# Patient Record
Sex: FEMALE | URBAN - METROPOLITAN AREA
[De-identification: names, ages, dates, MRNs, and addresses within clinical notes are randomized per-mention and may not be internally consistent; named-entity substitution may affect disease eponyms.]

---

## 2017-02-02 ENCOUNTER — EMERGENCY (EMERGENCY)
Facility: HOSPITAL | Age: 57
LOS: 1 days | Discharge: HOME | End: 2017-02-02
Attending: EMERGENCY MEDICINE
Payer: MEDICARE

## 2017-02-02 VITALS
TEMPERATURE: 98 F | SYSTOLIC BLOOD PRESSURE: 111 MMHG | HEIGHT: 56 IN | DIASTOLIC BLOOD PRESSURE: 71 MMHG | HEART RATE: 68 BPM | RESPIRATION RATE: 16 BRPM | OXYGEN SATURATION: 94 % | WEIGHT: 100.09 LBS

## 2017-02-02 VITALS
SYSTOLIC BLOOD PRESSURE: 98 MMHG | DIASTOLIC BLOOD PRESSURE: 61 MMHG | HEART RATE: 64 BPM | RESPIRATION RATE: 16 BRPM | OXYGEN SATURATION: 100 % | TEMPERATURE: 98 F

## 2017-02-02 DIAGNOSIS — Z00.00 ENCOUNTER FOR GENERAL ADULT MEDICAL EXAMINATION WITHOUT ABNORMAL FINDINGS: ICD-10-CM

## 2017-02-02 DIAGNOSIS — I49.1 ATRIAL PREMATURE DEPOLARIZATION: ICD-10-CM

## 2017-02-02 DIAGNOSIS — F79 UNSPECIFIED INTELLECTUAL DISABILITIES: ICD-10-CM

## 2017-02-02 DIAGNOSIS — R56.9 UNSPECIFIED CONVULSIONS: ICD-10-CM

## 2017-02-02 DIAGNOSIS — R10.2 PELVIC AND PERINEAL PAIN: ICD-10-CM

## 2017-02-02 LAB
ANION GAP SERPL CALC-SCNC: 11 MMOL/L — SIGNIFICANT CHANGE UP (ref 5–17)
APTT BLD: 30 SEC — SIGNIFICANT CHANGE UP (ref 27.5–37.4)
BUN SERPL-MCNC: 23 MG/DL — HIGH (ref 8–20)
CALCIUM SERPL-MCNC: 8.6 MG/DL — SIGNIFICANT CHANGE UP (ref 8.6–10.2)
CHLORIDE SERPL-SCNC: 99 MMOL/L — SIGNIFICANT CHANGE UP (ref 98–107)
CO2 SERPL-SCNC: 28 MMOL/L — SIGNIFICANT CHANGE UP (ref 22–29)
CREAT SERPL-MCNC: 0.79 MG/DL — SIGNIFICANT CHANGE UP (ref 0.5–1.3)
GLUCOSE SERPL-MCNC: 86 MG/DL — SIGNIFICANT CHANGE UP (ref 70–115)
HCT VFR BLD CALC: 38.3 % — SIGNIFICANT CHANGE UP (ref 37–47)
HGB BLD-MCNC: 12.6 G/DL — SIGNIFICANT CHANGE UP (ref 12–16)
INR BLD: 1.01 RATIO — SIGNIFICANT CHANGE UP (ref 0.88–1.16)
MCHC RBC-ENTMCNC: 32.4 PG — HIGH (ref 27–31)
MCHC RBC-ENTMCNC: 32.9 G/DL — SIGNIFICANT CHANGE UP (ref 32–36)
MCV RBC AUTO: 98.5 FL — SIGNIFICANT CHANGE UP (ref 81–99)
PLATELET # BLD AUTO: 264 K/UL — SIGNIFICANT CHANGE UP (ref 150–400)
POTASSIUM SERPL-MCNC: 5.6 MMOL/L — HIGH (ref 3.5–5.3)
POTASSIUM SERPL-SCNC: 5.6 MMOL/L — HIGH (ref 3.5–5.3)
PROTHROM AB SERPL-ACNC: 11.1 SEC — SIGNIFICANT CHANGE UP (ref 10–13.1)
RBC # BLD: 3.89 M/UL — LOW (ref 4.4–5.2)
RBC # FLD: 14.9 % — SIGNIFICANT CHANGE UP (ref 11–15.6)
SODIUM SERPL-SCNC: 138 MMOL/L — SIGNIFICANT CHANGE UP (ref 135–145)
WBC # BLD: 7.82 K/UL — SIGNIFICANT CHANGE UP (ref 4.8–10.8)
WBC # FLD AUTO: 7.82 K/UL — SIGNIFICANT CHANGE UP (ref 4.8–10.8)

## 2017-02-02 PROCEDURE — 80048 BASIC METABOLIC PNL TOTAL CA: CPT

## 2017-02-02 PROCEDURE — 70450 CT HEAD/BRAIN W/O DYE: CPT

## 2017-02-02 PROCEDURE — 70450 CT HEAD/BRAIN W/O DYE: CPT | Mod: 26

## 2017-02-02 PROCEDURE — 99284 EMERGENCY DEPT VISIT MOD MDM: CPT

## 2017-02-02 PROCEDURE — 85610 PROTHROMBIN TIME: CPT

## 2017-02-02 PROCEDURE — 85027 COMPLETE CBC AUTOMATED: CPT

## 2017-02-02 PROCEDURE — 85730 THROMBOPLASTIN TIME PARTIAL: CPT

## 2017-02-02 PROCEDURE — 99284 EMERGENCY DEPT VISIT MOD MDM: CPT | Mod: 25

## 2017-02-02 RX ORDER — SODIUM CHLORIDE 9 MG/ML
3 INJECTION INTRAMUSCULAR; INTRAVENOUS; SUBCUTANEOUS ONCE
Qty: 0 | Refills: 0 | Status: COMPLETED | OUTPATIENT
Start: 2017-02-02 | End: 2017-02-02

## 2017-02-02 RX ORDER — RISPERIDONE 4 MG/1
0 TABLET ORAL
Qty: 0 | Refills: 0 | COMMUNITY

## 2017-02-02 RX ORDER — LEVOTHYROXINE SODIUM 125 MCG
0 TABLET ORAL
Qty: 0 | Refills: 0 | COMMUNITY

## 2017-02-02 RX ORDER — MEMANTINE HYDROCHLORIDE 10 MG/1
0 TABLET ORAL
Qty: 0 | Refills: 0 | COMMUNITY

## 2017-02-02 RX ORDER — SERTRALINE 25 MG/1
0 TABLET, FILM COATED ORAL
Qty: 0 | Refills: 0 | COMMUNITY

## 2017-02-02 RX ADMIN — SODIUM CHLORIDE 3 MILLILITER(S): 9 INJECTION INTRAMUSCULAR; INTRAVENOUS; SUBCUTANEOUS at 18:21

## 2017-02-02 NOTE — ED ADULT TRIAGE NOTE - CHIEF COMPLAINT QUOTE
BIBA, patient is awake and oriented to person only, near baseline, per ems, patient was sent by tere LAKHANI for eval of a seizure, patient denies any pain or injury

## 2017-02-02 NOTE — ED PROVIDER NOTE - CHPI ED SYMPTOMS NEG
no fever/no pain/no numbness/no tingling/no nausea/no decreased eating/drinking/no vomiting/no weakness/no chills/no dizziness

## 2017-02-02 NOTE — ED PROVIDER NOTE - OBJECTIVE STATEMENT
PT IS A 57 YO FEMALE WITH MENTAL RETARDATION, MICROCEPHALY,  WHO PRESENTS AFTER BEING FOUND DOWN ON THE GROUND. PT WAS IN HER WHEELCHAIR WAITING TO LOAD INTO THE TRANSPORTATION VEHICLE AND WHEN STAFF WENT TO LOAD HER SHE WAS ON THE GROUND.  NO SEIZURE ACTIVITY SEEN. NO LOC.  NO VOMITING. NO ABNORMAL BEHAVIOR. PT IS UNDERGOING NEUROLOGIC EVALUATION BY  NEURO FOR RECENT EPISODES OF FACIAL TWITCHING. SHE IS SCHEDULED FOR AN EEG AND HAD A RECENT CT HEAD APPROXIMATELY 2-4 WEEKS AGO. RESULTS UNKNOWN.

## 2017-02-02 NOTE — ED ADULT NURSE NOTE - OBJECTIVE STATEMENT
Patient is awake and oriented to person only. Patient was sent by tere LAKHANI for eval of a seizure. patient of history downs syndrome. VSS. clear BBS. Abd soft, nondistended and nontender. moving all extremities well. Patient is awake and oriented to person only. s/p fall. no injury noted. no loc. Denies head injury. Patient was sent by tere LAKHANI for eval of a seizure. patient of history downs syndrome. VSS. clear BBS. Abd soft, nondistended and nontender. moving all extremities well. Patient is awake and oriented to person only (baseline). s/p fall out of the chair, last seeing in the chair as per EMS. no injury noted. no loc. Denies head injury. Patient was sent by tere LAKHANI for eval of a seizure. patient of history downs syndrome. VSS. clear BBS. Abd soft, nondistended and nontender. moving all extremities well.

## 2017-02-02 NOTE — ED PROVIDER NOTE - MEDICAL DECISION MAKING DETAILS
PT FOUND ON GROUND. LIKELY FALL OUT OF WHEELCHAIR. CHECKING LABS AND CT HEAD TO RULE OUT ACUTE PATHOLOGY. PT UNDERGOING NEUROLOGIC EVALUATION. MOST LIKELY DOES NOT HAVE UPPER BODY STRENGTH TO SELF MAINTAIN IN WHEELCHAIR. LABS AND CT ORDERED PT FOUND ON GROUND. LIKELY FALL OUT OF WHEELCHAIR. CHECKING LABS AND CT HEAD TO RULE OUT ACUTE PATHOLOGY. PT UNDERGOING NEUROLOGIC EVALUATION. MOST LIKELY DOES NOT HAVE UPPER BODY STRENGTH TO SELF MAINTAIN IN WHEELCHAIR. LABS AND CT ORDERED  NORMAL EXAM AND RESULTS. AMBULATORY. D/C

## 2019-01-01 ENCOUNTER — APPOINTMENT (OUTPATIENT)
Dept: RADIOLOGY | Facility: HOSPITAL | Age: 59
End: 2019-01-01
Payer: MEDICARE

## 2019-01-01 ENCOUNTER — OUTPATIENT (OUTPATIENT)
Dept: OUTPATIENT SERVICES | Facility: HOSPITAL | Age: 59
LOS: 1 days | End: 2019-01-01
Payer: MEDICARE

## 2019-01-01 ENCOUNTER — EMERGENCY (EMERGENCY)
Facility: HOSPITAL | Age: 59
LOS: 1 days | Discharge: HOME | End: 2019-01-01
Attending: INTERNAL MEDICINE | Admitting: EMERGENCY MEDICINE
Payer: MEDICARE

## 2019-01-01 ENCOUNTER — APPOINTMENT (OUTPATIENT)
Dept: ULTRASOUND IMAGING | Facility: HOSPITAL | Age: 59
End: 2019-01-01
Payer: MEDICARE

## 2019-01-01 VITALS
SYSTOLIC BLOOD PRESSURE: 114 MMHG | DIASTOLIC BLOOD PRESSURE: 65 MMHG | HEART RATE: 59 BPM | OXYGEN SATURATION: 100 % | RESPIRATION RATE: 16 BRPM

## 2019-01-01 VITALS
WEIGHT: 145.06 LBS | HEIGHT: 62 IN | OXYGEN SATURATION: 98 % | HEART RATE: 80 BPM | SYSTOLIC BLOOD PRESSURE: 92 MMHG | RESPIRATION RATE: 16 BRPM | TEMPERATURE: 97 F | DIASTOLIC BLOOD PRESSURE: 58 MMHG

## 2019-01-01 DIAGNOSIS — Z00.8 ENCOUNTER FOR OTHER GENERAL EXAMINATION: ICD-10-CM

## 2019-01-01 LAB
-  AMIKACIN: SIGNIFICANT CHANGE UP
-  AMPICILLIN/SULBACTAM: SIGNIFICANT CHANGE UP
-  AMPICILLIN: SIGNIFICANT CHANGE UP
-  AZTREONAM: SIGNIFICANT CHANGE UP
-  CEFAZOLIN: SIGNIFICANT CHANGE UP
-  CEFEPIME: SIGNIFICANT CHANGE UP
-  CEFOXITIN: SIGNIFICANT CHANGE UP
-  CEFTRIAXONE: SIGNIFICANT CHANGE UP
-  CIPROFLOXACIN: SIGNIFICANT CHANGE UP
-  GENTAMICIN: SIGNIFICANT CHANGE UP
-  IMIPENEM: SIGNIFICANT CHANGE UP
-  LEVOFLOXACIN: SIGNIFICANT CHANGE UP
-  MEROPENEM: SIGNIFICANT CHANGE UP
-  NITROFURANTOIN: SIGNIFICANT CHANGE UP
-  PIPERACILLIN/TAZOBACTAM: SIGNIFICANT CHANGE UP
-  TIGECYCLINE: SIGNIFICANT CHANGE UP
-  TOBRAMYCIN: SIGNIFICANT CHANGE UP
-  TRIMETHOPRIM/SULFAMETHOXAZOLE: SIGNIFICANT CHANGE UP
ALBUMIN SERPL ELPH-MCNC: 2.8 G/DL — LOW (ref 3.3–5)
ALP SERPL-CCNC: 121 U/L — HIGH (ref 40–120)
ALT FLD-CCNC: 38 U/L — SIGNIFICANT CHANGE UP (ref 10–45)
ANION GAP SERPL CALC-SCNC: 6 MMOL/L — SIGNIFICANT CHANGE UP (ref 5–17)
ANION GAP SERPL CALC-SCNC: 9 MMOL/L — SIGNIFICANT CHANGE UP (ref 5–17)
APPEARANCE UR: CLEAR — SIGNIFICANT CHANGE UP
AST SERPL-CCNC: 43 U/L — HIGH (ref 10–40)
BACTERIA # UR AUTO: ABNORMAL /HPF
BASOPHILS # BLD AUTO: 0.06 K/UL — SIGNIFICANT CHANGE UP (ref 0–0.2)
BASOPHILS NFR BLD AUTO: 1.2 % — SIGNIFICANT CHANGE UP (ref 0–2)
BILIRUB SERPL-MCNC: 0.3 MG/DL — SIGNIFICANT CHANGE UP (ref 0.2–1.2)
BILIRUB UR-MCNC: NEGATIVE — SIGNIFICANT CHANGE UP
BUN SERPL-MCNC: 16 MG/DL — SIGNIFICANT CHANGE UP (ref 7–23)
BUN SERPL-MCNC: 17 MG/DL — SIGNIFICANT CHANGE UP (ref 7–23)
CALCIUM SERPL-MCNC: 8.3 MG/DL — LOW (ref 8.4–10.5)
CALCIUM SERPL-MCNC: 8.8 MG/DL — SIGNIFICANT CHANGE UP (ref 8.4–10.5)
CHLORIDE SERPL-SCNC: 105 MMOL/L — SIGNIFICANT CHANGE UP (ref 96–108)
CHLORIDE SERPL-SCNC: 106 MMOL/L — SIGNIFICANT CHANGE UP (ref 96–108)
CO2 SERPL-SCNC: 25 MMOL/L — SIGNIFICANT CHANGE UP (ref 22–31)
CO2 SERPL-SCNC: 30 MMOL/L — SIGNIFICANT CHANGE UP (ref 22–31)
COLOR SPEC: YELLOW — SIGNIFICANT CHANGE UP
CREAT SERPL-MCNC: 0.82 MG/DL — SIGNIFICANT CHANGE UP (ref 0.5–1.3)
CREAT SERPL-MCNC: 0.9 MG/DL — SIGNIFICANT CHANGE UP (ref 0.5–1.3)
CULTURE RESULTS: SIGNIFICANT CHANGE UP
DIFF PNL FLD: NEGATIVE — SIGNIFICANT CHANGE UP
EOSINOPHIL # BLD AUTO: 0.06 K/UL — SIGNIFICANT CHANGE UP (ref 0–0.5)
EOSINOPHIL NFR BLD AUTO: 1.2 % — SIGNIFICANT CHANGE UP (ref 0–6)
EPI CELLS # UR: SIGNIFICANT CHANGE UP
GLUCOSE SERPL-MCNC: 149 MG/DL — HIGH (ref 70–99)
GLUCOSE SERPL-MCNC: 97 MG/DL — SIGNIFICANT CHANGE UP (ref 70–99)
GLUCOSE UR QL: NEGATIVE — SIGNIFICANT CHANGE UP
HCT VFR BLD CALC: 40.3 % — SIGNIFICANT CHANGE UP (ref 34.5–45)
HGB BLD-MCNC: 13.4 G/DL — SIGNIFICANT CHANGE UP (ref 11.5–15.5)
IMM GRANULOCYTES NFR BLD AUTO: 0.2 % — SIGNIFICANT CHANGE UP (ref 0–1.5)
KETONES UR-MCNC: NEGATIVE — SIGNIFICANT CHANGE UP
LEUKOCYTE ESTERASE UR-ACNC: ABNORMAL
LYMPHOCYTES # BLD AUTO: 1.47 K/UL — SIGNIFICANT CHANGE UP (ref 1–3.3)
LYMPHOCYTES # BLD AUTO: 29.5 % — SIGNIFICANT CHANGE UP (ref 13–44)
MCHC RBC-ENTMCNC: 33.3 GM/DL — SIGNIFICANT CHANGE UP (ref 32–36)
MCHC RBC-ENTMCNC: 34.2 PG — HIGH (ref 27–34)
MCV RBC AUTO: 102.8 FL — HIGH (ref 80–100)
METHOD TYPE: SIGNIFICANT CHANGE UP
MONOCYTES # BLD AUTO: 0.52 K/UL — SIGNIFICANT CHANGE UP (ref 0–0.9)
MONOCYTES NFR BLD AUTO: 10.4 % — SIGNIFICANT CHANGE UP (ref 2–14)
NEUTROPHILS # BLD AUTO: 2.86 K/UL — SIGNIFICANT CHANGE UP (ref 1.8–7.4)
NEUTROPHILS NFR BLD AUTO: 57.5 % — SIGNIFICANT CHANGE UP (ref 43–77)
NITRITE UR-MCNC: POSITIVE
NRBC # BLD: 0 /100 WBCS — SIGNIFICANT CHANGE UP (ref 0–0)
ORGANISM # SPEC MICROSCOPIC CNT: SIGNIFICANT CHANGE UP
ORGANISM # SPEC MICROSCOPIC CNT: SIGNIFICANT CHANGE UP
PH UR: 6.5 — SIGNIFICANT CHANGE UP (ref 5–8)
PLATELET # BLD AUTO: 200 K/UL — SIGNIFICANT CHANGE UP (ref 150–400)
POTASSIUM SERPL-MCNC: 5.7 MMOL/L — HIGH (ref 3.5–5.3)
POTASSIUM SERPL-MCNC: 6 MMOL/L — HIGH (ref 3.5–5.3)
POTASSIUM SERPL-SCNC: 5.7 MMOL/L — HIGH (ref 3.5–5.3)
POTASSIUM SERPL-SCNC: 6 MMOL/L — HIGH (ref 3.5–5.3)
PROT SERPL-MCNC: 7.2 G/DL — SIGNIFICANT CHANGE UP (ref 6–8.3)
PROT UR-MCNC: NEGATIVE — SIGNIFICANT CHANGE UP
RBC # BLD: 3.92 M/UL — SIGNIFICANT CHANGE UP (ref 3.8–5.2)
RBC # FLD: 13.4 % — SIGNIFICANT CHANGE UP (ref 10.3–14.5)
RBC CASTS # UR COMP ASSIST: SIGNIFICANT CHANGE UP /HPF (ref 0–4)
SODIUM SERPL-SCNC: 139 MMOL/L — SIGNIFICANT CHANGE UP (ref 135–145)
SODIUM SERPL-SCNC: 142 MMOL/L — SIGNIFICANT CHANGE UP (ref 135–145)
SP GR SPEC: 1.01 — SIGNIFICANT CHANGE UP (ref 1.01–1.02)
SPECIMEN SOURCE: SIGNIFICANT CHANGE UP
UROBILINOGEN FLD QL: NEGATIVE — SIGNIFICANT CHANGE UP
WBC # BLD: 4.98 K/UL — SIGNIFICANT CHANGE UP (ref 3.8–10.5)
WBC # FLD AUTO: 4.98 K/UL — SIGNIFICANT CHANGE UP (ref 3.8–10.5)
WBC UR QL: ABNORMAL /HPF (ref 0–5)

## 2019-01-01 PROCEDURE — 80053 COMPREHEN METABOLIC PANEL: CPT

## 2019-01-01 PROCEDURE — 85027 COMPLETE CBC AUTOMATED: CPT

## 2019-01-01 PROCEDURE — 81001 URINALYSIS AUTO W/SCOPE: CPT

## 2019-01-01 PROCEDURE — 96365 THER/PROPH/DIAG IV INF INIT: CPT

## 2019-01-01 PROCEDURE — 87186 SC STD MICRODIL/AGAR DIL: CPT

## 2019-01-01 PROCEDURE — 76641 ULTRASOUND BREAST COMPLETE: CPT | Mod: 26,50

## 2019-01-01 PROCEDURE — 77080 DXA BONE DENSITY AXIAL: CPT | Mod: 26

## 2019-01-01 PROCEDURE — 77080 DXA BONE DENSITY AXIAL: CPT

## 2019-01-01 PROCEDURE — 80048 BASIC METABOLIC PNL TOTAL CA: CPT

## 2019-01-01 PROCEDURE — 99284 EMERGENCY DEPT VISIT MOD MDM: CPT

## 2019-01-01 PROCEDURE — 76641 ULTRASOUND BREAST COMPLETE: CPT

## 2019-01-01 PROCEDURE — 36415 COLL VENOUS BLD VENIPUNCTURE: CPT

## 2019-01-01 PROCEDURE — 99284 EMERGENCY DEPT VISIT MOD MDM: CPT | Mod: 25

## 2019-01-01 PROCEDURE — 87086 URINE CULTURE/COLONY COUNT: CPT

## 2019-01-01 PROCEDURE — 96367 TX/PROPH/DG ADDL SEQ IV INF: CPT

## 2019-01-01 PROCEDURE — 96368 THER/DIAG CONCURRENT INF: CPT

## 2019-01-01 RX ORDER — LEVETIRACETAM 250 MG/1
500 TABLET, FILM COATED ORAL ONCE
Refills: 0 | Status: COMPLETED | OUTPATIENT
Start: 2019-01-01 | End: 2019-01-01

## 2019-01-01 RX ORDER — CIPROFLOXACIN LACTATE 400MG/40ML
200 VIAL (ML) INTRAVENOUS ONCE
Refills: 0 | Status: COMPLETED | OUTPATIENT
Start: 2019-01-01 | End: 2019-01-01

## 2019-01-01 RX ORDER — AZTREONAM 2 G
1000 VIAL (EA) INJECTION ONCE
Refills: 0 | Status: COMPLETED | OUTPATIENT
Start: 2019-01-01 | End: 2019-01-01

## 2019-01-01 RX ORDER — NITROFURANTOIN MACROCRYSTAL 50 MG
1 CAPSULE ORAL
Qty: 20 | Refills: 0
Start: 2019-01-01 | End: 2020-01-01

## 2019-01-01 RX ORDER — SODIUM CHLORIDE 9 MG/ML
1000 INJECTION INTRAMUSCULAR; INTRAVENOUS; SUBCUTANEOUS ONCE
Refills: 0 | Status: COMPLETED | OUTPATIENT
Start: 2019-01-01 | End: 2019-01-01

## 2019-01-01 RX ORDER — CEFTRIAXONE 500 MG/1
1000 INJECTION, POWDER, FOR SOLUTION INTRAMUSCULAR; INTRAVENOUS ONCE
Refills: 0 | Status: DISCONTINUED | OUTPATIENT
Start: 2019-01-01 | End: 2019-01-01

## 2019-01-01 RX ADMIN — Medication 1000 MILLIGRAM(S): at 16:34

## 2019-01-01 RX ADMIN — SODIUM CHLORIDE 1000 MILLILITER(S): 9 INJECTION INTRAMUSCULAR; INTRAVENOUS; SUBCUTANEOUS at 14:06

## 2019-01-01 RX ADMIN — Medication 200 MILLIGRAM(S): at 15:42

## 2019-01-01 RX ADMIN — LEVETIRACETAM 400 MILLIGRAM(S): 250 TABLET, FILM COATED ORAL at 14:13

## 2019-01-01 RX ADMIN — SODIUM CHLORIDE 1000 MILLILITER(S): 9 INJECTION INTRAMUSCULAR; INTRAVENOUS; SUBCUTANEOUS at 15:44

## 2019-01-01 RX ADMIN — Medication 100 MILLIGRAM(S): at 14:31

## 2019-01-01 RX ADMIN — LEVETIRACETAM 500 MILLIGRAM(S): 250 TABLET, FILM COATED ORAL at 15:42

## 2019-01-01 RX ADMIN — Medication 50 MILLIGRAM(S): at 15:44

## 2019-08-08 ENCOUNTER — TRANSCRIPTION ENCOUNTER (OUTPATIENT)
Age: 59
End: 2019-08-08

## 2019-08-21 PROBLEM — Z00.00 ENCOUNTER FOR PREVENTIVE HEALTH EXAMINATION: Status: ACTIVE | Noted: 2019-08-21

## 2019-09-23 ENCOUNTER — EMERGENCY (EMERGENCY)
Facility: HOSPITAL | Age: 59
LOS: 1 days | Discharge: HOME | End: 2019-09-23
Attending: EMERGENCY MEDICINE | Admitting: EMERGENCY MEDICINE
Payer: MEDICARE

## 2019-09-23 VITALS
RESPIRATION RATE: 17 BRPM | DIASTOLIC BLOOD PRESSURE: 76 MMHG | TEMPERATURE: 98 F | WEIGHT: 98.99 LBS | HEART RATE: 77 BPM | OXYGEN SATURATION: 94 % | SYSTOLIC BLOOD PRESSURE: 128 MMHG

## 2019-09-23 VITALS
DIASTOLIC BLOOD PRESSURE: 55 MMHG | HEART RATE: 76 BPM | OXYGEN SATURATION: 95 % | RESPIRATION RATE: 16 BRPM | SYSTOLIC BLOOD PRESSURE: 109 MMHG

## 2019-09-23 LAB
ALBUMIN SERPL ELPH-MCNC: 2.9 G/DL — LOW (ref 3.3–5)
ALP SERPL-CCNC: 268 U/L — HIGH (ref 40–120)
ALT FLD-CCNC: 22 U/L — SIGNIFICANT CHANGE UP (ref 10–45)
ANION GAP SERPL CALC-SCNC: 6 MMOL/L — SIGNIFICANT CHANGE UP (ref 5–17)
APPEARANCE UR: CLEAR — SIGNIFICANT CHANGE UP
AST SERPL-CCNC: 27 U/L — SIGNIFICANT CHANGE UP (ref 10–40)
BACTERIA # UR AUTO: ABNORMAL /HPF
BASOPHILS # BLD AUTO: 0.07 K/UL — SIGNIFICANT CHANGE UP (ref 0–0.2)
BASOPHILS NFR BLD AUTO: 1.6 % — SIGNIFICANT CHANGE UP (ref 0–2)
BILIRUB SERPL-MCNC: 0.3 MG/DL — SIGNIFICANT CHANGE UP (ref 0.2–1.2)
BILIRUB UR-MCNC: NEGATIVE — SIGNIFICANT CHANGE UP
BUN SERPL-MCNC: 13 MG/DL — SIGNIFICANT CHANGE UP (ref 7–23)
CALCIUM SERPL-MCNC: 9.6 MG/DL — SIGNIFICANT CHANGE UP (ref 8.4–10.5)
CHLORIDE SERPL-SCNC: 105 MMOL/L — SIGNIFICANT CHANGE UP (ref 96–108)
CO2 SERPL-SCNC: 30 MMOL/L — SIGNIFICANT CHANGE UP (ref 22–31)
COLOR SPEC: YELLOW — SIGNIFICANT CHANGE UP
CREAT SERPL-MCNC: 0.78 MG/DL — SIGNIFICANT CHANGE UP (ref 0.5–1.3)
DIFF PNL FLD: NEGATIVE — SIGNIFICANT CHANGE UP
EOSINOPHIL # BLD AUTO: 0.05 K/UL — SIGNIFICANT CHANGE UP (ref 0–0.5)
EOSINOPHIL NFR BLD AUTO: 1.2 % — SIGNIFICANT CHANGE UP (ref 0–6)
EPI CELLS # UR: SIGNIFICANT CHANGE UP
GLUCOSE SERPL-MCNC: 104 MG/DL — HIGH (ref 70–99)
GLUCOSE UR QL: NEGATIVE — SIGNIFICANT CHANGE UP
HCT VFR BLD CALC: 41.5 % — SIGNIFICANT CHANGE UP (ref 34.5–45)
HGB BLD-MCNC: 13.6 G/DL — SIGNIFICANT CHANGE UP (ref 11.5–15.5)
IMM GRANULOCYTES NFR BLD AUTO: 0.2 % — SIGNIFICANT CHANGE UP (ref 0–1.5)
KETONES UR-MCNC: NEGATIVE — SIGNIFICANT CHANGE UP
LEUKOCYTE ESTERASE UR-ACNC: ABNORMAL
LYMPHOCYTES # BLD AUTO: 1.94 K/UL — SIGNIFICANT CHANGE UP (ref 1–3.3)
LYMPHOCYTES # BLD AUTO: 45 % — HIGH (ref 13–44)
MCHC RBC-ENTMCNC: 32.8 GM/DL — SIGNIFICANT CHANGE UP (ref 32–36)
MCHC RBC-ENTMCNC: 34.1 PG — HIGH (ref 27–34)
MCV RBC AUTO: 104 FL — HIGH (ref 80–100)
MONOCYTES # BLD AUTO: 0.57 K/UL — SIGNIFICANT CHANGE UP (ref 0–0.9)
MONOCYTES NFR BLD AUTO: 13.2 % — SIGNIFICANT CHANGE UP (ref 2–14)
NEUTROPHILS # BLD AUTO: 1.67 K/UL — LOW (ref 1.8–7.4)
NEUTROPHILS NFR BLD AUTO: 38.8 % — LOW (ref 43–77)
NITRITE UR-MCNC: POSITIVE
NRBC # BLD: 0 /100 WBCS — SIGNIFICANT CHANGE UP (ref 0–0)
PH UR: 6 — SIGNIFICANT CHANGE UP (ref 5–8)
PLATELET # BLD AUTO: 292 K/UL — SIGNIFICANT CHANGE UP (ref 150–400)
POTASSIUM SERPL-MCNC: 4.5 MMOL/L — SIGNIFICANT CHANGE UP (ref 3.5–5.3)
POTASSIUM SERPL-SCNC: 4.5 MMOL/L — SIGNIFICANT CHANGE UP (ref 3.5–5.3)
PROT SERPL-MCNC: 8.1 G/DL — SIGNIFICANT CHANGE UP (ref 6–8.3)
PROT UR-MCNC: NEGATIVE — SIGNIFICANT CHANGE UP
RBC # BLD: 3.99 M/UL — SIGNIFICANT CHANGE UP (ref 3.8–5.2)
RBC # FLD: 12.3 % — SIGNIFICANT CHANGE UP (ref 10.3–14.5)
RBC CASTS # UR COMP ASSIST: SIGNIFICANT CHANGE UP /HPF (ref 0–4)
SODIUM SERPL-SCNC: 141 MMOL/L — SIGNIFICANT CHANGE UP (ref 135–145)
SP GR SPEC: 1.01 — SIGNIFICANT CHANGE UP (ref 1.01–1.02)
UROBILINOGEN FLD QL: NEGATIVE — SIGNIFICANT CHANGE UP
WBC # BLD: 4.31 K/UL — SIGNIFICANT CHANGE UP (ref 3.8–10.5)
WBC # FLD AUTO: 4.31 K/UL — SIGNIFICANT CHANGE UP (ref 3.8–10.5)
WBC UR QL: SIGNIFICANT CHANGE UP /HPF (ref 0–5)

## 2019-09-23 PROCEDURE — 99283 EMERGENCY DEPT VISIT LOW MDM: CPT

## 2019-09-23 PROCEDURE — 80053 COMPREHEN METABOLIC PANEL: CPT

## 2019-09-23 PROCEDURE — 36415 COLL VENOUS BLD VENIPUNCTURE: CPT

## 2019-09-23 PROCEDURE — 85027 COMPLETE CBC AUTOMATED: CPT

## 2019-09-23 PROCEDURE — 99284 EMERGENCY DEPT VISIT MOD MDM: CPT

## 2019-09-23 PROCEDURE — 87086 URINE CULTURE/COLONY COUNT: CPT

## 2019-09-23 PROCEDURE — 81001 URINALYSIS AUTO W/SCOPE: CPT

## 2019-09-23 PROCEDURE — 87186 SC STD MICRODIL/AGAR DIL: CPT

## 2019-09-23 RX ORDER — AZTREONAM 2 G
1 VIAL (EA) INJECTION
Qty: 14 | Refills: 0
Start: 2019-09-23 | End: 2019-09-29

## 2019-09-23 RX ADMIN — Medication 1 TABLET(S): at 20:35

## 2019-09-23 NOTE — ED PROVIDER NOTE - OBJECTIVE STATEMENT
58 y/o F with h/o Down Syndrome, Hypothyroidism, frequent UTI's pw "foul smelling urine" and "shaking" x 1 day. No known fever, abdominal pain, nausea, vomiting, 60 y/o F sent in from [facility] with h/o Down Syndrome, Hypothyroidism, frequent UTI's pw "foul smelling urine" and "shaking" x 1 day. No known fever, abdominal pain, nausea, vomiting, diarrhea. Accompanied with aid.  JACKSON- Snow

## 2019-09-23 NOTE — ED PROVIDER NOTE - CLINICAL SUMMARY MEDICAL DECISION MAKING FREE TEXT BOX
58 y/o F sent in from [facility] with h/o Down Syndrome, Hypothyroidism, frequent UTI's pw "foul smelling urine" and "shaking" x 1 day. No known fever, abdominal pain, nausea, vomiting, diarrhea. Responds to tactile and verbal stimulation but does not follow commands nor answer questions. Vital signs stable upon arrival. Will obtain basic labs, UA, UCX, administer ABX, PMD follow up Rangel: 58 y/o F sent in from [facility] with h/o Down Syndrome, Hypothyroidism, frequent UTI's pw "foul smelling urine" and "shaking" x 1 day. No known fever, abdominal pain, nausea, vomiting, diarrhea. Responds to tactile and verbal stimulation but does not follow commands nor answer questions. Vital signs stable upon arrival. Will obtain basic labs, UA, UCX, administer ABX, PMD follow up

## 2019-09-23 NOTE — ED ADULT NURSE NOTE - NSIMPLEMENTINTERV_GEN_ALL_ED
Implemented All Universal Safety Interventions:  [place] to call system. Call bell, personal items and telephone within reach. Instruct patient to call for assistance. Room bathroom lighting operational. Non-slip footwear when patient is off stretcher. Physically safe environment: no spills, clutter or unnecessary equipment. Stretcher in lowest position, wheels locked, appropriate side rails in place.

## 2019-09-23 NOTE — ED ADULT NURSE NOTE - CHIEF COMPLAINT QUOTE
Pt from P, aide stated pt lethargic and shaking, stated she has strong smelling urine, sent here to r/o UTI

## 2019-09-23 NOTE — ED PROVIDER NOTE - NSFOLLOWUPINSTRUCTIONS_ED_ALL_ED_FT
Follow up with your PMD within 48-72 hours.   Increase your fluids.    Take Bactrim DS 1 tab 2x/day for 7 days.    Worsening, continued or ANY new concerning symptoms return to the emergency department.

## 2019-09-23 NOTE — ED PROVIDER NOTE - PMH
Immediate Brief Procedure Note    Patient: Heather Roche    Pre-op Dx: Acquired absence of bilateral breasts and nipples after mastectomies for right breast carcinoma    Post-op Dx: Same    Procedure: Bilateral immediate breast reconstruction with prepectoral tissue expanders and acellular dermal matrix (Allergan 133 MV 13 T4 00 cc expanders filled to 100 cc of air the right and 125 cc on the left,  SN [id number] for the right SN [id number] for the left, and two pieces and medium contour ready to use thick perforated AlloDerm sewn together per side).  Placement of Prevena 13 Duo  negative pressure incisional dressings    Surgeon:  Pradip Bonner MD    Assistants: MD Daya Gomez SA    Anesthesia Staff: Anesthesiologist: Donovan De Leon MD  Anesthesia Technician: Man Treadwell    Anesthesia Type: general     Findings: breast weight 273 gm left and 260 gm on right   4 drains , all skin margins viable at completion    Estimated Blood Loss: 75 ml    Complications: none    Specimens Removed: none   Down syndrome    Frequent urinary tract infections    Hypothyroidism, unspecified type

## 2019-09-23 NOTE — ED PROVIDER NOTE - PATIENT PORTAL LINK FT
You can access the FollowMyHealth Patient Portal offered by [facility] by registering at the following website: [email or web address]. By joining [facility]’s FollowMyHealth portal, you will also be able to view your health information using other applications (apps) compatible with our system.

## 2019-09-25 ENCOUNTER — LABORATORY RESULT (OUTPATIENT)
Age: 59
End: 2019-09-25

## 2019-09-25 ENCOUNTER — OUTPATIENT (OUTPATIENT)
Dept: OUTPATIENT SERVICES | Facility: HOSPITAL | Age: 59
LOS: 1 days | End: 2019-09-25
Payer: MEDICARE

## 2019-09-25 ENCOUNTER — APPOINTMENT (OUTPATIENT)
Dept: OBGYN | Facility: CLINIC | Age: 59
End: 2019-09-25
Payer: MEDICARE

## 2019-09-25 DIAGNOSIS — Z01.419 ENCOUNTER FOR GYNECOLOGICAL EXAMINATION (GENERAL) (ROUTINE) WITHOUT ABNORMAL FINDINGS: ICD-10-CM

## 2019-09-25 DIAGNOSIS — N76.0 ACUTE VAGINITIS: ICD-10-CM

## 2019-09-25 PROCEDURE — 87624 HPV HI-RISK TYP POOLED RSLT: CPT

## 2019-09-25 PROCEDURE — G0463: CPT

## 2019-09-25 PROCEDURE — 99203 OFFICE O/P NEW LOW 30 MIN: CPT | Mod: GE

## 2019-09-25 NOTE — PHYSICAL EXAM
[Awake] : awake [Alert] : alert [Tender] : tender [Soft] : soft [No Lesions] : no genitalia lesions [Vulvar Atrophy] : vulvar atrophy [Labia Majora] : labia major [Normal] : uterus [Atrophy] : atrophy [Dry Mucosa] : dry mucosa [No Bleeding] : there was no active vaginal bleeding [Pap Obtained] : a Pap smear was performed [Acute Distress] : no acute distress [Distended] : not distended [H/Smegaly] : no hepatosplenomegaly [Discharge] : no discharge [Uterine Prolapse] : no uterine prolapse [FreeTextEntry6] : normal

## 2019-09-26 PROBLEM — N39.0 URINARY TRACT INFECTION, SITE NOT SPECIFIED: Chronic | Status: ACTIVE | Noted: 2019-09-23

## 2019-09-26 PROBLEM — Q90.9 DOWN SYNDROME, UNSPECIFIED: Chronic | Status: ACTIVE | Noted: 2019-09-23

## 2019-09-26 PROBLEM — E03.9 HYPOTHYROIDISM, UNSPECIFIED: Chronic | Status: ACTIVE | Noted: 2019-09-23

## 2019-09-26 LAB
-  AMIKACIN: SIGNIFICANT CHANGE UP
-  AMPICILLIN/SULBACTAM: SIGNIFICANT CHANGE UP
-  AMPICILLIN: SIGNIFICANT CHANGE UP
-  AZTREONAM: SIGNIFICANT CHANGE UP
-  CEFAZOLIN: SIGNIFICANT CHANGE UP
-  CEFEPIME: SIGNIFICANT CHANGE UP
-  CEFOXITIN: SIGNIFICANT CHANGE UP
-  CEFTRIAXONE: SIGNIFICANT CHANGE UP
-  CIPROFLOXACIN: SIGNIFICANT CHANGE UP
-  GENTAMICIN: SIGNIFICANT CHANGE UP
-  IMIPENEM: SIGNIFICANT CHANGE UP
-  LEVOFLOXACIN: SIGNIFICANT CHANGE UP
-  MEROPENEM: SIGNIFICANT CHANGE UP
-  NITROFURANTOIN: SIGNIFICANT CHANGE UP
-  PIPERACILLIN/TAZOBACTAM: SIGNIFICANT CHANGE UP
-  TIGECYCLINE: SIGNIFICANT CHANGE UP
-  TOBRAMYCIN: SIGNIFICANT CHANGE UP
-  TRIMETHOPRIM/SULFAMETHOXAZOLE: SIGNIFICANT CHANGE UP
CULTURE RESULTS: SIGNIFICANT CHANGE UP
HPV HIGH+LOW RISK DNA PNL CVX: SIGNIFICANT CHANGE UP
METHOD TYPE: SIGNIFICANT CHANGE UP
ORGANISM # SPEC MICROSCOPIC CNT: SIGNIFICANT CHANGE UP
ORGANISM # SPEC MICROSCOPIC CNT: SIGNIFICANT CHANGE UP
SPECIMEN SOURCE: SIGNIFICANT CHANGE UP

## 2019-09-27 RX ORDER — CIPROFLOXACIN LACTATE 400MG/40ML
1 VIAL (ML) INTRAVENOUS
Qty: 14 | Refills: 0
Start: 2019-09-27 | End: 2019-10-03

## 2019-09-29 DIAGNOSIS — N39.0 URINARY TRACT INFECTION, SITE NOT SPECIFIED: ICD-10-CM

## 2019-09-30 LAB — CYTOLOGY SPEC DOC CYTO: SIGNIFICANT CHANGE UP

## 2019-10-09 ENCOUNTER — APPOINTMENT (OUTPATIENT)
Dept: MAMMOGRAPHY | Facility: HOSPITAL | Age: 59
End: 2019-10-09

## 2019-10-09 ENCOUNTER — APPOINTMENT (OUTPATIENT)
Dept: RADIOLOGY | Facility: HOSPITAL | Age: 59
End: 2019-10-09

## 2019-10-30 DIAGNOSIS — Z01.419 ENCOUNTER FOR GYNECOLOGICAL EXAMINATION (GENERAL) (ROUTINE) W/OUT ABNORMAL FINDINGS: ICD-10-CM

## 2019-11-11 ENCOUNTER — APPOINTMENT (OUTPATIENT)
Dept: ULTRASOUND IMAGING | Facility: HOSPITAL | Age: 59
End: 2019-11-11

## 2019-11-11 ENCOUNTER — APPOINTMENT (OUTPATIENT)
Dept: RADIOLOGY | Facility: HOSPITAL | Age: 59
End: 2019-11-11

## 2019-11-27 ENCOUNTER — EMERGENCY (EMERGENCY)
Facility: HOSPITAL | Age: 59
LOS: 1 days | Discharge: HOME | End: 2019-11-27
Attending: EMERGENCY MEDICINE | Admitting: EMERGENCY MEDICINE
Payer: MEDICARE

## 2019-11-27 VITALS
HEIGHT: 56 IN | RESPIRATION RATE: 18 BRPM | HEART RATE: 150 BPM | DIASTOLIC BLOOD PRESSURE: 64 MMHG | TEMPERATURE: 98 F | WEIGHT: 97 LBS | SYSTOLIC BLOOD PRESSURE: 99 MMHG

## 2019-11-27 VITALS
HEART RATE: 68 BPM | OXYGEN SATURATION: 100 % | SYSTOLIC BLOOD PRESSURE: 129 MMHG | RESPIRATION RATE: 16 BRPM | DIASTOLIC BLOOD PRESSURE: 80 MMHG

## 2019-11-27 DIAGNOSIS — R25.1 TREMOR, UNSPECIFIED: ICD-10-CM

## 2019-11-27 LAB
ALBUMIN SERPL ELPH-MCNC: 3 G/DL — LOW (ref 3.3–5)
ALP SERPL-CCNC: 132 U/L — HIGH (ref 40–120)
ALT FLD-CCNC: 33 U/L — SIGNIFICANT CHANGE UP (ref 10–45)
ANION GAP SERPL CALC-SCNC: 8 MMOL/L — SIGNIFICANT CHANGE UP (ref 5–17)
APPEARANCE UR: CLEAR — SIGNIFICANT CHANGE UP
APTT BLD: 29.2 SEC — SIGNIFICANT CHANGE UP (ref 27.5–36.3)
AST SERPL-CCNC: 57 U/L — HIGH (ref 10–40)
BACTERIA # UR AUTO: ABNORMAL /HPF
BASOPHILS # BLD AUTO: 0.05 K/UL — SIGNIFICANT CHANGE UP (ref 0–0.2)
BASOPHILS NFR BLD AUTO: 1.1 % — SIGNIFICANT CHANGE UP (ref 0–2)
BILIRUB SERPL-MCNC: 0.5 MG/DL — SIGNIFICANT CHANGE UP (ref 0.2–1.2)
BILIRUB UR-MCNC: NEGATIVE — SIGNIFICANT CHANGE UP
BUN SERPL-MCNC: 12 MG/DL — SIGNIFICANT CHANGE UP (ref 7–23)
CALCIUM SERPL-MCNC: 9.1 MG/DL — SIGNIFICANT CHANGE UP (ref 8.4–10.5)
CHLORIDE SERPL-SCNC: 105 MMOL/L — SIGNIFICANT CHANGE UP (ref 96–108)
CO2 SERPL-SCNC: 27 MMOL/L — SIGNIFICANT CHANGE UP (ref 22–31)
COLOR SPEC: YELLOW — SIGNIFICANT CHANGE UP
CREAT SERPL-MCNC: 0.84 MG/DL — SIGNIFICANT CHANGE UP (ref 0.5–1.3)
DIFF PNL FLD: NEGATIVE — SIGNIFICANT CHANGE UP
EOSINOPHIL # BLD AUTO: 0.07 K/UL — SIGNIFICANT CHANGE UP (ref 0–0.5)
EOSINOPHIL NFR BLD AUTO: 1.6 % — SIGNIFICANT CHANGE UP (ref 0–6)
EPI CELLS # UR: SIGNIFICANT CHANGE UP
GLUCOSE SERPL-MCNC: 95 MG/DL — SIGNIFICANT CHANGE UP (ref 70–99)
GLUCOSE UR QL: NEGATIVE — SIGNIFICANT CHANGE UP
HCT VFR BLD CALC: 44 % — SIGNIFICANT CHANGE UP (ref 34.5–45)
HGB BLD-MCNC: 14.2 G/DL — SIGNIFICANT CHANGE UP (ref 11.5–15.5)
IMM GRANULOCYTES NFR BLD AUTO: 0.2 % — SIGNIFICANT CHANGE UP (ref 0–1.5)
INR BLD: 0.98 RATIO — SIGNIFICANT CHANGE UP (ref 0.88–1.16)
KETONES UR-MCNC: NEGATIVE — SIGNIFICANT CHANGE UP
LACTATE SERPL-SCNC: 1.7 MMOL/L — SIGNIFICANT CHANGE UP (ref 0.7–2)
LEUKOCYTE ESTERASE UR-ACNC: NEGATIVE — SIGNIFICANT CHANGE UP
LYMPHOCYTES # BLD AUTO: 2.14 K/UL — SIGNIFICANT CHANGE UP (ref 1–3.3)
LYMPHOCYTES # BLD AUTO: 47.9 % — HIGH (ref 13–44)
MCHC RBC-ENTMCNC: 32.3 GM/DL — SIGNIFICANT CHANGE UP (ref 32–36)
MCHC RBC-ENTMCNC: 33.3 PG — SIGNIFICANT CHANGE UP (ref 27–34)
MCV RBC AUTO: 103.3 FL — HIGH (ref 80–100)
MONOCYTES # BLD AUTO: 0.44 K/UL — SIGNIFICANT CHANGE UP (ref 0–0.9)
MONOCYTES NFR BLD AUTO: 9.8 % — SIGNIFICANT CHANGE UP (ref 2–14)
NEUTROPHILS # BLD AUTO: 1.76 K/UL — LOW (ref 1.8–7.4)
NEUTROPHILS NFR BLD AUTO: 39.4 % — LOW (ref 43–77)
NITRITE UR-MCNC: POSITIVE
NRBC # BLD: 0 /100 WBCS — SIGNIFICANT CHANGE UP (ref 0–0)
PH UR: 7 — SIGNIFICANT CHANGE UP (ref 5–8)
PLATELET # BLD AUTO: 197 K/UL — SIGNIFICANT CHANGE UP (ref 150–400)
POTASSIUM SERPL-MCNC: 5.7 MMOL/L — HIGH (ref 3.5–5.3)
POTASSIUM SERPL-SCNC: 5.7 MMOL/L — HIGH (ref 3.5–5.3)
PROT SERPL-MCNC: 7.7 G/DL — SIGNIFICANT CHANGE UP (ref 6–8.3)
PROT UR-MCNC: NEGATIVE — SIGNIFICANT CHANGE UP
PROTHROM AB SERPL-ACNC: 11 SEC — SIGNIFICANT CHANGE UP (ref 10–12.9)
RBC # BLD: 4.26 M/UL — SIGNIFICANT CHANGE UP (ref 3.8–5.2)
RBC # FLD: 13.2 % — SIGNIFICANT CHANGE UP (ref 10.3–14.5)
RBC CASTS # UR COMP ASSIST: SIGNIFICANT CHANGE UP /HPF (ref 0–4)
SODIUM SERPL-SCNC: 140 MMOL/L — SIGNIFICANT CHANGE UP (ref 135–145)
SP GR SPEC: 1.01 — SIGNIFICANT CHANGE UP (ref 1.01–1.02)
UROBILINOGEN FLD QL: NEGATIVE — SIGNIFICANT CHANGE UP
WBC # BLD: 4.47 K/UL — SIGNIFICANT CHANGE UP (ref 3.8–10.5)
WBC # FLD AUTO: 4.47 K/UL — SIGNIFICANT CHANGE UP (ref 3.8–10.5)
WBC UR QL: SIGNIFICANT CHANGE UP /HPF (ref 0–5)

## 2019-11-27 PROCEDURE — 87040 BLOOD CULTURE FOR BACTERIA: CPT

## 2019-11-27 PROCEDURE — 83605 ASSAY OF LACTIC ACID: CPT

## 2019-11-27 PROCEDURE — 99284 EMERGENCY DEPT VISIT MOD MDM: CPT

## 2019-11-27 PROCEDURE — 87086 URINE CULTURE/COLONY COUNT: CPT

## 2019-11-27 PROCEDURE — 93005 ELECTROCARDIOGRAM TRACING: CPT

## 2019-11-27 PROCEDURE — 36415 COLL VENOUS BLD VENIPUNCTURE: CPT

## 2019-11-27 PROCEDURE — 71045 X-RAY EXAM CHEST 1 VIEW: CPT

## 2019-11-27 PROCEDURE — 99284 EMERGENCY DEPT VISIT MOD MDM: CPT | Mod: 25

## 2019-11-27 PROCEDURE — 96361 HYDRATE IV INFUSION ADD-ON: CPT

## 2019-11-27 PROCEDURE — 85027 COMPLETE CBC AUTOMATED: CPT

## 2019-11-27 PROCEDURE — 71045 X-RAY EXAM CHEST 1 VIEW: CPT | Mod: 26

## 2019-11-27 PROCEDURE — 85610 PROTHROMBIN TIME: CPT

## 2019-11-27 PROCEDURE — 93010 ELECTROCARDIOGRAM REPORT: CPT

## 2019-11-27 PROCEDURE — 80053 COMPREHEN METABOLIC PANEL: CPT

## 2019-11-27 PROCEDURE — 85730 THROMBOPLASTIN TIME PARTIAL: CPT

## 2019-11-27 PROCEDURE — 87186 SC STD MICRODIL/AGAR DIL: CPT

## 2019-11-27 PROCEDURE — 96374 THER/PROPH/DIAG INJ IV PUSH: CPT

## 2019-11-27 PROCEDURE — 81001 URINALYSIS AUTO W/SCOPE: CPT

## 2019-11-27 RX ORDER — CIPROFLOXACIN LACTATE 400MG/40ML
1 VIAL (ML) INTRAVENOUS
Qty: 7 | Refills: 0
Start: 2019-11-27 | End: 2019-12-03

## 2019-11-27 RX ORDER — SODIUM CHLORIDE 9 MG/ML
1350 INJECTION INTRAMUSCULAR; INTRAVENOUS; SUBCUTANEOUS ONCE
Refills: 0 | Status: COMPLETED | OUTPATIENT
Start: 2019-11-27 | End: 2019-11-27

## 2019-11-27 RX ADMIN — SODIUM CHLORIDE 1350 MILLILITER(S): 9 INJECTION INTRAMUSCULAR; INTRAVENOUS; SUBCUTANEOUS at 13:53

## 2019-11-27 RX ADMIN — SODIUM CHLORIDE 1350 MILLILITER(S): 9 INJECTION INTRAMUSCULAR; INTRAVENOUS; SUBCUTANEOUS at 14:57

## 2019-11-27 NOTE — ED ADULT NURSE NOTE - CHIEF COMPLAINT QUOTE
Pt BIB EMS from [facility] for "tremors for 10 minutes".  Pt has history of seizures.  Pt is non verbal.

## 2019-11-27 NOTE — ED PROVIDER NOTE - PATIENT PORTAL LINK FT
You can access the FollowMyHealth Patient Portal offered by [facility] by registering at the following website: [email or web address]. By joining [facility]’s FollowMyHealth portal, you will also be able to view your health information using other applications (apps) compatible with our system.

## 2019-11-27 NOTE — ED ADULT TRIAGE NOTE - CHIEF COMPLAINT QUOTE
Pt BIB EMS from [facility] for "tremors for 10 minutes".  Pt has history of seizures.  Pt is non verbal.

## 2019-11-27 NOTE — ED PROVIDER NOTE - OBJECTIVE STATEMENT
58 y/o F sent in from [facility] with h/o Down Syndrome, Hypothyroidism, frequent UTI's "shaking" this AM.   No known fever, abdominal pain, nausea, vomiting, diarrhea. Accompanied with aid.

## 2019-11-27 NOTE — ED PROVIDER NOTE - ATTENDING CONTRIBUTION TO CARE
Lonnie with ZOE Velazquez. 58 y/o F sent in from [facility] with h/o Down Syndrome, Hypothyroidism, frequent UTI's "shaking" this AM.   No known fever, abdominal pain, nausea, vomiting, diarrhea. Accompanied with aid.  nitrate positive ua. will dc with PO abx. rectal temp checked twice and pt remains afebrile. Stable for discharge. Will f/u urine culture.     HR was 150 on triage vitals but that is noted to be an error as the patient had tremors. It was a HR taken by Pulse Oximetry. When attached to cardiac monitory, true HR noted. Also, on auscultation, normal rate and rhythm noted.       I performed a face to face bedside interview with patient regarding history of present illness, review of symptoms and past medical history. I completed an independent physical exam.  I have discussed the patient's plan of care with Physician Assistant (PA). I agree with note as stated above, having amended the EMR as needed to reflect my findings.   This includes History of Present Illness, HIV, Past Medical/Surgical/Family/Social History, Allergies and Home Medications, Review of Systems, Physical Exam, and any Progress Notes during the time I functioned as the attending physician for this patient.

## 2019-11-27 NOTE — ED PROVIDER NOTE - NSFOLLOWUPINSTRUCTIONS_ED_ALL_ED_FT
Follow up with your primary care provider within 48-72 hours.    Take levaquin 500 mg 1 tab 2x/day for 7 days.    Increase fluids.   Motrin 600mg every 6 hours with food for pain.   Worsening pain, new fever, chills, nausea, vomiting return to ER           Urinary Tract Infection    A urinary tract infection (UTI) is an infection of any part of the urinary tract, which includes the kidneys, ureters, bladder, and urethra. Risk factors include ignoring your need to urinate, wiping back to front if female, being an uncircumcised male, and having diabetes or a weak immune system. Symptoms include frequent urination, pain or burning with urination, foul smelling urine, cloudy urine, pain in the lower abdomen, blood in the urine, and fever. If you were prescribed an antibiotic medicine, take it as told by your health care provider. Do not stop taking the antibiotic even if you start to feel better.    SEEK IMMEDIATE MEDICAL CARE IF YOU HAVE ANY OF THE FOLLOWING SYMPTOMS: severe back or abdominal pain, fever, inability to keep fluids or medicine down, dizziness/lightheadedness, or a change in mental status.

## 2019-11-27 NOTE — ED PROVIDER NOTE - PHYSICAL EXAMINATION
General:     NAD   Eyes: PERRL  Head:     NC/AT   Neck:     trachea midline  Lungs:     CTA b/l  CVS:     RRR  Abd:     not distended  Skin: stage 2 decubitus ulcer   Ext:   diffuse upper ext tremor  Neuro: alert, non verbal

## 2019-11-29 NOTE — ED POST DISCHARGE NOTE - RESULT SUMMARY
Prelim urine cx E.Coli >493481, sensitivity pending, pt on Levaquin; will f/u on sensitivity; Prelim urine cx - gr neg rods >341013, sensitivity pending, pt on Levaquin; will f/u on sensitivity;

## 2019-11-30 RX ORDER — NITROFURANTOIN MACROCRYSTAL 50 MG
100 CAPSULE ORAL
Refills: 0 | Status: DISCONTINUED | OUTPATIENT
Start: 2019-11-30 | End: 2019-11-30

## 2019-11-30 RX ORDER — NITROFURANTOIN MACROCRYSTAL 50 MG
1 CAPSULE ORAL
Qty: 14 | Refills: 0
Start: 2019-11-30 | End: 2019-12-06

## 2019-12-02 LAB
CULTURE RESULTS: SIGNIFICANT CHANGE UP
CULTURE RESULTS: SIGNIFICANT CHANGE UP
SPECIMEN SOURCE: SIGNIFICANT CHANGE UP
SPECIMEN SOURCE: SIGNIFICANT CHANGE UP

## 2019-12-25 NOTE — ED PROVIDER NOTE - CARE PLAN
Principal Discharge DX:	Acute UTI Principal Discharge DX:	Acute UTI  Secondary Diagnosis:	Breakthrough seizure

## 2019-12-25 NOTE — ED ADULT TRIAGE NOTE - IDEAL BODY WEIGHT(KG)
From: Ray Corona  To: Chad Snell MD  Sent: 8/14/2017 3:55 PM CDT  Subject: My stomach problems    Dr. Snell:   Thank you for the new stomach pills you prescribed for me. I take this pill before breakfast, and it has eliminated the nausea/vomiting and diahrea problems I was having. I have Pepto Bismol tablets handy if I occasionally have a queasy stomach. Was there any problem that you saw in the x-ray you had taken of my abdomen?    Thank you,    - Ray Corona   48

## 2019-12-25 NOTE — ED ADULT NURSE REASSESSMENT NOTE - NS ED NURSE REASSESS COMMENT FT1
aide at bedside. VSS. some shaking noted, r/o seizure activity, she seems to be able to focus with verbal stimuli when shaking. Dr Romero aware

## 2019-12-25 NOTE — ED PROVIDER NOTE - CARE PROVIDER_API CALL
Antonio Donis (DO)  Internal Medicine  [address], [address]  [place], [place] [id number]  Phone: [phone]  Fax: [phone]  Established Patient  Follow Up Time:

## 2019-12-25 NOTE — ED PROVIDER NOTE - PATIENT PORTAL LINK FT
You can access the FollowMyHealth Patient Portal offered by [facility] by registering at the following website: [email or web address]. By joining [facility]’s FollowMyHealth portal, you will also be able to view your health information using other applications (apps) compatible with our system.

## 2019-12-25 NOTE — ED PROVIDER NOTE - CPE EDP EYES NORM
05/23/18 1500   Quick Adds   Quick Adds Certification   Type of Visit Initial OP PT Evaluation       Present No   General Information   Start of Care Date 05/23/18   Referring Physician Rina Solares   Orders Evaluate and Treat as Indicated   Additional Orders Familial progressive myoclonic epilepsy. Impaired mobility and ADL's. 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 States he was ambulating more however in the last year he has used his wheelchair more so he wouldn't fall   ADL States independence   Current Community Support Transportation service   Patient role/Employment history Disabled   Living environment Assisted living   Home/Community Accessibility Comments Uses wheelchair for longer distance and baldo walker for transfers and short distance ambulation   Current Assistive Devices Baldo Walker;Manual Wheel Chair   Patient/Family Goals Statement Patient wants to become more steady while ambluation to reduce his risk of falling   General Information Comments Patient is a 51 year old male referred to physical therapy with familial progressive myoclonic epilepsy leading to impaired mobility and ADL's. He states that he was diagnosed with the neurological diagnosis back in 1985 and he also had a fall in 2014 resulting in a TBI. He states that he is dealing with ataxia in the UE's and the LE's.  States that because of this his balance is impaired. He has been in a wheelchair for about 1 year now because he does not want to fall. He moved to an Assisted living about 1 year ago. He states he gets help with cleaning and laundry. He states that he is dressing, bathing, and eating independently. Does complain of some low back pain that worses with activity.    Fall Risk Screen   Fall screen completed by PT   Have you fallen 2 or more times in the past year? No   Have you fallen and had an injury in the past year? No   Is patient a fall risk? Yes;Department fall risk interventions implemented   Cognitive Status Examination   Orientation orientation to person, place and time   Level of Consciousness alert   Follows Commands and Answers Questions 100% of the time   Observation   Observation Arrives in wheelchair. Able to maneuver in chair and adjust foot pedals and brakes independently.    Integumentary   Integumentary Comments No significant findings. Brace on right wrist.    Posture   Posture Protracted shoulders   Range of Motion (ROM)   ROM Comment Limited in cervical range of motion due to past surgeries.    Strength   Manual Muscle Testing Quick Adds MMT: Hip;MMT: Knee;MMT: Ankle   MMT: Hip, Rehab Eval   Hip Flexion - Left Side (5/5) normal,left   Hip ABduction - Left Side (5/5) normal,left   Hip ADduction - Left Side (5/5) normal,left   Hip Flexion - Right Side (5/5) normal,right   Hip ABduction - Right Side (5/5) normal,right   Hip ADduction - Right Side (5/5) normal,right   MMT: Knee, Rehab Eval   Knee Flexion - Left Side (5/5) normal,left   Knee Extension - Left Side (5/5) normal,left   Knee Flexion - Right Side (5/5) normal,right   Knee Extension - Right Side (5/5) normal,right   MMT: Ankle, Rehab Eval   Ankle Dorsiflexion - Left Side (5/5) normal,left   Ankle Dorsiflexion - Right Side (5/5) normal,right   Bed Mobility   Bed Mobility Bed mobility skill: Sit to supine;Bed mobility skill: Supine to sit    Bed Mobility Skill: Sit to Supine   Level of [place]: Sit/Supine independent   Bed Mobility Skill: Supine to Sit   Level of [place]: Supine/Sit independent   Transfer Skills   Transfer Transfer Skill: Bed to Chair/Chair to Bed;Transfer Skill: Sit/Stand   Transfer Skill: Bed/Chair   Level of [place]: Bed/Chair independent   Physical/Nonphysical Assist: Bed/Chair supervision   Weighbearing Restrictions: Bed/Chair full weight-bearing   Assistive Device: Bed/Chair sherry walker   Transfer Skill: Sit to Stand   Level of [place]: Sit to Stand independent   Weighbearing Restrictions: Sit to Stand full weight-bearing   Locomotion   Wheel Chair Mobility Comments Able to propel wheelchair with bilateral LE's   Gait   Gait Gait Skill   Gait Skills   Level of [place]: Gait contact guard   Physical Assist/Nonphysical Assist: Gait 1 person assist   Weight-Bearing Restrictions: Gait full weight-bearing   Assistive Device for Transfer: Gait sherry walker   Gait Distance 200 feet   Balance   Balance other (describe)   Balance Comments Most balance impairements with narrowed base of supoprt or SLS   Balance Special Tests   Balance Special Tests Lopez balance   Balance Special Tests Lopez Balance   Score out of 56 23/56   Coordination   Coordination Comments Able to perform UE pronation/supination alternating movements, heel to toe movements, and heel to shin   Modality Interventions   Planned Modality Interventions Cryotherapy   Planned Therapy Interventions   Planned Therapy Interventions balance training;bed mobility training;gait training;neuromuscular re-education;ROM;strengthening;stretching;transfer training   Clinical Impression   Criteria for Skilled Therapeutic Interventions Met yes, treatment indicated   PT Diagnosis Impaired mobility, gait, and balance, decreased muscle strength and endurance   Influenced by the following impairments Ataxia, motor control, functional weakness,    Functional limitations  due to impairments prolonged ambulation, stairs, transfers   Clinical Presentation Evolving/Changing   Clinical Presentation Rationale Multiple comoridities effecing plan of care   Clinical Decision Making (Complexity) Moderate complexity   Therapy Frequency 2 times/Week   Predicted Duration of Therapy Intervention (days/wks) 8 weeks   Risk & Benefits of therapy have been explained Yes   Patient, Family & other staff in agreement with plan of care Yes   Clinical Impression Comments Patient is a 51 year old male referred to physical therapy for impaired mobility and ADLs. Has developed some back pain with prolonged walking or sitting. Patient states most of his difficulty with gait is when he gets into an open environment and has to process potential challenges or threats to his balance. He would benefit from physical therapy services in order to improve his balance and gait while reducing his risk of falling   GOALS   PT Eval Goals 1;2;3;4   Goal 1   Goal Identifier HEP   Goal Description Patient will demonstrate compliance and independence with his HEP in order to improve his overall function and gait   Target Date 06/20/18   Goal 2   Goal Identifier Gait   Goal Description Patient will be able to ambulate for longer than 5 minutes with CGA for safety with Baldo walker in order to improve mobility around the home and community   Target Date 07/18/18   Goal 3   Goal Identifier Functional transfers   Goal Description Patient will be able to complete all transfers with SBA and no loss of balance consistently in order to improve overall function around the home   Target Date 07/18/18   Goal 4   Goal Identifier Stairs   Goal Description Patient will be able to ascend/descend 1 flight of stairs in order to improve functional mobility   Target Date 07/18/18   Total Evaluation Time   Total Evaluation Time (Minutes) 45   Therapy Certification   Certification date from 05/23/18   Certification date to 07/18/18   Medical  Diagnosis 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

## 2020-01-01 ENCOUNTER — OUTPATIENT (OUTPATIENT)
Dept: OUTPATIENT SERVICES | Facility: HOSPITAL | Age: 60
LOS: 1 days | End: 2020-01-01
Payer: MEDICARE

## 2020-01-01 ENCOUNTER — INPATIENT (INPATIENT)
Facility: HOSPITAL | Age: 60
LOS: 17 days | Discharge: ANOTHER HOSPITAL | DRG: 207 | End: 2020-04-22
Attending: INTERNAL MEDICINE | Admitting: INTERNAL MEDICINE
Payer: MEDICARE

## 2020-01-01 ENCOUNTER — TRANSCRIPTION ENCOUNTER (OUTPATIENT)
Age: 60
End: 2020-01-01

## 2020-01-01 ENCOUNTER — INPATIENT (INPATIENT)
Facility: HOSPITAL | Age: 60
LOS: 0 days | DRG: 64 | End: 2020-12-18
Attending: INTERNAL MEDICINE | Admitting: INTERNAL MEDICINE
Payer: MEDICARE

## 2020-01-01 ENCOUNTER — INPATIENT (INPATIENT)
Facility: HOSPITAL | Age: 60
LOS: 27 days | Discharge: NURSING, REHAB OR LONG-TERM CARE | End: 2020-05-20
Attending: INTERNAL MEDICINE | Admitting: INTERNAL MEDICINE
Payer: MEDICARE

## 2020-01-01 VITALS
RESPIRATION RATE: 19 BRPM | DIASTOLIC BLOOD PRESSURE: 77 MMHG | HEART RATE: 81 BPM | OXYGEN SATURATION: 100 % | TEMPERATURE: 99 F | SYSTOLIC BLOOD PRESSURE: 115 MMHG

## 2020-01-01 VITALS
TEMPERATURE: 97 F | HEART RATE: 47 BPM | DIASTOLIC BLOOD PRESSURE: 83 MMHG | RESPIRATION RATE: 16 BRPM | OXYGEN SATURATION: 96 % | SYSTOLIC BLOOD PRESSURE: 145 MMHG

## 2020-01-01 VITALS
DIASTOLIC BLOOD PRESSURE: 50 MMHG | RESPIRATION RATE: 24 BRPM | OXYGEN SATURATION: 92 % | HEIGHT: 59 IN | WEIGHT: 100.09 LBS | TEMPERATURE: 100 F | SYSTOLIC BLOOD PRESSURE: 90 MMHG | HEART RATE: 66 BPM

## 2020-01-01 VITALS — DIASTOLIC BLOOD PRESSURE: 67 MMHG | HEART RATE: 46 BPM | TEMPERATURE: 96 F | SYSTOLIC BLOOD PRESSURE: 145 MMHG

## 2020-01-01 VITALS — HEIGHT: 59 IN | WEIGHT: 139.99 LBS

## 2020-01-01 DIAGNOSIS — G40.909 EPILEPSY, UNSPECIFIED, NOT INTRACTABLE, WITHOUT STATUS EPILEPTICUS: ICD-10-CM

## 2020-01-01 DIAGNOSIS — Z03.818 ENCOUNTER FOR OBSERVATION FOR SUSPECTED EXPOSURE TO OTHER BIOLOGICAL AGENTS RULED OUT: ICD-10-CM

## 2020-01-01 DIAGNOSIS — R30.0 DYSURIA: ICD-10-CM

## 2020-01-01 DIAGNOSIS — Z93.0 TRACHEOSTOMY STATUS: Chronic | ICD-10-CM

## 2020-01-01 DIAGNOSIS — N17.9 ACUTE KIDNEY FAILURE, UNSPECIFIED: ICD-10-CM

## 2020-01-01 DIAGNOSIS — R50.9 FEVER, UNSPECIFIED: ICD-10-CM

## 2020-01-01 DIAGNOSIS — Z93.0 TRACHEOSTOMY STATUS: ICD-10-CM

## 2020-01-01 DIAGNOSIS — Z93.1 GASTROSTOMY STATUS: Chronic | ICD-10-CM

## 2020-01-01 DIAGNOSIS — D62 ACUTE POSTHEMORRHAGIC ANEMIA: ICD-10-CM

## 2020-01-01 DIAGNOSIS — Q90.9 DOWN SYNDROME, UNSPECIFIED: ICD-10-CM

## 2020-01-01 DIAGNOSIS — U07.1 COVID-19: ICD-10-CM

## 2020-01-01 DIAGNOSIS — J96.01 ACUTE RESPIRATORY FAILURE WITH HYPOXIA: ICD-10-CM

## 2020-01-01 DIAGNOSIS — A41.9 SEPSIS, UNSPECIFIED ORGANISM: ICD-10-CM

## 2020-01-01 DIAGNOSIS — I21.4 NON-ST ELEVATION (NSTEMI) MYOCARDIAL INFARCTION: ICD-10-CM

## 2020-01-01 DIAGNOSIS — R09.02 HYPOXEMIA: ICD-10-CM

## 2020-01-01 DIAGNOSIS — Z51.5 ENCOUNTER FOR PALLIATIVE CARE: ICD-10-CM

## 2020-01-01 DIAGNOSIS — R41.82 ALTERED MENTAL STATUS, UNSPECIFIED: ICD-10-CM

## 2020-01-01 DIAGNOSIS — R56.9 UNSPECIFIED CONVULSIONS: ICD-10-CM

## 2020-01-01 DIAGNOSIS — K62.5 HEMORRHAGE OF ANUS AND RECTUM: ICD-10-CM

## 2020-01-01 DIAGNOSIS — Z93.1 GASTROSTOMY STATUS: ICD-10-CM

## 2020-01-01 DIAGNOSIS — E03.9 HYPOTHYROIDISM, UNSPECIFIED: ICD-10-CM

## 2020-01-01 DIAGNOSIS — K59.00 CONSTIPATION, UNSPECIFIED: ICD-10-CM

## 2020-01-01 DIAGNOSIS — Z71.89 OTHER SPECIFIED COUNSELING: ICD-10-CM

## 2020-01-01 DIAGNOSIS — N93.9 ABNORMAL UTERINE AND VAGINAL BLEEDING, UNSPECIFIED: ICD-10-CM

## 2020-01-01 DIAGNOSIS — J18.9 PNEUMONIA, UNSPECIFIED ORGANISM: ICD-10-CM

## 2020-01-01 DIAGNOSIS — R53.2 FUNCTIONAL QUADRIPLEGIA: ICD-10-CM

## 2020-01-01 DIAGNOSIS — E87.0 HYPEROSMOLALITY AND HYPERNATREMIA: ICD-10-CM

## 2020-01-01 DIAGNOSIS — Z29.9 ENCOUNTER FOR PROPHYLACTIC MEASURES, UNSPECIFIED: ICD-10-CM

## 2020-01-01 DIAGNOSIS — R68.89 OTHER GENERAL SYMPTOMS AND SIGNS: ICD-10-CM

## 2020-01-01 LAB
-  CANDIDA ALBICANS: SIGNIFICANT CHANGE UP
-  CANDIDA GLABRATA: SIGNIFICANT CHANGE UP
-  CANDIDA KRUSEI: SIGNIFICANT CHANGE UP
-  CANDIDA PARAPSILOSIS: SIGNIFICANT CHANGE UP
-  CANDIDA TROPICALIS: SIGNIFICANT CHANGE UP
-  COAGULASE NEGATIVE STAPHYLOCOCCUS: SIGNIFICANT CHANGE UP
-  K. PNEUMONIAE GROUP: SIGNIFICANT CHANGE UP
-  KPC RESISTANCE GENE: SIGNIFICANT CHANGE UP
-  STREPTOCOCCUS SP. (NOT GRP A, B OR S PNEUMONIAE): SIGNIFICANT CHANGE UP
A BAUMANNII DNA SPEC QL NAA+PROBE: SIGNIFICANT CHANGE UP
ABO RH CONFIRMATION: SIGNIFICANT CHANGE UP
ALBUMIN SERPL ELPH-MCNC: 1.2 G/DL — LOW (ref 3.3–5)
ALBUMIN SERPL ELPH-MCNC: 1.3 G/DL — LOW (ref 3.3–5)
ALBUMIN SERPL ELPH-MCNC: 1.3 G/DL — LOW (ref 3.3–5)
ALBUMIN SERPL ELPH-MCNC: 1.4 G/DL — LOW (ref 3.3–5)
ALBUMIN SERPL ELPH-MCNC: 1.5 G/DL — LOW (ref 3.3–5)
ALBUMIN SERPL ELPH-MCNC: 1.5 G/DL — LOW (ref 3.3–5)
ALBUMIN SERPL ELPH-MCNC: 1.6 G/DL — LOW (ref 3.3–5)
ALBUMIN SERPL ELPH-MCNC: 1.7 G/DL — LOW (ref 3.3–5)
ALBUMIN SERPL ELPH-MCNC: 1.8 G/DL — LOW (ref 3.3–5)
ALBUMIN SERPL ELPH-MCNC: 2.1 G/DL — LOW (ref 3.3–5)
ALBUMIN SERPL ELPH-MCNC: 2.2 G/DL — LOW (ref 3.3–5)
ALBUMIN SERPL ELPH-MCNC: 2.3 G/DL — LOW (ref 3.3–5)
ALBUMIN SERPL ELPH-MCNC: 2.3 G/DL — LOW (ref 3.3–5)
ALBUMIN SERPL ELPH-MCNC: 2.4 G/DL — LOW (ref 3.3–5)
ALBUMIN SERPL ELPH-MCNC: 2.4 G/DL — LOW (ref 3.3–5)
ALBUMIN SERPL ELPH-MCNC: 2.5 G/DL — LOW (ref 3.3–5)
ALBUMIN SERPL ELPH-MCNC: 2.5 G/DL — LOW (ref 3.3–5)
ALBUMIN SERPL ELPH-MCNC: 2.6 G/DL — LOW (ref 3.3–5)
ALBUMIN SERPL ELPH-MCNC: 2.7 G/DL — LOW (ref 3.3–5)
ALBUMIN SERPL ELPH-MCNC: 2.8 G/DL — LOW (ref 3.3–5)
ALBUMIN SERPL ELPH-MCNC: 2.8 G/DL — LOW (ref 3.3–5)
ALBUMIN SERPL ELPH-MCNC: 2.9 G/DL — LOW (ref 3.3–5)
ALBUMIN SERPL ELPH-MCNC: 3 G/DL — LOW (ref 3.3–5)
ALBUMIN SERPL ELPH-MCNC: 3.1 G/DL — LOW (ref 3.3–5)
ALBUMIN SERPL ELPH-MCNC: 3.1 G/DL — LOW (ref 3.3–5)
ALBUMIN SERPL ELPH-MCNC: 3.2 G/DL — LOW (ref 3.3–5)
ALBUMIN SERPL ELPH-MCNC: 3.4 G/DL — SIGNIFICANT CHANGE UP (ref 3.3–5)
ALBUMIN SERPL ELPH-MCNC: 3.5 G/DL — SIGNIFICANT CHANGE UP (ref 3.3–5)
ALP SERPL-CCNC: 101 U/L — SIGNIFICANT CHANGE UP (ref 30–120)
ALP SERPL-CCNC: 108 U/L — SIGNIFICANT CHANGE UP (ref 30–120)
ALP SERPL-CCNC: 115 U/L — SIGNIFICANT CHANGE UP (ref 30–120)
ALP SERPL-CCNC: 118 U/L — SIGNIFICANT CHANGE UP (ref 30–120)
ALP SERPL-CCNC: 125 U/L — HIGH (ref 30–120)
ALP SERPL-CCNC: 139 U/L — HIGH (ref 30–120)
ALP SERPL-CCNC: 52 U/L — SIGNIFICANT CHANGE UP (ref 40–120)
ALP SERPL-CCNC: 54 U/L — SIGNIFICANT CHANGE UP (ref 40–120)
ALP SERPL-CCNC: 55 U/L — SIGNIFICANT CHANGE UP (ref 40–120)
ALP SERPL-CCNC: 58 U/L — SIGNIFICANT CHANGE UP (ref 40–120)
ALP SERPL-CCNC: 60 U/L — SIGNIFICANT CHANGE UP (ref 40–120)
ALP SERPL-CCNC: 60 U/L — SIGNIFICANT CHANGE UP (ref 40–120)
ALP SERPL-CCNC: 61 U/L — SIGNIFICANT CHANGE UP (ref 40–120)
ALP SERPL-CCNC: 62 U/L — SIGNIFICANT CHANGE UP (ref 40–120)
ALP SERPL-CCNC: 64 U/L — SIGNIFICANT CHANGE UP (ref 40–120)
ALP SERPL-CCNC: 68 U/L — SIGNIFICANT CHANGE UP (ref 40–120)
ALP SERPL-CCNC: 70 U/L — SIGNIFICANT CHANGE UP (ref 30–120)
ALP SERPL-CCNC: 70 U/L — SIGNIFICANT CHANGE UP (ref 30–120)
ALP SERPL-CCNC: 71 U/L — SIGNIFICANT CHANGE UP (ref 40–120)
ALP SERPL-CCNC: 72 U/L — SIGNIFICANT CHANGE UP (ref 40–120)
ALP SERPL-CCNC: 73 U/L — SIGNIFICANT CHANGE UP (ref 30–120)
ALP SERPL-CCNC: 78 U/L — SIGNIFICANT CHANGE UP (ref 30–120)
ALP SERPL-CCNC: 78 U/L — SIGNIFICANT CHANGE UP (ref 30–120)
ALP SERPL-CCNC: 79 U/L — SIGNIFICANT CHANGE UP (ref 30–120)
ALP SERPL-CCNC: 79 U/L — SIGNIFICANT CHANGE UP (ref 40–120)
ALP SERPL-CCNC: 80 U/L — SIGNIFICANT CHANGE UP (ref 30–120)
ALP SERPL-CCNC: 84 U/L — SIGNIFICANT CHANGE UP (ref 30–120)
ALP SERPL-CCNC: 85 U/L — SIGNIFICANT CHANGE UP (ref 30–120)
ALP SERPL-CCNC: 85 U/L — SIGNIFICANT CHANGE UP (ref 30–120)
ALP SERPL-CCNC: 87 U/L — SIGNIFICANT CHANGE UP (ref 30–120)
ALP SERPL-CCNC: 88 U/L — SIGNIFICANT CHANGE UP (ref 30–120)
ALP SERPL-CCNC: 92 U/L — SIGNIFICANT CHANGE UP (ref 30–120)
ALP SERPL-CCNC: 96 U/L — SIGNIFICANT CHANGE UP (ref 40–120)
ALP SERPL-CCNC: 98 U/L — SIGNIFICANT CHANGE UP (ref 30–120)
ALP SERPL-CCNC: 98 U/L — SIGNIFICANT CHANGE UP (ref 30–120)
ALT FLD-CCNC: 10 U/L — SIGNIFICANT CHANGE UP (ref 4–33)
ALT FLD-CCNC: 11 U/L — SIGNIFICANT CHANGE UP (ref 4–33)
ALT FLD-CCNC: 13 U/L — SIGNIFICANT CHANGE UP (ref 4–33)
ALT FLD-CCNC: 14 U/L — SIGNIFICANT CHANGE UP (ref 4–33)
ALT FLD-CCNC: 15 U/L — SIGNIFICANT CHANGE UP (ref 4–33)
ALT FLD-CCNC: 16 U/L — SIGNIFICANT CHANGE UP (ref 4–33)
ALT FLD-CCNC: 16 U/L — SIGNIFICANT CHANGE UP (ref 4–33)
ALT FLD-CCNC: 17 U/L — SIGNIFICANT CHANGE UP (ref 4–33)
ALT FLD-CCNC: 18 U/L — SIGNIFICANT CHANGE UP (ref 4–33)
ALT FLD-CCNC: 18 U/L — SIGNIFICANT CHANGE UP (ref 4–33)
ALT FLD-CCNC: 19 U/L — SIGNIFICANT CHANGE UP (ref 4–33)
ALT FLD-CCNC: 20 U/L — SIGNIFICANT CHANGE UP (ref 4–33)
ALT FLD-CCNC: 24 U/L — SIGNIFICANT CHANGE UP (ref 4–33)
ALT FLD-CCNC: 25 U/L — SIGNIFICANT CHANGE UP (ref 4–33)
ALT FLD-CCNC: 26 U/L — SIGNIFICANT CHANGE UP (ref 4–33)
ALT FLD-CCNC: 27 U/L DA — SIGNIFICANT CHANGE UP (ref 10–60)
ALT FLD-CCNC: 31 U/L — SIGNIFICANT CHANGE UP (ref 4–33)
ALT FLD-CCNC: 32 U/L DA — SIGNIFICANT CHANGE UP (ref 10–60)
ALT FLD-CCNC: 34 U/L DA — SIGNIFICANT CHANGE UP (ref 10–60)
ALT FLD-CCNC: 35 U/L DA — SIGNIFICANT CHANGE UP (ref 10–60)
ALT FLD-CCNC: 36 U/L DA — SIGNIFICANT CHANGE UP (ref 10–60)
ALT FLD-CCNC: 38 U/L DA — SIGNIFICANT CHANGE UP (ref 10–60)
ALT FLD-CCNC: 39 U/L DA — SIGNIFICANT CHANGE UP (ref 10–60)
ALT FLD-CCNC: 43 U/L DA — SIGNIFICANT CHANGE UP (ref 10–60)
ALT FLD-CCNC: 44 U/L DA — SIGNIFICANT CHANGE UP (ref 10–60)
ALT FLD-CCNC: 45 U/L DA — SIGNIFICANT CHANGE UP (ref 10–60)
ALT FLD-CCNC: 46 U/L DA — SIGNIFICANT CHANGE UP (ref 10–60)
ALT FLD-CCNC: 46 U/L DA — SIGNIFICANT CHANGE UP (ref 10–60)
ALT FLD-CCNC: 55 U/L DA — SIGNIFICANT CHANGE UP (ref 10–60)
ALT FLD-CCNC: 59 U/L DA — SIGNIFICANT CHANGE UP (ref 10–60)
ALT FLD-CCNC: 62 U/L DA — HIGH (ref 10–60)
ALT FLD-CCNC: 65 U/L DA — HIGH (ref 10–60)
ALT FLD-CCNC: 66 U/L DA — HIGH (ref 10–60)
ALT FLD-CCNC: 69 U/L DA — HIGH (ref 10–60)
ALT FLD-CCNC: 72 U/L DA — HIGH (ref 10–60)
AMYLASE P1 CFR SERPL: 40 U/L — SIGNIFICANT CHANGE UP (ref 25–125)
ANION GAP SERPL CALC-SCNC: -1 MMOL/L — LOW (ref 5–17)
ANION GAP SERPL CALC-SCNC: 1 MMOL/L — LOW (ref 5–17)
ANION GAP SERPL CALC-SCNC: 10 MMO/L — SIGNIFICANT CHANGE UP (ref 7–14)
ANION GAP SERPL CALC-SCNC: 10 MMOL/L — SIGNIFICANT CHANGE UP (ref 5–17)
ANION GAP SERPL CALC-SCNC: 11 MMO/L — SIGNIFICANT CHANGE UP (ref 7–14)
ANION GAP SERPL CALC-SCNC: 12 MMO/L — SIGNIFICANT CHANGE UP (ref 7–14)
ANION GAP SERPL CALC-SCNC: 13 MMO/L — SIGNIFICANT CHANGE UP (ref 7–14)
ANION GAP SERPL CALC-SCNC: 13 MMOL/L — SIGNIFICANT CHANGE UP (ref 5–17)
ANION GAP SERPL CALC-SCNC: 3 MMOL/L — LOW (ref 5–17)
ANION GAP SERPL CALC-SCNC: 4 MMOL/L — LOW (ref 5–17)
ANION GAP SERPL CALC-SCNC: 5 MMOL/L — SIGNIFICANT CHANGE UP (ref 5–17)
ANION GAP SERPL CALC-SCNC: 6 MMOL/L — SIGNIFICANT CHANGE UP (ref 5–17)
ANION GAP SERPL CALC-SCNC: 7 MMO/L — SIGNIFICANT CHANGE UP (ref 7–14)
ANION GAP SERPL CALC-SCNC: 7 MMOL/L — SIGNIFICANT CHANGE UP (ref 5–17)
ANION GAP SERPL CALC-SCNC: 8 MMOL/L — SIGNIFICANT CHANGE UP (ref 5–17)
ANION GAP SERPL CALC-SCNC: 9 MMO/L — SIGNIFICANT CHANGE UP (ref 7–14)
ANISOCYTOSIS BLD QL: SIGNIFICANT CHANGE UP
APPEARANCE UR: ABNORMAL
APTT BLD: 21.2 SEC — LOW (ref 27.5–36.3)
APTT BLD: 24.7 SEC — LOW (ref 27.5–36.3)
APTT BLD: 25.2 SEC — LOW (ref 28.5–37)
APTT BLD: 25.7 SEC — LOW (ref 27.5–36.3)
APTT BLD: 27.1 SEC — LOW (ref 27.5–36.3)
APTT BLD: 27.1 SEC — LOW (ref 27.5–36.3)
APTT BLD: 27.3 SEC — LOW (ref 27.5–36.3)
APTT BLD: 27.5 SEC — SIGNIFICANT CHANGE UP (ref 27.5–36.3)
APTT BLD: 27.7 SEC — SIGNIFICANT CHANGE UP (ref 27.5–35.5)
APTT BLD: 28.3 SEC — SIGNIFICANT CHANGE UP (ref 27.5–36.3)
APTT BLD: 29.3 SEC — SIGNIFICANT CHANGE UP (ref 28.5–37)
APTT BLD: 29.7 SEC — SIGNIFICANT CHANGE UP (ref 27.5–36.3)
APTT BLD: 29.9 SEC — SIGNIFICANT CHANGE UP (ref 28.5–37)
APTT BLD: 30.5 SEC — SIGNIFICANT CHANGE UP (ref 27.5–36.3)
APTT BLD: 30.6 SEC — SIGNIFICANT CHANGE UP (ref 27.5–36.3)
APTT BLD: 30.8 SEC — SIGNIFICANT CHANGE UP (ref 28.5–37)
APTT BLD: 30.9 SEC — SIGNIFICANT CHANGE UP (ref 27.5–36.3)
APTT BLD: 31 SEC — SIGNIFICANT CHANGE UP (ref 27.5–36.3)
APTT BLD: 32.2 SEC — SIGNIFICANT CHANGE UP (ref 27.5–36.3)
APTT BLD: 32.5 SEC — SIGNIFICANT CHANGE UP (ref 28.5–37)
APTT BLD: 33 SEC — SIGNIFICANT CHANGE UP (ref 28.5–37)
APTT BLD: 33.5 SEC — SIGNIFICANT CHANGE UP (ref 28.5–37)
APTT BLD: 33.8 SEC — SIGNIFICANT CHANGE UP (ref 28.5–37)
APTT BLD: 33.9 SEC — SIGNIFICANT CHANGE UP (ref 28.5–37)
APTT BLD: 35.1 SEC — SIGNIFICANT CHANGE UP (ref 28.5–37)
APTT BLD: 37.2 SEC — HIGH (ref 28.5–37)
AST SERPL-CCNC: 17 U/L — SIGNIFICANT CHANGE UP (ref 4–32)
AST SERPL-CCNC: 19 U/L — SIGNIFICANT CHANGE UP (ref 4–32)
AST SERPL-CCNC: 20 U/L — SIGNIFICANT CHANGE UP (ref 4–32)
AST SERPL-CCNC: 21 U/L — SIGNIFICANT CHANGE UP (ref 4–32)
AST SERPL-CCNC: 22 U/L — SIGNIFICANT CHANGE UP (ref 4–32)
AST SERPL-CCNC: 23 U/L — SIGNIFICANT CHANGE UP (ref 4–32)
AST SERPL-CCNC: 23 U/L — SIGNIFICANT CHANGE UP (ref 4–32)
AST SERPL-CCNC: 24 U/L — SIGNIFICANT CHANGE UP (ref 4–32)
AST SERPL-CCNC: 26 U/L — SIGNIFICANT CHANGE UP (ref 4–32)
AST SERPL-CCNC: 26 U/L — SIGNIFICANT CHANGE UP (ref 4–32)
AST SERPL-CCNC: 29 U/L — SIGNIFICANT CHANGE UP (ref 10–40)
AST SERPL-CCNC: 29 U/L — SIGNIFICANT CHANGE UP (ref 10–40)
AST SERPL-CCNC: 31 U/L — SIGNIFICANT CHANGE UP (ref 4–32)
AST SERPL-CCNC: 31 U/L — SIGNIFICANT CHANGE UP (ref 4–32)
AST SERPL-CCNC: 32 U/L — SIGNIFICANT CHANGE UP (ref 4–32)
AST SERPL-CCNC: 36 U/L — SIGNIFICANT CHANGE UP (ref 10–40)
AST SERPL-CCNC: 36 U/L — SIGNIFICANT CHANGE UP (ref 10–40)
AST SERPL-CCNC: 39 U/L — SIGNIFICANT CHANGE UP (ref 10–40)
AST SERPL-CCNC: 43 U/L — HIGH (ref 10–40)
AST SERPL-CCNC: 47 U/L — HIGH (ref 10–40)
AST SERPL-CCNC: 50 U/L — HIGH (ref 10–40)
AST SERPL-CCNC: 54 U/L — HIGH (ref 10–40)
AST SERPL-CCNC: 54 U/L — HIGH (ref 10–40)
AST SERPL-CCNC: 58 U/L — HIGH (ref 10–40)
AST SERPL-CCNC: 58 U/L — HIGH (ref 10–40)
AST SERPL-CCNC: 59 U/L — HIGH (ref 10–40)
AST SERPL-CCNC: 60 U/L — HIGH (ref 10–40)
AST SERPL-CCNC: 62 U/L — HIGH (ref 10–40)
AST SERPL-CCNC: 68 U/L — HIGH (ref 10–40)
AST SERPL-CCNC: 70 U/L — HIGH (ref 10–40)
AST SERPL-CCNC: 73 U/L — HIGH (ref 10–40)
AST SERPL-CCNC: 73 U/L — HIGH (ref 10–40)
AST SERPL-CCNC: 80 U/L — HIGH (ref 10–40)
AST SERPL-CCNC: 90 U/L — HIGH (ref 10–40)
BACTERIA # UR AUTO: ABNORMAL
BASE EXCESS BLDA CALC-SCNC: -18.8 MMOL/L — LOW (ref -2–2)
BASE EXCESS BLDA CALC-SCNC: -8.3 MMOL/L — LOW (ref -2–2)
BASE EXCESS BLDA CALC-SCNC: 0.1 MMOL/L — SIGNIFICANT CHANGE UP (ref -2–2)
BASE EXCESS BLDA CALC-SCNC: 0.2 MMOL/L — SIGNIFICANT CHANGE UP (ref -2–2)
BASE EXCESS BLDA CALC-SCNC: 0.3 MMOL/L — SIGNIFICANT CHANGE UP (ref -2–2)
BASE EXCESS BLDA CALC-SCNC: 10.1 MMOL/L — SIGNIFICANT CHANGE UP
BASE EXCESS BLDA CALC-SCNC: 11.1 MMOL/L — SIGNIFICANT CHANGE UP
BASE EXCESS BLDA CALC-SCNC: 11.3 MMOL/L — SIGNIFICANT CHANGE UP
BASE EXCESS BLDA CALC-SCNC: 11.8 MMOL/L — SIGNIFICANT CHANGE UP
BASE EXCESS BLDA CALC-SCNC: 12.5 MMOL/L — HIGH (ref -2–2)
BASE EXCESS BLDA CALC-SCNC: 2.5 MMOL/L — HIGH (ref -2–2)
BASE EXCESS BLDA CALC-SCNC: 2.7 MMOL/L — HIGH (ref -2–2)
BASE EXCESS BLDA CALC-SCNC: 3.3 MMOL/L — HIGH (ref -2–2)
BASE EXCESS BLDA CALC-SCNC: 3.7 MMOL/L — SIGNIFICANT CHANGE UP
BASE EXCESS BLDA CALC-SCNC: 4.7 MMOL/L — HIGH (ref -2–2)
BASE EXCESS BLDA CALC-SCNC: 4.7 MMOL/L — HIGH (ref -2–2)
BASE EXCESS BLDA CALC-SCNC: 4.7 MMOL/L — SIGNIFICANT CHANGE UP
BASE EXCESS BLDA CALC-SCNC: 4.9 MMOL/L — HIGH (ref -2–2)
BASE EXCESS BLDA CALC-SCNC: 5.1 MMOL/L — HIGH (ref -2–2)
BASE EXCESS BLDA CALC-SCNC: 5.2 MMOL/L — SIGNIFICANT CHANGE UP
BASE EXCESS BLDA CALC-SCNC: 5.5 MMOL/L — HIGH (ref -2–2)
BASE EXCESS BLDA CALC-SCNC: 5.5 MMOL/L — HIGH (ref -2–2)
BASE EXCESS BLDA CALC-SCNC: 5.5 MMOL/L — SIGNIFICANT CHANGE UP
BASE EXCESS BLDA CALC-SCNC: 6 MMOL/L — SIGNIFICANT CHANGE UP
BASE EXCESS BLDA CALC-SCNC: 6.1 MMOL/L — SIGNIFICANT CHANGE UP
BASE EXCESS BLDA CALC-SCNC: 6.2 MMOL/L — SIGNIFICANT CHANGE UP
BASE EXCESS BLDA CALC-SCNC: 6.5 MMOL/L — SIGNIFICANT CHANGE UP
BASE EXCESS BLDA CALC-SCNC: 6.6 MMOL/L — SIGNIFICANT CHANGE UP
BASE EXCESS BLDA CALC-SCNC: 7.1 MMOL/L — SIGNIFICANT CHANGE UP
BASE EXCESS BLDA CALC-SCNC: 7.1 MMOL/L — SIGNIFICANT CHANGE UP
BASE EXCESS BLDA CALC-SCNC: 7.2 MMOL/L — HIGH (ref -2–2)
BASE EXCESS BLDA CALC-SCNC: 7.2 MMOL/L — SIGNIFICANT CHANGE UP
BASE EXCESS BLDA CALC-SCNC: 7.2 MMOL/L — SIGNIFICANT CHANGE UP
BASE EXCESS BLDA CALC-SCNC: 7.4 MMOL/L — SIGNIFICANT CHANGE UP
BASE EXCESS BLDA CALC-SCNC: 7.6 MMOL/L — SIGNIFICANT CHANGE UP
BASE EXCESS BLDA CALC-SCNC: 7.7 MMOL/L — SIGNIFICANT CHANGE UP
BASE EXCESS BLDA CALC-SCNC: 7.7 MMOL/L — SIGNIFICANT CHANGE UP
BASE EXCESS BLDA CALC-SCNC: 7.9 MMOL/L — SIGNIFICANT CHANGE UP
BASE EXCESS BLDA CALC-SCNC: 8.1 MMOL/L — SIGNIFICANT CHANGE UP
BASE EXCESS BLDA CALC-SCNC: 8.8 MMOL/L — HIGH (ref -2–2)
BASE EXCESS BLDA CALC-SCNC: 8.8 MMOL/L — SIGNIFICANT CHANGE UP
BASE EXCESS BLDA CALC-SCNC: 8.9 MMOL/L — SIGNIFICANT CHANGE UP
BASE EXCESS BLDA CALC-SCNC: 9 MMOL/L — SIGNIFICANT CHANGE UP
BASE EXCESS BLDA CALC-SCNC: 9.5 MMOL/L — SIGNIFICANT CHANGE UP
BASE EXCESS BLDA CALC-SCNC: 9.5 MMOL/L — SIGNIFICANT CHANGE UP
BASE EXCESS BLDA CALC-SCNC: 9.7 MMOL/L — SIGNIFICANT CHANGE UP
BASE EXCESS BLDA CALC-SCNC: 9.9 MMOL/L — SIGNIFICANT CHANGE UP
BASE EXCESS BLDV CALC-SCNC: -0.4 MMOL/L — SIGNIFICANT CHANGE UP (ref -2–2)
BASE EXCESS BLDV CALC-SCNC: -5.1 MMOL/L — LOW (ref -2–2)
BASE EXCESS BLDV CALC-SCNC: 9.5 MMOL/L — SIGNIFICANT CHANGE UP
BASOPHILS # BLD AUTO: 0 K/UL — SIGNIFICANT CHANGE UP (ref 0–0.2)
BASOPHILS # BLD AUTO: 0.01 K/UL — SIGNIFICANT CHANGE UP (ref 0–0.2)
BASOPHILS # BLD AUTO: 0.02 K/UL — SIGNIFICANT CHANGE UP (ref 0–0.2)
BASOPHILS # BLD AUTO: 0.03 K/UL — SIGNIFICANT CHANGE UP (ref 0–0.2)
BASOPHILS # BLD AUTO: 0.04 K/UL — SIGNIFICANT CHANGE UP (ref 0–0.2)
BASOPHILS # BLD AUTO: 0.07 K/UL — SIGNIFICANT CHANGE UP (ref 0–0.2)
BASOPHILS NFR BLD AUTO: 0 % — SIGNIFICANT CHANGE UP (ref 0–2)
BASOPHILS NFR BLD AUTO: 0.1 % — SIGNIFICANT CHANGE UP (ref 0–2)
BASOPHILS NFR BLD AUTO: 0.2 % — SIGNIFICANT CHANGE UP (ref 0–2)
BASOPHILS NFR BLD AUTO: 0.3 % — SIGNIFICANT CHANGE UP (ref 0–2)
BASOPHILS NFR BLD AUTO: 0.4 % — SIGNIFICANT CHANGE UP (ref 0–2)
BASOPHILS NFR BLD AUTO: 0.9 % — SIGNIFICANT CHANGE UP (ref 0–2)
BASOPHILS NFR BLD AUTO: 1 % — SIGNIFICANT CHANGE UP (ref 0–2)
BASOPHILS NFR BLD AUTO: 1.1 % — SIGNIFICANT CHANGE UP (ref 0–2)
BASOPHILS NFR BLD AUTO: 1.2 % — SIGNIFICANT CHANGE UP (ref 0–2)
BASOPHILS NFR SPEC: 0 % — SIGNIFICANT CHANGE UP (ref 0–2)
BILIRUB DIRECT SERPL-MCNC: 0.1 MG/DL — SIGNIFICANT CHANGE UP (ref 0–0.2)
BILIRUB INDIRECT FLD-MCNC: 0.1 MG/DL — LOW (ref 0.2–1)
BILIRUB SERPL-MCNC: 0.1 MG/DL — LOW (ref 0.2–1.2)
BILIRUB SERPL-MCNC: 0.2 MG/DL — SIGNIFICANT CHANGE UP (ref 0.2–1.2)
BILIRUB SERPL-MCNC: 0.3 MG/DL — SIGNIFICANT CHANGE UP (ref 0.2–1.2)
BILIRUB SERPL-MCNC: 0.4 MG/DL — SIGNIFICANT CHANGE UP (ref 0.2–1.2)
BILIRUB SERPL-MCNC: 0.5 MG/DL — SIGNIFICANT CHANGE UP (ref 0.2–1.2)
BILIRUB SERPL-MCNC: 0.6 MG/DL — SIGNIFICANT CHANGE UP (ref 0.2–1.2)
BILIRUB SERPL-MCNC: < 0.2 MG/DL — LOW (ref 0.2–1.2)
BILIRUB UR-MCNC: NEGATIVE — SIGNIFICANT CHANGE UP
BLASTS # FLD: 0 % — SIGNIFICANT CHANGE UP (ref 0–0)
BLD GP AB SCN SERPL QL: NEGATIVE — SIGNIFICANT CHANGE UP
BLD GP AB SCN SERPL QL: SIGNIFICANT CHANGE UP
BLOOD GAS ARTERIAL - FIO2: 100 — SIGNIFICANT CHANGE UP
BLOOD GAS ARTERIAL - FIO2: 30 — SIGNIFICANT CHANGE UP
BLOOD GAS ARTERIAL - FIO2: 30 — SIGNIFICANT CHANGE UP
BLOOD GAS ARTERIAL - FIO2: 40 — SIGNIFICANT CHANGE UP
BLOOD GAS ARTERIAL - FIO2: 50 — SIGNIFICANT CHANGE UP
BLOOD GAS ARTERIAL - FIO2: 50 — SIGNIFICANT CHANGE UP
BLOOD GAS ARTERIAL - FIO2: 60 — SIGNIFICANT CHANGE UP
BLOOD GAS COMMENTS ARTERIAL: SIGNIFICANT CHANGE UP
BLOOD GAS COMMENTS: 320 — SIGNIFICANT CHANGE UP
BLOOD GAS COMMENTS: SIGNIFICANT CHANGE UP
BLOOD GAS SOURCE: SIGNIFICANT CHANGE UP
BUN SERPL-MCNC: 10 MG/DL — SIGNIFICANT CHANGE UP (ref 7–23)
BUN SERPL-MCNC: 11 MG/DL — SIGNIFICANT CHANGE UP (ref 7–23)
BUN SERPL-MCNC: 12 MG/DL — SIGNIFICANT CHANGE UP (ref 7–23)
BUN SERPL-MCNC: 13 MG/DL — SIGNIFICANT CHANGE UP (ref 7–23)
BUN SERPL-MCNC: 14 MG/DL — SIGNIFICANT CHANGE UP (ref 7–23)
BUN SERPL-MCNC: 15 MG/DL — SIGNIFICANT CHANGE UP (ref 7–23)
BUN SERPL-MCNC: 16 MG/DL — SIGNIFICANT CHANGE UP (ref 7–23)
BUN SERPL-MCNC: 17 MG/DL — SIGNIFICANT CHANGE UP (ref 7–23)
BUN SERPL-MCNC: 17 MG/DL — SIGNIFICANT CHANGE UP (ref 7–23)
BUN SERPL-MCNC: 18 MG/DL — SIGNIFICANT CHANGE UP (ref 7–23)
BUN SERPL-MCNC: 19 MG/DL — SIGNIFICANT CHANGE UP (ref 7–23)
BUN SERPL-MCNC: 19 MG/DL — SIGNIFICANT CHANGE UP (ref 7–23)
BUN SERPL-MCNC: 31 MG/DL — HIGH (ref 7–23)
BUN SERPL-MCNC: 7 MG/DL — SIGNIFICANT CHANGE UP (ref 7–23)
BUN SERPL-MCNC: 8 MG/DL — SIGNIFICANT CHANGE UP (ref 7–23)
BUN SERPL-MCNC: 9 MG/DL — SIGNIFICANT CHANGE UP (ref 7–23)
C DIFF BY PCR RESULT: SIGNIFICANT CHANGE UP
C DIFF TOX GENS STL QL NAA+PROBE: SIGNIFICANT CHANGE UP
CA-I BLDA-SCNC: 1.09 MMOL/L — LOW (ref 1.15–1.29)
CA-I BLDA-SCNC: 1.1 MMOL/L — LOW (ref 1.15–1.29)
CA-I BLDA-SCNC: 1.13 MMOL/L — LOW (ref 1.15–1.29)
CA-I BLDA-SCNC: 1.15 MMOL/L — SIGNIFICANT CHANGE UP (ref 1.15–1.29)
CA-I BLDA-SCNC: 1.16 MMOL/L — SIGNIFICANT CHANGE UP (ref 1.15–1.29)
CA-I BLDA-SCNC: 1.17 MMOL/L — SIGNIFICANT CHANGE UP (ref 1.15–1.29)
CA-I BLDA-SCNC: 1.17 MMOL/L — SIGNIFICANT CHANGE UP (ref 1.15–1.29)
CA-I BLDA-SCNC: 1.18 MMOL/L — SIGNIFICANT CHANGE UP (ref 1.15–1.29)
CA-I BLDA-SCNC: 1.19 MMOL/L — SIGNIFICANT CHANGE UP (ref 1.15–1.29)
CA-I BLDA-SCNC: 1.2 MMOL/L — SIGNIFICANT CHANGE UP (ref 1.15–1.29)
CA-I BLDA-SCNC: 1.21 MMOL/L — SIGNIFICANT CHANGE UP (ref 1.15–1.29)
CA-I BLDA-SCNC: 1.24 MMOL/L — SIGNIFICANT CHANGE UP (ref 1.15–1.29)
CA-I BLDA-SCNC: 1.25 MMOL/L — SIGNIFICANT CHANGE UP (ref 1.15–1.29)
CA-I BLDA-SCNC: 1.26 MMOL/L — SIGNIFICANT CHANGE UP (ref 1.15–1.29)
CALCIUM SERPL-MCNC: 6.8 MG/DL — LOW (ref 8.4–10.5)
CALCIUM SERPL-MCNC: 7 MG/DL — LOW (ref 8.4–10.5)
CALCIUM SERPL-MCNC: 7.1 MG/DL — LOW (ref 8.4–10.5)
CALCIUM SERPL-MCNC: 7.2 MG/DL — LOW (ref 8.4–10.5)
CALCIUM SERPL-MCNC: 7.2 MG/DL — LOW (ref 8.4–10.5)
CALCIUM SERPL-MCNC: 7.3 MG/DL — LOW (ref 8.4–10.5)
CALCIUM SERPL-MCNC: 7.5 MG/DL — LOW (ref 8.4–10.5)
CALCIUM SERPL-MCNC: 7.6 MG/DL — LOW (ref 8.4–10.5)
CALCIUM SERPL-MCNC: 7.7 MG/DL — LOW (ref 8.4–10.5)
CALCIUM SERPL-MCNC: 7.9 MG/DL — LOW (ref 8.4–10.5)
CALCIUM SERPL-MCNC: 7.9 MG/DL — LOW (ref 8.4–10.5)
CALCIUM SERPL-MCNC: 8 MG/DL — LOW (ref 8.4–10.5)
CALCIUM SERPL-MCNC: 8.1 MG/DL — LOW (ref 8.4–10.5)
CALCIUM SERPL-MCNC: 8.2 MG/DL — LOW (ref 8.4–10.5)
CALCIUM SERPL-MCNC: 8.3 MG/DL — LOW (ref 8.4–10.5)
CALCIUM SERPL-MCNC: 8.4 MG/DL — SIGNIFICANT CHANGE UP (ref 8.4–10.5)
CALCIUM SERPL-MCNC: 8.4 MG/DL — SIGNIFICANT CHANGE UP (ref 8.4–10.5)
CALCIUM SERPL-MCNC: 8.5 MG/DL — SIGNIFICANT CHANGE UP (ref 8.4–10.5)
CALCIUM SERPL-MCNC: 8.6 MG/DL — SIGNIFICANT CHANGE UP (ref 8.4–10.5)
CALCIUM SERPL-MCNC: 8.7 MG/DL — SIGNIFICANT CHANGE UP (ref 8.4–10.5)
CALCIUM SERPL-MCNC: 8.8 MG/DL — SIGNIFICANT CHANGE UP (ref 8.4–10.5)
CALCIUM SERPL-MCNC: 8.9 MG/DL — SIGNIFICANT CHANGE UP (ref 8.4–10.5)
CALCIUM SERPL-MCNC: 9 MG/DL — SIGNIFICANT CHANGE UP (ref 8.4–10.5)
CALCIUM SERPL-MCNC: 9.1 MG/DL — SIGNIFICANT CHANGE UP (ref 8.4–10.5)
CHLORIDE BLDA-SCNC: 104 MMOL/L — SIGNIFICANT CHANGE UP (ref 96–108)
CHLORIDE BLDA-SCNC: 105 MMOL/L — SIGNIFICANT CHANGE UP (ref 96–108)
CHLORIDE BLDA-SCNC: 106 MMOL/L — SIGNIFICANT CHANGE UP (ref 96–108)
CHLORIDE BLDA-SCNC: 106 MMOL/L — SIGNIFICANT CHANGE UP (ref 96–108)
CHLORIDE BLDA-SCNC: 107 MMOL/L — SIGNIFICANT CHANGE UP (ref 96–108)
CHLORIDE BLDA-SCNC: 107 MMOL/L — SIGNIFICANT CHANGE UP (ref 96–108)
CHLORIDE BLDA-SCNC: 111 MMOL/L — HIGH (ref 96–108)
CHLORIDE BLDA-SCNC: 111 MMOL/L — HIGH (ref 96–108)
CHLORIDE BLDA-SCNC: 113 MMOL/L — HIGH (ref 96–108)
CHLORIDE SERPL-SCNC: 100 MMOL/L — SIGNIFICANT CHANGE UP (ref 98–107)
CHLORIDE SERPL-SCNC: 101 MMOL/L — SIGNIFICANT CHANGE UP (ref 98–107)
CHLORIDE SERPL-SCNC: 102 MMOL/L — SIGNIFICANT CHANGE UP (ref 98–107)
CHLORIDE SERPL-SCNC: 102 MMOL/L — SIGNIFICANT CHANGE UP (ref 98–107)
CHLORIDE SERPL-SCNC: 103 MMOL/L — SIGNIFICANT CHANGE UP (ref 98–107)
CHLORIDE SERPL-SCNC: 104 MMOL/L — SIGNIFICANT CHANGE UP (ref 96–108)
CHLORIDE SERPL-SCNC: 104 MMOL/L — SIGNIFICANT CHANGE UP (ref 98–107)
CHLORIDE SERPL-SCNC: 104 MMOL/L — SIGNIFICANT CHANGE UP (ref 98–107)
CHLORIDE SERPL-SCNC: 105 MMOL/L — SIGNIFICANT CHANGE UP (ref 96–108)
CHLORIDE SERPL-SCNC: 105 MMOL/L — SIGNIFICANT CHANGE UP (ref 96–108)
CHLORIDE SERPL-SCNC: 105 MMOL/L — SIGNIFICANT CHANGE UP (ref 98–107)
CHLORIDE SERPL-SCNC: 106 MMOL/L — SIGNIFICANT CHANGE UP (ref 98–107)
CHLORIDE SERPL-SCNC: 106 MMOL/L — SIGNIFICANT CHANGE UP (ref 98–107)
CHLORIDE SERPL-SCNC: 107 MMOL/L — SIGNIFICANT CHANGE UP (ref 96–108)
CHLORIDE SERPL-SCNC: 107 MMOL/L — SIGNIFICANT CHANGE UP (ref 98–107)
CHLORIDE SERPL-SCNC: 108 MMOL/L — SIGNIFICANT CHANGE UP (ref 96–108)
CHLORIDE SERPL-SCNC: 108 MMOL/L — SIGNIFICANT CHANGE UP (ref 96–108)
CHLORIDE SERPL-SCNC: 109 MMOL/L — HIGH (ref 96–108)
CHLORIDE SERPL-SCNC: 109 MMOL/L — HIGH (ref 98–107)
CHLORIDE SERPL-SCNC: 110 MMOL/L — HIGH (ref 96–108)
CHLORIDE SERPL-SCNC: 111 MMOL/L — HIGH (ref 96–108)
CHLORIDE SERPL-SCNC: 111 MMOL/L — HIGH (ref 96–108)
CHLORIDE SERPL-SCNC: 111 MMOL/L — HIGH (ref 98–107)
CHLORIDE SERPL-SCNC: 114 MMOL/L — HIGH (ref 96–108)
CHLORIDE SERPL-SCNC: 115 MMOL/L — HIGH (ref 96–108)
CHLORIDE SERPL-SCNC: 115 MMOL/L — HIGH (ref 96–108)
CHLORIDE SERPL-SCNC: 117 MMOL/L — HIGH (ref 96–108)
CHLORIDE SERPL-SCNC: 117 MMOL/L — HIGH (ref 96–108)
CHLORIDE SERPL-SCNC: 118 MMOL/L — HIGH (ref 96–108)
CHLORIDE SERPL-SCNC: 91 MMOL/L — LOW (ref 96–108)
CHLORIDE SERPL-SCNC: 93 MMOL/L — LOW (ref 96–108)
CHLORIDE SERPL-SCNC: 99 MMOL/L — SIGNIFICANT CHANGE UP (ref 96–108)
CHLORIDE SERPL-SCNC: 99 MMOL/L — SIGNIFICANT CHANGE UP (ref 98–107)
CHLORIDE SERPL-SCNC: 99 MMOL/L — SIGNIFICANT CHANGE UP (ref 98–107)
CK SERPL-CCNC: 12 U/L — LOW (ref 25–170)
CK SERPL-CCNC: 124 U/L — SIGNIFICANT CHANGE UP (ref 26–192)
CK SERPL-CCNC: 15 U/L — LOW (ref 25–170)
CK SERPL-CCNC: 15 U/L — LOW (ref 25–170)
CK SERPL-CCNC: 155 U/L — SIGNIFICANT CHANGE UP (ref 26–192)
CK SERPL-CCNC: 17 U/L — LOW (ref 26–192)
CK SERPL-CCNC: 254 U/L — HIGH (ref 26–192)
CK SERPL-CCNC: 26 U/L — SIGNIFICANT CHANGE UP (ref 26–192)
CK SERPL-CCNC: 290 U/L — HIGH (ref 26–192)
CK SERPL-CCNC: 30 U/L — SIGNIFICANT CHANGE UP (ref 26–192)
CK SERPL-CCNC: 31 U/L — SIGNIFICANT CHANGE UP (ref 26–192)
CK SERPL-CCNC: 60 U/L — SIGNIFICANT CHANGE UP (ref 26–192)
CK SERPL-CCNC: 71 U/L — SIGNIFICANT CHANGE UP (ref 26–192)
CK SERPL-CCNC: 74 U/L — SIGNIFICANT CHANGE UP (ref 26–192)
CO2 SERPL-SCNC: 24 MMOL/L — SIGNIFICANT CHANGE UP (ref 22–31)
CO2 SERPL-SCNC: 24 MMOL/L — SIGNIFICANT CHANGE UP (ref 22–31)
CO2 SERPL-SCNC: 25 MMOL/L — SIGNIFICANT CHANGE UP (ref 22–31)
CO2 SERPL-SCNC: 25 MMOL/L — SIGNIFICANT CHANGE UP (ref 22–31)
CO2 SERPL-SCNC: 26 MMOL/L — SIGNIFICANT CHANGE UP (ref 22–31)
CO2 SERPL-SCNC: 27 MMOL/L — SIGNIFICANT CHANGE UP (ref 22–31)
CO2 SERPL-SCNC: 28 MMOL/L — SIGNIFICANT CHANGE UP (ref 22–31)
CO2 SERPL-SCNC: 29 MMOL/L — SIGNIFICANT CHANGE UP (ref 22–31)
CO2 SERPL-SCNC: 30 MMOL/L — SIGNIFICANT CHANGE UP (ref 22–31)
CO2 SERPL-SCNC: 31 MMOL/L — SIGNIFICANT CHANGE UP (ref 22–31)
CO2 SERPL-SCNC: 32 MMOL/L — HIGH (ref 22–31)
CO2 SERPL-SCNC: 32 MMOL/L — HIGH (ref 22–31)
CO2 SERPL-SCNC: 33 MMOL/L — HIGH (ref 22–31)
CO2 SERPL-SCNC: 34 MMOL/L — HIGH (ref 22–31)
CO2 SERPL-SCNC: 35 MMOL/L — HIGH (ref 22–31)
CO2 SERPL-SCNC: 35 MMOL/L — HIGH (ref 22–31)
CO2 SERPL-SCNC: 38 MMOL/L — HIGH (ref 22–31)
COLOR SPEC: YELLOW — SIGNIFICANT CHANGE UP
CORTIS AM PEAK SERPL-MCNC: 14 UG/DL — SIGNIFICANT CHANGE UP (ref 6–18.4)
CREAT BLDA-MCNC: 0.4 MG/DL — LOW (ref 0.5–1.3)
CREAT BLDA-MCNC: 0.4 MG/DL — LOW (ref 0.5–1.3)
CREAT BLDA-MCNC: 0.42 MG/DL — LOW (ref 0.5–1.3)
CREAT BLDA-MCNC: 0.48 MG/DL — LOW (ref 0.5–1.3)
CREAT BLDA-MCNC: 0.49 MG/DL — LOW (ref 0.5–1.3)
CREAT BLDA-MCNC: 0.49 MG/DL — LOW (ref 0.5–1.3)
CREAT BLDA-MCNC: 0.55 MG/DL — SIGNIFICANT CHANGE UP (ref 0.5–1.3)
CREAT BLDA-MCNC: SIGNIFICANT CHANGE UP MG/DL (ref 0.5–1.3)
CREAT SERPL-MCNC: 0.43 MG/DL — LOW (ref 0.5–1.3)
CREAT SERPL-MCNC: 0.45 MG/DL — LOW (ref 0.5–1.3)
CREAT SERPL-MCNC: 0.45 MG/DL — LOW (ref 0.5–1.3)
CREAT SERPL-MCNC: 0.46 MG/DL — LOW (ref 0.5–1.3)
CREAT SERPL-MCNC: 0.46 MG/DL — LOW (ref 0.5–1.3)
CREAT SERPL-MCNC: 0.47 MG/DL — LOW (ref 0.5–1.3)
CREAT SERPL-MCNC: 0.47 MG/DL — LOW (ref 0.5–1.3)
CREAT SERPL-MCNC: 0.48 MG/DL — LOW (ref 0.5–1.3)
CREAT SERPL-MCNC: 0.48 MG/DL — LOW (ref 0.5–1.3)
CREAT SERPL-MCNC: 0.49 MG/DL — LOW (ref 0.5–1.3)
CREAT SERPL-MCNC: 0.5 MG/DL — SIGNIFICANT CHANGE UP (ref 0.5–1.3)
CREAT SERPL-MCNC: 0.5 MG/DL — SIGNIFICANT CHANGE UP (ref 0.5–1.3)
CREAT SERPL-MCNC: 0.52 MG/DL — SIGNIFICANT CHANGE UP (ref 0.5–1.3)
CREAT SERPL-MCNC: 0.53 MG/DL — SIGNIFICANT CHANGE UP (ref 0.5–1.3)
CREAT SERPL-MCNC: 0.53 MG/DL — SIGNIFICANT CHANGE UP (ref 0.5–1.3)
CREAT SERPL-MCNC: 0.54 MG/DL — SIGNIFICANT CHANGE UP (ref 0.5–1.3)
CREAT SERPL-MCNC: 0.54 MG/DL — SIGNIFICANT CHANGE UP (ref 0.5–1.3)
CREAT SERPL-MCNC: 0.55 MG/DL — SIGNIFICANT CHANGE UP (ref 0.5–1.3)
CREAT SERPL-MCNC: 0.58 MG/DL — SIGNIFICANT CHANGE UP (ref 0.5–1.3)
CREAT SERPL-MCNC: 0.59 MG/DL — SIGNIFICANT CHANGE UP (ref 0.5–1.3)
CREAT SERPL-MCNC: 0.6 MG/DL — SIGNIFICANT CHANGE UP (ref 0.5–1.3)
CREAT SERPL-MCNC: 0.61 MG/DL — SIGNIFICANT CHANGE UP (ref 0.5–1.3)
CREAT SERPL-MCNC: 0.61 MG/DL — SIGNIFICANT CHANGE UP (ref 0.5–1.3)
CREAT SERPL-MCNC: 0.62 MG/DL — SIGNIFICANT CHANGE UP (ref 0.5–1.3)
CREAT SERPL-MCNC: 0.63 MG/DL — SIGNIFICANT CHANGE UP (ref 0.5–1.3)
CREAT SERPL-MCNC: 0.64 MG/DL — SIGNIFICANT CHANGE UP (ref 0.5–1.3)
CREAT SERPL-MCNC: 0.66 MG/DL — SIGNIFICANT CHANGE UP (ref 0.5–1.3)
CREAT SERPL-MCNC: 0.66 MG/DL — SIGNIFICANT CHANGE UP (ref 0.5–1.3)
CREAT SERPL-MCNC: 0.67 MG/DL — SIGNIFICANT CHANGE UP (ref 0.5–1.3)
CREAT SERPL-MCNC: 0.67 MG/DL — SIGNIFICANT CHANGE UP (ref 0.5–1.3)
CREAT SERPL-MCNC: 0.68 MG/DL — SIGNIFICANT CHANGE UP (ref 0.5–1.3)
CREAT SERPL-MCNC: 0.69 MG/DL — SIGNIFICANT CHANGE UP (ref 0.5–1.3)
CREAT SERPL-MCNC: 0.69 MG/DL — SIGNIFICANT CHANGE UP (ref 0.5–1.3)
CREAT SERPL-MCNC: 0.7 MG/DL — SIGNIFICANT CHANGE UP (ref 0.5–1.3)
CREAT SERPL-MCNC: 0.73 MG/DL — SIGNIFICANT CHANGE UP (ref 0.5–1.3)
CREAT SERPL-MCNC: 0.78 MG/DL — SIGNIFICANT CHANGE UP (ref 0.5–1.3)
CREAT SERPL-MCNC: 0.81 MG/DL — SIGNIFICANT CHANGE UP (ref 0.5–1.3)
CREAT SERPL-MCNC: 0.85 MG/DL — SIGNIFICANT CHANGE UP (ref 0.5–1.3)
CREAT SERPL-MCNC: 0.87 MG/DL — SIGNIFICANT CHANGE UP (ref 0.5–1.3)
CREAT SERPL-MCNC: 0.92 MG/DL — SIGNIFICANT CHANGE UP (ref 0.5–1.3)
CREAT SERPL-MCNC: 0.95 MG/DL — SIGNIFICANT CHANGE UP (ref 0.5–1.3)
CREAT SERPL-MCNC: 1.48 MG/DL — HIGH (ref 0.5–1.3)
CRP SERPL-MCNC: 10.04 MG/DL — HIGH (ref 0–0.4)
CRP SERPL-MCNC: 106.5 MG/L — HIGH
CRP SERPL-MCNC: 11.24 MG/DL — HIGH (ref 0–0.4)
CRP SERPL-MCNC: 13.23 MG/DL — HIGH (ref 0–0.4)
CRP SERPL-MCNC: 16.96 MG/DL — HIGH (ref 0–0.4)
CRP SERPL-MCNC: 18.4 MG/L — HIGH
CRP SERPL-MCNC: 2.58 MG/DL — HIGH (ref 0–0.4)
CRP SERPL-MCNC: 2.68 MG/DL — HIGH (ref 0–0.4)
CRP SERPL-MCNC: 3.82 MG/DL — HIGH (ref 0–0.4)
CRP SERPL-MCNC: 5.53 MG/DL — HIGH (ref 0–0.4)
CRP SERPL-MCNC: 51.6 MG/L — HIGH
CRP SERPL-MCNC: 6.4 MG/DL — HIGH (ref 0–0.4)
CRP SERPL-MCNC: 68.8 MG/L — HIGH
CRP SERPL-MCNC: 9.59 MG/DL — HIGH (ref 0–0.4)
CRP SERPL-MCNC: 91.1 MG/L — HIGH
CULTURE RESULTS: SIGNIFICANT CHANGE UP
D DIMER BLD IA.RAPID-MCNC: 1063 NG/ML DDU — HIGH
D DIMER BLD IA.RAPID-MCNC: 1212 NG/ML DDU — HIGH
D DIMER BLD IA.RAPID-MCNC: 228 NG/ML DDU — SIGNIFICANT CHANGE UP
D DIMER BLD IA.RAPID-MCNC: 404 NG/ML DDU — HIGH
D DIMER BLD IA.RAPID-MCNC: 428 NG/ML DDU — HIGH
D DIMER BLD IA.RAPID-MCNC: 467 NG/ML DDU — HIGH
D DIMER BLD IA.RAPID-MCNC: 578 NG/ML — SIGNIFICANT CHANGE UP
D DIMER BLD IA.RAPID-MCNC: 639 NG/ML DDU — HIGH
D DIMER BLD IA.RAPID-MCNC: 645 NG/ML DDU — HIGH
D DIMER BLD IA.RAPID-MCNC: 656 NG/ML — SIGNIFICANT CHANGE UP
D DIMER BLD IA.RAPID-MCNC: 693 NG/ML — SIGNIFICANT CHANGE UP
D DIMER BLD IA.RAPID-MCNC: 743 NG/ML — SIGNIFICANT CHANGE UP
D DIMER BLD IA.RAPID-MCNC: 786 NG/ML DDU — HIGH
D DIMER BLD IA.RAPID-MCNC: 826 NG/ML — SIGNIFICANT CHANGE UP
D DIMER BLD IA.RAPID-MCNC: 902 NG/ML DDU — HIGH
D DIMER BLD IA.RAPID-MCNC: 997 NG/ML DDU — HIGH
DIFF PNL FLD: ABNORMAL
E CLOAC COMP DNA BLD POS QL NAA+PROBE: SIGNIFICANT CHANGE UP
E COLI DNA BLD POS QL NAA+NON-PROBE: SIGNIFICANT CHANGE UP
ENTEROCOC DNA BLD POS QL NAA+NON-PROBE: SIGNIFICANT CHANGE UP
ENTEROCOC DNA BLD POS QL NAA+NON-PROBE: SIGNIFICANT CHANGE UP
EOSINOPHIL # BLD AUTO: 0 K/UL — SIGNIFICANT CHANGE UP (ref 0–0.5)
EOSINOPHIL # BLD AUTO: 0.01 K/UL — SIGNIFICANT CHANGE UP (ref 0–0.5)
EOSINOPHIL # BLD AUTO: 0.02 K/UL — SIGNIFICANT CHANGE UP (ref 0–0.5)
EOSINOPHIL # BLD AUTO: 0.03 K/UL — SIGNIFICANT CHANGE UP (ref 0–0.5)
EOSINOPHIL # BLD AUTO: 0.04 K/UL — SIGNIFICANT CHANGE UP (ref 0–0.5)
EOSINOPHIL # BLD AUTO: 0.04 K/UL — SIGNIFICANT CHANGE UP (ref 0–0.5)
EOSINOPHIL # BLD AUTO: 0.07 K/UL — SIGNIFICANT CHANGE UP (ref 0–0.5)
EOSINOPHIL # BLD AUTO: 0.13 K/UL — SIGNIFICANT CHANGE UP (ref 0–0.5)
EOSINOPHIL NFR BLD AUTO: 0 % — SIGNIFICANT CHANGE UP (ref 0–6)
EOSINOPHIL NFR BLD AUTO: 0.1 % — SIGNIFICANT CHANGE UP (ref 0–6)
EOSINOPHIL NFR BLD AUTO: 0.2 % — SIGNIFICANT CHANGE UP (ref 0–6)
EOSINOPHIL NFR BLD AUTO: 0.2 % — SIGNIFICANT CHANGE UP (ref 0–6)
EOSINOPHIL NFR BLD AUTO: 0.4 % — SIGNIFICANT CHANGE UP (ref 0–6)
EOSINOPHIL NFR BLD AUTO: 0.5 % — SIGNIFICANT CHANGE UP (ref 0–6)
EOSINOPHIL NFR BLD AUTO: 0.7 % — SIGNIFICANT CHANGE UP (ref 0–6)
EOSINOPHIL NFR BLD AUTO: 0.9 % — SIGNIFICANT CHANGE UP (ref 0–6)
EOSINOPHIL NFR BLD AUTO: 1.3 % — SIGNIFICANT CHANGE UP (ref 0–6)
EOSINOPHIL NFR FLD: 0 % — SIGNIFICANT CHANGE UP (ref 0–6)
EPI CELLS # UR: SIGNIFICANT CHANGE UP
ERYTHROCYTE [SEDIMENTATION RATE] IN BLOOD: 101 MM/HR — HIGH (ref 0–20)
ERYTHROCYTE [SEDIMENTATION RATE] IN BLOOD: 101 MM/HR — HIGH (ref 0–20)
ERYTHROCYTE [SEDIMENTATION RATE] IN BLOOD: 25 MM/HR — HIGH (ref 0–20)
ERYTHROCYTE [SEDIMENTATION RATE] IN BLOOD: 25 MM/HR — HIGH (ref 0–20)
ERYTHROCYTE [SEDIMENTATION RATE] IN BLOOD: 26 MM/HR — HIGH (ref 0–20)
ERYTHROCYTE [SEDIMENTATION RATE] IN BLOOD: 26 MM/HR — HIGH (ref 0–20)
ERYTHROCYTE [SEDIMENTATION RATE] IN BLOOD: 91 MM/HR — HIGH (ref 0–20)
FERRITIN SERPL-MCNC: 1011 NG/ML — HIGH (ref 15–150)
FERRITIN SERPL-MCNC: 1039 NG/ML — HIGH (ref 15–150)
FERRITIN SERPL-MCNC: 1078 NG/ML — HIGH (ref 15–150)
FERRITIN SERPL-MCNC: 1233 NG/ML — HIGH (ref 15–150)
FERRITIN SERPL-MCNC: 1342 NG/ML — HIGH (ref 15–150)
FERRITIN SERPL-MCNC: 1404 NG/ML — HIGH (ref 15–150)
FERRITIN SERPL-MCNC: 1405 NG/ML — HIGH (ref 15–150)
FERRITIN SERPL-MCNC: 1454 NG/ML — HIGH (ref 15–150)
FERRITIN SERPL-MCNC: 1524 NG/ML — HIGH (ref 15–150)
FERRITIN SERPL-MCNC: 1602 NG/ML — HIGH (ref 15–150)
FERRITIN SERPL-MCNC: 1625 NG/ML — HIGH (ref 15–150)
FERRITIN SERPL-MCNC: 1644 NG/ML — HIGH (ref 15–150)
FERRITIN SERPL-MCNC: 1770 NG/ML — HIGH (ref 15–150)
FERRITIN SERPL-MCNC: 2326 NG/ML — HIGH (ref 15–150)
FERRITIN SERPL-MCNC: 2591 NG/ML — HIGH (ref 15–150)
FERRITIN SERPL-MCNC: 671 NG/ML — HIGH (ref 15–150)
FERRITIN SERPL-MCNC: 808 NG/ML — HIGH (ref 15–150)
FIBRINOGEN PPP-MCNC: 404 MG/DL — SIGNIFICANT CHANGE UP (ref 300–520)
FIBRINOGEN PPP-MCNC: 508 MG/DL — SIGNIFICANT CHANGE UP (ref 300–520)
FIBRINOGEN PPP-MCNC: 632 MG/DL — HIGH (ref 300–520)
FIBRINOGEN PPP-MCNC: 666 MG/DL — HIGH (ref 300–520)
GAS PNL BLDA: SIGNIFICANT CHANGE UP
GAS PNL BLDV: 147 MMOL/L — HIGH (ref 136–146)
GAS PNL BLDV: SIGNIFICANT CHANGE UP
GIANT PLATELETS BLD QL SMEAR: PRESENT — SIGNIFICANT CHANGE UP
GLUCOSE BLDA-MCNC: 101 MG/DL — HIGH (ref 70–99)
GLUCOSE BLDA-MCNC: 101 MG/DL — HIGH (ref 70–99)
GLUCOSE BLDA-MCNC: 102 MG/DL — HIGH (ref 70–99)
GLUCOSE BLDA-MCNC: 105 MG/DL — HIGH (ref 70–99)
GLUCOSE BLDA-MCNC: 108 MG/DL — HIGH (ref 70–99)
GLUCOSE BLDA-MCNC: 108 MG/DL — HIGH (ref 70–99)
GLUCOSE BLDA-MCNC: 113 MG/DL — HIGH (ref 70–99)
GLUCOSE BLDA-MCNC: 114 MG/DL — HIGH (ref 70–99)
GLUCOSE BLDA-MCNC: 115 MG/DL — HIGH (ref 70–99)
GLUCOSE BLDA-MCNC: 120 MG/DL — HIGH (ref 70–99)
GLUCOSE BLDA-MCNC: 123 MG/DL — HIGH (ref 70–99)
GLUCOSE BLDA-MCNC: 124 MG/DL — HIGH (ref 70–99)
GLUCOSE BLDA-MCNC: 127 MG/DL — HIGH (ref 70–99)
GLUCOSE BLDA-MCNC: 129 MG/DL — HIGH (ref 70–99)
GLUCOSE BLDA-MCNC: 148 MG/DL — HIGH (ref 70–99)
GLUCOSE BLDA-MCNC: 154 MG/DL — HIGH (ref 70–99)
GLUCOSE BLDA-MCNC: 168 MG/DL — HIGH (ref 70–99)
GLUCOSE BLDA-MCNC: 73 MG/DL — SIGNIFICANT CHANGE UP (ref 70–99)
GLUCOSE BLDA-MCNC: 79 MG/DL — SIGNIFICANT CHANGE UP (ref 70–99)
GLUCOSE BLDA-MCNC: 79 MG/DL — SIGNIFICANT CHANGE UP (ref 70–99)
GLUCOSE BLDA-MCNC: 83 MG/DL — SIGNIFICANT CHANGE UP (ref 70–99)
GLUCOSE BLDA-MCNC: 85 MG/DL — SIGNIFICANT CHANGE UP (ref 70–99)
GLUCOSE BLDA-MCNC: 87 MG/DL — SIGNIFICANT CHANGE UP (ref 70–99)
GLUCOSE BLDA-MCNC: 89 MG/DL — SIGNIFICANT CHANGE UP (ref 70–99)
GLUCOSE BLDA-MCNC: 91 MG/DL — SIGNIFICANT CHANGE UP (ref 70–99)
GLUCOSE BLDA-MCNC: 95 MG/DL — SIGNIFICANT CHANGE UP (ref 70–99)
GLUCOSE BLDA-MCNC: 95 MG/DL — SIGNIFICANT CHANGE UP (ref 70–99)
GLUCOSE BLDA-MCNC: 96 MG/DL — SIGNIFICANT CHANGE UP (ref 70–99)
GLUCOSE BLDA-MCNC: 96 MG/DL — SIGNIFICANT CHANGE UP (ref 70–99)
GLUCOSE BLDC GLUCOMTR-MCNC: 101 MG/DL — HIGH (ref 70–99)
GLUCOSE BLDC GLUCOMTR-MCNC: 102 MG/DL — HIGH (ref 70–99)
GLUCOSE BLDC GLUCOMTR-MCNC: 102 MG/DL — HIGH (ref 70–99)
GLUCOSE BLDC GLUCOMTR-MCNC: 103 MG/DL — HIGH (ref 70–99)
GLUCOSE BLDC GLUCOMTR-MCNC: 103 MG/DL — HIGH (ref 70–99)
GLUCOSE BLDC GLUCOMTR-MCNC: 104 MG/DL — HIGH (ref 70–99)
GLUCOSE BLDC GLUCOMTR-MCNC: 104 MG/DL — HIGH (ref 70–99)
GLUCOSE BLDC GLUCOMTR-MCNC: 105 MG/DL — HIGH (ref 70–99)
GLUCOSE BLDC GLUCOMTR-MCNC: 105 MG/DL — HIGH (ref 70–99)
GLUCOSE BLDC GLUCOMTR-MCNC: 108 MG/DL — HIGH (ref 70–99)
GLUCOSE BLDC GLUCOMTR-MCNC: 109 MG/DL — HIGH (ref 70–99)
GLUCOSE BLDC GLUCOMTR-MCNC: 112 MG/DL — HIGH (ref 70–99)
GLUCOSE BLDC GLUCOMTR-MCNC: 117 MG/DL — HIGH (ref 70–99)
GLUCOSE BLDC GLUCOMTR-MCNC: 118 MG/DL — HIGH (ref 70–99)
GLUCOSE BLDC GLUCOMTR-MCNC: 120 MG/DL — HIGH (ref 70–99)
GLUCOSE BLDC GLUCOMTR-MCNC: 122 MG/DL — HIGH (ref 70–99)
GLUCOSE BLDC GLUCOMTR-MCNC: 122 MG/DL — HIGH (ref 70–99)
GLUCOSE BLDC GLUCOMTR-MCNC: 123 MG/DL — HIGH (ref 70–99)
GLUCOSE BLDC GLUCOMTR-MCNC: 125 MG/DL — HIGH (ref 70–99)
GLUCOSE BLDC GLUCOMTR-MCNC: 126 MG/DL — HIGH (ref 70–99)
GLUCOSE BLDC GLUCOMTR-MCNC: 126 MG/DL — HIGH (ref 70–99)
GLUCOSE BLDC GLUCOMTR-MCNC: 127 MG/DL — HIGH (ref 70–99)
GLUCOSE BLDC GLUCOMTR-MCNC: 129 MG/DL — HIGH (ref 70–99)
GLUCOSE BLDC GLUCOMTR-MCNC: 130 MG/DL — HIGH (ref 70–99)
GLUCOSE BLDC GLUCOMTR-MCNC: 133 MG/DL — HIGH (ref 70–99)
GLUCOSE BLDC GLUCOMTR-MCNC: 136 MG/DL — HIGH (ref 70–99)
GLUCOSE BLDC GLUCOMTR-MCNC: 137 MG/DL — HIGH (ref 70–99)
GLUCOSE BLDC GLUCOMTR-MCNC: 141 MG/DL — HIGH (ref 70–99)
GLUCOSE BLDC GLUCOMTR-MCNC: 143 MG/DL — HIGH (ref 70–99)
GLUCOSE BLDC GLUCOMTR-MCNC: 144 MG/DL — HIGH (ref 70–99)
GLUCOSE BLDC GLUCOMTR-MCNC: 145 MG/DL — HIGH (ref 70–99)
GLUCOSE BLDC GLUCOMTR-MCNC: 145 MG/DL — HIGH (ref 70–99)
GLUCOSE BLDC GLUCOMTR-MCNC: 146 MG/DL — HIGH (ref 70–99)
GLUCOSE BLDC GLUCOMTR-MCNC: 150 MG/DL — HIGH (ref 70–99)
GLUCOSE BLDC GLUCOMTR-MCNC: 151 MG/DL — HIGH (ref 70–99)
GLUCOSE BLDC GLUCOMTR-MCNC: 153 MG/DL — HIGH (ref 70–99)
GLUCOSE BLDC GLUCOMTR-MCNC: 159 MG/DL — HIGH (ref 70–99)
GLUCOSE BLDC GLUCOMTR-MCNC: 164 MG/DL — HIGH (ref 70–99)
GLUCOSE BLDC GLUCOMTR-MCNC: 191 MG/DL — HIGH (ref 70–99)
GLUCOSE BLDC GLUCOMTR-MCNC: 58 MG/DL — LOW (ref 70–99)
GLUCOSE BLDC GLUCOMTR-MCNC: 61 MG/DL — LOW (ref 70–99)
GLUCOSE BLDC GLUCOMTR-MCNC: 63 MG/DL — LOW (ref 70–99)
GLUCOSE BLDC GLUCOMTR-MCNC: 65 MG/DL — LOW (ref 70–99)
GLUCOSE BLDC GLUCOMTR-MCNC: 66 MG/DL — LOW (ref 70–99)
GLUCOSE BLDC GLUCOMTR-MCNC: 67 MG/DL — LOW (ref 70–99)
GLUCOSE BLDC GLUCOMTR-MCNC: 70 MG/DL — SIGNIFICANT CHANGE UP (ref 70–99)
GLUCOSE BLDC GLUCOMTR-MCNC: 70 MG/DL — SIGNIFICANT CHANGE UP (ref 70–99)
GLUCOSE BLDC GLUCOMTR-MCNC: 72 MG/DL — SIGNIFICANT CHANGE UP (ref 70–99)
GLUCOSE BLDC GLUCOMTR-MCNC: 79 MG/DL — SIGNIFICANT CHANGE UP (ref 70–99)
GLUCOSE BLDC GLUCOMTR-MCNC: 80 MG/DL — SIGNIFICANT CHANGE UP (ref 70–99)
GLUCOSE BLDC GLUCOMTR-MCNC: 81 MG/DL — SIGNIFICANT CHANGE UP (ref 70–99)
GLUCOSE BLDC GLUCOMTR-MCNC: 82 MG/DL — SIGNIFICANT CHANGE UP (ref 70–99)
GLUCOSE BLDC GLUCOMTR-MCNC: 83 MG/DL — SIGNIFICANT CHANGE UP (ref 70–99)
GLUCOSE BLDC GLUCOMTR-MCNC: 85 MG/DL — SIGNIFICANT CHANGE UP (ref 70–99)
GLUCOSE BLDC GLUCOMTR-MCNC: 86 MG/DL — SIGNIFICANT CHANGE UP (ref 70–99)
GLUCOSE BLDC GLUCOMTR-MCNC: 86 MG/DL — SIGNIFICANT CHANGE UP (ref 70–99)
GLUCOSE BLDC GLUCOMTR-MCNC: 87 MG/DL — SIGNIFICANT CHANGE UP (ref 70–99)
GLUCOSE BLDC GLUCOMTR-MCNC: 89 MG/DL — SIGNIFICANT CHANGE UP (ref 70–99)
GLUCOSE BLDC GLUCOMTR-MCNC: 89 MG/DL — SIGNIFICANT CHANGE UP (ref 70–99)
GLUCOSE BLDC GLUCOMTR-MCNC: 90 MG/DL — SIGNIFICANT CHANGE UP (ref 70–99)
GLUCOSE BLDC GLUCOMTR-MCNC: 92 MG/DL — SIGNIFICANT CHANGE UP (ref 70–99)
GLUCOSE BLDC GLUCOMTR-MCNC: 94 MG/DL — SIGNIFICANT CHANGE UP (ref 70–99)
GLUCOSE BLDC GLUCOMTR-MCNC: 95 MG/DL — SIGNIFICANT CHANGE UP (ref 70–99)
GLUCOSE BLDC GLUCOMTR-MCNC: 96 MG/DL — SIGNIFICANT CHANGE UP (ref 70–99)
GLUCOSE BLDC GLUCOMTR-MCNC: 97 MG/DL — SIGNIFICANT CHANGE UP (ref 70–99)
GLUCOSE BLDC GLUCOMTR-MCNC: 98 MG/DL — SIGNIFICANT CHANGE UP (ref 70–99)
GLUCOSE BLDC GLUCOMTR-MCNC: 98 MG/DL — SIGNIFICANT CHANGE UP (ref 70–99)
GLUCOSE BLDV-MCNC: 83 MG/DL — SIGNIFICANT CHANGE UP (ref 70–99)
GLUCOSE SERPL-MCNC: 100 MG/DL — HIGH (ref 70–99)
GLUCOSE SERPL-MCNC: 101 MG/DL — HIGH (ref 70–99)
GLUCOSE SERPL-MCNC: 102 MG/DL — HIGH (ref 70–99)
GLUCOSE SERPL-MCNC: 102 MG/DL — HIGH (ref 70–99)
GLUCOSE SERPL-MCNC: 106 MG/DL — HIGH (ref 70–99)
GLUCOSE SERPL-MCNC: 107 MG/DL — HIGH (ref 70–99)
GLUCOSE SERPL-MCNC: 107 MG/DL — HIGH (ref 70–99)
GLUCOSE SERPL-MCNC: 108 MG/DL — HIGH (ref 70–99)
GLUCOSE SERPL-MCNC: 109 MG/DL — HIGH (ref 70–99)
GLUCOSE SERPL-MCNC: 110 MG/DL — HIGH (ref 70–99)
GLUCOSE SERPL-MCNC: 112 MG/DL — HIGH (ref 70–99)
GLUCOSE SERPL-MCNC: 114 MG/DL — HIGH (ref 70–99)
GLUCOSE SERPL-MCNC: 115 MG/DL — HIGH (ref 70–99)
GLUCOSE SERPL-MCNC: 116 MG/DL — HIGH (ref 70–99)
GLUCOSE SERPL-MCNC: 119 MG/DL — HIGH (ref 70–99)
GLUCOSE SERPL-MCNC: 122 MG/DL — HIGH (ref 70–99)
GLUCOSE SERPL-MCNC: 128 MG/DL — HIGH (ref 70–99)
GLUCOSE SERPL-MCNC: 130 MG/DL — HIGH (ref 70–99)
GLUCOSE SERPL-MCNC: 130 MG/DL — HIGH (ref 70–99)
GLUCOSE SERPL-MCNC: 131 MG/DL — HIGH (ref 70–99)
GLUCOSE SERPL-MCNC: 132 MG/DL — HIGH (ref 70–99)
GLUCOSE SERPL-MCNC: 132 MG/DL — HIGH (ref 70–99)
GLUCOSE SERPL-MCNC: 142 MG/DL — HIGH (ref 70–99)
GLUCOSE SERPL-MCNC: 143 MG/DL — HIGH (ref 70–99)
GLUCOSE SERPL-MCNC: 147 MG/DL — HIGH (ref 70–99)
GLUCOSE SERPL-MCNC: 154 MG/DL — HIGH (ref 70–99)
GLUCOSE SERPL-MCNC: 156 MG/DL — HIGH (ref 70–99)
GLUCOSE SERPL-MCNC: 171 MG/DL — HIGH (ref 70–99)
GLUCOSE SERPL-MCNC: 175 MG/DL — HIGH (ref 70–99)
GLUCOSE SERPL-MCNC: 178 MG/DL — HIGH (ref 70–99)
GLUCOSE SERPL-MCNC: 198 MG/DL — HIGH (ref 70–99)
GLUCOSE SERPL-MCNC: 222 MG/DL — HIGH (ref 70–99)
GLUCOSE SERPL-MCNC: 70 MG/DL — SIGNIFICANT CHANGE UP (ref 70–99)
GLUCOSE SERPL-MCNC: 77 MG/DL — SIGNIFICANT CHANGE UP (ref 70–99)
GLUCOSE SERPL-MCNC: 79 MG/DL — SIGNIFICANT CHANGE UP (ref 70–99)
GLUCOSE SERPL-MCNC: 81 MG/DL — SIGNIFICANT CHANGE UP (ref 70–99)
GLUCOSE SERPL-MCNC: 86 MG/DL — SIGNIFICANT CHANGE UP (ref 70–99)
GLUCOSE SERPL-MCNC: 87 MG/DL — SIGNIFICANT CHANGE UP (ref 70–99)
GLUCOSE SERPL-MCNC: 90 MG/DL — SIGNIFICANT CHANGE UP (ref 70–99)
GLUCOSE SERPL-MCNC: 91 MG/DL — SIGNIFICANT CHANGE UP (ref 70–99)
GLUCOSE SERPL-MCNC: 96 MG/DL — SIGNIFICANT CHANGE UP (ref 70–99)
GLUCOSE SERPL-MCNC: 97 MG/DL — SIGNIFICANT CHANGE UP (ref 70–99)
GLUCOSE SERPL-MCNC: 98 MG/DL — SIGNIFICANT CHANGE UP (ref 70–99)
GLUCOSE SERPL-MCNC: 98 MG/DL — SIGNIFICANT CHANGE UP (ref 70–99)
GLUCOSE SERPL-MCNC: 99 MG/DL — SIGNIFICANT CHANGE UP (ref 70–99)
GLUCOSE UR QL: NEGATIVE MG/DL — SIGNIFICANT CHANGE UP
GP B STREP DNA BLD POS QL NAA+NON-PROBE: SIGNIFICANT CHANGE UP
GRAM STN FLD: SIGNIFICANT CHANGE UP
HAEM INFLU DNA BLD POS QL NAA+NON-PROBE: SIGNIFICANT CHANGE UP
HBA1C BLD-MCNC: 5.6 % — SIGNIFICANT CHANGE UP (ref 4–5.6)
HCO3 BLDA-SCNC: 17 MMOL/L — LOW (ref 21–29)
HCO3 BLDA-SCNC: 23 MMOL/L — SIGNIFICANT CHANGE UP (ref 21–29)
HCO3 BLDA-SCNC: 24 MMOL/L — SIGNIFICANT CHANGE UP (ref 21–29)
HCO3 BLDA-SCNC: 25 MMOL/L — SIGNIFICANT CHANGE UP (ref 21–29)
HCO3 BLDA-SCNC: 26 MMOL/L — SIGNIFICANT CHANGE UP (ref 21–29)
HCO3 BLDA-SCNC: 27 MMOL/L — SIGNIFICANT CHANGE UP (ref 21–29)
HCO3 BLDA-SCNC: 28 MMOL/L — HIGH (ref 22–26)
HCO3 BLDA-SCNC: 28 MMOL/L — SIGNIFICANT CHANGE UP (ref 21–29)
HCO3 BLDA-SCNC: 29 MMOL/L — HIGH (ref 22–26)
HCO3 BLDA-SCNC: 29 MMOL/L — HIGH (ref 22–26)
HCO3 BLDA-SCNC: 29 MMOL/L — SIGNIFICANT CHANGE UP (ref 21–29)
HCO3 BLDA-SCNC: 30 MMOL/L — HIGH (ref 22–26)
HCO3 BLDA-SCNC: 31 MMOL/L — HIGH (ref 21–29)
HCO3 BLDA-SCNC: 31 MMOL/L — HIGH (ref 22–26)
HCO3 BLDA-SCNC: 32 MMOL/L — HIGH (ref 22–26)
HCO3 BLDA-SCNC: 33 MMOL/L — HIGH (ref 22–26)
HCO3 BLDA-SCNC: 34 MMOL/L — HIGH (ref 22–26)
HCO3 BLDA-SCNC: 35 MMOL/L — HIGH (ref 21–29)
HCO3 BLDA-SCNC: 35 MMOL/L — HIGH (ref 22–26)
HCO3 BLDA-SCNC: 9 MMOL/L — LOW (ref 21–29)
HCO3 BLDV-SCNC: 18 MMOL/L — LOW (ref 21–29)
HCO3 BLDV-SCNC: 23 MMOL/L — SIGNIFICANT CHANGE UP (ref 21–29)
HCO3 BLDV-SCNC: 33 MMOL/L — HIGH (ref 20–27)
HCT VFR BLD CALC: 20.2 % — CRITICAL LOW (ref 34.5–45)
HCT VFR BLD CALC: 22.4 % — LOW (ref 34.5–45)
HCT VFR BLD CALC: 22.6 % — LOW (ref 34.5–45)
HCT VFR BLD CALC: 23.7 % — LOW (ref 34.5–45)
HCT VFR BLD CALC: 24.2 % — LOW (ref 34.5–45)
HCT VFR BLD CALC: 24.3 % — LOW (ref 34.5–45)
HCT VFR BLD CALC: 24.3 % — LOW (ref 34.5–45)
HCT VFR BLD CALC: 24.6 % — LOW (ref 34.5–45)
HCT VFR BLD CALC: 24.8 % — LOW (ref 34.5–45)
HCT VFR BLD CALC: 25.2 % — LOW (ref 34.5–45)
HCT VFR BLD CALC: 26.2 % — LOW (ref 34.5–45)
HCT VFR BLD CALC: 26.4 % — LOW (ref 34.5–45)
HCT VFR BLD CALC: 26.4 % — LOW (ref 34.5–45)
HCT VFR BLD CALC: 26.5 % — LOW (ref 34.5–45)
HCT VFR BLD CALC: 26.8 % — LOW (ref 34.5–45)
HCT VFR BLD CALC: 26.9 % — LOW (ref 34.5–45)
HCT VFR BLD CALC: 27.2 % — LOW (ref 34.5–45)
HCT VFR BLD CALC: 27.3 % — LOW (ref 34.5–45)
HCT VFR BLD CALC: 27.4 % — LOW (ref 34.5–45)
HCT VFR BLD CALC: 27.6 % — LOW (ref 34.5–45)
HCT VFR BLD CALC: 27.7 % — LOW (ref 34.5–45)
HCT VFR BLD CALC: 28 % — LOW (ref 34.5–45)
HCT VFR BLD CALC: 28 % — LOW (ref 34.5–45)
HCT VFR BLD CALC: 28.2 % — LOW (ref 34.5–45)
HCT VFR BLD CALC: 28.4 % — LOW (ref 34.5–45)
HCT VFR BLD CALC: 28.8 % — LOW (ref 34.5–45)
HCT VFR BLD CALC: 28.9 % — LOW (ref 34.5–45)
HCT VFR BLD CALC: 28.9 % — LOW (ref 34.5–45)
HCT VFR BLD CALC: 29.1 % — LOW (ref 34.5–45)
HCT VFR BLD CALC: 29.7 % — LOW (ref 34.5–45)
HCT VFR BLD CALC: 30.3 % — LOW (ref 34.5–45)
HCT VFR BLD CALC: 31.1 % — LOW (ref 34.5–45)
HCT VFR BLD CALC: 31.2 % — LOW (ref 34.5–45)
HCT VFR BLD CALC: 31.3 % — LOW (ref 34.5–45)
HCT VFR BLD CALC: 31.3 % — LOW (ref 34.5–45)
HCT VFR BLD CALC: 31.6 % — LOW (ref 34.5–45)
HCT VFR BLD CALC: 31.7 % — LOW (ref 34.5–45)
HCT VFR BLD CALC: 32 % — LOW (ref 34.5–45)
HCT VFR BLD CALC: 32.6 % — LOW (ref 34.5–45)
HCT VFR BLD CALC: 32.7 % — LOW (ref 34.5–45)
HCT VFR BLD CALC: 33.6 % — LOW (ref 34.5–45)
HCT VFR BLD CALC: 34 % — LOW (ref 34.5–45)
HCT VFR BLD CALC: 35 % — SIGNIFICANT CHANGE UP (ref 34.5–45)
HCT VFR BLD CALC: 38.8 % — SIGNIFICANT CHANGE UP (ref 34.5–45)
HCT VFR BLD CALC: 42.5 % — SIGNIFICANT CHANGE UP (ref 34.5–45)
HCT VFR BLD CALC: 45.9 % — HIGH (ref 34.5–45)
HCT VFR BLDA CALC: 14.6 % — CRITICAL LOW (ref 34.5–46.5)
HCT VFR BLDA CALC: 20.9 % — CRITICAL LOW (ref 34.5–46.5)
HCT VFR BLDA CALC: 21 % — CRITICAL LOW (ref 34.5–46.5)
HCT VFR BLDA CALC: 21.5 % — LOW (ref 34.5–46.5)
HCT VFR BLDA CALC: 22.2 % — LOW (ref 34.5–46.5)
HCT VFR BLDA CALC: 22.5 % — LOW (ref 34.5–46.5)
HCT VFR BLDA CALC: 23.6 % — LOW (ref 34.5–46.5)
HCT VFR BLDA CALC: 23.9 % — LOW (ref 34.5–46.5)
HCT VFR BLDA CALC: 24.3 % — LOW (ref 34.5–46.5)
HCT VFR BLDA CALC: 24.7 % — LOW (ref 34.5–46.5)
HCT VFR BLDA CALC: 26.8 % — LOW (ref 34.5–46.5)
HCT VFR BLDA CALC: 26.9 % — LOW (ref 34.5–46.5)
HCT VFR BLDA CALC: 27 % — LOW (ref 34.5–46.5)
HCT VFR BLDA CALC: 27.2 % — LOW (ref 34.5–46.5)
HCT VFR BLDA CALC: 27.4 % — LOW (ref 34.5–46.5)
HCT VFR BLDA CALC: 27.5 % — LOW (ref 34.5–46.5)
HCT VFR BLDA CALC: 27.7 % — LOW (ref 34.5–46.5)
HCT VFR BLDA CALC: 27.9 % — LOW (ref 34.5–46.5)
HCT VFR BLDA CALC: 28.1 % — LOW (ref 34.5–46.5)
HCT VFR BLDA CALC: 28.2 % — LOW (ref 34.5–46.5)
HCT VFR BLDA CALC: 28.4 % — LOW (ref 34.5–46.5)
HCT VFR BLDA CALC: 28.5 % — LOW (ref 34.5–46.5)
HCT VFR BLDA CALC: 28.6 % — LOW (ref 34.5–46.5)
HCT VFR BLDA CALC: 28.9 % — LOW (ref 34.5–46.5)
HCT VFR BLDA CALC: 29 % — LOW (ref 34.5–46.5)
HCT VFR BLDA CALC: 29.1 % — LOW (ref 34.5–46.5)
HCT VFR BLDA CALC: 29.8 % — LOW (ref 34.5–46.5)
HCT VFR BLDA CALC: 30.5 % — LOW (ref 34.5–46.5)
HCT VFR BLDA CALC: 30.5 % — LOW (ref 34.5–46.5)
HCT VFR BLDV CALC: 32.7 % — LOW (ref 34.5–45)
HCV AB S/CO SERPL IA: 0.25 S/CO — SIGNIFICANT CHANGE UP (ref 0–0.99)
HCV AB SERPL-IMP: SIGNIFICANT CHANGE UP
HEPARIN-PF4 AB RESULT: <0.6 U/ML — SIGNIFICANT CHANGE UP (ref 0–0.9)
HGB BLD-MCNC: 10.3 G/DL — LOW (ref 11.5–15.5)
HGB BLD-MCNC: 10.4 G/DL — LOW (ref 11.5–15.5)
HGB BLD-MCNC: 10.5 G/DL — LOW (ref 11.5–15.5)
HGB BLD-MCNC: 10.5 G/DL — LOW (ref 11.5–15.5)
HGB BLD-MCNC: 10.6 G/DL — LOW (ref 11.5–15.5)
HGB BLD-MCNC: 10.6 G/DL — LOW (ref 11.5–15.5)
HGB BLD-MCNC: 10.7 G/DL — LOW (ref 11.5–15.5)
HGB BLD-MCNC: 10.9 G/DL — LOW (ref 11.5–15.5)
HGB BLD-MCNC: 11.1 G/DL — LOW (ref 11.5–15.5)
HGB BLD-MCNC: 11.2 G/DL — LOW (ref 11.5–15.5)
HGB BLD-MCNC: 11.7 G/DL — SIGNIFICANT CHANGE UP (ref 11.5–15.5)
HGB BLD-MCNC: 12.4 G/DL — SIGNIFICANT CHANGE UP (ref 11.5–15.5)
HGB BLD-MCNC: 13.4 G/DL — SIGNIFICANT CHANGE UP (ref 11.5–15.5)
HGB BLD-MCNC: 14.7 G/DL — SIGNIFICANT CHANGE UP (ref 11.5–15.5)
HGB BLD-MCNC: 6.5 G/DL — CRITICAL LOW (ref 11.5–15.5)
HGB BLD-MCNC: 7.1 G/DL — LOW (ref 11.5–15.5)
HGB BLD-MCNC: 7.4 G/DL — LOW (ref 11.5–15.5)
HGB BLD-MCNC: 7.6 G/DL — LOW (ref 11.5–15.5)
HGB BLD-MCNC: 7.8 G/DL — LOW (ref 11.5–15.5)
HGB BLD-MCNC: 8 G/DL — LOW (ref 11.5–15.5)
HGB BLD-MCNC: 8 G/DL — LOW (ref 11.5–15.5)
HGB BLD-MCNC: 8.1 G/DL — LOW (ref 11.5–15.5)
HGB BLD-MCNC: 8.3 G/DL — LOW (ref 11.5–15.5)
HGB BLD-MCNC: 8.5 G/DL — LOW (ref 11.5–15.5)
HGB BLD-MCNC: 8.5 G/DL — LOW (ref 11.5–15.5)
HGB BLD-MCNC: 8.6 G/DL — LOW (ref 11.5–15.5)
HGB BLD-MCNC: 8.6 G/DL — LOW (ref 11.5–15.5)
HGB BLD-MCNC: 8.7 G/DL — LOW (ref 11.5–15.5)
HGB BLD-MCNC: 8.8 G/DL — LOW (ref 11.5–15.5)
HGB BLD-MCNC: 8.8 G/DL — LOW (ref 11.5–15.5)
HGB BLD-MCNC: 8.9 G/DL — LOW (ref 11.5–15.5)
HGB BLD-MCNC: 9 G/DL — LOW (ref 11.5–15.5)
HGB BLD-MCNC: 9.1 G/DL — LOW (ref 11.5–15.5)
HGB BLD-MCNC: 9.2 G/DL — LOW (ref 11.5–15.5)
HGB BLD-MCNC: 9.3 G/DL — LOW (ref 11.5–15.5)
HGB BLD-MCNC: 9.3 G/DL — LOW (ref 11.5–15.5)
HGB BLD-MCNC: 9.6 G/DL — LOW (ref 11.5–15.5)
HGB BLD-MCNC: 9.7 G/DL — LOW (ref 11.5–15.5)
HGB BLD-MCNC: 9.8 G/DL — LOW (ref 11.5–15.5)
HGB BLDA-MCNC: 4.6 G/DL — CRITICAL LOW (ref 11.5–15.5)
HGB BLDA-MCNC: 6.7 G/DL — CRITICAL LOW (ref 11.5–15.5)
HGB BLDA-MCNC: 6.7 G/DL — CRITICAL LOW (ref 11.5–15.5)
HGB BLDA-MCNC: 6.9 G/DL — CRITICAL LOW (ref 11.5–15.5)
HGB BLDA-MCNC: 7.1 G/DL — LOW (ref 11.5–15.5)
HGB BLDA-MCNC: 7.2 G/DL — LOW (ref 11.5–15.5)
HGB BLDA-MCNC: 7.6 G/DL — LOW (ref 11.5–15.5)
HGB BLDA-MCNC: 7.7 G/DL — LOW (ref 11.5–15.5)
HGB BLDA-MCNC: 7.8 G/DL — LOW (ref 11.5–15.5)
HGB BLDA-MCNC: 7.9 G/DL — LOW (ref 11.5–15.5)
HGB BLDA-MCNC: 8.6 G/DL — LOW (ref 11.5–15.5)
HGB BLDA-MCNC: 8.7 G/DL — LOW (ref 11.5–15.5)
HGB BLDA-MCNC: 8.7 G/DL — LOW (ref 11.5–15.5)
HGB BLDA-MCNC: 8.8 G/DL — LOW (ref 11.5–15.5)
HGB BLDA-MCNC: 8.8 G/DL — LOW (ref 11.5–15.5)
HGB BLDA-MCNC: 8.9 G/DL — LOW (ref 11.5–15.5)
HGB BLDA-MCNC: 8.9 G/DL — LOW (ref 11.5–15.5)
HGB BLDA-MCNC: 9 G/DL — LOW (ref 11.5–15.5)
HGB BLDA-MCNC: 9 G/DL — LOW (ref 11.5–15.5)
HGB BLDA-MCNC: 9.1 G/DL — LOW (ref 11.5–15.5)
HGB BLDA-MCNC: 9.2 G/DL — LOW (ref 11.5–15.5)
HGB BLDA-MCNC: 9.3 G/DL — LOW (ref 11.5–15.5)
HGB BLDA-MCNC: 9.4 G/DL — LOW (ref 11.5–15.5)
HGB BLDA-MCNC: 9.4 G/DL — LOW (ref 11.5–15.5)
HGB BLDA-MCNC: 9.6 G/DL — LOW (ref 11.5–15.5)
HGB BLDA-MCNC: 9.9 G/DL — LOW (ref 11.5–15.5)
HGB BLDA-MCNC: 9.9 G/DL — LOW (ref 11.5–15.5)
HGB BLDV-MCNC: 10.6 G/DL — LOW (ref 11.5–15.5)
HOROWITZ INDEX BLDA+IHG-RTO: 100 — SIGNIFICANT CHANGE UP
HOROWITZ INDEX BLDA+IHG-RTO: 30 — SIGNIFICANT CHANGE UP
HOROWITZ INDEX BLDA+IHG-RTO: 30 — SIGNIFICANT CHANGE UP
HOROWITZ INDEX BLDA+IHG-RTO: 40 — SIGNIFICANT CHANGE UP
HOROWITZ INDEX BLDA+IHG-RTO: 45 — SIGNIFICANT CHANGE UP
HOROWITZ INDEX BLDA+IHG-RTO: 50 — SIGNIFICANT CHANGE UP
HOROWITZ INDEX BLDA+IHG-RTO: 50 — SIGNIFICANT CHANGE UP
HOROWITZ INDEX BLDA+IHG-RTO: 60 — SIGNIFICANT CHANGE UP
HOROWITZ INDEX BLDA+IHG-RTO: 60 — SIGNIFICANT CHANGE UP
HOROWITZ INDEX BLDA+IHG-RTO: 70 — SIGNIFICANT CHANGE UP
HOROWITZ INDEX BLDV+IHG-RTO: 21 — SIGNIFICANT CHANGE UP
HYPOCHROMIA BLD QL: SLIGHT — SIGNIFICANT CHANGE UP
IMM GRANULOCYTES NFR BLD AUTO: 0.3 % — SIGNIFICANT CHANGE UP (ref 0–1.5)
IMM GRANULOCYTES NFR BLD AUTO: 0.4 % — SIGNIFICANT CHANGE UP (ref 0–1.5)
IMM GRANULOCYTES NFR BLD AUTO: 0.4 % — SIGNIFICANT CHANGE UP (ref 0–1.5)
IMM GRANULOCYTES NFR BLD AUTO: 0.5 % — SIGNIFICANT CHANGE UP (ref 0–1.5)
IMM GRANULOCYTES NFR BLD AUTO: 0.5 % — SIGNIFICANT CHANGE UP (ref 0–1.5)
IMM GRANULOCYTES NFR BLD AUTO: 0.9 % — SIGNIFICANT CHANGE UP (ref 0–1.5)
IMM GRANULOCYTES NFR BLD AUTO: 1 % — SIGNIFICANT CHANGE UP (ref 0–1.5)
IMM GRANULOCYTES NFR BLD AUTO: 1.1 % — SIGNIFICANT CHANGE UP (ref 0–1.5)
IMM GRANULOCYTES NFR BLD AUTO: 1.2 % — SIGNIFICANT CHANGE UP (ref 0–1.5)
IMM GRANULOCYTES NFR BLD AUTO: 1.3 % — SIGNIFICANT CHANGE UP (ref 0–1.5)
IMM GRANULOCYTES NFR BLD AUTO: 1.3 % — SIGNIFICANT CHANGE UP (ref 0–1.5)
IMM GRANULOCYTES NFR BLD AUTO: 1.5 % — SIGNIFICANT CHANGE UP (ref 0–1.5)
IMM GRANULOCYTES NFR BLD AUTO: 1.7 % — HIGH (ref 0–1.5)
IMM GRANULOCYTES NFR BLD AUTO: 1.7 % — HIGH (ref 0–1.5)
IMM GRANULOCYTES NFR BLD AUTO: 2 % — HIGH (ref 0–1.5)
IMM GRANULOCYTES NFR BLD AUTO: 3 % — HIGH (ref 0–1.5)
IMM GRANULOCYTES NFR BLD AUTO: 3.3 % — HIGH (ref 0–1.5)
IMM GRANULOCYTES NFR BLD AUTO: 3.4 % — HIGH (ref 0–1.5)
IMM GRANULOCYTES NFR BLD AUTO: 4 % — HIGH (ref 0–1.5)
IMM GRANULOCYTES NFR BLD AUTO: 6 % — HIGH (ref 0–1.5)
IMM GRANULOCYTES NFR BLD AUTO: 6.4 % — HIGH (ref 0–1.5)
INR BLD: 0.98 RATIO — SIGNIFICANT CHANGE UP (ref 0.88–1.16)
INR BLD: 0.98 — SIGNIFICANT CHANGE UP (ref 0.88–1.17)
INR BLD: 1 RATIO — SIGNIFICANT CHANGE UP (ref 0.88–1.16)
INR BLD: 1 — SIGNIFICANT CHANGE UP (ref 0.88–1.17)
INR BLD: 1.01 RATIO — SIGNIFICANT CHANGE UP (ref 0.88–1.16)
INR BLD: 1.01 — SIGNIFICANT CHANGE UP (ref 0.88–1.17)
INR BLD: 1.03 RATIO — SIGNIFICANT CHANGE UP (ref 0.88–1.16)
INR BLD: 1.03 — SIGNIFICANT CHANGE UP (ref 0.88–1.17)
INR BLD: 1.04 — SIGNIFICANT CHANGE UP (ref 0.88–1.17)
INR BLD: 1.04 — SIGNIFICANT CHANGE UP (ref 0.88–1.17)
INR BLD: 1.05 — SIGNIFICANT CHANGE UP (ref 0.88–1.17)
INR BLD: 1.05 — SIGNIFICANT CHANGE UP (ref 0.88–1.17)
INR BLD: 1.06 RATIO — SIGNIFICANT CHANGE UP (ref 0.88–1.16)
INR BLD: 1.06 — SIGNIFICANT CHANGE UP (ref 0.88–1.17)
INR BLD: 1.07 RATIO — SIGNIFICANT CHANGE UP (ref 0.88–1.16)
INR BLD: 1.07 RATIO — SIGNIFICANT CHANGE UP (ref 0.88–1.16)
INR BLD: 1.09 RATIO — SIGNIFICANT CHANGE UP (ref 0.88–1.16)
INR BLD: 1.09 — SIGNIFICANT CHANGE UP (ref 0.88–1.17)
INR BLD: 1.1 RATIO — SIGNIFICANT CHANGE UP (ref 0.88–1.16)
INR BLD: 1.1 — SIGNIFICANT CHANGE UP (ref 0.88–1.17)
INR BLD: 1.11 — SIGNIFICANT CHANGE UP (ref 0.88–1.17)
INR BLD: 1.13 — SIGNIFICANT CHANGE UP (ref 0.88–1.17)
INR BLD: 1.15 RATIO — SIGNIFICANT CHANGE UP (ref 0.88–1.16)
INR BLD: 1.16 RATIO — SIGNIFICANT CHANGE UP (ref 0.88–1.16)
INR BLD: 1.18 RATIO — HIGH (ref 0.88–1.16)
INR BLD: 1.2 RATIO — HIGH (ref 0.88–1.16)
INR BLD: 1.22 RATIO — HIGH (ref 0.88–1.16)
INR BLD: 1.24 RATIO — HIGH (ref 0.88–1.16)
INR BLD: 1.28 RATIO — HIGH (ref 0.88–1.16)
INR BLD: 1.44 RATIO — HIGH (ref 0.88–1.16)
K OXYTOCA DNA BLD POS QL NAA+NON-PROBE: SIGNIFICANT CHANGE UP
KETONES UR-MCNC: NEGATIVE — SIGNIFICANT CHANGE UP
L MONOCYTOG DNA BLD POS QL NAA+NON-PROBE: SIGNIFICANT CHANGE UP
LACTATE BLDA-SCNC: 1.1 MMOL/L — SIGNIFICANT CHANGE UP (ref 0.5–2)
LACTATE BLDA-SCNC: 1.1 MMOL/L — SIGNIFICANT CHANGE UP (ref 0.5–2)
LACTATE BLDA-SCNC: 1.2 MMOL/L — SIGNIFICANT CHANGE UP (ref 0.5–2)
LACTATE BLDA-SCNC: 1.3 MMOL/L — SIGNIFICANT CHANGE UP (ref 0.5–2)
LACTATE BLDA-SCNC: 1.3 MMOL/L — SIGNIFICANT CHANGE UP (ref 0.5–2)
LACTATE BLDA-SCNC: 1.5 MMOL/L — SIGNIFICANT CHANGE UP (ref 0.5–2)
LACTATE BLDA-SCNC: 1.6 MMOL/L — SIGNIFICANT CHANGE UP (ref 0.5–2)
LACTATE BLDA-SCNC: 1.6 MMOL/L — SIGNIFICANT CHANGE UP (ref 0.5–2)
LACTATE BLDA-SCNC: 1.7 MMOL/L — SIGNIFICANT CHANGE UP (ref 0.5–2)
LACTATE BLDA-SCNC: 1.8 MMOL/L — SIGNIFICANT CHANGE UP (ref 0.5–2)
LACTATE BLDA-SCNC: 1.8 MMOL/L — SIGNIFICANT CHANGE UP (ref 0.5–2)
LACTATE BLDA-SCNC: 1.9 MMOL/L — SIGNIFICANT CHANGE UP (ref 0.5–2)
LACTATE BLDA-SCNC: 1.9 MMOL/L — SIGNIFICANT CHANGE UP (ref 0.5–2)
LACTATE BLDA-SCNC: 2 MMOL/L — SIGNIFICANT CHANGE UP (ref 0.5–2)
LACTATE BLDA-SCNC: 2.1 MMOL/L — HIGH (ref 0.5–2)
LACTATE BLDA-SCNC: 2.2 MMOL/L — HIGH (ref 0.5–2)
LACTATE BLDA-SCNC: 2.2 MMOL/L — HIGH (ref 0.5–2)
LACTATE BLDA-SCNC: 2.4 MMOL/L — HIGH (ref 0.5–2)
LACTATE BLDA-SCNC: 2.6 MMOL/L — HIGH (ref 0.5–2)
LACTATE BLDA-SCNC: 2.7 MMOL/L — HIGH (ref 0.5–2)
LACTATE SERPL-SCNC: 1 MMOL/L — SIGNIFICANT CHANGE UP (ref 0.7–2)
LACTATE SERPL-SCNC: 1.5 MMOL/L — SIGNIFICANT CHANGE UP (ref 0.7–2)
LACTATE SERPL-SCNC: 1.8 MMOL/L — SIGNIFICANT CHANGE UP (ref 0.7–2)
LACTATE SERPL-SCNC: 1.9 MMOL/L — SIGNIFICANT CHANGE UP (ref 0.7–2)
LACTATE SERPL-SCNC: 2.1 MMOL/L — HIGH (ref 0.7–2)
LACTATE SERPL-SCNC: 2.1 MMOL/L — HIGH (ref 0.7–2)
LACTATE SERPL-SCNC: 3.2 MMOL/L — HIGH (ref 0.7–2)
LACTATE SERPL-SCNC: 3.8 MMOL/L — HIGH (ref 0.7–2)
LACTATE SERPL-SCNC: 4.1 MMOL/L — CRITICAL HIGH (ref 0.7–2)
LACTATE SERPL-SCNC: 4.9 MMOL/L — CRITICAL HIGH (ref 0.7–2)
LDH SERPL L TO P-CCNC: 244 U/L — HIGH (ref 50–242)
LDH SERPL L TO P-CCNC: 317 U/L — HIGH (ref 50–242)
LDH SERPL L TO P-CCNC: 379 U/L — HIGH (ref 50–242)
LDH SERPL L TO P-CCNC: 379 U/L — HIGH (ref 50–242)
LDH SERPL L TO P-CCNC: 384 U/L — HIGH (ref 50–242)
LDH SERPL L TO P-CCNC: 385 U/L — HIGH (ref 50–242)
LDH SERPL L TO P-CCNC: 440 U/L — HIGH (ref 50–242)
LDH SERPL L TO P-CCNC: 446 U/L — HIGH (ref 50–242)
LDH SERPL L TO P-CCNC: 451 U/L — HIGH (ref 50–242)
LDH SERPL L TO P-CCNC: 461 U/L — HIGH (ref 50–242)
LDH SERPL L TO P-CCNC: 463 U/L — HIGH (ref 50–242)
LDH SERPL L TO P-CCNC: 477 U/L — HIGH (ref 50–242)
LDH SERPL L TO P-CCNC: 514 U/L — HIGH (ref 50–242)
LDH SERPL L TO P-CCNC: 731 U/L — HIGH (ref 50–242)
LDH SERPL L TO P-CCNC: 732 U/L — HIGH (ref 50–242)
LEGIONELLA AG UR QL: NEGATIVE — SIGNIFICANT CHANGE UP
LEUKOCYTE ESTERASE UR-ACNC: ABNORMAL
LEVETIRACETAM SERPL-MCNC: 48.4 MCG/ML — HIGH (ref 12–46)
LEVETIRACETAM SERPL-MCNC: 90.4 MCG/ML — HIGH (ref 12–46)
LIDOCAIN IGE QN: 67 U/L — LOW (ref 73–393)
LYMPHOCYTES # BLD AUTO: 0.45 K/UL — LOW (ref 1–3.3)
LYMPHOCYTES # BLD AUTO: 0.49 K/UL — LOW (ref 1–3.3)
LYMPHOCYTES # BLD AUTO: 0.59 K/UL — LOW (ref 1–3.3)
LYMPHOCYTES # BLD AUTO: 0.66 K/UL — LOW (ref 1–3.3)
LYMPHOCYTES # BLD AUTO: 0.76 K/UL — LOW (ref 1–3.3)
LYMPHOCYTES # BLD AUTO: 0.81 K/UL — LOW (ref 1–3.3)
LYMPHOCYTES # BLD AUTO: 0.82 K/UL — LOW (ref 1–3.3)
LYMPHOCYTES # BLD AUTO: 0.9 K/UL — LOW (ref 1–3.3)
LYMPHOCYTES # BLD AUTO: 0.93 K/UL — LOW (ref 1–3.3)
LYMPHOCYTES # BLD AUTO: 1 K/UL — SIGNIFICANT CHANGE UP (ref 1–3.3)
LYMPHOCYTES # BLD AUTO: 1.05 K/UL — SIGNIFICANT CHANGE UP (ref 1–3.3)
LYMPHOCYTES # BLD AUTO: 1.08 K/UL — SIGNIFICANT CHANGE UP (ref 1–3.3)
LYMPHOCYTES # BLD AUTO: 1.2 K/UL — SIGNIFICANT CHANGE UP (ref 1–3.3)
LYMPHOCYTES # BLD AUTO: 1.21 K/UL — SIGNIFICANT CHANGE UP (ref 1–3.3)
LYMPHOCYTES # BLD AUTO: 1.22 K/UL — SIGNIFICANT CHANGE UP (ref 1–3.3)
LYMPHOCYTES # BLD AUTO: 1.25 K/UL — SIGNIFICANT CHANGE UP (ref 1–3.3)
LYMPHOCYTES # BLD AUTO: 1.48 K/UL — SIGNIFICANT CHANGE UP (ref 1–3.3)
LYMPHOCYTES # BLD AUTO: 1.51 K/UL — SIGNIFICANT CHANGE UP (ref 1–3.3)
LYMPHOCYTES # BLD AUTO: 1.52 K/UL — SIGNIFICANT CHANGE UP (ref 1–3.3)
LYMPHOCYTES # BLD AUTO: 10.6 % — LOW (ref 13–44)
LYMPHOCYTES # BLD AUTO: 11 % — LOW (ref 13–44)
LYMPHOCYTES # BLD AUTO: 11.8 % — LOW (ref 13–44)
LYMPHOCYTES # BLD AUTO: 12.4 % — LOW (ref 13–44)
LYMPHOCYTES # BLD AUTO: 12.6 % — LOW (ref 13–44)
LYMPHOCYTES # BLD AUTO: 12.8 % — LOW (ref 13–44)
LYMPHOCYTES # BLD AUTO: 13.1 % — SIGNIFICANT CHANGE UP (ref 13–44)
LYMPHOCYTES # BLD AUTO: 13.2 % — SIGNIFICANT CHANGE UP (ref 13–44)
LYMPHOCYTES # BLD AUTO: 14.1 % — SIGNIFICANT CHANGE UP (ref 13–44)
LYMPHOCYTES # BLD AUTO: 14.4 % — SIGNIFICANT CHANGE UP (ref 13–44)
LYMPHOCYTES # BLD AUTO: 15 % — SIGNIFICANT CHANGE UP (ref 13–44)
LYMPHOCYTES # BLD AUTO: 17.7 % — SIGNIFICANT CHANGE UP (ref 13–44)
LYMPHOCYTES # BLD AUTO: 19.1 % — SIGNIFICANT CHANGE UP (ref 13–44)
LYMPHOCYTES # BLD AUTO: 19.5 % — SIGNIFICANT CHANGE UP (ref 13–44)
LYMPHOCYTES # BLD AUTO: 2.05 K/UL — SIGNIFICANT CHANGE UP (ref 1–3.3)
LYMPHOCYTES # BLD AUTO: 2.33 K/UL — SIGNIFICANT CHANGE UP (ref 1–3.3)
LYMPHOCYTES # BLD AUTO: 2.34 K/UL — SIGNIFICANT CHANGE UP (ref 1–3.3)
LYMPHOCYTES # BLD AUTO: 2.43 K/UL — SIGNIFICANT CHANGE UP (ref 1–3.3)
LYMPHOCYTES # BLD AUTO: 2.49 K/UL — SIGNIFICANT CHANGE UP (ref 1–3.3)
LYMPHOCYTES # BLD AUTO: 2.51 K/UL — SIGNIFICANT CHANGE UP (ref 1–3.3)
LYMPHOCYTES # BLD AUTO: 2.56 K/UL — SIGNIFICANT CHANGE UP (ref 1–3.3)
LYMPHOCYTES # BLD AUTO: 2.7 K/UL — SIGNIFICANT CHANGE UP (ref 1–3.3)
LYMPHOCYTES # BLD AUTO: 2.72 K/UL — SIGNIFICANT CHANGE UP (ref 1–3.3)
LYMPHOCYTES # BLD AUTO: 20 % — SIGNIFICANT CHANGE UP (ref 13–44)
LYMPHOCYTES # BLD AUTO: 21 % — SIGNIFICANT CHANGE UP (ref 13–44)
LYMPHOCYTES # BLD AUTO: 23 % — SIGNIFICANT CHANGE UP (ref 13–44)
LYMPHOCYTES # BLD AUTO: 25.5 % — SIGNIFICANT CHANGE UP (ref 13–44)
LYMPHOCYTES # BLD AUTO: 26.7 % — SIGNIFICANT CHANGE UP (ref 13–44)
LYMPHOCYTES # BLD AUTO: 27.3 % — SIGNIFICANT CHANGE UP (ref 13–44)
LYMPHOCYTES # BLD AUTO: 29 % — SIGNIFICANT CHANGE UP (ref 13–44)
LYMPHOCYTES # BLD AUTO: 32.1 % — SIGNIFICANT CHANGE UP (ref 13–44)
LYMPHOCYTES # BLD AUTO: 32.7 % — SIGNIFICANT CHANGE UP (ref 13–44)
LYMPHOCYTES # BLD AUTO: 37.1 % — SIGNIFICANT CHANGE UP (ref 13–44)
LYMPHOCYTES # BLD AUTO: 4.6 % — LOW (ref 13–44)
LYMPHOCYTES # BLD AUTO: 40.2 % — SIGNIFICANT CHANGE UP (ref 13–44)
LYMPHOCYTES # BLD AUTO: 47 % — HIGH (ref 13–44)
LYMPHOCYTES # BLD AUTO: 9.8 % — LOW (ref 13–44)
LYMPHOCYTES NFR SPEC AUTO: 9.5 % — LOW (ref 13–44)
MACROCYTES BLD QL: SIGNIFICANT CHANGE UP
MACROCYTES BLD QL: SIGNIFICANT CHANGE UP
MACROCYTES BLD QL: SLIGHT — SIGNIFICANT CHANGE UP
MACROCYTES BLD QL: SLIGHT — SIGNIFICANT CHANGE UP
MAGNESIUM SERPL-MCNC: 1.3 MG/DL — LOW (ref 1.6–2.6)
MAGNESIUM SERPL-MCNC: 1.4 MG/DL — LOW (ref 1.6–2.6)
MAGNESIUM SERPL-MCNC: 1.7 MG/DL — SIGNIFICANT CHANGE UP (ref 1.6–2.6)
MAGNESIUM SERPL-MCNC: 1.8 MG/DL — SIGNIFICANT CHANGE UP (ref 1.6–2.6)
MAGNESIUM SERPL-MCNC: 1.9 MG/DL — SIGNIFICANT CHANGE UP (ref 1.6–2.6)
MAGNESIUM SERPL-MCNC: 2 MG/DL — SIGNIFICANT CHANGE UP (ref 1.6–2.6)
MAGNESIUM SERPL-MCNC: 2.1 MG/DL — SIGNIFICANT CHANGE UP (ref 1.6–2.6)
MAGNESIUM SERPL-MCNC: 2.2 MG/DL — SIGNIFICANT CHANGE UP (ref 1.6–2.6)
MAGNESIUM SERPL-MCNC: 2.3 MG/DL — SIGNIFICANT CHANGE UP (ref 1.6–2.6)
MAGNESIUM SERPL-MCNC: 2.6 MG/DL — SIGNIFICANT CHANGE UP (ref 1.6–2.6)
MAGNESIUM SERPL-MCNC: 2.6 MG/DL — SIGNIFICANT CHANGE UP (ref 1.6–2.6)
MAGNESIUM SERPL-MCNC: 3.2 MG/DL — HIGH (ref 1.6–2.6)
MANUAL SMEAR VERIFICATION: SIGNIFICANT CHANGE UP
MANUAL SMEAR VERIFICATION: YES — SIGNIFICANT CHANGE UP
MCHC RBC-ENTMCNC: 30.5 % — LOW (ref 32–36)
MCHC RBC-ENTMCNC: 30.5 % — LOW (ref 32–36)
MCHC RBC-ENTMCNC: 30.6 % — LOW (ref 32–36)
MCHC RBC-ENTMCNC: 30.7 % — LOW (ref 32–36)
MCHC RBC-ENTMCNC: 30.9 % — LOW (ref 32–36)
MCHC RBC-ENTMCNC: 31 % — LOW (ref 32–36)
MCHC RBC-ENTMCNC: 31.2 % — LOW (ref 32–36)
MCHC RBC-ENTMCNC: 31.2 PG — SIGNIFICANT CHANGE UP (ref 27–34)
MCHC RBC-ENTMCNC: 31.3 % — LOW (ref 32–36)
MCHC RBC-ENTMCNC: 31.3 % — LOW (ref 32–36)
MCHC RBC-ENTMCNC: 31.3 PG — SIGNIFICANT CHANGE UP (ref 27–34)
MCHC RBC-ENTMCNC: 31.4 % — LOW (ref 32–36)
MCHC RBC-ENTMCNC: 31.4 % — LOW (ref 32–36)
MCHC RBC-ENTMCNC: 31.5 GM/DL — LOW (ref 32–36)
MCHC RBC-ENTMCNC: 31.6 % — LOW (ref 32–36)
MCHC RBC-ENTMCNC: 31.6 PG — SIGNIFICANT CHANGE UP (ref 27–34)
MCHC RBC-ENTMCNC: 31.7 % — LOW (ref 32–36)
MCHC RBC-ENTMCNC: 31.8 % — LOW (ref 32–36)
MCHC RBC-ENTMCNC: 31.9 % — LOW (ref 32–36)
MCHC RBC-ENTMCNC: 32 % — SIGNIFICANT CHANGE UP (ref 32–36)
MCHC RBC-ENTMCNC: 32 GM/DL — SIGNIFICANT CHANGE UP (ref 32–36)
MCHC RBC-ENTMCNC: 32 GM/DL — SIGNIFICANT CHANGE UP (ref 32–36)
MCHC RBC-ENTMCNC: 32.1 % — SIGNIFICANT CHANGE UP (ref 32–36)
MCHC RBC-ENTMCNC: 32.1 PG — SIGNIFICANT CHANGE UP (ref 27–34)
MCHC RBC-ENTMCNC: 32.2 % — SIGNIFICANT CHANGE UP (ref 32–36)
MCHC RBC-ENTMCNC: 32.2 PG — SIGNIFICANT CHANGE UP (ref 27–34)
MCHC RBC-ENTMCNC: 32.3 PG — SIGNIFICANT CHANGE UP (ref 27–34)
MCHC RBC-ENTMCNC: 32.4 PG — SIGNIFICANT CHANGE UP (ref 27–34)
MCHC RBC-ENTMCNC: 32.5 PG — SIGNIFICANT CHANGE UP (ref 27–34)
MCHC RBC-ENTMCNC: 32.5 PG — SIGNIFICANT CHANGE UP (ref 27–34)
MCHC RBC-ENTMCNC: 32.6 PG — SIGNIFICANT CHANGE UP (ref 27–34)
MCHC RBC-ENTMCNC: 32.7 % — SIGNIFICANT CHANGE UP (ref 32–36)
MCHC RBC-ENTMCNC: 32.7 PG — SIGNIFICANT CHANGE UP (ref 27–34)
MCHC RBC-ENTMCNC: 32.7 PG — SIGNIFICANT CHANGE UP (ref 27–34)
MCHC RBC-ENTMCNC: 32.8 PG — SIGNIFICANT CHANGE UP (ref 27–34)
MCHC RBC-ENTMCNC: 32.8 PG — SIGNIFICANT CHANGE UP (ref 27–34)
MCHC RBC-ENTMCNC: 32.9 GM/DL — SIGNIFICANT CHANGE UP (ref 32–36)
MCHC RBC-ENTMCNC: 32.9 PG — SIGNIFICANT CHANGE UP (ref 27–34)
MCHC RBC-ENTMCNC: 33 % — SIGNIFICANT CHANGE UP (ref 32–36)
MCHC RBC-ENTMCNC: 33 GM/DL — SIGNIFICANT CHANGE UP (ref 32–36)
MCHC RBC-ENTMCNC: 33 GM/DL — SIGNIFICANT CHANGE UP (ref 32–36)
MCHC RBC-ENTMCNC: 33 PG — SIGNIFICANT CHANGE UP (ref 27–34)
MCHC RBC-ENTMCNC: 33.1 PG — SIGNIFICANT CHANGE UP (ref 27–34)
MCHC RBC-ENTMCNC: 33.2 GM/DL — SIGNIFICANT CHANGE UP (ref 32–36)
MCHC RBC-ENTMCNC: 33.3 GM/DL — SIGNIFICANT CHANGE UP (ref 32–36)
MCHC RBC-ENTMCNC: 33.3 PG — SIGNIFICANT CHANGE UP (ref 27–34)
MCHC RBC-ENTMCNC: 33.4 GM/DL — SIGNIFICANT CHANGE UP (ref 32–36)
MCHC RBC-ENTMCNC: 33.4 PG — SIGNIFICANT CHANGE UP (ref 27–34)
MCHC RBC-ENTMCNC: 33.6 % — SIGNIFICANT CHANGE UP (ref 32–36)
MCHC RBC-ENTMCNC: 33.6 PG — SIGNIFICANT CHANGE UP (ref 27–34)
MCHC RBC-ENTMCNC: 33.6 PG — SIGNIFICANT CHANGE UP (ref 27–34)
MCHC RBC-ENTMCNC: 33.7 % — SIGNIFICANT CHANGE UP (ref 32–36)
MCHC RBC-ENTMCNC: 33.7 % — SIGNIFICANT CHANGE UP (ref 32–36)
MCHC RBC-ENTMCNC: 33.7 PG — SIGNIFICANT CHANGE UP (ref 27–34)
MCHC RBC-ENTMCNC: 33.8 PG — SIGNIFICANT CHANGE UP (ref 27–34)
MCHC RBC-ENTMCNC: 33.9 PG — SIGNIFICANT CHANGE UP (ref 27–34)
MCHC RBC-ENTMCNC: 34 GM/DL — SIGNIFICANT CHANGE UP (ref 32–36)
MCHC RBC-ENTMCNC: 34 GM/DL — SIGNIFICANT CHANGE UP (ref 32–36)
MCHC RBC-ENTMCNC: 34 PG — SIGNIFICANT CHANGE UP (ref 27–34)
MCHC RBC-ENTMCNC: 34 PG — SIGNIFICANT CHANGE UP (ref 27–34)
MCHC RBC-ENTMCNC: 34.2 GM/DL — SIGNIFICANT CHANGE UP (ref 32–36)
MCHC RBC-ENTMCNC: 34.2 PG — HIGH (ref 27–34)
MCHC RBC-ENTMCNC: 34.2 PG — HIGH (ref 27–34)
MCHC RBC-ENTMCNC: 34.3 GM/DL — SIGNIFICANT CHANGE UP (ref 32–36)
MCHC RBC-ENTMCNC: 34.3 PG — HIGH (ref 27–34)
MCHC RBC-ENTMCNC: 34.3 PG — HIGH (ref 27–34)
MCHC RBC-ENTMCNC: 34.4 PG — HIGH (ref 27–34)
MCHC RBC-ENTMCNC: 34.4 PG — HIGH (ref 27–34)
MCHC RBC-ENTMCNC: 34.6 PG — HIGH (ref 27–34)
MCHC RBC-ENTMCNC: 34.6 PG — HIGH (ref 27–34)
MCHC RBC-ENTMCNC: 34.7 PG — HIGH (ref 27–34)
MCHC RBC-ENTMCNC: 34.7 PG — HIGH (ref 27–34)
MCHC RBC-ENTMCNC: 34.8 GM/DL — SIGNIFICANT CHANGE UP (ref 32–36)
MCHC RBC-ENTMCNC: 35.3 PG — HIGH (ref 27–34)
MCHC RBC-ENTMCNC: 35.4 GM/DL — SIGNIFICANT CHANGE UP (ref 32–36)
MCHC RBC-ENTMCNC: 36.3 PG — HIGH (ref 27–34)
MCV RBC AUTO: 100 FL — SIGNIFICANT CHANGE UP (ref 80–100)
MCV RBC AUTO: 100 FL — SIGNIFICANT CHANGE UP (ref 80–100)
MCV RBC AUTO: 100.3 FL — HIGH (ref 80–100)
MCV RBC AUTO: 100.9 FL — HIGH (ref 80–100)
MCV RBC AUTO: 101 FL — HIGH (ref 80–100)
MCV RBC AUTO: 101.1 FL — HIGH (ref 80–100)
MCV RBC AUTO: 101.3 FL — HIGH (ref 80–100)
MCV RBC AUTO: 101.4 FL — HIGH (ref 80–100)
MCV RBC AUTO: 101.6 FL — HIGH (ref 80–100)
MCV RBC AUTO: 101.6 FL — HIGH (ref 80–100)
MCV RBC AUTO: 101.8 FL — HIGH (ref 80–100)
MCV RBC AUTO: 101.9 FL — HIGH (ref 80–100)
MCV RBC AUTO: 101.9 FL — HIGH (ref 80–100)
MCV RBC AUTO: 102 FL — HIGH (ref 80–100)
MCV RBC AUTO: 102.3 FL — HIGH (ref 80–100)
MCV RBC AUTO: 102.4 FL — HIGH (ref 80–100)
MCV RBC AUTO: 102.4 FL — HIGH (ref 80–100)
MCV RBC AUTO: 102.6 FL — HIGH (ref 80–100)
MCV RBC AUTO: 102.7 FL — HIGH (ref 80–100)
MCV RBC AUTO: 102.9 FL — HIGH (ref 80–100)
MCV RBC AUTO: 102.9 FL — HIGH (ref 80–100)
MCV RBC AUTO: 103.3 FL — HIGH (ref 80–100)
MCV RBC AUTO: 103.7 FL — HIGH (ref 80–100)
MCV RBC AUTO: 104 FL — HIGH (ref 80–100)
MCV RBC AUTO: 104.1 FL — HIGH (ref 80–100)
MCV RBC AUTO: 104.1 FL — HIGH (ref 80–100)
MCV RBC AUTO: 104.3 FL — HIGH (ref 80–100)
MCV RBC AUTO: 104.3 FL — HIGH (ref 80–100)
MCV RBC AUTO: 105.2 FL — HIGH (ref 80–100)
MCV RBC AUTO: 105.6 FL — HIGH (ref 80–100)
MCV RBC AUTO: 105.7 FL — HIGH (ref 80–100)
MCV RBC AUTO: 106.1 FL — HIGH (ref 80–100)
MCV RBC AUTO: 106.3 FL — HIGH (ref 80–100)
MCV RBC AUTO: 106.6 FL — HIGH (ref 80–100)
MCV RBC AUTO: 106.7 FL — HIGH (ref 80–100)
MCV RBC AUTO: 106.9 FL — HIGH (ref 80–100)
MCV RBC AUTO: 107.4 FL — HIGH (ref 80–100)
MCV RBC AUTO: 108 FL — HIGH (ref 80–100)
MCV RBC AUTO: 108.4 FL — HIGH (ref 80–100)
MCV RBC AUTO: 108.7 FL — HIGH (ref 80–100)
MCV RBC AUTO: 109 FL — HIGH (ref 80–100)
MCV RBC AUTO: 92.3 FL — SIGNIFICANT CHANGE UP (ref 80–100)
MCV RBC AUTO: 96.4 FL — SIGNIFICANT CHANGE UP (ref 80–100)
MCV RBC AUTO: 97.8 FL — SIGNIFICANT CHANGE UP (ref 80–100)
MCV RBC AUTO: 98.9 FL — SIGNIFICANT CHANGE UP (ref 80–100)
MCV RBC AUTO: 98.9 FL — SIGNIFICANT CHANGE UP (ref 80–100)
MCV RBC AUTO: 99 FL — SIGNIFICANT CHANGE UP (ref 80–100)
MCV RBC AUTO: 99.6 FL — SIGNIFICANT CHANGE UP (ref 80–100)
METAMYELOCYTES # FLD: 0 % — SIGNIFICANT CHANGE UP (ref 0–1)
METHOD TYPE: SIGNIFICANT CHANGE UP
MONOCYTES # BLD AUTO: 0.03 K/UL — SIGNIFICANT CHANGE UP (ref 0–0.9)
MONOCYTES # BLD AUTO: 0.04 K/UL — SIGNIFICANT CHANGE UP (ref 0–0.9)
MONOCYTES # BLD AUTO: 0.13 K/UL — SIGNIFICANT CHANGE UP (ref 0–0.9)
MONOCYTES # BLD AUTO: 0.17 K/UL — SIGNIFICANT CHANGE UP (ref 0–0.9)
MONOCYTES # BLD AUTO: 0.24 K/UL — SIGNIFICANT CHANGE UP (ref 0–0.9)
MONOCYTES # BLD AUTO: 0.24 K/UL — SIGNIFICANT CHANGE UP (ref 0–0.9)
MONOCYTES # BLD AUTO: 0.28 K/UL — SIGNIFICANT CHANGE UP (ref 0–0.9)
MONOCYTES # BLD AUTO: 0.28 K/UL — SIGNIFICANT CHANGE UP (ref 0–0.9)
MONOCYTES # BLD AUTO: 0.3 K/UL — SIGNIFICANT CHANGE UP (ref 0–0.9)
MONOCYTES # BLD AUTO: 0.37 K/UL — SIGNIFICANT CHANGE UP (ref 0–0.9)
MONOCYTES # BLD AUTO: 0.43 K/UL — SIGNIFICANT CHANGE UP (ref 0–0.9)
MONOCYTES # BLD AUTO: 0.52 K/UL — SIGNIFICANT CHANGE UP (ref 0–0.9)
MONOCYTES # BLD AUTO: 0.6 K/UL — SIGNIFICANT CHANGE UP (ref 0–0.9)
MONOCYTES # BLD AUTO: 0.68 K/UL — SIGNIFICANT CHANGE UP (ref 0–0.9)
MONOCYTES # BLD AUTO: 0.69 K/UL — SIGNIFICANT CHANGE UP (ref 0–0.9)
MONOCYTES # BLD AUTO: 0.69 K/UL — SIGNIFICANT CHANGE UP (ref 0–0.9)
MONOCYTES # BLD AUTO: 0.73 K/UL — SIGNIFICANT CHANGE UP (ref 0–0.9)
MONOCYTES # BLD AUTO: 0.74 K/UL — SIGNIFICANT CHANGE UP (ref 0–0.9)
MONOCYTES # BLD AUTO: 0.75 K/UL — SIGNIFICANT CHANGE UP (ref 0–0.9)
MONOCYTES # BLD AUTO: 0.79 K/UL — SIGNIFICANT CHANGE UP (ref 0–0.9)
MONOCYTES # BLD AUTO: 0.8 K/UL — SIGNIFICANT CHANGE UP (ref 0–0.9)
MONOCYTES # BLD AUTO: 0.83 K/UL — SIGNIFICANT CHANGE UP (ref 0–0.9)
MONOCYTES # BLD AUTO: 0.84 K/UL — SIGNIFICANT CHANGE UP (ref 0–0.9)
MONOCYTES # BLD AUTO: 0.95 K/UL — HIGH (ref 0–0.9)
MONOCYTES # BLD AUTO: 1.02 K/UL — HIGH (ref 0–0.9)
MONOCYTES # BLD AUTO: 1.25 K/UL — HIGH (ref 0–0.9)
MONOCYTES # BLD AUTO: 1.35 K/UL — HIGH (ref 0–0.9)
MONOCYTES # BLD AUTO: 1.36 K/UL — HIGH (ref 0–0.9)
MONOCYTES NFR BLD AUTO: 1 % — LOW (ref 2–14)
MONOCYTES NFR BLD AUTO: 1 % — LOW (ref 2–14)
MONOCYTES NFR BLD AUTO: 10.3 % — SIGNIFICANT CHANGE UP (ref 2–14)
MONOCYTES NFR BLD AUTO: 10.7 % — SIGNIFICANT CHANGE UP (ref 2–14)
MONOCYTES NFR BLD AUTO: 11.7 % — SIGNIFICANT CHANGE UP (ref 2–14)
MONOCYTES NFR BLD AUTO: 11.9 % — SIGNIFICANT CHANGE UP (ref 2–14)
MONOCYTES NFR BLD AUTO: 12.2 % — SIGNIFICANT CHANGE UP (ref 2–14)
MONOCYTES NFR BLD AUTO: 12.7 % — SIGNIFICANT CHANGE UP (ref 2–14)
MONOCYTES NFR BLD AUTO: 12.9 % — SIGNIFICANT CHANGE UP (ref 2–14)
MONOCYTES NFR BLD AUTO: 14.4 % — HIGH (ref 2–14)
MONOCYTES NFR BLD AUTO: 16.9 % — HIGH (ref 2–14)
MONOCYTES NFR BLD AUTO: 2.4 % — SIGNIFICANT CHANGE UP (ref 2–14)
MONOCYTES NFR BLD AUTO: 3 % — SIGNIFICANT CHANGE UP (ref 2–14)
MONOCYTES NFR BLD AUTO: 3.1 % — SIGNIFICANT CHANGE UP (ref 2–14)
MONOCYTES NFR BLD AUTO: 3.5 % — SIGNIFICANT CHANGE UP (ref 2–14)
MONOCYTES NFR BLD AUTO: 3.7 % — SIGNIFICANT CHANGE UP (ref 2–14)
MONOCYTES NFR BLD AUTO: 4 % — SIGNIFICANT CHANGE UP (ref 2–14)
MONOCYTES NFR BLD AUTO: 4.4 % — SIGNIFICANT CHANGE UP (ref 2–14)
MONOCYTES NFR BLD AUTO: 5.1 % — SIGNIFICANT CHANGE UP (ref 2–14)
MONOCYTES NFR BLD AUTO: 6.4 % — SIGNIFICANT CHANGE UP (ref 2–14)
MONOCYTES NFR BLD AUTO: 6.6 % — SIGNIFICANT CHANGE UP (ref 2–14)
MONOCYTES NFR BLD AUTO: 8.2 % — SIGNIFICANT CHANGE UP (ref 2–14)
MONOCYTES NFR BLD AUTO: 8.7 % — SIGNIFICANT CHANGE UP (ref 2–14)
MONOCYTES NFR BLD AUTO: 8.8 % — SIGNIFICANT CHANGE UP (ref 2–14)
MONOCYTES NFR BLD AUTO: 9 % — SIGNIFICANT CHANGE UP (ref 2–14)
MONOCYTES NFR BLD AUTO: 9 % — SIGNIFICANT CHANGE UP (ref 2–14)
MONOCYTES NFR BLD AUTO: 9.1 % — SIGNIFICANT CHANGE UP (ref 2–14)
MONOCYTES NFR BLD AUTO: 9.4 % — SIGNIFICANT CHANGE UP (ref 2–14)
MONOCYTES NFR BLD: 0.8 % — LOW (ref 2–9)
MRSA PCR RESULT.: SIGNIFICANT CHANGE UP
MRSA SPEC QL CULT: SIGNIFICANT CHANGE UP
MSSA DNA SPEC QL NAA+PROBE: SIGNIFICANT CHANGE UP
MYELOCYTES NFR BLD: 0.9 % — HIGH (ref 0–0)
N MEN ISLT CULT: SIGNIFICANT CHANGE UP
NEUTROPHIL AB SER-ACNC: 84.5 % — HIGH (ref 43–77)
NEUTROPHILS # BLD AUTO: 1.41 K/UL — LOW (ref 1.8–7.4)
NEUTROPHILS # BLD AUTO: 2.12 K/UL — SIGNIFICANT CHANGE UP (ref 1.8–7.4)
NEUTROPHILS # BLD AUTO: 2.79 K/UL — SIGNIFICANT CHANGE UP (ref 1.8–7.4)
NEUTROPHILS # BLD AUTO: 3.05 K/UL — SIGNIFICANT CHANGE UP (ref 1.8–7.4)
NEUTROPHILS # BLD AUTO: 3.28 K/UL — SIGNIFICANT CHANGE UP (ref 1.8–7.4)
NEUTROPHILS # BLD AUTO: 3.3 K/UL — SIGNIFICANT CHANGE UP (ref 1.8–7.4)
NEUTROPHILS # BLD AUTO: 3.38 K/UL — SIGNIFICANT CHANGE UP (ref 1.8–7.4)
NEUTROPHILS # BLD AUTO: 3.45 K/UL — SIGNIFICANT CHANGE UP (ref 1.8–7.4)
NEUTROPHILS # BLD AUTO: 3.56 K/UL — SIGNIFICANT CHANGE UP (ref 1.8–7.4)
NEUTROPHILS # BLD AUTO: 3.65 K/UL — SIGNIFICANT CHANGE UP (ref 1.8–7.4)
NEUTROPHILS # BLD AUTO: 3.97 K/UL — SIGNIFICANT CHANGE UP (ref 1.8–7.4)
NEUTROPHILS # BLD AUTO: 4.36 K/UL — SIGNIFICANT CHANGE UP (ref 1.8–7.4)
NEUTROPHILS # BLD AUTO: 4.61 K/UL — SIGNIFICANT CHANGE UP (ref 1.8–7.4)
NEUTROPHILS # BLD AUTO: 5.15 K/UL — SIGNIFICANT CHANGE UP (ref 1.8–7.4)
NEUTROPHILS # BLD AUTO: 5.24 K/UL — SIGNIFICANT CHANGE UP (ref 1.8–7.4)
NEUTROPHILS # BLD AUTO: 5.3 K/UL — SIGNIFICANT CHANGE UP (ref 1.8–7.4)
NEUTROPHILS # BLD AUTO: 6.08 K/UL — SIGNIFICANT CHANGE UP (ref 1.8–7.4)
NEUTROPHILS # BLD AUTO: 6.13 K/UL — SIGNIFICANT CHANGE UP (ref 1.8–7.4)
NEUTROPHILS # BLD AUTO: 6.16 K/UL — SIGNIFICANT CHANGE UP (ref 1.8–7.4)
NEUTROPHILS # BLD AUTO: 6.3 K/UL — SIGNIFICANT CHANGE UP (ref 1.8–7.4)
NEUTROPHILS # BLD AUTO: 6.53 K/UL — SIGNIFICANT CHANGE UP (ref 1.8–7.4)
NEUTROPHILS # BLD AUTO: 6.61 K/UL — SIGNIFICANT CHANGE UP (ref 1.8–7.4)
NEUTROPHILS # BLD AUTO: 6.71 K/UL — SIGNIFICANT CHANGE UP (ref 1.8–7.4)
NEUTROPHILS # BLD AUTO: 7.55 K/UL — HIGH (ref 1.8–7.4)
NEUTROPHILS # BLD AUTO: 8.27 K/UL — HIGH (ref 1.8–7.4)
NEUTROPHILS # BLD AUTO: 8.32 K/UL — HIGH (ref 1.8–7.4)
NEUTROPHILS # BLD AUTO: 8.32 K/UL — HIGH (ref 1.8–7.4)
NEUTROPHILS # BLD AUTO: 9.71 K/UL — HIGH (ref 1.8–7.4)
NEUTROPHILS NFR BLD AUTO: 36.9 % — LOW (ref 43–77)
NEUTROPHILS NFR BLD AUTO: 45 % — SIGNIFICANT CHANGE UP (ref 43–77)
NEUTROPHILS NFR BLD AUTO: 45 % — SIGNIFICANT CHANGE UP (ref 43–77)
NEUTROPHILS NFR BLD AUTO: 49.7 % — SIGNIFICANT CHANGE UP (ref 43–77)
NEUTROPHILS NFR BLD AUTO: 54 % — SIGNIFICANT CHANGE UP (ref 43–77)
NEUTROPHILS NFR BLD AUTO: 55.1 % — SIGNIFICANT CHANGE UP (ref 43–77)
NEUTROPHILS NFR BLD AUTO: 59 % — SIGNIFICANT CHANGE UP (ref 43–77)
NEUTROPHILS NFR BLD AUTO: 59.9 % — SIGNIFICANT CHANGE UP (ref 43–77)
NEUTROPHILS NFR BLD AUTO: 60.2 % — SIGNIFICANT CHANGE UP (ref 43–77)
NEUTROPHILS NFR BLD AUTO: 61.4 % — SIGNIFICANT CHANGE UP (ref 43–77)
NEUTROPHILS NFR BLD AUTO: 62.8 % — SIGNIFICANT CHANGE UP (ref 43–77)
NEUTROPHILS NFR BLD AUTO: 65.4 % — SIGNIFICANT CHANGE UP (ref 43–77)
NEUTROPHILS NFR BLD AUTO: 68.5 % — SIGNIFICANT CHANGE UP (ref 43–77)
NEUTROPHILS NFR BLD AUTO: 69.8 % — SIGNIFICANT CHANGE UP (ref 43–77)
NEUTROPHILS NFR BLD AUTO: 69.9 % — SIGNIFICANT CHANGE UP (ref 43–77)
NEUTROPHILS NFR BLD AUTO: 69.9 % — SIGNIFICANT CHANGE UP (ref 43–77)
NEUTROPHILS NFR BLD AUTO: 71.7 % — SIGNIFICANT CHANGE UP (ref 43–77)
NEUTROPHILS NFR BLD AUTO: 71.9 % — SIGNIFICANT CHANGE UP (ref 43–77)
NEUTROPHILS NFR BLD AUTO: 73.2 % — SIGNIFICANT CHANGE UP (ref 43–77)
NEUTROPHILS NFR BLD AUTO: 74.9 % — SIGNIFICANT CHANGE UP (ref 43–77)
NEUTROPHILS NFR BLD AUTO: 76.3 % — SIGNIFICANT CHANGE UP (ref 43–77)
NEUTROPHILS NFR BLD AUTO: 77.4 % — HIGH (ref 43–77)
NEUTROPHILS NFR BLD AUTO: 77.6 % — HIGH (ref 43–77)
NEUTROPHILS NFR BLD AUTO: 77.7 % — HIGH (ref 43–77)
NEUTROPHILS NFR BLD AUTO: 77.9 % — HIGH (ref 43–77)
NEUTROPHILS NFR BLD AUTO: 79.1 % — HIGH (ref 43–77)
NEUTROPHILS NFR BLD AUTO: 80.8 % — HIGH (ref 43–77)
NEUTROPHILS NFR BLD AUTO: 90.4 % — HIGH (ref 43–77)
NEUTS BAND # BLD: 0 % — SIGNIFICANT CHANGE UP (ref 0–6)
NEUTS BAND # BLD: 22 % — HIGH (ref 0–8)
NEUTS BAND # BLD: 4 % — SIGNIFICANT CHANGE UP (ref 0–8)
NITRITE UR-MCNC: NEGATIVE — SIGNIFICANT CHANGE UP
NRBC # BLD: 0 /100 WBCS — SIGNIFICANT CHANGE UP (ref 0–0)
NRBC # BLD: 0 — SIGNIFICANT CHANGE UP
NRBC # BLD: 0 — SIGNIFICANT CHANGE UP
NRBC # BLD: 1 /100 WBCS — HIGH (ref 0–0)
NRBC # BLD: SIGNIFICANT CHANGE UP /100 WBCS (ref 0–0)
NRBC # FLD: 0 K/UL — SIGNIFICANT CHANGE UP (ref 0–0)
NRBC # FLD: 0.02 K/UL — SIGNIFICANT CHANGE UP (ref 0–0)
NT-PROBNP SERPL-SCNC: HIGH PG/ML (ref 0–125)
ORGANISM # SPEC MICROSCOPIC CNT: SIGNIFICANT CHANGE UP
ORGANISM # SPEC MICROSCOPIC CNT: SIGNIFICANT CHANGE UP
OTHER - HEMATOLOGY %: 0 — SIGNIFICANT CHANGE UP
P AERUGINOSA DNA BLD POS NAA+NON-PROBE: SIGNIFICANT CHANGE UP
PCO2 BLDA: 25 MMHG — LOW (ref 32–48)
PCO2 BLDA: 28 MMHG — LOW (ref 32–46)
PCO2 BLDA: 30 MMHG — LOW (ref 32–48)
PCO2 BLDA: 31 MMHG — LOW (ref 32–46)
PCO2 BLDA: 32 MMHG — SIGNIFICANT CHANGE UP (ref 32–46)
PCO2 BLDA: 33 MMHG — SIGNIFICANT CHANGE UP (ref 32–46)
PCO2 BLDA: 34 MMHG — SIGNIFICANT CHANGE UP (ref 32–46)
PCO2 BLDA: 35 MMHG — SIGNIFICANT CHANGE UP (ref 32–46)
PCO2 BLDA: 37 MMHG — SIGNIFICANT CHANGE UP (ref 32–48)
PCO2 BLDA: 38 MMHG — SIGNIFICANT CHANGE UP (ref 32–48)
PCO2 BLDA: 40 MMHG — SIGNIFICANT CHANGE UP (ref 32–46)
PCO2 BLDA: 41 MMHG — SIGNIFICANT CHANGE UP (ref 32–46)
PCO2 BLDA: 41 MMHG — SIGNIFICANT CHANGE UP (ref 32–46)
PCO2 BLDA: 41 MMHG — SIGNIFICANT CHANGE UP (ref 32–48)
PCO2 BLDA: 42 MMHG — SIGNIFICANT CHANGE UP (ref 32–46)
PCO2 BLDA: 42 MMHG — SIGNIFICANT CHANGE UP (ref 32–46)
PCO2 BLDA: 42 MMHG — SIGNIFICANT CHANGE UP (ref 32–48)
PCO2 BLDA: 42 MMHG — SIGNIFICANT CHANGE UP (ref 32–48)
PCO2 BLDA: 43 MMHG — SIGNIFICANT CHANGE UP (ref 32–48)
PCO2 BLDA: 44 MMHG — SIGNIFICANT CHANGE UP (ref 32–48)
PCO2 BLDA: 45 MMHG — SIGNIFICANT CHANGE UP (ref 32–48)
PCO2 BLDA: 46 MMHG — SIGNIFICANT CHANGE UP (ref 32–48)
PCO2 BLDA: 46 MMHG — SIGNIFICANT CHANGE UP (ref 32–48)
PCO2 BLDA: 47 MMHG — SIGNIFICANT CHANGE UP (ref 32–48)
PCO2 BLDA: 48 MMHG — HIGH (ref 32–46)
PCO2 BLDA: 48 MMHG — SIGNIFICANT CHANGE UP (ref 32–48)
PCO2 BLDA: 48 MMHG — SIGNIFICANT CHANGE UP (ref 32–48)
PCO2 BLDA: 49 MMHG — HIGH (ref 32–48)
PCO2 BLDA: 50 MMHG — HIGH (ref 32–46)
PCO2 BLDA: 50 MMHG — HIGH (ref 32–48)
PCO2 BLDA: 51 MMHG — HIGH (ref 32–48)
PCO2 BLDA: 52 MMHG — HIGH (ref 32–48)
PCO2 BLDA: 53 MMHG — HIGH (ref 32–48)
PCO2 BLDA: 53 MMHG — HIGH (ref 32–48)
PCO2 BLDA: 55 MMHG — HIGH (ref 32–48)
PCO2 BLDA: 58 MMHG — HIGH (ref 32–46)
PCO2 BLDA: 62 MMHG — HIGH (ref 32–46)
PCO2 BLDV: 28 MMHG — LOW (ref 35–50)
PCO2 BLDV: 42 MMHG — SIGNIFICANT CHANGE UP (ref 35–50)
PCO2 BLDV: 49 MMHG — SIGNIFICANT CHANGE UP (ref 41–51)
PF4 HEPARIN CMPLX AB SER-ACNC: NEGATIVE — SIGNIFICANT CHANGE UP
PH BLD: 7.09 — CRITICAL LOW (ref 7.35–7.45)
PH BLD: 7.27 — LOW (ref 7.35–7.45)
PH BLD: 7.34 — LOW (ref 7.35–7.45)
PH BLD: 7.39 — SIGNIFICANT CHANGE UP (ref 7.35–7.45)
PH BLD: 7.42 — SIGNIFICANT CHANGE UP (ref 7.35–7.45)
PH BLD: 7.44 — SIGNIFICANT CHANGE UP (ref 7.35–7.45)
PH BLD: 7.45 — SIGNIFICANT CHANGE UP (ref 7.35–7.45)
PH BLD: 7.45 — SIGNIFICANT CHANGE UP (ref 7.35–7.45)
PH BLD: 7.47 — HIGH (ref 7.35–7.45)
PH BLD: 7.48 — HIGH (ref 7.35–7.45)
PH BLD: 7.5 — HIGH (ref 7.35–7.45)
PH BLD: 7.51 — HIGH (ref 7.35–7.45)
PH BLD: 7.52 — HIGH (ref 7.35–7.45)
PH BLD: 7.57 — HIGH (ref 7.35–7.45)
PH BLDA: 7.31 — LOW (ref 7.35–7.45)
PH BLDA: 7.41 PH — SIGNIFICANT CHANGE UP (ref 7.35–7.45)
PH BLDA: 7.41 PH — SIGNIFICANT CHANGE UP (ref 7.35–7.45)
PH BLDA: 7.42 PH — SIGNIFICANT CHANGE UP (ref 7.35–7.45)
PH BLDA: 7.42 PH — SIGNIFICANT CHANGE UP (ref 7.35–7.45)
PH BLDA: 7.43 PH — SIGNIFICANT CHANGE UP (ref 7.35–7.45)
PH BLDA: 7.44 PH — SIGNIFICANT CHANGE UP (ref 7.35–7.45)
PH BLDA: 7.45 PH — SIGNIFICANT CHANGE UP (ref 7.35–7.45)
PH BLDA: 7.46 PH — HIGH (ref 7.35–7.45)
PH BLDA: 7.47 PH — HIGH (ref 7.35–7.45)
PH BLDA: 7.48 PH — HIGH (ref 7.35–7.45)
PH BLDA: 7.49 PH — HIGH (ref 7.35–7.45)
PH BLDA: 7.5 PH — HIGH (ref 7.35–7.45)
PH BLDA: 7.51 PH — HIGH (ref 7.35–7.45)
PH BLDA: 7.52 PH — HIGH (ref 7.35–7.45)
PH BLDA: 7.52 PH — HIGH (ref 7.35–7.45)
PH BLDA: 7.61 PH — CRITICAL HIGH (ref 7.35–7.45)
PH BLDA: 7.67 PH — CRITICAL HIGH (ref 7.35–7.45)
PH BLDV: 7.37 — SIGNIFICANT CHANGE UP (ref 7.35–7.45)
PH BLDV: 7.43 — SIGNIFICANT CHANGE UP (ref 7.35–7.45)
PH BLDV: 7.46 PH — HIGH (ref 7.32–7.43)
PH UR: 6 — SIGNIFICANT CHANGE UP (ref 5–8)
PHOSPHATE SERPL-MCNC: 1.3 MG/DL — LOW (ref 2.5–4.5)
PHOSPHATE SERPL-MCNC: 1.5 MG/DL — LOW (ref 2.5–4.5)
PHOSPHATE SERPL-MCNC: 1.9 MG/DL — LOW (ref 2.5–4.5)
PHOSPHATE SERPL-MCNC: 2 MG/DL — LOW (ref 2.5–4.5)
PHOSPHATE SERPL-MCNC: 2.2 MG/DL — LOW (ref 2.5–4.5)
PHOSPHATE SERPL-MCNC: 2.4 MG/DL — LOW (ref 2.5–4.5)
PHOSPHATE SERPL-MCNC: 2.7 MG/DL — SIGNIFICANT CHANGE UP (ref 2.5–4.5)
PHOSPHATE SERPL-MCNC: 2.7 MG/DL — SIGNIFICANT CHANGE UP (ref 2.5–4.5)
PHOSPHATE SERPL-MCNC: 2.8 MG/DL — SIGNIFICANT CHANGE UP (ref 2.5–4.5)
PHOSPHATE SERPL-MCNC: 3 MG/DL — SIGNIFICANT CHANGE UP (ref 2.5–4.5)
PHOSPHATE SERPL-MCNC: 3.1 MG/DL — SIGNIFICANT CHANGE UP (ref 2.5–4.5)
PHOSPHATE SERPL-MCNC: 3.2 MG/DL — SIGNIFICANT CHANGE UP (ref 2.5–4.5)
PHOSPHATE SERPL-MCNC: 3.3 MG/DL — SIGNIFICANT CHANGE UP (ref 2.5–4.5)
PHOSPHATE SERPL-MCNC: 3.3 MG/DL — SIGNIFICANT CHANGE UP (ref 2.5–4.5)
PHOSPHATE SERPL-MCNC: 3.4 MG/DL — SIGNIFICANT CHANGE UP (ref 2.5–4.5)
PHOSPHATE SERPL-MCNC: 3.5 MG/DL — SIGNIFICANT CHANGE UP (ref 2.5–4.5)
PHOSPHATE SERPL-MCNC: 3.6 MG/DL — SIGNIFICANT CHANGE UP (ref 2.5–4.5)
PHOSPHATE SERPL-MCNC: 3.6 MG/DL — SIGNIFICANT CHANGE UP (ref 2.5–4.5)
PHOSPHATE SERPL-MCNC: 3.7 MG/DL — SIGNIFICANT CHANGE UP (ref 2.5–4.5)
PHOSPHATE SERPL-MCNC: 3.8 MG/DL — SIGNIFICANT CHANGE UP (ref 2.5–4.5)
PHOSPHATE SERPL-MCNC: 3.9 MG/DL — SIGNIFICANT CHANGE UP (ref 2.5–4.5)
PHOSPHATE SERPL-MCNC: 3.9 MG/DL — SIGNIFICANT CHANGE UP (ref 2.5–4.5)
PHOSPHATE SERPL-MCNC: 4 MG/DL — SIGNIFICANT CHANGE UP (ref 2.5–4.5)
PHOSPHATE SERPL-MCNC: 4 MG/DL — SIGNIFICANT CHANGE UP (ref 2.5–4.5)
PHOSPHATE SERPL-MCNC: 4.1 MG/DL — SIGNIFICANT CHANGE UP (ref 2.5–4.5)
PHOSPHATE SERPL-MCNC: 4.2 MG/DL — SIGNIFICANT CHANGE UP (ref 2.5–4.5)
PHOSPHATE SERPL-MCNC: 4.4 MG/DL — SIGNIFICANT CHANGE UP (ref 2.5–4.5)
PHOSPHATE SERPL-MCNC: 4.5 MG/DL — SIGNIFICANT CHANGE UP (ref 2.5–4.5)
PLAT MORPH BLD: NORMAL — SIGNIFICANT CHANGE UP
PLATELET # BLD AUTO: 100 K/UL — LOW (ref 150–400)
PLATELET # BLD AUTO: 103 K/UL — LOW (ref 150–400)
PLATELET # BLD AUTO: 109 K/UL — LOW (ref 150–400)
PLATELET # BLD AUTO: 109 K/UL — LOW (ref 150–400)
PLATELET # BLD AUTO: 113 K/UL — LOW (ref 150–400)
PLATELET # BLD AUTO: 115 K/UL — LOW (ref 150–400)
PLATELET # BLD AUTO: 129 K/UL — LOW (ref 150–400)
PLATELET # BLD AUTO: 133 K/UL — LOW (ref 150–400)
PLATELET # BLD AUTO: 134 K/UL — LOW (ref 150–400)
PLATELET # BLD AUTO: 136 K/UL — LOW (ref 150–400)
PLATELET # BLD AUTO: 136 K/UL — LOW (ref 150–400)
PLATELET # BLD AUTO: 142 K/UL — LOW (ref 150–400)
PLATELET # BLD AUTO: 149 K/UL — LOW (ref 150–400)
PLATELET # BLD AUTO: 153 K/UL — SIGNIFICANT CHANGE UP (ref 150–400)
PLATELET # BLD AUTO: 163 K/UL — SIGNIFICANT CHANGE UP (ref 150–400)
PLATELET # BLD AUTO: 167 K/UL — SIGNIFICANT CHANGE UP (ref 150–400)
PLATELET # BLD AUTO: 182 K/UL — SIGNIFICANT CHANGE UP (ref 150–400)
PLATELET # BLD AUTO: 197 K/UL — SIGNIFICANT CHANGE UP (ref 150–400)
PLATELET # BLD AUTO: 197 K/UL — SIGNIFICANT CHANGE UP (ref 150–400)
PLATELET # BLD AUTO: 207 K/UL — SIGNIFICANT CHANGE UP (ref 150–400)
PLATELET # BLD AUTO: 215 K/UL — SIGNIFICANT CHANGE UP (ref 150–400)
PLATELET # BLD AUTO: 224 K/UL — SIGNIFICANT CHANGE UP (ref 150–400)
PLATELET # BLD AUTO: 235 K/UL — SIGNIFICANT CHANGE UP (ref 150–400)
PLATELET # BLD AUTO: 257 K/UL — SIGNIFICANT CHANGE UP (ref 150–400)
PLATELET # BLD AUTO: 271 K/UL — SIGNIFICANT CHANGE UP (ref 150–400)
PLATELET # BLD AUTO: 304 K/UL — SIGNIFICANT CHANGE UP (ref 150–400)
PLATELET # BLD AUTO: 321 K/UL — SIGNIFICANT CHANGE UP (ref 150–400)
PLATELET # BLD AUTO: 331 K/UL — SIGNIFICANT CHANGE UP (ref 150–400)
PLATELET # BLD AUTO: 333 K/UL — SIGNIFICANT CHANGE UP (ref 150–400)
PLATELET # BLD AUTO: 339 K/UL — SIGNIFICANT CHANGE UP (ref 150–400)
PLATELET # BLD AUTO: 343 K/UL — SIGNIFICANT CHANGE UP (ref 150–400)
PLATELET # BLD AUTO: 350 K/UL — SIGNIFICANT CHANGE UP (ref 150–400)
PLATELET # BLD AUTO: 367 K/UL — SIGNIFICANT CHANGE UP (ref 150–400)
PLATELET # BLD AUTO: 367 K/UL — SIGNIFICANT CHANGE UP (ref 150–400)
PLATELET # BLD AUTO: 380 K/UL — SIGNIFICANT CHANGE UP (ref 150–400)
PLATELET # BLD AUTO: 383 K/UL — SIGNIFICANT CHANGE UP (ref 150–400)
PLATELET # BLD AUTO: 387 K/UL — SIGNIFICANT CHANGE UP (ref 150–400)
PLATELET # BLD AUTO: 396 K/UL — SIGNIFICANT CHANGE UP (ref 150–400)
PLATELET # BLD AUTO: 455 K/UL — HIGH (ref 150–400)
PLATELET # BLD AUTO: 466 K/UL — HIGH (ref 150–400)
PLATELET # BLD AUTO: 52 K/UL — LOW (ref 150–400)
PLATELET # BLD AUTO: 53 K/UL — LOW (ref 150–400)
PLATELET # BLD AUTO: 54 K/UL — LOW (ref 150–400)
PLATELET # BLD AUTO: 81 K/UL — LOW (ref 150–400)
PLATELET # BLD AUTO: 89 K/UL — LOW (ref 150–400)
PLATELET # BLD AUTO: 89 K/UL — LOW (ref 150–400)
PLATELET # BLD AUTO: 96 K/UL — LOW (ref 150–400)
PLATELET # BLD AUTO: 98 K/UL — LOW (ref 150–400)
PLATELET COUNT - ESTIMATE: ABNORMAL
PLATELET COUNT - ESTIMATE: NORMAL — SIGNIFICANT CHANGE UP
PMV BLD: 10.1 FL — SIGNIFICANT CHANGE UP (ref 7–13)
PMV BLD: 10.1 FL — SIGNIFICANT CHANGE UP (ref 7–13)
PMV BLD: 10.4 FL — SIGNIFICANT CHANGE UP (ref 7–13)
PMV BLD: 10.5 FL — SIGNIFICANT CHANGE UP (ref 7–13)
PMV BLD: 10.6 FL — SIGNIFICANT CHANGE UP (ref 7–13)
PMV BLD: 10.6 FL — SIGNIFICANT CHANGE UP (ref 7–13)
PMV BLD: 10.7 FL — SIGNIFICANT CHANGE UP (ref 7–13)
PMV BLD: 10.8 FL — SIGNIFICANT CHANGE UP (ref 7–13)
PMV BLD: 10.9 FL — SIGNIFICANT CHANGE UP (ref 7–13)
PMV BLD: 11 FL — SIGNIFICANT CHANGE UP (ref 7–13)
PMV BLD: 11.2 FL — SIGNIFICANT CHANGE UP (ref 7–13)
PMV BLD: 11.2 FL — SIGNIFICANT CHANGE UP (ref 7–13)
PMV BLD: 11.3 FL — SIGNIFICANT CHANGE UP (ref 7–13)
PMV BLD: 11.3 FL — SIGNIFICANT CHANGE UP (ref 7–13)
PMV BLD: 11.5 FL — SIGNIFICANT CHANGE UP (ref 7–13)
PMV BLD: 11.6 FL — SIGNIFICANT CHANGE UP (ref 7–13)
PMV BLD: 11.7 FL — SIGNIFICANT CHANGE UP (ref 7–13)
PMV BLD: 12 FL — SIGNIFICANT CHANGE UP (ref 7–13)
PMV BLD: 12.4 FL — SIGNIFICANT CHANGE UP (ref 7–13)
PMV BLD: 12.4 FL — SIGNIFICANT CHANGE UP (ref 7–13)
PO2 BLDA: 100 MMHG — SIGNIFICANT CHANGE UP (ref 83–108)
PO2 BLDA: 104 MMHG — SIGNIFICANT CHANGE UP (ref 83–108)
PO2 BLDA: 109 MMHG — HIGH (ref 83–108)
PO2 BLDA: 114 MMHG — HIGH (ref 83–108)
PO2 BLDA: 115 MMHG — HIGH (ref 83–108)
PO2 BLDA: 117 MMHG — HIGH (ref 83–108)
PO2 BLDA: 149 MMHG — HIGH (ref 83–108)
PO2 BLDA: 157 MMHG — HIGH (ref 83–108)
PO2 BLDA: 158 MMHG — HIGH (ref 74–108)
PO2 BLDA: 46 MMHG — CRITICAL LOW (ref 74–108)
PO2 BLDA: 48 MMHG — CRITICAL LOW (ref 74–108)
PO2 BLDA: 49 MMHG — CRITICAL LOW (ref 74–108)
PO2 BLDA: 51 MMHG — LOW (ref 74–108)
PO2 BLDA: 54 MMHG — LOW (ref 74–108)
PO2 BLDA: 60 MMHG — LOW (ref 74–108)
PO2 BLDA: 62 MMHG — LOW (ref 83–108)
PO2 BLDA: 63 MMHG — LOW (ref 74–108)
PO2 BLDA: 63 MMHG — LOW (ref 74–108)
PO2 BLDA: 63 MMHG — LOW (ref 83–108)
PO2 BLDA: 63 MMHG — LOW (ref 83–108)
PO2 BLDA: 64 MMHG — LOW (ref 74–108)
PO2 BLDA: 67 MMHG — LOW (ref 74–108)
PO2 BLDA: 68 MMHG — LOW (ref 74–108)
PO2 BLDA: 68 MMHG — LOW (ref 83–108)
PO2 BLDA: 69 MMHG — LOW (ref 74–108)
PO2 BLDA: 69 MMHG — LOW (ref 74–108)
PO2 BLDA: 69 MMHG — LOW (ref 83–108)
PO2 BLDA: 69 MMHG — LOW (ref 83–108)
PO2 BLDA: 70 MMHG — LOW (ref 83–108)
PO2 BLDA: 71 MMHG — LOW (ref 83–108)
PO2 BLDA: 72 MMHG — LOW (ref 83–108)
PO2 BLDA: 75 MMHG — LOW (ref 83–108)
PO2 BLDA: 75 MMHG — SIGNIFICANT CHANGE UP (ref 74–108)
PO2 BLDA: 78 MMHG — LOW (ref 83–108)
PO2 BLDA: 79 MMHG — SIGNIFICANT CHANGE UP (ref 74–108)
PO2 BLDA: 80 MMHG — LOW (ref 83–108)
PO2 BLDA: 81 MMHG — LOW (ref 83–108)
PO2 BLDA: 81 MMHG — LOW (ref 83–108)
PO2 BLDA: 82 MMHG — LOW (ref 83–108)
PO2 BLDA: 82 MMHG — LOW (ref 83–108)
PO2 BLDA: 83 MMHG — SIGNIFICANT CHANGE UP (ref 83–108)
PO2 BLDA: 88 MMHG — SIGNIFICANT CHANGE UP (ref 83–108)
PO2 BLDA: 89 MMHG — SIGNIFICANT CHANGE UP (ref 83–108)
PO2 BLDA: 91 MMHG — SIGNIFICANT CHANGE UP (ref 74–108)
PO2 BLDA: 93 MMHG — SIGNIFICANT CHANGE UP (ref 83–108)
PO2 BLDA: 94 MMHG — SIGNIFICANT CHANGE UP (ref 83–108)
PO2 BLDA: 98 MMHG — SIGNIFICANT CHANGE UP (ref 83–108)
PO2 BLDV: 135 MMHG — HIGH (ref 25–45)
PO2 BLDV: 36 MMHG — SIGNIFICANT CHANGE UP (ref 25–45)
PO2 BLDV: 47 MMHG — HIGH (ref 35–40)
POLYCHROMASIA BLD QL SMEAR: SLIGHT — SIGNIFICANT CHANGE UP
POTASSIUM BLDA-SCNC: 2.6 MMOL/L — CRITICAL LOW (ref 3.4–4.5)
POTASSIUM BLDA-SCNC: 2.7 MMOL/L — CRITICAL LOW (ref 3.4–4.5)
POTASSIUM BLDA-SCNC: 3.1 MMOL/L — LOW (ref 3.4–4.5)
POTASSIUM BLDA-SCNC: 3.3 MMOL/L — LOW (ref 3.4–4.5)
POTASSIUM BLDA-SCNC: 3.3 MMOL/L — LOW (ref 3.4–4.5)
POTASSIUM BLDA-SCNC: 3.4 MMOL/L — SIGNIFICANT CHANGE UP (ref 3.4–4.5)
POTASSIUM BLDA-SCNC: 3.5 MMOL/L — SIGNIFICANT CHANGE UP (ref 3.4–4.5)
POTASSIUM BLDA-SCNC: 3.7 MMOL/L — SIGNIFICANT CHANGE UP (ref 3.4–4.5)
POTASSIUM BLDA-SCNC: 3.8 MMOL/L — SIGNIFICANT CHANGE UP (ref 3.4–4.5)
POTASSIUM BLDA-SCNC: 3.9 MMOL/L — SIGNIFICANT CHANGE UP (ref 3.4–4.5)
POTASSIUM BLDA-SCNC: 4 MMOL/L — SIGNIFICANT CHANGE UP (ref 3.4–4.5)
POTASSIUM BLDA-SCNC: 4 MMOL/L — SIGNIFICANT CHANGE UP (ref 3.4–4.5)
POTASSIUM BLDA-SCNC: 4.1 MMOL/L — SIGNIFICANT CHANGE UP (ref 3.4–4.5)
POTASSIUM BLDA-SCNC: 4.1 MMOL/L — SIGNIFICANT CHANGE UP (ref 3.4–4.5)
POTASSIUM BLDA-SCNC: 4.3 MMOL/L — SIGNIFICANT CHANGE UP (ref 3.4–4.5)
POTASSIUM BLDA-SCNC: 4.3 MMOL/L — SIGNIFICANT CHANGE UP (ref 3.4–4.5)
POTASSIUM BLDV-SCNC: 3.3 MMOL/L — LOW (ref 3.4–4.5)
POTASSIUM SERPL-MCNC: 2.8 MMOL/L — CRITICAL LOW (ref 3.5–5.3)
POTASSIUM SERPL-MCNC: 3 MMOL/L — LOW (ref 3.5–5.3)
POTASSIUM SERPL-MCNC: 3.1 MMOL/L — LOW (ref 3.5–5.3)
POTASSIUM SERPL-MCNC: 3.2 MMOL/L — LOW (ref 3.5–5.3)
POTASSIUM SERPL-MCNC: 3.3 MMOL/L — LOW (ref 3.5–5.3)
POTASSIUM SERPL-MCNC: 3.4 MMOL/L — LOW (ref 3.5–5.3)
POTASSIUM SERPL-MCNC: 3.5 MMOL/L — SIGNIFICANT CHANGE UP (ref 3.5–5.3)
POTASSIUM SERPL-MCNC: 3.7 MMOL/L — SIGNIFICANT CHANGE UP (ref 3.5–5.3)
POTASSIUM SERPL-MCNC: 3.8 MMOL/L — SIGNIFICANT CHANGE UP (ref 3.5–5.3)
POTASSIUM SERPL-MCNC: 3.9 MMOL/L — SIGNIFICANT CHANGE UP (ref 3.5–5.3)
POTASSIUM SERPL-MCNC: 4 MMOL/L — SIGNIFICANT CHANGE UP (ref 3.5–5.3)
POTASSIUM SERPL-MCNC: 4.1 MMOL/L — SIGNIFICANT CHANGE UP (ref 3.5–5.3)
POTASSIUM SERPL-MCNC: 4.2 MMOL/L — SIGNIFICANT CHANGE UP (ref 3.5–5.3)
POTASSIUM SERPL-MCNC: 4.3 MMOL/L — SIGNIFICANT CHANGE UP (ref 3.5–5.3)
POTASSIUM SERPL-MCNC: 4.4 MMOL/L — SIGNIFICANT CHANGE UP (ref 3.5–5.3)
POTASSIUM SERPL-MCNC: 4.5 MMOL/L — SIGNIFICANT CHANGE UP (ref 3.5–5.3)
POTASSIUM SERPL-MCNC: 4.5 MMOL/L — SIGNIFICANT CHANGE UP (ref 3.5–5.3)
POTASSIUM SERPL-MCNC: 4.6 MMOL/L — SIGNIFICANT CHANGE UP (ref 3.5–5.3)
POTASSIUM SERPL-MCNC: 4.7 MMOL/L — SIGNIFICANT CHANGE UP (ref 3.5–5.3)
POTASSIUM SERPL-MCNC: 5 MMOL/L — SIGNIFICANT CHANGE UP (ref 3.5–5.3)
POTASSIUM SERPL-MCNC: 5 MMOL/L — SIGNIFICANT CHANGE UP (ref 3.5–5.3)
POTASSIUM SERPL-SCNC: 2.8 MMOL/L — CRITICAL LOW (ref 3.5–5.3)
POTASSIUM SERPL-SCNC: 3 MMOL/L — LOW (ref 3.5–5.3)
POTASSIUM SERPL-SCNC: 3.1 MMOL/L — LOW (ref 3.5–5.3)
POTASSIUM SERPL-SCNC: 3.2 MMOL/L — LOW (ref 3.5–5.3)
POTASSIUM SERPL-SCNC: 3.3 MMOL/L — LOW (ref 3.5–5.3)
POTASSIUM SERPL-SCNC: 3.4 MMOL/L — LOW (ref 3.5–5.3)
POTASSIUM SERPL-SCNC: 3.5 MMOL/L — SIGNIFICANT CHANGE UP (ref 3.5–5.3)
POTASSIUM SERPL-SCNC: 3.7 MMOL/L — SIGNIFICANT CHANGE UP (ref 3.5–5.3)
POTASSIUM SERPL-SCNC: 3.8 MMOL/L — SIGNIFICANT CHANGE UP (ref 3.5–5.3)
POTASSIUM SERPL-SCNC: 3.9 MMOL/L — SIGNIFICANT CHANGE UP (ref 3.5–5.3)
POTASSIUM SERPL-SCNC: 4 MMOL/L — SIGNIFICANT CHANGE UP (ref 3.5–5.3)
POTASSIUM SERPL-SCNC: 4.1 MMOL/L — SIGNIFICANT CHANGE UP (ref 3.5–5.3)
POTASSIUM SERPL-SCNC: 4.2 MMOL/L — SIGNIFICANT CHANGE UP (ref 3.5–5.3)
POTASSIUM SERPL-SCNC: 4.3 MMOL/L — SIGNIFICANT CHANGE UP (ref 3.5–5.3)
POTASSIUM SERPL-SCNC: 4.4 MMOL/L — SIGNIFICANT CHANGE UP (ref 3.5–5.3)
POTASSIUM SERPL-SCNC: 4.5 MMOL/L — SIGNIFICANT CHANGE UP (ref 3.5–5.3)
POTASSIUM SERPL-SCNC: 4.5 MMOL/L — SIGNIFICANT CHANGE UP (ref 3.5–5.3)
POTASSIUM SERPL-SCNC: 4.6 MMOL/L — SIGNIFICANT CHANGE UP (ref 3.5–5.3)
POTASSIUM SERPL-SCNC: 4.7 MMOL/L — SIGNIFICANT CHANGE UP (ref 3.5–5.3)
POTASSIUM SERPL-SCNC: 5 MMOL/L — SIGNIFICANT CHANGE UP (ref 3.5–5.3)
POTASSIUM SERPL-SCNC: 5 MMOL/L — SIGNIFICANT CHANGE UP (ref 3.5–5.3)
PROCALCITONIN SERPL-MCNC: 0.08 NG/ML — SIGNIFICANT CHANGE UP (ref 0.02–0.1)
PROCALCITONIN SERPL-MCNC: 0.09 NG/ML — SIGNIFICANT CHANGE UP (ref 0.02–0.1)
PROCALCITONIN SERPL-MCNC: 0.09 NG/ML — SIGNIFICANT CHANGE UP (ref 0.02–0.1)
PROCALCITONIN SERPL-MCNC: 0.11 NG/ML — HIGH (ref 0.02–0.1)
PROCALCITONIN SERPL-MCNC: 0.12 NG/ML — HIGH (ref 0.02–0.1)
PROCALCITONIN SERPL-MCNC: 0.13 NG/ML — HIGH (ref 0.02–0.1)
PROCALCITONIN SERPL-MCNC: 0.15 NG/ML — HIGH (ref 0.02–0.1)
PROCALCITONIN SERPL-MCNC: 0.15 NG/ML — HIGH (ref 0.02–0.1)
PROCALCITONIN SERPL-MCNC: 0.25 NG/ML — HIGH (ref 0.02–0.1)
PROCALCITONIN SERPL-MCNC: 0.27 NG/ML — HIGH (ref 0.02–0.1)
PROCALCITONIN SERPL-MCNC: 0.29 NG/ML — HIGH (ref 0.02–0.1)
PROCALCITONIN SERPL-MCNC: 0.34 NG/ML — HIGH (ref 0.02–0.1)
PROCALCITONIN SERPL-MCNC: 0.45 NG/ML — HIGH (ref 0.02–0.1)
PROCALCITONIN SERPL-MCNC: 0.46 NG/ML — HIGH (ref 0.02–0.1)
PROMYELOCYTES # FLD: 0 % — SIGNIFICANT CHANGE UP (ref 0–0)
PROT SERPL-MCNC: 4.5 G/DL — LOW (ref 6–8.3)
PROT SERPL-MCNC: 4.7 G/DL — LOW (ref 6–8.3)
PROT SERPL-MCNC: 5.1 G/DL — LOW (ref 6–8.3)
PROT SERPL-MCNC: 5.1 G/DL — LOW (ref 6–8.3)
PROT SERPL-MCNC: 5.2 G/DL — LOW (ref 6–8.3)
PROT SERPL-MCNC: 5.3 G/DL — LOW (ref 6–8.3)
PROT SERPL-MCNC: 5.7 G/DL — LOW (ref 6–8.3)
PROT SERPL-MCNC: 5.8 G/DL — LOW (ref 6–8.3)
PROT SERPL-MCNC: 5.8 G/DL — LOW (ref 6–8.3)
PROT SERPL-MCNC: 5.9 G/DL — LOW (ref 6–8.3)
PROT SERPL-MCNC: 5.9 G/DL — LOW (ref 6–8.3)
PROT SERPL-MCNC: 6 G/DL — SIGNIFICANT CHANGE UP (ref 6–8.3)
PROT SERPL-MCNC: 6.1 G/DL — SIGNIFICANT CHANGE UP (ref 6–8.3)
PROT SERPL-MCNC: 6.2 G/DL — SIGNIFICANT CHANGE UP (ref 6–8.3)
PROT SERPL-MCNC: 6.3 G/DL — SIGNIFICANT CHANGE UP (ref 6–8.3)
PROT SERPL-MCNC: 6.3 G/DL — SIGNIFICANT CHANGE UP (ref 6–8.3)
PROT SERPL-MCNC: 6.5 G/DL — SIGNIFICANT CHANGE UP (ref 6–8.3)
PROT SERPL-MCNC: 6.6 G/DL — SIGNIFICANT CHANGE UP (ref 6–8.3)
PROT SERPL-MCNC: 6.8 G/DL — SIGNIFICANT CHANGE UP (ref 6–8.3)
PROT SERPL-MCNC: 6.8 G/DL — SIGNIFICANT CHANGE UP (ref 6–8.3)
PROT SERPL-MCNC: 6.9 G/DL — SIGNIFICANT CHANGE UP (ref 6–8.3)
PROT SERPL-MCNC: 7.1 G/DL — SIGNIFICANT CHANGE UP (ref 6–8.3)
PROT SERPL-MCNC: 7.1 G/DL — SIGNIFICANT CHANGE UP (ref 6–8.3)
PROT SERPL-MCNC: 7.2 G/DL — SIGNIFICANT CHANGE UP (ref 6–8.3)
PROT SERPL-MCNC: 7.4 G/DL — SIGNIFICANT CHANGE UP (ref 6–8.3)
PROT SERPL-MCNC: 7.5 G/DL — SIGNIFICANT CHANGE UP (ref 6–8.3)
PROT UR-MCNC: NEGATIVE MG/DL — SIGNIFICANT CHANGE UP
PROTEUS SP DNA BLD POS QL NAA+NON-PROBE: SIGNIFICANT CHANGE UP
PROTHROM AB SERPL-ACNC: 10.9 SEC — SIGNIFICANT CHANGE UP (ref 10–12.9)
PROTHROM AB SERPL-ACNC: 11.1 SEC — SIGNIFICANT CHANGE UP (ref 10–12.9)
PROTHROM AB SERPL-ACNC: 11.2 SEC — SIGNIFICANT CHANGE UP (ref 10–12.9)
PROTHROM AB SERPL-ACNC: 11.3 SEC — SIGNIFICANT CHANGE UP (ref 9.8–13.1)
PROTHROM AB SERPL-ACNC: 11.5 SEC — SIGNIFICANT CHANGE UP (ref 10–12.9)
PROTHROM AB SERPL-ACNC: 11.5 SEC — SIGNIFICANT CHANGE UP (ref 9.8–13.1)
PROTHROM AB SERPL-ACNC: 11.5 SEC — SIGNIFICANT CHANGE UP (ref 9.8–13.1)
PROTHROM AB SERPL-ACNC: 11.8 SEC — SIGNIFICANT CHANGE UP (ref 10–12.9)
PROTHROM AB SERPL-ACNC: 11.8 SEC — SIGNIFICANT CHANGE UP (ref 9.8–13.1)
PROTHROM AB SERPL-ACNC: 11.9 SEC — SIGNIFICANT CHANGE UP (ref 10–12.9)
PROTHROM AB SERPL-ACNC: 11.9 SEC — SIGNIFICANT CHANGE UP (ref 10–12.9)
PROTHROM AB SERPL-ACNC: 12 SEC — SIGNIFICANT CHANGE UP (ref 9.8–13.1)
PROTHROM AB SERPL-ACNC: 12 SEC — SIGNIFICANT CHANGE UP (ref 9.8–13.1)
PROTHROM AB SERPL-ACNC: 12.1 SEC — SIGNIFICANT CHANGE UP (ref 10–12.9)
PROTHROM AB SERPL-ACNC: 12.1 SEC — SIGNIFICANT CHANGE UP (ref 9.8–13.1)
PROTHROM AB SERPL-ACNC: 12.2 SEC — SIGNIFICANT CHANGE UP (ref 10–12.9)
PROTHROM AB SERPL-ACNC: 12.3 SEC — SIGNIFICANT CHANGE UP (ref 10–12.9)
PROTHROM AB SERPL-ACNC: 12.3 SEC — SIGNIFICANT CHANGE UP (ref 10–12.9)
PROTHROM AB SERPL-ACNC: 12.6 SEC — SIGNIFICANT CHANGE UP (ref 9.8–13.1)
PROTHROM AB SERPL-ACNC: 12.7 SEC — SIGNIFICANT CHANGE UP (ref 9.8–13.1)
PROTHROM AB SERPL-ACNC: 12.7 SEC — SIGNIFICANT CHANGE UP (ref 9.8–13.1)
PROTHROM AB SERPL-ACNC: 12.8 SEC — SIGNIFICANT CHANGE UP (ref 10–12.9)
PROTHROM AB SERPL-ACNC: 12.8 SEC — SIGNIFICANT CHANGE UP (ref 9.8–13.1)
PROTHROM AB SERPL-ACNC: 12.8 SEC — SIGNIFICANT CHANGE UP (ref 9.8–13.1)
PROTHROM AB SERPL-ACNC: 12.9 SEC — SIGNIFICANT CHANGE UP (ref 10–12.9)
PROTHROM AB SERPL-ACNC: 13.1 SEC — SIGNIFICANT CHANGE UP (ref 9.8–13.1)
PROTHROM AB SERPL-ACNC: 13.2 SEC — HIGH (ref 10–12.9)
PROTHROM AB SERPL-ACNC: 13.4 SEC — HIGH (ref 10–12.9)
PROTHROM AB SERPL-ACNC: 13.9 SEC — HIGH (ref 10–12.9)
PROTHROM AB SERPL-ACNC: 14.1 SEC — HIGH (ref 10.6–13.6)
PROTHROM AB SERPL-ACNC: 14.3 SEC — HIGH (ref 10–12.9)
PROTHROM AB SERPL-ACNC: 16.2 SEC — HIGH (ref 10–12.9)
RAPID RVP RESULT: SIGNIFICANT CHANGE UP
RBC # BLD: 1.9 M/UL — LOW (ref 3.8–5.2)
RBC # BLD: 2.13 M/UL — LOW (ref 3.8–5.2)
RBC # BLD: 2.21 M/UL — LOW (ref 3.8–5.2)
RBC # BLD: 2.23 M/UL — LOW (ref 3.8–5.2)
RBC # BLD: 2.28 M/UL — LOW (ref 3.8–5.2)
RBC # BLD: 2.3 M/UL — LOW (ref 3.8–5.2)
RBC # BLD: 2.32 M/UL — LOW (ref 3.8–5.2)
RBC # BLD: 2.33 M/UL — LOW (ref 3.8–5.2)
RBC # BLD: 2.34 M/UL — LOW (ref 3.8–5.2)
RBC # BLD: 2.44 M/UL — LOW (ref 3.8–5.2)
RBC # BLD: 2.53 M/UL — LOW (ref 3.8–5.2)
RBC # BLD: 2.55 M/UL — LOW (ref 3.8–5.2)
RBC # BLD: 2.58 M/UL — LOW (ref 3.8–5.2)
RBC # BLD: 2.6 M/UL — LOW (ref 3.8–5.2)
RBC # BLD: 2.61 M/UL — LOW (ref 3.8–5.2)
RBC # BLD: 2.63 M/UL — LOW (ref 3.8–5.2)
RBC # BLD: 2.65 M/UL — LOW (ref 3.8–5.2)
RBC # BLD: 2.67 M/UL — LOW (ref 3.8–5.2)
RBC # BLD: 2.67 M/UL — LOW (ref 3.8–5.2)
RBC # BLD: 2.69 M/UL — LOW (ref 3.8–5.2)
RBC # BLD: 2.69 M/UL — LOW (ref 3.8–5.2)
RBC # BLD: 2.71 M/UL — LOW (ref 3.8–5.2)
RBC # BLD: 2.75 M/UL — LOW (ref 3.8–5.2)
RBC # BLD: 2.77 M/UL — LOW (ref 3.8–5.2)
RBC # BLD: 2.79 M/UL — LOW (ref 3.8–5.2)
RBC # BLD: 2.88 M/UL — LOW (ref 3.8–5.2)
RBC # BLD: 2.9 M/UL — LOW (ref 3.8–5.2)
RBC # BLD: 3 M/UL — LOW (ref 3.8–5.2)
RBC # BLD: 3.03 M/UL — LOW (ref 3.8–5.2)
RBC # BLD: 3.06 M/UL — LOW (ref 3.8–5.2)
RBC # BLD: 3.07 M/UL — LOW (ref 3.8–5.2)
RBC # BLD: 3.08 M/UL — LOW (ref 3.8–5.2)
RBC # BLD: 3.08 M/UL — LOW (ref 3.8–5.2)
RBC # BLD: 3.12 M/UL — LOW (ref 3.8–5.2)
RBC # BLD: 3.15 M/UL — LOW (ref 3.8–5.2)
RBC # BLD: 3.15 M/UL — LOW (ref 3.8–5.2)
RBC # BLD: 3.23 M/UL — LOW (ref 3.8–5.2)
RBC # BLD: 3.24 M/UL — LOW (ref 3.8–5.2)
RBC # BLD: 3.27 M/UL — LOW (ref 3.8–5.2)
RBC # BLD: 3.32 M/UL — LOW (ref 3.8–5.2)
RBC # BLD: 3.4 M/UL — LOW (ref 3.8–5.2)
RBC # BLD: 3.57 M/UL — LOW (ref 3.8–5.2)
RBC # BLD: 3.92 M/UL — SIGNIFICANT CHANGE UP (ref 3.8–5.2)
RBC # BLD: 4.25 M/UL — SIGNIFICANT CHANGE UP (ref 3.8–5.2)
RBC # FLD: 13.2 % — SIGNIFICANT CHANGE UP (ref 10.3–14.5)
RBC # FLD: 13.4 % — SIGNIFICANT CHANGE UP (ref 10.3–14.5)
RBC # FLD: 13.5 % — SIGNIFICANT CHANGE UP (ref 10.3–14.5)
RBC # FLD: 13.5 % — SIGNIFICANT CHANGE UP (ref 10.3–14.5)
RBC # FLD: 13.6 % — SIGNIFICANT CHANGE UP (ref 10.3–14.5)
RBC # FLD: 13.7 % — SIGNIFICANT CHANGE UP (ref 10.3–14.5)
RBC # FLD: 13.7 % — SIGNIFICANT CHANGE UP (ref 10.3–14.5)
RBC # FLD: 13.9 % — SIGNIFICANT CHANGE UP (ref 10.3–14.5)
RBC # FLD: 13.9 % — SIGNIFICANT CHANGE UP (ref 10.3–14.5)
RBC # FLD: 14.1 % — SIGNIFICANT CHANGE UP (ref 10.3–14.5)
RBC # FLD: 14.6 % — HIGH (ref 10.3–14.5)
RBC # FLD: 14.9 % — HIGH (ref 10.3–14.5)
RBC # FLD: 15 % — HIGH (ref 10.3–14.5)
RBC # FLD: 16.6 % — HIGH (ref 10.3–14.5)
RBC # FLD: 18.2 % — HIGH (ref 10.3–14.5)
RBC # FLD: 18.5 % — HIGH (ref 10.3–14.5)
RBC # FLD: 18.8 % — HIGH (ref 10.3–14.5)
RBC # FLD: 18.9 % — HIGH (ref 10.3–14.5)
RBC # FLD: 19 % — HIGH (ref 10.3–14.5)
RBC # FLD: 19 % — HIGH (ref 10.3–14.5)
RBC # FLD: 19.6 % — HIGH (ref 10.3–14.5)
RBC # FLD: 19.8 % — HIGH (ref 10.3–14.5)
RBC # FLD: 19.9 % — HIGH (ref 10.3–14.5)
RBC # FLD: 19.9 % — HIGH (ref 10.3–14.5)
RBC # FLD: 20.4 % — HIGH (ref 10.3–14.5)
RBC # FLD: 20.4 % — HIGH (ref 10.3–14.5)
RBC # FLD: 20.5 % — HIGH (ref 10.3–14.5)
RBC # FLD: 20.6 % — HIGH (ref 10.3–14.5)
RBC # FLD: 20.8 % — HIGH (ref 10.3–14.5)
RBC # FLD: 20.9 % — HIGH (ref 10.3–14.5)
RBC # FLD: 21.2 % — HIGH (ref 10.3–14.5)
RBC # FLD: 21.5 % — HIGH (ref 10.3–14.5)
RBC # FLD: 21.7 % — HIGH (ref 10.3–14.5)
RBC # FLD: 21.8 % — HIGH (ref 10.3–14.5)
RBC # FLD: 22.5 % — HIGH (ref 10.3–14.5)
RBC BLD AUTO: ABNORMAL
RBC BLD AUTO: NORMAL — SIGNIFICANT CHANGE UP
RBC CASTS # UR COMP ASSIST: SIGNIFICANT CHANGE UP /HPF (ref 0–4)
RH IG SCN BLD-IMP: POSITIVE — SIGNIFICANT CHANGE UP
S AUREUS DNA NOSE QL NAA+PROBE: SIGNIFICANT CHANGE UP
S MARCESCENS DNA BLD POS NAA+NON-PROBE: SIGNIFICANT CHANGE UP
S PNEUM AG UR QL: NEGATIVE — SIGNIFICANT CHANGE UP
S PNEUM DNA BLD POS QL NAA+NON-PROBE: SIGNIFICANT CHANGE UP
S PYO DNA BLD POS QL NAA+NON-PROBE: SIGNIFICANT CHANGE UP
SAO2 % BLDA: 100 % — HIGH (ref 95–99)
SAO2 % BLDA: 76 % — LOW (ref 92–96)
SAO2 % BLDA: 83 % — LOW (ref 92–96)
SAO2 % BLDA: 84 % — LOW (ref 92–96)
SAO2 % BLDA: 85 % — LOW (ref 92–96)
SAO2 % BLDA: 86 % — LOW (ref 92–96)
SAO2 % BLDA: 86 % — LOW (ref 92–96)
SAO2 % BLDA: 90 % — LOW (ref 92–96)
SAO2 % BLDA: 91 % — LOW (ref 92–96)
SAO2 % BLDA: 91.8 % — LOW (ref 95–99)
SAO2 % BLDA: 92 % — SIGNIFICANT CHANGE UP (ref 92–96)
SAO2 % BLDA: 92.5 % — LOW (ref 95–99)
SAO2 % BLDA: 92.5 % — LOW (ref 95–99)
SAO2 % BLDA: 93 % — LOW (ref 95–99)
SAO2 % BLDA: 93 % — SIGNIFICANT CHANGE UP (ref 92–96)
SAO2 % BLDA: 93.5 % — LOW (ref 95–99)
SAO2 % BLDA: 94 % — SIGNIFICANT CHANGE UP (ref 92–96)
SAO2 % BLDA: 94.2 % — LOW (ref 95–99)
SAO2 % BLDA: 94.3 % — LOW (ref 95–99)
SAO2 % BLDA: 94.4 % — LOW (ref 95–99)
SAO2 % BLDA: 95 % — SIGNIFICANT CHANGE UP (ref 92–96)
SAO2 % BLDA: 95.4 % — SIGNIFICANT CHANGE UP (ref 95–99)
SAO2 % BLDA: 95.8 % — SIGNIFICANT CHANGE UP (ref 95–99)
SAO2 % BLDA: 95.9 % — SIGNIFICANT CHANGE UP (ref 95–99)
SAO2 % BLDA: 96 % — SIGNIFICANT CHANGE UP (ref 92–96)
SAO2 % BLDA: 96.4 % — SIGNIFICANT CHANGE UP (ref 95–99)
SAO2 % BLDA: 96.7 % — SIGNIFICANT CHANGE UP (ref 95–99)
SAO2 % BLDA: 96.7 % — SIGNIFICANT CHANGE UP (ref 95–99)
SAO2 % BLDA: 96.9 % — SIGNIFICANT CHANGE UP (ref 95–99)
SAO2 % BLDA: 97.1 % — SIGNIFICANT CHANGE UP (ref 95–99)
SAO2 % BLDA: 97.2 % — SIGNIFICANT CHANGE UP (ref 95–99)
SAO2 % BLDA: 97.3 % — SIGNIFICANT CHANGE UP (ref 95–99)
SAO2 % BLDA: 97.9 % — SIGNIFICANT CHANGE UP (ref 95–99)
SAO2 % BLDA: 98 % — HIGH (ref 92–96)
SAO2 % BLDA: 98 % — HIGH (ref 92–96)
SAO2 % BLDA: 98 % — SIGNIFICANT CHANGE UP (ref 95–99)
SAO2 % BLDA: 98 % — SIGNIFICANT CHANGE UP (ref 95–99)
SAO2 % BLDA: 98.1 % — SIGNIFICANT CHANGE UP (ref 95–99)
SAO2 % BLDA: 98.1 % — SIGNIFICANT CHANGE UP (ref 95–99)
SAO2 % BLDA: 98.7 % — SIGNIFICANT CHANGE UP (ref 95–99)
SAO2 % BLDA: 98.7 % — SIGNIFICANT CHANGE UP (ref 95–99)
SAO2 % BLDA: 98.9 % — SIGNIFICANT CHANGE UP (ref 95–99)
SAO2 % BLDA: 99.1 % — HIGH (ref 95–99)
SAO2 % BLDA: 99.7 % — HIGH (ref 95–99)
SAO2 % BLDA: 99.7 % — HIGH (ref 95–99)
SAO2 % BLDV: 64 % — LOW (ref 67–88)
SAO2 % BLDV: 81.7 % — SIGNIFICANT CHANGE UP (ref 60–85)
SAO2 % BLDV: 99 % — HIGH (ref 67–88)
SARS-COV-2 RNA SPEC QL NAA+PROBE: DETECTED
SARS-COV-2 RNA SPEC QL NAA+PROBE: SIGNIFICANT CHANGE UP
SODIUM BLDA-SCNC: 139 MMOL/L — SIGNIFICANT CHANGE UP (ref 136–146)
SODIUM BLDA-SCNC: 140 MMOL/L — SIGNIFICANT CHANGE UP (ref 136–146)
SODIUM BLDA-SCNC: 141 MMOL/L — SIGNIFICANT CHANGE UP (ref 136–146)
SODIUM BLDA-SCNC: 142 MMOL/L — SIGNIFICANT CHANGE UP (ref 136–146)
SODIUM BLDA-SCNC: 143 MMOL/L — SIGNIFICANT CHANGE UP (ref 136–146)
SODIUM BLDA-SCNC: 144 MMOL/L — SIGNIFICANT CHANGE UP (ref 136–146)
SODIUM BLDA-SCNC: 145 MMOL/L — SIGNIFICANT CHANGE UP (ref 136–146)
SODIUM BLDA-SCNC: 146 MMOL/L — SIGNIFICANT CHANGE UP (ref 136–146)
SODIUM BLDA-SCNC: 146 MMOL/L — SIGNIFICANT CHANGE UP (ref 136–146)
SODIUM SERPL-SCNC: 125 MMOL/L — LOW (ref 135–145)
SODIUM SERPL-SCNC: 128 MMOL/L — LOW (ref 135–145)
SODIUM SERPL-SCNC: 136 MMOL/L — SIGNIFICANT CHANGE UP (ref 135–145)
SODIUM SERPL-SCNC: 136 MMOL/L — SIGNIFICANT CHANGE UP (ref 135–145)
SODIUM SERPL-SCNC: 137 MMOL/L — SIGNIFICANT CHANGE UP (ref 135–145)
SODIUM SERPL-SCNC: 137 MMOL/L — SIGNIFICANT CHANGE UP (ref 135–145)
SODIUM SERPL-SCNC: 139 MMOL/L — SIGNIFICANT CHANGE UP (ref 135–145)
SODIUM SERPL-SCNC: 140 MMOL/L — SIGNIFICANT CHANGE UP (ref 135–145)
SODIUM SERPL-SCNC: 141 MMOL/L — SIGNIFICANT CHANGE UP (ref 135–145)
SODIUM SERPL-SCNC: 142 MMOL/L — SIGNIFICANT CHANGE UP (ref 135–145)
SODIUM SERPL-SCNC: 143 MMOL/L — SIGNIFICANT CHANGE UP (ref 135–145)
SODIUM SERPL-SCNC: 144 MMOL/L — SIGNIFICANT CHANGE UP (ref 135–145)
SODIUM SERPL-SCNC: 144 MMOL/L — SIGNIFICANT CHANGE UP (ref 135–145)
SODIUM SERPL-SCNC: 145 MMOL/L — SIGNIFICANT CHANGE UP (ref 135–145)
SODIUM SERPL-SCNC: 146 MMOL/L — HIGH (ref 135–145)
SODIUM SERPL-SCNC: 147 MMOL/L — HIGH (ref 135–145)
SODIUM SERPL-SCNC: 147 MMOL/L — HIGH (ref 135–145)
SODIUM SERPL-SCNC: 148 MMOL/L — HIGH (ref 135–145)
SODIUM SERPL-SCNC: 149 MMOL/L — HIGH (ref 135–145)
SODIUM SERPL-SCNC: 150 MMOL/L — HIGH (ref 135–145)
SODIUM SERPL-SCNC: 151 MMOL/L — HIGH (ref 135–145)
SODIUM SERPL-SCNC: 153 MMOL/L — HIGH (ref 135–145)
SODIUM SERPL-SCNC: 153 MMOL/L — HIGH (ref 135–145)
SODIUM SERPL-SCNC: 154 MMOL/L — HIGH (ref 135–145)
SODIUM SERPL-SCNC: 154 MMOL/L — HIGH (ref 135–145)
SP GR SPEC: 1.01 — SIGNIFICANT CHANGE UP (ref 1.01–1.02)
SPECIMEN SOURCE: SIGNIFICANT CHANGE UP
T4 FREE SERPL-MCNC: 1.11 NG/DL — SIGNIFICANT CHANGE UP (ref 0.9–1.8)
TRIGL SERPL-MCNC: 122 MG/DL — SIGNIFICANT CHANGE UP (ref 10–149)
TRIGL SERPL-MCNC: 131 MG/DL — SIGNIFICANT CHANGE UP (ref 10–149)
TRIGL SERPL-MCNC: 191 MG/DL — HIGH (ref 10–149)
TRIGL SERPL-MCNC: 261 MG/DL — HIGH (ref 10–149)
TRIGL SERPL-MCNC: 276 MG/DL — HIGH (ref 10–149)
TRIGL SERPL-MCNC: 289 MG/DL — HIGH (ref 10–149)
TRIGL SERPL-MCNC: 345 MG/DL — HIGH (ref 10–149)
TROPONIN I SERPL-MCNC: 0.02 NG/ML — SIGNIFICANT CHANGE UP (ref 0.02–0.06)
TROPONIN I SERPL-MCNC: 0.04 NG/ML — SIGNIFICANT CHANGE UP (ref 0.02–0.06)
TROPONIN I SERPL-MCNC: 0.04 NG/ML — SIGNIFICANT CHANGE UP (ref 0.02–0.06)
TROPONIN I SERPL-MCNC: 0.1 NG/ML — HIGH (ref 0.02–0.06)
TROPONIN I SERPL-MCNC: 0.1 NG/ML — HIGH (ref 0.02–0.06)
TROPONIN I SERPL-MCNC: 0.16 NG/ML — HIGH (ref 0.02–0.06)
TROPONIN I SERPL-MCNC: 0.19 NG/ML — HIGH (ref 0.02–0.06)
TROPONIN I SERPL-MCNC: 0.24 NG/ML — HIGH (ref 0.02–0.06)
TROPONIN I SERPL-MCNC: 0.47 NG/ML — HIGH (ref 0.02–0.06)
TROPONIN I SERPL-MCNC: 0.61 NG/ML — HIGH (ref 0.02–0.06)
TROPONIN I SERPL-MCNC: 0.64 NG/ML — HIGH (ref 0.02–0.06)
TROPONIN I SERPL-MCNC: 1.01 NG/ML — HIGH (ref 0.02–0.06)
TROPONIN I SERPL-MCNC: 1.41 NG/ML — HIGH (ref 0.02–0.06)
TROPONIN I SERPL-MCNC: 2.71 NG/ML — HIGH (ref 0.02–0.06)
TROPONIN T, HIGH SENSITIVITY: 44 NG/L — SIGNIFICANT CHANGE UP (ref ?–14)
TROPONIN T, HIGH SENSITIVITY: 52 NG/L — HIGH (ref ?–14)
TROPONIN T, HIGH SENSITIVITY: 57 NG/L — CRITICAL HIGH (ref ?–14)
TROPONIN T, HIGH SENSITIVITY: 61 NG/L — CRITICAL HIGH (ref ?–14)
TROPONIN T, HIGH SENSITIVITY: 68 NG/L — CRITICAL HIGH (ref ?–14)
TSH SERPL-MCNC: 1.99 UIU/ML — SIGNIFICANT CHANGE UP (ref 0.27–4.2)
UROBILINOGEN FLD QL: NEGATIVE MG/DL — SIGNIFICANT CHANGE UP
VALPROATE SERPL-MCNC: 16 UG/ML — LOW (ref 50–100)
VALPROATE SERPL-MCNC: 19 UG/ML — LOW (ref 50–100)
VALPROATE SERPL-MCNC: 28 UG/ML — LOW (ref 50–100)
VALPROATE SERPL-MCNC: 34 UG/ML — LOW (ref 50–100)
VALPROATE SERPL-MCNC: 62.2 UG/ML — SIGNIFICANT CHANGE UP (ref 50–100)
VALPROATE SERPL-MCNC: 76.1 UG/ML — SIGNIFICANT CHANGE UP (ref 50–100)
VARIANT LYMPHS # BLD: 26 % — HIGH (ref 0–6)
VARIANT LYMPHS # BLD: 4.3 % — SIGNIFICANT CHANGE UP
WBC # BLD: 10.24 K/UL — SIGNIFICANT CHANGE UP (ref 3.8–10.5)
WBC # BLD: 10.32 K/UL — SIGNIFICANT CHANGE UP (ref 3.8–10.5)
WBC # BLD: 10.52 K/UL — HIGH (ref 3.8–10.5)
WBC # BLD: 10.74 K/UL — HIGH (ref 3.8–10.5)
WBC # BLD: 10.74 K/UL — HIGH (ref 3.8–10.5)
WBC # BLD: 11.11 K/UL — HIGH (ref 3.8–10.5)
WBC # BLD: 11.92 K/UL — HIGH (ref 3.8–10.5)
WBC # BLD: 2.88 K/UL — LOW (ref 3.8–10.5)
WBC # BLD: 3.55 K/UL — LOW (ref 3.8–10.5)
WBC # BLD: 3.93 K/UL — SIGNIFICANT CHANGE UP (ref 3.8–10.5)
WBC # BLD: 4.24 K/UL — SIGNIFICANT CHANGE UP (ref 3.8–10.5)
WBC # BLD: 4.45 K/UL — SIGNIFICANT CHANGE UP (ref 3.8–10.5)
WBC # BLD: 4.58 K/UL — SIGNIFICANT CHANGE UP (ref 3.8–10.5)
WBC # BLD: 4.65 K/UL — SIGNIFICANT CHANGE UP (ref 3.8–10.5)
WBC # BLD: 4.71 K/UL — SIGNIFICANT CHANGE UP (ref 3.8–10.5)
WBC # BLD: 5.53 K/UL — SIGNIFICANT CHANGE UP (ref 3.8–10.5)
WBC # BLD: 5.75 K/UL — SIGNIFICANT CHANGE UP (ref 3.8–10.5)
WBC # BLD: 5.76 K/UL — SIGNIFICANT CHANGE UP (ref 3.8–10.5)
WBC # BLD: 5.81 K/UL — SIGNIFICANT CHANGE UP (ref 3.8–10.5)
WBC # BLD: 6.2 K/UL — SIGNIFICANT CHANGE UP (ref 3.8–10.5)
WBC # BLD: 6.23 K/UL — SIGNIFICANT CHANGE UP (ref 3.8–10.5)
WBC # BLD: 6.32 K/UL — SIGNIFICANT CHANGE UP (ref 3.8–10.5)
WBC # BLD: 6.36 K/UL — SIGNIFICANT CHANGE UP (ref 3.8–10.5)
WBC # BLD: 6.64 K/UL — SIGNIFICANT CHANGE UP (ref 3.8–10.5)
WBC # BLD: 6.9 K/UL — SIGNIFICANT CHANGE UP (ref 3.8–10.5)
WBC # BLD: 7.2 K/UL — SIGNIFICANT CHANGE UP (ref 3.8–10.5)
WBC # BLD: 7.59 K/UL — SIGNIFICANT CHANGE UP (ref 3.8–10.5)
WBC # BLD: 7.65 K/UL — SIGNIFICANT CHANGE UP (ref 3.8–10.5)
WBC # BLD: 7.67 K/UL — SIGNIFICANT CHANGE UP (ref 3.8–10.5)
WBC # BLD: 7.75 K/UL — SIGNIFICANT CHANGE UP (ref 3.8–10.5)
WBC # BLD: 7.94 K/UL — SIGNIFICANT CHANGE UP (ref 3.8–10.5)
WBC # BLD: 7.98 K/UL — SIGNIFICANT CHANGE UP (ref 3.8–10.5)
WBC # BLD: 8.1 K/UL — SIGNIFICANT CHANGE UP (ref 3.8–10.5)
WBC # BLD: 8.25 K/UL — SIGNIFICANT CHANGE UP (ref 3.8–10.5)
WBC # BLD: 8.25 K/UL — SIGNIFICANT CHANGE UP (ref 3.8–10.5)
WBC # BLD: 8.39 K/UL — SIGNIFICANT CHANGE UP (ref 3.8–10.5)
WBC # BLD: 8.49 K/UL — SIGNIFICANT CHANGE UP (ref 3.8–10.5)
WBC # BLD: 8.64 K/UL — SIGNIFICANT CHANGE UP (ref 3.8–10.5)
WBC # BLD: 8.75 K/UL — SIGNIFICANT CHANGE UP (ref 3.8–10.5)
WBC # BLD: 8.88 K/UL — SIGNIFICANT CHANGE UP (ref 3.8–10.5)
WBC # BLD: 9.15 K/UL — SIGNIFICANT CHANGE UP (ref 3.8–10.5)
WBC # BLD: 9.18 K/UL — SIGNIFICANT CHANGE UP (ref 3.8–10.5)
WBC # BLD: 9.31 K/UL — SIGNIFICANT CHANGE UP (ref 3.8–10.5)
WBC # BLD: 9.33 K/UL — SIGNIFICANT CHANGE UP (ref 3.8–10.5)
WBC # BLD: 9.34 K/UL — SIGNIFICANT CHANGE UP (ref 3.8–10.5)
WBC # BLD: 9.39 K/UL — SIGNIFICANT CHANGE UP (ref 3.8–10.5)
WBC # BLD: 9.59 K/UL — SIGNIFICANT CHANGE UP (ref 3.8–10.5)
WBC # BLD: 9.98 K/UL — SIGNIFICANT CHANGE UP (ref 3.8–10.5)
WBC # FLD AUTO: 10.24 K/UL — SIGNIFICANT CHANGE UP (ref 3.8–10.5)
WBC # FLD AUTO: 10.32 K/UL — SIGNIFICANT CHANGE UP (ref 3.8–10.5)
WBC # FLD AUTO: 10.52 K/UL — HIGH (ref 3.8–10.5)
WBC # FLD AUTO: 10.74 K/UL — HIGH (ref 3.8–10.5)
WBC # FLD AUTO: 10.74 K/UL — HIGH (ref 3.8–10.5)
WBC # FLD AUTO: 11.11 K/UL — HIGH (ref 3.8–10.5)
WBC # FLD AUTO: 11.92 K/UL — HIGH (ref 3.8–10.5)
WBC # FLD AUTO: 2.88 K/UL — LOW (ref 3.8–10.5)
WBC # FLD AUTO: 3.55 K/UL — LOW (ref 3.8–10.5)
WBC # FLD AUTO: 3.93 K/UL — SIGNIFICANT CHANGE UP (ref 3.8–10.5)
WBC # FLD AUTO: 4.24 K/UL — SIGNIFICANT CHANGE UP (ref 3.8–10.5)
WBC # FLD AUTO: 4.45 K/UL — SIGNIFICANT CHANGE UP (ref 3.8–10.5)
WBC # FLD AUTO: 4.58 K/UL — SIGNIFICANT CHANGE UP (ref 3.8–10.5)
WBC # FLD AUTO: 4.65 K/UL — SIGNIFICANT CHANGE UP (ref 3.8–10.5)
WBC # FLD AUTO: 4.71 K/UL — SIGNIFICANT CHANGE UP (ref 3.8–10.5)
WBC # FLD AUTO: 5.53 K/UL — SIGNIFICANT CHANGE UP (ref 3.8–10.5)
WBC # FLD AUTO: 5.75 K/UL — SIGNIFICANT CHANGE UP (ref 3.8–10.5)
WBC # FLD AUTO: 5.76 K/UL — SIGNIFICANT CHANGE UP (ref 3.8–10.5)
WBC # FLD AUTO: 5.81 K/UL — SIGNIFICANT CHANGE UP (ref 3.8–10.5)
WBC # FLD AUTO: 6.2 K/UL — SIGNIFICANT CHANGE UP (ref 3.8–10.5)
WBC # FLD AUTO: 6.23 K/UL — SIGNIFICANT CHANGE UP (ref 3.8–10.5)
WBC # FLD AUTO: 6.32 K/UL — SIGNIFICANT CHANGE UP (ref 3.8–10.5)
WBC # FLD AUTO: 6.36 K/UL — SIGNIFICANT CHANGE UP (ref 3.8–10.5)
WBC # FLD AUTO: 6.64 K/UL — SIGNIFICANT CHANGE UP (ref 3.8–10.5)
WBC # FLD AUTO: 6.9 K/UL — SIGNIFICANT CHANGE UP (ref 3.8–10.5)
WBC # FLD AUTO: 7.2 K/UL — SIGNIFICANT CHANGE UP (ref 3.8–10.5)
WBC # FLD AUTO: 7.59 K/UL — SIGNIFICANT CHANGE UP (ref 3.8–10.5)
WBC # FLD AUTO: 7.65 K/UL — SIGNIFICANT CHANGE UP (ref 3.8–10.5)
WBC # FLD AUTO: 7.67 K/UL — SIGNIFICANT CHANGE UP (ref 3.8–10.5)
WBC # FLD AUTO: 7.75 K/UL — SIGNIFICANT CHANGE UP (ref 3.8–10.5)
WBC # FLD AUTO: 7.94 K/UL — SIGNIFICANT CHANGE UP (ref 3.8–10.5)
WBC # FLD AUTO: 7.98 K/UL — SIGNIFICANT CHANGE UP (ref 3.8–10.5)
WBC # FLD AUTO: 8.1 K/UL — SIGNIFICANT CHANGE UP (ref 3.8–10.5)
WBC # FLD AUTO: 8.25 K/UL — SIGNIFICANT CHANGE UP (ref 3.8–10.5)
WBC # FLD AUTO: 8.25 K/UL — SIGNIFICANT CHANGE UP (ref 3.8–10.5)
WBC # FLD AUTO: 8.39 K/UL — SIGNIFICANT CHANGE UP (ref 3.8–10.5)
WBC # FLD AUTO: 8.49 K/UL — SIGNIFICANT CHANGE UP (ref 3.8–10.5)
WBC # FLD AUTO: 8.64 K/UL — SIGNIFICANT CHANGE UP (ref 3.8–10.5)
WBC # FLD AUTO: 8.75 K/UL — SIGNIFICANT CHANGE UP (ref 3.8–10.5)
WBC # FLD AUTO: 8.88 K/UL — SIGNIFICANT CHANGE UP (ref 3.8–10.5)
WBC # FLD AUTO: 9.15 K/UL — SIGNIFICANT CHANGE UP (ref 3.8–10.5)
WBC # FLD AUTO: 9.18 K/UL — SIGNIFICANT CHANGE UP (ref 3.8–10.5)
WBC # FLD AUTO: 9.31 K/UL — SIGNIFICANT CHANGE UP (ref 3.8–10.5)
WBC # FLD AUTO: 9.33 K/UL — SIGNIFICANT CHANGE UP (ref 3.8–10.5)
WBC # FLD AUTO: 9.34 K/UL — SIGNIFICANT CHANGE UP (ref 3.8–10.5)
WBC # FLD AUTO: 9.39 K/UL — SIGNIFICANT CHANGE UP (ref 3.8–10.5)
WBC # FLD AUTO: 9.59 K/UL — SIGNIFICANT CHANGE UP (ref 3.8–10.5)
WBC # FLD AUTO: 9.98 K/UL — SIGNIFICANT CHANGE UP (ref 3.8–10.5)
WBC UR QL: ABNORMAL

## 2020-01-01 PROCEDURE — 99233 SBSQ HOSP IP/OBS HIGH 50: CPT

## 2020-01-01 PROCEDURE — 71045 X-RAY EXAM CHEST 1 VIEW: CPT | Mod: 26

## 2020-01-01 PROCEDURE — 71045 X-RAY EXAM CHEST 1 VIEW: CPT | Mod: 26,CS,77

## 2020-01-01 PROCEDURE — 83880 ASSAY OF NATRIURETIC PEPTIDE: CPT

## 2020-01-01 PROCEDURE — 96361 HYDRATE IV INFUSION ADD-ON: CPT

## 2020-01-01 PROCEDURE — 99232 SBSQ HOSP IP/OBS MODERATE 35: CPT | Mod: CS

## 2020-01-01 PROCEDURE — 71045 X-RAY EXAM CHEST 1 VIEW: CPT | Mod: 26,77

## 2020-01-01 PROCEDURE — 85379 FIBRIN DEGRADATION QUANT: CPT

## 2020-01-01 PROCEDURE — 93010 ELECTROCARDIOGRAM REPORT: CPT

## 2020-01-01 PROCEDURE — 84145 PROCALCITONIN (PCT): CPT

## 2020-01-01 PROCEDURE — 99233 SBSQ HOSP IP/OBS HIGH 50: CPT | Mod: CS,GC

## 2020-01-01 PROCEDURE — 99223 1ST HOSP IP/OBS HIGH 75: CPT

## 2020-01-01 PROCEDURE — 87798 DETECT AGENT NOS DNA AMP: CPT

## 2020-01-01 PROCEDURE — 85730 THROMBOPLASTIN TIME PARTIAL: CPT

## 2020-01-01 PROCEDURE — 82803 BLOOD GASES ANY COMBINATION: CPT

## 2020-01-01 PROCEDURE — 43246 EGD PLACE GASTROSTOMY TUBE: CPT | Mod: CS

## 2020-01-01 PROCEDURE — 94640 AIRWAY INHALATION TREATMENT: CPT

## 2020-01-01 PROCEDURE — 99233 SBSQ HOSP IP/OBS HIGH 50: CPT | Mod: CS

## 2020-01-01 PROCEDURE — 86850 RBC ANTIBODY SCREEN: CPT

## 2020-01-01 PROCEDURE — 99223 1ST HOSP IP/OBS HIGH 75: CPT | Mod: CS,25

## 2020-01-01 PROCEDURE — 87493 C DIFF AMPLIFIED PROBE: CPT

## 2020-01-01 PROCEDURE — 87633 RESP VIRUS 12-25 TARGETS: CPT

## 2020-01-01 PROCEDURE — 82550 ASSAY OF CK (CPK): CPT

## 2020-01-01 PROCEDURE — 36430 TRANSFUSION BLD/BLD COMPNT: CPT

## 2020-01-01 PROCEDURE — 71045 X-RAY EXAM CHEST 1 VIEW: CPT

## 2020-01-01 PROCEDURE — 99292 CRITICAL CARE ADDL 30 MIN: CPT

## 2020-01-01 PROCEDURE — 87635 SARS-COV-2 COVID-19 AMP PRB: CPT

## 2020-01-01 PROCEDURE — 74018 RADEX ABDOMEN 1 VIEW: CPT | Mod: 26,CS

## 2020-01-01 PROCEDURE — 82150 ASSAY OF AMYLASE: CPT

## 2020-01-01 PROCEDURE — 84484 ASSAY OF TROPONIN QUANT: CPT

## 2020-01-01 PROCEDURE — 99232 SBSQ HOSP IP/OBS MODERATE 35: CPT

## 2020-01-01 PROCEDURE — 83605 ASSAY OF LACTIC ACID: CPT

## 2020-01-01 PROCEDURE — 74018 RADEX ABDOMEN 1 VIEW: CPT | Mod: 26

## 2020-01-01 PROCEDURE — 93306 TTE W/DOPPLER COMPLETE: CPT

## 2020-01-01 PROCEDURE — 85610 PROTHROMBIN TIME: CPT

## 2020-01-01 PROCEDURE — 99291 CRITICAL CARE FIRST HOUR: CPT | Mod: CS,25

## 2020-01-01 PROCEDURE — 99291 CRITICAL CARE FIRST HOUR: CPT | Mod: CS

## 2020-01-01 PROCEDURE — 80053 COMPREHEN METABOLIC PANEL: CPT

## 2020-01-01 PROCEDURE — P9016: CPT

## 2020-01-01 PROCEDURE — 71250 CT THORAX DX C-: CPT | Mod: 26

## 2020-01-01 PROCEDURE — 86140 C-REACTIVE PROTEIN: CPT

## 2020-01-01 PROCEDURE — 99232 SBSQ HOSP IP/OBS MODERATE 35: CPT | Mod: CS,57

## 2020-01-01 PROCEDURE — 84478 ASSAY OF TRIGLYCERIDES: CPT

## 2020-01-01 PROCEDURE — 85027 COMPLETE CBC AUTOMATED: CPT

## 2020-01-01 PROCEDURE — 36415 COLL VENOUS BLD VENIPUNCTURE: CPT

## 2020-01-01 PROCEDURE — 71250 CT THORAX DX C-: CPT

## 2020-01-01 PROCEDURE — 80048 BASIC METABOLIC PNL TOTAL CA: CPT

## 2020-01-01 PROCEDURE — 31600 PLANNED TRACHEOSTOMY: CPT | Mod: CS

## 2020-01-01 PROCEDURE — 86900 BLOOD TYPING SEROLOGIC ABO: CPT

## 2020-01-01 PROCEDURE — 95812 EEG 41-60 MINUTES: CPT

## 2020-01-01 PROCEDURE — 87040 BLOOD CULTURE FOR BACTERIA: CPT

## 2020-01-01 PROCEDURE — 93308 TTE F-UP OR LMTD: CPT | Mod: 26,CS

## 2020-01-01 PROCEDURE — 87045 FECES CULTURE AEROBIC BACT: CPT

## 2020-01-01 PROCEDURE — 87641 MR-STAPH DNA AMP PROBE: CPT

## 2020-01-01 PROCEDURE — 99291 CRITICAL CARE FIRST HOUR: CPT

## 2020-01-01 PROCEDURE — 83735 ASSAY OF MAGNESIUM: CPT

## 2020-01-01 PROCEDURE — 87899 AGENT NOS ASSAY W/OPTIC: CPT

## 2020-01-01 PROCEDURE — 87640 STAPH A DNA AMP PROBE: CPT

## 2020-01-01 PROCEDURE — P9047: CPT

## 2020-01-01 PROCEDURE — 86923 COMPATIBILITY TEST ELECTRIC: CPT

## 2020-01-01 PROCEDURE — 99239 HOSP IP/OBS DSCHRG MGMT >30: CPT | Mod: CS

## 2020-01-01 PROCEDURE — 86022 PLATELET ANTIBODIES: CPT

## 2020-01-01 PROCEDURE — 85652 RBC SED RATE AUTOMATED: CPT

## 2020-01-01 PROCEDURE — 76604 US EXAM CHEST: CPT | Mod: 26,CS

## 2020-01-01 PROCEDURE — 74018 RADEX ABDOMEN 1 VIEW: CPT

## 2020-01-01 PROCEDURE — 74176 CT ABD & PELVIS W/O CONTRAST: CPT

## 2020-01-01 PROCEDURE — 82728 ASSAY OF FERRITIN: CPT

## 2020-01-01 PROCEDURE — 87449 NOS EACH ORGANISM AG IA: CPT

## 2020-01-01 PROCEDURE — 87581 M.PNEUMON DNA AMP PROBE: CPT

## 2020-01-01 PROCEDURE — 87046 STOOL CULTR AEROBIC BACT EA: CPT

## 2020-01-01 PROCEDURE — 86803 HEPATITIS C AB TEST: CPT

## 2020-01-01 PROCEDURE — 71045 X-RAY EXAM CHEST 1 VIEW: CPT | Mod: 26,76

## 2020-01-01 PROCEDURE — 99497 ADVNCD CARE PLAN 30 MIN: CPT | Mod: CS

## 2020-01-01 PROCEDURE — 99497 ADVNCD CARE PLAN 30 MIN: CPT | Mod: CS,25

## 2020-01-01 PROCEDURE — 86901 BLOOD TYPING SEROLOGIC RH(D): CPT

## 2020-01-01 PROCEDURE — 82962 GLUCOSE BLOOD TEST: CPT

## 2020-01-01 PROCEDURE — 99285 EMERGENCY DEPT VISIT HI MDM: CPT

## 2020-01-01 PROCEDURE — 93005 ELECTROCARDIOGRAM TRACING: CPT

## 2020-01-01 PROCEDURE — 70450 CT HEAD/BRAIN W/O DYE: CPT | Mod: 26

## 2020-01-01 PROCEDURE — 71045 X-RAY EXAM CHEST 1 VIEW: CPT | Mod: 26,CS

## 2020-01-01 PROCEDURE — 96365 THER/PROPH/DIAG IV INF INIT: CPT

## 2020-01-01 PROCEDURE — 99223 1ST HOSP IP/OBS HIGH 75: CPT | Mod: CS

## 2020-01-01 PROCEDURE — 87486 CHLMYD PNEUM DNA AMP PROBE: CPT

## 2020-01-01 PROCEDURE — 31500 INSERT EMERGENCY AIRWAY: CPT | Mod: CS

## 2020-01-01 PROCEDURE — 99285 EMERGENCY DEPT VISIT HI MDM: CPT | Mod: CS

## 2020-01-01 PROCEDURE — 80164 ASSAY DIPROPYLACETIC ACD TOT: CPT

## 2020-01-01 PROCEDURE — 82248 BILIRUBIN DIRECT: CPT

## 2020-01-01 PROCEDURE — 87150 DNA/RNA AMPLIFIED PROBE: CPT

## 2020-01-01 PROCEDURE — 87507 IADNA-DNA/RNA PROBE TQ 12-25: CPT

## 2020-01-01 PROCEDURE — 83690 ASSAY OF LIPASE: CPT

## 2020-01-01 PROCEDURE — 36600 WITHDRAWAL OF ARTERIAL BLOOD: CPT

## 2020-01-01 PROCEDURE — 84100 ASSAY OF PHOSPHORUS: CPT

## 2020-01-01 PROCEDURE — 31645 BRNCHSC W/THER ASPIR 1ST: CPT | Mod: CS

## 2020-01-01 PROCEDURE — 83615 LACTATE (LD) (LDH) ENZYME: CPT

## 2020-01-01 PROCEDURE — 70450 CT HEAD/BRAIN W/O DYE: CPT

## 2020-01-01 PROCEDURE — 99231 SBSQ HOSP IP/OBS SF/LOW 25: CPT | Mod: CS

## 2020-01-01 PROCEDURE — 87070 CULTURE OTHR SPECIMN AEROBIC: CPT

## 2020-01-01 PROCEDURE — 31624 DX BRONCHOSCOPE/LAVAGE: CPT | Mod: CS

## 2020-01-01 PROCEDURE — 93306 TTE W/DOPPLER COMPLETE: CPT | Mod: 26

## 2020-01-01 PROCEDURE — 82533 TOTAL CORTISOL: CPT

## 2020-01-01 PROCEDURE — 74176 CT ABD & PELVIS W/O CONTRAST: CPT | Mod: 26

## 2020-01-01 RX ORDER — AMIODARONE HYDROCHLORIDE 400 MG/1
1 TABLET ORAL
Qty: 900 | Refills: 0 | Status: DISCONTINUED | OUTPATIENT
Start: 2020-01-01 | End: 2020-01-01

## 2020-01-01 RX ORDER — FENTANYL CITRATE 50 UG/ML
5 INJECTION INTRAVENOUS
Qty: 0 | Refills: 0 | DISCHARGE
Start: 2020-01-01

## 2020-01-01 RX ORDER — MAGNESIUM SULFATE 500 MG/ML
1 VIAL (ML) INJECTION ONCE
Refills: 0 | Status: COMPLETED | OUTPATIENT
Start: 2020-01-01 | End: 2020-01-01

## 2020-01-01 RX ORDER — SODIUM CHLORIDE 9 MG/ML
2 INJECTION INTRAMUSCULAR; INTRAVENOUS; SUBCUTANEOUS
Qty: 0 | Refills: 0 | DISCHARGE

## 2020-01-01 RX ORDER — VALPROIC ACID (AS SODIUM SALT) 250 MG/5ML
5 SOLUTION, ORAL ORAL
Qty: 0 | Refills: 0 | DISCHARGE
Start: 2020-01-01

## 2020-01-01 RX ORDER — DEXTROSE 50 % IN WATER 50 %
25 SYRINGE (ML) INTRAVENOUS ONCE
Refills: 0 | Status: DISCONTINUED | OUTPATIENT
Start: 2020-01-01 | End: 2020-01-01

## 2020-01-01 RX ORDER — METOCLOPRAMIDE HCL 10 MG
5 TABLET ORAL
Refills: 0 | Status: DISCONTINUED | OUTPATIENT
Start: 2020-01-01 | End: 2020-01-01

## 2020-01-01 RX ORDER — CISATRACURIUM BESYLATE 2 MG/ML
20 INJECTION INTRAVENOUS ONCE
Refills: 0 | Status: DISCONTINUED | OUTPATIENT
Start: 2020-01-01 | End: 2020-01-01

## 2020-01-01 RX ORDER — POLYETHYLENE GLYCOL 3350 17 G/17G
17 POWDER, FOR SOLUTION ORAL DAILY
Refills: 0 | Status: DISCONTINUED | OUTPATIENT
Start: 2020-01-01 | End: 2020-01-01

## 2020-01-01 RX ORDER — OMEPRAZOLE 10 MG/1
1 CAPSULE, DELAYED RELEASE ORAL
Qty: 0 | Refills: 0 | DISCHARGE

## 2020-01-01 RX ORDER — POTASSIUM CHLORIDE 20 MEQ
20 PACKET (EA) ORAL
Refills: 0 | Status: COMPLETED | OUTPATIENT
Start: 2020-01-01 | End: 2020-01-01

## 2020-01-01 RX ORDER — POTASSIUM CHLORIDE 20 MEQ
40 PACKET (EA) ORAL
Refills: 0 | Status: COMPLETED | OUTPATIENT
Start: 2020-01-01 | End: 2020-01-01

## 2020-01-01 RX ORDER — MAGNESIUM HYDROXIDE 400 MG/1
30 TABLET, CHEWABLE ORAL
Qty: 0 | Refills: 0 | DISCHARGE

## 2020-01-01 RX ORDER — ENOXAPARIN SODIUM 100 MG/ML
40 INJECTION SUBCUTANEOUS DAILY
Refills: 0 | Status: DISCONTINUED | OUTPATIENT
Start: 2020-01-01 | End: 2020-01-01

## 2020-01-01 RX ORDER — DOCUSATE SODIUM 100 MG
10 CAPSULE ORAL
Qty: 0 | Refills: 0 | DISCHARGE

## 2020-01-01 RX ORDER — HYDROCORTISONE 20 MG
7 TABLET ORAL ONCE
Refills: 0 | Status: COMPLETED | OUTPATIENT
Start: 2020-01-01 | End: 2020-01-01

## 2020-01-01 RX ORDER — MIDAZOLAM HYDROCHLORIDE 1 MG/ML
0 INJECTION, SOLUTION INTRAMUSCULAR; INTRAVENOUS
Qty: 0 | Refills: 0 | DISCHARGE
Start: 2020-01-01

## 2020-01-01 RX ORDER — DEXMEDETOMIDINE HYDROCHLORIDE IN 0.9% SODIUM CHLORIDE 4 UG/ML
0.4 INJECTION INTRAVENOUS
Qty: 200 | Refills: 0 | Status: DISCONTINUED | OUTPATIENT
Start: 2020-01-01 | End: 2020-01-01

## 2020-01-01 RX ORDER — SODIUM CHLORIDE 9 MG/ML
1000 INJECTION INTRAMUSCULAR; INTRAVENOUS; SUBCUTANEOUS ONCE
Refills: 0 | Status: COMPLETED | OUTPATIENT
Start: 2020-01-01 | End: 2020-01-01

## 2020-01-01 RX ORDER — ENOXAPARIN SODIUM 100 MG/ML
40 INJECTION SUBCUTANEOUS
Qty: 0 | Refills: 0 | DISCHARGE
Start: 2020-01-01

## 2020-01-01 RX ORDER — CALCIUM CARBONATE 500(1250)
0 TABLET ORAL
Qty: 0 | Refills: 0 | DISCHARGE

## 2020-01-01 RX ORDER — CHLORHEXIDINE GLUCONATE 213 G/1000ML
15 SOLUTION TOPICAL EVERY 12 HOURS
Refills: 0 | Status: DISCONTINUED | OUTPATIENT
Start: 2020-01-01 | End: 2020-01-01

## 2020-01-01 RX ORDER — ENOXAPARIN SODIUM 100 MG/ML
30 INJECTION SUBCUTANEOUS DAILY
Refills: 0 | Status: DISCONTINUED | OUTPATIENT
Start: 2020-01-01 | End: 2020-01-01

## 2020-01-01 RX ORDER — LEVOTHYROXINE SODIUM 125 MCG
125 TABLET ORAL DAILY
Refills: 0 | Status: DISCONTINUED | OUTPATIENT
Start: 2020-01-01 | End: 2020-01-01

## 2020-01-01 RX ORDER — ALBUMIN HUMAN 25 %
50 VIAL (ML) INTRAVENOUS ONCE
Refills: 0 | Status: DISCONTINUED | OUTPATIENT
Start: 2020-01-01 | End: 2020-01-01

## 2020-01-01 RX ORDER — GLUCAGON INJECTION, SOLUTION 0.5 MG/.1ML
1 INJECTION, SOLUTION SUBCUTANEOUS ONCE
Refills: 0 | Status: DISCONTINUED | OUTPATIENT
Start: 2020-01-01 | End: 2020-01-01

## 2020-01-01 RX ORDER — POLYETHYLENE GLYCOL 3350 17 G/17G
17 POWDER, FOR SOLUTION ORAL
Qty: 0 | Refills: 0 | DISCHARGE
Start: 2020-01-01

## 2020-01-01 RX ORDER — MEROPENEM 1 G/30ML
1000 INJECTION INTRAVENOUS ONCE
Refills: 0 | Status: COMPLETED | OUTPATIENT
Start: 2020-01-01 | End: 2020-01-01

## 2020-01-01 RX ORDER — NITROFURANTOIN MACROCRYSTAL 50 MG
1 CAPSULE ORAL
Qty: 0 | Refills: 0 | DISCHARGE

## 2020-01-01 RX ORDER — SENNA PLUS 8.6 MG/1
2 TABLET ORAL AT BEDTIME
Refills: 0 | Status: DISCONTINUED | OUTPATIENT
Start: 2020-01-01 | End: 2020-01-01

## 2020-01-01 RX ORDER — ERYTHROMYCIN ETHYLSUCCINATE 400 MG
120 TABLET ORAL EVERY 8 HOURS
Refills: 0 | Status: DISCONTINUED | OUTPATIENT
Start: 2020-01-01 | End: 2020-01-01

## 2020-01-01 RX ORDER — FAMOTIDINE 10 MG/ML
20 INJECTION INTRAVENOUS DAILY
Refills: 0 | Status: DISCONTINUED | OUTPATIENT
Start: 2020-01-01 | End: 2020-01-01

## 2020-01-01 RX ORDER — NOREPINEPHRINE BITARTRATE/D5W 8 MG/250ML
0.05 PLASTIC BAG, INJECTION (ML) INTRAVENOUS
Qty: 8 | Refills: 0 | Status: DISCONTINUED | OUTPATIENT
Start: 2020-01-01 | End: 2020-01-01

## 2020-01-01 RX ORDER — MIDODRINE HYDROCHLORIDE 2.5 MG/1
10 TABLET ORAL EVERY 8 HOURS
Refills: 0 | Status: DISCONTINUED | OUTPATIENT
Start: 2020-01-01 | End: 2020-01-01

## 2020-01-01 RX ORDER — CALCITONIN SALMON 200 [IU]/ML
1 INJECTION, SOLUTION INTRAMUSCULAR
Qty: 0 | Refills: 0 | DISCHARGE

## 2020-01-01 RX ORDER — HYDROXYCHLOROQUINE SULFATE 200 MG
200 TABLET ORAL EVERY 12 HOURS
Refills: 0 | Status: COMPLETED | OUTPATIENT
Start: 2020-01-01 | End: 2020-01-01

## 2020-01-01 RX ORDER — AZITHROMYCIN 500 MG/1
500 TABLET, FILM COATED ORAL DAILY
Refills: 0 | Status: DISCONTINUED | OUTPATIENT
Start: 2020-01-01 | End: 2020-01-01

## 2020-01-01 RX ORDER — MAGNESIUM SULFATE 500 MG/ML
2 VIAL (ML) INJECTION ONCE
Refills: 0 | Status: COMPLETED | OUTPATIENT
Start: 2020-01-01 | End: 2020-01-01

## 2020-01-01 RX ORDER — DEXTROSE 50 % IN WATER 50 %
12.5 SYRINGE (ML) INTRAVENOUS ONCE
Refills: 0 | Status: DISCONTINUED | OUTPATIENT
Start: 2020-01-01 | End: 2020-01-01

## 2020-01-01 RX ORDER — MEMANTINE HYDROCHLORIDE 10 MG/1
1 TABLET ORAL
Qty: 0 | Refills: 0 | DISCHARGE

## 2020-01-01 RX ORDER — POTASSIUM CHLORIDE 20 MEQ
10 PACKET (EA) ORAL
Refills: 0 | Status: COMPLETED | OUTPATIENT
Start: 2020-01-01 | End: 2020-01-01

## 2020-01-01 RX ORDER — MIDAZOLAM HYDROCHLORIDE 1 MG/ML
0.02 INJECTION, SOLUTION INTRAMUSCULAR; INTRAVENOUS
Qty: 100 | Refills: 0 | Status: DISCONTINUED | OUTPATIENT
Start: 2020-01-01 | End: 2020-01-01

## 2020-01-01 RX ORDER — POTASSIUM CHLORIDE 20 MEQ
10 PACKET (EA) ORAL ONCE
Refills: 0 | Status: COMPLETED | OUTPATIENT
Start: 2020-01-01 | End: 2020-01-01

## 2020-01-01 RX ORDER — ASPIRIN/CALCIUM CARB/MAGNESIUM 324 MG
81 TABLET ORAL DAILY
Refills: 0 | Status: DISCONTINUED | OUTPATIENT
Start: 2020-01-01 | End: 2020-01-01

## 2020-01-01 RX ORDER — FENTANYL CITRATE 50 UG/ML
100 INJECTION INTRAVENOUS ONCE
Refills: 0 | Status: DISCONTINUED | OUTPATIENT
Start: 2020-01-01 | End: 2020-01-01

## 2020-01-01 RX ORDER — PROPOFOL 10 MG/ML
40 INJECTION, EMULSION INTRAVENOUS
Qty: 1000 | Refills: 0 | Status: DISCONTINUED | OUTPATIENT
Start: 2020-01-01 | End: 2020-01-01

## 2020-01-01 RX ORDER — VANCOMYCIN HCL 1 G
1000 VIAL (EA) INTRAVENOUS ONCE
Refills: 0 | Status: COMPLETED | OUTPATIENT
Start: 2020-01-01 | End: 2020-01-01

## 2020-01-01 RX ORDER — HYDROMORPHONE HYDROCHLORIDE 2 MG/ML
1 INJECTION INTRAMUSCULAR; INTRAVENOUS; SUBCUTANEOUS ONCE
Refills: 0 | Status: DISCONTINUED | OUTPATIENT
Start: 2020-01-01 | End: 2020-01-01

## 2020-01-01 RX ORDER — ACETAMINOPHEN 500 MG
650 TABLET ORAL EVERY 6 HOURS
Refills: 0 | Status: DISCONTINUED | OUTPATIENT
Start: 2020-01-01 | End: 2020-01-01

## 2020-01-01 RX ORDER — LEVETIRACETAM 250 MG/1
1 TABLET, FILM COATED ORAL
Qty: 0 | Refills: 0 | DISCHARGE

## 2020-01-01 RX ORDER — HYDROCORTISONE 20 MG
25 TABLET ORAL ONCE
Refills: 0 | Status: COMPLETED | OUTPATIENT
Start: 2020-01-01 | End: 2020-01-01

## 2020-01-01 RX ORDER — SODIUM CHLORIDE 9 MG/ML
500 INJECTION, SOLUTION INTRAVENOUS ONCE
Refills: 0 | Status: COMPLETED | OUTPATIENT
Start: 2020-01-01 | End: 2020-01-01

## 2020-01-01 RX ORDER — SODIUM CHLORIDE 9 MG/ML
1000 INJECTION, SOLUTION INTRAVENOUS
Refills: 0 | Status: DISCONTINUED | OUTPATIENT
Start: 2020-01-01 | End: 2020-01-01

## 2020-01-01 RX ORDER — IMMUNE GLOBULIN (HUMAN) 10 G/100ML
20 INJECTION INTRAVENOUS; SUBCUTANEOUS DAILY
Refills: 0 | Status: DISCONTINUED | OUTPATIENT
Start: 2020-01-01 | End: 2020-01-01

## 2020-01-01 RX ORDER — CHLORHEXIDINE GLUCONATE 213 G/1000ML
1 SOLUTION TOPICAL
Refills: 0 | Status: DISCONTINUED | OUTPATIENT
Start: 2020-01-01 | End: 2020-01-01

## 2020-01-01 RX ORDER — FUROSEMIDE 40 MG
20 TABLET ORAL ONCE
Refills: 0 | Status: COMPLETED | OUTPATIENT
Start: 2020-01-01 | End: 2020-01-01

## 2020-01-01 RX ORDER — SODIUM BICARBONATE 1 MEQ/ML
0.33 SYRINGE (ML) INTRAVENOUS
Qty: 150 | Refills: 0 | Status: DISCONTINUED | OUTPATIENT
Start: 2020-01-01 | End: 2020-01-01

## 2020-01-01 RX ORDER — HYDROCORTISONE 20 MG
7 TABLET ORAL
Refills: 0 | Status: DISCONTINUED | OUTPATIENT
Start: 2020-01-01 | End: 2020-01-01

## 2020-01-01 RX ORDER — NOREPINEPHRINE BITARTRATE/D5W 8 MG/250ML
0.05 PLASTIC BAG, INJECTION (ML) INTRAVENOUS
Qty: 16 | Refills: 0 | Status: DISCONTINUED | OUTPATIENT
Start: 2020-01-01 | End: 2020-01-01

## 2020-01-01 RX ORDER — VALPROIC ACID (AS SODIUM SALT) 250 MG/5ML
500 SOLUTION, ORAL ORAL EVERY 12 HOURS
Refills: 0 | Status: DISCONTINUED | OUTPATIENT
Start: 2020-01-01 | End: 2020-01-01

## 2020-01-01 RX ORDER — SODIUM CHLORIDE 9 MG/ML
500 INJECTION INTRAMUSCULAR; INTRAVENOUS; SUBCUTANEOUS
Refills: 0 | Status: DISCONTINUED | OUTPATIENT
Start: 2020-01-01 | End: 2020-01-01

## 2020-01-01 RX ORDER — POTASSIUM CHLORIDE 20 MEQ
40 PACKET (EA) ORAL ONCE
Refills: 0 | Status: COMPLETED | OUTPATIENT
Start: 2020-01-01 | End: 2020-01-01

## 2020-01-01 RX ORDER — LACTOBACILLUS ACIDOPHILUS 100MM CELL
1 CAPSULE ORAL DAILY
Refills: 0 | Status: DISCONTINUED | OUTPATIENT
Start: 2020-01-01 | End: 2020-01-01

## 2020-01-01 RX ORDER — DIVALPROEX SODIUM 500 MG/1
2 TABLET, DELAYED RELEASE ORAL
Qty: 0 | Refills: 0 | DISCHARGE

## 2020-01-01 RX ORDER — ACETAMINOPHEN 500 MG
2 TABLET ORAL
Qty: 0 | Refills: 0 | DISCHARGE
Start: 2020-01-01

## 2020-01-01 RX ORDER — CHLORHEXIDINE GLUCONATE 213 G/1000ML
15 SOLUTION TOPICAL
Qty: 0 | Refills: 0 | DISCHARGE

## 2020-01-01 RX ORDER — INSULIN LISPRO 100/ML
VIAL (ML) SUBCUTANEOUS EVERY 6 HOURS
Refills: 0 | Status: DISCONTINUED | OUTPATIENT
Start: 2020-01-01 | End: 2020-01-01

## 2020-01-01 RX ORDER — ZINC OXIDE 200 MG/G
1 OINTMENT TOPICAL
Qty: 0 | Refills: 0 | DISCHARGE

## 2020-01-01 RX ORDER — VANCOMYCIN HCL 1 G
1000 VIAL (EA) INTRAVENOUS ONCE
Refills: 0 | Status: DISCONTINUED | OUTPATIENT
Start: 2020-01-01 | End: 2020-01-01

## 2020-01-01 RX ORDER — MEROPENEM 1 G/30ML
INJECTION INTRAVENOUS
Refills: 0 | Status: DISCONTINUED | OUTPATIENT
Start: 2020-01-01 | End: 2020-01-01

## 2020-01-01 RX ORDER — LEVETIRACETAM 250 MG/1
1000 TABLET, FILM COATED ORAL EVERY 12 HOURS
Refills: 0 | Status: DISCONTINUED | OUTPATIENT
Start: 2020-01-01 | End: 2020-01-01

## 2020-01-01 RX ORDER — ACETAZOLAMIDE 250 MG/1
250 TABLET ORAL ONCE
Refills: 0 | Status: COMPLETED | OUTPATIENT
Start: 2020-01-01 | End: 2020-01-01

## 2020-01-01 RX ORDER — FENTANYL CITRATE 50 UG/ML
25 INJECTION INTRAVENOUS
Refills: 0 | Status: DISCONTINUED | OUTPATIENT
Start: 2020-01-01 | End: 2020-01-01

## 2020-01-01 RX ORDER — VECURONIUM BROMIDE 20 MG/1
10 INJECTION, POWDER, FOR SOLUTION INTRAVENOUS ONCE
Refills: 0 | Status: DISCONTINUED | OUTPATIENT
Start: 2020-01-01 | End: 2020-01-01

## 2020-01-01 RX ORDER — MIDAZOLAM HYDROCHLORIDE 1 MG/ML
2 INJECTION, SOLUTION INTRAMUSCULAR; INTRAVENOUS ONCE
Refills: 0 | Status: DISCONTINUED | OUTPATIENT
Start: 2020-01-01 | End: 2020-01-01

## 2020-01-01 RX ORDER — NOREPINEPHRINE BITARTRATE/D5W 8 MG/250ML
0 PLASTIC BAG, INJECTION (ML) INTRAVENOUS
Qty: 0 | Refills: 0 | DISCHARGE
Start: 2020-01-01

## 2020-01-01 RX ORDER — HYDROCORTISONE 20 MG
12.5 TABLET ORAL
Refills: 0 | Status: COMPLETED | OUTPATIENT
Start: 2020-01-01 | End: 2020-01-01

## 2020-01-01 RX ORDER — IPRATROPIUM/ALBUTEROL SULFATE 18-103MCG
3 AEROSOL WITH ADAPTER (GRAM) INHALATION EVERY 6 HOURS
Refills: 0 | Status: DISCONTINUED | OUTPATIENT
Start: 2020-01-01 | End: 2020-01-01

## 2020-01-01 RX ORDER — PROPOFOL 10 MG/ML
15 INJECTION, EMULSION INTRAVENOUS
Qty: 1000 | Refills: 0 | Status: DISCONTINUED | OUTPATIENT
Start: 2020-01-01 | End: 2020-01-01

## 2020-01-01 RX ORDER — VALPROIC ACID (AS SODIUM SALT) 250 MG/5ML
250 SOLUTION, ORAL ORAL EVERY 8 HOURS
Refills: 0 | Status: DISCONTINUED | OUTPATIENT
Start: 2020-01-01 | End: 2020-01-01

## 2020-01-01 RX ORDER — LEVOTHYROXINE SODIUM 125 MCG
1 TABLET ORAL
Qty: 0 | Refills: 0 | DISCHARGE

## 2020-01-01 RX ORDER — ALTEPLASE 100 MG
2 KIT INTRAVENOUS ONCE
Refills: 0 | Status: COMPLETED | OUTPATIENT
Start: 2020-01-01 | End: 2020-01-01

## 2020-01-01 RX ORDER — PROPOFOL 10 MG/ML
10 INJECTION, EMULSION INTRAVENOUS
Qty: 1000 | Refills: 0 | Status: DISCONTINUED | OUTPATIENT
Start: 2020-01-01 | End: 2020-01-01

## 2020-01-01 RX ORDER — DEXMEDETOMIDINE HYDROCHLORIDE IN 0.9% SODIUM CHLORIDE 4 UG/ML
0 INJECTION INTRAVENOUS
Qty: 0 | Refills: 0 | DISCHARGE
Start: 2020-01-01

## 2020-01-01 RX ORDER — AZITHROMYCIN 500 MG/1
500 TABLET, FILM COATED ORAL ONCE
Refills: 0 | Status: DISCONTINUED | OUTPATIENT
Start: 2020-01-01 | End: 2020-01-01

## 2020-01-01 RX ORDER — LEVETIRACETAM 250 MG/1
10 TABLET, FILM COATED ORAL
Qty: 0 | Refills: 0 | DISCHARGE
Start: 2020-01-01

## 2020-01-01 RX ORDER — HYDROCORTISONE 20 MG
50 TABLET ORAL EVERY 8 HOURS
Refills: 0 | Status: DISCONTINUED | OUTPATIENT
Start: 2020-01-01 | End: 2020-01-01

## 2020-01-01 RX ORDER — METOCLOPRAMIDE HCL 10 MG
10 TABLET ORAL ONCE
Refills: 0 | Status: COMPLETED | OUTPATIENT
Start: 2020-01-01 | End: 2020-01-01

## 2020-01-01 RX ORDER — PANTOPRAZOLE SODIUM 20 MG/1
40 TABLET, DELAYED RELEASE ORAL DAILY
Refills: 0 | Status: DISCONTINUED | OUTPATIENT
Start: 2020-01-01 | End: 2020-01-01

## 2020-01-01 RX ORDER — CEFEPIME 1 G/1
1000 INJECTION, POWDER, FOR SOLUTION INTRAMUSCULAR; INTRAVENOUS EVERY 8 HOURS
Refills: 0 | Status: DISCONTINUED | OUTPATIENT
Start: 2020-01-01 | End: 2020-01-01

## 2020-01-01 RX ORDER — ACETYLCYSTEINE 200 MG/ML
4 VIAL (ML) MISCELLANEOUS EVERY 8 HOURS
Refills: 0 | Status: COMPLETED | OUTPATIENT
Start: 2020-01-01 | End: 2020-01-01

## 2020-01-01 RX ORDER — MEMANTINE HYDROCHLORIDE 10 MG/1
5 TABLET ORAL DAILY
Refills: 0 | Status: DISCONTINUED | OUTPATIENT
Start: 2020-01-01 | End: 2020-01-01

## 2020-01-01 RX ORDER — ALBUTEROL 90 UG/1
2 AEROSOL, METERED ORAL EVERY 6 HOURS
Refills: 0 | Status: DISCONTINUED | OUTPATIENT
Start: 2020-01-01 | End: 2020-01-01

## 2020-01-01 RX ORDER — AZITHROMYCIN 500 MG/1
500 TABLET, FILM COATED ORAL ONCE
Refills: 0 | Status: COMPLETED | OUTPATIENT
Start: 2020-01-01 | End: 2020-01-01

## 2020-01-01 RX ORDER — AMIODARONE HYDROCHLORIDE 400 MG/1
150 TABLET ORAL ONCE
Refills: 0 | Status: COMPLETED | OUTPATIENT
Start: 2020-01-01 | End: 2020-01-01

## 2020-01-01 RX ORDER — ACETYLCYSTEINE 200 MG/ML
2.5 VIAL (ML) MISCELLANEOUS EVERY 6 HOURS
Refills: 0 | Status: COMPLETED | OUTPATIENT
Start: 2020-01-01 | End: 2020-01-01

## 2020-01-01 RX ORDER — LEVETIRACETAM 250 MG/1
1000 TABLET, FILM COATED ORAL
Refills: 0 | Status: DISCONTINUED | OUTPATIENT
Start: 2020-01-01 | End: 2020-01-01

## 2020-01-01 RX ORDER — CEFEPIME 1 G/1
INJECTION, POWDER, FOR SOLUTION INTRAMUSCULAR; INTRAVENOUS
Refills: 0 | Status: DISCONTINUED | OUTPATIENT
Start: 2020-01-01 | End: 2020-01-01

## 2020-01-01 RX ORDER — POTASSIUM CHLORIDE 20 MEQ
40 PACKET (EA) ORAL
Refills: 0 | Status: DISCONTINUED | OUTPATIENT
Start: 2020-01-01 | End: 2020-01-01

## 2020-01-01 RX ORDER — LACTOBACILLUS ACIDOPHILUS 100MM CELL
1 CAPSULE ORAL
Qty: 0 | Refills: 0 | DISCHARGE

## 2020-01-01 RX ORDER — MEROPENEM 1 G/30ML
1000 INJECTION INTRAVENOUS EVERY 8 HOURS
Refills: 0 | Status: DISCONTINUED | OUTPATIENT
Start: 2020-01-01 | End: 2020-01-01

## 2020-01-01 RX ORDER — PANTOPRAZOLE SODIUM 20 MG/1
40 TABLET, DELAYED RELEASE ORAL
Qty: 0 | Refills: 0 | DISCHARGE
Start: 2020-01-01

## 2020-01-01 RX ORDER — INSULIN LISPRO 100/ML
VIAL (ML) SUBCUTANEOUS
Refills: 0 | Status: DISCONTINUED | OUTPATIENT
Start: 2020-01-01 | End: 2020-01-01

## 2020-01-01 RX ORDER — CHLORHEXIDINE GLUCONATE 213 G/1000ML
1 SOLUTION TOPICAL DAILY
Refills: 0 | Status: DISCONTINUED | OUTPATIENT
Start: 2020-01-01 | End: 2020-01-01

## 2020-01-01 RX ORDER — ACETAMINOPHEN 500 MG
650 TABLET ORAL ONCE
Refills: 0 | Status: COMPLETED | OUTPATIENT
Start: 2020-01-01 | End: 2020-01-01

## 2020-01-01 RX ORDER — DEXTROSE 50 % IN WATER 50 %
15 SYRINGE (ML) INTRAVENOUS ONCE
Refills: 0 | Status: DISCONTINUED | OUTPATIENT
Start: 2020-01-01 | End: 2020-01-01

## 2020-01-01 RX ORDER — LACTOBACILLUS ACIDOPHILUS 100MM CELL
1 CAPSULE ORAL
Refills: 0 | Status: DISCONTINUED | OUTPATIENT
Start: 2020-01-01 | End: 2020-01-01

## 2020-01-01 RX ORDER — QUETIAPINE FUMARATE 200 MG/1
25 TABLET, FILM COATED ORAL DAILY
Refills: 0 | Status: DISCONTINUED | OUTPATIENT
Start: 2020-01-01 | End: 2020-01-01

## 2020-01-01 RX ORDER — POTASSIUM CHLORIDE 20 MEQ
20 PACKET (EA) ORAL
Refills: 0 | Status: DISCONTINUED | OUTPATIENT
Start: 2020-01-01 | End: 2020-01-01

## 2020-01-01 RX ORDER — VANCOMYCIN HCL 1 G
125 VIAL (EA) INTRAVENOUS EVERY 6 HOURS
Refills: 0 | Status: DISCONTINUED | OUTPATIENT
Start: 2020-01-01 | End: 2020-01-01

## 2020-01-01 RX ORDER — DIVALPROEX SODIUM 500 MG/1
250 TABLET, DELAYED RELEASE ORAL THREE TIMES A DAY
Refills: 0 | Status: DISCONTINUED | OUTPATIENT
Start: 2020-01-01 | End: 2020-01-01

## 2020-01-01 RX ORDER — HYDROCORTISONE 20 MG
25 TABLET ORAL
Qty: 0 | Refills: 0 | DISCHARGE
Start: 2020-01-01

## 2020-01-01 RX ORDER — MEMANTINE HYDROCHLORIDE 10 MG/1
10 TABLET ORAL EVERY 12 HOURS
Refills: 0 | Status: DISCONTINUED | OUTPATIENT
Start: 2020-01-01 | End: 2020-01-01

## 2020-01-01 RX ORDER — SODIUM CHLORIDE 9 MG/ML
500 INJECTION, SOLUTION INTRAVENOUS
Refills: 0 | Status: DISCONTINUED | OUTPATIENT
Start: 2020-01-01 | End: 2020-01-01

## 2020-01-01 RX ORDER — ALBUMIN HUMAN 25 %
50 VIAL (ML) INTRAVENOUS EVERY 6 HOURS
Refills: 0 | Status: COMPLETED | OUTPATIENT
Start: 2020-01-01 | End: 2020-01-01

## 2020-01-01 RX ORDER — FUROSEMIDE 40 MG
40 TABLET ORAL ONCE
Refills: 0 | Status: COMPLETED | OUTPATIENT
Start: 2020-01-01 | End: 2020-01-01

## 2020-01-01 RX ORDER — QUETIAPINE FUMARATE 200 MG/1
12.5 TABLET, FILM COATED ORAL DAILY
Refills: 0 | Status: DISCONTINUED | OUTPATIENT
Start: 2020-01-01 | End: 2020-01-01

## 2020-01-01 RX ORDER — ENOXAPARIN SODIUM 100 MG/ML
0.4 INJECTION SUBCUTANEOUS
Qty: 0 | Refills: 0 | DISCHARGE

## 2020-01-01 RX ORDER — DEXMEDETOMIDINE HYDROCHLORIDE IN 0.9% SODIUM CHLORIDE 4 UG/ML
0.05 INJECTION INTRAVENOUS
Qty: 200 | Refills: 0 | Status: DISCONTINUED | OUTPATIENT
Start: 2020-01-01 | End: 2020-01-01

## 2020-01-01 RX ORDER — POLYETHYLENE GLYCOL 3350 17 G/17G
17 POWDER, FOR SOLUTION ORAL
Qty: 0 | Refills: 0 | DISCHARGE

## 2020-01-01 RX ORDER — ENOXAPARIN SODIUM 100 MG/ML
40 INJECTION SUBCUTANEOUS EVERY 12 HOURS
Refills: 0 | Status: DISCONTINUED | OUTPATIENT
Start: 2020-01-01 | End: 2020-01-01

## 2020-01-01 RX ORDER — SODIUM,POTASSIUM PHOSPHATES 278-250MG
1 POWDER IN PACKET (EA) ORAL EVERY 6 HOURS
Refills: 0 | Status: COMPLETED | OUTPATIENT
Start: 2020-01-01 | End: 2020-01-01

## 2020-01-01 RX ORDER — CEFEPIME 1 G/1
1000 INJECTION, POWDER, FOR SOLUTION INTRAMUSCULAR; INTRAVENOUS ONCE
Refills: 0 | Status: DISCONTINUED | OUTPATIENT
Start: 2020-01-01 | End: 2020-01-01

## 2020-01-01 RX ORDER — MIDAZOLAM HYDROCHLORIDE 1 MG/ML
4 INJECTION, SOLUTION INTRAMUSCULAR; INTRAVENOUS ONCE
Refills: 0 | Status: DISCONTINUED | OUTPATIENT
Start: 2020-01-01 | End: 2020-01-01

## 2020-01-01 RX ORDER — DORNASE ALFA 1 MG/ML
2.5 SOLUTION RESPIRATORY (INHALATION) EVERY 12 HOURS
Refills: 0 | Status: DISCONTINUED | OUTPATIENT
Start: 2020-01-01 | End: 2020-01-01

## 2020-01-01 RX ORDER — POTASSIUM CHLORIDE 20 MEQ
40 PACKET (EA) ORAL ONCE
Refills: 0 | Status: DISCONTINUED | OUTPATIENT
Start: 2020-01-01 | End: 2020-01-01

## 2020-01-01 RX ORDER — SODIUM CHLORIDE 9 MG/ML
1950 INJECTION INTRAMUSCULAR; INTRAVENOUS; SUBCUTANEOUS ONCE
Refills: 0 | Status: COMPLETED | OUTPATIENT
Start: 2020-01-01 | End: 2020-01-01

## 2020-01-01 RX ORDER — LEVOTHYROXINE SODIUM 125 MCG
62.5 TABLET ORAL AT BEDTIME
Refills: 0 | Status: DISCONTINUED | OUTPATIENT
Start: 2020-01-01 | End: 2020-01-01

## 2020-01-01 RX ORDER — CEFEPIME 1 G/1
1000 INJECTION, POWDER, FOR SOLUTION INTRAMUSCULAR; INTRAVENOUS ONCE
Refills: 0 | Status: COMPLETED | OUTPATIENT
Start: 2020-01-01 | End: 2020-01-01

## 2020-01-01 RX ORDER — PHENYLEPHRINE HYDROCHLORIDE 10 MG/ML
2 INJECTION INTRAVENOUS
Qty: 160 | Refills: 0 | Status: DISCONTINUED | OUTPATIENT
Start: 2020-01-01 | End: 2020-01-01

## 2020-01-01 RX ORDER — FENTANYL CITRATE 50 UG/ML
0.5 INJECTION INTRAVENOUS
Qty: 2500 | Refills: 0 | Status: DISCONTINUED | OUTPATIENT
Start: 2020-01-01 | End: 2020-01-01

## 2020-01-01 RX ORDER — SODIUM BICARBONATE 1 MEQ/ML
50 SYRINGE (ML) INTRAVENOUS ONCE
Refills: 0 | Status: COMPLETED | OUTPATIENT
Start: 2020-01-01 | End: 2020-01-01

## 2020-01-01 RX ORDER — VALPROIC ACID (AS SODIUM SALT) 250 MG/5ML
1000 SOLUTION, ORAL ORAL ONCE
Refills: 0 | Status: COMPLETED | OUTPATIENT
Start: 2020-01-01 | End: 2020-01-01

## 2020-01-01 RX ORDER — CHOLECALCIFEROL (VITAMIN D3) 125 MCG
2 CAPSULE ORAL
Qty: 0 | Refills: 0 | DISCHARGE

## 2020-01-01 RX ORDER — POTASSIUM PHOSPHATE, MONOBASIC POTASSIUM PHOSPHATE, DIBASIC 236; 224 MG/ML; MG/ML
30 INJECTION, SOLUTION INTRAVENOUS ONCE
Refills: 0 | Status: COMPLETED | OUTPATIENT
Start: 2020-01-01 | End: 2020-01-01

## 2020-01-01 RX ORDER — CEFEPIME 1 G/1
2000 INJECTION, POWDER, FOR SOLUTION INTRAMUSCULAR; INTRAVENOUS ONCE
Refills: 0 | Status: COMPLETED | OUTPATIENT
Start: 2020-01-01 | End: 2020-01-01

## 2020-01-01 RX ORDER — LEVOTHYROXINE SODIUM 125 MCG
62.5 TABLET ORAL
Qty: 0 | Refills: 0 | DISCHARGE
Start: 2020-01-01

## 2020-01-01 RX ORDER — SODIUM CHLORIDE 9 MG/ML
1 INJECTION INTRAMUSCULAR; INTRAVENOUS; SUBCUTANEOUS
Refills: 0 | Status: DISCONTINUED | OUTPATIENT
Start: 2020-01-01 | End: 2020-01-01

## 2020-01-01 RX ORDER — VASOPRESSIN 20 [USP'U]/ML
0.06 INJECTION INTRAVENOUS
Qty: 50 | Refills: 0 | Status: DISCONTINUED | OUTPATIENT
Start: 2020-01-01 | End: 2020-01-01

## 2020-01-01 RX ORDER — ALBUTEROL 90 UG/1
4 AEROSOL, METERED ORAL EVERY 4 HOURS
Refills: 0 | Status: DISCONTINUED | OUTPATIENT
Start: 2020-01-01 | End: 2020-01-01

## 2020-01-01 RX ORDER — ACETYLCYSTEINE 200 MG/ML
4 VIAL (ML) MISCELLANEOUS THREE TIMES A DAY
Refills: 0 | Status: COMPLETED | OUTPATIENT
Start: 2020-01-01 | End: 2020-01-01

## 2020-01-01 RX ORDER — CIPROFLOXACIN LACTATE 400MG/40ML
1 VIAL (ML) INTRAVENOUS
Qty: 0 | Refills: 0 | DISCHARGE

## 2020-01-01 RX ORDER — HYDROCORTISONE 20 MG
25 TABLET ORAL EVERY 8 HOURS
Refills: 0 | Status: DISCONTINUED | OUTPATIENT
Start: 2020-01-01 | End: 2020-01-01

## 2020-01-01 RX ORDER — ACETAMINOPHEN 500 MG
650 TABLET ORAL EVERY 4 HOURS
Refills: 0 | Status: DISCONTINUED | OUTPATIENT
Start: 2020-01-01 | End: 2020-01-01

## 2020-01-01 RX ORDER — METOCLOPRAMIDE HCL 10 MG
1 TABLET ORAL
Qty: 0 | Refills: 0 | DISCHARGE
Start: 2020-01-01

## 2020-01-01 RX ORDER — LEVETIRACETAM 250 MG/1
500 TABLET, FILM COATED ORAL ONCE
Refills: 0 | Status: COMPLETED | OUTPATIENT
Start: 2020-01-01 | End: 2020-01-01

## 2020-01-01 RX ORDER — MIDODRINE HYDROCHLORIDE 2.5 MG/1
1 TABLET ORAL
Qty: 0 | Refills: 0 | DISCHARGE
Start: 2020-01-01

## 2020-01-01 RX ORDER — AZTREONAM 2 G
1000 VIAL (EA) INJECTION EVERY 8 HOURS
Refills: 0 | Status: DISCONTINUED | OUTPATIENT
Start: 2020-01-01 | End: 2020-01-01

## 2020-01-01 RX ORDER — CEFEPIME 1 G/1
2000 INJECTION, POWDER, FOR SOLUTION INTRAMUSCULAR; INTRAVENOUS ONCE
Refills: 0 | Status: DISCONTINUED | OUTPATIENT
Start: 2020-01-01 | End: 2020-01-01

## 2020-01-01 RX ORDER — ACETAMINOPHEN 500 MG
1 TABLET ORAL
Qty: 0 | Refills: 0 | DISCHARGE
Start: 2020-01-01

## 2020-01-01 RX ORDER — HYDROXYCHLOROQUINE SULFATE 200 MG
TABLET ORAL
Refills: 0 | Status: COMPLETED | OUTPATIENT
Start: 2020-01-01 | End: 2020-01-01

## 2020-01-01 RX ORDER — PANTOPRAZOLE SODIUM 20 MG/1
1 TABLET, DELAYED RELEASE ORAL
Qty: 0 | Refills: 0 | DISCHARGE

## 2020-01-01 RX ORDER — VALPROIC ACID (AS SODIUM SALT) 250 MG/5ML
500 SOLUTION, ORAL ORAL
Refills: 0 | Status: DISCONTINUED | OUTPATIENT
Start: 2020-01-01 | End: 2020-01-01

## 2020-01-01 RX ORDER — TOCILIZUMAB 20 MG/ML
400 INJECTION, SOLUTION, CONCENTRATE INTRAVENOUS ONCE
Refills: 0 | Status: DISCONTINUED | OUTPATIENT
Start: 2020-01-01 | End: 2020-01-01

## 2020-01-01 RX ORDER — VALPROIC ACID (AS SODIUM SALT) 250 MG/5ML
10 SOLUTION, ORAL ORAL
Qty: 0 | Refills: 0 | DISCHARGE

## 2020-01-01 RX ORDER — HYDROXYCHLOROQUINE SULFATE 200 MG
400 TABLET ORAL EVERY 12 HOURS
Refills: 0 | Status: COMPLETED | OUTPATIENT
Start: 2020-01-01 | End: 2020-01-01

## 2020-01-01 RX ORDER — SENNA PLUS 8.6 MG/1
2 TABLET ORAL
Qty: 0 | Refills: 0 | DISCHARGE
Start: 2020-01-01

## 2020-01-01 RX ORDER — ALBUTEROL 90 UG/1
2 AEROSOL, METERED ORAL
Qty: 0 | Refills: 0 | DISCHARGE
Start: 2020-01-01

## 2020-01-01 RX ORDER — PANTOPRAZOLE SODIUM 20 MG/1
40 TABLET, DELAYED RELEASE ORAL
Refills: 0 | Status: DISCONTINUED | OUTPATIENT
Start: 2020-01-01 | End: 2020-01-01

## 2020-01-01 RX ORDER — MIDAZOLAM HYDROCHLORIDE 1 MG/ML
1 INJECTION, SOLUTION INTRAMUSCULAR; INTRAVENOUS
Refills: 0 | Status: DISCONTINUED | OUTPATIENT
Start: 2020-01-01 | End: 2020-01-01

## 2020-01-01 RX ORDER — LEVOTHYROXINE SODIUM 125 MCG
1 TABLET ORAL
Qty: 0 | Refills: 0 | DISCHARGE
Start: 2020-01-01

## 2020-01-01 RX ORDER — ALENDRONATE SODIUM 70 MG/1
1 TABLET ORAL
Qty: 0 | Refills: 0 | DISCHARGE

## 2020-01-01 RX ORDER — SENNA PLUS 8.6 MG/1
10 TABLET ORAL
Qty: 0 | Refills: 0 | DISCHARGE
Start: 2020-01-01

## 2020-01-01 RX ORDER — ALBUMIN HUMAN 25 %
50 VIAL (ML) INTRAVENOUS EVERY 8 HOURS
Refills: 0 | Status: DISCONTINUED | OUTPATIENT
Start: 2020-01-01 | End: 2020-01-01

## 2020-01-01 RX ORDER — DEXMEDETOMIDINE HYDROCHLORIDE IN 0.9% SODIUM CHLORIDE 4 UG/ML
0.5 INJECTION INTRAVENOUS
Qty: 400 | Refills: 0 | Status: DISCONTINUED | OUTPATIENT
Start: 2020-01-01 | End: 2020-01-01

## 2020-01-01 RX ADMIN — QUETIAPINE FUMARATE 25 MILLIGRAM(S): 200 TABLET, FILM COATED ORAL at 12:23

## 2020-01-01 RX ADMIN — Medication 26.25 MILLIGRAM(S): at 13:10

## 2020-01-01 RX ADMIN — ENOXAPARIN SODIUM 40 MILLIGRAM(S): 100 INJECTION SUBCUTANEOUS at 10:54

## 2020-01-01 RX ADMIN — Medication 500 MILLIGRAM(S): at 18:17

## 2020-01-01 RX ADMIN — Medication 27.5 MILLIGRAM(S): at 11:59

## 2020-01-01 RX ADMIN — Medication 100 MILLIEQUIVALENT(S): at 09:49

## 2020-01-01 RX ADMIN — LEVETIRACETAM 400 MILLIGRAM(S): 250 TABLET, FILM COATED ORAL at 18:05

## 2020-01-01 RX ADMIN — ENOXAPARIN SODIUM 30 MILLIGRAM(S): 100 INJECTION SUBCUTANEOUS at 12:36

## 2020-01-01 RX ADMIN — CHLORHEXIDINE GLUCONATE 1 APPLICATION(S): 213 SOLUTION TOPICAL at 12:01

## 2020-01-01 RX ADMIN — MIDODRINE HYDROCHLORIDE 10 MILLIGRAM(S): 2.5 TABLET ORAL at 05:13

## 2020-01-01 RX ADMIN — Medication 27.5 MILLIGRAM(S): at 06:04

## 2020-01-01 RX ADMIN — Medication 50 MILLILITER(S): at 11:04

## 2020-01-01 RX ADMIN — CHLORHEXIDINE GLUCONATE 15 MILLILITER(S): 213 SOLUTION TOPICAL at 17:57

## 2020-01-01 RX ADMIN — DIVALPROEX SODIUM 250 MILLIGRAM(S): 500 TABLET, DELAYED RELEASE ORAL at 15:19

## 2020-01-01 RX ADMIN — ENOXAPARIN SODIUM 40 MILLIGRAM(S): 100 INJECTION SUBCUTANEOUS at 17:38

## 2020-01-01 RX ADMIN — Medication 2.11 MICROGRAM(S)/KG/MIN: at 20:19

## 2020-01-01 RX ADMIN — Medication 50 MILLILITER(S): at 05:12

## 2020-01-01 RX ADMIN — MEMANTINE HYDROCHLORIDE 5 MILLIGRAM(S): 10 TABLET ORAL at 12:54

## 2020-01-01 RX ADMIN — Medication 650 MILLIGRAM(S): at 04:16

## 2020-01-01 RX ADMIN — Medication 100 MILLIEQUIVALENT(S): at 12:13

## 2020-01-01 RX ADMIN — Medication 27.5 MILLIGRAM(S): at 18:15

## 2020-01-01 RX ADMIN — LEVETIRACETAM 1000 MILLIGRAM(S): 250 TABLET, FILM COATED ORAL at 17:00

## 2020-01-01 RX ADMIN — PANTOPRAZOLE SODIUM 40 MILLIGRAM(S): 20 TABLET, DELAYED RELEASE ORAL at 11:37

## 2020-01-01 RX ADMIN — CHLORHEXIDINE GLUCONATE 1 APPLICATION(S): 213 SOLUTION TOPICAL at 11:29

## 2020-01-01 RX ADMIN — DEXMEDETOMIDINE HYDROCHLORIDE IN 0.9% SODIUM CHLORIDE 4.54 MICROGRAM(S)/KG/HR: 4 INJECTION INTRAVENOUS at 07:47

## 2020-01-01 RX ADMIN — Medication 26.25 MILLIGRAM(S): at 19:58

## 2020-01-01 RX ADMIN — DORNASE ALFA 2.5 MILLIGRAM(S): 1 SOLUTION RESPIRATORY (INHALATION) at 19:53

## 2020-01-01 RX ADMIN — ENOXAPARIN SODIUM 30 MILLIGRAM(S): 100 INJECTION SUBCUTANEOUS at 11:17

## 2020-01-01 RX ADMIN — MEROPENEM 100 MILLIGRAM(S): 1 INJECTION INTRAVENOUS at 13:33

## 2020-01-01 RX ADMIN — SODIUM CHLORIDE 2000 MILLILITER(S): 9 INJECTION INTRAMUSCULAR; INTRAVENOUS; SUBCUTANEOUS at 02:37

## 2020-01-01 RX ADMIN — CHLORHEXIDINE GLUCONATE 15 MILLILITER(S): 213 SOLUTION TOPICAL at 18:17

## 2020-01-01 RX ADMIN — Medication 500 MILLIGRAM(S): at 17:00

## 2020-01-01 RX ADMIN — CHLORHEXIDINE GLUCONATE 15 MILLILITER(S): 213 SOLUTION TOPICAL at 17:38

## 2020-01-01 RX ADMIN — Medication 27.5 MILLIGRAM(S): at 18:00

## 2020-01-01 RX ADMIN — Medication 27.5 MILLIGRAM(S): at 16:56

## 2020-01-01 RX ADMIN — MIDODRINE HYDROCHLORIDE 10 MILLIGRAM(S): 2.5 TABLET ORAL at 21:44

## 2020-01-01 RX ADMIN — SODIUM CHLORIDE 75 MILLILITER(S): 9 INJECTION, SOLUTION INTRAVENOUS at 01:40

## 2020-01-01 RX ADMIN — Medication 62.5 MICROGRAM(S): at 21:23

## 2020-01-01 RX ADMIN — Medication 26.25 MILLIGRAM(S): at 05:16

## 2020-01-01 RX ADMIN — Medication 100 MILLIGRAM(S): at 22:25

## 2020-01-01 RX ADMIN — MIDODRINE HYDROCHLORIDE 10 MILLIGRAM(S): 2.5 TABLET ORAL at 05:45

## 2020-01-01 RX ADMIN — CHLORHEXIDINE GLUCONATE 15 MILLILITER(S): 213 SOLUTION TOPICAL at 19:26

## 2020-01-01 RX ADMIN — SODIUM CHLORIDE 500 MILLILITER(S): 9 INJECTION, SOLUTION INTRAVENOUS at 16:29

## 2020-01-01 RX ADMIN — Medication 4.26 MICROGRAM(S)/KG/MIN: at 08:40

## 2020-01-01 RX ADMIN — MIDODRINE HYDROCHLORIDE 10 MILLIGRAM(S): 2.5 TABLET ORAL at 04:17

## 2020-01-01 RX ADMIN — Medication 100 MILLIEQUIVALENT(S): at 22:27

## 2020-01-01 RX ADMIN — Medication 500 MILLIGRAM(S): at 05:10

## 2020-01-01 RX ADMIN — Medication 2.11 MICROGRAM(S)/KG/MIN: at 05:36

## 2020-01-01 RX ADMIN — DORNASE ALFA 2.5 MILLIGRAM(S): 1 SOLUTION RESPIRATORY (INHALATION) at 08:39

## 2020-01-01 RX ADMIN — LEVETIRACETAM 1000 MILLIGRAM(S): 250 TABLET, FILM COATED ORAL at 17:31

## 2020-01-01 RX ADMIN — SENNA PLUS 2 TABLET(S): 8.6 TABLET ORAL at 23:49

## 2020-01-01 RX ADMIN — FAMOTIDINE 20 MILLIGRAM(S): 10 INJECTION INTRAVENOUS at 11:02

## 2020-01-01 RX ADMIN — ENOXAPARIN SODIUM 40 MILLIGRAM(S): 100 INJECTION SUBCUTANEOUS at 10:09

## 2020-01-01 RX ADMIN — LEVETIRACETAM 1000 MILLIGRAM(S): 250 TABLET, FILM COATED ORAL at 06:49

## 2020-01-01 RX ADMIN — CHLORHEXIDINE GLUCONATE 15 MILLILITER(S): 213 SOLUTION TOPICAL at 05:07

## 2020-01-01 RX ADMIN — Medication 25 MILLIGRAM(S): at 13:51

## 2020-01-01 RX ADMIN — Medication 40 MILLIEQUIVALENT(S): at 06:40

## 2020-01-01 RX ADMIN — Medication 4.26 MICROGRAM(S)/KG/MIN: at 03:24

## 2020-01-01 RX ADMIN — PANTOPRAZOLE SODIUM 40 MILLIGRAM(S): 20 TABLET, DELAYED RELEASE ORAL at 11:25

## 2020-01-01 RX ADMIN — SENNA PLUS 2 TABLET(S): 8.6 TABLET ORAL at 21:23

## 2020-01-01 RX ADMIN — Medication 100 MILLIEQUIVALENT(S): at 11:47

## 2020-01-01 RX ADMIN — Medication 2.11 MICROGRAM(S)/KG/MIN: at 07:41

## 2020-01-01 RX ADMIN — VASOPRESSIN 3.6 UNIT(S)/MIN: 20 INJECTION INTRAVENOUS at 22:21

## 2020-01-01 RX ADMIN — FAMOTIDINE 20 MILLIGRAM(S): 10 INJECTION INTRAVENOUS at 11:37

## 2020-01-01 RX ADMIN — MIDODRINE HYDROCHLORIDE 10 MILLIGRAM(S): 2.5 TABLET ORAL at 05:30

## 2020-01-01 RX ADMIN — MEROPENEM 100 MILLIGRAM(S): 1 INJECTION INTRAVENOUS at 05:32

## 2020-01-01 RX ADMIN — Medication 40 MILLIEQUIVALENT(S): at 11:59

## 2020-01-01 RX ADMIN — Medication 81 MILLIGRAM(S): at 11:30

## 2020-01-01 RX ADMIN — Medication 62.5 MICROGRAM(S): at 22:06

## 2020-01-01 RX ADMIN — MIDODRINE HYDROCHLORIDE 10 MILLIGRAM(S): 2.5 TABLET ORAL at 21:27

## 2020-01-01 RX ADMIN — Medication 26.25 MILLIGRAM(S): at 13:02

## 2020-01-01 RX ADMIN — MIDODRINE HYDROCHLORIDE 10 MILLIGRAM(S): 2.5 TABLET ORAL at 06:31

## 2020-01-01 RX ADMIN — MIDODRINE HYDROCHLORIDE 10 MILLIGRAM(S): 2.5 TABLET ORAL at 05:36

## 2020-01-01 RX ADMIN — Medication 4.26 MICROGRAM(S)/KG/MIN: at 10:55

## 2020-01-01 RX ADMIN — MIDAZOLAM HYDROCHLORIDE 0.9 MG/KG/HR: 1 INJECTION, SOLUTION INTRAMUSCULAR; INTRAVENOUS at 18:10

## 2020-01-01 RX ADMIN — Medication 1000 MILLIGRAM(S): at 16:18

## 2020-01-01 RX ADMIN — Medication 100 MILLIEQUIVALENT(S): at 00:09

## 2020-01-01 RX ADMIN — DORNASE ALFA 2.5 MILLIGRAM(S): 1 SOLUTION RESPIRATORY (INHALATION) at 23:11

## 2020-01-01 RX ADMIN — Medication 125 MILLIGRAM(S): at 00:22

## 2020-01-01 RX ADMIN — Medication 27.5 MILLIGRAM(S): at 05:13

## 2020-01-01 RX ADMIN — CHLORHEXIDINE GLUCONATE 1 APPLICATION(S): 213 SOLUTION TOPICAL at 12:02

## 2020-01-01 RX ADMIN — FAMOTIDINE 20 MILLIGRAM(S): 10 INJECTION INTRAVENOUS at 10:12

## 2020-01-01 RX ADMIN — DIVALPROEX SODIUM 250 MILLIGRAM(S): 500 TABLET, DELAYED RELEASE ORAL at 16:24

## 2020-01-01 RX ADMIN — CHLORHEXIDINE GLUCONATE 1 APPLICATION(S): 213 SOLUTION TOPICAL at 11:51

## 2020-01-01 RX ADMIN — MEMANTINE HYDROCHLORIDE 10 MILLIGRAM(S): 10 TABLET ORAL at 05:45

## 2020-01-01 RX ADMIN — VASOPRESSIN 3.6 UNIT(S)/MIN: 20 INJECTION INTRAVENOUS at 08:05

## 2020-01-01 RX ADMIN — QUETIAPINE FUMARATE 25 MILLIGRAM(S): 200 TABLET, FILM COATED ORAL at 10:20

## 2020-01-01 RX ADMIN — MIDODRINE HYDROCHLORIDE 10 MILLIGRAM(S): 2.5 TABLET ORAL at 22:02

## 2020-01-01 RX ADMIN — MIDODRINE HYDROCHLORIDE 10 MILLIGRAM(S): 2.5 TABLET ORAL at 22:42

## 2020-01-01 RX ADMIN — Medication 40 MILLIEQUIVALENT(S): at 03:56

## 2020-01-01 RX ADMIN — CHLORHEXIDINE GLUCONATE 15 MILLILITER(S): 213 SOLUTION TOPICAL at 05:31

## 2020-01-01 RX ADMIN — LEVETIRACETAM 400 MILLIGRAM(S): 250 TABLET, FILM COATED ORAL at 06:09

## 2020-01-01 RX ADMIN — DEXMEDETOMIDINE HYDROCHLORIDE IN 0.9% SODIUM CHLORIDE 0.56 MICROGRAM(S)/KG/HR: 4 INJECTION INTRAVENOUS at 00:59

## 2020-01-01 RX ADMIN — DEXMEDETOMIDINE HYDROCHLORIDE IN 0.9% SODIUM CHLORIDE 5.68 MICROGRAM(S)/KG/HR: 4 INJECTION INTRAVENOUS at 09:18

## 2020-01-01 RX ADMIN — MIDODRINE HYDROCHLORIDE 10 MILLIGRAM(S): 2.5 TABLET ORAL at 12:58

## 2020-01-01 RX ADMIN — POLYETHYLENE GLYCOL 3350 17 GRAM(S): 17 POWDER, FOR SOLUTION ORAL at 12:01

## 2020-01-01 RX ADMIN — Medication 7 MILLIGRAM(S): at 05:13

## 2020-01-01 RX ADMIN — ENOXAPARIN SODIUM 30 MILLIGRAM(S): 100 INJECTION SUBCUTANEOUS at 11:05

## 2020-01-01 RX ADMIN — Medication 81 MILLIGRAM(S): at 11:22

## 2020-01-01 RX ADMIN — LEVETIRACETAM 400 MILLIGRAM(S): 250 TABLET, FILM COATED ORAL at 05:02

## 2020-01-01 RX ADMIN — LEVETIRACETAM 1000 MILLIGRAM(S): 250 TABLET, FILM COATED ORAL at 17:16

## 2020-01-01 RX ADMIN — AMIODARONE HYDROCHLORIDE 618 MILLIGRAM(S): 400 TABLET ORAL at 19:38

## 2020-01-01 RX ADMIN — DEXMEDETOMIDINE HYDROCHLORIDE IN 0.9% SODIUM CHLORIDE 5.68 MICROGRAM(S)/KG/HR: 4 INJECTION INTRAVENOUS at 17:16

## 2020-01-01 RX ADMIN — FAMOTIDINE 20 MILLIGRAM(S): 10 INJECTION INTRAVENOUS at 10:54

## 2020-01-01 RX ADMIN — SODIUM CHLORIDE 500 MILLILITER(S): 9 INJECTION, SOLUTION INTRAVENOUS at 14:35

## 2020-01-01 RX ADMIN — Medication 27.5 MILLIGRAM(S): at 12:00

## 2020-01-01 RX ADMIN — LEVETIRACETAM 400 MILLIGRAM(S): 250 TABLET, FILM COATED ORAL at 16:30

## 2020-01-01 RX ADMIN — CHLORHEXIDINE GLUCONATE 15 MILLILITER(S): 213 SOLUTION TOPICAL at 04:27

## 2020-01-01 RX ADMIN — CHLORHEXIDINE GLUCONATE 15 MILLILITER(S): 213 SOLUTION TOPICAL at 04:05

## 2020-01-01 RX ADMIN — HYDROMORPHONE HYDROCHLORIDE 1 MILLIGRAM(S): 2 INJECTION INTRAMUSCULAR; INTRAVENOUS; SUBCUTANEOUS at 12:46

## 2020-01-01 RX ADMIN — Medication 650 MILLIGRAM(S): at 22:16

## 2020-01-01 RX ADMIN — POLYETHYLENE GLYCOL 3350 17 GRAM(S): 17 POWDER, FOR SOLUTION ORAL at 12:21

## 2020-01-01 RX ADMIN — Medication 200 MILLIGRAM(S): at 04:08

## 2020-01-01 RX ADMIN — PANTOPRAZOLE SODIUM 40 MILLIGRAM(S): 20 TABLET, DELAYED RELEASE ORAL at 05:37

## 2020-01-01 RX ADMIN — DEXMEDETOMIDINE HYDROCHLORIDE IN 0.9% SODIUM CHLORIDE 4.54 MICROGRAM(S)/KG/HR: 4 INJECTION INTRAVENOUS at 07:46

## 2020-01-01 RX ADMIN — ENOXAPARIN SODIUM 40 MILLIGRAM(S): 100 INJECTION SUBCUTANEOUS at 17:20

## 2020-01-01 RX ADMIN — DORNASE ALFA 2.5 MILLIGRAM(S): 1 SOLUTION RESPIRATORY (INHALATION) at 10:38

## 2020-01-01 RX ADMIN — LEVETIRACETAM 1000 MILLIGRAM(S): 250 TABLET, FILM COATED ORAL at 17:10

## 2020-01-01 RX ADMIN — Medication 4.26 MICROGRAM(S)/KG/MIN: at 00:07

## 2020-01-01 RX ADMIN — DEXMEDETOMIDINE HYDROCHLORIDE IN 0.9% SODIUM CHLORIDE 0.56 MICROGRAM(S)/KG/HR: 4 INJECTION INTRAVENOUS at 10:44

## 2020-01-01 RX ADMIN — Medication 62.5 MICROGRAM(S): at 22:00

## 2020-01-01 RX ADMIN — CHLORHEXIDINE GLUCONATE 15 MILLILITER(S): 213 SOLUTION TOPICAL at 18:34

## 2020-01-01 RX ADMIN — Medication 2.11 MICROGRAM(S)/KG/MIN: at 10:55

## 2020-01-01 RX ADMIN — Medication 1 TABLET(S): at 21:13

## 2020-01-01 RX ADMIN — SODIUM CHLORIDE 50 MILLILITER(S): 9 INJECTION, SOLUTION INTRAVENOUS at 20:55

## 2020-01-01 RX ADMIN — CHLORHEXIDINE GLUCONATE 15 MILLILITER(S): 213 SOLUTION TOPICAL at 05:11

## 2020-01-01 RX ADMIN — Medication 27.5 MILLIGRAM(S): at 19:00

## 2020-01-01 RX ADMIN — LEVETIRACETAM 400 MILLIGRAM(S): 250 TABLET, FILM COATED ORAL at 05:03

## 2020-01-01 RX ADMIN — Medication 40 MILLIEQUIVALENT(S): at 08:25

## 2020-01-01 RX ADMIN — POLYETHYLENE GLYCOL 3350 17 GRAM(S): 17 POWDER, FOR SOLUTION ORAL at 11:07

## 2020-01-01 RX ADMIN — SODIUM CHLORIDE 1 GRAM(S): 9 INJECTION INTRAMUSCULAR; INTRAVENOUS; SUBCUTANEOUS at 05:24

## 2020-01-01 RX ADMIN — MEMANTINE HYDROCHLORIDE 10 MILLIGRAM(S): 10 TABLET ORAL at 04:40

## 2020-01-01 RX ADMIN — ENOXAPARIN SODIUM 40 MILLIGRAM(S): 100 INJECTION SUBCUTANEOUS at 12:02

## 2020-01-01 RX ADMIN — SENNA PLUS 2 TABLET(S): 8.6 TABLET ORAL at 22:42

## 2020-01-01 RX ADMIN — MIDODRINE HYDROCHLORIDE 10 MILLIGRAM(S): 2.5 TABLET ORAL at 05:15

## 2020-01-01 RX ADMIN — Medication 81 MILLIGRAM(S): at 11:48

## 2020-01-01 RX ADMIN — Medication 62.5 MICROGRAM(S): at 21:12

## 2020-01-01 RX ADMIN — MEMANTINE HYDROCHLORIDE 10 MILLIGRAM(S): 10 TABLET ORAL at 18:37

## 2020-01-01 RX ADMIN — Medication 62.5 MICROGRAM(S): at 22:02

## 2020-01-01 RX ADMIN — MEMANTINE HYDROCHLORIDE 5 MILLIGRAM(S): 10 TABLET ORAL at 15:19

## 2020-01-01 RX ADMIN — ENOXAPARIN SODIUM 40 MILLIGRAM(S): 100 INJECTION SUBCUTANEOUS at 11:50

## 2020-01-01 RX ADMIN — Medication 5.95 MICROGRAM(S)/KG/MIN: at 15:30

## 2020-01-01 RX ADMIN — CHLORHEXIDINE GLUCONATE 1 APPLICATION(S): 213 SOLUTION TOPICAL at 11:06

## 2020-01-01 RX ADMIN — Medication 100 MILLIEQUIVALENT(S): at 21:40

## 2020-01-01 RX ADMIN — Medication 100 MEQ/KG/HR: at 03:24

## 2020-01-01 RX ADMIN — Medication 80 MILLIGRAM(S): at 18:04

## 2020-01-01 RX ADMIN — Medication 100 MILLIEQUIVALENT(S): at 18:17

## 2020-01-01 RX ADMIN — ACETAZOLAMIDE 105 MILLIGRAM(S): 250 TABLET ORAL at 11:31

## 2020-01-01 RX ADMIN — LEVETIRACETAM 400 MILLIGRAM(S): 250 TABLET, FILM COATED ORAL at 19:27

## 2020-01-01 RX ADMIN — DEXMEDETOMIDINE HYDROCHLORIDE IN 0.9% SODIUM CHLORIDE 0.56 MICROGRAM(S)/KG/HR: 4 INJECTION INTRAVENOUS at 00:01

## 2020-01-01 RX ADMIN — DORNASE ALFA 2.5 MILLIGRAM(S): 1 SOLUTION RESPIRATORY (INHALATION) at 20:46

## 2020-01-01 RX ADMIN — DEXMEDETOMIDINE HYDROCHLORIDE IN 0.9% SODIUM CHLORIDE 5.68 MICROGRAM(S)/KG/HR: 4 INJECTION INTRAVENOUS at 20:35

## 2020-01-01 RX ADMIN — Medication 62.5 MICROGRAM(S): at 22:39

## 2020-01-01 RX ADMIN — LEVETIRACETAM 400 MILLIGRAM(S): 250 TABLET, FILM COATED ORAL at 05:22

## 2020-01-01 RX ADMIN — Medication 62.5 MICROGRAM(S): at 22:41

## 2020-01-01 RX ADMIN — MIDODRINE HYDROCHLORIDE 10 MILLIGRAM(S): 2.5 TABLET ORAL at 05:34

## 2020-01-01 RX ADMIN — PROPOFOL 2.72 MICROGRAM(S)/KG/MIN: 10 INJECTION, EMULSION INTRAVENOUS at 10:54

## 2020-01-01 RX ADMIN — CHLORHEXIDINE GLUCONATE 15 MILLILITER(S): 213 SOLUTION TOPICAL at 18:21

## 2020-01-01 RX ADMIN — POLYETHYLENE GLYCOL 3350 17 GRAM(S): 17 POWDER, FOR SOLUTION ORAL at 13:00

## 2020-01-01 RX ADMIN — LEVETIRACETAM 1000 MILLIGRAM(S): 250 TABLET, FILM COATED ORAL at 05:20

## 2020-01-01 RX ADMIN — Medication 80 MILLIGRAM(S): at 04:08

## 2020-01-01 RX ADMIN — SODIUM CHLORIDE 1 GRAM(S): 9 INJECTION INTRAMUSCULAR; INTRAVENOUS; SUBCUTANEOUS at 16:38

## 2020-01-01 RX ADMIN — ENOXAPARIN SODIUM 40 MILLIGRAM(S): 100 INJECTION SUBCUTANEOUS at 18:28

## 2020-01-01 RX ADMIN — Medication 81 MILLIGRAM(S): at 11:17

## 2020-01-01 RX ADMIN — ENOXAPARIN SODIUM 30 MILLIGRAM(S): 100 INJECTION SUBCUTANEOUS at 12:26

## 2020-01-01 RX ADMIN — Medication 125 MICROGRAM(S): at 05:17

## 2020-01-01 RX ADMIN — LEVETIRACETAM 1000 MILLIGRAM(S): 250 TABLET, FILM COATED ORAL at 05:38

## 2020-01-01 RX ADMIN — Medication 27.5 MILLIGRAM(S): at 00:03

## 2020-01-01 RX ADMIN — Medication 27.5 MILLIGRAM(S): at 04:41

## 2020-01-01 RX ADMIN — CHLORHEXIDINE GLUCONATE 1 APPLICATION(S): 213 SOLUTION TOPICAL at 12:26

## 2020-01-01 RX ADMIN — SENNA PLUS 2 TABLET(S): 8.6 TABLET ORAL at 21:57

## 2020-01-01 RX ADMIN — CHLORHEXIDINE GLUCONATE 15 MILLILITER(S): 213 SOLUTION TOPICAL at 06:39

## 2020-01-01 RX ADMIN — DEXMEDETOMIDINE HYDROCHLORIDE IN 0.9% SODIUM CHLORIDE 0.56 MICROGRAM(S)/KG/HR: 4 INJECTION INTRAVENOUS at 20:08

## 2020-01-01 RX ADMIN — DORNASE ALFA 2.5 MILLIGRAM(S): 1 SOLUTION RESPIRATORY (INHALATION) at 08:03

## 2020-01-01 RX ADMIN — Medication 100 MILLIEQUIVALENT(S): at 18:09

## 2020-01-01 RX ADMIN — ENOXAPARIN SODIUM 30 MILLIGRAM(S): 100 INJECTION SUBCUTANEOUS at 11:06

## 2020-01-01 RX ADMIN — POLYETHYLENE GLYCOL 3350 17 GRAM(S): 17 POWDER, FOR SOLUTION ORAL at 11:59

## 2020-01-01 RX ADMIN — Medication 27.5 MILLIGRAM(S): at 06:26

## 2020-01-01 RX ADMIN — Medication 26.25 MILLIGRAM(S): at 21:22

## 2020-01-01 RX ADMIN — SENNA PLUS 2 TABLET(S): 8.6 TABLET ORAL at 21:40

## 2020-01-01 RX ADMIN — FAMOTIDINE 20 MILLIGRAM(S): 10 INJECTION INTRAVENOUS at 00:00

## 2020-01-01 RX ADMIN — LEVETIRACETAM 400 MILLIGRAM(S): 250 TABLET, FILM COATED ORAL at 17:09

## 2020-01-01 RX ADMIN — VASOPRESSIN 3.6 UNIT(S)/MIN: 20 INJECTION INTRAVENOUS at 21:27

## 2020-01-01 RX ADMIN — PANTOPRAZOLE SODIUM 40 MILLIGRAM(S): 20 TABLET, DELAYED RELEASE ORAL at 11:28

## 2020-01-01 RX ADMIN — MIDODRINE HYDROCHLORIDE 10 MILLIGRAM(S): 2.5 TABLET ORAL at 14:37

## 2020-01-01 RX ADMIN — LEVETIRACETAM 400 MILLIGRAM(S): 250 TABLET, FILM COATED ORAL at 04:40

## 2020-01-01 RX ADMIN — LEVETIRACETAM 400 MILLIGRAM(S): 250 TABLET, FILM COATED ORAL at 05:50

## 2020-01-01 RX ADMIN — Medication 1 TABLET(S): at 21:06

## 2020-01-01 RX ADMIN — Medication 62.5 MICROGRAM(S): at 23:55

## 2020-01-01 RX ADMIN — CHLORHEXIDINE GLUCONATE 15 MILLILITER(S): 213 SOLUTION TOPICAL at 05:30

## 2020-01-01 RX ADMIN — CHLORHEXIDINE GLUCONATE 1 APPLICATION(S): 213 SOLUTION TOPICAL at 13:38

## 2020-01-01 RX ADMIN — Medication 200 MILLIGRAM(S): at 05:15

## 2020-01-01 RX ADMIN — LEVETIRACETAM 1000 MILLIGRAM(S): 250 TABLET, FILM COATED ORAL at 05:08

## 2020-01-01 RX ADMIN — MIDODRINE HYDROCHLORIDE 10 MILLIGRAM(S): 2.5 TABLET ORAL at 05:14

## 2020-01-01 RX ADMIN — Medication 100 MILLIEQUIVALENT(S): at 13:00

## 2020-01-01 RX ADMIN — SODIUM CHLORIDE 75 MILLILITER(S): 9 INJECTION, SOLUTION INTRAVENOUS at 15:38

## 2020-01-01 RX ADMIN — Medication 100 MILLIEQUIVALENT(S): at 10:16

## 2020-01-01 RX ADMIN — CEFEPIME 100 MILLIGRAM(S): 1 INJECTION, POWDER, FOR SOLUTION INTRAMUSCULAR; INTRAVENOUS at 15:51

## 2020-01-01 RX ADMIN — LEVETIRACETAM 400 MILLIGRAM(S): 250 TABLET, FILM COATED ORAL at 05:13

## 2020-01-01 RX ADMIN — PROPOFOL 2.72 MICROGRAM(S)/KG/MIN: 10 INJECTION, EMULSION INTRAVENOUS at 23:33

## 2020-01-01 RX ADMIN — ENOXAPARIN SODIUM 40 MILLIGRAM(S): 100 INJECTION SUBCUTANEOUS at 12:10

## 2020-01-01 RX ADMIN — Medication 27.5 MILLIGRAM(S): at 02:03

## 2020-01-01 RX ADMIN — Medication 50 MILLIEQUIVALENT(S): at 03:45

## 2020-01-01 RX ADMIN — MIDODRINE HYDROCHLORIDE 10 MILLIGRAM(S): 2.5 TABLET ORAL at 23:53

## 2020-01-01 RX ADMIN — DORNASE ALFA 2.5 MILLIGRAM(S): 1 SOLUTION RESPIRATORY (INHALATION) at 20:12

## 2020-01-01 RX ADMIN — Medication 100 MILLIGRAM(S): at 05:06

## 2020-01-01 RX ADMIN — Medication 500 MILLIGRAM(S): at 06:49

## 2020-01-01 RX ADMIN — DEXMEDETOMIDINE HYDROCHLORIDE IN 0.9% SODIUM CHLORIDE 0.56 MICROGRAM(S)/KG/HR: 4 INJECTION INTRAVENOUS at 20:13

## 2020-01-01 RX ADMIN — CHLORHEXIDINE GLUCONATE 15 MILLILITER(S): 213 SOLUTION TOPICAL at 17:29

## 2020-01-01 RX ADMIN — MIDODRINE HYDROCHLORIDE 10 MILLIGRAM(S): 2.5 TABLET ORAL at 21:13

## 2020-01-01 RX ADMIN — LEVETIRACETAM 400 MILLIGRAM(S): 250 TABLET, FILM COATED ORAL at 16:16

## 2020-01-01 RX ADMIN — ENOXAPARIN SODIUM 40 MILLIGRAM(S): 100 INJECTION SUBCUTANEOUS at 10:36

## 2020-01-01 RX ADMIN — Medication 50 MILLIGRAM(S): at 14:06

## 2020-01-01 RX ADMIN — MIDODRINE HYDROCHLORIDE 10 MILLIGRAM(S): 2.5 TABLET ORAL at 22:41

## 2020-01-01 RX ADMIN — Medication 50 MILLIGRAM(S): at 05:13

## 2020-01-01 RX ADMIN — Medication 100 MILLIGRAM(S): at 05:31

## 2020-01-01 RX ADMIN — MIDODRINE HYDROCHLORIDE 10 MILLIGRAM(S): 2.5 TABLET ORAL at 20:39

## 2020-01-01 RX ADMIN — MIDAZOLAM HYDROCHLORIDE 0.9 MG/KG/HR: 1 INJECTION, SOLUTION INTRAMUSCULAR; INTRAVENOUS at 07:29

## 2020-01-01 RX ADMIN — Medication 100 MILLIEQUIVALENT(S): at 13:37

## 2020-01-01 RX ADMIN — CHLORHEXIDINE GLUCONATE 1 APPLICATION(S): 213 SOLUTION TOPICAL at 11:58

## 2020-01-01 RX ADMIN — Medication 100 GRAM(S): at 06:30

## 2020-01-01 RX ADMIN — ENOXAPARIN SODIUM 30 MILLIGRAM(S): 100 INJECTION SUBCUTANEOUS at 15:18

## 2020-01-01 RX ADMIN — LEVETIRACETAM 400 MILLIGRAM(S): 250 TABLET, FILM COATED ORAL at 20:08

## 2020-01-01 RX ADMIN — LEVETIRACETAM 400 MILLIGRAM(S): 250 TABLET, FILM COATED ORAL at 18:07

## 2020-01-01 RX ADMIN — CHLORHEXIDINE GLUCONATE 15 MILLILITER(S): 213 SOLUTION TOPICAL at 17:20

## 2020-01-01 RX ADMIN — DORNASE ALFA 2.5 MILLIGRAM(S): 1 SOLUTION RESPIRATORY (INHALATION) at 22:39

## 2020-01-01 RX ADMIN — SENNA PLUS 2 TABLET(S): 8.6 TABLET ORAL at 22:37

## 2020-01-01 RX ADMIN — PANTOPRAZOLE SODIUM 40 MILLIGRAM(S): 20 TABLET, DELAYED RELEASE ORAL at 12:14

## 2020-01-01 RX ADMIN — LEVETIRACETAM 400 MILLIGRAM(S): 250 TABLET, FILM COATED ORAL at 17:13

## 2020-01-01 RX ADMIN — LEVETIRACETAM 400 MILLIGRAM(S): 250 TABLET, FILM COATED ORAL at 16:57

## 2020-01-01 RX ADMIN — LEVETIRACETAM 1000 MILLIGRAM(S): 250 TABLET, FILM COATED ORAL at 05:05

## 2020-01-01 RX ADMIN — Medication 100 MILLIEQUIVALENT(S): at 07:48

## 2020-01-01 RX ADMIN — SODIUM CHLORIDE 1950 MILLILITER(S): 9 INJECTION INTRAMUSCULAR; INTRAVENOUS; SUBCUTANEOUS at 14:30

## 2020-01-01 RX ADMIN — Medication 27.5 MILLIGRAM(S): at 05:58

## 2020-01-01 RX ADMIN — CHLORHEXIDINE GLUCONATE 15 MILLILITER(S): 213 SOLUTION TOPICAL at 08:37

## 2020-01-01 RX ADMIN — CHLORHEXIDINE GLUCONATE 15 MILLILITER(S): 213 SOLUTION TOPICAL at 17:06

## 2020-01-01 RX ADMIN — CHLORHEXIDINE GLUCONATE 1 APPLICATION(S): 213 SOLUTION TOPICAL at 11:38

## 2020-01-01 RX ADMIN — Medication 2 MILLIGRAM(S): at 15:39

## 2020-01-01 RX ADMIN — Medication 500 MILLIGRAM(S): at 17:20

## 2020-01-01 RX ADMIN — Medication 100 MILLIEQUIVALENT(S): at 06:05

## 2020-01-01 RX ADMIN — LEVETIRACETAM 1000 MILLIGRAM(S): 250 TABLET, FILM COATED ORAL at 05:17

## 2020-01-01 RX ADMIN — CHLORHEXIDINE GLUCONATE 15 MILLILITER(S): 213 SOLUTION TOPICAL at 17:25

## 2020-01-01 RX ADMIN — QUETIAPINE FUMARATE 25 MILLIGRAM(S): 200 TABLET, FILM COATED ORAL at 11:59

## 2020-01-01 RX ADMIN — LEVETIRACETAM 400 MILLIGRAM(S): 250 TABLET, FILM COATED ORAL at 17:00

## 2020-01-01 RX ADMIN — LEVETIRACETAM 400 MILLIGRAM(S): 250 TABLET, FILM COATED ORAL at 05:12

## 2020-01-01 RX ADMIN — Medication 50 GRAM(S): at 09:04

## 2020-01-01 RX ADMIN — Medication 50 MILLIEQUIVALENT(S): at 16:00

## 2020-01-01 RX ADMIN — MEMANTINE HYDROCHLORIDE 5 MILLIGRAM(S): 10 TABLET ORAL at 11:29

## 2020-01-01 RX ADMIN — Medication 40 MILLIEQUIVALENT(S): at 14:40

## 2020-01-01 RX ADMIN — Medication 100 MILLIEQUIVALENT(S): at 11:55

## 2020-01-01 RX ADMIN — Medication 100 MILLIEQUIVALENT(S): at 17:15

## 2020-01-01 RX ADMIN — Medication 500 MILLIGRAM(S): at 05:17

## 2020-01-01 RX ADMIN — Medication 100 MILLIEQUIVALENT(S): at 19:11

## 2020-01-01 RX ADMIN — SENNA PLUS 2 TABLET(S): 8.6 TABLET ORAL at 21:39

## 2020-01-01 RX ADMIN — DEXMEDETOMIDINE HYDROCHLORIDE IN 0.9% SODIUM CHLORIDE 4.54 MICROGRAM(S)/KG/HR: 4 INJECTION INTRAVENOUS at 15:37

## 2020-01-01 RX ADMIN — DORNASE ALFA 2.5 MILLIGRAM(S): 1 SOLUTION RESPIRATORY (INHALATION) at 21:39

## 2020-01-01 RX ADMIN — ENOXAPARIN SODIUM 30 MILLIGRAM(S): 100 INJECTION SUBCUTANEOUS at 11:47

## 2020-01-01 RX ADMIN — MEMANTINE HYDROCHLORIDE 5 MILLIGRAM(S): 10 TABLET ORAL at 11:23

## 2020-01-01 RX ADMIN — IMMUNE GLOBULIN (HUMAN) 200 GRAM(S): 10 INJECTION INTRAVENOUS; SUBCUTANEOUS at 15:40

## 2020-01-01 RX ADMIN — Medication 100 MILLIGRAM(S): at 14:00

## 2020-01-01 RX ADMIN — Medication 100 MILLIGRAM(S): at 05:36

## 2020-01-01 RX ADMIN — Medication 125 MICROGRAM(S): at 06:49

## 2020-01-01 RX ADMIN — FAMOTIDINE 20 MILLIGRAM(S): 10 INJECTION INTRAVENOUS at 12:00

## 2020-01-01 RX ADMIN — Medication 125 MICROGRAM(S): at 13:45

## 2020-01-01 RX ADMIN — ENOXAPARIN SODIUM 40 MILLIGRAM(S): 100 INJECTION SUBCUTANEOUS at 12:17

## 2020-01-01 RX ADMIN — Medication 500 MILLIGRAM(S): at 18:34

## 2020-01-01 RX ADMIN — Medication 4.26 MICROGRAM(S)/KG/MIN: at 06:43

## 2020-01-01 RX ADMIN — LEVETIRACETAM 400 MILLIGRAM(S): 250 TABLET, FILM COATED ORAL at 18:06

## 2020-01-01 RX ADMIN — LEVETIRACETAM 400 MILLIGRAM(S): 250 TABLET, FILM COATED ORAL at 05:49

## 2020-01-01 RX ADMIN — SODIUM CHLORIDE 50 MILLILITER(S): 9 INJECTION, SOLUTION INTRAVENOUS at 13:49

## 2020-01-01 RX ADMIN — DEXMEDETOMIDINE HYDROCHLORIDE IN 0.9% SODIUM CHLORIDE 5.68 MICROGRAM(S)/KG/HR: 4 INJECTION INTRAVENOUS at 08:50

## 2020-01-01 RX ADMIN — LEVETIRACETAM 400 MILLIGRAM(S): 250 TABLET, FILM COATED ORAL at 05:25

## 2020-01-01 RX ADMIN — FAMOTIDINE 20 MILLIGRAM(S): 10 INJECTION INTRAVENOUS at 12:10

## 2020-01-01 RX ADMIN — DORNASE ALFA 2.5 MILLIGRAM(S): 1 SOLUTION RESPIRATORY (INHALATION) at 20:47

## 2020-01-01 RX ADMIN — ENOXAPARIN SODIUM 40 MILLIGRAM(S): 100 INJECTION SUBCUTANEOUS at 10:19

## 2020-01-01 RX ADMIN — Medication 4.26 MICROGRAM(S)/KG/MIN: at 20:35

## 2020-01-01 RX ADMIN — MIDODRINE HYDROCHLORIDE 10 MILLIGRAM(S): 2.5 TABLET ORAL at 04:39

## 2020-01-01 RX ADMIN — Medication 4.26 MICROGRAM(S)/KG/MIN: at 18:59

## 2020-01-01 RX ADMIN — DEXMEDETOMIDINE HYDROCHLORIDE IN 0.9% SODIUM CHLORIDE 0.56 MICROGRAM(S)/KG/HR: 4 INJECTION INTRAVENOUS at 07:41

## 2020-01-01 RX ADMIN — Medication 200 MILLIGRAM(S): at 18:35

## 2020-01-01 RX ADMIN — Medication 20 MILLIGRAM(S): at 12:19

## 2020-01-01 RX ADMIN — POLYETHYLENE GLYCOL 3350 17 GRAM(S): 17 POWDER, FOR SOLUTION ORAL at 22:33

## 2020-01-01 RX ADMIN — DEXMEDETOMIDINE HYDROCHLORIDE IN 0.9% SODIUM CHLORIDE 4.54 MICROGRAM(S)/KG/HR: 4 INJECTION INTRAVENOUS at 08:43

## 2020-01-01 RX ADMIN — Medication 27.5 MILLIGRAM(S): at 05:31

## 2020-01-01 RX ADMIN — Medication 4.26 MICROGRAM(S)/KG/MIN: at 07:46

## 2020-01-01 RX ADMIN — CHLORHEXIDINE GLUCONATE 1 APPLICATION(S): 213 SOLUTION TOPICAL at 11:18

## 2020-01-01 RX ADMIN — Medication 27.5 MILLIGRAM(S): at 12:04

## 2020-01-01 RX ADMIN — LEVETIRACETAM 400 MILLIGRAM(S): 250 TABLET, FILM COATED ORAL at 17:20

## 2020-01-01 RX ADMIN — CHLORHEXIDINE GLUCONATE 15 MILLILITER(S): 213 SOLUTION TOPICAL at 17:31

## 2020-01-01 RX ADMIN — PROPOFOL 10.8 MICROGRAM(S)/KG/MIN: 10 INJECTION, EMULSION INTRAVENOUS at 07:29

## 2020-01-01 RX ADMIN — LEVETIRACETAM 400 MILLIGRAM(S): 250 TABLET, FILM COATED ORAL at 05:11

## 2020-01-01 RX ADMIN — PANTOPRAZOLE SODIUM 40 MILLIGRAM(S): 20 TABLET, DELAYED RELEASE ORAL at 13:19

## 2020-01-01 RX ADMIN — Medication 20 MILLIGRAM(S): at 00:00

## 2020-01-01 RX ADMIN — Medication 50 MILLIEQUIVALENT(S): at 03:32

## 2020-01-01 RX ADMIN — Medication 1 TABLET(S): at 05:12

## 2020-01-01 RX ADMIN — Medication 4.22 MICROGRAM(S)/KG/MIN: at 07:30

## 2020-01-01 RX ADMIN — Medication 27.5 MILLIGRAM(S): at 17:47

## 2020-01-01 RX ADMIN — Medication 26.25 MILLIGRAM(S): at 06:41

## 2020-01-01 RX ADMIN — CHLORHEXIDINE GLUCONATE 15 MILLILITER(S): 213 SOLUTION TOPICAL at 17:11

## 2020-01-01 RX ADMIN — Medication 50 GRAM(S): at 06:11

## 2020-01-01 RX ADMIN — Medication 2.11 MICROGRAM(S)/KG/MIN: at 21:03

## 2020-01-01 RX ADMIN — MIDODRINE HYDROCHLORIDE 10 MILLIGRAM(S): 2.5 TABLET ORAL at 21:32

## 2020-01-01 RX ADMIN — Medication 81 MILLIGRAM(S): at 11:27

## 2020-01-01 RX ADMIN — Medication 62.5 MICROGRAM(S): at 21:34

## 2020-01-01 RX ADMIN — CHLORHEXIDINE GLUCONATE 15 MILLILITER(S): 213 SOLUTION TOPICAL at 05:15

## 2020-01-01 RX ADMIN — Medication 40 MILLIEQUIVALENT(S): at 09:08

## 2020-01-01 RX ADMIN — MIDODRINE HYDROCHLORIDE 10 MILLIGRAM(S): 2.5 TABLET ORAL at 13:24

## 2020-01-01 RX ADMIN — ENOXAPARIN SODIUM 40 MILLIGRAM(S): 100 INJECTION SUBCUTANEOUS at 11:59

## 2020-01-01 RX ADMIN — Medication 50 MILLIGRAM(S): at 21:01

## 2020-01-01 RX ADMIN — Medication 50 MILLILITER(S): at 14:57

## 2020-01-01 RX ADMIN — Medication 1 TABLET(S): at 05:08

## 2020-01-01 RX ADMIN — CHLORHEXIDINE GLUCONATE 15 MILLILITER(S): 213 SOLUTION TOPICAL at 17:51

## 2020-01-01 RX ADMIN — ENOXAPARIN SODIUM 30 MILLIGRAM(S): 100 INJECTION SUBCUTANEOUS at 11:19

## 2020-01-01 RX ADMIN — MIDODRINE HYDROCHLORIDE 10 MILLIGRAM(S): 2.5 TABLET ORAL at 12:44

## 2020-01-01 RX ADMIN — ENOXAPARIN SODIUM 30 MILLIGRAM(S): 100 INJECTION SUBCUTANEOUS at 13:34

## 2020-01-01 RX ADMIN — CEFEPIME 100 MILLIGRAM(S): 1 INJECTION, POWDER, FOR SOLUTION INTRAMUSCULAR; INTRAVENOUS at 06:00

## 2020-01-01 RX ADMIN — CEFEPIME 100 MILLIGRAM(S): 1 INJECTION, POWDER, FOR SOLUTION INTRAMUSCULAR; INTRAVENOUS at 15:17

## 2020-01-01 RX ADMIN — SODIUM CHLORIDE 500 MILLILITER(S): 9 INJECTION INTRAMUSCULAR; INTRAVENOUS; SUBCUTANEOUS at 00:09

## 2020-01-01 RX ADMIN — Medication 1 TABLET(S): at 05:40

## 2020-01-01 RX ADMIN — LEVETIRACETAM 400 MILLIGRAM(S): 250 TABLET, FILM COATED ORAL at 05:45

## 2020-01-01 RX ADMIN — CEFEPIME 2000 MILLIGRAM(S): 1 INJECTION, POWDER, FOR SOLUTION INTRAMUSCULAR; INTRAVENOUS at 15:47

## 2020-01-01 RX ADMIN — Medication 1 TABLET(S): at 20:40

## 2020-01-01 RX ADMIN — Medication 1 TABLET(S): at 09:45

## 2020-01-01 RX ADMIN — Medication 62.5 MICROGRAM(S): at 23:52

## 2020-01-01 RX ADMIN — DEXMEDETOMIDINE HYDROCHLORIDE IN 0.9% SODIUM CHLORIDE 0.56 MICROGRAM(S)/KG/HR: 4 INJECTION INTRAVENOUS at 08:21

## 2020-01-01 RX ADMIN — MEROPENEM 100 MILLIGRAM(S): 1 INJECTION INTRAVENOUS at 15:23

## 2020-01-01 RX ADMIN — CHLORHEXIDINE GLUCONATE 15 MILLILITER(S): 213 SOLUTION TOPICAL at 05:06

## 2020-01-01 RX ADMIN — Medication 650 MILLIGRAM(S): at 01:18

## 2020-01-01 RX ADMIN — DEXMEDETOMIDINE HYDROCHLORIDE IN 0.9% SODIUM CHLORIDE 5.68 MICROGRAM(S)/KG/HR: 4 INJECTION INTRAVENOUS at 01:08

## 2020-01-01 RX ADMIN — Medication 50 GRAM(S): at 10:45

## 2020-01-01 RX ADMIN — SODIUM CHLORIDE 100 MILLILITER(S): 9 INJECTION, SOLUTION INTRAVENOUS at 21:38

## 2020-01-01 RX ADMIN — ENOXAPARIN SODIUM 30 MILLIGRAM(S): 100 INJECTION SUBCUTANEOUS at 11:58

## 2020-01-01 RX ADMIN — Medication 7 MILLIGRAM(S): at 05:23

## 2020-01-01 RX ADMIN — FAMOTIDINE 20 MILLIGRAM(S): 10 INJECTION INTRAVENOUS at 22:02

## 2020-01-01 RX ADMIN — CHLORHEXIDINE GLUCONATE 15 MILLILITER(S): 213 SOLUTION TOPICAL at 05:14

## 2020-01-01 RX ADMIN — MIDODRINE HYDROCHLORIDE 10 MILLIGRAM(S): 2.5 TABLET ORAL at 22:35

## 2020-01-01 RX ADMIN — CHLORHEXIDINE GLUCONATE 15 MILLILITER(S): 213 SOLUTION TOPICAL at 05:23

## 2020-01-01 RX ADMIN — FAMOTIDINE 20 MILLIGRAM(S): 10 INJECTION INTRAVENOUS at 12:02

## 2020-01-01 RX ADMIN — Medication 62.5 MICROGRAM(S): at 20:56

## 2020-01-01 RX ADMIN — Medication 7 MILLIGRAM(S): at 18:14

## 2020-01-01 RX ADMIN — Medication 62.5 MICROGRAM(S): at 21:31

## 2020-01-01 RX ADMIN — Medication 62.5 MICROGRAM(S): at 21:45

## 2020-01-01 RX ADMIN — MEMANTINE HYDROCHLORIDE 5 MILLIGRAM(S): 10 TABLET ORAL at 11:20

## 2020-01-01 RX ADMIN — FENTANYL CITRATE 100 MICROGRAM(S): 50 INJECTION INTRAVENOUS at 21:34

## 2020-01-01 RX ADMIN — MIDODRINE HYDROCHLORIDE 10 MILLIGRAM(S): 2.5 TABLET ORAL at 05:40

## 2020-01-01 RX ADMIN — QUETIAPINE FUMARATE 25 MILLIGRAM(S): 200 TABLET, FILM COATED ORAL at 12:10

## 2020-01-01 RX ADMIN — Medication 27.5 MILLIGRAM(S): at 12:28

## 2020-01-01 RX ADMIN — Medication 27.5 MILLIGRAM(S): at 19:19

## 2020-01-01 RX ADMIN — MIDODRINE HYDROCHLORIDE 10 MILLIGRAM(S): 2.5 TABLET ORAL at 13:18

## 2020-01-01 RX ADMIN — MIDODRINE HYDROCHLORIDE 10 MILLIGRAM(S): 2.5 TABLET ORAL at 21:22

## 2020-01-01 RX ADMIN — Medication 27.5 MILLIGRAM(S): at 06:25

## 2020-01-01 RX ADMIN — MIDODRINE HYDROCHLORIDE 10 MILLIGRAM(S): 2.5 TABLET ORAL at 05:46

## 2020-01-01 RX ADMIN — Medication 4.26 MICROGRAM(S)/KG/MIN: at 07:22

## 2020-01-01 RX ADMIN — Medication 80 MILLIGRAM(S): at 05:11

## 2020-01-01 RX ADMIN — SODIUM CHLORIDE 1000 MILLILITER(S): 9 INJECTION INTRAMUSCULAR; INTRAVENOUS; SUBCUTANEOUS at 09:50

## 2020-01-01 RX ADMIN — Medication 500 MILLIGRAM(S): at 05:19

## 2020-01-01 RX ADMIN — LEVETIRACETAM 400 MILLIGRAM(S): 250 TABLET, FILM COATED ORAL at 07:24

## 2020-01-01 RX ADMIN — VASOPRESSIN 3.6 UNIT(S)/MIN: 20 INJECTION INTRAVENOUS at 17:14

## 2020-01-01 RX ADMIN — DORNASE ALFA 2.5 MILLIGRAM(S): 1 SOLUTION RESPIRATORY (INHALATION) at 20:50

## 2020-01-01 RX ADMIN — MEROPENEM 100 MILLIGRAM(S): 1 INJECTION INTRAVENOUS at 21:37

## 2020-01-01 RX ADMIN — ENOXAPARIN SODIUM 40 MILLIGRAM(S): 100 INJECTION SUBCUTANEOUS at 13:59

## 2020-01-01 RX ADMIN — Medication 81 MILLIGRAM(S): at 19:47

## 2020-01-01 RX ADMIN — MIDODRINE HYDROCHLORIDE 10 MILLIGRAM(S): 2.5 TABLET ORAL at 13:54

## 2020-01-01 RX ADMIN — Medication 100 MILLIEQUIVALENT(S): at 11:21

## 2020-01-01 RX ADMIN — Medication 80 MILLIGRAM(S): at 05:30

## 2020-01-01 RX ADMIN — CHLORHEXIDINE GLUCONATE 1 APPLICATION(S): 213 SOLUTION TOPICAL at 11:07

## 2020-01-01 RX ADMIN — Medication 2 MILLIGRAM(S): at 17:24

## 2020-01-01 RX ADMIN — MIDODRINE HYDROCHLORIDE 10 MILLIGRAM(S): 2.5 TABLET ORAL at 13:22

## 2020-01-01 RX ADMIN — Medication 200 MILLIGRAM(S): at 05:11

## 2020-01-01 RX ADMIN — Medication 125 MICROGRAM(S): at 05:05

## 2020-01-01 RX ADMIN — ENOXAPARIN SODIUM 40 MILLIGRAM(S): 100 INJECTION SUBCUTANEOUS at 11:25

## 2020-01-01 RX ADMIN — Medication 2.11 MICROGRAM(S)/KG/MIN: at 20:09

## 2020-01-01 RX ADMIN — Medication 1 TABLET(S): at 05:30

## 2020-01-01 RX ADMIN — PROPOFOL 2.72 MICROGRAM(S)/KG/MIN: 10 INJECTION, EMULSION INTRAVENOUS at 19:52

## 2020-01-01 RX ADMIN — ENOXAPARIN SODIUM 40 MILLIGRAM(S): 100 INJECTION SUBCUTANEOUS at 17:40

## 2020-01-01 RX ADMIN — LEVETIRACETAM 400 MILLIGRAM(S): 250 TABLET, FILM COATED ORAL at 06:00

## 2020-01-01 RX ADMIN — Medication 250 MILLIGRAM(S): at 15:18

## 2020-01-01 RX ADMIN — Medication 5 MILLIGRAM(S): at 06:08

## 2020-01-01 RX ADMIN — CHLORHEXIDINE GLUCONATE 15 MILLILITER(S): 213 SOLUTION TOPICAL at 05:10

## 2020-01-01 RX ADMIN — Medication 50 MILLILITER(S): at 21:42

## 2020-01-01 RX ADMIN — QUETIAPINE FUMARATE 25 MILLIGRAM(S): 200 TABLET, FILM COATED ORAL at 13:01

## 2020-01-01 RX ADMIN — Medication 10 MILLIGRAM(S): at 00:12

## 2020-01-01 RX ADMIN — LEVETIRACETAM 400 MILLIGRAM(S): 250 TABLET, FILM COATED ORAL at 16:55

## 2020-01-01 RX ADMIN — DORNASE ALFA 2.5 MILLIGRAM(S): 1 SOLUTION RESPIRATORY (INHALATION) at 09:49

## 2020-01-01 RX ADMIN — CHLORHEXIDINE GLUCONATE 15 MILLILITER(S): 213 SOLUTION TOPICAL at 18:30

## 2020-01-01 RX ADMIN — CHLORHEXIDINE GLUCONATE 15 MILLILITER(S): 213 SOLUTION TOPICAL at 22:33

## 2020-01-01 RX ADMIN — CHLORHEXIDINE GLUCONATE 1 APPLICATION(S): 213 SOLUTION TOPICAL at 10:36

## 2020-01-01 RX ADMIN — POLYETHYLENE GLYCOL 3350 17 GRAM(S): 17 POWDER, FOR SOLUTION ORAL at 10:47

## 2020-01-01 RX ADMIN — Medication 27.5 MILLIGRAM(S): at 13:30

## 2020-01-01 RX ADMIN — Medication 4 MILLILITER(S): at 00:31

## 2020-01-01 RX ADMIN — Medication 50 MILLILITER(S): at 17:17

## 2020-01-01 RX ADMIN — Medication 27.5 MILLIGRAM(S): at 00:22

## 2020-01-01 RX ADMIN — Medication 7 MILLIGRAM(S): at 17:35

## 2020-01-01 RX ADMIN — Medication 100 GRAM(S): at 01:40

## 2020-01-01 RX ADMIN — MIDODRINE HYDROCHLORIDE 10 MILLIGRAM(S): 2.5 TABLET ORAL at 21:06

## 2020-01-01 RX ADMIN — Medication 27.5 MILLIGRAM(S): at 23:23

## 2020-01-01 RX ADMIN — Medication 12.5 MILLIGRAM(S): at 05:32

## 2020-01-01 RX ADMIN — PANTOPRAZOLE SODIUM 40 MILLIGRAM(S): 20 TABLET, DELAYED RELEASE ORAL at 12:26

## 2020-01-01 RX ADMIN — LEVETIRACETAM 400 MILLIGRAM(S): 250 TABLET, FILM COATED ORAL at 18:36

## 2020-01-01 RX ADMIN — Medication 4.26 MICROGRAM(S)/KG/MIN: at 22:42

## 2020-01-01 RX ADMIN — LEVETIRACETAM 400 MILLIGRAM(S): 250 TABLET, FILM COATED ORAL at 17:03

## 2020-01-01 RX ADMIN — Medication 27.5 MILLIGRAM(S): at 00:30

## 2020-01-01 RX ADMIN — ENOXAPARIN SODIUM 40 MILLIGRAM(S): 100 INJECTION SUBCUTANEOUS at 17:42

## 2020-01-01 RX ADMIN — Medication 27.5 MILLIGRAM(S): at 18:10

## 2020-01-01 RX ADMIN — Medication 27.5 MILLIGRAM(S): at 17:06

## 2020-01-01 RX ADMIN — FAMOTIDINE 20 MILLIGRAM(S): 10 INJECTION INTRAVENOUS at 10:36

## 2020-01-01 RX ADMIN — Medication 27.5 MILLIGRAM(S): at 00:32

## 2020-01-01 RX ADMIN — ENOXAPARIN SODIUM 40 MILLIGRAM(S): 100 INJECTION SUBCUTANEOUS at 05:13

## 2020-01-01 RX ADMIN — LEVETIRACETAM 400 MILLIGRAM(S): 250 TABLET, FILM COATED ORAL at 17:36

## 2020-01-01 RX ADMIN — LEVETIRACETAM 400 MILLIGRAM(S): 250 TABLET, FILM COATED ORAL at 18:12

## 2020-01-01 RX ADMIN — ENOXAPARIN SODIUM 40 MILLIGRAM(S): 100 INJECTION SUBCUTANEOUS at 11:00

## 2020-01-01 RX ADMIN — PANTOPRAZOLE SODIUM 40 MILLIGRAM(S): 20 TABLET, DELAYED RELEASE ORAL at 11:06

## 2020-01-01 RX ADMIN — MIDODRINE HYDROCHLORIDE 10 MILLIGRAM(S): 2.5 TABLET ORAL at 05:52

## 2020-01-01 RX ADMIN — Medication 27.5 MILLIGRAM(S): at 06:42

## 2020-01-01 RX ADMIN — POLYETHYLENE GLYCOL 3350 17 GRAM(S): 17 POWDER, FOR SOLUTION ORAL at 11:38

## 2020-01-01 RX ADMIN — Medication 0: at 07:07

## 2020-01-01 RX ADMIN — Medication 500 MILLIGRAM(S): at 05:05

## 2020-01-01 RX ADMIN — SODIUM CHLORIDE 75 MILLILITER(S): 9 INJECTION, SOLUTION INTRAVENOUS at 21:12

## 2020-01-01 RX ADMIN — Medication 5 MILLIGRAM(S): at 11:22

## 2020-01-01 RX ADMIN — Medication 27.5 MILLIGRAM(S): at 05:45

## 2020-01-01 RX ADMIN — Medication 4.26 MICROGRAM(S)/KG/MIN: at 09:18

## 2020-01-01 RX ADMIN — POLYETHYLENE GLYCOL 3350 17 GRAM(S): 17 POWDER, FOR SOLUTION ORAL at 11:00

## 2020-01-01 RX ADMIN — POLYETHYLENE GLYCOL 3350 17 GRAM(S): 17 POWDER, FOR SOLUTION ORAL at 18:34

## 2020-01-01 RX ADMIN — MEMANTINE HYDROCHLORIDE 10 MILLIGRAM(S): 10 TABLET ORAL at 17:50

## 2020-01-01 RX ADMIN — Medication 1 TABLET(S): at 22:41

## 2020-01-01 RX ADMIN — Medication 4.26 MICROGRAM(S)/KG/MIN: at 08:26

## 2020-01-01 RX ADMIN — Medication 500 MILLIGRAM(S): at 18:59

## 2020-01-01 RX ADMIN — Medication 650 MILLIGRAM(S): at 13:32

## 2020-01-01 RX ADMIN — LEVETIRACETAM 400 MILLIGRAM(S): 250 TABLET, FILM COATED ORAL at 17:16

## 2020-01-01 RX ADMIN — MEROPENEM 100 MILLIGRAM(S): 1 INJECTION INTRAVENOUS at 22:15

## 2020-01-01 RX ADMIN — Medication 500 MILLIGRAM(S): at 17:10

## 2020-01-01 RX ADMIN — SODIUM CHLORIDE 500 MILLILITER(S): 9 INJECTION, SOLUTION INTRAVENOUS at 14:14

## 2020-01-01 RX ADMIN — Medication 50 MILLIEQUIVALENT(S): at 18:55

## 2020-01-01 RX ADMIN — SENNA PLUS 2 TABLET(S): 8.6 TABLET ORAL at 22:32

## 2020-01-01 RX ADMIN — MIDODRINE HYDROCHLORIDE 10 MILLIGRAM(S): 2.5 TABLET ORAL at 23:49

## 2020-01-01 RX ADMIN — MEMANTINE HYDROCHLORIDE 5 MILLIGRAM(S): 10 TABLET ORAL at 11:06

## 2020-01-01 RX ADMIN — QUETIAPINE FUMARATE 25 MILLIGRAM(S): 200 TABLET, FILM COATED ORAL at 14:31

## 2020-01-01 RX ADMIN — ENOXAPARIN SODIUM 40 MILLIGRAM(S): 100 INJECTION SUBCUTANEOUS at 09:53

## 2020-01-01 RX ADMIN — Medication 81 MILLIGRAM(S): at 13:43

## 2020-01-01 RX ADMIN — ALTEPLASE 2 MILLIGRAM(S): KIT at 18:26

## 2020-01-01 RX ADMIN — DIVALPROEX SODIUM 250 MILLIGRAM(S): 500 TABLET, DELAYED RELEASE ORAL at 05:36

## 2020-01-01 RX ADMIN — Medication 500 MILLIGRAM(S): at 06:39

## 2020-01-01 RX ADMIN — ENOXAPARIN SODIUM 40 MILLIGRAM(S): 100 INJECTION SUBCUTANEOUS at 06:00

## 2020-01-01 RX ADMIN — LEVETIRACETAM 400 MILLIGRAM(S): 250 TABLET, FILM COATED ORAL at 17:52

## 2020-01-01 RX ADMIN — MEMANTINE HYDROCHLORIDE 5 MILLIGRAM(S): 10 TABLET ORAL at 11:48

## 2020-01-01 RX ADMIN — MEMANTINE HYDROCHLORIDE 5 MILLIGRAM(S): 10 TABLET ORAL at 11:28

## 2020-01-01 RX ADMIN — Medication 500 MILLIGRAM(S): at 17:26

## 2020-01-01 RX ADMIN — CHLORHEXIDINE GLUCONATE 15 MILLILITER(S): 213 SOLUTION TOPICAL at 17:02

## 2020-01-01 RX ADMIN — Medication 1 TABLET(S): at 05:13

## 2020-01-01 RX ADMIN — Medication 500 MILLIGRAM(S): at 05:57

## 2020-01-01 RX ADMIN — Medication 62.5 MICROGRAM(S): at 21:14

## 2020-01-01 RX ADMIN — LEVETIRACETAM 1000 MILLIGRAM(S): 250 TABLET, FILM COATED ORAL at 05:41

## 2020-01-01 RX ADMIN — CHLORHEXIDINE GLUCONATE 1 APPLICATION(S): 213 SOLUTION TOPICAL at 12:08

## 2020-01-01 RX ADMIN — MEMANTINE HYDROCHLORIDE 10 MILLIGRAM(S): 10 TABLET ORAL at 04:05

## 2020-01-01 RX ADMIN — PANTOPRAZOLE SODIUM 40 MILLIGRAM(S): 20 TABLET, DELAYED RELEASE ORAL at 11:48

## 2020-01-01 RX ADMIN — Medication 26.25 MILLIGRAM(S): at 05:15

## 2020-01-01 RX ADMIN — Medication 4.26 MICROGRAM(S)/KG/MIN: at 22:45

## 2020-01-01 RX ADMIN — POLYETHYLENE GLYCOL 3350 17 GRAM(S): 17 POWDER, FOR SOLUTION ORAL at 11:02

## 2020-01-01 RX ADMIN — POLYETHYLENE GLYCOL 3350 17 GRAM(S): 17 POWDER, FOR SOLUTION ORAL at 10:37

## 2020-01-01 RX ADMIN — Medication 80 MILLIGRAM(S): at 05:26

## 2020-01-01 RX ADMIN — CHLORHEXIDINE GLUCONATE 1 APPLICATION(S): 213 SOLUTION TOPICAL at 11:34

## 2020-01-01 RX ADMIN — LEVETIRACETAM 1000 MILLIGRAM(S): 250 TABLET, FILM COATED ORAL at 05:36

## 2020-01-01 RX ADMIN — Medication 500 MILLIGRAM(S): at 17:56

## 2020-01-01 RX ADMIN — Medication 500 MILLIGRAM(S): at 05:08

## 2020-01-01 RX ADMIN — CHLORHEXIDINE GLUCONATE 15 MILLILITER(S): 213 SOLUTION TOPICAL at 18:44

## 2020-01-01 RX ADMIN — Medication 2 MILLIGRAM(S): at 17:50

## 2020-01-01 RX ADMIN — CHLORHEXIDINE GLUCONATE 15 MILLILITER(S): 213 SOLUTION TOPICAL at 17:16

## 2020-01-01 RX ADMIN — Medication 27.5 MILLIGRAM(S): at 17:34

## 2020-01-01 RX ADMIN — MEMANTINE HYDROCHLORIDE 5 MILLIGRAM(S): 10 TABLET ORAL at 12:24

## 2020-01-01 RX ADMIN — CHLORHEXIDINE GLUCONATE 15 MILLILITER(S): 213 SOLUTION TOPICAL at 05:37

## 2020-01-01 RX ADMIN — Medication 27.5 MILLIGRAM(S): at 23:43

## 2020-01-01 RX ADMIN — MEMANTINE HYDROCHLORIDE 10 MILLIGRAM(S): 10 TABLET ORAL at 06:21

## 2020-01-01 RX ADMIN — DEXMEDETOMIDINE HYDROCHLORIDE IN 0.9% SODIUM CHLORIDE 5.68 MICROGRAM(S)/KG/HR: 4 INJECTION INTRAVENOUS at 08:53

## 2020-01-01 RX ADMIN — Medication 200 MILLIGRAM(S): at 17:50

## 2020-01-01 RX ADMIN — ALBUTEROL 2 PUFF(S): 90 AEROSOL, METERED ORAL at 15:42

## 2020-01-01 RX ADMIN — Medication 20 MILLIGRAM(S): at 20:00

## 2020-01-01 RX ADMIN — ENOXAPARIN SODIUM 30 MILLIGRAM(S): 100 INJECTION SUBCUTANEOUS at 11:59

## 2020-01-01 RX ADMIN — Medication 62.5 MICROGRAM(S): at 22:01

## 2020-01-01 RX ADMIN — LEVETIRACETAM 400 MILLIGRAM(S): 250 TABLET, FILM COATED ORAL at 05:10

## 2020-01-01 RX ADMIN — ENOXAPARIN SODIUM 40 MILLIGRAM(S): 100 INJECTION SUBCUTANEOUS at 06:42

## 2020-01-01 RX ADMIN — CHLORHEXIDINE GLUCONATE 1 APPLICATION(S): 213 SOLUTION TOPICAL at 12:36

## 2020-01-01 RX ADMIN — Medication 62.5 MICROGRAM(S): at 21:21

## 2020-01-01 RX ADMIN — Medication 27.5 MILLIGRAM(S): at 06:27

## 2020-01-01 RX ADMIN — PROPOFOL 2.72 MICROGRAM(S)/KG/MIN: 10 INJECTION, EMULSION INTRAVENOUS at 08:41

## 2020-01-01 RX ADMIN — PANTOPRAZOLE SODIUM 40 MILLIGRAM(S): 20 TABLET, DELAYED RELEASE ORAL at 11:59

## 2020-01-01 RX ADMIN — LEVETIRACETAM 400 MILLIGRAM(S): 250 TABLET, FILM COATED ORAL at 17:51

## 2020-01-01 RX ADMIN — Medication 50 GRAM(S): at 19:12

## 2020-01-01 RX ADMIN — CHLORHEXIDINE GLUCONATE 15 MILLILITER(S): 213 SOLUTION TOPICAL at 18:06

## 2020-01-01 RX ADMIN — MEMANTINE HYDROCHLORIDE 10 MILLIGRAM(S): 10 TABLET ORAL at 05:12

## 2020-01-01 RX ADMIN — LEVETIRACETAM 400 MILLIGRAM(S): 250 TABLET, FILM COATED ORAL at 06:15

## 2020-01-01 RX ADMIN — CHLORHEXIDINE GLUCONATE 15 MILLILITER(S): 213 SOLUTION TOPICAL at 17:39

## 2020-01-01 RX ADMIN — FENTANYL CITRATE 100 MICROGRAM(S): 50 INJECTION INTRAVENOUS at 01:20

## 2020-01-01 RX ADMIN — ALBUTEROL 2 PUFF(S): 90 AEROSOL, METERED ORAL at 18:00

## 2020-01-01 RX ADMIN — LEVETIRACETAM 1000 MILLIGRAM(S): 250 TABLET, FILM COATED ORAL at 18:31

## 2020-01-01 RX ADMIN — Medication 50 GRAM(S): at 16:21

## 2020-01-01 RX ADMIN — LEVETIRACETAM 400 MILLIGRAM(S): 250 TABLET, FILM COATED ORAL at 17:38

## 2020-01-01 RX ADMIN — Medication 4.26 MICROGRAM(S)/KG/MIN: at 06:07

## 2020-01-01 RX ADMIN — Medication 100 GRAM(S): at 06:28

## 2020-01-01 RX ADMIN — LEVETIRACETAM 400 MILLIGRAM(S): 250 TABLET, FILM COATED ORAL at 06:02

## 2020-01-01 RX ADMIN — DEXMEDETOMIDINE HYDROCHLORIDE IN 0.9% SODIUM CHLORIDE 0.56 MICROGRAM(S)/KG/HR: 4 INJECTION INTRAVENOUS at 06:51

## 2020-01-01 RX ADMIN — CHLORHEXIDINE GLUCONATE 15 MILLILITER(S): 213 SOLUTION TOPICAL at 05:21

## 2020-01-01 RX ADMIN — Medication 650 MILLIGRAM(S): at 19:35

## 2020-01-01 RX ADMIN — Medication 4.26 MICROGRAM(S)/KG/MIN: at 00:04

## 2020-01-01 RX ADMIN — MIDODRINE HYDROCHLORIDE 10 MILLIGRAM(S): 2.5 TABLET ORAL at 22:56

## 2020-01-01 RX ADMIN — Medication 26.25 MILLIGRAM(S): at 04:03

## 2020-01-01 RX ADMIN — CHLORHEXIDINE GLUCONATE 15 MILLILITER(S): 213 SOLUTION TOPICAL at 05:17

## 2020-01-01 RX ADMIN — CHLORHEXIDINE GLUCONATE 15 MILLILITER(S): 213 SOLUTION TOPICAL at 06:13

## 2020-01-01 RX ADMIN — Medication 2.11 MICROGRAM(S)/KG/MIN: at 08:10

## 2020-01-01 RX ADMIN — SENNA PLUS 2 TABLET(S): 8.6 TABLET ORAL at 22:02

## 2020-01-01 RX ADMIN — MIDODRINE HYDROCHLORIDE 10 MILLIGRAM(S): 2.5 TABLET ORAL at 05:11

## 2020-01-01 RX ADMIN — DEXMEDETOMIDINE HYDROCHLORIDE IN 0.9% SODIUM CHLORIDE 5.68 MICROGRAM(S)/KG/HR: 4 INJECTION INTRAVENOUS at 08:02

## 2020-01-01 RX ADMIN — Medication 650 MILLIGRAM(S): at 22:57

## 2020-01-01 RX ADMIN — LEVETIRACETAM 400 MILLIGRAM(S): 250 TABLET, FILM COATED ORAL at 05:07

## 2020-01-01 RX ADMIN — Medication 4.26 MICROGRAM(S)/KG/MIN: at 17:53

## 2020-01-01 RX ADMIN — Medication 2.11 MICROGRAM(S)/KG/MIN: at 22:34

## 2020-01-01 RX ADMIN — MEROPENEM 100 MILLIGRAM(S): 1 INJECTION INTRAVENOUS at 06:27

## 2020-01-01 RX ADMIN — Medication 26.25 MILLIGRAM(S): at 03:16

## 2020-01-01 RX ADMIN — ENOXAPARIN SODIUM 40 MILLIGRAM(S): 100 INJECTION SUBCUTANEOUS at 10:46

## 2020-01-01 RX ADMIN — PROPOFOL 2.72 MICROGRAM(S)/KG/MIN: 10 INJECTION, EMULSION INTRAVENOUS at 18:11

## 2020-01-01 RX ADMIN — MIDODRINE HYDROCHLORIDE 10 MILLIGRAM(S): 2.5 TABLET ORAL at 05:33

## 2020-01-01 RX ADMIN — LEVETIRACETAM 1000 MILLIGRAM(S): 250 TABLET, FILM COATED ORAL at 19:02

## 2020-01-01 RX ADMIN — ENOXAPARIN SODIUM 40 MILLIGRAM(S): 100 INJECTION SUBCUTANEOUS at 06:14

## 2020-01-01 RX ADMIN — ENOXAPARIN SODIUM 30 MILLIGRAM(S): 100 INJECTION SUBCUTANEOUS at 11:51

## 2020-01-01 RX ADMIN — Medication 200 MILLIGRAM(S): at 17:08

## 2020-01-01 RX ADMIN — LEVETIRACETAM 400 MILLIGRAM(S): 250 TABLET, FILM COATED ORAL at 18:11

## 2020-01-01 RX ADMIN — Medication 27.5 MILLIGRAM(S): at 16:40

## 2020-01-01 RX ADMIN — PANTOPRAZOLE SODIUM 40 MILLIGRAM(S): 20 TABLET, DELAYED RELEASE ORAL at 11:26

## 2020-01-01 RX ADMIN — DIVALPROEX SODIUM 250 MILLIGRAM(S): 500 TABLET, DELAYED RELEASE ORAL at 21:36

## 2020-01-01 RX ADMIN — CHLORHEXIDINE GLUCONATE 15 MILLILITER(S): 213 SOLUTION TOPICAL at 05:19

## 2020-01-01 RX ADMIN — MEMANTINE HYDROCHLORIDE 10 MILLIGRAM(S): 10 TABLET ORAL at 18:15

## 2020-01-01 RX ADMIN — FAMOTIDINE 20 MILLIGRAM(S): 10 INJECTION INTRAVENOUS at 11:22

## 2020-01-01 RX ADMIN — CEFEPIME 100 MILLIGRAM(S): 1 INJECTION, POWDER, FOR SOLUTION INTRAMUSCULAR; INTRAVENOUS at 21:59

## 2020-01-01 RX ADMIN — Medication 4.26 MICROGRAM(S)/KG/MIN: at 15:37

## 2020-01-01 RX ADMIN — Medication 500 MILLIGRAM(S): at 05:41

## 2020-01-01 RX ADMIN — MIDODRINE HYDROCHLORIDE 10 MILLIGRAM(S): 2.5 TABLET ORAL at 05:02

## 2020-01-01 RX ADMIN — Medication 27.5 MILLIGRAM(S): at 06:00

## 2020-01-01 RX ADMIN — DEXMEDETOMIDINE HYDROCHLORIDE IN 0.9% SODIUM CHLORIDE 5.68 MICROGRAM(S)/KG/HR: 4 INJECTION INTRAVENOUS at 20:59

## 2020-01-01 RX ADMIN — LEVETIRACETAM 1000 MILLIGRAM(S): 250 TABLET, FILM COATED ORAL at 06:39

## 2020-01-01 RX ADMIN — POLYETHYLENE GLYCOL 3350 17 GRAM(S): 17 POWDER, FOR SOLUTION ORAL at 12:36

## 2020-01-01 RX ADMIN — CHLORHEXIDINE GLUCONATE 15 MILLILITER(S): 213 SOLUTION TOPICAL at 17:41

## 2020-01-01 RX ADMIN — Medication 25 MILLIGRAM(S): at 05:08

## 2020-01-01 RX ADMIN — Medication 100 MILLIGRAM(S): at 16:56

## 2020-01-01 RX ADMIN — Medication 50 MILLILITER(S): at 16:06

## 2020-01-01 RX ADMIN — Medication 500 MILLIGRAM(S): at 18:31

## 2020-01-01 RX ADMIN — Medication 400 MILLIGRAM(S): at 23:25

## 2020-01-01 RX ADMIN — Medication 25 MILLIGRAM(S): at 20:37

## 2020-01-01 RX ADMIN — Medication 27.5 MILLIGRAM(S): at 18:03

## 2020-01-01 RX ADMIN — AMIODARONE HYDROCHLORIDE 33.3 MG/MIN: 400 TABLET ORAL at 00:02

## 2020-01-01 RX ADMIN — Medication 1 PACKET(S): at 00:15

## 2020-01-01 RX ADMIN — MIDODRINE HYDROCHLORIDE 10 MILLIGRAM(S): 2.5 TABLET ORAL at 05:12

## 2020-01-01 RX ADMIN — ENOXAPARIN SODIUM 30 MILLIGRAM(S): 100 INJECTION SUBCUTANEOUS at 11:34

## 2020-01-01 RX ADMIN — Medication 26.25 MILLIGRAM(S): at 14:09

## 2020-01-01 RX ADMIN — DEXMEDETOMIDINE HYDROCHLORIDE IN 0.9% SODIUM CHLORIDE 0.56 MICROGRAM(S)/KG/HR: 4 INJECTION INTRAVENOUS at 08:11

## 2020-01-01 RX ADMIN — Medication 200 MILLIGRAM(S): at 05:30

## 2020-01-01 RX ADMIN — DORNASE ALFA 2.5 MILLIGRAM(S): 1 SOLUTION RESPIRATORY (INHALATION) at 08:30

## 2020-01-01 RX ADMIN — Medication 62.5 MICROGRAM(S): at 23:25

## 2020-01-01 RX ADMIN — MIDODRINE HYDROCHLORIDE 10 MILLIGRAM(S): 2.5 TABLET ORAL at 14:59

## 2020-01-01 RX ADMIN — LEVETIRACETAM 1000 MILLIGRAM(S): 250 TABLET, FILM COATED ORAL at 17:56

## 2020-01-01 RX ADMIN — Medication 2.11 MICROGRAM(S)/KG/MIN: at 20:14

## 2020-01-01 RX ADMIN — CHLORHEXIDINE GLUCONATE 15 MILLILITER(S): 213 SOLUTION TOPICAL at 17:00

## 2020-01-01 RX ADMIN — Medication 125 MICROGRAM(S): at 05:08

## 2020-01-01 RX ADMIN — Medication 2.11 MICROGRAM(S)/KG/MIN: at 08:22

## 2020-01-01 RX ADMIN — PROPOFOL 2.72 MICROGRAM(S)/KG/MIN: 10 INJECTION, EMULSION INTRAVENOUS at 22:20

## 2020-01-01 RX ADMIN — CHLORHEXIDINE GLUCONATE 1 APPLICATION(S): 213 SOLUTION TOPICAL at 12:27

## 2020-01-01 RX ADMIN — CHLORHEXIDINE GLUCONATE 15 MILLILITER(S): 213 SOLUTION TOPICAL at 17:48

## 2020-01-01 RX ADMIN — MEMANTINE HYDROCHLORIDE 10 MILLIGRAM(S): 10 TABLET ORAL at 17:20

## 2020-01-01 RX ADMIN — Medication 1 TABLET(S): at 21:31

## 2020-01-01 RX ADMIN — CHLORHEXIDINE GLUCONATE 15 MILLILITER(S): 213 SOLUTION TOPICAL at 18:11

## 2020-01-01 RX ADMIN — Medication 1 TABLET(S): at 13:24

## 2020-01-01 RX ADMIN — LEVETIRACETAM 1000 MILLIGRAM(S): 250 TABLET, FILM COATED ORAL at 18:59

## 2020-01-01 RX ADMIN — DORNASE ALFA 2.5 MILLIGRAM(S): 1 SOLUTION RESPIRATORY (INHALATION) at 07:14

## 2020-01-01 RX ADMIN — CHLORHEXIDINE GLUCONATE 15 MILLILITER(S): 213 SOLUTION TOPICAL at 05:05

## 2020-01-01 RX ADMIN — ENOXAPARIN SODIUM 40 MILLIGRAM(S): 100 INJECTION SUBCUTANEOUS at 11:01

## 2020-01-01 RX ADMIN — LEVETIRACETAM 400 MILLIGRAM(S): 250 TABLET, FILM COATED ORAL at 04:05

## 2020-01-01 RX ADMIN — DORNASE ALFA 2.5 MILLIGRAM(S): 1 SOLUTION RESPIRATORY (INHALATION) at 10:30

## 2020-01-01 RX ADMIN — FAMOTIDINE 20 MILLIGRAM(S): 10 INJECTION INTRAVENOUS at 11:50

## 2020-01-01 RX ADMIN — Medication 62.5 MICROGRAM(S): at 21:30

## 2020-01-01 RX ADMIN — Medication 2.5 MILLILITER(S): at 00:32

## 2020-01-01 RX ADMIN — Medication 1 TABLET(S): at 05:14

## 2020-01-01 RX ADMIN — CHLORHEXIDINE GLUCONATE 1 APPLICATION(S): 213 SOLUTION TOPICAL at 13:32

## 2020-01-01 RX ADMIN — CHLORHEXIDINE GLUCONATE 1 APPLICATION(S): 213 SOLUTION TOPICAL at 12:10

## 2020-01-01 RX ADMIN — Medication 1 TABLET(S): at 20:33

## 2020-01-01 RX ADMIN — LEVETIRACETAM 1000 MILLIGRAM(S): 250 TABLET, FILM COATED ORAL at 05:57

## 2020-01-01 RX ADMIN — LEVETIRACETAM 400 MILLIGRAM(S): 250 TABLET, FILM COATED ORAL at 05:31

## 2020-01-01 RX ADMIN — Medication 26.25 MILLIGRAM(S): at 11:31

## 2020-01-01 RX ADMIN — POLYETHYLENE GLYCOL 3350 17 GRAM(S): 17 POWDER, FOR SOLUTION ORAL at 11:18

## 2020-01-01 RX ADMIN — LEVETIRACETAM 400 MILLIGRAM(S): 250 TABLET, FILM COATED ORAL at 05:24

## 2020-01-01 RX ADMIN — Medication 27.5 MILLIGRAM(S): at 17:50

## 2020-01-01 RX ADMIN — Medication 4.26 MICROGRAM(S)/KG/MIN: at 18:35

## 2020-01-01 RX ADMIN — Medication 1 PACKET(S): at 18:09

## 2020-01-01 RX ADMIN — ENOXAPARIN SODIUM 40 MILLIGRAM(S): 100 INJECTION SUBCUTANEOUS at 11:37

## 2020-01-01 RX ADMIN — Medication 125 MICROGRAM(S): at 05:09

## 2020-01-01 RX ADMIN — FAMOTIDINE 20 MILLIGRAM(S): 10 INJECTION INTRAVENOUS at 10:46

## 2020-01-01 RX ADMIN — Medication 50 MILLILITER(S): at 17:48

## 2020-01-01 RX ADMIN — DORNASE ALFA 2.5 MILLIGRAM(S): 1 SOLUTION RESPIRATORY (INHALATION) at 08:15

## 2020-01-01 RX ADMIN — Medication 27.5 MILLIGRAM(S): at 05:25

## 2020-01-01 RX ADMIN — PANTOPRAZOLE SODIUM 40 MILLIGRAM(S): 20 TABLET, DELAYED RELEASE ORAL at 11:17

## 2020-01-01 RX ADMIN — Medication 100 MILLIGRAM(S): at 21:21

## 2020-01-01 RX ADMIN — CHLORHEXIDINE GLUCONATE 1 APPLICATION(S): 213 SOLUTION TOPICAL at 12:00

## 2020-01-01 RX ADMIN — LEVETIRACETAM 400 MILLIGRAM(S): 250 TABLET, FILM COATED ORAL at 17:40

## 2020-01-01 RX ADMIN — LEVETIRACETAM 400 MILLIGRAM(S): 250 TABLET, FILM COATED ORAL at 05:30

## 2020-01-01 RX ADMIN — Medication 50 MILLILITER(S): at 05:22

## 2020-01-01 RX ADMIN — CHLORHEXIDINE GLUCONATE 15 MILLILITER(S): 213 SOLUTION TOPICAL at 17:34

## 2020-01-01 RX ADMIN — Medication 2 MILLIGRAM(S): at 22:41

## 2020-01-01 RX ADMIN — Medication 100 MILLIEQUIVALENT(S): at 12:44

## 2020-01-01 RX ADMIN — DEXMEDETOMIDINE HYDROCHLORIDE IN 0.9% SODIUM CHLORIDE 5.68 MICROGRAM(S)/KG/HR: 4 INJECTION INTRAVENOUS at 11:37

## 2020-01-01 RX ADMIN — Medication 650 MILLIGRAM(S): at 16:32

## 2020-01-01 RX ADMIN — PROPOFOL 2.72 MICROGRAM(S)/KG/MIN: 10 INJECTION, EMULSION INTRAVENOUS at 08:08

## 2020-01-01 RX ADMIN — LEVETIRACETAM 400 MILLIGRAM(S): 250 TABLET, FILM COATED ORAL at 18:15

## 2020-01-01 RX ADMIN — MIDODRINE HYDROCHLORIDE 10 MILLIGRAM(S): 2.5 TABLET ORAL at 11:20

## 2020-01-01 RX ADMIN — Medication 50 MILLILITER(S): at 23:24

## 2020-01-01 RX ADMIN — MIDODRINE HYDROCHLORIDE 10 MILLIGRAM(S): 2.5 TABLET ORAL at 13:30

## 2020-01-01 RX ADMIN — LEVETIRACETAM 400 MILLIGRAM(S): 250 TABLET, FILM COATED ORAL at 18:21

## 2020-01-01 RX ADMIN — PROPOFOL 10.8 MICROGRAM(S)/KG/MIN: 10 INJECTION, EMULSION INTRAVENOUS at 18:00

## 2020-01-01 RX ADMIN — SENNA PLUS 2 TABLET(S): 8.6 TABLET ORAL at 21:35

## 2020-01-01 RX ADMIN — Medication 5 MILLIGRAM(S): at 06:29

## 2020-01-01 RX ADMIN — ENOXAPARIN SODIUM 30 MILLIGRAM(S): 100 INJECTION SUBCUTANEOUS at 13:32

## 2020-01-01 RX ADMIN — DORNASE ALFA 2.5 MILLIGRAM(S): 1 SOLUTION RESPIRATORY (INHALATION) at 19:22

## 2020-01-01 RX ADMIN — Medication 2.5 MILLILITER(S): at 20:22

## 2020-01-01 RX ADMIN — Medication 125 MICROGRAM(S): at 05:57

## 2020-01-01 RX ADMIN — LEVETIRACETAM 400 MILLIGRAM(S): 250 TABLET, FILM COATED ORAL at 05:16

## 2020-01-01 RX ADMIN — PROPOFOL 2.72 MICROGRAM(S)/KG/MIN: 10 INJECTION, EMULSION INTRAVENOUS at 07:47

## 2020-01-01 RX ADMIN — LEVETIRACETAM 400 MILLIGRAM(S): 250 TABLET, FILM COATED ORAL at 05:20

## 2020-01-01 RX ADMIN — Medication 125 MICROGRAM(S): at 05:36

## 2020-01-01 RX ADMIN — Medication 4.26 MICROGRAM(S)/KG/MIN: at 13:09

## 2020-01-01 RX ADMIN — LEVETIRACETAM 1000 MILLIGRAM(S): 250 TABLET, FILM COATED ORAL at 17:24

## 2020-01-01 RX ADMIN — Medication 125 MILLIGRAM(S): at 17:42

## 2020-01-01 RX ADMIN — CEFEPIME 100 MILLIGRAM(S): 1 INJECTION, POWDER, FOR SOLUTION INTRAMUSCULAR; INTRAVENOUS at 19:38

## 2020-01-01 RX ADMIN — MIDODRINE HYDROCHLORIDE 10 MILLIGRAM(S): 2.5 TABLET ORAL at 14:05

## 2020-01-01 RX ADMIN — MEMANTINE HYDROCHLORIDE 5 MILLIGRAM(S): 10 TABLET ORAL at 12:23

## 2020-01-01 RX ADMIN — CHLORHEXIDINE GLUCONATE 1 APPLICATION(S): 213 SOLUTION TOPICAL at 13:35

## 2020-01-01 RX ADMIN — MIDAZOLAM HYDROCHLORIDE 2 MILLIGRAM(S): 1 INJECTION, SOLUTION INTRAMUSCULAR; INTRAVENOUS at 01:22

## 2020-01-01 RX ADMIN — Medication 27.5 MILLIGRAM(S): at 00:00

## 2020-01-01 RX ADMIN — SODIUM CHLORIDE 1000 MILLILITER(S): 9 INJECTION INTRAMUSCULAR; INTRAVENOUS; SUBCUTANEOUS at 10:59

## 2020-01-01 RX ADMIN — CHLORHEXIDINE GLUCONATE 1 APPLICATION(S): 213 SOLUTION TOPICAL at 13:27

## 2020-01-01 RX ADMIN — DEXMEDETOMIDINE HYDROCHLORIDE IN 0.9% SODIUM CHLORIDE 5.68 MICROGRAM(S)/KG/HR: 4 INJECTION INTRAVENOUS at 01:35

## 2020-01-01 RX ADMIN — CHLORHEXIDINE GLUCONATE 1 APPLICATION(S): 213 SOLUTION TOPICAL at 13:10

## 2020-01-01 RX ADMIN — Medication 4.26 MICROGRAM(S)/KG/MIN: at 12:48

## 2020-01-01 RX ADMIN — Medication 200 MILLIGRAM(S): at 18:08

## 2020-01-01 RX ADMIN — Medication 125 MICROGRAM(S): at 06:39

## 2020-01-01 RX ADMIN — MIDODRINE HYDROCHLORIDE 10 MILLIGRAM(S): 2.5 TABLET ORAL at 13:55

## 2020-01-01 RX ADMIN — Medication 40 MILLIGRAM(S): at 11:55

## 2020-01-01 RX ADMIN — Medication 80 MILLIGRAM(S): at 08:03

## 2020-01-01 RX ADMIN — POLYETHYLENE GLYCOL 3350 17 GRAM(S): 17 POWDER, FOR SOLUTION ORAL at 18:57

## 2020-01-01 RX ADMIN — AMIODARONE HYDROCHLORIDE 33.3 MG/MIN: 400 TABLET ORAL at 19:55

## 2020-01-01 RX ADMIN — MIDODRINE HYDROCHLORIDE 10 MILLIGRAM(S): 2.5 TABLET ORAL at 06:22

## 2020-01-01 RX ADMIN — ENOXAPARIN SODIUM 40 MILLIGRAM(S): 100 INJECTION SUBCUTANEOUS at 17:36

## 2020-01-01 RX ADMIN — Medication 1 TABLET(S): at 14:15

## 2020-01-01 RX ADMIN — Medication 500 MILLIGRAM(S): at 17:16

## 2020-01-01 RX ADMIN — LEVETIRACETAM 1000 MILLIGRAM(S): 250 TABLET, FILM COATED ORAL at 18:34

## 2020-01-01 RX ADMIN — MIDODRINE HYDROCHLORIDE 10 MILLIGRAM(S): 2.5 TABLET ORAL at 22:22

## 2020-01-01 RX ADMIN — LEVETIRACETAM 1000 MILLIGRAM(S): 250 TABLET, FILM COATED ORAL at 18:17

## 2020-01-01 RX ADMIN — Medication 4.26 MICROGRAM(S)/KG/MIN: at 10:07

## 2020-01-01 RX ADMIN — DEXMEDETOMIDINE HYDROCHLORIDE IN 0.9% SODIUM CHLORIDE 5.68 MICROGRAM(S)/KG/HR: 4 INJECTION INTRAVENOUS at 18:36

## 2020-01-01 RX ADMIN — Medication 27.5 MILLIGRAM(S): at 01:32

## 2020-01-01 RX ADMIN — AZITHROMYCIN 500 MILLIGRAM(S): 500 TABLET, FILM COATED ORAL at 12:32

## 2020-01-01 RX ADMIN — MIDODRINE HYDROCHLORIDE 10 MILLIGRAM(S): 2.5 TABLET ORAL at 14:47

## 2020-01-01 RX ADMIN — PROPOFOL 2.72 MICROGRAM(S)/KG/MIN: 10 INJECTION, EMULSION INTRAVENOUS at 07:21

## 2020-01-01 RX ADMIN — Medication 50 GRAM(S): at 17:35

## 2020-01-01 RX ADMIN — Medication 40 MILLIGRAM(S): at 10:51

## 2020-01-01 RX ADMIN — Medication 650 MILLIGRAM(S): at 15:18

## 2020-01-01 RX ADMIN — Medication 50 MILLIEQUIVALENT(S): at 02:45

## 2020-01-01 RX ADMIN — LEVETIRACETAM 400 MILLIGRAM(S): 250 TABLET, FILM COATED ORAL at 17:08

## 2020-01-01 RX ADMIN — MIDODRINE HYDROCHLORIDE 10 MILLIGRAM(S): 2.5 TABLET ORAL at 05:31

## 2020-01-01 RX ADMIN — MIDODRINE HYDROCHLORIDE 10 MILLIGRAM(S): 2.5 TABLET ORAL at 14:01

## 2020-01-01 RX ADMIN — LEVETIRACETAM 400 MILLIGRAM(S): 250 TABLET, FILM COATED ORAL at 17:44

## 2020-01-01 RX ADMIN — FAMOTIDINE 20 MILLIGRAM(S): 10 INJECTION INTRAVENOUS at 10:19

## 2020-01-01 RX ADMIN — Medication 100 MILLIEQUIVALENT(S): at 16:19

## 2020-01-01 RX ADMIN — Medication 100 MILLIGRAM(S): at 21:01

## 2020-01-01 RX ADMIN — LEVETIRACETAM 400 MILLIGRAM(S): 250 TABLET, FILM COATED ORAL at 18:44

## 2020-01-01 RX ADMIN — Medication 3 MILLILITER(S): at 00:32

## 2020-01-01 RX ADMIN — VASOPRESSIN 3.6 UNIT(S)/MIN: 20 INJECTION INTRAVENOUS at 08:50

## 2020-01-01 RX ADMIN — DEXMEDETOMIDINE HYDROCHLORIDE IN 0.9% SODIUM CHLORIDE 0.56 MICROGRAM(S)/KG/HR: 4 INJECTION INTRAVENOUS at 20:19

## 2020-01-01 RX ADMIN — MIDODRINE HYDROCHLORIDE 10 MILLIGRAM(S): 2.5 TABLET ORAL at 13:39

## 2020-01-01 RX ADMIN — Medication 5 MILLIGRAM(S): at 17:45

## 2020-01-01 RX ADMIN — FENTANYL CITRATE 2.27 MICROGRAM(S)/KG/HR: 50 INJECTION INTRAVENOUS at 02:56

## 2020-01-01 RX ADMIN — Medication 100 MILLIEQUIVALENT(S): at 10:58

## 2020-01-01 RX ADMIN — DEXMEDETOMIDINE HYDROCHLORIDE IN 0.9% SODIUM CHLORIDE 5.68 MICROGRAM(S)/KG/HR: 4 INJECTION INTRAVENOUS at 00:03

## 2020-01-01 RX ADMIN — CHLORHEXIDINE GLUCONATE 15 MILLILITER(S): 213 SOLUTION TOPICAL at 06:49

## 2020-01-01 RX ADMIN — DEXMEDETOMIDINE HYDROCHLORIDE IN 0.9% SODIUM CHLORIDE 5.68 MICROGRAM(S)/KG/HR: 4 INJECTION INTRAVENOUS at 22:43

## 2020-01-01 RX ADMIN — MIDODRINE HYDROCHLORIDE 10 MILLIGRAM(S): 2.5 TABLET ORAL at 14:12

## 2020-01-01 RX ADMIN — MIDODRINE HYDROCHLORIDE 10 MILLIGRAM(S): 2.5 TABLET ORAL at 13:11

## 2020-01-01 RX ADMIN — Medication 26.25 MILLIGRAM(S): at 21:12

## 2020-01-01 RX ADMIN — CHLORHEXIDINE GLUCONATE 15 MILLILITER(S): 213 SOLUTION TOPICAL at 05:40

## 2020-01-01 RX ADMIN — PANTOPRAZOLE SODIUM 40 MILLIGRAM(S): 20 TABLET, DELAYED RELEASE ORAL at 12:11

## 2020-01-01 RX ADMIN — Medication 62.5 MICROGRAM(S): at 21:05

## 2020-01-01 RX ADMIN — Medication 62.5 MICROGRAM(S): at 21:41

## 2020-01-01 RX ADMIN — LEVETIRACETAM 1000 MILLIGRAM(S): 250 TABLET, FILM COATED ORAL at 05:06

## 2020-01-01 RX ADMIN — Medication 20 MILLIGRAM(S): at 16:38

## 2020-01-01 RX ADMIN — Medication 100 MILLIEQUIVALENT(S): at 12:42

## 2020-01-01 RX ADMIN — Medication 26.25 MILLIGRAM(S): at 13:31

## 2020-01-01 RX ADMIN — MIDODRINE HYDROCHLORIDE 10 MILLIGRAM(S): 2.5 TABLET ORAL at 13:09

## 2020-01-01 RX ADMIN — ENOXAPARIN SODIUM 30 MILLIGRAM(S): 100 INJECTION SUBCUTANEOUS at 11:28

## 2020-01-01 RX ADMIN — Medication 2.11 MICROGRAM(S)/KG/MIN: at 10:46

## 2020-01-01 RX ADMIN — CHLORHEXIDINE GLUCONATE 15 MILLILITER(S): 213 SOLUTION TOPICAL at 05:57

## 2020-01-01 RX ADMIN — Medication 50 MILLIGRAM(S): at 20:28

## 2020-01-01 RX ADMIN — FENTANYL CITRATE 100 MICROGRAM(S): 50 INJECTION INTRAVENOUS at 01:12

## 2020-01-01 RX ADMIN — Medication 25 MILLIGRAM(S): at 13:35

## 2020-01-01 RX ADMIN — LEVETIRACETAM 1000 MILLIGRAM(S): 250 TABLET, FILM COATED ORAL at 17:57

## 2020-01-01 RX ADMIN — Medication 1 TABLET(S): at 22:01

## 2020-01-01 RX ADMIN — Medication 50 MILLILITER(S): at 00:24

## 2020-01-01 RX ADMIN — POLYETHYLENE GLYCOL 3350 17 GRAM(S): 17 POWDER, FOR SOLUTION ORAL at 11:34

## 2020-01-01 RX ADMIN — CHLORHEXIDINE GLUCONATE 15 MILLILITER(S): 213 SOLUTION TOPICAL at 18:59

## 2020-01-01 RX ADMIN — Medication 20 MILLIGRAM(S): at 15:34

## 2020-01-01 RX ADMIN — MIDODRINE HYDROCHLORIDE 10 MILLIGRAM(S): 2.5 TABLET ORAL at 21:39

## 2020-01-01 RX ADMIN — Medication 3 MILLILITER(S): at 20:22

## 2020-01-01 RX ADMIN — Medication 27.5 MILLIGRAM(S): at 04:06

## 2020-01-01 RX ADMIN — Medication 27.5 MILLIGRAM(S): at 12:21

## 2020-01-01 RX ADMIN — POLYETHYLENE GLYCOL 3350 17 GRAM(S): 17 POWDER, FOR SOLUTION ORAL at 10:54

## 2020-01-01 RX ADMIN — Medication 27.5 MILLIGRAM(S): at 12:24

## 2020-01-01 RX ADMIN — DORNASE ALFA 2.5 MILLIGRAM(S): 1 SOLUTION RESPIRATORY (INHALATION) at 08:42

## 2020-01-01 RX ADMIN — Medication 2.11 MICROGRAM(S)/KG/MIN: at 08:21

## 2020-01-01 RX ADMIN — PROPOFOL 2.7 MICROGRAM(S)/KG/MIN: 10 INJECTION, EMULSION INTRAVENOUS at 23:10

## 2020-01-01 RX ADMIN — Medication 27.5 MILLIGRAM(S): at 16:35

## 2020-01-01 RX ADMIN — Medication 650 MILLIGRAM(S): at 09:50

## 2020-01-01 RX ADMIN — Medication 50 MILLIEQUIVALENT(S): at 01:45

## 2020-01-01 RX ADMIN — MIDODRINE HYDROCHLORIDE 10 MILLIGRAM(S): 2.5 TABLET ORAL at 21:09

## 2020-01-01 RX ADMIN — Medication 4.26 MICROGRAM(S)/KG/MIN: at 06:30

## 2020-01-01 RX ADMIN — POLYETHYLENE GLYCOL 3350 17 GRAM(S): 17 POWDER, FOR SOLUTION ORAL at 12:23

## 2020-01-01 RX ADMIN — Medication 125 MILLIGRAM(S): at 05:40

## 2020-01-01 RX ADMIN — PANTOPRAZOLE SODIUM 40 MILLIGRAM(S): 20 TABLET, DELAYED RELEASE ORAL at 11:56

## 2020-01-01 RX ADMIN — Medication 12.5 MILLIGRAM(S): at 17:12

## 2020-01-01 RX ADMIN — AZITHROMYCIN 255 MILLIGRAM(S): 500 TABLET, FILM COATED ORAL at 11:32

## 2020-01-01 RX ADMIN — SODIUM CHLORIDE 1950 MILLILITER(S): 9 INJECTION INTRAMUSCULAR; INTRAVENOUS; SUBCUTANEOUS at 15:30

## 2020-01-01 RX ADMIN — SODIUM CHLORIDE 1000 MILLILITER(S): 9 INJECTION INTRAMUSCULAR; INTRAVENOUS; SUBCUTANEOUS at 06:25

## 2020-01-01 RX ADMIN — Medication 400 MILLIGRAM(S): at 11:22

## 2020-01-01 RX ADMIN — MIDODRINE HYDROCHLORIDE 10 MILLIGRAM(S): 2.5 TABLET ORAL at 21:18

## 2020-01-01 RX ADMIN — Medication 1 TABLET(S): at 14:06

## 2020-01-01 RX ADMIN — LEVETIRACETAM 1000 MILLIGRAM(S): 250 TABLET, FILM COATED ORAL at 17:20

## 2020-01-01 RX ADMIN — PROPOFOL 2.72 MICROGRAM(S)/KG/MIN: 10 INJECTION, EMULSION INTRAVENOUS at 00:07

## 2020-01-01 RX ADMIN — SENNA PLUS 2 TABLET(S): 8.6 TABLET ORAL at 23:57

## 2020-01-01 RX ADMIN — Medication 4 MILLILITER(S): at 09:14

## 2020-01-01 RX ADMIN — LEVETIRACETAM 400 MILLIGRAM(S): 250 TABLET, FILM COATED ORAL at 05:40

## 2020-01-01 RX ADMIN — Medication 50 MILLIEQUIVALENT(S): at 14:04

## 2020-01-01 RX ADMIN — LEVETIRACETAM 400 MILLIGRAM(S): 250 TABLET, FILM COATED ORAL at 05:14

## 2020-01-01 RX ADMIN — Medication 500 MILLIGRAM(S): at 17:31

## 2020-01-01 RX ADMIN — Medication 26.25 MILLIGRAM(S): at 03:40

## 2020-01-01 RX ADMIN — CHLORHEXIDINE GLUCONATE 15 MILLILITER(S): 213 SOLUTION TOPICAL at 18:09

## 2020-01-01 RX ADMIN — SODIUM CHLORIDE 1000 MILLILITER(S): 9 INJECTION INTRAMUSCULAR; INTRAVENOUS; SUBCUTANEOUS at 11:32

## 2020-01-01 RX ADMIN — SODIUM CHLORIDE 100 MILLILITER(S): 9 INJECTION, SOLUTION INTRAVENOUS at 17:58

## 2020-01-01 RX ADMIN — Medication 1 TABLET(S): at 13:40

## 2020-01-01 RX ADMIN — Medication 100 MILLIGRAM(S): at 18:19

## 2020-01-01 RX ADMIN — Medication 125 MICROGRAM(S): at 05:40

## 2020-01-01 RX ADMIN — LEVETIRACETAM 400 MILLIGRAM(S): 250 TABLET, FILM COATED ORAL at 04:08

## 2020-01-01 RX ADMIN — POTASSIUM PHOSPHATE, MONOBASIC POTASSIUM PHOSPHATE, DIBASIC 83.33 MILLIMOLE(S): 236; 224 INJECTION, SOLUTION INTRAVENOUS at 11:23

## 2020-01-01 RX ADMIN — ENOXAPARIN SODIUM 40 MILLIGRAM(S): 100 INJECTION SUBCUTANEOUS at 19:26

## 2020-01-01 RX ADMIN — Medication 7 MILLIGRAM(S): at 05:39

## 2020-01-01 RX ADMIN — CHLORHEXIDINE GLUCONATE 1 APPLICATION(S): 213 SOLUTION TOPICAL at 13:00

## 2020-01-01 RX ADMIN — POLYETHYLENE GLYCOL 3350 17 GRAM(S): 17 POWDER, FOR SOLUTION ORAL at 11:20

## 2020-01-01 RX ADMIN — SODIUM CHLORIDE 75 MILLILITER(S): 9 INJECTION, SOLUTION INTRAVENOUS at 10:55

## 2020-01-01 RX ADMIN — DEXMEDETOMIDINE HYDROCHLORIDE IN 0.9% SODIUM CHLORIDE 5.68 MICROGRAM(S)/KG/HR: 4 INJECTION INTRAVENOUS at 08:27

## 2020-01-01 RX ADMIN — Medication 125 MICROGRAM(S): at 05:38

## 2020-01-01 RX ADMIN — Medication 50 MILLIEQUIVALENT(S): at 03:37

## 2020-01-01 RX ADMIN — DEXMEDETOMIDINE HYDROCHLORIDE IN 0.9% SODIUM CHLORIDE 0.56 MICROGRAM(S)/KG/HR: 4 INJECTION INTRAVENOUS at 08:50

## 2020-01-01 RX ADMIN — Medication 100 MILLIEQUIVALENT(S): at 06:10

## 2020-01-01 RX ADMIN — ENOXAPARIN SODIUM 40 MILLIGRAM(S): 100 INJECTION SUBCUTANEOUS at 18:10

## 2020-01-01 RX ADMIN — QUETIAPINE FUMARATE 25 MILLIGRAM(S): 200 TABLET, FILM COATED ORAL at 11:01

## 2020-01-01 RX ADMIN — LEVETIRACETAM 400 MILLIGRAM(S): 250 TABLET, FILM COATED ORAL at 17:41

## 2020-01-01 RX ADMIN — Medication 27.5 MILLIGRAM(S): at 13:24

## 2020-01-01 RX ADMIN — Medication 62.5 MICROGRAM(S): at 21:51

## 2020-01-01 RX ADMIN — SODIUM CHLORIDE 75 MILLILITER(S): 9 INJECTION, SOLUTION INTRAVENOUS at 03:39

## 2020-01-01 RX ADMIN — Medication 25 MILLIGRAM(S): at 18:08

## 2020-01-01 RX ADMIN — PANTOPRAZOLE SODIUM 40 MILLIGRAM(S): 20 TABLET, DELAYED RELEASE ORAL at 11:21

## 2020-01-01 RX ADMIN — Medication 62.5 MICROGRAM(S): at 21:18

## 2020-01-01 RX ADMIN — Medication 5 MILLIGRAM(S): at 16:37

## 2020-01-01 RX ADMIN — VASOPRESSIN 3.6 UNIT(S)/MIN: 20 INJECTION INTRAVENOUS at 00:05

## 2020-01-01 RX ADMIN — CHLORHEXIDINE GLUCONATE 15 MILLILITER(S): 213 SOLUTION TOPICAL at 17:44

## 2020-01-01 RX ADMIN — LEVETIRACETAM 400 MILLIGRAM(S): 250 TABLET, FILM COATED ORAL at 17:49

## 2020-01-01 RX ADMIN — Medication 27.5 MILLIGRAM(S): at 06:11

## 2020-01-01 RX ADMIN — Medication 27.5 MILLIGRAM(S): at 11:57

## 2020-01-01 RX ADMIN — Medication 26.25 MILLIGRAM(S): at 21:19

## 2020-01-01 RX ADMIN — ENOXAPARIN SODIUM 30 MILLIGRAM(S): 100 INJECTION SUBCUTANEOUS at 12:00

## 2020-01-01 RX ADMIN — Medication 62.5 MICROGRAM(S): at 22:35

## 2020-01-01 RX ADMIN — CHLORHEXIDINE GLUCONATE 15 MILLILITER(S): 213 SOLUTION TOPICAL at 05:29

## 2020-01-01 RX ADMIN — CEFEPIME 100 MILLIGRAM(S): 1 INJECTION, POWDER, FOR SOLUTION INTRAMUSCULAR; INTRAVENOUS at 06:13

## 2020-01-01 RX ADMIN — SODIUM CHLORIDE 50 MILLILITER(S): 9 INJECTION, SOLUTION INTRAVENOUS at 19:51

## 2020-01-01 RX ADMIN — MIDODRINE HYDROCHLORIDE 10 MILLIGRAM(S): 2.5 TABLET ORAL at 05:07

## 2020-01-01 RX ADMIN — MIDODRINE HYDROCHLORIDE 10 MILLIGRAM(S): 2.5 TABLET ORAL at 04:05

## 2020-01-01 RX ADMIN — DEXMEDETOMIDINE HYDROCHLORIDE IN 0.9% SODIUM CHLORIDE 0.56 MICROGRAM(S)/KG/HR: 4 INJECTION INTRAVENOUS at 10:55

## 2020-01-01 RX ADMIN — Medication 100 MILLIGRAM(S): at 22:01

## 2020-01-01 RX ADMIN — POLYETHYLENE GLYCOL 3350 17 GRAM(S): 17 POWDER, FOR SOLUTION ORAL at 11:30

## 2020-01-01 RX ADMIN — CHLORHEXIDINE GLUCONATE 15 MILLILITER(S): 213 SOLUTION TOPICAL at 05:16

## 2020-01-01 RX ADMIN — MIDODRINE HYDROCHLORIDE 10 MILLIGRAM(S): 2.5 TABLET ORAL at 21:20

## 2020-01-01 RX ADMIN — Medication 650 MILLIGRAM(S): at 16:24

## 2020-01-01 RX ADMIN — Medication 100 MILLIEQUIVALENT(S): at 17:29

## 2020-01-01 RX ADMIN — Medication 62.5 MICROGRAM(S): at 22:56

## 2020-01-01 RX ADMIN — PROPOFOL 4.05 MICROGRAM(S)/KG/MIN: 10 INJECTION, EMULSION INTRAVENOUS at 00:09

## 2020-01-01 RX ADMIN — DIVALPROEX SODIUM 250 MILLIGRAM(S): 500 TABLET, DELAYED RELEASE ORAL at 21:37

## 2020-01-01 RX ADMIN — Medication 26.25 MILLIGRAM(S): at 05:41

## 2020-01-01 RX ADMIN — DEXMEDETOMIDINE HYDROCHLORIDE IN 0.9% SODIUM CHLORIDE 4.54 MICROGRAM(S)/KG/HR: 4 INJECTION INTRAVENOUS at 21:47

## 2020-01-01 RX ADMIN — Medication 27.5 MILLIGRAM(S): at 18:13

## 2020-01-01 RX ADMIN — LEVETIRACETAM 1000 MILLIGRAM(S): 250 TABLET, FILM COATED ORAL at 05:10

## 2020-01-01 RX ADMIN — Medication 1 TABLET(S): at 13:09

## 2020-01-01 RX ADMIN — LEVETIRACETAM 400 MILLIGRAM(S): 250 TABLET, FILM COATED ORAL at 05:17

## 2020-01-01 RX ADMIN — Medication 62.5 MICROGRAM(S): at 21:50

## 2020-01-01 RX ADMIN — DEXMEDETOMIDINE HYDROCHLORIDE IN 0.9% SODIUM CHLORIDE 0.56 MICROGRAM(S)/KG/HR: 4 INJECTION INTRAVENOUS at 21:03

## 2020-01-01 RX ADMIN — MIDODRINE HYDROCHLORIDE 10 MILLIGRAM(S): 2.5 TABLET ORAL at 14:16

## 2020-01-01 RX ADMIN — Medication 5 MILLIGRAM(S): at 06:13

## 2020-01-01 RX ADMIN — CHLORHEXIDINE GLUCONATE 1 APPLICATION(S): 213 SOLUTION TOPICAL at 11:05

## 2020-01-01 RX ADMIN — ENOXAPARIN SODIUM 30 MILLIGRAM(S): 100 INJECTION SUBCUTANEOUS at 13:00

## 2020-01-01 RX ADMIN — MIDODRINE HYDROCHLORIDE 10 MILLIGRAM(S): 2.5 TABLET ORAL at 13:01

## 2020-01-01 RX ADMIN — MEMANTINE HYDROCHLORIDE 10 MILLIGRAM(S): 10 TABLET ORAL at 18:12

## 2020-01-01 RX ADMIN — Medication 125 MICROGRAM(S): at 05:20

## 2020-01-01 RX ADMIN — MIDODRINE HYDROCHLORIDE 10 MILLIGRAM(S): 2.5 TABLET ORAL at 13:14

## 2020-01-01 RX ADMIN — ACETAZOLAMIDE 105 MILLIGRAM(S): 250 TABLET ORAL at 14:35

## 2020-01-01 RX ADMIN — Medication 27.5 MILLIGRAM(S): at 17:21

## 2020-01-01 RX ADMIN — Medication 62.5 MICROGRAM(S): at 23:32

## 2020-01-01 RX ADMIN — Medication 4 MILLILITER(S): at 13:23

## 2020-01-01 RX ADMIN — DEXMEDETOMIDINE HYDROCHLORIDE IN 0.9% SODIUM CHLORIDE 0.56 MICROGRAM(S)/KG/HR: 4 INJECTION INTRAVENOUS at 23:20

## 2020-01-01 RX ADMIN — Medication 50 MILLIGRAM(S): at 05:11

## 2020-01-01 RX ADMIN — LEVETIRACETAM 400 MILLIGRAM(S): 250 TABLET, FILM COATED ORAL at 05:29

## 2020-01-01 RX ADMIN — ENOXAPARIN SODIUM 30 MILLIGRAM(S): 100 INJECTION SUBCUTANEOUS at 12:07

## 2020-01-01 RX ADMIN — ENOXAPARIN SODIUM 40 MILLIGRAM(S): 100 INJECTION SUBCUTANEOUS at 05:22

## 2020-01-01 RX ADMIN — QUETIAPINE FUMARATE 25 MILLIGRAM(S): 200 TABLET, FILM COATED ORAL at 12:04

## 2020-01-01 RX ADMIN — Medication 85 MILLIMOLE(S): at 16:30

## 2020-01-01 RX ADMIN — Medication 1 TABLET(S): at 13:14

## 2020-01-01 RX ADMIN — MIDODRINE HYDROCHLORIDE 10 MILLIGRAM(S): 2.5 TABLET ORAL at 05:21

## 2020-01-01 RX ADMIN — CHLORHEXIDINE GLUCONATE 1 APPLICATION(S): 213 SOLUTION TOPICAL at 11:48

## 2020-01-01 RX ADMIN — MIDAZOLAM HYDROCHLORIDE 4 MILLIGRAM(S): 1 INJECTION, SOLUTION INTRAMUSCULAR; INTRAVENOUS at 04:17

## 2020-01-01 RX ADMIN — MIDAZOLAM HYDROCHLORIDE 2 MILLIGRAM(S): 1 INJECTION, SOLUTION INTRAMUSCULAR; INTRAVENOUS at 12:15

## 2020-01-01 RX ADMIN — Medication 2.11 MICROGRAM(S)/KG/MIN: at 23:21

## 2020-01-01 RX ADMIN — LEVETIRACETAM 400 MILLIGRAM(S): 250 TABLET, FILM COATED ORAL at 17:25

## 2020-01-01 RX ADMIN — ENOXAPARIN SODIUM 40 MILLIGRAM(S): 100 INJECTION SUBCUTANEOUS at 17:34

## 2020-01-01 RX ADMIN — Medication 27.5 MILLIGRAM(S): at 16:46

## 2020-01-01 RX ADMIN — Medication 27.5 MILLIGRAM(S): at 17:53

## 2020-01-01 RX ADMIN — Medication 26.25 MILLIGRAM(S): at 21:42

## 2020-01-01 RX ADMIN — Medication 500 MILLIGRAM(S): at 05:38

## 2020-01-01 RX ADMIN — MEMANTINE HYDROCHLORIDE 5 MILLIGRAM(S): 10 TABLET ORAL at 11:27

## 2020-01-01 RX ADMIN — CHLORHEXIDINE GLUCONATE 15 MILLILITER(S): 213 SOLUTION TOPICAL at 06:32

## 2020-01-01 RX ADMIN — ENOXAPARIN SODIUM 40 MILLIGRAM(S): 100 INJECTION SUBCUTANEOUS at 05:30

## 2020-01-01 RX ADMIN — CHLORHEXIDINE GLUCONATE 15 MILLILITER(S): 213 SOLUTION TOPICAL at 04:03

## 2020-01-01 RX ADMIN — CHLORHEXIDINE GLUCONATE 15 MILLILITER(S): 213 SOLUTION TOPICAL at 05:03

## 2020-04-04 NOTE — H&P ADULT - PROBLEM SELECTOR PLAN 1
admitted to [facility] with puls eox  will start azithromycin  pendign covid test  will consult pulmonology (dr foster)  pending covid labs

## 2020-04-04 NOTE — CONSULT NOTE ADULT - SUBJECTIVE AND OBJECTIVE BOX
Nephrology Consultation: MD JOSE ANGEL Cain LEONA  60y    HPI:  Patient is a 60y old  Female who presents with a chief complaint of sob and fever    HPI: 60 with Down's syndrome, nonverbal pt bib ems from shelter for fever, sob, cough, low o2 sat, with possible covid exposure. per ems, pt on levaquin for aspiration pna. no further hx available.pt has hx of chronic constipation an dis on multiple stool softners.  she was found ot be hypernatremic and in renal failure on admission, also has hx of hypothyroidism.     PAST MEDICAL & SURGICAL HISTORY:  Frequent urinary tract infections  Hypothyroidism, unspecified type  Down syndrome      FAMILY HISTORY: can not obtain due to mental status      SOCIAL HISTORY:    Allergies    amoxicillin (Rash)  penicillin (Rash)    Intolerances    REVIEW OF SYSEMS:  negative except form above mentioned      Vital Signs Last 24 Hrs  T(C): 37.8 (04 Apr 2020 09:46), Max: 37.8 (04 Apr 2020 09:46)  T(F): 100 (04 Apr 2020 09:46), Max: 100 (04 Apr 2020 09:46)  HR: 68 (04 Apr 2020 11:00) (66 - 69)  BP: 89/52 (04 Apr 2020 11:00) (87/51 - 91/52)  BP(mean): --  RR: 24 (04 Apr 2020 11:00) (24 - 24)  SpO2: 100% (04 Apr 2020 11:00) (89% - 100%)  I&O's Summary      PHYSICAL EXAM:  GENERAL: onnc  HEAD:  Atraumatic, Normocephalic  EYES: EOMI, PERRLA, conjunctiva and sclera clear  NECK: Supple, No JVD  CHEST/LUNG: dec bs at bases and rhonchi  HEART: Regular rate and rhythm; No murmurs, rubs, or gallops  ABDOMEN: Soft, Nontender, Nondistended; Bowel sounds present  SKIN: No rashes or lesions    LABS:                        14.7   2.88  )-----------( 52       ( 04 Apr 2020 10:49 )             45.9     04-04    154<H>  |  117<H>  |  31<H>  ----------------------------<  107<H>  4.7   |  30  |  1.48<H>    Ca    8.5      04 Apr 2020 10:49    TPro  7.1  /  Alb  2.3<L>  /  TBili  0.2  /  DBili  x   /  AST  73<H>  /  ALT  66<H>  /  AlkPhos  118  04-04

## 2020-04-04 NOTE — ED PROVIDER NOTE - OBJECTIVE STATEMENT
nonverbal pt bib ems from [facility] for fever, sob, cough, low o2 sat, with possible covid exposure. per ems, pt on levaquin for aspiration pna. no further hx available.  pmd - tarsha

## 2020-04-04 NOTE — H&P ADULT - ASSESSMENT
This is a nonverbal pt bib ems from [facility] for fever, sob, cough, low o2 sat, with possible covid exposure. per ems, pt on levaquin for aspiration pna. no further hx available.pt has hx of chronic constipation an dis on multiple stool softners.  she was found ot be hypernatremic and in renal failure at admission.  also has hx of hypothyroidism and down syndrome

## 2020-04-04 NOTE — H&P ADULT - HISTORY OF PRESENT ILLNESS
Patient is a 60y old  Female who presents with a chief complaint of sob and fever    HPI:This is a nonverbal pt bib ems from [facility] for fever, sob, cough, low o2 sat, with possible covid exposure. per ems, pt on levaquin for aspiration pna. no further hx available.pt has hx of chronic constipation an dis on multiple stool softners.  she was found ot be hypernatremic and in renal failure at admission.  also has hx of hypothyroidism and down syndrome        PAST MEDICAL & SURGICAL HISTORY:  Frequent urinary tract infections  Hypothyroidism, unspecified type  Down syndrome      FAMILY HISTORY:can not obtain due to mental status      SOCIAL HISTORY:    Allergies    amoxicillin (Rash)  penicillin (Rash)    Intolerances          REVIEW OF SYSEMS:  negative except form above mentioned      Vital Signs Last 24 Hrs  T(C): 37.8 (04 Apr 2020 09:46), Max: 37.8 (04 Apr 2020 09:46)  T(F): 100 (04 Apr 2020 09:46), Max: 100 (04 Apr 2020 09:46)  HR: 68 (04 Apr 2020 11:00) (66 - 69)  BP: 89/52 (04 Apr 2020 11:00) (87/51 - 91/52)  BP(mean): --  RR: 24 (04 Apr 2020 11:00) (24 - 24)  SpO2: 100% (04 Apr 2020 11:00) (89% - 100%)  I&O's Summary      PHYSICAL EXAM:  GENERAL: onnc  HEAD:  Atraumatic, Normocephalic  EYES: EOMI, PERRLA, conjunctiva and sclera clear  NECK: Supple, No JVD  CHEST/LUNG: dec bs at bases and rhonchi  HEART: Regular rate and rhythm; No murmurs, rubs, or gallops  ABDOMEN: Soft, Nontender, Nondistended; Bowel sounds present  SKIN: No rashes or lesions    LABS:                        14.7   2.88  )-----------( 52       ( 04 Apr 2020 10:49 )             45.9     04-04    154<H>  |  117<H>  |  31<H>  ----------------------------<  107<H>  4.7   |  30  |  1.48<H>    Ca    8.5      04 Apr 2020 10:49    TPro  7.1  /  Alb  2.3<L>  /  TBili  0.2  /  DBili  x   /  AST  73<H>  /  ALT  66<H>  /  AlkPhos  118  04-04      CAPILLARY BLOOD GLUCOSE                RADIOLOGY & ADDITIONAL TESTS:    Imaging Personally Reviewed:  [x] YES  [ ] NO    Consultant(s) Notes Reviewed:  [x] YES  [ ] NO      MEDICATIONS  (STANDING):  azithromycin   Tablet 500 milliGRAM(s) Oral daily  dextrose 5%. 1000 milliLiter(s) (100 mL/Hr) IV Continuous <Continuous>  diVALproex  milliGRAM(s) Oral three times a day  enoxaparin Injectable 30 milliGRAM(s) SubCutaneous daily  levETIRAcetam 1000 milliGRAM(s) Oral two times a day  levothyroxine 125 MICROGram(s) Oral daily  memantine 5 milliGRAM(s) Oral daily  pantoprazole    Tablet 40 milliGRAM(s) Oral before breakfast  polyethylene glycol 3350 17 Gram(s) Oral daily    MEDICATIONS  (PRN):  acetaminophen   Tablet .. 650 milliGRAM(s) Oral every 6 hours PRN Temp greater or equal to 38C (100.4F), Mild Pain (1 - 3)      Care Discussed with Consultants/Other Providers [x] YES  [ ] NO    HEALTH ISSUES - PROBLEM Dx:

## 2020-04-04 NOTE — CONSULT NOTE ADULT - ASSESSMENT
60 with MR, Down's syndrome now admitted with fever, cough and SOB. Now being ruled out for covid PNA.   ADAM due to pre renal azotemia and hypernatremia due decreased free water intake. Continue the IVF with D5W at 100 cc an hour. will follow.

## 2020-04-04 NOTE — ED PROVIDER NOTE - CARE PLAN
Principal Discharge DX:	Suspected [facility] coronavirus infection  Secondary Diagnosis:	PNA (pneumonia)  Secondary Diagnosis:	Hypoxia

## 2020-04-04 NOTE — ED ADULT NURSE NOTE - NSIMPLEMENTINTERV_GEN_ALL_ED
Implemented All Fall with Harm Risk Interventions:  [place]olen to call system. Call bell, personal items and telephone within reach. Instruct patient to call for assistance. Room bathroom lighting operational. Non-slip footwear when patient is off stretcher. Physically safe environment: no spills, clutter or unnecessary equipment. Stretcher in lowest position, wheels locked, appropriate side rails in place. Provide visual cue, wrist band, yellow gown, etc. Monitor gait and stability. Monitor for mental status changes and reorient to person, place, and time. Review medications for side effects contributing to fall risk. Reinforce activity limits and safety measures with patient and family. Provide visual clues: red socks.

## 2020-04-04 NOTE — CONSULT NOTE ADULT - SUBJECTIVE AND OBJECTIVE BOX
MIGNON WALTER    SY EDIP 13    Patient is a 60y old  Female who presents with a chief complaint of sob (04 Apr 2020 13:11)       Allergies    amoxicillin (Rash)  penicillin (Rash)    Intolerances        HPI:  Patient is a 60y old  Female who presents with a chief complaint of sob and fever    HPI:This is a nonverbal pt bib ems from [facility] living for fever, sob, cough, low o2 sat, with possible covid exposure. per ems, pt on levaquin for aspiration pna. no further hx available.pt has hx of chronic constipation an dis on multiple stool softners.  she was found ot be hypernatremic and in renal failure at admission.  also has hx of hypothyroidism and down syndrome        PAST MEDICAL & SURGICAL HISTORY:  Frequent urinary tract infections  Hypothyroidism, unspecified type  Down syndrome      FAMILY HISTORY:can not obtain due to mental status      SOCIAL HISTORY:    Allergies    amoxicillin (Rash)  penicillin (Rash)    Intolerances          REVIEW OF SYSEMS:  negative except form above mentioned      Vital Signs Last 24 Hrs  T(C): 37.8 (04 Apr 2020 09:46), Max: 37.8 (04 Apr 2020 09:46)  T(F): 100 (04 Apr 2020 09:46), Max: 100 (04 Apr 2020 09:46)  HR: 68 (04 Apr 2020 11:00) (66 - 69)  BP: 89/52 (04 Apr 2020 11:00) (87/51 - 91/52)  BP(mean): --  RR: 24 (04 Apr 2020 11:00) (24 - 24)  SpO2: 100% (04 Apr 2020 11:00) (89% - 100%)  I&O's Summary      PHYSICAL EXAM:  GENERAL: onnc  HEAD:  Atraumatic, Normocephalic  EYES: EOMI, PERRLA, conjunctiva and sclera clear  NECK: Supple, No JVD  CHEST/LUNG: dec bs at bases and rhonchi  HEART: Regular rate and rhythm; No murmurs, rubs, or gallops  ABDOMEN: Soft, Nontender, Nondistended; Bowel sounds present  SKIN: No rashes or lesions    LABS:                        14.7   2.88  )-----------( 52       ( 04 Apr 2020 10:49 )             45.9     04-04    154<H>  |  117<H>  |  31<H>  ----------------------------<  107<H>  4.7   |  30  |  1.48<H>    Ca    8.5      04 Apr 2020 10:49    TPro  7.1  /  Alb  2.3<L>  /  TBili  0.2  /  DBili  x   /  AST  73<H>  /  ALT  66<H>  /  AlkPhos  118  04-04      CAPILLARY BLOOD GLUCOSE                RADIOLOGY & ADDITIONAL TESTS:    Imaging Personally Reviewed:  [x] YES  [ ] NO    Consultant(s) Notes Reviewed:  [x] YES  [ ] NO      MEDICATIONS  (STANDING):  azithromycin   Tablet 500 milliGRAM(s) Oral daily  dextrose 5%. 1000 milliLiter(s) (100 mL/Hr) IV Continuous <Continuous>  diVALproex  milliGRAM(s) Oral three times a day  enoxaparin Injectable 30 milliGRAM(s) SubCutaneous daily  levETIRAcetam 1000 milliGRAM(s) Oral two times a day  levothyroxine 125 MICROGram(s) Oral daily  memantine 5 milliGRAM(s) Oral daily  pantoprazole    Tablet 40 milliGRAM(s) Oral before breakfast  polyethylene glycol 3350 17 Gram(s) Oral daily    MEDICATIONS  (PRN):  acetaminophen   Tablet .. 650 milliGRAM(s) Oral every 6 hours PRN Temp greater or equal to 38C (100.4F), Mild Pain (1 - 3)      Care Discussed with Consultants/Other Providers [x] YES  [ ] NO    HEALTH ISSUES - PROBLEM Dx: (04 Apr 2020 13:11)      PAST MEDICAL & SURGICAL HISTORY:  Frequent urinary tract infections  Hypothyroidism, unspecified type  Down syndrome      FAMILY HISTORY:        MEDICATIONS   acetaminophen   Tablet .. 650 milliGRAM(s) Oral every 6 hours PRN  ALBUTerol    90 MICROgram(s) HFA Inhaler 2 Puff(s) Inhalation every 6 hours PRN  azithromycin   Tablet 500 milliGRAM(s) Oral daily  dextrose 5%. 1000 milliLiter(s) IV Continuous <Continuous>  diVALproex  milliGRAM(s) Oral three times a day  enoxaparin Injectable 30 milliGRAM(s) SubCutaneous daily  levETIRAcetam 1000 milliGRAM(s) Oral two times a day  levothyroxine 125 MICROGram(s) Oral daily  memantine 5 milliGRAM(s) Oral daily  pantoprazole    Tablet 40 milliGRAM(s) Oral before breakfast  polyethylene glycol 3350 17 Gram(s) Oral daily      Vital Signs Last 24 Hrs  T(C): 37.8 (04 Apr 2020 09:46), Max: 37.8 (04 Apr 2020 09:46)  T(F): 100 (04 Apr 2020 09:46), Max: 100 (04 Apr 2020 09:46)  HR: 99 (04 Apr 2020 14:42) (66 - 99)  BP: 95/51 (04 Apr 2020 14:42) (87/51 - 95/51)  BP(mean): --  RR: 26 (04 Apr 2020 14:42) (24 - 26)  SpO2: 99% (04 Apr 2020 14:42) (89% - 100%)            LABS:                        14.7   2.88  )-----------( 52       ( 04 Apr 2020 10:49 )             45.9     04-04    154<H>  |  117<H>  |  31<H>  ----------------------------<  107<H>  4.7   |  30  |  1.48<H>    Ca    8.5      04 Apr 2020 10:49    TPro  7.1  /  Alb  2.3<L>  /  TBili  0.2  /  DBili  x   /  AST  73<H>  /  ALT  66<H>  /  AlkPhos  118  04-04              WBC:  WBC Count: 2.88 K/uL (04-04 @ 10:49)      MICROBIOLOGY:  RECENT CULTURES:                  Sodium:  Sodium, Serum: 154 mmol/L (04-04 @ 10:49)      1.48 mg/dL 04-04 @ 10:49      Hemoglobin:  Hemoglobin: 14.7 g/dL (04-04 @ 10:49)      Platelets: Platelet Count - Automated: 52 K/uL (04-04 @ 10:49)      LIVER FUNCTIONS - ( 04 Apr 2020 10:49 )  Alb: 2.3 g/dL / Pro: 7.1 g/dL / ALK PHOS: 118 U/L / ALT: 66 U/L DA / AST: 73 U/L / GGT: x                 RADIOLOGY & ADDITIONAL STUDIES:

## 2020-04-04 NOTE — ED ADULT TRIAGE NOTE - DIRECT TO ROOM CARE INITIATED:
04-Apr-2020 09:48
Normal vision: sees adequately in most situations; can see medication labels, newsprint

## 2020-04-04 NOTE — CONSULT NOTE ADULT - ASSESSMENT
[facility] [phone]   1960 DOA 2020 [address]   ALLERGY   amoxicillin pncl      CONTACT  Сергей MARINO  selv                      Initial evaluation/Pulmonary Critical Care consultation requested on  2020  by Dr Gilmore  from Dr Uribe   Patient examined chart reviewed    HOSPITAL ADMISSION   PATIENT CAME  FROM (if information available)      [facility] MIGNON [phone]   1960 [address]      REVIEW OF SYMPTOMS      Able to give ROS  NO     PHYSICAL EXAM    HEENT Unremarkable PERRLA atraumatic   RESP Fair air entry EXP prolonged    Harsh breath sound Resp distres mild   CARDIAC S1 S2 No S3     NO JVD    ABDOMEN SOFT BS PRESENT NOT DISTENDED No hepatosplenomegaly PEDAL EDEMA present No calf tenderness  NO rash   GENERAL Not TOXIC looking    VITALS/LABS       2020 66 91/52   2020 w 2.8 Hb 14.7 Plt 52 Na 154  K 4.7 CO2 30 Cr 1.4     PT DATA/BEST PRACTICE  ALLERGY     NOTEWORTHY  POINTS/CHANGES ROS/PE                                  WT                     BMI          ADVANCED DIRECTIVE       Goals of care discussion                                                                                      HEAD OF BED ELEVATION Yes  DYSPHAGIA EVAL                                  DIET                                         IV F        D5 100 ()                                                DVT PROPHYLAXIS      lvnx 30 ()                STRESS ULCER PROPHYLAXIS     protonix 40 /)                                                                INFECTION PPLX    ECHO                  CXR          2020 cxr volume loss l hemith   bk airspace consolidatn   rco   poss sm l effsn                  CT                                                                              MICROBIO      2020 RVP n              ABIO           azithro ()        PROCEDURE                               COVID-19  3/26/2020 COVID pcr n   SYMPTOM ONSET  PRE DC AFEBRILE DATES                          RESP   O 2020 5l 92%   A     W    2020 w 2.8                      H  2020 Hb 14.7   P 2020 Plt 52   N    2020 1.41            l 2020 .66  NLR   2020 2.1                                  Na   2020 Na 154   Cr      2020 Cr 1.4    CK    l 2020  AST 73 ALT 66        PATIENT SUMMARY   60 f nonverbal downs syndrome from group home HO COVID exposure per transfer papers on Rx for aspiration pneumonia and shortness of breath   er vitals 90/50 66 100f 92%    Pt found to have pneumonia pl effsn hypernatremia and adam on arrival COVID palma started Pulm consulted     Home meds memantine 28 keppra 1.2                                          PATIENT SUMMARY   60 f nonverbal downs syndrome from group home HO COVID exposure per transfer papers on Rx for aspiration pneumonia and shortness of breath   er vitals 90/50 66 100f 92%    Pt found to have pneumonia pl effsn hypernatremia and adam on arrival COVID palma started Pulm consulted     Home meds memantine 28 keppra 1.2                                          PATIENT DATA/ASSESSMENT/RECOMMENDATIONS       HYPOXEMIA   A/R Target po 90-95%   PNEUMONIA POA   A/R Possibly COVID   await test   On azithro ()   PLEURALK EFFSN POA   A/R 2020 Check CT ch  May need diagnostic thoracentesis    WHEEZE   prov hfa ()  Cont Rx    HYPERNATREMIA POA  On D5 w   monitor serially   ADAM POA      Monitor crea  THROPMBOCYTOPENIA POA            Check hit ab                      NEURO PSYCH   depakote 250.3 ()   Keppra 1.2 ()           TIME SPENT Over 55 minutes aggregate care time spent on encounter; activities included   direct pt care, counseling and/or coordinating care reviewing notes, lab data/ imaging , discussion with multidisciplinary team/ pt /family. Risks, benefits, alternatives  discussed in detail.    [facility] [phone]   1960 DOA 2020 [address]

## 2020-04-05 NOTE — PROGRESS NOTE ADULT - SUBJECTIVE AND OBJECTIVE BOX
MIGNON WALTER  60y  Female    Patient is a 60y old  Female who presents with a chief complaint of sob (05 Apr 2020 14:11)      hemodynamically stable.    PHYSICAL EXAM:    T(C): 38.9 (04-05-20 @ 15:15), Max: 39.6 (04-05-20 @ 14:00)  HR: 85 (04-05-20 @ 14:00) (75 - 93)  BP: 92/60 (04-05-20 @ 14:00) (75/52 - 104/60)  RR: 20 (04-05-20 @ 14:00) (20 - 22)  SpO2: 92% (04-05-20 @ 14:00) (92% - 98%)  Wt(kg): --    I&O's Detail    04 Apr 2020 07:01  -  05 Apr 2020 07:00  --------------------------------------------------------  IN:    dextrose 5%.: 1200 mL  Total IN: 1200 mL    OUT:  Total OUT: 0 mL    Total NET: 1200 mL          Respiratory: clear anteriorly, decreased BS at bases  Cardiovascular: S1 S2  Gastrointestinal: soft NT ND +BS  Extremities: edema   Neuro: Awake and alert    MEDICATIONS  (STANDING):  dextrose 5%. 1000 milliLiter(s) (100 mL/Hr) IV Continuous <Continuous>  diVALproex  milliGRAM(s) Oral three times a day  enoxaparin Injectable 30 milliGRAM(s) SubCutaneous daily  levETIRAcetam 1000 milliGRAM(s) Oral two times a day  levothyroxine 125 MICROGram(s) Oral daily  memantine 5 milliGRAM(s) Oral daily  meropenem  IVPB      meropenem  IVPB 1000 milliGRAM(s) IV Intermittent every 8 hours  pantoprazole    Tablet 40 milliGRAM(s) Oral before breakfast  polyethylene glycol 3350 17 Gram(s) Oral daily    MEDICATIONS  (PRN):  acetaminophen   Tablet .. 650 milliGRAM(s) Oral every 6 hours PRN Temp greater or equal to 38C (100.4F), Mild Pain (1 - 3)  ALBUTerol    90 MICROgram(s) HFA Inhaler 2 Puff(s) Inhalation every 6 hours PRN Bronchospasm                            13.4   4.45  )-----------( 53       ( 05 Apr 2020 08:20 )             42.5       04-05    153<H>  |  118<H>  |  19  ----------------------------<  98  4.1   |  27  |  0.95    Ca    8.2<L>      05 Apr 2020 08:20  Phos  2.2     04-05    TPro  6.6  /  Alb  2.1<L>  /  TBili  0.1<L>  /  DBili  x   /  AST  59<H>  /  ALT  46  /  AlkPhos  98  04-05      Creatinine Trend: Creatinine Trend: 0.95<--, 1.48<--

## 2020-04-05 NOTE — DIETITIAN INITIAL EVALUATION ADULT. - FACTORS AFF FOOD INTAKE
difficulty chewing/difficulty feeding self/difficulty swallowing/difficulty with food procurement/preparation

## 2020-04-05 NOTE — DIETITIAN INITIAL EVALUATION ADULT. - OTHER INFO
59 yo female admit from [facility] with SOB< fever, cough; patient has been treated fro Asp PNA with levaquin since 4/1; writer spoke with Breanne @ [facility] for PMH; as per staff, patient was tolerating puree consistency diet with thin liquids well PTA; patient began with a cough, which is likely cause of aspiration; patient required spoonfeeding and was minimally verbal due to her down syndrome; staff reports that patient is able to make her needs known @ the group home, but has impaired speech and cognition; staff also reports that patient is an "owl" and prefers to sleep during the day, and be awake @ night; no edema noted; patient has the following impaired skin: DTI of Jesus heels, stg 2 of the Rt sacrum, and DTI of sacrum; UBW ~100-101# (as of 3/2020) with no change in weight status; unable to perform NFPE due to COVID19 precautions; will monitor; recommend diet advancement, as medically feasible; RD to remain available

## 2020-04-05 NOTE — PROGRESS NOTE ADULT - ASSESSMENT
60 with MR, Down's syndrome now admitted with fever, cough and SOB. Now being ruled out for covid PNA.   ADAM due to pre renal azotemia and hypernatremia due decreased free water intake. Indices are now improving. Continue the IVF with D5W at 100 cc an hour. will follow.

## 2020-04-05 NOTE — PROGRESS NOTE ADULT - ASSESSMENT
PATIENT SUMMARY   60 f nonverbal downs syndrome from group home HO COVID exposure per transfer papers on Rx for aspiration pneumonia and shortness of breath   er vitals 90/50 66 100f 92%    Pt found to have pneumonia pl effsn hypernatremia and adam on arrival COVID palma started Pulm consulted     Pulm consulted  CT ch showed l lung collapse Pt ruled out for COVID     Home meds memantine 28 keppra 1.2                                      PATIENT DATA/ASSESSMENT/RECOMMENDATIONS       HYPOXEMIA   A/R Target po 90-95%   2020 abg ordered  PNEUMONIA POA   A/R Possibly COVID   await test   On azithro (-)   On meropenem ()   ATELECTASIS   2020 DW brother   He was explained re fob rbaa and was explained thatw e will have to intubate prior to procedure as she is on nrb   WHEEZE   prov hfa ()  Cont Rx    HYPERNATREMIA POA  On D5 w   monitor serially   ADAM POA      Monitor crea  THROPMBOCYTOPENIA POA            Check hit ab                      NEURO PSYCH   depakote 250.3 ()   Keppra 1.2 ()              PLAN   2020 Transfer to ICU Intubated Plan bronch after intubation   On meropenem Started mucomyst duoneb     TIME SPENT Over 36 minutes aggregate critical  care time spent on encounter; activities included   direct pt care, counseling and/or coordinating care reviewing notes, lab data/ imaging , discussion with multidisciplinary team/ pt /family. Risks, benefits, alternatives  discussed in detail.    [phone]   1960 DOA 2020 [address]

## 2020-04-05 NOTE — PROGRESS NOTE ADULT - ASSESSMENT
This is a nonverbal pt bib ems from [facility] for fever, sob, cough, low o2 sat, with possible covid exposure. per ems, pt on levaquin for aspiration pna. no further hx available.pt has hx of chronic constipation an dis on multiple stool softners.  she was found ot be hypernatremic and in renal failure at admission.  also has hx of hypothyroidism and down syndrome

## 2020-04-05 NOTE — PROGRESS NOTE ADULT - SUBJECTIVE AND OBJECTIVE BOX
JOSE ANGEL CROW [phone]   1960 DOA 2020 [address]      REVIEW OF SYMPTOMS      Able to give ROS  NO     PHYSICAL EXAM    HEENT Unremarkable PERRLA atraumatic   RESP Fair air entry EXP prolonged    Harsh breath sound Resp distres mild   CARDIAC S1 S2 No S3     NO JVD    ABDOMEN SOFT BS PRESENT NOT DISTENDED No hepatosplenomegaly PEDAL EDEMA present No calf tenderness  NO rash   GENERAL Not TOXIC looking    VITALS/LABS       2020 103 85 92/60   2020 w 4.4 Hb 13.4 Plt 53     PT DATA/BEST PRACTICE  ALLERGY     NOTEWORTHY  POINTS/CHANGES ROS/PE                                  WT  45 (2020)                    BMI     20 (2020)     ADVANCED DIRECTIVE       Goals of care discussion                                                                                      HEAD OF BED ELEVATION Yes  DYSPHAGIA EVAL                                  DIET      dys 1 puree ()                                    IV F        D5 100 ()                                                DVT PROPHYLAXIS      lvnx 30 ()                STRESS ULCER PROPHYLAXIS     protonix 40 /)                                                                INFECTION PPLX    ECHO                  CXR          2020 cxr volume loss l hemith   bk airspace consolidatn   rco   poss sm l effsn                  CT         CT ch complete mucous plug distal lmsb and bk and lll bronchio                                                                         MICROBIO      2020 pc n   2020 RVP n              ABIO       meropenem ()       azithro ()        PROCEDURE

## 2020-04-05 NOTE — PROGRESS NOTE ADULT - SUBJECTIVE AND OBJECTIVE BOX
Patient is a 60y old  Female who presents with a chief complaint of sob (04 Apr 2020 15:51)      INTERVAL HPI/OVERNIGHT EVENTS:    no overnight events    MEDICATIONS  (STANDING):  azithromycin   Tablet 500 milliGRAM(s) Oral daily  dextrose 5%. 1000 milliLiter(s) (100 mL/Hr) IV Continuous <Continuous>  diVALproex  milliGRAM(s) Oral three times a day  enoxaparin Injectable 30 milliGRAM(s) SubCutaneous daily  levETIRAcetam 1000 milliGRAM(s) Oral two times a day  levothyroxine 125 MICROGram(s) Oral daily  memantine 5 milliGRAM(s) Oral daily  pantoprazole    Tablet 40 milliGRAM(s) Oral before breakfast  polyethylene glycol 3350 17 Gram(s) Oral daily    MEDICATIONS  (PRN):  acetaminophen   Tablet .. 650 milliGRAM(s) Oral every 6 hours PRN Temp greater or equal to 38C (100.4F), Mild Pain (1 - 3)  ALBUTerol    90 MICROgram(s) HFA Inhaler 2 Puff(s) Inhalation every 6 hours PRN Bronchospasm      Allergies    amoxicillin (Rash)  penicillin (Rash)    Intolerances        REVIEW OF SYSTEMS:      Vital Signs Last 24 Hrs  T(C): 37.5 (05 Apr 2020 09:31), Max: 38.7 (04 Apr 2020 17:58)  T(F): 99.5 (05 Apr 2020 09:31), Max: 101.6 (04 Apr 2020 17:58)  HR: 93 (05 Apr 2020 12:17) (75 - 99)  BP: 95/53 (05 Apr 2020 12:17) (75/52 - 104/60)  BP(mean): --  RR: 20 (05 Apr 2020 09:31) (20 - 26)  SpO2: 92% (05 Apr 2020 09:31) (92% - 99%)    PHYSICAL EXAM:  GENERAL: onnc  HEAD:  Atraumatic, Normocephalic  EYES: EOMI, PERRLA, conjunctiva and sclera clear  NECK: Supple, No JVD  CHEST/LUNG: dec bs at bases and rhonchi  HEART: Regular rate and rhythm; No murmurs, rubs, or gallops  ABDOMEN: Soft, Nontender, Nondistended; Bowel sounds present  SKIN: No rashes or lesions    LABS:                        13.4   4.45  )-----------( 53       ( 05 Apr 2020 08:20 )             42.5     05 Apr 2020 08:20    153    |  118    |  19     ----------------------------<  98     4.1     |  27     |  0.95     Ca    8.2        05 Apr 2020 08:20  Phos  2.2       05 Apr 2020 08:20    TPro  6.6    /  Alb  2.1    /  TBili  0.1    /  DBili  x      /  AST  59     /  ALT  46     /  AlkPhos  98     05 Apr 2020 08:20    PT/INR - ( 05 Apr 2020 08:20 )   PT: 12.2 sec;   INR: 1.10 ratio             CAPILLARY BLOOD GLUCOSE          RADIOLOGY & ADDITIONAL TESTS:    Imaging Personally Reviewed:  [ ] YES     Consultant(s) Notes Reviewed:      Care Discussed with Consultants/Other Providers:    Advanced Directives: [ ] DNR  [ ] No feeding tube  [ ] MOLST in chart  [ ] MOLST completed today  [ ] Unknown Patient is a 60y old  Female who presents with a chief complaint of sob (04 Apr 2020 15:51)      INTERVAL HPI/OVERNIGHT EVENTS:    Febrile overnight    MEDICATIONS  (STANDING):  azithromycin   Tablet 500 milliGRAM(s) Oral daily  dextrose 5%. 1000 milliLiter(s) (100 mL/Hr) IV Continuous <Continuous>  diVALproex  milliGRAM(s) Oral three times a day  enoxaparin Injectable 30 milliGRAM(s) SubCutaneous daily  levETIRAcetam 1000 milliGRAM(s) Oral two times a day  levothyroxine 125 MICROGram(s) Oral daily  memantine 5 milliGRAM(s) Oral daily  pantoprazole    Tablet 40 milliGRAM(s) Oral before breakfast  polyethylene glycol 3350 17 Gram(s) Oral daily    MEDICATIONS  (PRN):  acetaminophen   Tablet .. 650 milliGRAM(s) Oral every 6 hours PRN Temp greater or equal to 38C (100.4F), Mild Pain (1 - 3)  ALBUTerol    90 MICROgram(s) HFA Inhaler 2 Puff(s) Inhalation every 6 hours PRN Bronchospasm      Allergies    amoxicillin (Rash)  penicillin (Rash)    Intolerances        REVIEW OF SYSTEMS:      Vital Signs Last 24 Hrs  T(C): 37.5 (05 Apr 2020 09:31), Max: 38.7 (04 Apr 2020 17:58)  T(F): 99.5 (05 Apr 2020 09:31), Max: 101.6 (04 Apr 2020 17:58)  HR: 93 (05 Apr 2020 12:17) (75 - 99)  BP: 95/53 (05 Apr 2020 12:17) (75/52 - 104/60)  BP(mean): --  RR: 20 (05 Apr 2020 09:31) (20 - 26)  SpO2: 92% (05 Apr 2020 09:31) (92% - 99%)    PHYSICAL EXAM:  GENERAL: onnc  HEAD:  Atraumatic, Normocephalic  EYES: EOMI, PERRLA, conjunctiva and sclera clear  NECK: Supple, No JVD  CHEST/LUNG: dec bs at bases and rhonchi  HEART: Regular rate and rhythm; No murmurs, rubs, or gallops  ABDOMEN: Soft, Nontender, Nondistended; Bowel sounds present  SKIN: No rashes or lesions    LABS:                        13.4   4.45  )-----------( 53       ( 05 Apr 2020 08:20 )             42.5     05 Apr 2020 08:20    153    |  118    |  19     ----------------------------<  98     4.1     |  27     |  0.95     Ca    8.2        05 Apr 2020 08:20  Phos  2.2       05 Apr 2020 08:20    TPro  6.6    /  Alb  2.1    /  TBili  0.1    /  DBili  x      /  AST  59     /  ALT  46     /  AlkPhos  98     05 Apr 2020 08:20    PT/INR - ( 05 Apr 2020 08:20 )   PT: 12.2 sec;   INR: 1.10 ratio             CAPILLARY BLOOD GLUCOSE          RADIOLOGY & ADDITIONAL TESTS:    Imaging Personally Reviewed:  [ ] YES     Consultant(s) Notes Reviewed:      Care Discussed with Consultants/Other Providers:    Advanced Directives: [ ] DNR  [ ] No feeding tube  [ ] MOLST in chart  [ ] MOLST completed today  [ ] Unknown

## 2020-04-06 NOTE — PROGRESS NOTE ADULT - ASSESSMENT
This is a nonverbal pt bib ems from [facility] for fever, sob, cough, low o2 sat, with possible covid exposure. per ems, pt on levaquin for aspiration pna. no further hx available.pt has hx of chronic constipation an dis on multiple stool softners.  she was found ot be hypernatremic and in renal failure at admission.  also has hx of hypothyroidism and down syndrome

## 2020-04-06 NOTE — PROGRESS NOTE ADULT - SUBJECTIVE AND OBJECTIVE BOX
JOSE ANGEL CROW [phone]   1960 [address]A 2020 [address]TH MEDLEY      REVIEW OF SYMPTOMS      Able to give ROS  NO     PHYSICAL EXAM    HEENT Unremarkable PERRLA atraumatic   RESP Fair air entry EXP prolonged    Harsh breath sound Resp distres mild   CARDIAC S1 S2 No S3     NO JVD    ABDOMEN SOFT BS PRESENT NOT DISTENDED No hepatosplenomegaly PEDAL EDEMA present No calf tenderness  NO rash   GENERAL Not TOXIC looking    VITALS/LABS       2020 102 74 85/76   2020 w 4.7 Hb 12.4 Plt 54     PT DATA/BEST PRACTICE  ALLERGY     NOTEWORTHY  POINTS/CHANGES ROS/PE   2020 fc given 945a Puentes ordered    Transferred to ICU May need intubn and fob dw brother                                WT  45 (2020)                    BMI     20 (2020)     ADVANCED DIRECTIVE       Goals of care discussion                                                                                      HEAD OF BED ELEVATION Yes  DYSPHAGIA EVAL                                  DIET      dys 1 puree ()                                    IV F        D5 100 ()                                                DVT PROPHYLAXIS      lvnx 30 ()                STRESS ULCER PROPHYLAXIS     protonix 40 )                                                                INFECTION PPLX    ECHO                  CXR          2020 cxr volume loss l hemith   bk airspace consolidatn   rco   poss sm l effsn                  CT         CT ch complete mucous plug distal lmsb and bk and lll bronchio                                                                         MICROBIO      2020 pc n   2020 RVP n         blod c n         ABIO       meropenem ()       azithro ()        PROCEDURE                               COVID-19   COVID n   3/26/2020 COVID pcr n   SYMPTOM ONSET  PRE DC AFEBRILE DATES                          RESP   O -2020 5l 92% - nrb 92%   A     W    2020 w 2.8                      H  2020 Hb 14.7   P --2020 Plt 52 -  53 -54   N    2020 1.41            l 2020 .66  NLR   -2020 2.1 - 3.8                                  Na   --2020 Na 154 -153-153    Cr      --2020 Cr 1.4-.9 -.8     CK  2020 ck 290   l 2020  AST 73 ALT 66

## 2020-04-06 NOTE — CONSULT NOTE ADULT - SUBJECTIVE AND OBJECTIVE BOX
Date/Time Patient Seen:  		  Referring MD:   Data Reviewed	       Patient is a 60y old  Female who presents with a chief complaint of sob (05 Apr 2020 18:29)      Subjective/HPI  in bed  seen and examined  vs and meds reviewed  labs reviewed  H and P reviewed  ER provider note reviewed    fever  non verbal  on NRB    HPI:This is a nonverbal pt bib ems from assisted for fever, sob, cough, low o2 sat, with possible covid exposure. per ems, pt on levaquin for aspiration pna. no further hx available.pt has hx of chronic constipation an dis on multiple stool softners.  she was found ot be hypernatremic and in renal failure at admission.  also has hx of hypothyroidism and down syndrome    FAMILY HISTORY:can not obtain due to mental status      SOCIAL HISTORY:    Allergies    amoxicillin (Rash)  penicillin (Rash)    Intolerances          REVIEW OF SYSEMS:  negative except form above mentioned     PAST MEDICAL & SURGICAL HISTORY:  Frequent urinary tract infections  Hypothyroidism, unspecified type  Down syndrome        Medication list         MEDICATIONS  (STANDING):  acetylcysteine 10%  Inhalation 2.5 milliLiter(s) Inhalation every 6 hours  albuterol/ipratropium for Nebulization 3 milliLiter(s) Nebulizer every 6 hours  dextrose 5%. 1000 milliLiter(s) (100 mL/Hr) IV Continuous <Continuous>  diVALproex  milliGRAM(s) Oral three times a day  enoxaparin Injectable 30 milliGRAM(s) SubCutaneous daily  levETIRAcetam 1000 milliGRAM(s) Oral two times a day  levothyroxine 125 MICROGram(s) Oral daily  memantine 5 milliGRAM(s) Oral daily  meropenem  IVPB      meropenem  IVPB 1000 milliGRAM(s) IV Intermittent every 8 hours  pantoprazole    Tablet 40 milliGRAM(s) Oral before breakfast  polyethylene glycol 3350 17 Gram(s) Oral daily    MEDICATIONS  (PRN):  acetaminophen   Tablet .. 650 milliGRAM(s) Oral every 6 hours PRN Temp greater or equal to 38C (100.4F), Mild Pain (1 - 3)  ALBUTerol    90 MICROgram(s) HFA Inhaler 2 Puff(s) Inhalation every 6 hours PRN Bronchospasm         Vitals log        ICU Vital Signs Last 24 Hrs  T(C): 38.4 (06 Apr 2020 05:37), Max: 39.6 (05 Apr 2020 14:00)  T(F): 101.1 (06 Apr 2020 05:37), Max: 103.2 (05 Apr 2020 14:00)  HR: 80 (06 Apr 2020 04:03) (66 - 93)  BP: 98/70 (06 Apr 2020 06:39) (70/44 - 107/92)  BP(mean): --  ABP: --  ABP(mean): --  RR: 29 (06 Apr 2020 04:03) (20 - 29)  SpO2: 96% (06 Apr 2020 04:03) (92% - 98%)           Input and Output:  I&O's Detail    05 Apr 2020 07:01  -  06 Apr 2020 07:00  --------------------------------------------------------  IN:    dextrose 5%.: 1400 mL    IV PiggyBack: 50 mL  Total IN: 1450 mL    OUT:    Indwelling Catheter - Urethral: 80 mL  Total OUT: 80 mL    Total NET: 1370 mL          Lab Data                        12.4   4.71  )-----------( 54       ( 06 Apr 2020 04:23 )             38.8     04-06    153<H>  |  118<H>  |  15  ----------------------------<  70  3.7   |  29  |  0.85    Ca    7.7<L>      06 Apr 2020 07:31  Phos  2.2     04-05    TPro  6.0  /  Alb  1.8<L>  /  TBili  0.2  /  DBili  x   /  AST  50<H>  /  ALT  38  /  AlkPhos  73  04-06    ABG - ( 05 Apr 2020 18:31 )  pH, Arterial: x     pH, Blood: 7.39  /  pCO2: 41    /  pO2: 69    / HCO3: 24    / Base Excess: 0.2   /  SaO2: 93                CARDIAC MARKERS ( 06 Apr 2020 07:31 )  .041 ng/mL / x     / 290 U/L / x     / x      CARDIAC MARKERS ( 05 Apr 2020 08:20 )  .018 ng/mL / x     / 155 U/L / x     / x            Review of Systems	  on NRB    Objective     Physical Examination    heart s1s2  lung dec BS      Pertinent Lab findings & Imaging      Puentes:  NO   Adequate UO     I&O's Detail    05 Apr 2020 07:01  -  06 Apr 2020 07:00  --------------------------------------------------------  IN:    dextrose 5%.: 1400 mL    IV PiggyBack: 50 mL  Total IN: 1450 mL    OUT:    Indwelling Catheter - Urethral: 80 mL  Total OUT: 80 mL    Total NET: 1370 mL               Discussed with:     Cultures:	        Radiology        EXAM:  CT CHEST                                  PROCEDURE DATE:  04/04/2020          INTERPRETATION:  CLINICAL INFORMATION: Shortness of breath, fever cough. Down syndrome.    COMPARISON: Chest radiograph 4/4/2020.    PROCEDURE:   CT of the Chest was performed without intravenous contrast.  Sagittal and coronal reformats were performed.      FINDINGS:    LUNGS AND AIRWAYS:  PLEURA: There is complete mucous plugging distal left mainstem and left upper and lower lobe bronchi. There is complete opacification of the left hemithorax with volume loss including ipsilateral cardiomediastinal shift.    No significant pleural effusion is noted.    There are scattered small groundglass nodular opacities right upper, middle and lower lobes.  There is atelectatic changes involving the right lung base.    MEDIASTINUM AND FANNY: There are shotty prevascular, AP mediastinal and right paratracheal mediastinal lymph nodes, measuring up to 10 mm in short axis.    VESSELS: There is a prominent main pulmonary arterial trunk, which may reflect pulmonary hypertension.    HEART: Heart size is normal. No pericardial effusion.    CHEST WALL AND LOWER NECK: Within normal limits.    VISUALIZED UPPER ABDOMEN: Within normal limits.    BONES: Degenerative changes.  Mild compression deformity T12 vertebral body.    IMPRESSION:     Complete opacification left hemithorax associated with volume loss and mucous plugging; Findings could reflect aspiration pneumonia and atelectasis.    Patchy groundglass opacities right lung may reflect infection, including atypical/viral pneumonia.    Other findings as discussed.                          ZEENAT DOTY M.D., ATTENDING RADIOLOGIST  This document has been electronically signed. Apr 4 2020  4:53PM

## 2020-04-06 NOTE — PROGRESS NOTE ADULT - ASSESSMENT
1.	ADAM: Prerenal azotemia, ATN  2.	Shock, Sepsis  3.	Pneumonia, Fever    Improved renal indices. Will continue IVF. Change to D5 half NS. Needs pressor support for hypotension. Pt already received 3 Lit IVF bolus.   Check cultures, IV abx. Avoid nephrotoxic meds as possible. Will follow electrolytes and renal function trend. D/w ICU staff. 1.	ADAM: Prerenal azotemia, ATN  2.	Shock, Sepsis  3.	Pneumonia, Fever    Improved renal indices. Will continue IVF. Change to D5 half NS. Needs pressor support for hypotension. Pt already received 3 Lit IVF bolus.   Check cultures, IV abx. Avoid nephrotoxic meds as possible. Pt to get central line placement. Will follow electrolytes and renal function trend. D/w ICU staff.

## 2020-04-06 NOTE — PROGRESS NOTE ADULT - ASSESSMENT
PATIENT SUMMARY   60 f nonverbal downs syndrome from group home HO COVID exposure per transfer papers on Rx for aspiration pneumonia and shortness of breath   er vitals 90/50 66 100f 92%    Pt found to have pneumonia pl effsn hypernatremia and adam on arrival COVID palma started Pulm consulted     Pulm consulted  CT ch showed l lung collapse Pt ruled out for COVID     Home meds memantine 28 keppra 1.2                                            PATIENT DATA/ASSESSMENT/RECOMMENDATIONS       HYPOXEMIA    6p nrb 739/41/69   A/R Target po 90-95%   If worsens intubate     RO COVID    COVID n   3/26/2020 COVID pcr n     PNEUMONIA POA   A/R Possibly  aspiration  On meropenem ()   On azithro (-)       LACTICEMIA   2020 la 2.1     HEMODYNAMICS    2020 a Dropped BP this am   2020 Fluid challenge as needed and if does not work will need vasopressors         ATELECTASIS   M Mucomyst 3 doses ()   duoneb.4 ()   Postural drainage () (Dr Kruse)   Chets PT.4 ()   Percussion bed ordered 2020 DW brother   He was explained re fob rbaa and was explained  that w e will have to intubate prior to procedure as she is on nrb   A/R 2020 If conservative means do not work will plan intubation and bronch   2020 dw brother again after he spoke to Dr Burr earlier today   WHEEZE   duoneb.4 ()   Cont Rx      HYPERNATREMIA POA  On D5 w   monitor serially     ADAM POA      Monitor crea    THROPMBOCYTOPENIA POA             hpf4 n     P --2020 Plt 52 -  53 -54                    NEURO PSYCH   depakote 250.3 ()   Keppra 1.2 ()         FAMILY COMMUNICATION  2020 spoke to brother again after he spoke to Dr Burr Plan dw brother     2020     PLAN   2020 On DVT p Monitor abg If resp status deteriorates will intubate Will also intubate if bronch planned   On po diet If dysphagia will make npo On meropenem for asp pneum bultures neg  Check procalcitonin Monitor la and target map 65 On conservative mx for atelectaasis On bd mucolytics  As strong suspicion will repeat covid 19 pcr Is on hypotonic fluids     TIME SPENT Over 36 minutes aggregate critical care time spent on encounter; activities included   direct pt care, counseling and/or coordinating care reviewing notes, lab data/ imaging , discussion with multidisciplinary team/ pt /family. Risks, benefits, alternatives  discussed in detail.    [facility] [phone]   1960 DOA 2020 DR NORTH MEDLEY

## 2020-04-06 NOTE — PROVIDER CONTACT NOTE (CRITICAL VALUE NOTIFICATION) - RECOMMENDATIONS
No further interventions at this time; Dr. Lopez states to use antipyretics once temperature is greater than 101.4 rectally.

## 2020-04-06 NOTE — PROGRESS NOTE ADULT - SUBJECTIVE AND OBJECTIVE BOX
Patient is a 60y old  Female who presents with a chief complaint of sob (06 Apr 2020 10:54)      INTERVAL HPI/OVERNIGHT EVENTS:    hypotensive in AM.  CT shows possible atelectisis on left lung.  Pulmonologist wants to bronch. Covid neg x 2    MEDICATIONS  (STANDING):  acetylcysteine 10%  Inhalation 2.5 milliLiter(s) Inhalation every 6 hours  albuterol/ipratropium for Nebulization 3 milliLiter(s) Nebulizer every 6 hours  dextrose 5%. 1000 milliLiter(s) (100 mL/Hr) IV Continuous <Continuous>  diVALproex  milliGRAM(s) Oral three times a day  enoxaparin Injectable 30 milliGRAM(s) SubCutaneous daily  levETIRAcetam 1000 milliGRAM(s) Oral two times a day  levothyroxine 125 MICROGram(s) Oral daily  memantine 5 milliGRAM(s) Oral daily  meropenem  IVPB      meropenem  IVPB 1000 milliGRAM(s) IV Intermittent every 8 hours  pantoprazole    Tablet 40 milliGRAM(s) Oral before breakfast  polyethylene glycol 3350 17 Gram(s) Oral daily    MEDICATIONS  (PRN):  acetaminophen   Tablet .. 650 milliGRAM(s) Oral every 6 hours PRN Temp greater or equal to 38C (100.4F), Mild Pain (1 - 3)  acetaminophen  Suppository .. 650 milliGRAM(s) Rectal every 4 hours PRN Temp greater or equal to 38C (100.4F)      Allergies    amoxicillin (Rash)  penicillin (Rash)    Intolerances        Vital Signs Last 24 Hrs  T(C): 39.3 (06 Apr 2020 09:30), Max: 39.6 (05 Apr 2020 14:00)  T(F): 102.8 (06 Apr 2020 09:30), Max: 103.2 (05 Apr 2020 14:00)  HR: 74 (06 Apr 2020 09:45) (66 - 93)  BP: 74/39 (06 Apr 2020 09:45) (70/44 - 115/97)  BP(mean): 49 (06 Apr 2020 09:45) (45 - 104)  RR: 30 (06 Apr 2020 09:45) (20 - 50)  SpO2: 97% (06 Apr 2020 09:45) (84% - 98%)    PHYSICAL EXAM:  GENERAL: on NRB  HEAD:  Atraumatic, Normocephalic  EYES: EOMI, PERRLA, conjunctiva and sclera clear  NECK: Supple, No JVD  CHEST/LUNG: dec bs at bases and rhonchi  HEART: Regular rate and rhythm; No murmurs, rubs, or gallops  ABDOMEN: Soft, Nontender, Nondistended; Bowel sounds present  SKIN: No rashes or lesions      LABS:                        12.4   4.71  )-----------( 54       ( 06 Apr 2020 04:23 )             38.8     06 Apr 2020 07:31    153    |  118    |  15     ----------------------------<  70     3.7     |  29     |  0.85     Ca    7.7        06 Apr 2020 07:31    TPro  6.0    /  Alb  1.8    /  TBili  0.2    /  DBili  x      /  AST  50     /  ALT  38     /  AlkPhos  73     06 Apr 2020 07:31    PT/INR - ( 06 Apr 2020 07:31 )   PT: 12.9 sec;   INR: 1.16 ratio             CAPILLARY BLOOD GLUCOSE          RADIOLOGY & ADDITIONAL TESTS:    Imaging Personally Reviewed:  [ ] YES     Consultant(s) Notes Reviewed:      Care Discussed with Consultants/Other Providers:    Advanced Directives: [ ] DNR  [ ] No feeding tube  [ ] MOLST in chart  [ ] MOLST completed today  [ ] Unknown

## 2020-04-06 NOTE — PROGRESS NOTE ADULT - PROBLEM SELECTOR PLAN 2
likely 2/2 viral pneumonia vs aspiration pneumonia  has been on levaquin to no avail  follow labs  on mdi  continue NRB

## 2020-04-06 NOTE — CONSULT NOTE ADULT - PROBLEM SELECTOR RECOMMENDATION 9
fever, persistent fever, poss pna, asp pna, obstructive pna, mrdd, dementia, dysphagia, atelectasis,   needs postural drainage and chest pt to left side  o2 support  keep HOB elev  discussed goals of care with the brother - who resides in [place]  Brother's number - [phone]  as per Brother - pt had 1 outpatient Covid test neg - 1 week ago  GOC discussed  FULL CODE  Cont supportive medical regimen and care

## 2020-04-06 NOTE — PROGRESS NOTE ADULT - SUBJECTIVE AND OBJECTIVE BOX
Patient is a 60y old  Female who presents with a chief complaint of sob (06 Apr 2020 11:30)    Patient seen in follow up for ADAM, Hypernatremia.   Pt lethargic. On oxygen mask. Hypotensive. Got 3 lit IVF bolus.        PAST MEDICAL HISTORY:  Frequent urinary tract infections  Hypothyroidism, unspecified type  Down syndrome    MEDICATIONS  (STANDING):  albuterol/ipratropium for Nebulization 3 milliLiter(s) Nebulizer every 6 hours  dextrose 5%. 1000 milliLiter(s) (100 mL/Hr) IV Continuous <Continuous>  diVALproex  milliGRAM(s) Oral three times a day  enoxaparin Injectable 30 milliGRAM(s) SubCutaneous daily  levETIRAcetam 1000 milliGRAM(s) Oral two times a day  levothyroxine 125 MICROGram(s) Oral daily  memantine 5 milliGRAM(s) Oral daily  meropenem  IVPB      meropenem  IVPB 1000 milliGRAM(s) IV Intermittent every 8 hours  pantoprazole    Tablet 40 milliGRAM(s) Oral before breakfast  polyethylene glycol 3350 17 Gram(s) Oral daily    MEDICATIONS  (PRN):  acetaminophen   Tablet .. 650 milliGRAM(s) Oral every 6 hours PRN Temp greater or equal to 38C (100.4F), Mild Pain (1 - 3)  acetaminophen  Suppository .. 650 milliGRAM(s) Rectal every 4 hours PRN Temp greater or equal to 38C (100.4F)    T(C): 37.3 (04-06-20 @ 12:00), Max: 39.6 (04-05-20 @ 14:00)  HR: 75 (04-06-20 @ 12:00) (66 - 93)  BP: 98/49 (04-06-20 @ 12:00) (70/44 - 115/97)  RR: 35 (04-06-20 @ 12:00) (20 - 50)  SpO2: 97% (04-06-20 @ 12:00) (84% - 98%)  Wt(kg): --  I&O's Detail    05 Apr 2020 07:01  -  06 Apr 2020 07:00  --------------------------------------------------------  IN:    dextrose 5%.: 1400 mL    IV PiggyBack: 50 mL  Total IN: 1450 mL    OUT:    Indwelling Catheter - Urethral: 80 mL  Total OUT: 80 mL    Total NET: 1370 mL          PHYSICAL EXAM:  General: lethargic, on oxygen mask  Respiratory: b/l air entry  Cardiovascular: S1 S2  Gastrointestinal: soft  Extremities:  no edema                          12.4   4.71  )-----------( 54       ( 06 Apr 2020 04:23 )             38.8     04-06    153<H>  |  118<H>  |  15  ----------------------------<  70  3.7   |  29  |  0.85    Ca    7.7<L>      06 Apr 2020 07:31  Phos  2.2     04-05    TPro  6.0  /  Alb  1.8<L>  /  TBili  0.2  /  DBili  x   /  AST  50<H>  /  ALT  38  /  AlkPhos  73  04-06    CARDIAC MARKERS ( 06 Apr 2020 07:31 )  .041 ng/mL / x     / 290 U/L / x     / x      CARDIAC MARKERS ( 05 Apr 2020 08:20 )  .018 ng/mL / x     / 155 U/L / x     / x          LIVER FUNCTIONS - ( 06 Apr 2020 07:31 )  Alb: 1.8 g/dL / Pro: 6.0 g/dL / ALK PHOS: 73 U/L / ALT: 38 U/L DA / AST: 50 U/L / GGT: x             ABG - ( 05 Apr 2020 18:31 )  pH, Arterial: x     pH, Blood: 7.39  /  pCO2: 41    /  pO2: 69    / HCO3: 24    / Base Excess: 0.2   /  SaO2: 93                Sodium, Serum: 153 (04-06 @ 07:31)  Sodium, Serum: 153 (04-05 @ 08:20)  Sodium, Serum: 154 (04-04 @ 10:49)    Creatinine, Serum: 0.85 (04-06 @ 07:31)  Creatinine, Serum: 0.95 (04-05 @ 08:20)  Creatinine, Serum: 1.48 (04-04 @ 10:49)    Potassium, Serum: 3.7 (04-06 @ 07:31)  Potassium, Serum: 4.1 (04-05 @ 08:20)  Potassium, Serum: 4.7 (04-04 @ 10:49)    Hemoglobin: 12.4 (04-06 @ 04:23)  Hemoglobin: 13.4 (04-05 @ 08:20)  Hemoglobin: 14.7 (04-04 @ 10:49)

## 2020-04-07 NOTE — PROGRESS NOTE ADULT - PROBLEM SELECTOR PLAN 2
likely 2/2 viral pneumonia vs aspiration pneumonia  has been on levaquin to no avail  follow labs  on mdi  s/p intubation

## 2020-04-07 NOTE — CHART NOTE - NSCHARTNOTEFT_GEN_A_CORE
called to bedside for seizure activity, eyes deviated right with some nystagmus.  give ativan 2mg IV  due for keppra at 6pm  neuro eval  updated Dr. Uribe

## 2020-04-07 NOTE — PROGRESS NOTE ADULT - SUBJECTIVE AND OBJECTIVE BOX
Patient is a 60y old  Female who presents with a chief complaint of sob, probable COVID19 (07 Apr 2020 03:41)      INTERVAL HPI/OVERNIGHT EVENTS:    repeat test covid +  inutabated overnight      MEDICATIONS  (STANDING):  chlorhexidine 0.12% Liquid 15 milliLiter(s) Oral Mucosa every 12 hours  dextrose 5% + sodium chloride 0.45%. 1000 milliLiter(s) (75 mL/Hr) IV Continuous <Continuous>  enoxaparin Injectable 30 milliGRAM(s) SubCutaneous daily  fentaNYL   Infusion. 0.5 MICROgram(s)/kG/Hr (2.27 mL/Hr) IV Continuous <Continuous>  levETIRAcetam  IVPB 1000 milliGRAM(s) IV Intermittent every 12 hours  levothyroxine Injectable 62.5 MICROGram(s) IV Push at bedtime  memantine 5 milliGRAM(s) Oral daily  midodrine 10 milliGRAM(s) Oral every 8 hours  norepinephrine Infusion 0.05 MICROgram(s)/kG/Min (4.26 mL/Hr) IV Continuous <Continuous>  pantoprazole  Injectable 40 milliGRAM(s) IV Push daily  polyethylene glycol 3350 17 Gram(s) Oral daily  propofol Infusion 10 MICROgram(s)/kG/Min (2.72 mL/Hr) IV Continuous <Continuous>  valproate sodium IVPB 250 milliGRAM(s) IV Intermittent every 8 hours    MEDICATIONS  (PRN):  acetaminophen   Tablet .. 650 milliGRAM(s) Oral every 6 hours PRN Temp greater or equal to 38C (100.4F), Mild Pain (1 - 3)  acetaminophen  Suppository .. 650 milliGRAM(s) Rectal every 4 hours PRN Temp greater or equal to 38C (100.4F)      Allergies    amoxicillin (Rash)  penicillin (Rash)    Intolerances        REVIEW OF SYSTEMS:    Vital Signs Last 24 Hrs  T(C): 36.7 (07 Apr 2020 08:00), Max: 39.5 (07 Apr 2020 00:02)  T(F): 98.1 (07 Apr 2020 08:00), Max: 103.1 (07 Apr 2020 00:02)  HR: 58 (07 Apr 2020 09:26) (56 - 85)  BP: 137/63 (07 Apr 2020 08:00) (67/33 - 137/63)  BP(mean): 80 (07 Apr 2020 08:00) (45 - 98)  RR: 20 (07 Apr 2020 08:00) (16 - 37)  SpO2: 99% (07 Apr 2020 09:26) (87% - 100%)    PHYSICAL EXAM:  GENERAL: intubated  HEAD:  Atraumatic, Normocephalic  CHEST/LUNG: dec bs at bases and rhonchi  HEART: Regular rate and rhythm; No murmurs, rubs, or gallops  ABDOMEN: Soft, Nontender, Nondistended; Bowel sounds present  SKIN: No rashes or lesions    LABS:                        11.2   7.94  )-----------( 96       ( 07 Apr 2020 07:37 )             34.0     07 Apr 2020 07:37    151    |  117    |  11     ----------------------------<  222    3.3     |  30     |  0.68     Ca    6.8        07 Apr 2020 07:37  Phos  1.3       07 Apr 2020 07:37  Mg     1.3       07 Apr 2020 03:40    TPro  4.5    /  Alb  1.4    /  TBili  0.2    /  DBili  0.1    /  AST  58     /  ALT  32     /  AlkPhos  70     07 Apr 2020 07:37    PT/INR - ( 07 Apr 2020 07:37 )   PT: 13.4 sec;   INR: 1.20 ratio             CAPILLARY BLOOD GLUCOSE          RADIOLOGY & ADDITIONAL TESTS:    Imaging Personally Reviewed:  [ ] YES     Consultant(s) Notes Reviewed:      Care Discussed with Consultants/Other Providers:    Advanced Directives: [ ] DNR  [ ] No feeding tube  [ ] MOLST in chart  [ ] MOLST completed today  [ ] Unknown

## 2020-04-07 NOTE — PROGRESS NOTE ADULT - ASSESSMENT
This is a nonverbal pt bib ems from [facility] for fever, sob, cough, low o2 sat, with possible covid exposure. per ems, pt on levaquin for aspiration pna. no further hx available.pt has hx of chronic constipation an dis on multiple stool softners.  she was found ot be hypernatremic and in renal failure at admission.  also has hx of hypothyroidism and down syndrome

## 2020-04-07 NOTE — CONSULT NOTE ADULT - SUBJECTIVE AND OBJECTIVE BOX
REASON FOR CONSULT: intubated, respiratory failure.    Chief Complaint    HPI: r/o COVID, respiratory failure.    PAST MEDICAL & SURGICAL HISTORY:  Frequent urinary tract infections  Hypothyroidism, unspecified type  Down syndrome    Allergies    amoxicillin (Rash)  penicillin (Rash)    Intolerances      FAMILY HISTORY:      Review of Systems:  CONSTITUTIONAL: No fever, chills, or fatigue  EYES: No eye pain, visual disturbances, or discharge  ENMT:  No difficulty hearing, tinnitus, vertigo; No sinus or throat pain  NECK: No pain or stiffness  RESPIRATORY: No cough, wheezing, chills or hemoptysis; No shortness of breath  CARDIOVASCULAR: No chest pain, palpitations, dizziness, or leg swelling  GASTROINTESTINAL: No abdominal or epigastric pain. No nausea, vomiting, or hematemesis; No diarrhea or constipation. No melena or hematochezia.  GENITOURINARY: No dysuria, frequency, hematuria, or incontinence  NEUROLOGICAL: No headaches, memory loss, loss of strength, numbness, or tremors  SKIN: No itching, burning, rashes, or lesions   MUSCULOSKELETAL: No joint pain or swelling; No muscle, back, or extremity pain  PSYCHIATRIC: No depression, anxiety, mood swings, or difficulty sleeping      Medications:  meropenem  IVPB      meropenem  IVPB 1000 milliGRAM(s) IV Intermittent every 8 hours    norepinephrine Infusion 0.05 MICROgram(s)/kG/Min IV Continuous <Continuous>      acetaminophen   Tablet .. 650 milliGRAM(s) Oral every 6 hours PRN  acetaminophen  Suppository .. 650 milliGRAM(s) Rectal every 4 hours PRN  fentaNYL    Injectable 100 MICROGram(s) IV Push once  fentaNYL   Infusion. 0.5 MICROgram(s)/kG/Hr IV Continuous <Continuous>  levETIRAcetam  IVPB 1000 milliGRAM(s) IV Intermittent every 12 hours  memantine 5 milliGRAM(s) Oral daily  midazolam Injectable 4 milliGRAM(s) IV Push once  propofol Infusion 10 MICROgram(s)/kG/Min IV Continuous <Continuous>  valproate sodium IVPB 250 milliGRAM(s) IV Intermittent every 8 hours      enoxaparin Injectable 30 milliGRAM(s) SubCutaneous daily    pantoprazole  Injectable 40 milliGRAM(s) IV Push daily  polyethylene glycol 3350 17 Gram(s) Oral daily      levothyroxine Injectable 62.5 MICROGram(s) IV Push at bedtime    dextrose 5% + sodium chloride 0.45%. 1000 milliLiter(s) IV Continuous <Continuous>  dextrose 5%. 1000 milliLiter(s) IV Continuous <Continuous>  sodium bicarbonate  Infusion 0.33 mEq/kG/Hr IV Continuous <Continuous>      chlorhexidine 0.12% Liquid 15 milliLiter(s) Oral Mucosa every 12 hours        Mode: AC/ CMV (Assist Control/ Continuous Mandatory Ventilation)  RR (machine): 20  TV (machine): 320  FiO2: 100  PEEP: 5  ITime: 1  MAP: 13  PIP: 25      ICU Vital Signs Last 24 Hrs  T(C): 39.5 (07 Apr 2020 00:02), Max: 39.5 (07 Apr 2020 00:02)  T(F): 103.1 (07 Apr 2020 00:02), Max: 103.1 (07 Apr 2020 00:02)  HR: 85 (07 Apr 2020 02:07) (68 - 91)  BP: 117/65 (06 Apr 2020 20:00) (67/33 - 119/58)  BP(mean): 76 (06 Apr 2020 20:00) (45 - 104)  RR: 28 (06 Apr 2020 20:00) (20 - 50)  SpO2: 95% (07 Apr 2020 02:07) (84% - 98%)        ABG - ( 07 Apr 2020 01:38 )  pH, Arterial: x     pH, Blood: 7.09  /  pCO2: 31    /  pO2: 48    / HCO3: 9     / Base Excess: -18.8 /  SaO2: 76          I&O's Detail    05 Apr 2020 07:01  -  06 Apr 2020 07:00  --------------------------------------------------------  IN:    dextrose 5%.: 1400 mL    IV PiggyBack: 50 mL  Total IN: 1450 mL    OUT:    Indwelling Catheter - Urethral: 80 mL  Total OUT: 80 mL    Total NET: 1370 mL      06 Apr 2020 07:01  -  07 Apr 2020 03:42  --------------------------------------------------------  IN:    dextrose 5% + sodium chloride 0.45%.: 375 mL    norepinephrine Infusion: 3.4 mL  Total IN: 378.4 mL    OUT:    Indwelling Catheter - Urethral: 300 mL  Total OUT: 300 mL    Total NET: 78.4 mL            LABS:                        12.4   4.71  )-----------( 54       ( 06 Apr 2020 04:23 )             38.8     04-06    153<H>  |  118<H>  |  15  ----------------------------<  70  3.7   |  29  |  0.85    Ca    7.7<L>      06 Apr 2020 07:31  Phos  2.2     04-05    TPro  6.0  /  Alb  1.8<L>  /  TBili  0.2  /  DBili  x   /  AST  50<H>  /  ALT  38  /  AlkPhos  73  04-06      CARDIAC MARKERS ( 06 Apr 2020 07:31 )  .041 ng/mL / x     / 290 U/L / x     / x      CARDIAC MARKERS ( 05 Apr 2020 08:20 )  .018 ng/mL / x     / 155 U/L / x     / x          CAPILLARY BLOOD GLUCOSE        PT/INR - ( 06 Apr 2020 07:31 )   PT: 12.9 sec;   INR: 1.16 ratio             CULTURES:  Culture Results:   No growth to date. (04-04 @ 22:18)  Culture Results:   No growth to date. (04-04 @ 22:18)      Physical Examination:  Physical exam as per bedside MD (direct physical examination could not be performed because the visit was provided via Telemedicine):       RADIOLOGY: patchy infiltrates bilateral    A/P  Probable COVID19  respiratory failure    Vent support  COVID protocol    Prognosis- poor    CRITICAL CARE TIME SPENT: 30    This visit was provided via telemedicine. Patient was located at     [facility]    Provider was located at [facility], [place] for the visit.

## 2020-04-07 NOTE — PROGRESS NOTE ADULT - ASSESSMENT
PATIENT SUMMARY   60 f nonverbal downs syndrome from group home HO COVID exposure per transfer papers on Rx for aspiration pneumonia and shortness of breath   er vitals 90/50 66 100f 92%    Pt found to have pneumonia pl effsn hypernatremia and adam on arrival COVID palma started Pulm consulted     Pulm consulted  CT ch showed l lung collapse Pt ruled out for COVID     Home meds memantine 28 keppra 1.2                                Pt tr sicu 2020 itubated hemaodynamci intability                   PATIENT DATA/ASSESSMENT/RECOMMENDATIONS         COVID RULED IN    2020 COVID pcr detc    COVID n   3/26/2020 COVID pcr n     PNEUMONIA POA   A/R Meropenem dced as pt has covid which explains all fimndings     ATELECTASIS   2020 CXR shows diffuse bl opac cw covid and less atelectasis Bronch not needed   WHEEZE   duoneb.4 () DCed 2020   Cont Rx      HYPERNATREMIA POA  -2020 Na 151 -151  On D5  w   monitor serially     ADAM POA      URINE 2020 u 500    Crea 2020 Cr .68     THROPMBOCYTOPENIA POA             hpf4 n     P ---2020 Plt 52 -  53 -54 - 96                    NEURO PSYCH   depakote 250.3 ()   Keppra 1.2 ()             COVID  2020 COVID pcr detc   Supp care     SYMPTOM ONSET        OXYGENATION  2020 Intubated ON 90% ox     PROGNOSIS    2020 nlr 6.1   2020 ferritin 1644    2020 procal .27    MEDS     HCQ ()              SEDATION  2020 12p prop 30 m/k/m         RESP GAS EXCHANGE   2020 100% vent 742/41/75     LACTICEMIA   2020 la 2.1     HEMODYNAMICS    2020 Midodrine 10.3 ()   Norepi ()   2020 a Dropped BP this am   2020 Fluid challenge as needed and if does not work will need vasopressors       FAMILY COMMUNICATION  2020 spoke to brother   2020 spoke to brother again after he spoke to Dr Burr Plan dw brother     2020     PLAN   2020  DVT P On lvnx   RESP FAIL Sec COVID 19   COVID 19 On HCQ Will dw Dr Santamaria re Toci   ARDS ltvv   HEMODYNAMICS In shock sec COVID 19 On nore Mido   NONOLIGURIC ADAM Monitor lytes   HYPERNATR On hypotonic fl   SZ On keppra       TIME SPENT Over 36 minutes aggregate critical  care time spent on encounter; activities included   direct pt care, counseling and/or coordinating care reviewing notes, lab data/ imaging , discussion with multidisciplinary team/ pt /family. Risks, benefits, alternatives  discussed in detail.    JOSE ANGEL CROW [phone]   1960 DOA 2020 DR [address] MEDLEY

## 2020-04-07 NOTE — PROCEDURE NOTE - ADDITIONAL PROCEDURE DETAILS
Dx: Acute hypoxic respiratory failure    Procedure performed independent of CCT.
Attempted to access left femoral vein via ultrasound guidance, unsuccessful. Procedure aborted. Accessed right IJ under ultrasound guidance w/o complication.    Dx: Acute hypoxic respiratory failure    Procedure performed independent of CCT.

## 2020-04-07 NOTE — PROGRESS NOTE ADULT - ASSESSMENT
1.	ADAM: Prerenal azotemia, ATN  2.	Shock, Sepsis  3.	Pneumonia, COVID +    Improved and stable renal indices. Will continue IVF. Change to D5W. Treatment for COVID pneumonia. COVID precautions. Supportive care.   Avoid nephrotoxic meds as possible. ? start tube feeds with free water. Management as per ICU staff. 1.	ADAM: Prerenal azotemia, ATN  2.	Shock, Sepsis  3.	Pneumonia, COVID +    Improved and stable renal indices. Will continue IVF. Change to D5W. Treatment for COVID pneumonia. COVID precautions. Supportive care.   Avoid nephrotoxic meds as possible. When able to use NGT start tube feeds with free water. Management as per ICU staff.

## 2020-04-07 NOTE — CHART NOTE - NSCHARTNOTEFT_GEN_A_CORE
Called for hypoxia and tachypnea.  Given low desats despite %, ICU PA and anesthesia were called for intubation.  Spoke to the brother who consented for intubation.  Patient intubated and then transferred to SICU for further management.

## 2020-04-07 NOTE — PROGRESS NOTE ADULT - ASSESSMENT
61 y/o F w/ a PMHx of Down's syndrome, seizure disorder, and hypothyroidism admitted to [facility] on 4/4 for R/O COVID-19. Transferred to ICU s/p emergent intubation for acute hypoxic respiratory failure/ARDS 2/2 COVID-19 infection (rule out) versus aspiration PNA. Course complicated by hypernatremia, metabolic acidosis, and ADAM (resolved).    PLAN:  Neuro: Sedated w/ propofol for vent synchrony. Continue w/ Keppra and Depacon.  Cardiac: Actively titrating Levophed to maintain MAP > 65. Will add midodrine 10mg q8hrs.   Pulm: Emergently intubated, remains on full ventilatory support (20/320/100/12). Actively titrating FiO2 to maintain SpO2 > 90%. Vent bundle in place. Post intubation ABG obtained (7.09/31/48/9), administered 2 amps of sodium bicarb and 150mEq sodium bicarb infusion initiated. Will repeat in 1 hr.   GI: NGT in place, may begin tube feeds in AM.  Renal: ADAM resolved, continue to trend renal function. Puentes in place, monitor urine output. Remains hypernatremic (Na 149), continue w/ D5 half NS per nephro. Hypokalemic (K 3.4), Klor con 40mEqs x 2 doses ordered. Lactate within normal limits. Metabolic acidosis treated as stated above.  ID: COVID-19 PCR (-) x2, however, due to high suspicion for COVID-19 given known exposure, pending repeat PCR. Continue to trend inflammatory markers. Meropenem started for possible aspiration PNA, pending ID evaluation.  Endo: Continue w/ levothyroxine.  Heme/Onc: Chemical DVT prophylaxis w/ Lovenox, mechanical DVT prophylaxis w/ SCDs.    Dispo: Transfer to PACU. Pt's brother made aware of pt's current condition by hospitalist, Dr. Almaguer. Prognosis poor.    Case discussed w/ eICU attending Dr. Vega.

## 2020-04-07 NOTE — PROGRESS NOTE ADULT - SUBJECTIVE AND OBJECTIVE BOX
Patient is a 59 y/o female who presents with a chief complaint of SOB, probable COVID-19 (07 Apr 2020 03:41)    BRIEF HOSPITAL COURSE: 59 y/o F w/ a PMHx of Down's syndrome and hypothyroidism who presented to [facility] on 4/4 from group home w/ fever, SOB, cough, and hypoxia. Pt was admitted for R/O COVID-19. Course complicated by hypernatremia and ADAM. Pt w/ two negative COVID-19 PCRs, but given known exposure to multiple positive individuals, third COVID-19 PCR pending. Pt w/ plan for bronchoscopy by pulmonology. Also, currently being treated for aspiration PNA w/ meropenem.    Events last 24 hours: Called to pt's bedside by hospitalist due to hypoxia (SpO2 60s) on 100% NRB and tachypnea (RR 50s). Pt was emergently intubated by anesthesia and transferred to PACU for continuation of care.    PAST MEDICAL & SURGICAL HISTORY:  Frequent urinary tract infections  Hypothyroidism, unspecified type  Down syndrome    Review of Systems:  Unable to obtain. Pt is intubated and sedated.    Medications:  meropenem  IVPB      meropenem  IVPB 1000 milliGRAM(s) IV Intermittent every 8 hours  norepinephrine Infusion 0.05 MICROgram(s)/kG/Min IV Continuous <Continuous>  acetaminophen   Tablet .. 650 milliGRAM(s) Oral every 6 hours PRN  acetaminophen  Suppository .. 650 milliGRAM(s) Rectal every 4 hours PRN  fentaNYL    Injectable 100 MICROGram(s) IV Push once  fentaNYL   Infusion. 0.5 MICROgram(s)/kG/Hr IV Continuous <Continuous>  levETIRAcetam  IVPB 1000 milliGRAM(s) IV Intermittent every 12 hours  memantine 5 milliGRAM(s) Oral daily  midazolam Injectable 4 milliGRAM(s) IV Push once  propofol Infusion 10 MICROgram(s)/kG/Min IV Continuous <Continuous>  valproate sodium IVPB 250 milliGRAM(s) IV Intermittent every 8 hours  enoxaparin Injectable 30 milliGRAM(s) SubCutaneous daily  pantoprazole  Injectable 40 milliGRAM(s) IV Push daily  polyethylene glycol 3350 17 Gram(s) Oral daily  levothyroxine Injectable 62.5 MICROGram(s) IV Push at bedtime  dextrose 5% + sodium chloride 0.45%. 1000 milliLiter(s) IV Continuous <Continuous>  dextrose 5%. 1000 milliLiter(s) IV Continuous <Continuous>  sodium bicarbonate  Infusion 0.33 mEq/kG/Hr IV Continuous <Continuous>  chlorhexidine 0.12% Liquid 15 milliLiter(s) Oral Mucosa every 12 hours    Mode: AC/ CMV (Assist Control/ Continuous Mandatory Ventilation)  RR (machine): 14  TV (machine): 320  FiO2: 100  PEEP: 5  ITime: 1  MAP: 13  PIP: 25    ICU Vital Signs Last 24 Hrs  T(C): 39.5 (07 Apr 2020 00:02), Max: 39.5 (07 Apr 2020 00:02)  T(F): 103.1 (07 Apr 2020 00:02), Max: 103.1 (07 Apr 2020 00:02)  HR: 85 (07 Apr 2020 02:07) (68 - 91)  BP: 117/65 (06 Apr 2020 20:00) (67/33 - 119/58)  BP(mean): 76 (06 Apr 2020 20:00) (45 - 104)  ABP: --  ABP(mean): --  RR: 28 (06 Apr 2020 20:00) (20 - 50)  SpO2: 95% (07 Apr 2020 02:07) (84% - 98%)    ABG - ( 07 Apr 2020 01:38 )  pH, Arterial: x     pH, Blood: 7.09  /  pCO2: 31    /  pO2: 48    / HCO3: 9     / Base Excess: -18.8 /  SaO2: 76        I&O's Detail    05 Apr 2020 07:01  -  06 Apr 2020 07:00  --------------------------------------------------------  IN:    dextrose 5%.: 1400 mL    IV PiggyBack: 50 mL  Total IN: 1450 mL    OUT:    Indwelling Catheter - Urethral: 80 mL  Total OUT: 80 mL    Total NET: 1370 mL      06 Apr 2020 07:01  -  07 Apr 2020 03:54  --------------------------------------------------------  IN:    dextrose 5% + sodium chloride 0.45%.: 375 mL    norepinephrine Infusion: 139.8 mL    propofol Infusion: 54.4 mL    sodium bicarbonate  Infusion: 200 mL  Total IN: 769.2 mL    OUT:    Indwelling Catheter - Urethral: 300 mL  Total OUT: 300 mL    Total NET: 469.2 mL    LABS:                        12.4   4.71  )-----------( 54       ( 06 Apr 2020 04:23 )             38.8     04-07    149<H>  |  118<H>  |  12  ----------------------------<  156<H>  3.4<L>   |  24  |  0.92    Ca    7.2<L>      07 Apr 2020 03:40  Phos  2.2     04-05    TPro  6.0  /  Alb  1.8<L>  /  TBili  0.2  /  DBili  x   /  AST  50<H>  /  ALT  38  /  AlkPhos  73  04-06    CARDIAC MARKERS ( 06 Apr 2020 07:31 )  .041 ng/mL / x     / 290 U/L / x     / x      CARDIAC MARKERS ( 05 Apr 2020 08:20 )  .018 ng/mL / x     / 155 U/L / x     / x        CAPILLARY BLOOD GLUCOSE    PT/INR - ( 06 Apr 2020 07:31 )   PT: 12.9 sec;   INR: 1.16 ratio      CULTURES:  Culture Results:   No growth to date. (04-04 @ 22:18)  Culture Results:   No growth to date. (04-04 @ 22:18)  Rapid RVP Result: NotDetec (04-04 @ 10:49)    RADIOLOGY:  < from: CT Chest No Cont (04.04.20 @ 16:43) >  EXAM:  CT CHEST                          PROCEDURE DATE:  04/04/2020      INTERPRETATION:  CLINICAL INFORMATION: Shortness of breath, fever cough. Down syndrome.    COMPARISON: Chest radiograph 4/4/2020.    PROCEDURE:   CT of the Chest was performed without intravenous contrast.  Sagittal and coronal reformats were performed.      FINDINGS:    LUNGS AND AIRWAYS:  PLEURA: There is complete mucous plugging distal left mainstem and left upper and lower lobe bronchi. There is complete opacification of the left hemithorax with volume loss including ipsilateral cardiomediastinal shift.    No significant pleural effusion is noted.    There are scattered small groundglass nodular opacities right upper, middle and lower lobes.  There is atelectatic changes involving the right lung base.    MEDIASTINUM AND FANNY: There are shotty prevascular, AP mediastinal and right paratracheal mediastinal lymph nodes, measuring up to 10 mm in short axis.    VESSELS: There is a prominent main pulmonary arterial trunk, which may reflect pulmonary hypertension.    HEART: Heart size is normal. No pericardial effusion.    CHEST WALL AND LOWER NECK: Within normal limits.    VISUALIZED UPPER ABDOMEN: Within normal limits.    BONES: Degenerative changes.  Mild compression deformity T12 vertebral body.    IMPRESSION:     Complete opacification left hemithorax associated with volume loss and mucous plugging; Findings could reflect aspiration pneumonia and atelectasis.    Patchy groundglass opacities right lung may reflect infection, including atypical/viral pneumonia.    Other findings as discussed.    ZEENAT DOTY M.D., ATTENDING RADIOLOGIST  This document has been electronically signed. Apr 4 2020  4:53PM    < end of copied text >    CRITICAL CARE TIME SPENT: 56 minutes

## 2020-04-07 NOTE — PROGRESS NOTE ADULT - SUBJECTIVE AND OBJECTIVE BOX
Date/Time Patient Seen:  		  Referring MD:   Data Reviewed	       Patient is a 60y old  Female who presents with a chief complaint of sob (07 Apr 2020 11:45)      Subjective/HPI     PAST MEDICAL & SURGICAL HISTORY:  Frequent urinary tract infections  Hypothyroidism, unspecified type  Down syndrome        Medication list         MEDICATIONS  (STANDING):  chlorhexidine 0.12% Liquid 15 milliLiter(s) Oral Mucosa every 12 hours  chlorhexidine 2% Cloths 1 Application(s) Topical daily  dextrose 5% + sodium chloride 0.45%. 1000 milliLiter(s) (75 mL/Hr) IV Continuous <Continuous>  enoxaparin Injectable 30 milliGRAM(s) SubCutaneous daily  fentaNYL   Infusion. 0.5 MICROgram(s)/kG/Hr (2.27 mL/Hr) IV Continuous <Continuous>  hydroxychloroquine   Oral   hydroxychloroquine 400 milliGRAM(s) Oral every 12 hours  levETIRAcetam  IVPB 1000 milliGRAM(s) IV Intermittent every 12 hours  levothyroxine Injectable 62.5 MICROGram(s) IV Push at bedtime  memantine 5 milliGRAM(s) Oral daily  midodrine 10 milliGRAM(s) Oral every 8 hours  norepinephrine Infusion 0.05 MICROgram(s)/kG/Min (4.26 mL/Hr) IV Continuous <Continuous>  pantoprazole  Injectable 40 milliGRAM(s) IV Push daily  polyethylene glycol 3350 17 Gram(s) Oral daily  potassium chloride   Powder 40 milliEquivalent(s) Oral once  propofol Infusion 10 MICROgram(s)/kG/Min (2.72 mL/Hr) IV Continuous <Continuous>  valproate sodium IVPB 250 milliGRAM(s) IV Intermittent every 8 hours    MEDICATIONS  (PRN):  acetaminophen   Tablet .. 650 milliGRAM(s) Oral every 6 hours PRN Temp greater or equal to 38C (100.4F), Mild Pain (1 - 3)  acetaminophen  Suppository .. 650 milliGRAM(s) Rectal every 4 hours PRN Temp greater or equal to 38C (100.4F)         Vitals log        ICU Vital Signs Last 24 Hrs  T(C): 36.7 (07 Apr 2020 08:00), Max: 39.5 (07 Apr 2020 00:02)  T(F): 98.1 (07 Apr 2020 08:00), Max: 103.1 (07 Apr 2020 00:02)  HR: 55 (07 Apr 2020 11:00) (50 - 85)  BP: 125/69 (07 Apr 2020 11:00) (67/33 - 137/63)  BP(mean): 80 (07 Apr 2020 11:00) (45 - 98)  ABP: --  ABP(mean): --  RR: 20 (07 Apr 2020 11:00) (16 - 37)  SpO2: 100% (07 Apr 2020 11:00) (87% - 100%)       Mode: AC/ CMV (Assist Control/ Continuous Mandatory Ventilation)  RR (machine): 20  TV (machine): 320  FiO2: 90  PEEP: 12  ITime: 1  MAP: 13  PIP: 31      Input and Output:  I&O's Detail    06 Apr 2020 07:01  -  07 Apr 2020 07:00  --------------------------------------------------------  IN:    dextrose 5% + sodium chloride 0.45%.: 675 mL    Enteral Tube Flush: 150 mL    norepinephrine Infusion: 242.1 mL    propofol Infusion: 95.2 mL    sodium bicarbonate  Infusion: 500 mL  Total IN: 1662.3 mL    OUT:    Indwelling Catheter - Urethral: 800 mL  Total OUT: 800 mL    Total NET: 862.3 mL      07 Apr 2020 07:01  -  07 Apr 2020 13:09  --------------------------------------------------------  IN:    dextrose 5% + sodium chloride 0.45%.: 300 mL    Enteral Tube Flush: 100 mL    norepinephrine Infusion: 103.7 mL    propofol Infusion: 44.9 mL    sodium bicarbonate  Infusion: 200 mL  Total IN: 748.6 mL    OUT:  Total OUT: 0 mL    Total NET: 748.6 mL          Lab Data                        11.2   7.94  )-----------( 96       ( 07 Apr 2020 07:37 )             34.0     04-07    151<H>  |  117<H>  |  11  ----------------------------<  222<H>  3.3<L>   |  30  |  0.68    Ca    6.8<L>      07 Apr 2020 07:37  Phos  1.3     04-07  Mg     1.3     04-07    TPro  4.5<L>  /  Alb  1.4<L>  /  TBili  0.2  /  DBili  0.1  /  AST  58<H>  /  ALT  32  /  AlkPhos  70  04-07    ABG - ( 07 Apr 2020 05:36 )  pH, Arterial: x     pH, Blood: 7.42  /  pCO2: 41    /  pO2: 75    / HCO3: 26    / Base Excess: 2.5   /  SaO2: 95                CARDIAC MARKERS ( 07 Apr 2020 07:37 )  .637 ng/mL / x     / 254 U/L / x     / x      CARDIAC MARKERS ( 06 Apr 2020 07:31 )  .041 ng/mL / x     / 290 U/L / x     / x            Review of Systems	      Objective     Physical Examination    heart s1s2  lung dec BS      Pertinent Lab findings & Imaging      Deloris:  NO   Adequate UO     I&O's Detail    06 Apr 2020 07:01  -  07 Apr 2020 07:00  --------------------------------------------------------  IN:    dextrose 5% + sodium chloride 0.45%.: 675 mL    Enteral Tube Flush: 150 mL    norepinephrine Infusion: 242.1 mL    propofol Infusion: 95.2 mL    sodium bicarbonate  Infusion: 500 mL  Total IN: 1662.3 mL    OUT:    Indwelling Catheter - Urethral: 800 mL  Total OUT: 800 mL    Total NET: 862.3 mL      07 Apr 2020 07:01  -  07 Apr 2020 13:09  --------------------------------------------------------  IN:    dextrose 5% + sodium chloride 0.45%.: 300 mL    Enteral Tube Flush: 100 mL    norepinephrine Infusion: 103.7 mL    propofol Infusion: 44.9 mL    sodium bicarbonate  Infusion: 200 mL  Total IN: 748.6 mL    OUT:  Total OUT: 0 mL    Total NET: 748.6 mL               Discussed with:     Cultures:	        Radiology

## 2020-04-07 NOTE — PROGRESS NOTE ADULT - SUBJECTIVE AND OBJECTIVE BOX
JOSE ANGEL CROW [phone]   1960 DOA 2020 [address] [facility]      REVIEW OF SYMPTOMS      Able to give ROS  NO     PHYSICAL EXAM    HEENT Unremarkable PERRLA atraumatic   RESP Fair air entry EXP prolonged    Harsh breath sound Resp distres mild   CARDIAC S1 S2 No S3     NO JVD    ABDOMEN SOFT BS PRESENT NOT DISTENDED No hepatosplenomegaly PEDAL EDEMA present No calf tenderness  NO rash   GENERAL Not TOXIC looking    VITALS/LABS       2020 afeb 51 112/50    2020 12p nore .28 m/k/m   2020 12p prop 30 m/k/m   2020 u 500  2020 w 7.9 Hb 11.2 Plt 96 Na 151 K 3.3 CO2 30 Cr .6    2020 9a ac 20/320/+12/90%    PT DATA/BEST PRACTICE  ALLERGY     NOTEWORTHY  POINTS/CHANGES ROS/PE   2020 a Intubated tr to SICU   2020 fc given 945a Puentes ordered    Transferred to ICU May need intubn and fob dw brother                                WT  45 (2020)                    BMI     20 (2020)     ADVANCED DIRECTIVE       Goals of care discussion                                                                                      HEAD OF BED ELEVATION Yes  DYSPHAGIA EVAL                                  DIET      dys 1 puree () --> jevity 1.5 720 ()                                    IV F        D5 100 ()   --> D5 1/2 75 (()                                              DVT PROPHYLAXIS      lvnx 30 ()                STRESS ULCER PROPHYLAXIS     protonix 40 /)                                                                INFECTION PPLX  chlorhexidine .12 ()   chlorhexidine 2%   ECHO                  CXR         2020 cxr persistent advanced infiltr    2020 cxr volume loss l hemith   bk airspace consolidatn   rco   poss sm l effsn                  CT         CT ch complete mucous plug distal lmsb and bk and lll bronchio                                                                         MICROBIO       covid pcr detec  2020 mrsa n  2020 pc n   2020 RVP n         blod c n         ABIO       HCQ ()   meropenem (-)       azithro ()        PROCEDURE

## 2020-04-07 NOTE — PROCEDURE NOTE - NSPOSTCAREGUIDE_GEN_A_CORE
Verbal/written post procedure instructions were given to patient/caregiver
Care for catheter as per unit/ICU protocols
Verbal/written post procedure instructions were given to patient/caregiver/Instructed patient/caregiver regarding signs and symptoms of infection/Instructed patient/caregiver to follow-up with primary care physician

## 2020-04-07 NOTE — PROGRESS NOTE ADULT - SUBJECTIVE AND OBJECTIVE BOX
Patient is a 60y old  Female who presents with a chief complaint of sob (06 Apr 2020 11:30)    Patient seen in follow up for ADAM, Hypernatremia.   Pt now COVID +. Chart reviewed.        PAST MEDICAL HISTORY:  Frequent urinary tract infections  Hypothyroidism, unspecified type  Down syndrome    MEDICATIONS  (STANDING):  chlorhexidine 0.12% Liquid 15 milliLiter(s) Oral Mucosa every 12 hours  dextrose 5% + sodium chloride 0.45%. 1000 milliLiter(s) (75 mL/Hr) IV Continuous <Continuous>  enoxaparin Injectable 30 milliGRAM(s) SubCutaneous daily  fentaNYL   Infusion. 0.5 MICROgram(s)/kG/Hr (2.27 mL/Hr) IV Continuous <Continuous>  hydroxychloroquine   Oral   hydroxychloroquine 400 milliGRAM(s) Oral every 12 hours  levETIRAcetam  IVPB 1000 milliGRAM(s) IV Intermittent every 12 hours  levothyroxine Injectable 62.5 MICROGram(s) IV Push at bedtime  memantine 5 milliGRAM(s) Oral daily  midodrine 10 milliGRAM(s) Oral every 8 hours  norepinephrine Infusion 0.05 MICROgram(s)/kG/Min (4.26 mL/Hr) IV Continuous <Continuous>  pantoprazole  Injectable 40 milliGRAM(s) IV Push daily  polyethylene glycol 3350 17 Gram(s) Oral daily  potassium chloride   Powder 40 milliEquivalent(s) Oral once  propofol Infusion 10 MICROgram(s)/kG/Min (2.72 mL/Hr) IV Continuous <Continuous>  valproate sodium IVPB 250 milliGRAM(s) IV Intermittent every 8 hours    MEDICATIONS  (PRN):  acetaminophen   Tablet .. 650 milliGRAM(s) Oral every 6 hours PRN Temp greater or equal to 38C (100.4F), Mild Pain (1 - 3)  acetaminophen  Suppository .. 650 milliGRAM(s) Rectal every 4 hours PRN Temp greater or equal to 38C (100.4F)    T(C): 36.7 (04-07-20 @ 08:00), Max: 39.6 (04-05-20 @ 14:00)  HR: 55 (04-07-20 @ 11:00) (50 - 93)  BP: 125/69 (04-07-20 @ 11:00) (67/33 - 137/63)  RR: 20 (04-07-20 @ 11:00)  SpO2: 100% (04-07-20 @ 11:00)  Wt(kg): --  I&O's Detail    06 Apr 2020 07:01  -  07 Apr 2020 07:00  --------------------------------------------------------  IN:    dextrose 5% + sodium chloride 0.45%.: 675 mL    Enteral Tube Flush: 150 mL    norepinephrine Infusion: 242.1 mL    propofol Infusion: 95.2 mL    sodium bicarbonate  Infusion: 500 mL  Total IN: 1662.3 mL    OUT:    Indwelling Catheter - Urethral: 800 mL  Total OUT: 800 mL    Total NET: 862.3 mL      07 Apr 2020 07:01  -  07 Apr 2020 11:48  --------------------------------------------------------  IN:    dextrose 5% + sodium chloride 0.45%.: 300 mL    Enteral Tube Flush: 100 mL    norepinephrine Infusion: 103.7 mL    propofol Infusion: 44.9 mL    sodium bicarbonate  Infusion: 200 mL  Total IN: 748.6 mL    OUT:  Total OUT: 0 mL    Total NET: 748.6 mL        PHYSICAL EXAM:  Pt on COVID precautions. Chart reviewed and previous physical examination noted.                      LABORATORY:                        11.2   7.94  )-----------( 96       ( 07 Apr 2020 07:37 )             34.0     04-07    151<H>  |  117<H>  |  11  ----------------------------<  222<H>  3.3<L>   |  30  |  0.68    Ca    6.8<L>      07 Apr 2020 07:37  Phos  1.3     04-07  Mg     1.3     04-07    TPro  4.5<L>  /  Alb  1.4<L>  /  TBili  0.2  /  DBili  0.1  /  AST  58<H>  /  ALT  32  /  AlkPhos  70  04-07    Sodium, Serum: 151 mmol/L (04-07 @ 07:37)  Sodium, Serum: 149 mmol/L (04-07 @ 03:40)  Sodium, Serum: 153 mmol/L (04-06 @ 07:31)    Potassium, Serum: 3.3 mmol/L (04-07 @ 07:37)  Potassium, Serum: 3.4 mmol/L (04-07 @ 03:40)  Potassium, Serum: 3.7 mmol/L (04-06 @ 07:31)    Hemoglobin: 11.2 g/dL (04-07 @ 07:37)  Hemoglobin: 12.4 g/dL (04-06 @ 04:23)  Hemoglobin: 13.4 g/dL (04-05 @ 08:20)    Creatinine, Serum 0.68 (04-07 @ 07:37)  Creatinine, Serum 0.92 (04-07 @ 03:40)  Creatinine, Serum 0.85 (04-06 @ 07:31)  Creatinine, Serum 0.95 (04-05 @ 08:20)    CARDIAC MARKERS ( 07 Apr 2020 07:37 )  .637 ng/mL / x     / 254 U/L / x     / x      CARDIAC MARKERS ( 06 Apr 2020 07:31 )  .041 ng/mL / x     / 290 U/L / x     / x          LIVER FUNCTIONS - ( 07 Apr 2020 07:37 )  Alb: 1.4 g/dL / Pro: 4.5 g/dL / ALK PHOS: 70 U/L / ALT: 32 U/L DA / AST: 58 U/L / GGT: x             ABG - ( 07 Apr 2020 05:36 )  pH, Arterial: x     pH, Blood: 7.42  /  pCO2: 41    /  pO2: 75    / HCO3: 26    / Base Excess: 2.5   /  SaO2: 95

## 2020-04-07 NOTE — CHART NOTE - NSCHARTNOTEFT_GEN_A_CORE
Assessment: MD called re: tube feeding order recommendations.  Pt is a 61 yo female admit from [facility] with SOB< fever, cough(+COVID19).  Pt on po diet 4/5-4/6 (dysphagia 1 pureed with honey thick liquids), pt decompensated overnight and required intubation.  NPO status maintained with D51/2NS@75ml/hr infusing.  Noted OGT placed for meds/feedings.  Trophic feeds are recommended for COVID19+ patients.  Recommend Jevity 1.5 180mL every 4 hrs with 60ml free water flush before and after each bolus (provides 1080kcal based on 24kcal/kg Actual BW, 45.9g protein based on 1.0g/kg Actual BW; meeting ~70-85% of estimated needs). Recommend c/w IVFs given hypernatremia. Noted DTI of Jesus heels, stg 2 of the Rt sacrum, and DTI of sacrum.  RD to follow closely.    Factors impacting intake: [ ] none [ ] nausea  [ ] vomiting [ ] diarrhea [ ] constipation  [ ]chewing problems [ ] swallowing issues  [ ] other:     Diet Presciption: Diet, NPO (04-07-20 @ 04:29)    Intake: NPO, OGT to start     Current Weight: Weight (kg): 45.4 (04-04 @ 09:46)  % Weight Change- no updated bw to assess (ubw ~100#)    Pertinent Medications: MEDICATIONS  (STANDING):  chlorhexidine 0.12% Liquid 15 milliLiter(s) Oral Mucosa every 12 hours  dextrose 5% + sodium chloride 0.45%. 1000 milliLiter(s) (75 mL/Hr) IV Continuous <Continuous>  enoxaparin Injectable 30 milliGRAM(s) SubCutaneous daily  fentaNYL   Infusion. 0.5 MICROgram(s)/kG/Hr (2.27 mL/Hr) IV Continuous <Continuous>  hydroxychloroquine   Oral   hydroxychloroquine 400 milliGRAM(s) Oral every 12 hours  levETIRAcetam  IVPB 1000 milliGRAM(s) IV Intermittent every 12 hours  levothyroxine Injectable 62.5 MICROGram(s) IV Push at bedtime  memantine 5 milliGRAM(s) Oral daily  midodrine 10 milliGRAM(s) Oral every 8 hours  norepinephrine Infusion 0.05 MICROgram(s)/kG/Min (4.26 mL/Hr) IV Continuous <Continuous>  pantoprazole  Injectable 40 milliGRAM(s) IV Push daily  polyethylene glycol 3350 17 Gram(s) Oral daily  potassium chloride   Powder 40 milliEquivalent(s) Oral once  propofol Infusion 10 MICROgram(s)/kG/Min (2.72 mL/Hr) IV Continuous <Continuous>  valproate sodium IVPB 250 milliGRAM(s) IV Intermittent every 8 hours    MEDICATIONS  (PRN):  acetaminophen   Tablet .. 650 milliGRAM(s) Oral every 6 hours PRN Temp greater or equal to 38C (100.4F), Mild Pain (1 - 3)  acetaminophen  Suppository .. 650 milliGRAM(s) Rectal every 4 hours PRN Temp greater or equal to 38C (100.4F)    Pertinent Labs: 04-07 Na151 mmol/L<H> Glu 222 mg/dL<H> K+ 3.3 mmol/L<L> Cr  0.68 mg/dL BUN 11 mg/dL 04-07 Phos 1.3 mg/dL<L> 04-07 Alb 1.4 g/dL<L>     CAPILLARY BLOOD GLUCOSE        Skin: DTI of Jesus heels, stg 2 of the Rt sacrum, and DTI of sacrum    Estimated Needs:   [x ] no change since previous assessment  [ ] recalculated:     Previous Nutrition Diagnosis:   [ ] Inadequate Energy Intake [ ]Inadequate Oral Intake [ ] Excessive Energy Intake   [ ] Underweight [ ] Increased Nutrient Needs [ ] Overweight/Obesity [x] swallowing difficulty   [ ] Altered GI Function [ ] Unintended Weight Loss [ ] Food & Nutrition Related Knowledge Deficit [ ] Malnutrition     Nutrition Diagnosis is [x ] ongoing  [ ] resolved [ ] not applicable     New Nutrition Diagnosis: [ ] not applicable       Interventions: bolus feedings, IVF  Recommend  [ ] Change Diet To:  [ ] Nutrition Supplement  [ ] Nutrition Support  [ ] Other:     Monitoring and Evaluation:   [ ] PO intake [ x ] Tolerance to diet prescription/tube feeds [ x ] weights [ x ] labs[ x ] follow up per protocol  [ ] other:

## 2020-04-08 NOTE — PROVIDER CONTACT NOTE (CRITICAL VALUE NOTIFICATION) - ASSESSMENT
WBC within normal range and afebrile
Nonverbal at baseline. Not obtunded, moves arms and legs to touch/noxious stimuli. Previously received 1 L NS bolus in addition to standing D5% @ 100 mL/hr. Puentes cath placed earlier, yielding approximately 60 mL clear yellow urine.
Pt admitted for resp failure - COVID-19 positive. Pt intubated and sedated.

## 2020-04-08 NOTE — PROGRESS NOTE ADULT - ASSESSMENT
1.	ADAM: Prerenal azotemia, ATN  2.	Shock, Sepsis  3.	Pneumonia, COVID +    Improved and stable renal indices. To continue current meds. Treatment for COVID pneumonia. COVID precautions. Supportive care.   Avoid nephrotoxic meds as possible. Management as per ICU staff.

## 2020-04-08 NOTE — PROGRESS NOTE ADULT - ASSESSMENT
59 y/o F w/ a PMHx of Down's syndrome, seizure disorder, and hypothyroidism admitted to [facility] on 4/4 for R/O COVID-19. Transferred to ICU on 4/7 s/p emergent intubation for acute hypoxic respiratory failure/ARDS 2/2 COVID-19 PNA. Course complicated by hypernatremia.    PLAN:  Neuro: Sedated w/ propofol for vent synchrony. Continue w/ Keppra and Depacon. Received an additional dose of Keppra 500mg IV today for seizure.  Cardiac: Actively titrating Levophed to maintain MAP > 65. Continue w/ midodrine 10mg q8hrs. Monitor QTC while on hydroxychloroquine.  Pulm: ARDS-NET 4-6cc/kg IBW TV as able to maintain plateau pressures < 30. Prone ventilation consideration as feasible. PaO2/FiO2 < 150 on FiO2 > 60% and PEEP at least 5. Vent bundle in place. Solumedrol 80mg IV push daily.  GI: NGT in place. Continue w/ tube feeds. Consider adding free water flushes if hyperNa does not improve.  Renal: ADAM resolved, continue to trend renal function. Puentes in place, monitor urine output. Remains hypernatremic (Na 153 corrected), switched IVF to D5W. Hypokalemic and hypophosphatemic on AM labs, received 40mEqs Klorcon and 30mm Kphos. Continue to monitor electrolytes, will replace as needed.  ID: COVID-19 (+), RVP (-). Blood cultures w/o growth. Continue w/ hydroxychloroquine. Trend inflammatory markers. Meropenem discontinued.  Endo: Continue w/ levothyroxine. Accu-Cheks q6hrs. Aggressive glycemic control, goal FS < 180.  Heme/Onc: Chemical DVT prophylaxis w/ Lovenox, mechanical DVT prophylaxis w/ SCDs.    Dispo: To remain in ICU. Prognosis poor. Full code.    PLAN:  Neuro: Sedation w/ neuromuscular blockade (as needed) to facilitate safe ventilation.  Cardiac: Vasosupport as needed to maintain MAP > 65. Avoid fluid challenges.  QTC monitoring while on Zithromax and      hydroxychloroquine.  Pulm: ARDS-NET 4-6cc/kg IBW TV as able to maintain plateau pressures < 30. Prone ventilation consideration as feasabile.  PaO2/FiO2 < 150 on FiO2 > 60% and PEEP at least 5. Vent bundle in place. Consider Solumedrol 1 mg/kg q12 hrs.  GI: Enteric feeds as tolerated in tandem with NMB and prone ventilation.  Renal: Even to negative fluid balance as tolerated by hemodynamics and renal function. Feeds to be provided in lieu of IVF.   ID: ABX discontinuation based on discussion w/ ID in conjunction w/ clinical features, culture data, and judicious procalcitonin monitoring.  Endo: Aggressive glycemic control to limit FS glucose to < 180 mg/dl.  Heme: Chemical DVT prophylaxis w/ Lovenox and mechanical DVT prohylaxis w/ SCDs.    Dispo: To remain in ICU. Prognosis guarded.    COVID-19 specific considerations and therapeutic options based on the available and rapidly changing literature. 59 y/o F w/ a PMHx of Down's syndrome, seizure disorder, and hypothyroidism admitted to [facility] on 4/4 for R/O COVID-19. Transferred to ICU on 4/7 s/p emergent intubation for acute hypoxic respiratory failure/ARDS 2/2 COVID-19 PNA. Course complicated by hypernatremia.    PLAN:  Neuro: Sedated w/ propofol for vent synchrony. Continue w/ Keppra and Depacon. Received an additional dose of Keppra 500mg IV today for seizure. F/u valproic acid level in AM.  Cardiac: Actively titrating Levophed to maintain MAP > 65. Continue w/ midodrine 10mg q8hrs. Monitor QTC while on hydroxychloroquine.  Pulm: ARDS-NET 4-6cc/kg IBW TV as able to maintain plateau pressures < 30. Prone ventilation consideration as feasible. PaO2/FiO2 < 150 on FiO2 > 60% and PEEP at least 5. Vent bundle in place. Solumedrol 80mg IV push daily.  GI: NGT in place. Continue w/ tube feeds. Consider adding free water flushes if hyperNa does not improve.  Renal: ADAM resolved, continue to trend renal function. Puentes in place, monitor urine output. Remains hypernatremic (Na 153 corrected), switched IVF to D5W. Hypokalemic and hypophosphatemic on AM labs, received 40mEqs Klorcon and 30mm Kphos. Continue to monitor electrolytes, will replace as needed.  ID: COVID-19 (+), RVP (-). Blood cultures w/o growth. Continue w/ hydroxychloroquine. Trend inflammatory markers. Meropenem discontinued.  Endo: Continue w/ levothyroxine. Accu-Cheks q6hrs. Aggressive glycemic control, goal FS < 180.  Heme/Onc: Chemical DVT prophylaxis w/ Lovenox, mechanical DVT prophylaxis w/ SCDs.    Dispo: To remain in ICU. Prognosis poor. Full code.    PLAN:  Neuro: Sedation w/ neuromuscular blockade (as needed) to facilitate safe ventilation.  Cardiac: Vasosupport as needed to maintain MAP > 65. Avoid fluid challenges.  QTC monitoring while on Zithromax and      hydroxychloroquine.  Pulm: ARDS-NET 4-6cc/kg IBW TV as able to maintain plateau pressures < 30. Prone ventilation consideration as feasabile.  PaO2/FiO2 < 150 on FiO2 > 60% and PEEP at least 5. Vent bundle in place. Consider Solumedrol 1 mg/kg q12 hrs.  GI: Enteric feeds as tolerated in tandem with NMB and prone ventilation.  Renal: Even to negative fluid balance as tolerated by hemodynamics and renal function. Feeds to be provided in lieu of IVF.   ID: ABX discontinuation based on discussion w/ ID in conjunction w/ clinical features, culture data, and judicious procalcitonin monitoring.  Endo: Aggressive glycemic control to limit FS glucose to < 180 mg/dl.  Heme: Chemical DVT prophylaxis w/ Lovenox and mechanical DVT prohylaxis w/ SCDs.    Dispo: To remain in ICU. Prognosis guarded.    COVID-19 specific considerations and therapeutic options based on the available and rapidly changing literature. 61 y/o F w/ a PMHx of Down's syndrome, seizure disorder, and hypothyroidism admitted to [facility] on 4/4 for R/O COVID-19. Transferred to ICU on 4/7 s/p emergent intubation for acute hypoxic respiratory failure/ARDS 2/2 COVID-19 PNA. Course complicated by hypernatremia.    PLAN:  Neuro: Sedated w/ propofol for vent synchrony. Continue w/ Keppra and Depacon. Received an additional dose of Keppra 500mg IV today for seizure. F/u valproic acid level in AM.  Cardiac: Actively titrating Levophed to maintain MAP > 65. Continue w/ midodrine 10mg q8hrs. Monitor QTC while on hydroxychloroquine. Avoid fluid challenges.  Pulm: ARDS-NET 4-6cc/kg IBW TV as able to maintain plateau pressures < 30. Prone ventilation consideration as feasible. PaO2/FiO2 < 150 on FiO2 > 60% and PEEP at least 5. Vent bundle in place. Solumedrol 80mg IV push daily.  GI: NGT in place. Continue w/ tube feeds. Consider adding free water flushes if hyperNa does not improve.  Renal: ADAM resolved, continue to trend renal function. Puentes in place, monitor urine output. Remains hypernatremic (Na 153 corrected), switched IVF to D5W. Hypokalemic and hypophosphatemic on AM labs, received 40mEqs Klorcon and 30mm Kphos. Continue to monitor electrolytes, will replace as needed. Goal net negative fluid balance.  ID: COVID-19 (+), RVP (-). Blood cultures w/o growth. Continue w/ hydroxychloroquine. Trend inflammatory markers. Meropenem discontinued.  Endo: Continue w/ levothyroxine. Accu-Cheks q6hrs. Aggressive glycemic control, goal FS < 180.  Heme/Onc: Chemical DVT prophylaxis w/ Lovenox, mechanical DVT prophylaxis w/ SCDs.    Dispo: To remain in ICU. Prognosis poor. Full code.    COVID-19 specific considerations and therapeutic options based on the available and rapidly changing literature.

## 2020-04-08 NOTE — PROGRESS NOTE ADULT - SUBJECTIVE AND OBJECTIVE BOX
Patient is a 60y old  Female who presents with a chief complaint of sob (06 Apr 2020 11:30)    Patient seen in follow up for ADAM, Hypernatremia.   COVID +. Chart reviewed.        PAST MEDICAL HISTORY:  Frequent urinary tract infections  Hypothyroidism, unspecified type  Down syndrome    MEDICATIONS  (STANDING):  chlorhexidine 0.12% Liquid 15 milliLiter(s) Oral Mucosa every 12 hours  chlorhexidine 2% Cloths 1 Application(s) Topical daily  dextrose 5%. 1000 milliLiter(s) (75 mL/Hr) IV Continuous <Continuous>  enoxaparin Injectable 30 milliGRAM(s) SubCutaneous daily  hydroxychloroquine   Oral   hydroxychloroquine 200 milliGRAM(s) Oral every 12 hours  levETIRAcetam  IVPB 1000 milliGRAM(s) IV Intermittent every 12 hours  levoFLOXacin IVPB      levothyroxine Injectable 62.5 MICROGram(s) IV Push at bedtime  memantine 5 milliGRAM(s) Oral daily  methylPREDNISolone sodium succinate Injectable 80 milliGRAM(s) IV Push daily  midodrine 10 milliGRAM(s) Oral every 8 hours  norepinephrine Infusion 0.05 MICROgram(s)/kG/Min (4.26 mL/Hr) IV Continuous <Continuous>  pantoprazole  Injectable 40 milliGRAM(s) IV Push daily  polyethylene glycol 3350 17 Gram(s) Oral daily  propofol Infusion 10 MICROgram(s)/kG/Min (2.72 mL/Hr) IV Continuous <Continuous>  valproate sodium IVPB 250 milliGRAM(s) IV Intermittent every 8 hours    MEDICATIONS  (PRN):  acetaminophen   Tablet .. 650 milliGRAM(s) Oral every 6 hours PRN Temp greater or equal to 38C (100.4F), Mild Pain (1 - 3)  acetaminophen  Suppository .. 650 milliGRAM(s) Rectal every 4 hours PRN Temp greater or equal to 38C (100.4F)  LORazepam   Injectable 2 milliGRAM(s) IV Push every 6 hours PRN seizure activity    T(C): 36.3 (04-08-20 @ 14:00), Max: 39.5 (04-07-20 @ 00:02)  HR: 53 (04-08-20 @ 14:00) (47 - 85)  BP: 116/78 (04-08-20 @ 14:00) (83/63 - 155/65)  RR: 24 (04-08-20 @ 14:00)  SpO2: 96% (04-08-20 @ 14:00)  Wt(kg): --  I&O's Detail    07 Apr 2020 07:01  -  08 Apr 2020 07:00  --------------------------------------------------------  IN:    dextrose 5% + sodium chloride 0.45%.: 1350 mL    dextrose 5%.: 450 mL    Enteral Tube Flush: 460 mL    IV PiggyBack: 250 mL    norepinephrine Infusion: 585.3 mL    ns in tub fed  [id number]: 360 mL    propofol Infusion: 126.6 mL    sodium bicarbonate  Infusion: 200 mL    Solution: 499.8 mL  Total IN: 4281.7 mL    OUT:    Indwelling Catheter - Urethral: 900 mL  Total OUT: 900 mL    Total NET: 3381.7 mL      08 Apr 2020 07:01  -  08 Apr 2020 15:25  --------------------------------------------------------  IN:    dextrose 5%.: 300 mL    norepinephrine Infusion: 38 mL    propofol Infusion: 10.8 mL  Total IN: 348.8 mL    OUT:    Indwelling Catheter - Urethral: 850 mL  Total OUT: 850 mL    Total NET: -501.2 mL      PHYSICAL EXAM:  Pt on COVID precautions. Chart reviewed and previous physical examination noted.                    LABORATORY:                        11.7   3.93  )-----------( 98       ( 08 Apr 2020 07:22 )             35.0     04-08    143  |  114<H>  |  11  ----------------------------<  175<H>  5.0   |  25  |  0.87    Ca    7.0<L>      08 Apr 2020 07:22  Phos  2.0     04-08  Mg     1.4     04-08    TPro  4.7<L>  /  Alb  1.2<L>  /  TBili  0.1<L>  /  DBili  x   /  AST  62<H>  /  ALT  35  /  AlkPhos  84  04-08    Sodium, Serum: 143 mmol/L (04-08 @ 07:22)  Sodium, Serum: 151 mmol/L (04-07 @ 07:37)  Sodium, Serum: 149 mmol/L (04-07 @ 03:40)    Potassium, Serum: 5.0 mmol/L (04-08 @ 07:22)  Potassium, Serum: 3.3 mmol/L (04-07 @ 07:37)  Potassium, Serum: 3.4 mmol/L (04-07 @ 03:40)    Hemoglobin: 11.7 g/dL (04-08 @ 07:22)  Hemoglobin: 11.2 g/dL (04-07 @ 07:37)  Hemoglobin: 12.4 g/dL (04-06 @ 04:23)    Creatinine, Serum 0.87 (04-08 @ 07:22)  Creatinine, Serum 0.68 (04-07 @ 07:37)  Creatinine, Serum 0.92 (04-07 @ 03:40)  Creatinine, Serum 0.85 (04-06 @ 07:31)    CARDIAC MARKERS ( 08 Apr 2020 07:22 )  .158 ng/mL / x     / 124 U/L / x     / x      CARDIAC MARKERS ( 07 Apr 2020 07:37 )  .637 ng/mL / x     / 254 U/L / x     / x          LIVER FUNCTIONS - ( 08 Apr 2020 07:22 )  Alb: 1.2 g/dL / Pro: 4.7 g/dL / ALK PHOS: 84 U/L / ALT: 35 U/L DA / AST: 62 U/L / GGT: x             ABG - ( 07 Apr 2020 05:36 )  pH, Arterial: x     pH, Blood: 7.42  /  pCO2: 41    /  pO2: 75    / HCO3: 26    / Base Excess: 2.5   /  SaO2: 95

## 2020-04-08 NOTE — PROGRESS NOTE ADULT - SUBJECTIVE AND OBJECTIVE BOX
Patient seen and examined at bedside.  Intubated, sedated  Last night , report given by ICU staff, patient had a seizure. also noted patient seized two addition times during day  Vitals stable, afebrile  Hb stable  H/L Ratio improving  albumin low at 1.2    Neurology called for consult for evaluation of seizure      Review of Systems:  unable to obtain    Objective:  Vitals  T(C): 36.4 (04-08-20 @ 15:15), Max: 37.1 (04-08-20 @ 01:00)  HR: 65 (04-08-20 @ 16:15) (47 - 72)  BP: 113/65 (04-08-20 @ 16:15) (71/31 - 155/65)  RR: 20 (04-08-20 @ 16:15) (20 - 32)  SpO2: 93% (04-08-20 @ 16:15) (88% - 100%)    Physical Exam:  General: comfortable, no acute distress, sedated on propofl  HEENT: Atraumatic, no LAD, trachea midline, PERRLA  Cardiovascular: normal s1s2, no murmurs, gallops or fricition rubs  Pulmonary: rhonchi,  wheezing , rhonchi  Gastrointestinal: soft non tender non distended, no masses felt, no organomegally  Muscloskeletal: no lower extremity edema, intact bilateral lower extremity pulses  Neurological: patient sedated, although no gross focal defects  Psychiatrical: sedated  SKIN: no rash, lesions or ulcers      Labs:                          11.7   3.93  )-----------( 98       ( 08 Apr 2020 07:22 )             35.0     04-08    143  |  114<H>  |  11  ----------------------------<  175<H>  5.0   |  25  |  0.87    Ca    7.0<L>      08 Apr 2020 07:22  Phos  2.0     04-08  Mg     1.4     04-08    TPro  4.7<L>  /  Alb  1.2<L>  /  TBili  0.1<L>  /  DBili  x   /  AST  62<H>  /  ALT  35  /  AlkPhos  84  04-08    LIVER FUNCTIONS - ( 08 Apr 2020 07:22 )  Alb: 1.2 g/dL / Pro: 4.7 g/dL / ALK PHOS: 84 U/L / ALT: 35 U/L DA / AST: 62 U/L / GGT: x           PT/INR - ( 08 Apr 2020 07:22 )   PT: 12.3 sec;   INR: 1.10 ratio               Active Medications  MEDICATIONS  (STANDING):  chlorhexidine 0.12% Liquid 15 milliLiter(s) Oral Mucosa every 12 hours  chlorhexidine 2% Cloths 1 Application(s) Topical daily  dextrose 5%. 1000 milliLiter(s) (75 mL/Hr) IV Continuous <Continuous>  enoxaparin Injectable 30 milliGRAM(s) SubCutaneous daily  hydroxychloroquine   Oral   hydroxychloroquine 200 milliGRAM(s) Oral every 12 hours  levETIRAcetam  IVPB 1000 milliGRAM(s) IV Intermittent every 12 hours  levoFLOXacin IVPB      levothyroxine Injectable 62.5 MICROGram(s) IV Push at bedtime  magnesium sulfate  IVPB 2 Gram(s) IV Intermittent once  memantine 5 milliGRAM(s) Oral daily  methylPREDNISolone sodium succinate Injectable 80 milliGRAM(s) IV Push daily  midodrine 10 milliGRAM(s) Oral every 8 hours  norepinephrine Infusion 0.05 MICROgram(s)/kG/Min (4.26 mL/Hr) IV Continuous <Continuous>  pantoprazole  Injectable 40 milliGRAM(s) IV Push daily  polyethylene glycol 3350 17 Gram(s) Oral daily  propofol Infusion 10 MICROgram(s)/kG/Min (2.72 mL/Hr) IV Continuous <Continuous>  valproate sodium IVPB 250 milliGRAM(s) IV Intermittent every 8 hours    MEDICATIONS  (PRN):  acetaminophen   Tablet .. 650 milliGRAM(s) Oral every 6 hours PRN Temp greater or equal to 38C (100.4F), Mild Pain (1 - 3)  acetaminophen  Suppository .. 650 milliGRAM(s) Rectal every 4 hours PRN Temp greater or equal to 38C (100.4F)  LORazepam   Injectable 2 milliGRAM(s) IV Push every 6 hours PRN seizure activity

## 2020-04-08 NOTE — PROVIDER CONTACT NOTE (CRITICAL VALUE NOTIFICATION) - ACTION/TREATMENT ORDERED:
no action at this time
As above. Cont to monitor patient.
ZOE Dockery notified of critical values. No further treatment ordered. Will continue to monitor.

## 2020-04-08 NOTE — PROGRESS NOTE ADULT - SUBJECTIVE AND OBJECTIVE BOX
MIGNON WALTER    [facility] 09    Patient is a 60y old  Female who presents with a chief complaint of sob (08 Apr 2020 08:27)       Allergies    amoxicillin (Rash)  penicillin (Rash)    Intolerances        HPI:  Patient is a 60y old  Female who presents with a chief complaint of sob and fever    HPI:This is a nonverbal pt bib ems from longterm for fever, sob, cough, low o2 sat, with possible covid exposure. per ems, pt on levaquin for aspiration pna. no further hx available.pt has hx of chronic constipation an dis on multiple stool softners.  she was found ot be hypernatremic and in renal failure at admission.  also has hx of hypothyroidism and down syndrome        PAST MEDICAL & SURGICAL HISTORY:  Frequent urinary tract infections  Hypothyroidism, unspecified type  Down syndrome      FAMILY HISTORY:can not obtain due to mental status      SOCIAL HISTORY:    Allergies    amoxicillin (Rash)  penicillin (Rash)    Intolerances          REVIEW OF SYSEMS:  negative except form above mentioned      Vital Signs Last 24 Hrs  T(C): 37.8 (04 Apr 2020 09:46), Max: 37.8 (04 Apr 2020 09:46)  T(F): 100 (04 Apr 2020 09:46), Max: 100 (04 Apr 2020 09:46)  HR: 68 (04 Apr 2020 11:00) (66 - 69)  BP: 89/52 (04 Apr 2020 11:00) (87/51 - 91/52)  BP(mean): --  RR: 24 (04 Apr 2020 11:00) (24 - 24)  SpO2: 100% (04 Apr 2020 11:00) (89% - 100%)  I&O's Summary      PHYSICAL EXAM:  GENERAL: onnc  HEAD:  Atraumatic, Normocephalic  EYES: EOMI, PERRLA, conjunctiva and sclera clear  NECK: Supple, No JVD  CHEST/LUNG: dec bs at bases and rhonchi  HEART: Regular rate and rhythm; No murmurs, rubs, or gallops  ABDOMEN: Soft, Nontender, Nondistended; Bowel sounds present  SKIN: No rashes or lesions    LABS:                        14.7   2.88  )-----------( 52       ( 04 Apr 2020 10:49 )             45.9     04-04    154<H>  |  117<H>  |  31<H>  ----------------------------<  107<H>  4.7   |  30  |  1.48<H>    Ca    8.5      04 Apr 2020 10:49    TPro  7.1  /  Alb  2.3<L>  /  TBili  0.2  /  DBili  x   /  AST  73<H>  /  ALT  66<H>  /  AlkPhos  118  04-04      CAPILLARY BLOOD GLUCOSE                RADIOLOGY & ADDITIONAL TESTS:    Imaging Personally Reviewed:  [x] YES  [ ] NO    Consultant(s) Notes Reviewed:  [x] YES  [ ] NO      MEDICATIONS  (STANDING):  azithromycin   Tablet 500 milliGRAM(s) Oral daily  dextrose 5%. 1000 milliLiter(s) (100 mL/Hr) IV Continuous <Continuous>  diVALproex  milliGRAM(s) Oral three times a day  enoxaparin Injectable 30 milliGRAM(s) SubCutaneous daily  levETIRAcetam 1000 milliGRAM(s) Oral two times a day  levothyroxine 125 MICROGram(s) Oral daily  memantine 5 milliGRAM(s) Oral daily  pantoprazole    Tablet 40 milliGRAM(s) Oral before breakfast  polyethylene glycol 3350 17 Gram(s) Oral daily    MEDICATIONS  (PRN):  acetaminophen   Tablet .. 650 milliGRAM(s) Oral every 6 hours PRN Temp greater or equal to 38C (100.4F), Mild Pain (1 - 3)      Care Discussed with Consultants/Other Providers [x] YES  [ ] NO    HEALTH ISSUES - PROBLEM Dx: (04 Apr 2020 13:11)      PAST MEDICAL & SURGICAL HISTORY:  Frequent urinary tract infections  Hypothyroidism, unspecified type  Down syndrome      FAMILY HISTORY:        MEDICATIONS   acetaminophen   Tablet .. 650 milliGRAM(s) Oral every 6 hours PRN  acetaminophen  Suppository .. 650 milliGRAM(s) Rectal every 4 hours PRN  chlorhexidine 0.12% Liquid 15 milliLiter(s) Oral Mucosa every 12 hours  chlorhexidine 2% Cloths 1 Application(s) Topical daily  dextrose 5%. 1000 milliLiter(s) IV Continuous <Continuous>  enoxaparin Injectable 30 milliGRAM(s) SubCutaneous daily  hydroxychloroquine   Oral   hydroxychloroquine 200 milliGRAM(s) Oral every 12 hours  levETIRAcetam  IVPB 1000 milliGRAM(s) IV Intermittent every 12 hours  levoFLOXacin IVPB      levothyroxine Injectable 62.5 MICROGram(s) IV Push at bedtime  LORazepam   Injectable 2 milliGRAM(s) IV Push every 6 hours PRN  memantine 5 milliGRAM(s) Oral daily  methylPREDNISolone sodium succinate Injectable 80 milliGRAM(s) IV Push daily  midodrine 10 milliGRAM(s) Oral every 8 hours  norepinephrine Infusion 0.05 MICROgram(s)/kG/Min IV Continuous <Continuous>  pantoprazole  Injectable 40 milliGRAM(s) IV Push daily  polyethylene glycol 3350 17 Gram(s) Oral daily  propofol Infusion 10 MICROgram(s)/kG/Min IV Continuous <Continuous>  valproate sodium IVPB 250 milliGRAM(s) IV Intermittent every 8 hours      Vital Signs Last 24 Hrs  T(C): 37 (08 Apr 2020 08:00), Max: 37.1 (08 Apr 2020 01:00)  T(F): 98.6 (08 Apr 2020 08:00), Max: 98.8 (08 Apr 2020 06:20)  HR: 49 (08 Apr 2020 10:45) (47 - 61)  BP: 115/79 (08 Apr 2020 10:45) (83/63 - 155/65)  BP(mean): 88 (08 Apr 2020 10:45) (57 - 96)  RR: 20 (08 Apr 2020 10:45) (20 - 20)  SpO2: 95% (08 Apr 2020 10:45) (93% - 100%)      04-07-20 @ 07:01  -  04-08-20 @ 07:00  --------------------------------------------------------  IN: 4281.7 mL / OUT: 900 mL / NET: 3381.7 mL    04-08-20 @ 07:01  -  04-08-20 @ 13:34  --------------------------------------------------------  IN: 348.8 mL / OUT: 0 mL / NET: 348.8 mL        Mode: AC/ CMV (Assist Control/ Continuous Mandatory Ventilation), RR (machine): 201, TV (machine): 320, FiO2: 65, PEEP: 10, ITime: 1, MAP: 12, PIP: 31    LABS:                        11.7   3.93  )-----------( 98       ( 08 Apr 2020 07:22 )             35.0     04-08    143  |  114<H>  |  11  ----------------------------<  175<H>  5.0   |  25  |  0.87    Ca    7.0<L>      08 Apr 2020 07:22  Phos  2.0     04-08  Mg     1.4     04-08    TPro  4.7<L>  /  Alb  1.2<L>  /  TBili  0.1<L>  /  DBili  x   /  AST  62<H>  /  ALT  35  /  AlkPhos  84  04-08    PT/INR - ( 08 Apr 2020 07:22 )   PT: 12.3 sec;   INR: 1.10 ratio               ABG - ( 07 Apr 2020 05:36 )  pH, Arterial: x     pH, Blood: 7.42  /  pCO2: 41    /  pO2: 75    / HCO3: 26    / Base Excess: 2.5   /  SaO2: 95                  WBC:  WBC Count: 3.93 K/uL (04-08 @ 07:22)  WBC Count: 7.94 K/uL (04-07 @ 07:37)  WBC Count: 4.71 K/uL (04-06 @ 04:23)  WBC Count: 4.45 K/uL (04-05 @ 08:20)      MICROBIOLOGY:  RECENT CULTURES:  04-06 .Blood Blood XXXX XXXX   No growth to date.    04-04 .Blood Blood-Peripheral Blood Culture PCR   Growth in aerobic bottle: Gram Variable Rods   Growth in aerobic bottle: Gram Variable Rods  ***Blood Panel PCR results on this specimen are available  approximately 3 hours after the Gram stain result.***  Gram stain, PCR, and/or culture results may not always  correspond due to difference in methodologies.  ************************************************************  This PCR assay was performed using [employer].  The following targets are tested for: Enterococcus,  vancomycin resistant enterococci, Listeria monocytogenes,  coagulase negative staphylococci, S. aureus,  methicillin resistant S. aureus, Streptococcus agalactiae  (Group B), S. pneumoniae, S. pyogenes (Group A),  Acinetobacter baumannii, Enterobacter cloacae, E. coli,  Klebsiella oxytoca, K. pneumoniae, Proteus sp.,  Serratia marcescens, Haemophilus influenzae,  Neisseria meningitidis, Pseudomonas aeruginosa, Candida  albicans, C. glabrata, C krusei, C parapsilosis,  C. tropicalis and the KPC resistance gene.          CARDIAC MARKERS ( 08 Apr 2020 07:22 )  .158 ng/mL / x     / 124 U/L / x     / x      CARDIAC MARKERS ( 07 Apr 2020 07:37 )  .637 ng/mL / x     / 254 U/L / x     / x            PT/INR - ( 08 Apr 2020 07:22 )   PT: 12.3 sec;   INR: 1.10 ratio             Sodium:  Sodium, Serum: 143 mmol/L (04-08 @ 07:22)  Sodium, Serum: 151 mmol/L (04-07 @ 07:37)  Sodium, Serum: 149 mmol/L (04-07 @ 03:40)  Sodium, Serum: 153 mmol/L (04-06 @ 07:31)  Sodium, Serum: 153 mmol/L (04-05 @ 08:20)      0.87 mg/dL 04-08 @ 07:22  0.68 mg/dL 04-07 @ 07:37  0.92 mg/dL 04-07 @ 03:40  0.85 mg/dL 04-06 @ 07:31  0.95 mg/dL 04-05 @ 08:20      Hemoglobin:  Hemoglobin: 11.7 g/dL (04-08 @ 07:22)  Hemoglobin: 11.2 g/dL (04-07 @ 07:37)  Hemoglobin: 12.4 g/dL (04-06 @ 04:23)  Hemoglobin: 13.4 g/dL (04-05 @ 08:20)      Platelets: Platelet Count - Automated: 98 K/uL (04-08 @ 07:22)  Platelet Count - Automated: 96 K/uL (04-07 @ 07:37)  Platelet Count - Automated: 54 K/uL (04-06 @ 04:23)  Platelet Count - Automated: 53 K/uL (04-05 @ 08:20)      LIVER FUNCTIONS - ( 08 Apr 2020 07:22 )  Alb: 1.2 g/dL / Pro: 4.7 g/dL / ALK PHOS: 84 U/L / ALT: 35 U/L DA / AST: 62 U/L / GGT: x                 RADIOLOGY & ADDITIONAL STUDIES:

## 2020-04-08 NOTE — PROGRESS NOTE ADULT - ASSESSMENT
61 yo F  , nonverbal, MRDD, hypothyroidism and down syndrome BIBA  from group home for fever, sob, cough, low o2 sat. Admitted to [facility] for evaluation of r/o COVBID 19 PNA. also found to be hypernatremic and with evidence of Acute Renal Failure. Now admitted to ICU for acute severe respiratory decompensation secondary to COVID 19 PNA      Acute Severe Hypoxemic Respiratory Failure MF due to COVID 19 PNA with evidence of Mucus plug++Aspiration PNA  Septic Shock due to the above  CT with evidence of Right sided Mucus Plugging  s/p zithromax and meropenum  on pressors  Patient ++secretions from ET tube   on plaquinil.     Plan:  Add levoquin due to pencillin allergies  check sputum  c/w plaquenil and IV steroids    Generalized Seizures  may be contributing factor to aspiration PNA  on Keppra IV q12th + ativan rescue  Neuro called to evaluate     Hypernatremia.     Was on d5w at 100ml hr  nephro consulted, recs appreciated.     ADAM likely prerenal oliguria  Resolved    Hypothyroidism, unspecified type.  Plan: levothyroxine.     Constipation.  miralax      code: Full  dvt ppx: lovenox subq	  dispo: back to facility when stable    Patient still medical acute and requires ICU level of care

## 2020-04-08 NOTE — PROVIDER CONTACT NOTE (CRITICAL VALUE NOTIFICATION) - BACKGROUND
pt was on Meropenum and then D/C'd,
Admitted for r/o COVID-19, fever.
Pt admitted for resp failure - COVID-19 positive. Pt intubated and sedated.

## 2020-04-08 NOTE — PROGRESS NOTE ADULT - ASSESSMENT
cont supportive rx JOSE ANGEL CROW [phone]   1960 DOA 2020 [address]      REVIEW OF SYMPTOMS      Able to give ROS  NO     PHYSICAL EXAM    HEENT Unremarkable PERRLA atraumatic   RESP Fair air entry EXP prolonged    Harsh breath sound Resp distres mild   CARDIAC S1 S2 No S3     NO JVD    ABDOMEN SOFT BS PRESENT NOT DISTENDED No hepatosplenomegaly PEDAL EDEMA present No calf tenderness  NO rash   GENERAL Not TOXIC looking    VITALS/LABS       2020 afeb 54 110/61   2020 3p nore .08  2020 5p prop 15 m/k/m   2020 u 850   2020 ac 20/320/+10/65%   2020 w 3.9 Hb 11.7 Plt 98   Na 143 K 5 CO2 25 Cr .8     PT DATA/BEST PRACTICE  ALLERGY     NOTEWORTHY  POINTS/CHANGES ROS/PE   2020 Had seizure  2020 a Intubated tr to SICU   2020 fc given 945a Puentes ordered    Transferred to ICU May need intubn and fob dw brother                                WT  45 (2020)                    BMI     20 (2020)     ADVANCED DIRECTIVE       Goals of care discussion                                                                                      HEAD OF BED ELEVATION Yes  DYSPHAGIA EVAL                                  DIET      dys 1 puree () --> jevity 1.5 720 ()                                    IV F        D5 100 ()   --> D5 1/2 75 (() --> D5 75 ()                                              DVT PROPHYLAXIS      lvnx 30 ()                STRESS ULCER PROPHYLAXIS     protonix 40 /)                                                                INFECTION PPLX  chlorhexidine .12 ()   chlorhexidine 2%   ECHO                  CXR         2020 cxr persistent advanced infiltr    2020 cxr volume loss l hemith   bk airspace consolidatn   rco   poss sm l effsn                  CT         CT ch complete mucous plug distal lmsb and bk and lll bronchio                                                                         MICROBIO       covid pcr detec   leg n    strep pneu ag n    mrsa n   2020 mrsa n  2020 pc n   2020 RVP n         blod c n         ABIO       levaquin 500 () (ccpa) (pneu)   HCQ ()   meropenem (-)       azithro ()        PROCEDURE                               PATIENT SUMMARY   60 f nonverbal downs syndrome from group home HO COVID exposure per transfer papers on Rx for aspiration pneumonia and shortness of breath   er vitals 90/50 66 100f 92%    Pt found to have pneumonia pl effsn hypernatremia and adam on arrival COVID palma started Pulm consulted     Pulm consulted  CT ch showed l lung collapse Pt ruled out for COVID     Home meds memantine 28 keppra 1.2                                Pt tr sicu 2020 itubated hemaodynamci intability                   PATIENT DATA/ASSESSMENT/RECOMMENDATIONS       SEIZURES   2020 Had sz Given ativan      COVID RULED IN    2020 COVID pcr detc    COVID n   3/26/2020 COVID pcr n     LACTICEMIA   2020 la 4.1  Poss sec sz    PNEUMONIA 2020 blod c n    blod c gm raúl rods    A/R Levaquin started 2020   Blod c could be contaminant   WHEEZE   duoneb.4 () DCed 2020   Cont Rx      HYPERNATREMIA POA  --2020 Na 151 -151-143   On D5 1/2 w   monitor serially     ADAM POA      URINE 2020 u 500    Crea 2020 Cr .68     THROPMBOCYTOPENIA POA             hpf4 n     P ---2020 Plt 52 -  53 -54 - 96                    NEURO PSYCH   depakote 250.3 ()   Keppra 1.2 ()             COVID  2020 COVID pcr detc   Supp care     SYMPTOM ONSET        OXYGENATION  2020 Intubated ON 90% ox     PROGNOSIS    2020 nlr 6.1   -2020 ferritin [id number]    -2020 procal .27-.34    MEDS     HCQ ()     STEROIDS   solumed 80.5d ()              SEDATION  2020 12p prop 30 m/k/m         RESP GAS EXCHANGE   2020 On 60% O2  2020 100% vent 742/41/75   2020 1a 100% 709/31/48    LACTICEMIA   2020 la 2.1     HEMODYNAMICS    2020 Midodrine 10.3 ()   Norepi ()   Remains vasopressor dependent   Target map 65       FAMILY COMMUNICATION  2020 spoke to brother   2020 spoke to brother again after he spoke to Dr Burr Plan dw brother     2020       PLAN   DVT P On lvnx   RESP FAIL Sec COVID 19   POSS BACT PNEU On levaquin ()   COVID 19 On HCQ steroids  ARDS ltvv   HEMODYNAMICS In shock sec COVID 19 On nore Mido   NONOLIGURIC ADAM Monitor lytes   HYPERNATR On hypotonic fl   SZ On keppra and ativan for break thru      TIME SPENT Over 36 minutes aggregate critical care time spent on encounter; activities included   direct pt care, counseling and/or coordinating care reviewing notes, lab data/ imaging , discussion with multidisciplinary team/ pt /family. Risks, benefits, alternatives  discussed in detail.    [facility] [phone]   1960 DOA 2020 [address]

## 2020-04-08 NOTE — PROGRESS NOTE ADULT - SUBJECTIVE AND OBJECTIVE BOX
Patient is a 61 y/o female who presents with a chief complaint of SOB (07 Apr 2020 14:40)    BRIEF HOSPITAL COURSE: 61 y/o F w/ a PMHx of Down's syndrome, hypothyroidism, and seizure disorder who presented to [facility] on 4/4 from group home w/ fever, SOB, cough, and hypoxia. Pt was admitted for R/O COVID-19. Course complicated by hypernatremia and ADAM. Pt w/ two negative COVID-19 PCRs, third PCR sent, confirmed COVID-19 (+) now. Pt was emergently intubated by anesthesia on 4/7 for acute hypoxic respiratory failure/ARDS and transferred to PACU for continuation of care.    Events last 24 hours: Reportedly seized today. Given an additional dose of 500mg IV Keppra per neuro recs. Remains intubated on full ventilatory support (320/20/70/10) and sedated w/ propofol. Levophed gtt running.    PAST MEDICAL & SURGICAL HISTORY:  Frequent urinary tract infections  Hypothyroidism, unspecified type  Down syndrome    Review of Systems:  Unable to obtain. Pt is intubated and sedated.    Medications:  hydroxychloroquine   Oral   hydroxychloroquine 200 milliGRAM(s) Oral every 12 hours  midodrine 10 milliGRAM(s) Oral every 8 hours  norepinephrine Infusion 0.05 MICROgram(s)/kG/Min IV Continuous <Continuous>  acetaminophen   Tablet .. 650 milliGRAM(s) Oral every 6 hours PRN  acetaminophen  Suppository .. 650 milliGRAM(s) Rectal every 4 hours PRN  fentaNYL   Infusion. 0.5 MICROgram(s)/kG/Hr IV Continuous <Continuous>  levETIRAcetam  IVPB 1000 milliGRAM(s) IV Intermittent every 12 hours  LORazepam   Injectable 2 milliGRAM(s) IV Push every 6 hours PRN  memantine 5 milliGRAM(s) Oral daily  propofol Infusion 10 MICROgram(s)/kG/Min IV Continuous <Continuous>  valproate sodium IVPB 250 milliGRAM(s) IV Intermittent every 8 hours  enoxaparin Injectable 30 milliGRAM(s) SubCutaneous daily  pantoprazole  Injectable 40 milliGRAM(s) IV Push daily  polyethylene glycol 3350 17 Gram(s) Oral daily  levothyroxine Injectable 62.5 MICROGram(s) IV Push at bedtime  methylPREDNISolone sodium succinate Injectable 80 milliGRAM(s) IV Push daily  dextrose 5% + sodium chloride 0.45%. 1000 milliLiter(s) IV Continuous <Continuous>  chlorhexidine 0.12% Liquid 15 milliLiter(s) Oral Mucosa every 12 hours  chlorhexidine 2% Cloths 1 Application(s) Topical daily    Mode: AC/ CMV (Assist Control/ Continuous Mandatory Ventilation)  RR (machine): 20  TV (machine): 320  FiO2: 65  PEEP: 10  ITime: 1  MAP: 15  PIP: 34    ICU Vital Signs Last 24 Hrs  T(C): 36.5 (07 Apr 2020 18:00), Max: 38.4 (07 Apr 2020 06:00)  T(F): 97.7 (07 Apr 2020 18:00), Max: 101.2 (07 Apr 2020 06:00)  HR: 59 (07 Apr 2020 22:36) (47 - 85)  BP: 111/65 (07 Apr 2020 22:00) (83/63 - 137/63)  BP(mean): 76 (07 Apr 2020 22:00) (57 - 86)  ABP: --  ABP(mean): --  RR: 20 (07 Apr 2020 22:00) (16 - 30)  SpO2: 98% (07 Apr 2020 22:36) (92% - 100%)    ABG - ( 07 Apr 2020 05:36 )  pH, Arterial: x     pH, Blood: 7.42  /  pCO2: 41    /  pO2: 75    / HCO3: 26    / Base Excess: 2.5   /  SaO2: 95        I&O's Detail    06 Apr 2020 07:01  -  07 Apr 2020 07:00  --------------------------------------------------------  IN:    dextrose 5% + sodium chloride 0.45%.: 675 mL    Enteral Tube Flush: 150 mL    norepinephrine Infusion: 242.1 mL    propofol Infusion: 95.2 mL    sodium bicarbonate  Infusion: 500 mL  Total IN: 1662.3 mL    OUT:    Indwelling Catheter - Urethral: 800 mL  Total OUT: 800 mL    Total NET: 862.3 mL    07 Apr 2020 07:01  -  08 Apr 2020 00:42  --------------------------------------------------------  IN:    dextrose 5% + sodium chloride 0.45%.: 1050 mL    Enteral Tube Flush: 100 mL    IV PiggyBack: 250 mL    norepinephrine Infusion: 355.3 mL    propofol Infusion: 96.8 mL    sodium bicarbonate  Infusion: 200 mL    Solution: 499.8 mL  Total IN: 2551.9 mL    OUT:    Indwelling Catheter - Urethral: 300 mL  Total OUT: 300 mL    Total NET: 2251.9 mL    LABS:                        11.2   7.94  )-----------( 96       ( 07 Apr 2020 07:37 )             34.0     04-07    151<H>  |  117<H>  |  11  ----------------------------<  222<H>  3.3<L>   |  30  |  0.68    Ca    6.8<L>      07 Apr 2020 07:37  Phos  1.3     04-07  Mg     1.3     04-07    TPro  4.5<L>  /  Alb  1.4<L>  /  TBili  0.2  /  DBili  0.1  /  AST  58<H>  /  ALT  32  /  AlkPhos  70  04-07    CARDIAC MARKERS ( 07 Apr 2020 07:37 )  .637 ng/mL / x     / 254 U/L / x     / x      CARDIAC MARKERS ( 06 Apr 2020 07:31 )  .041 ng/mL / x     / 290 U/L / x     / x        CAPILLARY BLOOD GLUCOSE    PT/INR - ( 07 Apr 2020 07:37 )   PT: 13.4 sec;   INR: 1.20 ratio      CULTURES:  Culture Results:   No growth to date. (04-06 @ 16:37)  Culture Results:   No growth to date. (04-04 @ 22:18)  Culture Results:   No growth to date. (04-04 @ 22:18)  Rapid RVP Result: NotDetec (04-04 @ 10:49)    RADIOLOGY:  < from: Xray Chest 1 View- PORTABLE-Urgent (04.07.20 @ 06:39) >  EXAM:  XR CHEST PORTABLE URGENT 1V                          PROCEDURE DATE:  04/07/2020      INTERPRETATION:  AP supine chest on April 7, 2020 1:45 AM. Patient was intubated.    Heart size is within normal limits.    On April 4 there was a significant left lung infiltrate with some atelectasis of the left lung.    Presently there are advanced diffuse bilateral infiltrates.    A nasogastric tube is presently inserted and an endotracheal tube is noted. Endotracheal tube tip goes into the right mainstem bronchus. This was recognized by the clinical staff and was drawn to normal position at film taken on 1:56 AM on April 7.    IMPRESSION: Diffuse advanced infiltrates significantly increased from April 4. Endotracheal tube position in right mainstem bronchus was immediately recognizing corrected demonstrated on film of 1:56 AM.    BHARAT BURT M.D., ATTENDING RADIOLOGIST  This document has been electronically signed. Apr 7 2020  7:32AM    < end of copied text >    CRITICAL CARE TIME SPENT: 36 minutes

## 2020-04-08 NOTE — PROGRESS NOTE ADULT - SUBJECTIVE AND OBJECTIVE BOX
Date/Time Patient Seen:  		  Referring MD:   Data Reviewed	       Patient is a 60y old  Female who presents with a chief complaint of SOB (08 Apr 2020 00:42)      Subjective/HPI     PAST MEDICAL & SURGICAL HISTORY:  Frequent urinary tract infections  Hypothyroidism, unspecified type  Down syndrome        Medication list         MEDICATIONS  (STANDING):  chlorhexidine 0.12% Liquid 15 milliLiter(s) Oral Mucosa every 12 hours  chlorhexidine 2% Cloths 1 Application(s) Topical daily  dextrose 5%. 1000 milliLiter(s) (75 mL/Hr) IV Continuous <Continuous>  enoxaparin Injectable 30 milliGRAM(s) SubCutaneous daily  hydroxychloroquine   Oral   hydroxychloroquine 200 milliGRAM(s) Oral every 12 hours  levETIRAcetam  IVPB 1000 milliGRAM(s) IV Intermittent every 12 hours  levothyroxine Injectable 62.5 MICROGram(s) IV Push at bedtime  memantine 5 milliGRAM(s) Oral daily  methylPREDNISolone sodium succinate Injectable 80 milliGRAM(s) IV Push daily  midodrine 10 milliGRAM(s) Oral every 8 hours  norepinephrine Infusion 0.05 MICROgram(s)/kG/Min (4.26 mL/Hr) IV Continuous <Continuous>  pantoprazole  Injectable 40 milliGRAM(s) IV Push daily  polyethylene glycol 3350 17 Gram(s) Oral daily  propofol Infusion 10 MICROgram(s)/kG/Min (2.72 mL/Hr) IV Continuous <Continuous>  valproate sodium IVPB 250 milliGRAM(s) IV Intermittent every 8 hours    MEDICATIONS  (PRN):  acetaminophen   Tablet .. 650 milliGRAM(s) Oral every 6 hours PRN Temp greater or equal to 38C (100.4F), Mild Pain (1 - 3)  acetaminophen  Suppository .. 650 milliGRAM(s) Rectal every 4 hours PRN Temp greater or equal to 38C (100.4F)  LORazepam   Injectable 2 milliGRAM(s) IV Push every 6 hours PRN seizure activity         Vitals log        ICU Vital Signs Last 24 Hrs  T(C): 37 (08 Apr 2020 08:00), Max: 37.1 (08 Apr 2020 01:00)  T(F): 98.6 (08 Apr 2020 08:00), Max: 98.8 (08 Apr 2020 06:20)  HR: 53 (08 Apr 2020 08:00) (47 - 61)  BP: 144/73 (08 Apr 2020 08:00) (83/63 - 155/65)  BP(mean): 69 (08 Apr 2020 08:00) (57 - 96)  ABP: --  ABP(mean): --  RR: 20 (08 Apr 2020 08:00) (20 - 20)  SpO2: 98% (08 Apr 2020 08:00) (94% - 100%)       Mode: AC/ CMV (Assist Control/ Continuous Mandatory Ventilation)  RR (machine): 20  TV (machine): 320  FiO2: 65  PEEP: 10  ITime: 1  MAP: 14  PIP: 29      Input and Output:  I&O's Detail    07 Apr 2020 07:01  -  08 Apr 2020 07:00  --------------------------------------------------------  IN:    dextrose 5% + sodium chloride 0.45%.: 1350 mL    dextrose 5%.: 450 mL    Enteral Tube Flush: 460 mL    IV PiggyBack: 250 mL    norepinephrine Infusion: 585.3 mL    ns in tub fed  [id number]: 360 mL    propofol Infusion: 126.6 mL    sodium bicarbonate  Infusion: 200 mL    Solution: 499.8 mL  Total IN: 4281.7 mL    OUT:    Indwelling Catheter - Urethral: 900 mL  Total OUT: 900 mL    Total NET: 3381.7 mL          Lab Data                        11.7   3.93  )-----------( x        ( 08 Apr 2020 07:22 )             35.0     04-07    151<H>  |  117<H>  |  11  ----------------------------<  222<H>  3.3<L>   |  30  |  0.68    Ca    6.8<L>      07 Apr 2020 07:37  Phos  1.3     04-07  Mg     1.3     04-07    TPro  4.5<L>  /  Alb  1.4<L>  /  TBili  0.2  /  DBili  0.1  /  AST  58<H>  /  ALT  32  /  AlkPhos  70  04-07    ABG - ( 07 Apr 2020 05:36 )  pH, Arterial: x     pH, Blood: 7.42  /  pCO2: 41    /  pO2: 75    / HCO3: 26    / Base Excess: 2.5   /  SaO2: 95                CARDIAC MARKERS ( 07 Apr 2020 07:37 )  .637 ng/mL / x     / 254 U/L / x     / x            Review of Systems	      Objective     Physical Examination    heart s1s2  lung dec BS      Pertinent Lab findings & Imaging      Deloris:  NO   Adequate UO     I&O's Detail    07 Apr 2020 07:01  -  08 Apr 2020 07:00  --------------------------------------------------------  IN:    dextrose 5% + sodium chloride 0.45%.: 1350 mL    dextrose 5%.: 450 mL    Enteral Tube Flush: 460 mL    IV PiggyBack: 250 mL    norepinephrine Infusion: 585.3 mL    ns in tub fed  [id number]: 360 mL    propofol Infusion: 126.6 mL    sodium bicarbonate  Infusion: 200 mL    Solution: 499.8 mL  Total IN: 4281.7 mL    OUT:    Indwelling Catheter - Urethral: 900 mL  Total OUT: 900 mL    Total NET: 3381.7 mL               Discussed with:     Cultures:	        Radiology

## 2020-04-09 NOTE — PROGRESS NOTE ADULT - ASSESSMENT
1.	ADAM: Prerenal azotemia, ATN  2.	s/p Shock, Sepsis  3.	Pneumonia, COVID +  4.	Hypophosphatemia    Stable renal indices. Phos supps. To continue current meds. Treatment for COVID pneumonia. COVID precautions. Supportive care.   Avoid nephrotoxic meds as possible. Management as per ICU staff.

## 2020-04-09 NOTE — PROGRESS NOTE ADULT - SUBJECTIVE AND OBJECTIVE BOX
Patient seen and examined at bedside.  no acute overnight events  no seizure activity    vitals stable  hb stable  albumin low  phos low  valproic acid low    started IV levoquin  sputum culture collected    Morning trop at 1.0      Review of Systems:  UNABLE TO obtain  ~Objective:  Vitals  T(C): 36.5 (04-09-20 @ 08:00), Max: 36.5 (04-09-20 @ 08:00)  HR: 57 (04-09-20 @ 12:00) (52 - 88)  BP: 102/65 (04-09-20 @ 12:00) (71/31 - 148/78)  RR: 20 (04-09-20 @ 12:00) (18 - 42)  SpO2: 98% (04-09-20 @ 12:00) (88% - 100%)    Physical Exam:  General: comfortable, no acute distress, sedated on propofl  HEENT: Atraumatic, no LAD, trachea midline, PERRLA  Cardiovascular: normal s1s2, no murmurs, gallops or fricition rubs  Pulmonary: rhonchi,  wheezing , rhonchi  Gastrointestinal: soft non tender non distended, no masses felt, no organomegally  Muscloskeletal: no lower extremity edema, intact bilateral lower extremity pulses  Neurological: patient sedated, although no gross focal defects  Psychiatrical: sedated  SKIN: no rash, lesions or ulcers  ~  Labs:                          10.9   4.24  )-----------( 109      ( 09 Apr 2020 07:09 )             32.6     04-09    149<H>  |  115<H>  |  9   ----------------------------<  128<H>  4.3   |  27  |  0.73    Ca    7.3<L>      09 Apr 2020 07:09  Phos  1.5     04-09  Mg     1.8     04-09    TPro  5.1<L>  /  Alb  1.3<L>  /  TBili  0.1<L>  /  DBili  x   /  AST  70<H>  /  ALT  43  /  AlkPhos  101  04-09    LIVER FUNCTIONS - ( 09 Apr 2020 07:09 )  Alb: 1.3 g/dL / Pro: 5.1 g/dL / ALK PHOS: 101 U/L / ALT: 43 U/L DA / AST: 70 U/L / GGT: x           PT/INR - ( 09 Apr 2020 07:09 )   PT: 10.9 sec;   INR: 0.98 ratio         PTT - ( 09 Apr 2020 07:09 )  PTT:35.1 sec      Active Medications  MEDICATIONS  (STANDING):  albumin human 25% IVPB 50 milliLiter(s) IV Intermittent every 8 hours  chlorhexidine 0.12% Liquid 15 milliLiter(s) Oral Mucosa every 12 hours  chlorhexidine 2% Cloths 1 Application(s) Topical daily  dextrose 5%. 1000 milliLiter(s) (75 mL/Hr) IV Continuous <Continuous>  enoxaparin Injectable 30 milliGRAM(s) SubCutaneous daily  hydroxychloroquine   Oral   hydroxychloroquine 200 milliGRAM(s) Oral every 12 hours  levETIRAcetam  IVPB 1000 milliGRAM(s) IV Intermittent every 12 hours  levoFLOXacin IVPB      levoFLOXacin IVPB 500 milliGRAM(s) IV Intermittent every 24 hours  levothyroxine Injectable 62.5 MICROGram(s) IV Push at bedtime  memantine 5 milliGRAM(s) Oral daily  methylPREDNISolone sodium succinate Injectable 80 milliGRAM(s) IV Push daily  midodrine 10 milliGRAM(s) Oral every 8 hours  norepinephrine Infusion 0.05 MICROgram(s)/kG/Min (4.26 mL/Hr) IV Continuous <Continuous>  pantoprazole  Injectable 40 milliGRAM(s) IV Push daily  polyethylene glycol 3350 17 Gram(s) Oral daily  potassium phosphate / sodium phosphate powder 1 Packet(s) Oral every 6 hours  propofol Infusion 10 MICROgram(s)/kG/Min (2.72 mL/Hr) IV Continuous <Continuous>  sodium chloride 0.9%. 500 milliLiter(s) (500 mL/Hr) IV Continuous <Continuous>  sodium phosphate IVPB 30 milliMole(s) IV Intermittent once  valproate sodium IVPB 250 milliGRAM(s) IV Intermittent every 8 hours    MEDICATIONS  (PRN):  acetaminophen   Tablet .. 650 milliGRAM(s) Oral every 6 hours PRN Temp greater or equal to 38C (100.4F), Mild Pain (1 - 3)  acetaminophen  Suppository .. 650 milliGRAM(s) Rectal every 4 hours PRN Temp greater or equal to 38C (100.4F)  LORazepam   Injectable 2 milliGRAM(s) IV Push every 6 hours PRN seizure activity

## 2020-04-09 NOTE — PROGRESS NOTE ADULT - ASSESSMENT
59 yo F  , nonverbal, MRDD, hypothyroidism and down syndrome, hx of seizures BIBA  from group home for fever, sob, cough, low o2 sat. Admitted to [facility] for evaluation of r/o COVBID 19 PNA. also found to be hypernatremic and with evidence of Acute Renal Failure. Now admitted to ICU for acute severe respiratory decompensation secondary to COVID 19 PNA      Acute Severe Hypoxemic Respiratory Failure MF due to COVID 19 PNA with evidence of Mucus plug++Aspiration PNA  Septic Shock due to the above  CT with evidence of Right sided Mucus Plugging  s/p zithromax and meropenam  on pressors  Patient ++secretions from ET tube  on plaquinil and steroid  EKG 4/9 at 443  Levoquin added  Plan:  followup sputum  c/w plaquenil and IV steroids  followup q48h ldh ferritin and ddimer  daily cbc with diff    Breakthrough Generalized Seizures  may be contributing factor to aspiration PNA  on Keppra IV q12th + ativan rescue + depakote  Neuro called to evaluate :: added bolus depakote    Hypernatremia.     Was on d5w at 100ml hr  nephro consulted, recs appreciated.       ADAM likely prerenal oliguria  Resolved    Hypothyroidism, unspecified type.  Plan: levothyroxine.     Constipation.  miralax      code: Full  dvt ppx: lovenox subq	  dispo: back to facility when stable    Patient still medical acute and requires ICU level of care 61 yo F  , nonverbal, MRDD, hypothyroidism and down syndrome, hx of seizures BIBA  from group home for fever, sob, cough, low o2 sat. Admitted to [facility] for evaluation of r/o COVBID 19 PNA. also found to be hypernatremic and with evidence of Acute Renal Failure. Now admitted to ICU for acute severe respiratory decompensation secondary to COVID 19 PNA      Acute Severe Hypoxemic Respiratory Failure MF due to COVID 19 PNA with evidence of Mucus plug++Aspiration PNA  Septic Shock due to the above  CT with evidence of Right sided Mucus Plugging  s/p zithromax and meropenam  on pressors  Patient ++secretions from ET tube  on plaquinil and steroid  EKG 4/9 at 443  Levoquin added  Plan:  followup sputum  c/w plaquenil and IV steroids  followup q48h ldh ferritin and ddimer  daily cbc with diff    Breakthrough Generalized Seizures  may be contributing factor to aspiration PNA  on Keppra IV q12th + ativan rescue + depakote  Neuro called to evaluate :: added bolus depakote    Troponemia  trop  x1  1.0  due to demand ischemia from seizure?  Cards consulted      Hypernatremia.     Was on d5w at 100ml hr  nephro consulted, recs appreciated.       ADAM likely prerenal oliguria  Resolved    Hypothyroidism, unspecified type.  Plan: levothyroxine.     Constipation.  miralax      code: Full  dvt ppx: lovenox subq	  dispo: back to facility when stable    Patient still medical acute and requires ICU level of care

## 2020-04-09 NOTE — CONSULT NOTE ADULT - ASSESSMENT
The patient is a 60 year old female with a history of MRDD, hypothyroidism, seizure d/o who is admitted with COVID-19, acute respiratory failure, septic shock, NSTEMI.    Plan:  - ECG with no evidence of ischemia or infarction  - Troponin has been fluctuating; last is 1.011 likely secondary to demand ischemia from respiratory failure, shock. Cannot exclude myocarditis. Continue to trend as enzymes trending back up.  - If enzymes trend higher, will check echocardiogram  - Add aspirin 81 mg daily  - On hydroxychloroquine, levofloxacin  - Receiving albumin IV  - Off of pressors  - On enoxaparin DVT prophylaxis  - Overall prognosis poor

## 2020-04-09 NOTE — PROGRESS NOTE ADULT - SUBJECTIVE AND OBJECTIVE BOX
MIGNON WALTER    [facility] 09    Patient is a 60y old  Female who presents with a chief complaint of sob (09 Apr 2020 14:07)       Allergies    amoxicillin (Rash)  penicillin (Rash)    Intolerances        HPI:  Patient is a 60y old  Female who presents with a chief complaint of sob and fever    HPI:This is a nonverbal pt bib ems from retirement for fever, sob, cough, low o2 sat, with possible covid exposure. per ems, pt on levaquin for aspiration pna. no further hx available.pt has hx of chronic constipation an dis on multiple stool softners.  she was found ot be hypernatremic and in renal failure at admission.  also has hx of hypothyroidism and down syndrome        PAST MEDICAL & SURGICAL HISTORY:  Frequent urinary tract infections  Hypothyroidism, unspecified type  Down syndrome      FAMILY HISTORY:can not obtain due to mental status      SOCIAL HISTORY:    Allergies    amoxicillin (Rash)  penicillin (Rash)    Intolerances          REVIEW OF SYSEMS:  negative except form above mentioned      Vital Signs Last 24 Hrs  T(C): 37.8 (04 Apr 2020 09:46), Max: 37.8 (04 Apr 2020 09:46)  T(F): 100 (04 Apr 2020 09:46), Max: 100 (04 Apr 2020 09:46)  HR: 68 (04 Apr 2020 11:00) (66 - 69)  BP: 89/52 (04 Apr 2020 11:00) (87/51 - 91/52)  BP(mean): --  RR: 24 (04 Apr 2020 11:00) (24 - 24)  SpO2: 100% (04 Apr 2020 11:00) (89% - 100%)  I&O's Summary      PHYSICAL EXAM:  GENERAL: onnc  HEAD:  Atraumatic, Normocephalic  EYES: EOMI, PERRLA, conjunctiva and sclera clear  NECK: Supple, No JVD  CHEST/LUNG: dec bs at bases and rhonchi  HEART: Regular rate and rhythm; No murmurs, rubs, or gallops  ABDOMEN: Soft, Nontender, Nondistended; Bowel sounds present  SKIN: No rashes or lesions    LABS:                        14.7   2.88  )-----------( 52       ( 04 Apr 2020 10:49 )             45.9     04-04    154<H>  |  117<H>  |  31<H>  ----------------------------<  107<H>  4.7   |  30  |  1.48<H>    Ca    8.5      04 Apr 2020 10:49    TPro  7.1  /  Alb  2.3<L>  /  TBili  0.2  /  DBili  x   /  AST  73<H>  /  ALT  66<H>  /  AlkPhos  118  04-04      CAPILLARY BLOOD GLUCOSE                RADIOLOGY & ADDITIONAL TESTS:    Imaging Personally Reviewed:  [x] YES  [ ] NO    Consultant(s) Notes Reviewed:  [x] YES  [ ] NO      MEDICATIONS  (STANDING):  azithromycin   Tablet 500 milliGRAM(s) Oral daily  dextrose 5%. 1000 milliLiter(s) (100 mL/Hr) IV Continuous <Continuous>  diVALproex  milliGRAM(s) Oral three times a day  enoxaparin Injectable 30 milliGRAM(s) SubCutaneous daily  levETIRAcetam 1000 milliGRAM(s) Oral two times a day  levothyroxine 125 MICROGram(s) Oral daily  memantine 5 milliGRAM(s) Oral daily  pantoprazole    Tablet 40 milliGRAM(s) Oral before breakfast  polyethylene glycol 3350 17 Gram(s) Oral daily    MEDICATIONS  (PRN):  acetaminophen   Tablet .. 650 milliGRAM(s) Oral every 6 hours PRN Temp greater or equal to 38C (100.4F), Mild Pain (1 - 3)      Care Discussed with Consultants/Other Providers [x] YES  [ ] NO    HEALTH ISSUES - PROBLEM Dx: (04 Apr 2020 13:11)      PAST MEDICAL & SURGICAL HISTORY:  Frequent urinary tract infections  Hypothyroidism, unspecified type  Down syndrome      FAMILY HISTORY:        MEDICATIONS   acetaminophen   Tablet .. 650 milliGRAM(s) Oral every 6 hours PRN  acetaminophen  Suppository .. 650 milliGRAM(s) Rectal every 4 hours PRN  albumin human 25% IVPB 50 milliLiter(s) IV Intermittent every 8 hours  chlorhexidine 0.12% Liquid 15 milliLiter(s) Oral Mucosa every 12 hours  chlorhexidine 2% Cloths 1 Application(s) Topical daily  dextrose 5%. 1000 milliLiter(s) IV Continuous <Continuous>  enoxaparin Injectable 30 milliGRAM(s) SubCutaneous daily  hydroxychloroquine   Oral   hydroxychloroquine 200 milliGRAM(s) Oral every 12 hours  levETIRAcetam  IVPB 1000 milliGRAM(s) IV Intermittent every 12 hours  levoFLOXacin IVPB      levoFLOXacin IVPB 500 milliGRAM(s) IV Intermittent every 24 hours  levothyroxine Injectable 62.5 MICROGram(s) IV Push at bedtime  LORazepam   Injectable 2 milliGRAM(s) IV Push every 6 hours PRN  memantine 5 milliGRAM(s) Oral daily  methylPREDNISolone sodium succinate Injectable 80 milliGRAM(s) IV Push daily  midodrine 10 milliGRAM(s) Oral every 8 hours  norepinephrine Infusion 0.05 MICROgram(s)/kG/Min IV Continuous <Continuous>  pantoprazole  Injectable 40 milliGRAM(s) IV Push daily  polyethylene glycol 3350 17 Gram(s) Oral daily  potassium phosphate / sodium phosphate powder 1 Packet(s) Oral every 6 hours  propofol Infusion 10 MICROgram(s)/kG/Min IV Continuous <Continuous>  sodium chloride 0.9%. 500 milliLiter(s) IV Continuous <Continuous>  sodium phosphate IVPB 30 milliMole(s) IV Intermittent once  valproate sodium IVPB 250 milliGRAM(s) IV Intermittent every 8 hours      Vital Signs Last 24 Hrs  T(C): 36.6 (09 Apr 2020 12:00), Max: 36.6 (09 Apr 2020 12:00)  T(F): 97.8 (09 Apr 2020 12:00), Max: 97.8 (09 Apr 2020 12:00)  HR: 75 (09 Apr 2020 14:00) (52 - 88)  BP: 120/82 (09 Apr 2020 14:00) (71/31 - 148/78)  BP(mean): --  RR: 30 (09 Apr 2020 14:00) (18 - 42)  SpO2: 95% (09 Apr 2020 14:00) (88% - 100%)      04-08-20 @ 07:01  -  04-09-20 @ 07:00  --------------------------------------------------------  IN: 2006.1 mL / OUT: 3700 mL / NET: -1693.9 mL    04-09-20 @ 07:01  -  04-09-20 @ 14:51  --------------------------------------------------------  IN: 1371 mL / OUT: 1300 mL / NET: 71 mL        Mode: AC/ CMV (Assist Control/ Continuous Mandatory Ventilation), RR (machine): 20, TV (machine): 320, FiO2: 50, PEEP: 8, ITime: 1, MAP: 14, PIP: 30    LABS:                        10.9   4.24  )-----------( 109      ( 09 Apr 2020 07:09 )             32.6     04-09    149<H>  |  115<H>  |  9   ----------------------------<  128<H>  4.3   |  27  |  0.73    Ca    7.3<L>      09 Apr 2020 07:09  Phos  1.5     04-09  Mg     1.8     04-09    TPro  5.1<L>  /  Alb  1.3<L>  /  TBili  0.1<L>  /  DBili  x   /  AST  70<H>  /  ALT  43  /  AlkPhos  101  04-09    PT/INR - ( 09 Apr 2020 07:09 )   PT: 10.9 sec;   INR: 0.98 ratio         PTT - ( 09 Apr 2020 07:09 )  PTT:35.1 sec          WBC:  WBC Count: 4.24 K/uL (04-09 @ 07:09)  WBC Count: 3.93 K/uL (04-08 @ 07:22)  WBC Count: 7.94 K/uL (04-07 @ 07:37)  WBC Count: 4.71 K/uL (04-06 @ 04:23)      MICROBIOLOGY:  RECENT CULTURES:  04-06 .Blood Blood XXXX XXXX   No growth to date.    04-04 .Blood Blood-Peripheral Blood Culture PCR   Growth in aerobic bottle: Gram Variable Rods   Growth in aerobic bottle: Gram Variable Rods  ***Blood Panel PCR results on this specimen are available  approximately 3 hours after the Gram stain result.***  Gram stain, PCR, and/or culture results may not always  correspond due to difference in methodologies.  ************************************************************  This PCR assay was performed using [employer].  The following targets are tested for: Enterococcus,  vancomycin resistant enterococci, Listeria monocytogenes,  coagulase negative staphylococci, S. aureus,  methicillin resistant S. aureus, Streptococcus agalactiae  (Group B), S. pneumoniae, S. pyogenes (Group A),  Acinetobacter baumannii, Enterobacter cloacae, E. coli,  Klebsiella oxytoca, K. pneumoniae, Proteus sp.,  Serratia marcescens, Haemophilus influenzae,  Neisseria meningitidis, Pseudomonas aeruginosa, Candida  albicans, C. glabrata, C krusei, C parapsilosis,  C. tropicalis and the KPC resistance gene.          CARDIAC MARKERS ( 09 Apr 2020 07:09 )  1.011 ng/mL / x     / 74 U/L / x     / x      CARDIAC MARKERS ( 08 Apr 2020 07:22 )  .158 ng/mL / x     / 124 U/L / x     / x            PT/INR - ( 09 Apr 2020 07:09 )   PT: 10.9 sec;   INR: 0.98 ratio         PTT - ( 09 Apr 2020 07:09 )  PTT:35.1 sec    Sodium:  Sodium, Serum: 149 mmol/L (04-09 @ 07:09)  Sodium, Serum: 143 mmol/L (04-08 @ 07:22)  Sodium, Serum: 151 mmol/L (04-07 @ 07:37)  Sodium, Serum: 149 mmol/L (04-07 @ 03:40)  Sodium, Serum: 153 mmol/L (04-06 @ 07:31)      0.73 mg/dL 04-09 @ 07:09  0.87 mg/dL 04-08 @ 07:22  0.68 mg/dL 04-07 @ 07:37  0.92 mg/dL 04-07 @ 03:40  0.85 mg/dL 04-06 @ 07:31      Hemoglobin:  Hemoglobin: 10.9 g/dL (04-09 @ 07:09)  Hemoglobin: 11.7 g/dL (04-08 @ 07:22)  Hemoglobin: 11.2 g/dL (04-07 @ 07:37)  Hemoglobin: 12.4 g/dL (04-06 @ 04:23)      Platelets: Platelet Count - Automated: 109 K/uL (04-09 @ 07:09)  Platelet Count - Automated: 98 K/uL (04-08 @ 07:22)  Platelet Count - Automated: 96 K/uL (04-07 @ 07:37)  Platelet Count - Automated: 54 K/uL (04-06 @ 04:23)      LIVER FUNCTIONS - ( 09 Apr 2020 07:09 )  Alb: 1.3 g/dL / Pro: 5.1 g/dL / ALK PHOS: 101 U/L / ALT: 43 U/L DA / AST: 70 U/L / GGT: x                 RADIOLOGY & ADDITIONAL STUDIES:

## 2020-04-09 NOTE — CONSULT NOTE ADULT - SUBJECTIVE AND OBJECTIVE BOX
History of Present Illness: The patient is a 60 year old female with a history of MRDD, hypothyroidism, seizure d/o who presented with fevers, cough, shortness of breath. The patient was intubated for acute respiratory failure. The patient was found to be COVID-19 positive.    Past Medical/Surgical History:  MRDD, hypothyroidism, seizure d/o    Medications:  MEDICATIONS  (STANDING):  albumin human 25% IVPB 50 milliLiter(s) IV Intermittent every 8 hours  chlorhexidine 0.12% Liquid 15 milliLiter(s) Oral Mucosa every 12 hours  chlorhexidine 2% Cloths 1 Application(s) Topical daily  dextrose 5%. 1000 milliLiter(s) (75 mL/Hr) IV Continuous <Continuous>  enoxaparin Injectable 30 milliGRAM(s) SubCutaneous daily  hydroxychloroquine   Oral   hydroxychloroquine 200 milliGRAM(s) Oral every 12 hours  levETIRAcetam  IVPB 1000 milliGRAM(s) IV Intermittent every 12 hours  levoFLOXacin IVPB      levoFLOXacin IVPB 500 milliGRAM(s) IV Intermittent every 24 hours  levothyroxine Injectable 62.5 MICROGram(s) IV Push at bedtime  memantine 5 milliGRAM(s) Oral daily  methylPREDNISolone sodium succinate Injectable 80 milliGRAM(s) IV Push daily  midodrine 10 milliGRAM(s) Oral every 8 hours  norepinephrine Infusion 0.05 MICROgram(s)/kG/Min (4.26 mL/Hr) IV Continuous <Continuous>  pantoprazole  Injectable 40 milliGRAM(s) IV Push daily  polyethylene glycol 3350 17 Gram(s) Oral daily  potassium phosphate / sodium phosphate powder 1 Packet(s) Oral every 6 hours  propofol Infusion 10 MICROgram(s)/kG/Min (2.72 mL/Hr) IV Continuous <Continuous>  sodium chloride 0.9%. 500 milliLiter(s) (500 mL/Hr) IV Continuous <Continuous>  sodium phosphate IVPB 30 milliMole(s) IV Intermittent once  valproate sodium IVPB 250 milliGRAM(s) IV Intermittent every 8 hours    MEDICATIONS  (PRN):  acetaminophen   Tablet .. 650 milliGRAM(s) Oral every 6 hours PRN Temp greater or equal to 38C (100.4F), Mild Pain (1 - 3)  acetaminophen  Suppository .. 650 milliGRAM(s) Rectal every 4 hours PRN Temp greater or equal to 38C (100.4F)  LORazepam   Injectable 2 milliGRAM(s) IV Push every 6 hours PRN seizure activity      Family History: Non-contributory family history of premature cardiovascular atherosclerotic disease    Social History: No tobacco, alcohol or drug use    Review of Systems:  General: No fevers, chills, weight loss or gain  Skin: No rashes, color changes  Cardiovascular: No chest pain, orthopnea  Respiratory: No shortness of breath, cough  Gastrointestinal: No nausea, abdominal pain  Genitourinary: No incontinence, pain with urination  Musculoskeletal: No pain, swelling, decreased range of motion  Neurological: No headache, weakness  Psychiatric: No depression, anxiety  Endocrine: No weight loss or gain, increased thirst  All other systems are comprehensively negative.    Physical Exam:  Vitals:        Vital Signs Last 24 Hrs  T(C): 36.6 (09 Apr 2020 12:00), Max: 36.6 (09 Apr 2020 12:00)  T(F): 97.8 (09 Apr 2020 12:00), Max: 97.8 (09 Apr 2020 12:00)  HR: 75 (09 Apr 2020 14:00) (52 - 88)  BP: 120/82 (09 Apr 2020 14:00) (102/65 - 148/78)  BP(mean): --  RR: 30 (09 Apr 2020 14:00) (18 - 42)  SpO2: 95% (09 Apr 2020 14:00) (91% - 100%)  General: NAD  HEENT: MMM  Neck: No JVD, no carotid bruit  Lungs: CTAB  CV: RRR, nl S1/S2, no M/R/G  Abdomen: S/NT/ND, +BS  Extremities: No LE edema, no cyanosis  Neuro: AAOx3, non-focal  Skin: No rash    Labs:                        10.9   4.24  )-----------( 109      ( 09 Apr 2020 07:09 )             32.6     04-09    149<H>  |  115<H>  |  9   ----------------------------<  128<H>  4.3   |  27  |  0.73    Ca    7.3<L>      09 Apr 2020 07:09  Phos  1.5     04-09  Mg     1.8     04-09    TPro  5.1<L>  /  Alb  1.3<L>  /  TBili  0.1<L>  /  DBili  x   /  AST  70<H>  /  ALT  43  /  AlkPhos  101  04-09    CARDIAC MARKERS ( 09 Apr 2020 07:09 )  1.011 ng/mL / x     / 74 U/L / x     / x      CARDIAC MARKERS ( 08 Apr 2020 07:22 )  .158 ng/mL / x     / 124 U/L / x     / x          PT/INR - ( 09 Apr 2020 07:09 )   PT: 10.9 sec;   INR: 0.98 ratio         PTT - ( 09 Apr 2020 07:09 )  PTT:35.1 sec    ECG: NSR, normal axis, nonspecific ST abnormality, low voltage

## 2020-04-09 NOTE — PROGRESS NOTE ADULT - SUBJECTIVE AND OBJECTIVE BOX
Patient is a 60y old  Female who presents with a chief complaint of sob (06 Apr 2020 11:30)    Patient seen in follow up for ADAM, Hypernatremia.   COVID +. Chart reviewed.        PAST MEDICAL HISTORY:  Frequent urinary tract infections  Hypothyroidism, unspecified type  Down syndrome    MEDICATIONS  (STANDING):  chlorhexidine 0.12% Liquid 15 milliLiter(s) Oral Mucosa every 12 hours  chlorhexidine 2% Cloths 1 Application(s) Topical daily  dextrose 5%. 1000 milliLiter(s) (75 mL/Hr) IV Continuous <Continuous>  enoxaparin Injectable 30 milliGRAM(s) SubCutaneous daily  hydroxychloroquine   Oral   hydroxychloroquine 200 milliGRAM(s) Oral every 12 hours  levETIRAcetam  IVPB 1000 milliGRAM(s) IV Intermittent every 12 hours  levoFLOXacin IVPB      levoFLOXacin IVPB 500 milliGRAM(s) IV Intermittent every 24 hours  levothyroxine Injectable 62.5 MICROGram(s) IV Push at bedtime  memantine 5 milliGRAM(s) Oral daily  methylPREDNISolone sodium succinate Injectable 80 milliGRAM(s) IV Push daily  midodrine 10 milliGRAM(s) Oral every 8 hours  norepinephrine Infusion 0.05 MICROgram(s)/kG/Min (4.26 mL/Hr) IV Continuous <Continuous>  pantoprazole  Injectable 40 milliGRAM(s) IV Push daily  polyethylene glycol 3350 17 Gram(s) Oral daily  potassium phosphate / sodium phosphate powder 1 Packet(s) Oral every 6 hours  propofol Infusion 10 MICROgram(s)/kG/Min (2.72 mL/Hr) IV Continuous <Continuous>  sodium chloride 0.9%. 500 milliLiter(s) (500 mL/Hr) IV Continuous <Continuous>  valproate sodium IVPB 1000 milliGRAM(s) IV Intermittent once  valproate sodium IVPB 250 milliGRAM(s) IV Intermittent every 8 hours    MEDICATIONS  (PRN):  acetaminophen   Tablet .. 650 milliGRAM(s) Oral every 6 hours PRN Temp greater or equal to 38C (100.4F), Mild Pain (1 - 3)  acetaminophen  Suppository .. 650 milliGRAM(s) Rectal every 4 hours PRN Temp greater or equal to 38C (100.4F)  LORazepam   Injectable 2 milliGRAM(s) IV Push every 6 hours PRN seizure activity    T(C): 36.5 (04-09-20 @ 08:00), Max: 37.1 (04-08-20 @ 01:00)  HR: 60 (04-09-20 @ 11:18) (47 - 88)  BP: 109/62 (04-09-20 @ 10:00) (71/31 - 155/65)  RR: 20 (04-09-20 @ 10:00)  SpO2: 98% (04-09-20 @ 11:18)  Wt(kg): --  I&O's Detail    08 Apr 2020 07:01  -  09 Apr 2020 07:00  --------------------------------------------------------  IN:    dextrose 5%.: 600 mL    Enteral Tube Flush: 480 mL    IV PiggyBack: 200 mL    norepinephrine Infusion: 111.1 mL    ns in tub fed  [id number]: 540 mL    propofol Infusion: 75 mL  Total IN: 2006.1 mL    OUT:    Indwelling Catheter - Urethral: 3700 mL  Total OUT: 3700 mL    Total NET: -1693.9 mL        PHYSICAL EXAM:  Pt on COVID precautions. Chart reviewed and previous physical examination noted.                  LABORATORY:                        10.9   4.24  )-----------( 109      ( 09 Apr 2020 07:09 )             32.6     04-09    149<H>  |  115<H>  |  9   ----------------------------<  128<H>  4.3   |  27  |  0.73    Ca    7.3<L>      09 Apr 2020 07:09  Phos  1.5     04-09  Mg     1.8     04-09    TPro  5.1<L>  /  Alb  1.3<L>  /  TBili  0.1<L>  /  DBili  x   /  AST  70<H>  /  ALT  43  /  AlkPhos  101  04-09    Sodium, Serum: 149 mmol/L (04-09 @ 07:09)  Sodium, Serum: 143 mmol/L (04-08 @ 07:22)    Potassium, Serum: 4.3 mmol/L (04-09 @ 07:09)  Potassium, Serum: 5.0 mmol/L (04-08 @ 07:22)    Hemoglobin: 10.9 g/dL (04-09 @ 07:09)  Hemoglobin: 11.7 g/dL (04-08 @ 07:22)  Hemoglobin: 11.2 g/dL (04-07 @ 07:37)    Creatinine, Serum 0.73 (04-09 @ 07:09)  Creatinine, Serum 0.87 (04-08 @ 07:22)  Creatinine, Serum 0.68 (04-07 @ 07:37)  Creatinine, Serum 0.92 (04-07 @ 03:40)    CARDIAC MARKERS ( 09 Apr 2020 07:09 )  1.011 ng/mL / x     / 74 U/L / x     / x      CARDIAC MARKERS ( 08 Apr 2020 07:22 )  .158 ng/mL / x     / 124 U/L / x     / x          LIVER FUNCTIONS - ( 09 Apr 2020 07:09 )  Alb: 1.3 g/dL / Pro: 5.1 g/dL / ALK PHOS: 101 U/L / ALT: 43 U/L DA / AST: 70 U/L / GGT: x

## 2020-04-09 NOTE — PROGRESS NOTE ADULT - ASSESSMENT
MIGNON WALTER    [facility] 09    Patient is a 60y old  Female who presents with a chief complaint of sob (09 Apr 2020 14:07)       Allergies    amoxicillin (Rash)  penicillin (Rash)    Intolerances        HPI:  Patient is a 60y old  Female who presents with a chief complaint of sob and fever    HPI:This is a nonverbal pt bib ems from retirement for fever, sob, cough, low o2 sat, with possible covid exposure. per ems, pt on levaquin for aspiration pna. no further hx available.pt has hx of chronic constipation an dis on multiple stool softners.  she was found ot be hypernatremic and in renal failure at admission.  also has hx of hypothyroidism and down syndrome        PAST MEDICAL & SURGICAL HISTORY:  Frequent urinary tract infections  Hypothyroidism, unspecified type  Down syndrome      FAMILY HISTORY:can not obtain due to mental status      SOCIAL HISTORY:    Allergies    amoxicillin (Rash)  penicillin (Rash)    Intolerances          REVIEW OF SYSEMS:  negative except form above mentioned      Vital Signs Last 24 Hrs  T(C): 37.8 (04 Apr 2020 09:46), Max: 37.8 (04 Apr 2020 09:46)  T(F): 100 (04 Apr 2020 09:46), Max: 100 (04 Apr 2020 09:46)  HR: 68 (04 Apr 2020 11:00) (66 - 69)  BP: 89/52 (04 Apr 2020 11:00) (87/51 - 91/52)  BP(mean): --  RR: 24 (04 Apr 2020 11:00) (24 - 24)  SpO2: 100% (04 Apr 2020 11:00) (89% - 100%)  I&O's Summary      PHYSICAL EXAM:  GENERAL: onnc  HEAD:  Atraumatic, Normocephalic  EYES: EOMI, PERRLA, conjunctiva and sclera clear  NECK: Supple, No JVD  CHEST/LUNG: dec bs at bases and rhonchi  HEART: Regular rate and rhythm; No murmurs, rubs, or gallops  ABDOMEN: Soft, Nontender, Nondistended; Bowel sounds present  SKIN: No rashes or lesions    LABS:                        14.7   2.88  )-----------( 52       ( 04 Apr 2020 10:49 )             45.9     04-04    154<H>  |  117<H>  |  31<H>  ----------------------------<  107<H>  4.7   |  30  |  1.48<H>    Ca    8.5      04 Apr 2020 10:49    TPro  7.1  /  Alb  2.3<L>  /  TBili  0.2  /  DBili  x   /  AST  73<H>  /  ALT  66<H>  /  AlkPhos  118  04-04      CAPILLARY BLOOD GLUCOSE                RADIOLOGY & ADDITIONAL TESTS:    Imaging Personally Reviewed:  [x] YES  [ ] NO    Consultant(s) Notes Reviewed:  [x] YES  [ ] NO      MEDICATIONS  (STANDING):  azithromycin   Tablet 500 milliGRAM(s) Oral daily  dextrose 5%. 1000 milliLiter(s) (100 mL/Hr) IV Continuous <Continuous>  diVALproex  milliGRAM(s) Oral three times a day  enoxaparin Injectable 30 milliGRAM(s) SubCutaneous daily  levETIRAcetam 1000 milliGRAM(s) Oral two times a day  levothyroxine 125 MICROGram(s) Oral daily  memantine 5 milliGRAM(s) Oral daily  pantoprazole    Tablet 40 milliGRAM(s) Oral before breakfast  polyethylene glycol 3350 17 Gram(s) Oral daily    MEDICATIONS  (PRN):  acetaminophen   Tablet .. 650 milliGRAM(s) Oral every 6 hours PRN Temp greater or equal to 38C (100.4F), Mild Pain (1 - 3)      Care Discussed with Consultants/Other Providers [x] YES  [ ] NO    HEALTH ISSUES - PROBLEM Dx: (04 Apr 2020 13:11)      PAST MEDICAL & SURGICAL HISTORY:  Frequent urinary tract infections  Hypothyroidism, unspecified type  Down syndrome      FAMILY HISTORY:        MEDICATIONS   acetaminophen   Tablet .. 650 milliGRAM(s) Oral every 6 hours PRN  acetaminophen  Suppository .. 650 milliGRAM(s) Rectal every 4 hours PRN  albumin human 25% IVPB 50 milliLiter(s) IV Intermittent every 8 hours  chlorhexidine 0.12% Liquid 15 milliLiter(s) Oral Mucosa every 12 hours  chlorhexidine 2% Cloths 1 Application(s) Topical daily  dextrose 5%. 1000 milliLiter(s) IV Continuous <Continuous>  enoxaparin Injectable 30 milliGRAM(s) SubCutaneous daily  hydroxychloroquine   Oral   hydroxychloroquine 200 milliGRAM(s) Oral every 12 hours  levETIRAcetam  IVPB 1000 milliGRAM(s) IV Intermittent every 12 hours  levoFLOXacin IVPB      levoFLOXacin IVPB 500 milliGRAM(s) IV Intermittent every 24 hours  levothyroxine Injectable 62.5 MICROGram(s) IV Push at bedtime  LORazepam   Injectable 2 milliGRAM(s) IV Push every 6 hours PRN  memantine 5 milliGRAM(s) Oral daily  methylPREDNISolone sodium succinate Injectable 80 milliGRAM(s) IV Push daily  midodrine 10 milliGRAM(s) Oral every 8 hours  norepinephrine Infusion 0.05 MICROgram(s)/kG/Min IV Continuous <Continuous>  pantoprazole  Injectable 40 milliGRAM(s) IV Push daily  polyethylene glycol 3350 17 Gram(s) Oral daily  potassium phosphate / sodium phosphate powder 1 Packet(s) Oral every 6 hours  propofol Infusion 10 MICROgram(s)/kG/Min IV Continuous <Continuous>  sodium chloride 0.9%. 500 milliLiter(s) IV Continuous <Continuous>  sodium phosphate IVPB 30 milliMole(s) IV Intermittent once  valproate sodium IVPB 250 milliGRAM(s) IV Intermittent every 8 hours      Vital Signs Last 24 Hrs  T(C): 36.6 (09 Apr 2020 12:00), Max: 36.6 (09 Apr 2020 12:00)  T(F): 97.8 (09 Apr 2020 12:00), Max: 97.8 (09 Apr 2020 12:00)  HR: 75 (09 Apr 2020 14:00) (52 - 88)  BP: 120/82 (09 Apr 2020 14:00) (71/31 - 148/78)  BP(mean): --  RR: 30 (09 Apr 2020 14:00) (18 - 42)  SpO2: 95% (09 Apr 2020 14:00) (88% - 100%)      04-08-20 @ 07:01  -  04-09-20 @ 07:00  --------------------------------------------------------  IN: 2006.1 mL / OUT: 3700 mL / NET: -1693.9 mL    04-09-20 @ 07:01  -  04-09-20 @ 14:51  --------------------------------------------------------  IN: 1371 mL / OUT: 1300 mL / NET: 71 mL        Mode: AC/ CMV (Assist Control/ Continuous Mandatory Ventilation), RR (machine): 20, TV (machine): 320, FiO2: 50, PEEP: 8, ITime: 1, MAP: 14, PIP: 30    LABS:                        10.9   4.24  )-----------( 109      ( 09 Apr 2020 07:09 )             32.6     04-09    149<H>  |  115<H>  |  9   ----------------------------<  128<H>  4.3   |  27  |  0.73    Ca    7.3<L>      09 Apr 2020 07:09  Phos  1.5     04-09  Mg     1.8     04-09    TPro  5.1<L>  /  Alb  1.3<L>  /  TBili  0.1<L>  /  DBili  x   /  AST  70<H>  /  ALT  43  /  AlkPhos  101  04-09    PT/INR - ( 09 Apr 2020 07:09 )   PT: 10.9 sec;   INR: 0.98 ratio         PTT - ( 09 Apr 2020 07:09 )  PTT:35.1 sec          WBC:  WBC Count: 4.24 K/uL (04-09 @ 07:09)  WBC Count: 3.93 K/uL (04-08 @ 07:22)  WBC Count: 7.94 K/uL (04-07 @ 07:37)  WBC Count: 4.71 K/uL (04-06 @ 04:23)      MICROBIOLOGY:  RECENT CULTURES:  04-06 .Blood Blood XXXX XXXX   No growth to date.    04-04 .Blood Blood-Peripheral Blood Culture PCR   Growth in aerobic bottle: Gram Variable Rods   Growth in aerobic bottle: Gram Variable Rods  ***Blood Panel PCR results on this specimen are available  approximately 3 hours after the Gram stain result.***  Gram stain, PCR, and/or culture results may not always  correspond due to difference in methodologies.  ************************************************************  This PCR assay was performed using "[employer]  The following targets are tested for: Enterococcus,  vancomycin resistant enterococci, Listeria monocytogenes,  coagulase negative staphylococci, S. aureus,  methicillin resistant S. aureus, Streptococcus agalactiae  (Group B), S. pneumoniae, S. pyogenes (Group A),  Acinetobacter baumannii, Enterobacter cloacae, E. coli,  Klebsiella oxytoca, K. pneumoniae, Proteus sp.,  Serratia marcescens, Haemophilus influenzae,  Neisseria meningitidis, Pseudomonas aeruginosa, Candida  albicans, C. glabrata, C krusei, C parapsilosis,  C. tropicalis and the KPC resistance gene.          CARDIAC MARKERS ( 09 Apr 2020 07:09 )  1.011 ng/mL / x     / 74 U/L / x     / x      CARDIAC MARKERS ( 08 Apr 2020 07:22 )  .158 ng/mL / x     / 124 U/L / x     / x            PT/INR - ( 09 Apr 2020 07:09 )   PT: 10.9 sec;   INR: 0.98 ratio         PTT - ( 09 Apr 2020 07:09 )  PTT:35.1 sec    Sodium:  Sodium, Serum: 149 mmol/L (04-09 @ 07:09)  Sodium, Serum: 143 mmol/L (04-08 @ 07:22)  Sodium, Serum: 151 mmol/L (04-07 @ 07:37)  Sodium, Serum: 149 mmol/L (04-07 @ 03:40)  Sodium, Serum: 153 mmol/L (04-06 @ 07:31)      0.73 mg/dL 04-09 @ 07:09  0.87 mg/dL 04-08 @ 07:22  0.68 mg/dL 04-07 @ 07:37  0.92 mg/dL 04-07 @ 03:40  0.85 mg/dL 04-06 @ 07:31      Hemoglobin:  Hemoglobin: 10.9 g/dL (04-09 @ 07:09)  Hemoglobin: 11.7 g/dL (04-08 @ 07:22)  Hemoglobin: 11.2 g/dL (04-07 @ 07:37)  Hemoglobin: 12.4 g/dL (04-06 @ 04:23)      Platelets: Platelet Count - Automated: 109 K/uL (04-09 @ 07:09)  Platelet Count - Automated: 98 K/uL (04-08 @ 07:22)  Platelet Count - Automated: 96 K/uL (04-07 @ 07:37)  Platelet Count - Automated: 54 K/uL (04-06 @ 04:23)      LIVER FUNCTIONS - ( 09 Apr 2020 07:09 )  Alb: 1.3 g/dL / Pro: 5.1 g/dL / ALK PHOS: 101 U/L / ALT: 43 U/L DA / AST: 70 U/L / GGT: x                 RADIOLOGY & ADDITIONAL STUDIES:  cont rx cont rx JOSE ANGEL CROW [phone]   1960 DOA 2020 DR [address] MED[facility]      REVIEW OF SYMPTOMS      Able to give ROS  NO     PHYSICAL EXAM    HEENT Unremarkable PERRLA atraumatic   RESP Fair air entry EXP prolonged    Harsh breath sound Resp distres mild   CARDIAC S1 S2 No S3     NO JVD    ABDOMEN SOFT BS PRESENT NOT DISTENDED No hepatosplenomegaly PEDAL EDEMA present No calf tenderness  NO rash   GENERAL Not TOXIC looking    VITALS/LABS       2020  afeb 63 106/70   2020 w 4.2 Hb 10.9 Plt 109 Na 149 K 4.3 CO2 27 Cr .7   2020 11p nore .05 m/k/m  2020 11p prop 15 m/k/m   2020 u 1300   2020 3p ac 20/320/+8/.5     PT DATA/BEST PRACTICE  ALLERGY     NOTEWORTHY  POINTS/CHANGES ROS/PE   2020 Had seizure  2020 a Intubated tr to SICU   2020 fc given 945a Puentes ordered    Transferred to ICU May need intubn and fob dw brother                                WT  45 (2020)                    BMI     20 (2020)     ADVANCED DIRECTIVE       Goals of care discussion                                                                                      HEAD OF BED ELEVATION Yes  DYSPHAGIA EVAL                                  DIET      dys 1 puree () --> jevity 1.5 720 ()                                    IV F        D5 100 ()   --> D5 1/2 75 (() --> D5 75 ()                                              DVT PROPHYLAXIS      lvnx 30 ()             PATIENT SUMMARY   60 f nonverbal downs syndrome from group home HO COVID exposure per transfer papers on Rx for aspiration pneumonia and shortness of breath   er vitals 90/50 66 100f 92%    Pt found to have pneumonia pl effsn hypernatremia and adam on arrival COVID palma started Pulm consulted     Pulm consulted  CT ch showed l lung collapse Pt ruled out for COVID     Home meds memantine 28 keppra 1.2                                Pt tr sicu 2020 itubated hemaodynamci intability                     PATIENT DATA/ASSESSMENT/RECOMMENDATIONS       SEIZURES   2020 Had sz Given ativan      COVID RULED IN    2020 COVID pcr detc    COVID n   3/26/2020 COVID pcr n     LACTICEMIA   2020 la 4.1  Poss sec sz    PNEUMONIA 2020 blod c n    blod c gm raúl rods    A/R Levaquin started 2020   Blod c could be contaminant     HYPERNATREMIA POA  ---2020 Na 151 -151-143 -149  On D5 1/2 w   monitor serially     ADAM POA      URINE 2020 u 500    Crea 2020 Cr .68           COVID  2020 COVID pcr detc   Supp care     SYMPTOM ONSET        OXYGENATION  2020 Intubated ON 90% ox           PROGNOSIS    -2020 nlr 6.1 -7.1  -2020 ferritin 9685-3722    -2020 procal .27-.34  2020 esr 26   2020 d-dimer 639     MEDS     HCQ ()     QTC  2020 qtc 442     STEROIDS   solumed 80.5d ()          LACTICEMIA   2020 la 2.1     HEMODYNAMICS    alb 12.5x3 x 3 dose ()   2020 Midodrine 10.3 ()   Norepi ()   Remains vasopressor dependent   Target map 65       PLAN   DVT P On lvnx 30 ()      RESP FAIL Sec COVID 19 2020 On peep 8 FIO2 50%  POSS BACT PNEU On levaquin ()   COVID 19 On HCQ ()  solumed 80.5d ()   ARDS ltvv   HEMODYNAMICS In shock sec COVID 19 On nore () Mido () Given alb   NONOLIGURIC ADAM Monitor lytes   HYPERNATR On hypotonic fl   SZ On keppra and ativan for break thru      TIME SPENT Over 36 minutes aggregate critical  care time spent on encounter; activities included   direct pt care, counseling and/or coordinating care reviewing notes, lab data/ imaging , discussion with multidisciplinary team/ pt /family. Risks, benefits, alternatives  discussed in detail.    JOSE ANGEL CROW [phone]   1960 DOA 2020 [address] [facility]

## 2020-04-09 NOTE — CONSULT NOTE ADULT - SUBJECTIVE AND OBJECTIVE BOX
sz event suspect secondary to antibiotics in past interactive with Depakote--- now off that antibotic   bolus Depakote 1000mg once now continue  rest of AEDs

## 2020-04-09 NOTE — PROGRESS NOTE ADULT - SUBJECTIVE AND OBJECTIVE BOX
HPI: 60F COVID 19 +, developed worsening hypoxemic resp. failure, ARDS, and required intubation and mechanical vent support.     underlying seizure Hx, has had some seizsures since admission  Current Vent Settings:    FIO2 60  PEEP 10  RR 20        Vitalsigns/reviewed and physical exam performed where pertinent and urgently required.    Lab/radiology studies/ABG/Meds reviewed and interpreted into the assesment and treatment plan.    Assessment/Plan/Therapeutic interventions      Neuro: Sedation neuromuscular blockade as needed to facilitate safe ventilation. If further seizure activity, will re-keppra load    Cor:  Pressor support as needed to maintain MAP 65.  Avoiding fluid challenges.  QTC monitoring while on azithro and      hydroxychloroquine.    Pulm:  ARDS-NET 4-6cc/kg IBW TV as able to maintain plateau pressures <30. Prone ventilation consideration as feasable.  Pa02/Fi02 < 150 on Fi02 >60% and PEEP at least 5.    GI:  PPI  Enteric feeds as tolerated in tandem with NMB and prone ventilation    Renal: Even to negative fluid balance as tolerated by hemodynamics and renal fx.  Feeds to be provided in lieu of IVF.     Heme:  Pharmacologic DVT P in addition to SCD's    ID: ABX discontinuation based on discussion with ID in conjunction with clinical features, culture data, and judicious procalcitonin monitoring.      Endo Aggressive glycemic control to limit FS glucose to < 180mg/dl.        COVID 19 specific considerations and therapeuric options based on the available and rapidly changing literature    Goals of care considerations:  Ongoing assessment for patient specific treatment options based on progression or decline.  I have involved the family with updates and requests in guidance for medical decision making.      35 minutes of critical care time spent in the management of this critically ill COVID-19 patient/PUI patient with continuous assessments and interventions based on the interpretation of multiple databases.

## 2020-04-09 NOTE — PROGRESS NOTE ADULT - SUBJECTIVE AND OBJECTIVE BOX
Date/Time Patient Seen:  		  Referring MD:   Data Reviewed	       Patient is a 60y old  Female who presents with a chief complaint of sob (09 Apr 2020 02:24)      Subjective/HPI     PAST MEDICAL & SURGICAL HISTORY:  Frequent urinary tract infections  Hypothyroidism, unspecified type  Down syndrome        Medication list         MEDICATIONS  (STANDING):  chlorhexidine 0.12% Liquid 15 milliLiter(s) Oral Mucosa every 12 hours  chlorhexidine 2% Cloths 1 Application(s) Topical daily  dextrose 5%. 1000 milliLiter(s) (75 mL/Hr) IV Continuous <Continuous>  enoxaparin Injectable 30 milliGRAM(s) SubCutaneous daily  hydroxychloroquine   Oral   hydroxychloroquine 200 milliGRAM(s) Oral every 12 hours  levETIRAcetam  IVPB 1000 milliGRAM(s) IV Intermittent every 12 hours  levoFLOXacin IVPB      levoFLOXacin IVPB 500 milliGRAM(s) IV Intermittent every 24 hours  levothyroxine Injectable 62.5 MICROGram(s) IV Push at bedtime  memantine 5 milliGRAM(s) Oral daily  methylPREDNISolone sodium succinate Injectable 80 milliGRAM(s) IV Push daily  midodrine 10 milliGRAM(s) Oral every 8 hours  norepinephrine Infusion 0.05 MICROgram(s)/kG/Min (4.26 mL/Hr) IV Continuous <Continuous>  pantoprazole  Injectable 40 milliGRAM(s) IV Push daily  polyethylene glycol 3350 17 Gram(s) Oral daily  propofol Infusion 10 MICROgram(s)/kG/Min (2.72 mL/Hr) IV Continuous <Continuous>  valproate sodium IVPB 250 milliGRAM(s) IV Intermittent every 8 hours    MEDICATIONS  (PRN):  acetaminophen   Tablet .. 650 milliGRAM(s) Oral every 6 hours PRN Temp greater or equal to 38C (100.4F), Mild Pain (1 - 3)  acetaminophen  Suppository .. 650 milliGRAM(s) Rectal every 4 hours PRN Temp greater or equal to 38C (100.4F)  LORazepam   Injectable 2 milliGRAM(s) IV Push every 6 hours PRN seizure activity         Vitals log        ICU Vital Signs Last 24 Hrs  T(C): 36.3 (09 Apr 2020 01:30), Max: 36.4 (08 Apr 2020 15:15)  T(F): 97.4 (09 Apr 2020 01:30), Max: 97.6 (08 Apr 2020 15:15)  HR: 63 (09 Apr 2020 07:00) (47 - 88)  BP: 134/80 (09 Apr 2020 07:00) (71/31 - 149/70)  BP(mean): 86 (08 Apr 2020 14:00) (71 - 93)  ABP: --  ABP(mean): --  RR: 20 (09 Apr 2020 07:00) (18 - 42)  SpO2: 100% (09 Apr 2020 07:00) (88% - 100%)       Mode: AC/ CMV (Assist Control/ Continuous Mandatory Ventilation)  RR (machine): 20  TV (machine): 320  FiO2: 60  PEEP: 10  ITime: 1  MAP: 14  PIP: 30      Input and Output:  I&O's Detail    08 Apr 2020 07:01  -  09 Apr 2020 07:00  --------------------------------------------------------  IN:    dextrose 5%.: 600 mL    Enteral Tube Flush: 480 mL    IV PiggyBack: 200 mL    norepinephrine Infusion: 111.1 mL    ns in tub fed  [id number]: 540 mL    propofol Infusion: 75 mL  Total IN: 2006.1 mL    OUT:    Indwelling Catheter - Urethral: 3700 mL  Total OUT: 3700 mL    Total NET: -1693.9 mL          Lab Data                        10.9   4.24  )-----------( 109      ( 09 Apr 2020 07:09 )             32.6     04-08    143  |  114<H>  |  11  ----------------------------<  175<H>  5.0   |  25  |  0.87    Ca    7.0<L>      08 Apr 2020 07:22  Phos  2.0     04-08  Mg     1.4     04-08    TPro  4.7<L>  /  Alb  1.2<L>  /  TBili  0.1<L>  /  DBili  x   /  AST  62<H>  /  ALT  35  /  AlkPhos  84  04-08      CARDIAC MARKERS ( 08 Apr 2020 07:22 )  .158 ng/mL / x     / 124 U/L / x     / x            Review of Systems	      Objective     Physical Examination    heart s1s2  lung dec BS      Pertinent Lab findings & Imaging      Puentes:  NO   Adequate UO     I&O's Detail    08 Apr 2020 07:01  -  09 Apr 2020 07:00  --------------------------------------------------------  IN:    dextrose 5%.: 600 mL    Enteral Tube Flush: 480 mL    IV PiggyBack: 200 mL    norepinephrine Infusion: 111.1 mL    ns in tub fed  [id number]: 540 mL    propofol Infusion: 75 mL  Total IN: 2006.1 mL    OUT:    Indwelling Catheter - Urethral: 3700 mL  Total OUT: 3700 mL    Total NET: -1693.9 mL               Discussed with:     Cultures:	        Radiology

## 2020-04-10 NOTE — ADVANCED PRACTICE NURSE CONSULT - ASSESSMENT
Patient is a 61yo IDDD female COVID + PMHX ADAM, hypothyroidism, hypernatremia presents with:  1. Deep tissue pressure injury (dti) to the left heel .5 x .5  2. DTI  right heel blood filled blister 3 x 4  3. Moisture associated skin damage (MASD) intergluteal cleft

## 2020-04-10 NOTE — PROGRESS NOTE ADULT - ASSESSMENT
cont rx cont rx  JOSE ANGEL CROW [phone]   1960 DOA 2020 [address] [facility]      REVIEW OF SYMPTOMS      Able to give ROS  NO     PHYSICAL EXAM    HEENT Unremarkable PERRLA atraumatic   RESP Fair air entry EXP prolonged    Harsh breath sound Resp distres mild   CARDIAC S1 S2 No S3     NO JVD    ABDOMEN SOFT BS PRESENT NOT DISTENDED No hepatosplenomegaly PEDAL EDEMA present No calf tenderness  NO rash   GENERAL Not TOXIC looking    VITALS/LABS       4/10/2020 afeb 64 117/91   4/10/2020 w 6.2 Hb 10.6 Plt 136  4/10/2020 5p prop 25 m/k/m  4/10/2020 5p nore  0.15 m/k/m  4/10/2020 u 400   4/10/2020 5p 20/320/+5 /50%   4/10/2020 Na 154 K 4.1 CO 31 Cr .6     PT DATA/BEST PRACTICE  ALLERGY     NOTEWORTHY  POINTS/CHANGES ROS/PE   2020 Had seizure  2020 a Intubated tr to SICU   2020 fc given 945a Puentes ordered    Transferred to ICU May need intubn and fob dw brother                                WT  45 (2020)                    BMI     20 (2020)     ADVANCED DIRECTIVE       Goals of care discussion                                                                                      HEAD OF BED ELEVATION Yes  DYSPHAGIA EVAL                                  DIET      dys 1 puree () --> jevity 1.5 720 ()                                    IV F        D5 100 ()   --> D5 1/2 75 (() --> D5 75 ()                                              DVT PROPHYLAXIS      lvnx 30 ()             PATIENT SUMMARY   60 f nonverbal downs syndrome from group home HO COVID exposure per transfer papers on Rx for aspiration pneumonia and shortness of breath   er vitals 90/50 66 100f 92%    Pt found to have pneumonia pl effsn hypernatremia and adam on arrival COVID palma started Pulm consulted     Pulm consulted  CT ch showed l lung collapse Pt ruled out for COVID     Home meds memantine 28 keppra 1.2                                Pt tr sicu 2020 itubated hemaodynamci intability                 PATIENT DATA/ASSESSMENT/RECOMMENDATIONS       SEIZURES   2020 Had sz Given     TROPONINEMIA   ASA 81 () (Dr VILLEGAS)       COVID RULED IN    2020 COVID pcr detc    COVID n   3/26/2020 COVID pcr n     LACTICEMIA   2020 la 4.1  Poss sec sz    PNEUMONIA 2020 blod c n    blod c gm raúl rods    A/R Levaquin started 2020   Blod c could be contaminant     HYPERNATREMIA POA  ----4/10/2020 Na 151 -151-143 -149-154  IV D5 50 startd 4/10/2020   monitor serially   On fw 240.4 (4/10)       ADAM POA      URINE 2020 u 500    Crea -4/10/2020 Cr .68-.6           COVID  2020 COVID pcr detc   Supp care     SYMPTOM ONSET        OXYGENATION  2020 Intubated ON 90% ox           PROGNOSIS    --4/10/2020 nlr 6.1 -7.1- 5.3   -2020 ferritin 2897-3057    -2020 procal .27-.34  2020 esr 26   2020 d-dimer 639     MEDS     HCQ ()     QTC  2020 qtc 442     STEROIDS   solumed 80.5d ()              SEDATION  Remains on prop         RESP GAS EXCHANGE   4/10/2020 6a 40% O2 745/42/45  4/10/2020 5p 20/320/+5 /50%   A/R cont ltvv Gas exchange ok O2 increased to correct hypoxemia       LACTICEMIA   2020 la 2.1     HEMODYNAMICS    alb 12.5x3 x 3 dose ()   2020 Midodrine 10.3 ()   Norepi ()   Remains vasopressor dependent   Target map 65       PLAN   DVT P On lvnx 30 ()      RESP FAIL Sec COVID 19 2020 On peep 8 FIO2 50%  POSS BACT PNEU On levaquin ()   COVID 19 On HCQ ()  solumed 80.5d ()   ARDS ltvv   HEMODYNAMICS In shock sec COVID 19 On nore () Mido () Given alb   NONOLIGURIC ADAM Monitor lytes   HYPERNATR On hypotonic fl fw sstarted 4/10   SZ On keppra and ativan for break thru  Wean off vasopressors and vent as tolerated       TIME SPENT Over 36 minutes aggregate critical care time spent on encounter; activities included   direct pt care, counseling and/or coordinating care reviewing notes, lab data/ imaging , discussion with multidisciplinary team/ pt /family. Risks, benefits, alternatives  discussed in detail.    [facility] [phone]   1960 DOA 2020 [address]TH MED[address]

## 2020-04-10 NOTE — ADVANCED PRACTICE NURSE CONSULT - RECOMMEDATIONS
1. Paint bilateral heels with Cavilon daily.   2. Offload with Z-floats at all times while in bed  3. [place] MASD with Cavilon daily and Eduar neely  RN educated in the care of this patients skin care  Contact [facility] if injury progresses

## 2020-04-10 NOTE — PROGRESS NOTE ADULT - SUBJECTIVE AND OBJECTIVE BOX
Chief Complaint: Shortness of breath    Interval Events: No significant changes overnight.    Review of Systems:  General: No fevers, chills, weight loss or gain  Skin: No rashes, color changes  Cardiovascular: No chest pain, orthopnea  Respiratory: No shortness of breath, cough  Gastrointestinal: No nausea, abdominal pain  Genitourinary: No incontinence, pain with urination  Musculoskeletal: No pain, swelling, decreased range of motion  Neurological: No headache, weakness  Psychiatric: No depression, anxiety  Endocrine: No weight loss or gain, increased thirst  All other systems are comprehensively negative.    Physical Exam:  Vitals:        Vital Signs Last 24 Hrs  T(C): 36.4 (10 Apr 2020 08:00), Max: 36.6 (09 Apr 2020 12:00)  T(F): 97.6 (10 Apr 2020 08:00), Max: 97.9 (10 Apr 2020 00:00)  HR: 64 (10 Apr 2020 08:00) (57 - 79)  BP: 115/84 (10 Apr 2020 08:00) (98/53 - 135/93)  BP(mean): --  RR: 20 (10 Apr 2020 08:00) (18 - 30)  SpO2: 98% (10 Apr 2020 08:00) (93% - 98%)  General: NAD  HEENT: MMM  Neck: No JVD, no carotid bruit  Lungs: CTAB  CV: RRR, nl S1/S2, no M/R/G  Abdomen: S/NT/ND, +BS  Extremities: No LE edema, no cyanosis  Neuro: AAOx3, non-focal  Skin: No rash    Labs:                        10.6   6.23  )-----------( 136      ( 10 Apr 2020 07:28 )             31.7     04-10    154<H>  |  115<H>  |  7   ----------------------------<  114<H>  4.1   |  31  |  0.62    Ca    7.1<L>      10 Apr 2020 07:28  Phos  3.5     04-10  Mg     1.7     04-10    TPro  5.2<L>  /  Alb  1.8<L>  /  TBili  0.2  /  DBili  x   /  AST  80<H>  /  ALT  55  /  AlkPhos  108  04-10    CARDIAC MARKERS ( 10 Apr 2020 07:28 )  x     / x     / 71 U/L / x     / x      CARDIAC MARKERS ( 09 Apr 2020 07:09 )  1.011 ng/mL / x     / 74 U/L / x     / x          PT/INR - ( 09 Apr 2020 07:09 )   PT: 10.9 sec;   INR: 0.98 ratio         PTT - ( 09 Apr 2020 07:09 )  PTT:35.1 sec    Telemetry: Sinus rhythm

## 2020-04-10 NOTE — PROGRESS NOTE ADULT - SUBJECTIVE AND OBJECTIVE BOX
Neurology follow up note    MIGNON [id number]      Interval History:    Patient intubated sedated     MEDICATIONS    acetaminophen   Tablet .. 650 milliGRAM(s) Oral every 6 hours PRN  acetaminophen  Suppository .. 650 milliGRAM(s) Rectal every 4 hours PRN  aspirin  chewable 81 milliGRAM(s) Oral daily  chlorhexidine 0.12% Liquid 15 milliLiter(s) Oral Mucosa every 12 hours  chlorhexidine 2% Cloths 1 Application(s) Topical daily  enoxaparin Injectable 30 milliGRAM(s) SubCutaneous daily  hydroxychloroquine   Oral   hydroxychloroquine 200 milliGRAM(s) Oral every 12 hours  levETIRAcetam  IVPB 1000 milliGRAM(s) IV Intermittent every 12 hours  levoFLOXacin IVPB      levoFLOXacin IVPB 500 milliGRAM(s) IV Intermittent every 24 hours  levothyroxine Injectable 62.5 MICROGram(s) IV Push at bedtime  LORazepam   Injectable 2 milliGRAM(s) IV Push every 6 hours PRN  memantine 5 milliGRAM(s) Oral daily  methylPREDNISolone sodium succinate Injectable 80 milliGRAM(s) IV Push daily  midodrine 10 milliGRAM(s) Oral every 8 hours  norepinephrine Infusion 0.05 MICROgram(s)/kG/Min IV Continuous <Continuous>  pantoprazole  Injectable 40 milliGRAM(s) IV Push daily  polyethylene glycol 3350 17 Gram(s) Oral daily  propofol Infusion 10 MICROgram(s)/kG/Min IV Continuous <Continuous>  valproate sodium IVPB 250 milliGRAM(s) IV Intermittent every 8 hours      Allergies    amoxicillin (Rash)  penicillin (Rash)    Intolerances            Vital Signs Last 24 Hrs  T(C): 36.4 (10 Apr 2020 08:00), Max: 36.6 (10 Apr 2020 00:00)  T(F): 97.6 (10 Apr 2020 08:00), Max: 97.9 (10 Apr 2020 00:00)  HR: 64 (10 Apr 2020 08:00) (62 - 79)  BP: 115/84 (10 Apr 2020 08:00) (98/53 - 135/93)  BP(mean): --  RR: 20 (10 Apr 2020 08:00) (18 - 30)  SpO2: 98% (10 Apr 2020 08:00) (93% - 98%)    REVIEW OF SYSTEMS:  Could not be obtained secondary to the patient being nonverbal.    PHYSICAL EXAMINATION:    HEENT:  Head:  Normocephalic and atraumatic.  Eyes:  No scleral icterus.  Ears:  Hearing hard to evaluate secondary to the patient being sedated and intubated.  RESPIRATORY:  Good air entry bilaterally.  CARDIOVASCULAR:  S1 and S2 are heard.  ABDOMEN:  Soft and nontender.  EXTREMITIES:  No clubbing or cyanosis were noted.      NEUROLOGICAL:  Limited secondary to the patient being intubated and sedated.  No response to verbal stimuli.  I opened the patient's eyes, no gaze preference.  I applied stimuli to all four extremities with slight withdrawal bilateral upper and lower.  Tone; bilateral upper and lower was increased.                LABS:  CBC Full  -  ( 10 Apr 2020 07:28 )  WBC Count : 6.23 K/uL  RBC Count : 3.08 M/uL  Hemoglobin : 10.6 g/dL  Hematocrit : 31.7 %  Platelet Count - Automated : 136 K/uL  Mean Cell Volume : 102.9 fl  Mean Cell Hemoglobin : 34.4 pg  Mean Cell Hemoglobin Concentration : 33.4 gm/dL  Auto Neutrophil # : 4.36 K/uL  Auto Lymphocyte # : 0.82 K/uL  Auto Monocyte # : 0.79 K/uL  Auto Eosinophil # : 0.00 K/uL  Auto Basophil # : 0.01 K/uL  Auto Neutrophil % : 69.9 %  Auto Lymphocyte % : 13.2 %  Auto Monocyte % : 12.7 %  Auto Eosinophil % : 0.0 %  Auto Basophil % : 0.2 %      04-10    154<H>  |  115<H>  |  7   ----------------------------<  114<H>  4.1   |  31  |  0.62    Ca    7.1<L>      10 Apr 2020 07:28  Phos  3.5     04-10  Mg     1.7     04-10    TPro  5.2<L>  /  Alb  1.8<L>  /  TBili  0.2  /  DBili  x   /  AST  80<H>  /  ALT  55  /  AlkPhos  108  04-10    Hemoglobin A1C:     LIVER FUNCTIONS - ( 10 Apr 2020 07:28 )  Alb: 1.8 g/dL / Pro: 5.2 g/dL / ALK PHOS: 108 U/L / ALT: 55 U/L DA / AST: 80 U/L / GGT: x           Vitamin B12   PT/INR - ( 09 Apr 2020 07:09 )   PT: 10.9 sec;   INR: 0.98 ratio         PTT - ( 09 Apr 2020 07:09 )  PTT:35.1 sec      RADIOLOGY    ANALYSIS AND PLAN:  This is a 60-year-old with altered mental status and episode of seizure.  1.	For seizure event, suspect this could be secondary to the patient's interaction with antibiotics.  We will continue her on currently her home dose of 250 mg three times a day. no new seizures monitor levels.   2.	I will recommend Ativan 1 mg IV push q.6 h. p.r.n. for any type of breakthrough seizures.  3.	Seizure precautions.  4.	For altered mental status, secondary to metabolic encephalopathy secondary to COVID-19 infection.  5.	For hypotension, continue the patient on midodrine.  If possible, please maintain systolic blood pressure of above 100 and help maintain cerebral perfusion.  6.	For history of hypothyroidism, continue the patient on Synthroid.  Greater than 45 minutes spent in direct patient care reviewing  the notes, lab data/ imaging , discussion with multidisciplinary team.

## 2020-04-10 NOTE — PROGRESS NOTE ADULT - SUBJECTIVE AND OBJECTIVE BOX
Patient is a 60y old  Female who presents with a chief complaint of sob (06 Apr 2020 11:30)    Patient seen in follow up for ADAM, Hypernatremia.   COVID +. Chart reviewed.        PAST MEDICAL HISTORY:  Frequent urinary tract infections  Hypothyroidism, unspecified type  Down syndrome    MEDICATIONS  (STANDING):  aspirin  chewable 81 milliGRAM(s) Oral daily  chlorhexidine 0.12% Liquid 15 milliLiter(s) Oral Mucosa every 12 hours  chlorhexidine 2% Cloths 1 Application(s) Topical daily  enoxaparin Injectable 30 milliGRAM(s) SubCutaneous daily  hydroxychloroquine   Oral   hydroxychloroquine 200 milliGRAM(s) Oral every 12 hours  levETIRAcetam  IVPB 1000 milliGRAM(s) IV Intermittent every 12 hours  levoFLOXacin IVPB      levoFLOXacin IVPB 500 milliGRAM(s) IV Intermittent every 24 hours  levothyroxine Injectable 62.5 MICROGram(s) IV Push at bedtime  memantine 5 milliGRAM(s) Oral daily  methylPREDNISolone sodium succinate Injectable 80 milliGRAM(s) IV Push daily  midodrine 10 milliGRAM(s) Oral every 8 hours  norepinephrine Infusion 0.05 MICROgram(s)/kG/Min (4.26 mL/Hr) IV Continuous <Continuous>  pantoprazole  Injectable 40 milliGRAM(s) IV Push daily  polyethylene glycol 3350 17 Gram(s) Oral daily  propofol Infusion 10 MICROgram(s)/kG/Min (2.72 mL/Hr) IV Continuous <Continuous>  valproate sodium IVPB 250 milliGRAM(s) IV Intermittent every 8 hours    MEDICATIONS  (PRN):  acetaminophen   Tablet .. 650 milliGRAM(s) Oral every 6 hours PRN Temp greater or equal to 38C (100.4F), Mild Pain (1 - 3)  acetaminophen  Suppository .. 650 milliGRAM(s) Rectal every 4 hours PRN Temp greater or equal to 38C (100.4F)  LORazepam   Injectable 2 milliGRAM(s) IV Push every 6 hours PRN seizure activity    T(C): 36.4 (04-10-20 @ 08:00), Max: 36.6 (04-09-20 @ 12:00)  HR: 64 (04-10-20 @ 08:00) (52 - 88)  BP: 115/84 (04-10-20 @ 08:00) (71/31 - 148/78)  RR: 20 (04-10-20 @ 08:00)  SpO2: 98% (04-10-20 @ 08:00)  Wt(kg): --  I&O's Detail    09 Apr 2020 07:01  -  10 Apr 2020 07:00  --------------------------------------------------------  IN:    dextrose 5%.: 475 mL    Enteral Tube Flush: 240 mL    IV PiggyBack: 500 mL    norepinephrine Infusion: 148 mL    ns in tub fed  [id number]: 715 mL    propofol Infusion: 84 mL    sodium chloride 0.9%: 900 mL    Solution: 100 mL  Total IN: 3162 mL    OUT:    Indwelling Catheter - Urethral: 3450 mL  Total OUT: 3450 mL    Total NET: -288 mL      10 Apr 2020 07:01  -  10 Apr 2020 12:39  --------------------------------------------------------  IN:    IV PiggyBack: 100 mL  Total IN: 100 mL    OUT:  Total OUT: 0 mL    Total NET: 100 mL      PHYSICAL EXAM:  Pt on COVID precautions. Chart reviewed and previous physical examination noted.                  LABORATORY:                        10.6   6.23  )-----------( 136      ( 10 Apr 2020 07:28 )             31.7     04-10    154<H>  |  115<H>  |  7   ----------------------------<  114<H>  4.1   |  31  |  0.62    Ca    7.1<L>      10 Apr 2020 07:28  Phos  3.5     04-10  Mg     1.7     04-10    TPro  5.2<L>  /  Alb  1.8<L>  /  TBili  0.2  /  DBili  x   /  AST  80<H>  /  ALT  55  /  AlkPhos  108  04-10    Sodium, Serum: 154 mmol/L (04-10 @ 07:28)  Sodium, Serum: 149 mmol/L (04-09 @ 07:09)    Potassium, Serum: 4.1 mmol/L (04-10 @ 07:28)  Potassium, Serum: 4.3 mmol/L (04-09 @ 07:09)    Hemoglobin: 10.6 g/dL (04-10 @ 07:28)  Hemoglobin: 10.9 g/dL (04-09 @ 07:09)  Hemoglobin: 11.7 g/dL (04-08 @ 07:22)    Creatinine, Serum 0.62 (04-10 @ 07:28)  Creatinine, Serum 0.73 (04-09 @ 07:09)  Creatinine, Serum 0.87 (04-08 @ 07:22)    CARDIAC MARKERS ( 10 Apr 2020 10:15 )  2.706 ng/mL / x     / x     / x     / x      CARDIAC MARKERS ( 10 Apr 2020 07:28 )  x     / x     / 71 U/L / x     / x      CARDIAC MARKERS ( 09 Apr 2020 07:09 )  1.011 ng/mL / x     / 74 U/L / x     / x          LIVER FUNCTIONS - ( 10 Apr 2020 07:28 )  Alb: 1.8 g/dL / Pro: 5.2 g/dL / ALK PHOS: 108 U/L / ALT: 55 U/L DA / AST: 80 U/L / GGT: x             ABG - ( 10 Apr 2020 06:07 )  pH, Arterial: x     pH, Blood: 7.45  /  pCO2: 42    /  pO2: 46    / HCO3: 28    / Base Excess: 4.9   /  SaO2: 83

## 2020-04-10 NOTE — PROGRESS NOTE ADULT - ASSESSMENT
61 yo F  , nonverbal, MRDD, hypothyroidism and down syndrome, hx of seizures BIBA  from group home for fever, sob, cough, low o2 sat. Admitted to [facility] for evaluation of r/o COVBID 19 PNA. also found to be hypernatremic and with evidence of Acute Renal Failure. Now admitted to ICU for acute severe respiratory decompensation secondary to COVID 19 PNA      Acute Severe Hypoxemic Respiratory Failure MF due to COVID 19 PNA with evidence of Mucus plug++Aspiration PNA  Septic Shock due to the above  CT with evidence of Right sided Mucus Plugging  s/p zithromax and meropenam  on pressors  Patient ++secretions from ET tube  on plaquinil and steroid  EKG 4/9 at 443  Levoquin added  Plan:  followup sputum  c/w plaquenil and IV steroids  followup q48h ldh ferritin and ddimer  daily cbc with diff    Breakthrough Generalized Seizures  may be contributing factor to aspiration PNA  on Keppra IV q12th + ativan rescue + depakote  Neuro called to evaluate :: added bolus depakote    Diarrhea liikely from antibiotics  on ddx is viral although hard to stay if bout started today  giving probiotics , checking cdiff and stool  flexiseal    Troponemia NSTEMI type II likely due to septic cardiomyopathy  trop from 1 to 2.7  Cards consulted  Echo ordered: pending ready    Hypernatremia.     Was on d5w at 100ml hr  nephro consulted, recs appreciated.       ADAM likely prerenal oliguria  Resolved    Hypothyroidism, unspecified type.  Plan: levothyroxine.     Constipation.  miralax      code: Full  dvt ppx: lovenox subq	  dispo: back to facility when stable    Goals of care discussed  with brother : as above

## 2020-04-10 NOTE — PROGRESS NOTE ADULT - ASSESSMENT
1.	ADAM: Prerenal azotemia, ATN  2.	s/p Shock, Sepsis  3.	Pneumonia, COVID +  4.	Hypophosphatemia    Stable renal indices. Phos better. To continue current meds. Treatment for COVID pneumonia. COVID precautions. Supportive care.   Avoid nephrotoxic meds as possible. Management as per ICU staff. Overall prognosis poor.

## 2020-04-10 NOTE — PROGRESS NOTE ADULT - SUBJECTIVE AND OBJECTIVE BOX
Patient seen and examined at bedside  Overnight no acute events    yesterday for low albumin - gave 3  doses of IV.    Noted today:  vent settings now fi02 50 / 5  on Pressors and Midodrine  trops elevation at 2.6  on plaquenil and steroids  Had large bout of waterry diarrhea  on Iv antibiotics    Started patient on flexiseal  Ordered Chest Xray  start pribiotics  ordered stool for cdiff and   Touched with with . Patient brother is next of kin and had decision making capability    discussed patients clinical status and goals of care    Discussed continued ventilator and DNR..  Elevated troponins and possible inflammatory vs ischemic injury due to virus with elevated troponins  also discussed patient is not a candidate for  any further ischemic workup    Brother is asking for  cxr  echo  and Cardiology eval.    will discuss with cards further plan following the above    Review of Systems:  unable  Objective:  Vitals  T(C): 36.9 (04-10-20 @ 15:59), Max: 36.9 (04-10-20 @ 15:59)  HR: 69 (04-10-20 @ 17:30) (62 - 79)  BP: 124/84 (04-10-20 @ 17:30) (98/53 - 135/93)  RR: 22 (04-10-20 @ 17:30) (18 - 23)  SpO2: 95% (04-10-20 @ 17:38) (93% - 98%)    Physical Exam:  General: comfortable, no acute distress,sedated  HEENT: Atraumatic, no LAD, trachea midline, PERRLA  Cardiovascular: normal s1s2,   Pulmonary: decreased, no wheezing , rhonchi  Gastrointestinal: soft non tender non distended, no masses felt, no organomegally  Muscloskeletal: no lower extremity edema,   Neurological: sedated  Psychiatrical: sedated  SKIN: no rash, lesions or ulcers    Labs:                          10.6   6.23  )-----------( 136      ( 10 Apr 2020 07:28 )             31.7     04-10    154<H>  |  115<H>  |  7   ----------------------------<  114<H>  4.1   |  31  |  0.62    Ca    7.1<L>      10 Apr 2020 07:28  Phos  3.5     04-10  Mg     1.7     04-10    TPro  5.2<L>  /  Alb  1.8<L>  /  TBili  0.2  /  DBili  x   /  AST  80<H>  /  ALT  55  /  AlkPhos  108  04-10    LIVER FUNCTIONS - ( 10 Apr 2020 07:28 )  Alb: 1.8 g/dL / Pro: 5.2 g/dL / ALK PHOS: 108 U/L / ALT: 55 U/L DA / AST: 80 U/L / GGT: x           PT/INR - ( 09 Apr 2020 07:09 )   PT: 10.9 sec;   INR: 0.98 ratio         PTT - ( 09 Apr 2020 07:09 )  PTT:35.1 sec      Active Medications  MEDICATIONS  (STANDING):  aspirin  chewable 81 milliGRAM(s) Oral daily  chlorhexidine 0.12% Liquid 15 milliLiter(s) Oral Mucosa every 12 hours  chlorhexidine 2% Cloths 1 Application(s) Topical daily  dextrose 5%. 1000 milliLiter(s) (50 mL/Hr) IV Continuous <Continuous>  enoxaparin Injectable 30 milliGRAM(s) SubCutaneous daily  hydroxychloroquine   Oral   hydroxychloroquine 200 milliGRAM(s) Oral every 12 hours  levETIRAcetam  IVPB 1000 milliGRAM(s) IV Intermittent every 12 hours  levoFLOXacin IVPB      levoFLOXacin IVPB 500 milliGRAM(s) IV Intermittent every 24 hours  levothyroxine Injectable 62.5 MICROGram(s) IV Push at bedtime  memantine 5 milliGRAM(s) Oral daily  methylPREDNISolone sodium succinate Injectable 80 milliGRAM(s) IV Push daily  midodrine 10 milliGRAM(s) Oral every 8 hours  norepinephrine Infusion 0.05 MICROgram(s)/kG/Min (4.26 mL/Hr) IV Continuous <Continuous>  pantoprazole  Injectable 40 milliGRAM(s) IV Push daily  polyethylene glycol 3350 17 Gram(s) Oral daily  propofol Infusion 10 MICROgram(s)/kG/Min (2.72 mL/Hr) IV Continuous <Continuous>  valproate sodium IVPB 250 milliGRAM(s) IV Intermittent every 8 hours    MEDICATIONS  (PRN):  acetaminophen   Tablet .. 650 milliGRAM(s) Oral every 6 hours PRN Temp greater or equal to 38C (100.4F), Mild Pain (1 - 3)  acetaminophen  Suppository .. 650 milliGRAM(s) Rectal every 4 hours PRN Temp greater or equal to 38C (100.4F)  LORazepam   Injectable 2 milliGRAM(s) IV Push every 6 hours PRN seizure activity

## 2020-04-10 NOTE — CHART NOTE - NSCHARTNOTEFT_GEN_A_CORE
Assessment: Pt seen for nutrition f/u.  Pt is a 59 yo female admit from [facility] with SOB< fever, cough(+COVID19).  Pt on po diet 4/5-4/6 (dysphagia 1 pureed with honey thick liquids), pt decompensated and required intubation.  Noted OGT placed for meds/feedings.  Trophic feeds are recommended for COVID19+ patients.  Active bolus tube feeding order for Jevity 1.5 180mL every 4 hrs with 60ml free water flush before and after each bolus (provides 1080kcal based on 24kcal/kg Actual BW, 45.9g protein based on 1.0g/kg Actual BW; meeting ~70-85% of estimated needs).  Hypernatremia noted; additional free water added today- 240ml every 6 hrs for addtl 960ml, tube feed flush increased as well to 120ml before and after each bolus.  RD to continue to follow closely.    Factors impacting intake: [ ] none [ ] nausea  [ ] vomiting [ ] diarrhea [ ] constipation  [ ]chewing problems [ ] swallowing issues  [ ] other:     Diet Presciption: Diet, NPO with Tube Feed:   Tube Feeding Modality: Orogastric  Jevity 1.5 Mitch  Total Volume for 24 Hours (mL): 720  Bolus  Total Volume of Bolus (mL):  180  Total # of Feeds: 4  Tube Feed Frequency: Every 6 hours   Tube Feed Start Time: 13:00  Bolus Feed Rate (mL per Hour): 180   Bolus Feed Duration (in Hours): 1  Bolus Feed Instructions:  give bolus every 4hrs with 60mL water flush before and after each bolus feeding (04-10-20 @ 10:38)    Intake: bolus feeds/free water     Current Weight: Weight (kg): 45.4 (04-04 @ 09:46)  % Weight Change no updated bw to assess     Pertinent Medications: MEDICATIONS  (STANDING):  aspirin  chewable 81 milliGRAM(s) Oral daily  chlorhexidine 0.12% Liquid 15 milliLiter(s) Oral Mucosa every 12 hours  chlorhexidine 2% Cloths 1 Application(s) Topical daily  enoxaparin Injectable 30 milliGRAM(s) SubCutaneous daily  hydroxychloroquine   Oral   hydroxychloroquine 200 milliGRAM(s) Oral every 12 hours  levETIRAcetam  IVPB 1000 milliGRAM(s) IV Intermittent every 12 hours  levoFLOXacin IVPB      levoFLOXacin IVPB 500 milliGRAM(s) IV Intermittent every 24 hours  levothyroxine Injectable 62.5 MICROGram(s) IV Push at bedtime  memantine 5 milliGRAM(s) Oral daily  methylPREDNISolone sodium succinate Injectable 80 milliGRAM(s) IV Push daily  midodrine 10 milliGRAM(s) Oral every 8 hours  norepinephrine Infusion 0.05 MICROgram(s)/kG/Min (4.26 mL/Hr) IV Continuous <Continuous>  pantoprazole  Injectable 40 milliGRAM(s) IV Push daily  polyethylene glycol 3350 17 Gram(s) Oral daily  propofol Infusion 10 MICROgram(s)/kG/Min (2.72 mL/Hr) IV Continuous <Continuous>  sodium chloride 0.9%. 500 milliLiter(s) (500 mL/Hr) IV Continuous <Continuous>  valproate sodium IVPB 250 milliGRAM(s) IV Intermittent every 8 hours    MEDICATIONS  (PRN):  acetaminophen   Tablet .. 650 milliGRAM(s) Oral every 6 hours PRN Temp greater or equal to 38C (100.4F), Mild Pain (1 - 3)  acetaminophen  Suppository .. 650 milliGRAM(s) Rectal every 4 hours PRN Temp greater or equal to 38C (100.4F)  LORazepam   Injectable 2 milliGRAM(s) IV Push every 6 hours PRN seizure activity    Pertinent Labs: 04-10 Na154 mmol/L<H> Glu 114 mg/dL<H> K+ 4.1 mmol/L Cr  0.62 mg/dL BUN 7 mg/dL 04-10 Phos 3.5 mg/dL 04-10 Alb 1.8 g/dL<L> 04-09 Chol --    LDL --    HDL --    Trig 131 mg/dL     CAPILLARY BLOOD GLUCOSE      POCT Blood Glucose.: 105 mg/dL (10 Apr 2020 05:55)  POCT Blood Glucose.: 122 mg/dL (10 Apr 2020 00:07)  POCT Blood Glucose.: 125 mg/dL (09 Apr 2020 18:06)  POCT Blood Glucose.: 133 mg/dL (09 Apr 2020 12:01)    Skin: DTI of Jesus heels, stg 2 of the Rt sacrum, and DTI of sacrum    Estimated Needs:   [x ] no change since previous assessment  [ ] recalculated:     Previous Nutrition Diagnosis:   [ ] Inadequate Energy Intake [ ]Inadequate Oral Intake [ ] Excessive Energy Intake   [ ] Underweight [ ] Increased Nutrient Needs [ ] Overweight/Obesity [x] swallow diff.  [ ] Altered GI Function [ ] Unintended Weight Loss [ ] Food & Nutrition Related Knowledge Deficit [ ] Malnutrition     Nutrition Diagnosis is [x ] ongoing  [ ] resolved [ ] not applicable     New Nutrition Diagnosis: [ ] not applicable       Interventions: bolus feeds, free water boluses and flushes   Recommend  [ ] Change Diet To:  [ ] Nutrition Supplement  [ ] Nutrition Support  [ ] Other:     Monitoring and Evaluation:   [x ] PO intake [ x ] Tolerance to diet prescription [ x ] weights [ x ] labs[ x ] follow up per protocol  [ ] other:

## 2020-04-10 NOTE — PROGRESS NOTE ADULT - SUBJECTIVE AND OBJECTIVE BOX
MIGNON WALTER    [facility]    Patient is a 60y old  Female who presents with a chief complaint of sob (10 Apr 2020 12:38)       Allergies    amoxicillin (Rash)  penicillin (Rash)    Intolerances        HPI:  Patient is a 60y old  Female who presents with a chief complaint of sob and fever    HPI:This is a nonverbal pt bib ems from assisted for fever, sob, cough, low o2 sat, with possible covid exposure. per ems, pt on levaquin for aspiration pna. no further hx available.pt has hx of chronic constipation an dis on multiple stool softners.  she was found ot be hypernatremic and in renal failure at admission.  also has hx of hypothyroidism and down syndrome        PAST MEDICAL & SURGICAL HISTORY:  Frequent urinary tract infections  Hypothyroidism, unspecified type  Down syndrome      FAMILY HISTORY:can not obtain due to mental status      SOCIAL HISTORY:    Allergies    amoxicillin (Rash)  penicillin (Rash)    Intolerances          REVIEW OF SYSEMS:  negative except form above mentioned      Vital Signs Last 24 Hrs  T(C): 37.8 (04 Apr 2020 09:46), Max: 37.8 (04 Apr 2020 09:46)  T(F): 100 (04 Apr 2020 09:46), Max: 100 (04 Apr 2020 09:46)  HR: 68 (04 Apr 2020 11:00) (66 - 69)  BP: 89/52 (04 Apr 2020 11:00) (87/51 - 91/52)  BP(mean): --  RR: 24 (04 Apr 2020 11:00) (24 - 24)  SpO2: 100% (04 Apr 2020 11:00) (89% - 100%)  I&O's Summary      PHYSICAL EXAM:  GENERAL: onnc  HEAD:  Atraumatic, Normocephalic  EYES: EOMI, PERRLA, conjunctiva and sclera clear  NECK: Supple, No JVD  CHEST/LUNG: dec bs at bases and rhonchi  HEART: Regular rate and rhythm; No murmurs, rubs, or gallops  ABDOMEN: Soft, Nontender, Nondistended; Bowel sounds present  SKIN: No rashes or lesions    LABS:                        14.7   2.88  )-----------( 52       ( 04 Apr 2020 10:49 )             45.9     04-04    154<H>  |  117<H>  |  31<H>  ----------------------------<  107<H>  4.7   |  30  |  1.48<H>    Ca    8.5      04 Apr 2020 10:49    TPro  7.1  /  Alb  2.3<L>  /  TBili  0.2  /  DBili  x   /  AST  73<H>  /  ALT  66<H>  /  AlkPhos  118  04-04      CAPILLARY BLOOD GLUCOSE                RADIOLOGY & ADDITIONAL TESTS:    Imaging Personally Reviewed:  [x] YES  [ ] NO    Consultant(s) Notes Reviewed:  [x] YES  [ ] NO      MEDICATIONS  (STANDING):  azithromycin   Tablet 500 milliGRAM(s) Oral daily  dextrose 5%. 1000 milliLiter(s) (100 mL/Hr) IV Continuous <Continuous>  diVALproex  milliGRAM(s) Oral three times a day  enoxaparin Injectable 30 milliGRAM(s) SubCutaneous daily  levETIRAcetam 1000 milliGRAM(s) Oral two times a day  levothyroxine 125 MICROGram(s) Oral daily  memantine 5 milliGRAM(s) Oral daily  pantoprazole    Tablet 40 milliGRAM(s) Oral before breakfast  polyethylene glycol 3350 17 Gram(s) Oral daily    MEDICATIONS  (PRN):  acetaminophen   Tablet .. 650 milliGRAM(s) Oral every 6 hours PRN Temp greater or equal to 38C (100.4F), Mild Pain (1 - 3)      Care Discussed with Consultants/Other Providers [x] YES  [ ] NO    HEALTH ISSUES - PROBLEM Dx: (04 Apr 2020 13:11)      PAST MEDICAL & SURGICAL HISTORY:  Frequent urinary tract infections  Hypothyroidism, unspecified type  Down syndrome      FAMILY HISTORY:        MEDICATIONS   acetaminophen   Tablet .. 650 milliGRAM(s) Oral every 6 hours PRN  acetaminophen  Suppository .. 650 milliGRAM(s) Rectal every 4 hours PRN  aspirin  chewable 81 milliGRAM(s) Oral daily  chlorhexidine 0.12% Liquid 15 milliLiter(s) Oral Mucosa every 12 hours  chlorhexidine 2% Cloths 1 Application(s) Topical daily  enoxaparin Injectable 30 milliGRAM(s) SubCutaneous daily  hydroxychloroquine   Oral   hydroxychloroquine 200 milliGRAM(s) Oral every 12 hours  levETIRAcetam  IVPB 1000 milliGRAM(s) IV Intermittent every 12 hours  levoFLOXacin IVPB      levoFLOXacin IVPB 500 milliGRAM(s) IV Intermittent every 24 hours  levothyroxine Injectable 62.5 MICROGram(s) IV Push at bedtime  LORazepam   Injectable 2 milliGRAM(s) IV Push every 6 hours PRN  memantine 5 milliGRAM(s) Oral daily  methylPREDNISolone sodium succinate Injectable 80 milliGRAM(s) IV Push daily  midodrine 10 milliGRAM(s) Oral every 8 hours  norepinephrine Infusion 0.05 MICROgram(s)/kG/Min IV Continuous <Continuous>  pantoprazole  Injectable 40 milliGRAM(s) IV Push daily  polyethylene glycol 3350 17 Gram(s) Oral daily  propofol Infusion 10 MICROgram(s)/kG/Min IV Continuous <Continuous>  valproate sodium IVPB 250 milliGRAM(s) IV Intermittent every 8 hours      Vital Signs Last 24 Hrs  T(C): 36.4 (10 Apr 2020 08:00), Max: 36.6 (10 Apr 2020 00:00)  T(F): 97.6 (10 Apr 2020 08:00), Max: 97.9 (10 Apr 2020 00:00)  HR: 64 (10 Apr 2020 08:00) (62 - 79)  BP: 115/84 (10 Apr 2020 08:00) (98/53 - 135/93)  BP(mean): --  RR: 20 (10 Apr 2020 08:00) (18 - 30)  SpO2: 98% (10 Apr 2020 08:00) (93% - 98%)      04-09-20 @ 07:01  -  04-10-20 @ 07:00  --------------------------------------------------------  IN: 3162 mL / OUT: 3450 mL / NET: -288 mL    04-10-20 @ 07:01  -  04-10-20 @ 13:23  --------------------------------------------------------  IN: 100 mL / OUT: 0 mL / NET: 100 mL        Mode: AC/ CMV (Assist Control/ Continuous Mandatory Ventilation), RR (machine): 20, TV (machine): 320, FiO2: 60, PEEP: 5, ITime: 1, MAP: 10, PIP: 37    LABS:                        10.6   6.23  )-----------( 136      ( 10 Apr 2020 07:28 )             31.7     04-10    154<H>  |  115<H>  |  7   ----------------------------<  114<H>  4.1   |  31  |  0.62    Ca    7.1<L>      10 Apr 2020 07:28  Phos  3.5     04-10  Mg     1.7     04-10    TPro  5.2<L>  /  Alb  1.8<L>  /  TBili  0.2  /  DBili  x   /  AST  80<H>  /  ALT  55  /  AlkPhos  108  04-10    PT/INR - ( 09 Apr 2020 07:09 )   PT: 10.9 sec;   INR: 0.98 ratio         PTT - ( 09 Apr 2020 07:09 )  PTT:35.1 sec      ABG - ( 10 Apr 2020 06:07 )  pH, Arterial: x     pH, Blood: 7.45  /  pCO2: 42    /  pO2: 46    / HCO3: 28    / Base Excess: 4.9   /  SaO2: 83                  WBC:  WBC Count: 6.23 K/uL (04-10 @ 07:28)  WBC Count: 4.24 K/uL (04-09 @ 07:09)  WBC Count: 3.93 K/uL (04-08 @ 07:22)  WBC Count: 7.94 K/uL (04-07 @ 07:37)      MICROBIOLOGY:  RECENT CULTURES:  04-09 .Sputum Sputum XXXX   Numerous polymorphonuclear leukocytes seen per low power field  Rare Squamous epithelial cells seen per low power field  Rare Gram positive cocci in pairs seen per oil power field XXXX    04-06 .Blood Blood XXXX XXXX   No growth to date.    04-04 .Blood Blood-Peripheral Blood Culture PCR   Growth in aerobic bottle: Gram Variable Rods   Growth in aerobic bottle: Gram Variable Rods  Sent to  [facility]   for Identification  ***Blood Panel PCR results on this specimen are available  approximately 3 hours after the Gram stain result.***  Gram stain, PCR, and/or culture results may not always  correspond due to difference in methodologies.  ************************************************************  This PCR assay was performed using [employer].  The following targets are tested for: Enterococcus,  vancomycin resistant enterococci, Listeria monocytogenes,  coagulase negative staphylococci, S. aureus,  methicillin resistant S. aureus, Streptococcus agalactiae  (Group B), S. pneumoniae, S. pyogenes (Group A),  Acinetobacter baumannii,Enterobacter cloacae, E. coli,  Klebsiella oxytoca, K. pneumoniae, Proteus sp.,  Serratia marcescens, Haemophilus influenzae,  Neisseria meningitidis, Pseudomonas aeruginosa, Candida  albicans, C. glabrata, C krusei, C parapsilosis,  C. tropicalis and the KPC resistance gene.          CARDIAC MARKERS ( 10 Apr 2020 10:15 )  2.706 ng/mL / x     / x     / x     / x      CARDIAC MARKERS ( 10 Apr 2020 07:28 )  x     / x     / 71 U/L / x     / x      CARDIAC MARKERS ( 09 Apr 2020 07:09 )  1.011 ng/mL / x     / 74 U/L / x     / x            PT/INR - ( 09 Apr 2020 07:09 )   PT: 10.9 sec;   INR: 0.98 ratio         PTT - ( 09 Apr 2020 07:09 )  PTT:35.1 sec    Sodium:  Sodium, Serum: 154 mmol/L (04-10 @ 07:28)  Sodium, Serum: 149 mmol/L (04-09 @ 07:09)  Sodium, Serum: 143 mmol/L (04-08 @ 07:22)  Sodium, Serum: 151 mmol/L (04-07 @ 07:37)  Sodium, Serum: 149 mmol/L (04-07 @ 03:40)      0.62 mg/dL 04-10 @ 07:28  0.73 mg/dL 04-09 @ 07:09  0.87 mg/dL 04-08 @ 07:22  0.68 mg/dL 04-07 @ 07:37  0.92 mg/dL 04-07 @ 03:40      Hemoglobin:  Hemoglobin: 10.6 g/dL (04-10 @ 07:28)  Hemoglobin: 10.9 g/dL (04-09 @ 07:09)  Hemoglobin: 11.7 g/dL (04-08 @ 07:22)  Hemoglobin: 11.2 g/dL (04-07 @ 07:37)      Platelets: Platelet Count - Automated: 136 K/uL (04-10 @ 07:28)  Platelet Count - Automated: 109 K/uL (04-09 @ 07:09)  Platelet Count - Automated: 98 K/uL (04-08 @ 07:22)  Platelet Count - Automated: 96 K/uL (04-07 @ 07:37)      LIVER FUNCTIONS - ( 10 Apr 2020 07:28 )  Alb: 1.8 g/dL / Pro: 5.2 g/dL / ALK PHOS: 108 U/L / ALT: 55 U/L DA / AST: 80 U/L / GGT: x                 RADIOLOGY & ADDITIONAL STUDIES:

## 2020-04-10 NOTE — PROGRESS NOTE ADULT - ASSESSMENT
The patient is a 60 year old female with a history of MRDD, hypothyroidism, seizure d/o who is admitted with COVID-19, acute respiratory failure, septic shock, NSTEMI.    Plan:  - ECG with no evidence of ischemia or infarction  - Troponin has been fluctuating; last is 1.011 likely secondary to demand ischemia from respiratory failure, shock. Cannot exclude myocarditis. Repeat pending.  - If enzymes trend higher, will check echocardiogram  - Continue aspirin 81 mg daily  - On hydroxychloroquine, levofloxacin  - On enoxaparin DVT prophylaxis  - Overall prognosis poor

## 2020-04-10 NOTE — PROGRESS NOTE ADULT - SUBJECTIVE AND OBJECTIVE BOX
Patient is a 60y old  Female who presents with a chief complaint of sob (09 Apr 2020 14:51)      BRIEF HOSPITAL COURSE: 60y Female  ***    Events last 24 hours: ***    PAST MEDICAL & SURGICAL HISTORY:  Frequent urinary tract infections  Hypothyroidism, unspecified type  Down syndrome        Hosp day #6d    Vent day #  Mode: AC/ CMV (Assist Control/ Continuous Mandatory Ventilation)  RR (machine): 20  TV (machine): 320  FiO2: 40  PEEP: 5  ITime: 1  MAP: 10  PIP: 35        Vital signs / Reviewed and Physical Exam Performed where pertinent and urgently required    Lab / Radiology  studies / ABG / Meds -  reviewed and interpreted into the assessment and treatment plan.      Assessment/Plan/Therapeutic interventions      Neuro - Sedation neuromuscular blockade to facilitate safe ventilation    CV -  Pressor support as needed to maintain MAP 65           Avoiding fluid challenges          QTC monitoring while on Azithromycin and Hydroxychloroquine.    Pulm -  ARDS-NET 4-6cc/kg IBW TV as able to maintain plateau pressures <30               Prone ventilation consideration as feasible  Pa02/Fi02 < 150 on Fi02 >60% and PEEP at least 5                 Vent bundle Reviewed     GI -  PPI  Enteric feeds as tolerated in tandem with NMB and prone ventilation    Renal - Even to negative fluid balance as tolerated by hemodynamics and renal fx.  Feeds to be provided in lieu of IVF.     Heme -  Pharmacologic DVT PPx  in addition to SCD's    ID - ABX discontinuation based on discussion with ID in conjunction with clinical features, culture data, and judicious procalcitonin monitoring.      Endo -  Aggressive glycemic control to limit FS glucose to < 180mg/dl.      COVID 19 specific considerations and therapeutic  options based on the available and rapidly changing literature    Goals of care considerations:  Ongoing assessment for patient specific treatment options based on progression or decline.  I have involved the family with updates and requests in guidance for medical decision making.          38  Minutes of critical care tiem spent in the management of this critically ill COVID-19 patient/PUI patient with continuous assessments and interventions based on the interpretation of multiple databases. Patient is a 60y old  Female who presents with a chief complaint of sob (09 Apr 2020 14:51)      BRIEF HOSPITAL COURSE: 60y Female pmhx Down's syndrome, hypothyroidism, and seizure disorder presented with Fever and SOB found to be COVID 19+. Worsening respiratory status requiring intubation. Course complicated by seizure episodes    PAST MEDICAL & SURGICAL HISTORY:  Frequent urinary tract infections  Hypothyroidism, unspecified type  Down syndrome        Hosp day #6d    Vent day #  Mode: AC/ CMV (Assist Control/ Continuous Mandatory Ventilation)  RR (machine): 20  TV (machine): 320  FiO2: 40  PEEP: 5  ITime: 1  MAP: 10  PIP: 35        Vital signs / Reviewed and Physical Exam Performed where pertinent and urgently required    Lab / Radiology  studies / ABG / Meds -  reviewed and interpreted into the assessment and treatment plan.      Assessment/Plan/Therapeutic interventions      Neuro - Propofol gtt to facilitate safe ventilation. Additional Depakote load per Neurology. Continue all AED    CV -  Levophed gtt needed to maintain MAP 65           Avoiding fluid challenges          QTC monitoring while on Hydroxychloroquine.    Pulm -  FiO2 40%, PEEP decreased to +5. Consider wean trial AM. ARDS-NET 4-6cc/kg IBW TV as able to maintain plateau pressures <30               Prone ventilation consideration as feasible  Pa02/Fi02 < 150 on Fi02 >60% and PEEP at least 5                 Vent bundle Reviewed     GI -  PPI  Enteric feeds as tolerated in tandem with NMB and prone ventilation    Renal - Even to negative fluid balance as tolerated by hemodynamics and renal fx.  Feeds to be provided in lieu of IVF.     Heme -  Pharmacologic DVT PPx  in addition to SCD's    ID - COVID 19+ on Plaquenil. Additial coverage w/ Levaquin for PNA. ABX discontinuation based on discussion with ID in conjunction with clinical features, culture data, and judicious procalcitonin monitoring.      Endo -  Aggressive glycemic control to limit FS glucose to < 180mg/dl.      COVID 19 specific considerations and therapeutic  options based on the available and rapidly changing literature    Goals of care considerations:  Ongoing assessment for patient specific treatment options based on progression or decline.  I have involved the family with updates and requests in guidance for medical decision making.          38  Minutes of critical care tiem spent in the management of this critically ill COVID-19 patient/PUI patient with continuous assessments and interventions based on the interpretation of multiple databases.

## 2020-04-10 NOTE — PROGRESS NOTE ADULT - SUBJECTIVE AND OBJECTIVE BOX
Date/Time Patient Seen:  		  Referring MD:   Data Reviewed	       Patient is a 60y old  Female who presents with a chief complaint of sob (10 Apr 2020 00:42)      Subjective/HPI     PAST MEDICAL & SURGICAL HISTORY:  Frequent urinary tract infections  Hypothyroidism, unspecified type  Down syndrome        Medication list         MEDICATIONS  (STANDING):  aspirin  chewable 81 milliGRAM(s) Oral daily  chlorhexidine 0.12% Liquid 15 milliLiter(s) Oral Mucosa every 12 hours  chlorhexidine 2% Cloths 1 Application(s) Topical daily  enoxaparin Injectable 30 milliGRAM(s) SubCutaneous daily  hydroxychloroquine   Oral   hydroxychloroquine 200 milliGRAM(s) Oral every 12 hours  levETIRAcetam  IVPB 1000 milliGRAM(s) IV Intermittent every 12 hours  levoFLOXacin IVPB      levoFLOXacin IVPB 500 milliGRAM(s) IV Intermittent every 24 hours  levothyroxine Injectable 62.5 MICROGram(s) IV Push at bedtime  memantine 5 milliGRAM(s) Oral daily  methylPREDNISolone sodium succinate Injectable 80 milliGRAM(s) IV Push daily  midodrine 10 milliGRAM(s) Oral every 8 hours  norepinephrine Infusion 0.05 MICROgram(s)/kG/Min (4.26 mL/Hr) IV Continuous <Continuous>  pantoprazole  Injectable 40 milliGRAM(s) IV Push daily  polyethylene glycol 3350 17 Gram(s) Oral daily  propofol Infusion 10 MICROgram(s)/kG/Min (2.72 mL/Hr) IV Continuous <Continuous>  sodium chloride 0.9%. 500 milliLiter(s) (500 mL/Hr) IV Continuous <Continuous>  valproate sodium IVPB 250 milliGRAM(s) IV Intermittent every 8 hours    MEDICATIONS  (PRN):  acetaminophen   Tablet .. 650 milliGRAM(s) Oral every 6 hours PRN Temp greater or equal to 38C (100.4F), Mild Pain (1 - 3)  acetaminophen  Suppository .. 650 milliGRAM(s) Rectal every 4 hours PRN Temp greater or equal to 38C (100.4F)  LORazepam   Injectable 2 milliGRAM(s) IV Push every 6 hours PRN seizure activity         Vitals log        ICU Vital Signs Last 24 Hrs  T(C): 36.4 (10 Apr 2020 08:00), Max: 36.6 (09 Apr 2020 12:00)  T(F): 97.6 (10 Apr 2020 08:00), Max: 97.9 (10 Apr 2020 00:00)  HR: 64 (10 Apr 2020 08:00) (52 - 79)  BP: 115/84 (10 Apr 2020 08:00) (98/53 - 135/93)  BP(mean): --  ABP: --  ABP(mean): --  RR: 20 (10 Apr 2020 08:00) (18 - 30)  SpO2: 98% (10 Apr 2020 08:00) (93% - 98%)       Mode: AC/ CMV (Assist Control/ Continuous Mandatory Ventilation)  RR (machine): 20  TV (machine): 320  FiO2: 40  PEEP: 5  ITime: 1  MAP: 10  PIP: 31      Input and Output:  I&O's Detail    09 Apr 2020 07:01  -  10 Apr 2020 07:00  --------------------------------------------------------  IN:    dextrose 5%.: 475 mL    Enteral Tube Flush: 240 mL    IV PiggyBack: 500 mL    norepinephrine Infusion: 148 mL    ns in tub fed  [id number]: 715 mL    propofol Infusion: 84 mL    sodium chloride 0.9%.: 900 mL    Solution: 100 mL  Total IN: 3162 mL    OUT:    Indwelling Catheter - Urethral: 3450 mL  Total OUT: 3450 mL    Total NET: -288 mL          Lab Data                        10.6   6.23  )-----------( 136      ( 10 Apr 2020 07:28 )             31.7     04-10    154<H>  |  115<H>  |  7   ----------------------------<  114<H>  4.1   |  31  |  0.62    Ca    7.1<L>      10 Apr 2020 07:28  Phos  3.5     04-10  Mg     1.7     04-10    TPro  5.2<L>  /  Alb  1.8<L>  /  TBili  0.2  /  DBili  x   /  AST  80<H>  /  ALT  55  /  AlkPhos  108  04-10    ABG - ( 10 Apr 2020 06:07 )  pH, Arterial: x     pH, Blood: 7.45  /  pCO2: 42    /  pO2: 46    / HCO3: 28    / Base Excess: 4.9   /  SaO2: 83                CARDIAC MARKERS ( 10 Apr 2020 07:28 )  x     / x     / 71 U/L / x     / x      CARDIAC MARKERS ( 09 Apr 2020 07:09 )  1.011 ng/mL / x     / 74 U/L / x     / x            Review of Systems	      Objective     Physical Examination    heart s1s2  lung dec BS      Pertinent Lab findings & Imaging      Deloris:  NO   Adequate UO     I&O's Detail    09 Apr 2020 07:01  -  10 Apr 2020 07:00  --------------------------------------------------------  IN:    dextrose 5%.: 475 mL    Enteral Tube Flush: 240 mL    IV PiggyBack: 500 mL    norepinephrine Infusion: 148 mL    ns in tub fed  [id number]: 715 mL    propofol Infusion: 84 mL    sodium chloride 0.9%.: 900 mL    Solution: 100 mL  Total IN: 3162 mL    OUT:    Indwelling Catheter - Urethral: 3450 mL  Total OUT: 3450 mL    Total NET: -288 mL               Discussed with:     Cultures:	        Radiology

## 2020-04-11 NOTE — PROGRESS NOTE ADULT - SUBJECTIVE AND OBJECTIVE BOX
Neurology follow up note    MIGNON [id number]      Interval History:    Patient intubated sedated   MEDICATIONS    acetaminophen   Tablet .. 650 milliGRAM(s) Oral every 6 hours PRN  acetaminophen  Suppository .. 650 milliGRAM(s) Rectal every 4 hours PRN  aspirin  chewable 81 milliGRAM(s) Oral daily  chlorhexidine 0.12% Liquid 15 milliLiter(s) Oral Mucosa every 12 hours  chlorhexidine 2% Cloths 1 Application(s) Topical daily  dexMEDEtomidine Infusion 0.4 MICROgram(s)/kG/Hr IV Continuous <Continuous>  dextrose 5%. 1000 milliLiter(s) IV Continuous <Continuous>  enoxaparin Injectable 30 milliGRAM(s) SubCutaneous daily  furosemide   Injectable 20 milliGRAM(s) IV Push once  hydroxychloroquine   Oral   hydroxychloroquine 200 milliGRAM(s) Oral every 12 hours  lactobacillus acidophilus 1 Tablet(s) Oral three times a day with meals  levETIRAcetam  IVPB 1000 milliGRAM(s) IV Intermittent every 12 hours  levoFLOXacin IVPB      levoFLOXacin IVPB 500 milliGRAM(s) IV Intermittent every 24 hours  levothyroxine Injectable 62.5 MICROGram(s) IV Push at bedtime  LORazepam   Injectable 2 milliGRAM(s) IV Push every 6 hours PRN  memantine 5 milliGRAM(s) Oral daily  methylPREDNISolone sodium succinate Injectable 80 milliGRAM(s) IV Push daily  midodrine 10 milliGRAM(s) Oral every 8 hours  norepinephrine Infusion 0.05 MICROgram(s)/kG/Min IV Continuous <Continuous>  pantoprazole  Injectable 40 milliGRAM(s) IV Push daily  propofol Infusion 10 MICROgram(s)/kG/Min IV Continuous <Continuous>  valproate sodium IVPB 250 milliGRAM(s) IV Intermittent every 8 hours      Allergies    amoxicillin (Rash)  penicillin (Rash)    Intolerances            Vital Signs Last 24 Hrs  T(C): 36.3 (11 Apr 2020 07:15), Max: 36.9 (10 Apr 2020 15:59)  T(F): 97.4 (11 Apr 2020 07:15), Max: 98.4 (10 Apr 2020 15:59)  HR: 67 (11 Apr 2020 09:56) (64 - 74)  BP: 107/76 (11 Apr 2020 07:15) (105/372 - 125/87)  BP(mean): 93 (10 Apr 2020 17:30) (93 - 97)  RR: 20 (11 Apr 2020 07:15) (20 - 24)  SpO2: 99% (11 Apr 2020 09:56) (71% - 99%)      REVIEW OF SYSTEMS:  Could not be obtained secondary to the patient being nonverbal.    PHYSICAL EXAMINATION:    HEENT:  Head:  Normocephalic and atraumatic.  Eyes:  No scleral icterus.  Ears:  Hearing hard to evaluate secondary to the patient being sedated and intubated.  RESPIRATORY:  Good air entry bilaterally.  CARDIOVASCULAR:  S1 and S2 are heard.  ABDOMEN:  Soft and nontender.  EXTREMITIES:  No clubbing or cyanosis were noted.      NEUROLOGICAL:  Limited secondary to the patient being intubated and sedated.  No response to verbal stimuli.  I opened the patient's eyes, no gaze preference.  I applied stimuli to all four extremities with slight withdrawal bilateral upper and lower.  Tone; bilateral upper and lower was increased.            LABS:  CBC Full  -  ( 11 Apr 2020 06:28 )  WBC Count : 8.88 K/uL  RBC Count : 3.12 M/uL  Hemoglobin : 10.7 g/dL  Hematocrit : 32.0 %  Platelet Count - Automated : 163 K/uL  Mean Cell Volume : 102.6 fl  Mean Cell Hemoglobin : 34.3 pg  Mean Cell Hemoglobin Concentration : 33.4 gm/dL  Auto Neutrophil # : 6.08 K/uL  Auto Lymphocyte # : 1.25 K/uL  Auto Monocyte # : 0.95 K/uL  Auto Eosinophil # : 0.00 K/uL  Auto Basophil # : 0.03 K/uL  Auto Neutrophil % : 68.5 %  Auto Lymphocyte % : 14.1 %  Auto Monocyte % : 10.7 %  Auto Eosinophil % : 0.0 %  Auto Basophil % : 0.3 %      04-11    148<H>  |  110<H>  |  10  ----------------------------<  147<H>  4.0   |  32<H>  |  0.66    Ca    7.2<L>      11 Apr 2020 06:28  Phos  2.7     04-11  Mg     1.8     04-11    TPro  5.3<L>  /  Alb  1.7<L>  /  TBili  0.2  /  DBili  x   /  AST  73<H>  /  ALT  62<H>  /  AlkPhos  125<H>  04-11    Hemoglobin A1C:     LIVER FUNCTIONS - ( 11 Apr 2020 06:28 )  Alb: 1.7 g/dL / Pro: 5.3 g/dL / ALK PHOS: 125 U/L / ALT: 62 U/L DA / AST: 73 U/L / GGT: x           Vitamin B12   PT/INR - ( 11 Apr 2020 10:54 )   PT: 11.2 sec;   INR: 1.01 ratio         PTT - ( 11 Apr 2020 10:54 )  PTT:29.3 sec      RADIOLOGY      ANALYSIS AND PLAN:  This is a 60-year-old with altered mental status and episode of seizure.  1.	For seizure event, suspect this could be secondary to the patient's interaction with antibiotics.  We will continue her on currently her home dose of 250 mg three times a day. no new seizures monitor levels.   2.	I will recommend Ativan 1 mg IV push q.6 h. p.r.n. for any type of breakthrough seizures.  3.	Seizure precautions.  4.	For altered mental status, secondary to metabolic encephalopathy secondary to COVID-19 infection.  5.	For hypotension, continue the patient on midodrine.  If possible, please maintain systolic blood pressure of above 100 and help maintain cerebral perfusion.  6.	For history of hypothyroidism, continue the patient on Synthroid.  7.	spoke to staff no new events   Greater than 40 minutes spent in direct patient care reviewing  the notes, lab data/ imaging , discussion with multidisciplinary team.

## 2020-04-11 NOTE — PROGRESS NOTE ADULT - ASSESSMENT
[facility] MIGNON [phone]   1960 DOA 2020 [address]      REVIEW OF SYMPTOMS      Able to give ROS  NO     PHYSICAL EXAM    HEENT Unremarkable PERRLA atraumatic   RESP Fair air entry EXP prolonged    Harsh breath sound Resp distres mild   CARDIAC S1 S2 No S3     NO JVD    ABDOMEN SOFT BS PRESENT NOT DISTENDED No hepatosplenomegaly PEDAL EDEMA present No calf tenderness  NO rash   GENERAL Not TOXIC looking    VITALS/LABS       2020 afeb 62 120/80   2020 7a nore 0.15 m/k/m   2020 8a prop 30 m/k/m   2020 u 800   2020 8a ac 20/320/+5/.8   2020 6a 745/42/51   2020 cxr improving airspace dis   2020 w 8.8 Hb 10.7 plt 163 Na 148 K 4 CO2 32 Cr .6    4/10 echo ef 40% pasp 45       PT DATA/BEST PRACTICE  ALLERGY     NOTEWORTHY  POINTS/CHANGES ROS/PE   2020 Had seizure  2020 a Intubated tr to SICU   2020 fc given 945a Puentes ordered    Transferred to ICU May need intubn and fob dw brother                                WT  45 (2020)                    BMI     20 (2020)     ADVANCED DIRECTIVE       Goals of care discussion                                                                                      HEAD OF BED ELEVATION Yes  DYSPHAGIA EVAL                                  DIET      dys 1 puree () --> jevity 1.5 720 ()--> 1080 (4/10)                                     IV F        D5 100 ()   --> D5 1/2 75 (() --> D5 75 ()  --> d5 5 (4/10)                                             DVT PROPHYLAXIS      lvnx 30 ()             PATIENT SUMMARY   60 f nonverbal downs syndrome from group home HO COVID exposure per transfer papers on Rx for aspiration pneumonia and shortness of breath   er vitals 90/50 66 100f 92%    Pt found to have pneumonia pl effsn hypernatremia and adam on arrival COVID palma started Pulm consulted     Pulm consulted  CT ch showed l lung collapse Pt ruled out for COVID     Home meds memantine 28 keppra 1.2                                Pt tr sicu 2020 itubated hemaodynamci intability           PLAN   DVT P On lvnx 30 ()      RESP FAIL Sec COVID 19 2020 8a ac 20/320/+5/.8   2020 6a 745/42/51   Target PaO2 60 pH 730 Ppl 30   ltvv vent     POSS BACT PNEU On levaquin () pc downtrend  COVID 19   --4/ nlr 6.1 -7.1- 5.3 - 1.3    --2020 ferritin [id number] - 1404    --2020 procal .27-.34- .1  2020 crp 2.6  -2020 esr 26 - 25  -2020 d-dimer [phone]    On HCQ ()  solumed 80.5d ()   ARDS ltvv   HEMODYNAMICS In shock sec COVID 19 Remains on nore () Mido () Given alb   NONOLIGURIC ADAM Monitor lytes   HYPERNATR   ----4/ Na 151 -151-143 -149-154-148  IV D5 50 startd 4/10/2020   monitor serially   On fw 240.4 (4/10)   SZ On keppra and ativan for break thru  PLAN   Wean off vasopressors and vent as tolerated       TIME SPENT Over 36 minutes aggregate critical  care time spent on encounter; activities included   direct pt care, counseling and/or coordinating care reviewing notes, lab data/ imaging , discussion with multidisciplinary team/ pt /family. Risks, benefits, alternatives  discussed in detail.    JOSE ANGEL CROW [phone]   1960 DOA 2020 [address] MEDLEY

## 2020-04-11 NOTE — PROGRESS NOTE ADULT - ASSESSMENT
59 yo F  , nonverbal, MRDD, hypothyroidism and down syndrome, hx of seizures BIBA  from group home for fever, sob, cough, low o2 sat. Admitted to [facility] for evaluation of r/o COVBID 19 PNA. also found to be hypernatremic and with evidence of Acute Renal Failure. Now admitted to ICU for acute severe respiratory decompensation secondary to COVID 19 PNA      Acute Severe Hypoxemic Respiratory Failure MF due to COVID 19 PNA with evidence of Mucus plug++Aspiration PNA  Septic Shock due to the above  CT with evidence of Right sided Mucus Plugging  s/p zithromax and meropenam  on pressors  Patient ++secretions from ET tube  on plaquinil and steroid --> LAST DAY TODAY  EKG 4/9 at 443  Levoquin added  sputum negative  Plan:  followup q48h ldh ferritin and ddimer  daily cbc with diff    Breakthrough Generalized Seizures  may be contributing factor to aspiration PNA  on Keppra IV q12th + ativan rescue + depakote  Neuro called to evaluate :: added bolus depakote    Diarrhea liikely from antibiotics  on ddx is viral although hard to stay if bout started today  giving probiotics , checking cdiff and stool  flexiseal    Troponemia NSTEMI type II likely due to septic cardiomyopathy now with new evidence of Congestive Heart failure EF of 40 percent  trop from 1 to 2.7  Cards consulted  Discussed case with ICU team and cardiology will start IVIG      Hypernatremia.     Was on d5w at 100ml hr  nephro consulted, recs appreciated.       ADAM likely prerenal oliguria  Resolved    Hypothyroidism, unspecified type.  Plan: levothyroxine.     Constipation.  miralax      code: Full  dvt ppx: lovenox subq	  dispo: back to facility when stable    At this time will need to reach out to brother on status of patients echo and repeat CXR , clear findings of Multiorgan failure  although team will trial IVIG

## 2020-04-11 NOTE — PROGRESS NOTE ADULT - SUBJECTIVE AND OBJECTIVE BOX
Chief Complaint: Shortness of breath    Interval Events: No events overnight.    Review of Systems:  General: No fevers, chills, weight loss or gain  Skin: No rashes, color changes  Cardiovascular: No chest pain, orthopnea  Respiratory: No shortness of breath, cough  Gastrointestinal: No nausea, abdominal pain  Genitourinary: No incontinence, pain with urination  Musculoskeletal: No pain, swelling, decreased range of motion  Neurological: No headache, weakness  Psychiatric: No depression, anxiety  Endocrine: No weight loss or gain, increased thirst  All other systems are comprehensively negative.    Physical Exam:  Vital Signs Last 24 Hrs  T(C): 36.3 (11 Apr 2020 07:15), Max: 36.9 (10 Apr 2020 15:59)  T(F): 97.4 (11 Apr 2020 07:15), Max: 98.4 (10 Apr 2020 15:59)  HR: 67 (11 Apr 2020 09:56) (64 - 74)  BP: 107/76 (11 Apr 2020 07:15) (105/372 - 125/87)  BP(mean): 93 (10 Apr 2020 17:30) (93 - 97)  RR: 20 (11 Apr 2020 07:15) (20 - 24)  SpO2: 99% (11 Apr 2020 09:56) (71% - 99%)  General: NAD  HEENT: MMM  Neck: No JVD, no carotid bruit  Lungs: CTAB  CV: RRR, nl S1/S2, no M/R/G  Abdomen: S/NT/ND, +BS  Extremities: No LE edema, no cyanosis  Neuro: AAOx3, non-focal  Skin: No rash    Labs:             04-11    148<H>  |  110<H>  |  10  ----------------------------<  147<H>  4.0   |  32<H>  |  0.66    Ca    7.2<L>      11 Apr 2020 06:28  Phos  2.7     04-11  Mg     1.8     04-11    TPro  5.3<L>  /  Alb  1.7<L>  /  TBili  0.2  /  DBili  x   /  AST  73<H>  /  ALT  62<H>  /  AlkPhos  125<H>  04-11                        10.7   8.88  )-----------( 163      ( 11 Apr 2020 06:28 )             32.0       Telemetry: Sinus rhythm

## 2020-04-11 NOTE — PROGRESS NOTE ADULT - SUBJECTIVE AND OBJECTIVE BOX
MIGNON WALTER    [facility] S[facility] 09    Patient is a 60y old  Female who presents with a chief complaint of sob (11 Apr 2020 16:15)       Allergies    amoxicillin (Rash)  penicillin (Rash)    Intolerances        HPI:  Patient is a 60y old  Female who presents with a chief complaint of sob and fever    HPI:This is a nonverbal pt bib ems from [facility] for fever, sob, cough, low o2 sat, with possible covid exposure. per ems, pt on levaquin for aspiration pna. no further hx available.pt has hx of chronic constipation an dis on multiple stool softners.  she was found ot be hypernatremic and in renal failure at admission.  also has hx of hypothyroidism and down syndrome        PAST MEDICAL & SURGICAL HISTORY:  Frequent urinary tract infections  Hypothyroidism, unspecified type  Down syndrome      FAMILY HISTORY:can not obtain due to mental status      SOCIAL HISTORY:    Allergies    amoxicillin (Rash)  penicillin (Rash)    Intolerances          REVIEW OF SYSEMS:  negative except form above mentioned      Vital Signs Last 24 Hrs  T(C): 37.8 (04 Apr 2020 09:46), Max: 37.8 (04 Apr 2020 09:46)  T(F): 100 (04 Apr 2020 09:46), Max: 100 (04 Apr 2020 09:46)  HR: 68 (04 Apr 2020 11:00) (66 - 69)  BP: 89/52 (04 Apr 2020 11:00) (87/51 - 91/52)  BP(mean): --  RR: 24 (04 Apr 2020 11:00) (24 - 24)  SpO2: 100% (04 Apr 2020 11:00) (89% - 100%)  I&O's Summary      PHYSICAL EXAM:  GENERAL: onnc  HEAD:  Atraumatic, Normocephalic  EYES: EOMI, PERRLA, conjunctiva and sclera clear  NECK: Supple, No JVD  CHEST/LUNG: dec bs at bases and rhonchi  HEART: Regular rate and rhythm; No murmurs, rubs, or gallops  ABDOMEN: Soft, Nontender, Nondistended; Bowel sounds present  SKIN: No rashes or lesions    LABS:                        14.7   2.88  )-----------( 52       ( 04 Apr 2020 10:49 )             45.9     04-04    154<H>  |  117<H>  |  31<H>  ----------------------------<  107<H>  4.7   |  30  |  1.48<H>    Ca    8.5      04 Apr 2020 10:49    TPro  7.1  /  Alb  2.3<L>  /  TBili  0.2  /  DBili  x   /  AST  73<H>  /  ALT  66<H>  /  AlkPhos  118  04-04      CAPILLARY BLOOD GLUCOSE                RADIOLOGY & ADDITIONAL TESTS:    Imaging Personally Reviewed:  [x] YES  [ ] NO    Consultant(s) Notes Reviewed:  [x] YES  [ ] NO      MEDICATIONS  (STANDING):  azithromycin   Tablet 500 milliGRAM(s) Oral daily  dextrose 5%. 1000 milliLiter(s) (100 mL/Hr) IV Continuous <Continuous>  diVALproex  milliGRAM(s) Oral three times a day  enoxaparin Injectable 30 milliGRAM(s) SubCutaneous daily  levETIRAcetam 1000 milliGRAM(s) Oral two times a day  levothyroxine 125 MICROGram(s) Oral daily  memantine 5 milliGRAM(s) Oral daily  pantoprazole    Tablet 40 milliGRAM(s) Oral before breakfast  polyethylene glycol 3350 17 Gram(s) Oral daily    MEDICATIONS  (PRN):  acetaminophen   Tablet .. 650 milliGRAM(s) Oral every 6 hours PRN Temp greater or equal to 38C (100.4F), Mild Pain (1 - 3)      Care Discussed with Consultants/Other Providers [x] YES  [ ] NO    HEALTH ISSUES - PROBLEM Dx: (04 Apr 2020 13:11)      PAST MEDICAL & SURGICAL HISTORY:  Frequent urinary tract infections  Hypothyroidism, unspecified type  Down syndrome      FAMILY HISTORY:        MEDICATIONS   acetaminophen   Tablet .. 650 milliGRAM(s) Oral every 6 hours PRN  acetaminophen  Suppository .. 650 milliGRAM(s) Rectal every 4 hours PRN  aspirin  chewable 81 milliGRAM(s) Oral daily  chlorhexidine 0.12% Liquid 15 milliLiter(s) Oral Mucosa every 12 hours  chlorhexidine 2% Cloths 1 Application(s) Topical daily  dexMEDEtomidine Infusion 0.4 MICROgram(s)/kG/Hr IV Continuous <Continuous>  dextrose 5%. 1000 milliLiter(s) IV Continuous <Continuous>  enoxaparin Injectable 30 milliGRAM(s) SubCutaneous daily  furosemide   Injectable 20 milliGRAM(s) IV Push once  hydroxychloroquine   Oral   hydroxychloroquine 200 milliGRAM(s) Oral every 12 hours  lactobacillus acidophilus 1 Tablet(s) Oral three times a day with meals  levETIRAcetam  IVPB 1000 milliGRAM(s) IV Intermittent every 12 hours  levoFLOXacin IVPB      levoFLOXacin IVPB 500 milliGRAM(s) IV Intermittent every 24 hours  levothyroxine Injectable 62.5 MICROGram(s) IV Push at bedtime  LORazepam   Injectable 2 milliGRAM(s) IV Push every 6 hours PRN  memantine 5 milliGRAM(s) Oral daily  methylPREDNISolone sodium succinate Injectable 80 milliGRAM(s) IV Push daily  midodrine 10 milliGRAM(s) Oral every 8 hours  norepinephrine Infusion 0.05 MICROgram(s)/kG/Min IV Continuous <Continuous>  pantoprazole  Injectable 40 milliGRAM(s) IV Push daily  propofol Infusion 10 MICROgram(s)/kG/Min IV Continuous <Continuous>  valproate sodium IVPB 250 milliGRAM(s) IV Intermittent every 8 hours      Vital Signs Last 24 Hrs  T(C): 36.7 (11 Apr 2020 15:20), Max: 36.7 (11 Apr 2020 15:20)  T(F): 98 (11 Apr 2020 15:20), Max: 98 (11 Apr 2020 15:20)  HR: 61 (11 Apr 2020 15:20) (59 - 72)  BP: 125/75 (11 Apr 2020 15:20) (105/372 - 128/86)  BP(mean): 89 (11 Apr 2020 15:20) (89 - 93)  RR: 25 (11 Apr 2020 15:20) (20 - 26)  SpO2: 96% (11 Apr 2020 15:46) (71% - 99%)      04-10-20 @ 07:01  -  04-11-20 @ 07:00  --------------------------------------------------------  IN: 2921.3 mL / OUT: 2050 mL / NET: 871.3 mL    04-11-20 @ 07:01  -  04-11-20 @ 16:24  --------------------------------------------------------  IN: 1095 mL / OUT: 800 mL / NET: 295 mL        Mode: AC/ CMV (Assist Control/ Continuous Mandatory Ventilation), RR (machine): 20, TV (machine): 320, FiO2: 50, PEEP: 5, ITime: 1, MAP: 10, PIP: 38    LABS:                        10.7   8.88  )-----------( 163      ( 11 Apr 2020 06:28 )             32.0     04-11    148<H>  |  110<H>  |  10  ----------------------------<  147<H>  4.0   |  32<H>  |  0.66    Ca    7.2<L>      11 Apr 2020 06:28  Phos  2.7     04-11  Mg     1.8     04-11    TPro  5.3<L>  /  Alb  1.7<L>  /  TBili  0.2  /  DBili  x   /  AST  73<H>  /  ALT  62<H>  /  AlkPhos  125<H>  04-11    PT/INR - ( 11 Apr 2020 10:54 )   PT: 11.2 sec;   INR: 1.01 ratio         PTT - ( 11 Apr 2020 10:54 )  PTT:29.3 sec      ABG - ( 11 Apr 2020 06:51 )  pH, Arterial: x     pH, Blood: 7.45  /  pCO2: 42    /  pO2: 51    / HCO3: 29    / Base Excess: 5.5   /  SaO2: 84                  WBC:  WBC Count: 8.88 K/uL (04-11 @ 06:28)  WBC Count: 6.23 K/uL (04-10 @ 07:28)  WBC Count: 4.24 K/uL (04-09 @ 07:09)  WBC Count: 3.93 K/uL (04-08 @ 07:22)      MICROBIOLOGY:  RECENT CULTURES:  04-09 .Sputum Sputum XXXX   Numerous polymorphonuclear leukocytes seen per low power field  Rare Squamous epithelial cells seen per low power field  Rare Gram positive cocci in pairs seen per oil power field   Normal Respiratory Jackelin present    04-06 .Blood Blood XXXX XXXX   No Growth Final    04-04 .Blood Blood-Peripheral Blood Culture PCR   Growth in aerobic bottle: Gram Variable Rods   Growth in aerobic bottle: Gram Variable Rods  Sent to  [facility]   for Identification  ***Blood Panel PCR results on this specimen are available  approximately 3 hours after the Gram stain result.***  Gram stain, PCR, and/or culture results may not always  correspond due to difference in methodologies.  ************************************************************  This PCR assay was performed using [employer].  The following targets are tested for: Enterococcus,  vancomycin resistant enterococci, Listeria monocytogenes,  coagulase negative staphylococci, S. aureus,  methicillin resistant S. aureus, Streptococcus agalactiae  (Group B), S. pneumoniae, S. pyogenes (Group A),  Acinetobacter baumannii,Enterobacter cloacae, E. coli,  Klebsiella oxytoca, K. pneumoniae, Proteus sp.,  Serratia marcescens, Haemophilus influenzae,  Neisseria meningitidis, Pseudomonas aeruginosa, Candida  albicans, C. glabrata, C krusei, C parapsilosis,  C. tropicalis and the KPC resistance gene.          CARDIAC MARKERS ( 11 Apr 2020 06:28 )  1.407 ng/mL / x     / 60 U/L / x     / x      CARDIAC MARKERS ( 10 Apr 2020 10:15 )  2.706 ng/mL / x     / x     / x     / x      CARDIAC MARKERS ( 10 Apr 2020 07:28 )  x     / x     / 71 U/L / x     / x            PT/INR - ( 11 Apr 2020 10:54 )   PT: 11.2 sec;   INR: 1.01 ratio         PTT - ( 11 Apr 2020 10:54 )  PTT:29.3 sec    Sodium:  Sodium, Serum: 148 mmol/L (04-11 @ 06:28)  Sodium, Serum: 154 mmol/L (04-10 @ 07:28)  Sodium, Serum: 149 mmol/L (04-09 @ 07:09)  Sodium, Serum: 143 mmol/L (04-08 @ 07:22)      0.66 mg/dL 04-11 @ 06:28  0.62 mg/dL 04-10 @ 07:28  0.73 mg/dL 04-09 @ 07:09  0.87 mg/dL 04-08 @ 07:22      Hemoglobin:  Hemoglobin: 10.7 g/dL (04-11 @ 06:28)  Hemoglobin: 10.6 g/dL (04-10 @ 07:28)  Hemoglobin: 10.9 g/dL (04-09 @ 07:09)  Hemoglobin: 11.7 g/dL (04-08 @ 07:22)      Platelets: Platelet Count - Automated: 163 K/uL (04-11 @ 06:28)  Platelet Count - Automated: 136 K/uL (04-10 @ 07:28)  Platelet Count - Automated: 109 K/uL (04-09 @ 07:09)  Platelet Count - Automated: 98 K/uL (04-08 @ 07:22)      LIVER FUNCTIONS - ( 11 Apr 2020 06:28 )  Alb: 1.7 g/dL / Pro: 5.3 g/dL / ALK PHOS: 125 U/L / ALT: 62 U/L DA / AST: 73 U/L / GGT: x                 RADIOLOGY & ADDITIONAL STUDIES:

## 2020-04-11 NOTE — PROGRESS NOTE ADULT - SUBJECTIVE AND OBJECTIVE BOX
Date/Time Patient Seen:  		  Referring MD:   Data Reviewed	       Patient is a 60y old  Female who presents with a chief complaint of sob (10 Apr 2020 18:08)      Subjective/HPI     PAST MEDICAL & SURGICAL HISTORY:  Frequent urinary tract infections  Hypothyroidism, unspecified type  Down syndrome        Medication list         MEDICATIONS  (STANDING):  aspirin  chewable 81 milliGRAM(s) Oral daily  chlorhexidine 0.12% Liquid 15 milliLiter(s) Oral Mucosa every 12 hours  chlorhexidine 2% Cloths 1 Application(s) Topical daily  dextrose 5%. 1000 milliLiter(s) (50 mL/Hr) IV Continuous <Continuous>  enoxaparin Injectable 30 milliGRAM(s) SubCutaneous daily  hydroxychloroquine   Oral   hydroxychloroquine 200 milliGRAM(s) Oral every 12 hours  lactobacillus acidophilus 1 Tablet(s) Oral three times a day with meals  levETIRAcetam  IVPB 1000 milliGRAM(s) IV Intermittent every 12 hours  levoFLOXacin IVPB      levoFLOXacin IVPB 500 milliGRAM(s) IV Intermittent every 24 hours  levothyroxine Injectable 62.5 MICROGram(s) IV Push at bedtime  memantine 5 milliGRAM(s) Oral daily  methylPREDNISolone sodium succinate Injectable 80 milliGRAM(s) IV Push daily  midodrine 10 milliGRAM(s) Oral every 8 hours  norepinephrine Infusion 0.05 MICROgram(s)/kG/Min (4.26 mL/Hr) IV Continuous <Continuous>  pantoprazole  Injectable 40 milliGRAM(s) IV Push daily  polyethylene glycol 3350 17 Gram(s) Oral daily  propofol Infusion 10 MICROgram(s)/kG/Min (2.72 mL/Hr) IV Continuous <Continuous>  valproate sodium IVPB 250 milliGRAM(s) IV Intermittent every 8 hours    MEDICATIONS  (PRN):  acetaminophen   Tablet .. 650 milliGRAM(s) Oral every 6 hours PRN Temp greater or equal to 38C (100.4F), Mild Pain (1 - 3)  acetaminophen  Suppository .. 650 milliGRAM(s) Rectal every 4 hours PRN Temp greater or equal to 38C (100.4F)  LORazepam   Injectable 2 milliGRAM(s) IV Push every 6 hours PRN seizure activity         Vitals log        ICU Vital Signs Last 24 Hrs  T(C): 36.3 (11 Apr 2020 07:15), Max: 36.9 (10 Apr 2020 15:59)  T(F): 97.4 (11 Apr 2020 07:15), Max: 98.4 (10 Apr 2020 15:59)  HR: 68 (11 Apr 2020 07:15) (64 - 74)  BP: 107/76 (11 Apr 2020 07:15) (105/372 - 125/87)  BP(mean): 93 (10 Apr 2020 17:30) (93 - 97)  ABP: --  ABP(mean): --  RR: 20 (11 Apr 2020 07:15) (20 - 24)  SpO2: 95% (11 Apr 2020 07:15) (71% - 97%)       Mode: AC/ CMV (Assist Control/ Continuous Mandatory Ventilation)  RR (machine): 20  TV (machine): 320  FiO2: 40  PEEP: 5  ITime: 1  MAP: 12  PIP: 38      Input and Output:  I&O's Detail    10 Apr 2020 07:01  -  11 Apr 2020 07:00  --------------------------------------------------------  IN:    dextrose 5%.: 680 mL    Enteral Tube Flush: 600 mL    IV PiggyBack: 450 mL    norepinephrine Infusion: 294.4 mL    ns in tub fed  [id number]: 720 mL    propofol Infusion: 176.9 mL  Total IN: 2921.3 mL    OUT:    Indwelling Catheter - Urethral: 2050 mL  Total OUT: 2050 mL    Total NET: 871.3 mL          Lab Data                        10.7   8.88  )-----------( 163      ( 11 Apr 2020 06:28 )             32.0     04-11    148<H>  |  110<H>  |  10  ----------------------------<  147<H>  4.0   |  32<H>  |  0.66    Ca    7.2<L>      11 Apr 2020 06:28  Phos  2.7     04-11  Mg     1.8     04-11    TPro  5.3<L>  /  Alb  1.7<L>  /  TBili  0.2  /  DBili  x   /  AST  73<H>  /  ALT  62<H>  /  AlkPhos  125<H>  04-11    ABG - ( 11 Apr 2020 06:51 )  pH, Arterial: x     pH, Blood: 7.45  /  pCO2: 42    /  pO2: 51    / HCO3: 29    / Base Excess: 5.5   /  SaO2: 84                CARDIAC MARKERS ( 11 Apr 2020 06:28 )  1.407 ng/mL / x     / 60 U/L / x     / x      CARDIAC MARKERS ( 10 Apr 2020 10:15 )  2.706 ng/mL / x     / x     / x     / x      CARDIAC MARKERS ( 10 Apr 2020 07:28 )  x     / x     / 71 U/L / x     / x            Review of Systems	      Objective     Physical Examination    heart s1s2  lung dc BS      Pertinent Lab findings & Imaging      Deloris:  NO   Adequate UO     I&O's Detail    10 Apr 2020 07:01  -  11 Apr 2020 07:00  --------------------------------------------------------  IN:    dextrose 5%.: 680 mL    Enteral Tube Flush: 600 mL    IV PiggyBack: 450 mL    norepinephrine Infusion: 294.4 mL    ns in tub fed  [id number]: 720 mL    propofol Infusion: 176.9 mL  Total IN: 2921.3 mL    OUT:    Indwelling Catheter - Urethral: 2050 mL  Total OUT: 2050 mL    Total NET: 871.3 mL               Discussed with:     Cultures:	        Radiology

## 2020-04-11 NOTE — PROGRESS NOTE ADULT - SUBJECTIVE AND OBJECTIVE BOX
Patient seen and examined intubated CXR with worsening infiltrates  still with diarrhea    Vent settings: on fi02 50 peep 5  Echo reviewed Left Ventricle [place] Hypokinesis with CHF of 40 percent    on PLAQ / STEROIDS and LEVOQUIN    Flexiseal in place    CDiff and stool pending    Review of Systems:  unable    Objective:  Vitals  T(C): 36.7 (04-11-20 @ 15:20), Max: 36.7 (04-11-20 @ 15:20)  HR: 61 (04-11-20 @ 15:20) (59 - 72)  BP: 125/75 (04-11-20 @ 15:20) (105/372 - 128/86)  RR: 25 (04-11-20 @ 15:20) (20 - 26)  SpO2: 96% (04-11-20 @ 15:46) (71% - 99%)    Physical Exam:  General: comfortable, no acute distress, sedated RASS 0   HEENT: Atraumatic, no LAD, trachea midline, PERRLA  Cardiovascular: normal s1s2,  Pulmonary: decreasesed, no wheezing , rhonchi  Gastrointestinal: soft non tender non distended, no masses felt, no organomegally,flexiseal  Muscloskeletal: no lower extremity edema, intact bilateral lower extremity pulses  Neurological: sedated  Psychiatrical: sedated  SKIN: no rash, lesions or ulcers    ~  Labs:                          10.7   8.88  )-----------( 163      ( 11 Apr 2020 06:28 )             32.0     04-11    148<H>  |  110<H>  |  10  ----------------------------<  147<H>  4.0   |  32<H>  |  0.66    Ca    7.2<L>      11 Apr 2020 06:28  Phos  2.7     04-11  Mg     1.8     04-11    TPro  5.3<L>  /  Alb  1.7<L>  /  TBili  0.2  /  DBili  x   /  AST  73<H>  /  ALT  62<H>  /  AlkPhos  125<H>  04-11    LIVER FUNCTIONS - ( 11 Apr 2020 06:28 )  Alb: 1.7 g/dL / Pro: 5.3 g/dL / ALK PHOS: 125 U/L / ALT: 62 U/L DA / AST: 73 U/L / GGT: x           PT/INR - ( 11 Apr 2020 10:54 )   PT: 11.2 sec;   INR: 1.01 ratio         PTT - ( 11 Apr 2020 10:54 )  PTT:29.3 sec      Active Medications  MEDICATIONS  (STANDING):  aspirin  chewable 81 milliGRAM(s) Oral daily  chlorhexidine 0.12% Liquid 15 milliLiter(s) Oral Mucosa every 12 hours  chlorhexidine 2% Cloths 1 Application(s) Topical daily  dexMEDEtomidine Infusion 0.4 MICROgram(s)/kG/Hr (4.54 mL/Hr) IV Continuous <Continuous>  dextrose 5%. 1000 milliLiter(s) (50 mL/Hr) IV Continuous <Continuous>  enoxaparin Injectable 30 milliGRAM(s) SubCutaneous daily  hydroxychloroquine   Oral   hydroxychloroquine 200 milliGRAM(s) Oral every 12 hours  lactobacillus acidophilus 1 Tablet(s) Oral three times a day with meals  levETIRAcetam  IVPB 1000 milliGRAM(s) IV Intermittent every 12 hours  levoFLOXacin IVPB      levoFLOXacin IVPB 500 milliGRAM(s) IV Intermittent every 24 hours  levothyroxine Injectable 62.5 MICROGram(s) IV Push at bedtime  memantine 5 milliGRAM(s) Oral daily  methylPREDNISolone sodium succinate Injectable 80 milliGRAM(s) IV Push daily  midodrine 10 milliGRAM(s) Oral every 8 hours  norepinephrine Infusion 0.05 MICROgram(s)/kG/Min (4.26 mL/Hr) IV Continuous <Continuous>  pantoprazole  Injectable 40 milliGRAM(s) IV Push daily  propofol Infusion 10 MICROgram(s)/kG/Min (2.72 mL/Hr) IV Continuous <Continuous>  valproate sodium IVPB 250 milliGRAM(s) IV Intermittent every 8 hours    MEDICATIONS  (PRN):  acetaminophen   Tablet .. 650 milliGRAM(s) Oral every 6 hours PRN Temp greater or equal to 38C (100.4F), Mild Pain (1 - 3)  acetaminophen  Suppository .. 650 milliGRAM(s) Rectal every 4 hours PRN Temp greater or equal to 38C (100.4F)  LORazepam   Injectable 2 milliGRAM(s) IV Push every 6 hours PRN seizure activity

## 2020-04-11 NOTE — PROGRESS NOTE ADULT - SUBJECTIVE AND OBJECTIVE BOX
Patient is a 60y old  Female who presents with a chief complaint of sob (06 Apr 2020 11:30)    Patient seen in follow up for ADAM, Hypernatremia.   COVID +. Chart reviewed.        PAST MEDICAL HISTORY:  Frequent urinary tract infections  Hypothyroidism, unspecified type  Down syndrome      MEDICATIONS  (STANDING):  aspirin  chewable 81 milliGRAM(s) Oral daily  chlorhexidine 0.12% Liquid 15 milliLiter(s) Oral Mucosa every 12 hours  chlorhexidine 2% Cloths 1 Application(s) Topical daily  dexMEDEtomidine Infusion 0.4 MICROgram(s)/kG/Hr (4.54 mL/Hr) IV Continuous <Continuous>  dextrose 5%. 1000 milliLiter(s) (50 mL/Hr) IV Continuous <Continuous>  enoxaparin Injectable 30 milliGRAM(s) SubCutaneous daily  furosemide   Injectable 20 milliGRAM(s) IV Push once  hydroxychloroquine   Oral   hydroxychloroquine 200 milliGRAM(s) Oral every 12 hours  lactobacillus acidophilus 1 Tablet(s) Oral three times a day with meals  levETIRAcetam  IVPB 1000 milliGRAM(s) IV Intermittent every 12 hours  levoFLOXacin IVPB      levoFLOXacin IVPB 500 milliGRAM(s) IV Intermittent every 24 hours  levothyroxine Injectable 62.5 MICROGram(s) IV Push at bedtime  memantine 5 milliGRAM(s) Oral daily  methylPREDNISolone sodium succinate Injectable 80 milliGRAM(s) IV Push daily  midodrine 10 milliGRAM(s) Oral every 8 hours  norepinephrine Infusion 0.05 MICROgram(s)/kG/Min (4.26 mL/Hr) IV Continuous <Continuous>  pantoprazole  Injectable 40 milliGRAM(s) IV Push daily  propofol Infusion 10 MICROgram(s)/kG/Min (2.72 mL/Hr) IV Continuous <Continuous>  valproate sodium IVPB 250 milliGRAM(s) IV Intermittent every 8 hours    MEDICATIONS  (PRN):  acetaminophen   Tablet .. 650 milliGRAM(s) Oral every 6 hours PRN Temp greater or equal to 38C (100.4F), Mild Pain (1 - 3)  acetaminophen  Suppository .. 650 milliGRAM(s) Rectal every 4 hours PRN Temp greater or equal to 38C (100.4F)  LORazepam   Injectable 2 milliGRAM(s) IV Push every 6 hours PRN seizure activity    T(C): 36.7 (04-11-20 @ 15:20), Max: 36.9 (04-10-20 @ 15:59)  HR: 61 (04-11-20 @ 15:20) (59 - 79)  BP: 125/75 (04-11-20 @ 15:20) (98/53 - 135/93)  RR: 25 (04-11-20 @ 15:20)  SpO2: 96% (04-11-20 @ 15:46)  Wt(kg): --  I&O's Detail    10 Apr 2020 07:01  -  11 Apr 2020 07:00  --------------------------------------------------------  IN:    dextrose 5%.: 680 mL    Enteral Tube Flush: 600 mL    IV PiggyBack: 450 mL    norepinephrine Infusion: 294.4 mL    ns in tub fed  [id number]: 720 mL    propofol Infusion: 176.9 mL  Total IN: 2921.3 mL    OUT:    Indwelling Catheter - Urethral: 2050 mL  Total OUT: 2050 mL    Total NET: 871.3 mL      11 Apr 2020 07:01  -  11 Apr 2020 16:16  --------------------------------------------------------  IN:    dexmedetomidine Infusion: 9 mL    dextrose 5%.: 100 mL    Enteral Tube Flush: 240 mL    IV PiggyBack: 200 mL    norepinephrine Infusion: 115.2 mL    ns in tub fed  [id number]: 360 mL    propofol Infusion: 70.8 mL  Total IN: 1095 mL    OUT:    Indwelling Catheter - Urethral: 800 mL  Total OUT: 800 mL    Total NET: 295 mL          PHYSICAL EXAM:  Pt on COVID precautions. Chart reviewed and previous physical examination noted.                  LABORATORY:                        10.7   8.88  )-----------( 163      ( 11 Apr 2020 06:28 )             32.0     04-11    148<H>  |  110<H>  |  10  ----------------------------<  147<H>  4.0   |  32<H>  |  0.66    Ca    7.2<L>      11 Apr 2020 06:28  Phos  2.7     04-11  Mg     1.8     04-11    TPro  5.3<L>  /  Alb  1.7<L>  /  TBili  0.2  /  DBili  x   /  AST  73<H>  /  ALT  62<H>  /  AlkPhos  125<H>  04-11    Sodium, Serum: 148 mmol/L (04-11 @ 06:28)  Sodium, Serum: 154 mmol/L (04-10 @ 07:28)    Potassium, Serum: 4.0 mmol/L (04-11 @ 06:28)  Potassium, Serum: 4.1 mmol/L (04-10 @ 07:28)    Hemoglobin: 10.7 g/dL (04-11 @ 06:28)  Hemoglobin: 10.6 g/dL (04-10 @ 07:28)  Hemoglobin: 10.9 g/dL (04-09 @ 07:09)    Creatinine, Serum 0.66 (04-11 @ 06:28)  Creatinine, Serum 0.62 (04-10 @ 07:28)  Creatinine, Serum 0.73 (04-09 @ 07:09)    CARDIAC MARKERS ( 11 Apr 2020 06:28 )  1.407 ng/mL / x     / 60 U/L / x     / x      CARDIAC MARKERS ( 10 Apr 2020 10:15 )  2.706 ng/mL / x     / x     / x     / x      CARDIAC MARKERS ( 10 Apr 2020 07:28 )  x     / x     / 71 U/L / x     / x          LIVER FUNCTIONS - ( 11 Apr 2020 06:28 )  Alb: 1.7 g/dL / Pro: 5.3 g/dL / ALK PHOS: 125 U/L / ALT: 62 U/L DA / AST: 73 U/L / GGT: x             ABG - ( 11 Apr 2020 06:51 )  pH, Arterial: x     pH, Blood: 7.45  /  pCO2: 42    /  pO2: 51    / HCO3: 29    / Base Excess: 5.5   /  SaO2: 84

## 2020-04-11 NOTE — PROGRESS NOTE ADULT - ASSESSMENT
1.	ADAM: Prerenal azotemia, ATN  2.	s/p Shock, Sepsis  3.	Pneumonia, COVID +  4.	Hypophosphatemia    Stable renal indices. To continue current meds. Treatment for COVID pneumonia. COVID precautions. Supportive care.   Avoid nephrotoxic meds as possible. ICU management. Overall prognosis poor.

## 2020-04-12 NOTE — PROGRESS NOTE ADULT - SUBJECTIVE AND OBJECTIVE BOX
Neurology follow up note    MIGNON [id number]      Interval History:    Patient intubated sedated     MEDICATIONS    acetaminophen   Tablet .. 650 milliGRAM(s) Oral every 6 hours PRN  acetaminophen  Suppository .. 650 milliGRAM(s) Rectal every 4 hours PRN  aspirin  chewable 81 milliGRAM(s) Oral daily  chlorhexidine 0.12% Liquid 15 milliLiter(s) Oral Mucosa every 12 hours  chlorhexidine 2% Cloths 1 Application(s) Topical daily  dexMEDEtomidine Infusion 0.4 MICROgram(s)/kG/Hr IV Continuous <Continuous>  enoxaparin Injectable 30 milliGRAM(s) SubCutaneous daily  lactobacillus acidophilus 1 Tablet(s) Oral three times a day with meals  levETIRAcetam  IVPB 1000 milliGRAM(s) IV Intermittent every 12 hours  levoFLOXacin IVPB      levoFLOXacin IVPB 500 milliGRAM(s) IV Intermittent every 24 hours  levothyroxine Injectable 62.5 MICROGram(s) IV Push at bedtime  LORazepam   Injectable 2 milliGRAM(s) IV Push every 6 hours PRN  memantine 5 milliGRAM(s) Oral daily  midodrine 10 milliGRAM(s) Oral every 8 hours  norepinephrine Infusion 0.05 MICROgram(s)/kG/Min IV Continuous <Continuous>  pantoprazole  Injectable 40 milliGRAM(s) IV Push daily  propofol Infusion 10 MICROgram(s)/kG/Min IV Continuous <Continuous>  valproate sodium IVPB 250 milliGRAM(s) IV Intermittent every 8 hours      Allergies    amoxicillin (Rash)  penicillin (Rash)    Intolerances            Vital Signs Last 24 Hrs  T(C): 36.1 (12 Apr 2020 06:00), Max: 36.7 (11 Apr 2020 15:20)  T(F): 97 (12 Apr 2020 06:00), Max: 98 (11 Apr 2020 15:20)  HR: 60 (12 Apr 2020 07:15) (52 - 70)  BP: 114/65 (12 Apr 2020 07:15) (108/62 - 128/92)  BP(mean): 99 (11 Apr 2020 22:00) (89 - 99)  RR: 20 (12 Apr 2020 07:15) (20 - 26)  SpO2: 94% (12 Apr 2020 07:15) (89% - 99%)      REVIEW OF SYSTEMS:  Could not be obtained secondary to the patient being nonverbal.    PHYSICAL EXAMINATION:    HEENT:  Head:  Normocephalic and atraumatic.  Eyes:  No scleral icterus.  Ears:  Hearing hard to evaluate secondary to the patient being sedated and intubated.  RESPIRATORY:  Good air entry bilaterally.  CARDIOVASCULAR:  S1 and S2 are heard.  ABDOMEN:  Soft and nontender.  EXTREMITIES:  No clubbing or cyanosis were noted.      NEUROLOGICAL:  Limited secondary to the patient being intubated and sedated.  No response to verbal stimuli.  I opened the patient's eyes, no gaze preference.  I applied stimuli to all four extremities with slight withdrawal bilateral upper and lower.  Tone; bilateral upper and lower was increased.            LABS:  CBC Full  -  ( 12 Apr 2020 06:13 )  WBC Count : 9.34 K/uL  RBC Count : 3.24 M/uL  Hemoglobin : 11.1 g/dL  Hematocrit : 33.6 %  Platelet Count - Automated : 134 K/uL  Mean Cell Volume : 103.7 fl  Mean Cell Hemoglobin : 34.3 pg  Mean Cell Hemoglobin Concentration : 33.0 gm/dL  Auto Neutrophil # : 6.71 K/uL  Auto Lymphocyte # : 1.20 K/uL  Auto Monocyte # : 0.84 K/uL  Auto Eosinophil # : 0.00 K/uL  Auto Basophil # : 0.03 K/uL  Auto Neutrophil % : 71.9 %  Auto Lymphocyte % : 12.8 %  Auto Monocyte % : 9.0 %  Auto Eosinophil % : 0.0 %  Auto Basophil % : 0.3 %      04-12    149<H>  |  109<H>  |  13  ----------------------------<  198<H>  3.7   |  35<H>  |  0.61    Ca    7.6<L>      12 Apr 2020 06:13  Phos  3.3     04-12  Mg     1.9     04-12    TPro  5.9<L>  /  Alb  1.8<L>  /  TBili  0.2  /  DBili  x   /  AST  68<H>  /  ALT  69<H>  /  AlkPhos  139<H>  04-12    Hemoglobin A1C:   Lipid Panel 04-11 @ 11:34  Total Cholesterol, Serum --  LDL --  Triglycerides 276    LIVER FUNCTIONS - ( 12 Apr 2020 06:13 )  Alb: 1.8 g/dL / Pro: 5.9 g/dL / ALK PHOS: 139 U/L / ALT: 69 U/L DA / AST: 68 U/L / GGT: x           Vitamin B12   PT/INR - ( 12 Apr 2020 06:13 )   PT: 11.1 sec;   INR: 1.00 ratio         PTT - ( 11 Apr 2020 10:54 )  PTT:29.3 sec      RADIOLOGY      ANALYSIS AND PLAN:  This is a 60-year-old with altered mental status and episode of seizure.  1.	For seizure event, suspect this could be secondary to the patient's interaction with antibiotics.  We will continue her on currently her home dose of 250 mg three times a day. no new seizures monitor levels.   2.	I will recommend Ativan 1 mg IV push q.6 h. p.r.n. for any type of breakthrough seizures.  3.	Seizure precautions.  4.	For altered mental status, secondary to metabolic encephalopathy secondary to COVID-19 infection.  5.	For hypotension, continue the patient on midodrine.  If possible, please maintain systolic blood pressure of above 100 and help maintain cerebral perfusion.  6.	For history of hypothyroidism, continue the patient on Synthroid.  7.	spoke to staff no new events   Greater than 34 minutes spent in direct patient care reviewing  the notes, lab data/ imaging ,

## 2020-04-12 NOTE — PROGRESS NOTE ADULT - ASSESSMENT
VITALS/LABS       2020 afeb 58 120/70   2020 DEXM 7a 0.396 m/k/h   2020 NORE 7a 0.15 m/k/m   2020 PROP 10a 19 m/k/m   2020 u 900   2020 9a ac 20/300/+5/60%   2020 5a 734/62/57   2020 W 9.3 Hb 11.1 Plt 134   2020 nlr 5.5 INR 1 Na 149 K 3.7 CO2 35 Cr .       PT DATA/BEST PRACTICE  ALLERGY     NOTEWORTHY  POINTS/CHANGES ROS/PE   2020 started on po vanco for diarrhea   2020 ivig given by hospitalist   2020 Had seizure  2020 a Intubated tr to SICU   2020 fc given 945a Puentes ordered    Transferred to ICU May need intubn and fob dw brother                                WT  45 (2020)                    BMI     20 (2020)     ADVANCED DIRECTIVE       Goals of care discussion                                                                                      HEAD OF BED ELEVATION Yes  DYSPHAGIA EVAL                                  DIET      dys 1 puree () --> jevity 1.5 720 ()--> 1080 (4/10)    .4 ()  (Dr RICHARDSON)                                   IV F        D5 100 ()   --> D5 1/2 75 (() --> D5 75 ()  --> d5 5 (4/10-)      (DCEd by Dr RICHARDSON)                                        DVT PROPHYLAXIS      lvnx 30 ()      --> Lvnx 40 ()        PATIENT SUMMARY   60 f nonverbal downs syndrome from group home HO COVID exposure per transfer papers on Rx for aspiration pneumonia and shortness of breath   er vitals 90/50 66 100f 92%    Pt found to have pneumonia pl effsn hypernatremia and adam on arrival COVID palma started Pulm consulted     Pulm consulted  CT ch showed l lung collapse Pt ruled out for COVID     Home meds memantine 28 keppra 1.2                                Pt tr sicu 2020 itubated hemaodynamci intability                 PLAN   DVT P On lvnx 30 ()      --> Lvnx 40 ()      RESP FAIL Sec COVID 19  2020 9a ac 20/300/+5/60%   2020 5a 734/62/57   Target PaO2 60 pH 730 Ppl 30   ltvv vent     POSS BACT PNEU sp levaquin (-) pc downtrend  DIARRHEA 2020  vanco 125.4 () (diarrhea)   COVID 19     --4/10--2020 nlr 6.1 -7.1- 5.3 - 1.3 -5.5   --2020 ferritin [id number] - 1404    --2020 procal .27-.34- .1  2020 crp 2.6  -2020 esr 26 - 25  -2020 d-dimer [phone]       HCQ ()   2020 qtc 442   IVIG 20g/d.4d () (Dr RICHARDSON)    solumed 80.5d ()     Cont supp rx    ARDS ltvv   HEMODYNAMICS In shock sec COVID 19 Remains on nore () Mido () Given alb   NONOLIGURIC ADAM Monitor lytes   HYPERNATR   ----4/10--2020 Na 151 -151-143 -[phone]  IV D5 50 startd 4/10/2020 DCED 2020 Dr RICHARDSON  monitor serially   On fw 240.4 (4/10)   SZ On keppra and ativan for break thru  PLAN   Wean off vasopressors and vent as tolerated       TIME SPENT Over 36 minutes aggregate critical  care time spent on encounter; activities included   direct pt care, counseling and/or coordinating care reviewing notes, lab data/ imaging , discussion with multidisciplinary team/ pt /family. Risks, benefits, alternatives  discussed in detail.    [facility] [phone]   1960 DOA 2020 DR NORTH MEDLEY

## 2020-04-12 NOTE — PROGRESS NOTE ADULT - SUBJECTIVE AND OBJECTIVE BOX
Patient seen and examined at bedside.  Intubated/sedated  on plaq and steroids    on day of levoquin for mucus plugging    Vent settings: 50/5  Still with diarrhea  cdiff and stool pending    Review of Systems:  unable to obtain    Objective:  Vitals  T(C): 36.3 (04-12-20 @ 10:00), Max: 36.7 (04-11-20 @ 15:20)  HR: 58 (04-12-20 @ 10:00) (52 - 70)  BP: 127/79 (04-12-20 @ 10:00) (108/62 - 128/92)  RR: 22 (04-12-20 @ 10:00) (20 - 26)  SpO2: 96% (04-12-20 @ 10:00) (89% - 97%)    Physical Exam:  General: comfortable, no acute distress, sedated  HEENT: Atraumatic, no LAD, trachea midline, PERRLA  Cardiovascular: normal s1s2,   Pulmonary: very diminished no wheezing , rhonchi.... ++secretions and gargling from ETT  Gastrointestinal: soft non tender non distended, no masses felt, no organomegally  Muscloskeletal: no lower extremity edema, intact bilateral lower extremity pulses  Neurological: CN II-12 intact. No focal weakness  Psychiatrical: normal mood, cooperative  SKIN: no rash, lesions or ulcers    ~  Labs:                          11.1   9.34  )-----------( 134      ( 12 Apr 2020 06:13 )             33.6     04-12    149<H>  |  109<H>  |  13  ----------------------------<  198<H>  3.7   |  35<H>  |  0.61    Ca    7.6<L>      12 Apr 2020 06:13  Phos  3.3     04-12  Mg     1.9     04-12    TPro  5.9<L>  /  Alb  1.8<L>  /  TBili  0.2  /  DBili  x   /  AST  68<H>  /  ALT  69<H>  /  AlkPhos  139<H>  04-12    LIVER FUNCTIONS - ( 12 Apr 2020 06:13 )  Alb: 1.8 g/dL / Pro: 5.9 g/dL / ALK PHOS: 139 U/L / ALT: 69 U/L DA / AST: 68 U/L / GGT: x           PT/INR - ( 12 Apr 2020 06:13 )   PT: 11.1 sec;   INR: 1.00 ratio         PTT - ( 11 Apr 2020 10:54 )  PTT:29.3 sec      Active Medications  MEDICATIONS  (STANDING):  aspirin  chewable 81 milliGRAM(s) Oral daily  chlorhexidine 0.12% Liquid 15 milliLiter(s) Oral Mucosa every 12 hours  chlorhexidine 2% Cloths 1 Application(s) Topical daily  dexMEDEtomidine Infusion 0.4 MICROgram(s)/kG/Hr (4.54 mL/Hr) IV Continuous <Continuous>  enoxaparin Injectable 40 milliGRAM(s) SubCutaneous daily  lactobacillus acidophilus 1 Tablet(s) Oral three times a day with meals  levETIRAcetam  IVPB 1000 milliGRAM(s) IV Intermittent every 12 hours  levoFLOXacin IVPB      levoFLOXacin IVPB 500 milliGRAM(s) IV Intermittent every 24 hours  levothyroxine Injectable 62.5 MICROGram(s) IV Push at bedtime  memantine 5 milliGRAM(s) Oral daily  midodrine 10 milliGRAM(s) Oral every 8 hours  norepinephrine Infusion 0.05 MICROgram(s)/kG/Min (4.26 mL/Hr) IV Continuous <Continuous>  pantoprazole  Injectable 40 milliGRAM(s) IV Push daily  propofol Infusion 10 MICROgram(s)/kG/Min (2.72 mL/Hr) IV Continuous <Continuous>  valproate sodium IVPB 250 milliGRAM(s) IV Intermittent every 8 hours  vancomycin    Solution 125 milliGRAM(s) Oral every 6 hours    MEDICATIONS  (PRN):  acetaminophen   Tablet .. 650 milliGRAM(s) Oral every 6 hours PRN Temp greater or equal to 38C (100.4F), Mild Pain (1 - 3)  acetaminophen  Suppository .. 650 milliGRAM(s) Rectal every 4 hours PRN Temp greater or equal to 38C (100.4F)  LORazepam   Injectable 2 milliGRAM(s) IV Push every 6 hours PRN seizure activity

## 2020-04-12 NOTE — PROGRESS NOTE ADULT - ASSESSMENT
61 yo F  , nonverbal, MRDD, hypothyroidism and down syndrome, hx of seizures BIBA  from group home for fever, sob, cough, low o2 sat. Admitted to [facility] for evaluation of r/o COVBID 19 PNA. also found to be hypernatremic and with evidence of Acute Renal Failure. Now admitted to ICU for acute severe respiratory decompensation secondary to COVID 19 PNA      Acute Severe Hypoxemic Respiratory Failure MF due to COVID 19 PNA with evidence of Mucus plug++Aspiration PNA  Septic Shock due to the above  CT with evidence of Right sided Mucus Plugging  s/p zithromax and meropenam  on pressors  Patient ++secretions from ET tube  on plaquinil and steroid --> LAST DAY TODAY  EKG 4/9 at 443  Levoquin added  sputum negative  Plan:  maintain low tidal  Titrate sat to 92 percent    followup q48h ldh ferritin and ddimer  daily cbc with diff    Breakthrough Generalized Seizures  may be contributing factor to aspiration PNA  on Keppra IV q12th + ativan rescue + depakote  Neuro called to evaluate :: added bolus depakote    Diarrhea liikely from antibiotics  on ddx is viral although hard to stay if bout started today  giving probiotics , checking cdiff and stool  flexiseal    Troponemia NSTEMI type II likely due to septic cardiomyopathy now with new evidence of Congestive Heart failure EF of 40 percent  trop from 1 to 2.7  Cards consulted  Discussed case with ICU team and cardiology will start IVIG      Hypernatremia.     Was on d5w at 100ml hr  nephro consulted, recs appreciated.       ADAM likely prerenal oliguria  Resolved    Hypothyroidism, unspecified type.  Plan: levothyroxine.     Constipation.  miralax      code: Full  dvt ppx: lovenox subq	  dispo: back to facility when stable    At this time will need to reach out to brother on status of patients echo and repeat CXR , clear findings of Multiorgan failure  although team will trial IVIG

## 2020-04-12 NOTE — PROGRESS NOTE ADULT - SUBJECTIVE AND OBJECTIVE BOX
Date/Time Patient Seen:  		  Referring MD:   Data Reviewed	       Patient is a 60y old  Female who presents with a chief complaint of sob (12 Apr 2020 09:19)      Subjective/HPI     PAST MEDICAL & SURGICAL HISTORY:  Frequent urinary tract infections  Hypothyroidism, unspecified type  Down syndrome        Medication list         MEDICATIONS  (STANDING):  aspirin  chewable 81 milliGRAM(s) Oral daily  chlorhexidine 0.12% Liquid 15 milliLiter(s) Oral Mucosa every 12 hours  chlorhexidine 2% Cloths 1 Application(s) Topical daily  dexMEDEtomidine Infusion 0.4 MICROgram(s)/kG/Hr (4.54 mL/Hr) IV Continuous <Continuous>  enoxaparin Injectable 30 milliGRAM(s) SubCutaneous daily  lactobacillus acidophilus 1 Tablet(s) Oral three times a day with meals  levETIRAcetam  IVPB 1000 milliGRAM(s) IV Intermittent every 12 hours  levoFLOXacin IVPB      levoFLOXacin IVPB 500 milliGRAM(s) IV Intermittent every 24 hours  levothyroxine Injectable 62.5 MICROGram(s) IV Push at bedtime  memantine 5 milliGRAM(s) Oral daily  midodrine 10 milliGRAM(s) Oral every 8 hours  norepinephrine Infusion 0.05 MICROgram(s)/kG/Min (4.26 mL/Hr) IV Continuous <Continuous>  pantoprazole  Injectable 40 milliGRAM(s) IV Push daily  propofol Infusion 10 MICROgram(s)/kG/Min (2.72 mL/Hr) IV Continuous <Continuous>  valproate sodium IVPB 250 milliGRAM(s) IV Intermittent every 8 hours    MEDICATIONS  (PRN):  acetaminophen   Tablet .. 650 milliGRAM(s) Oral every 6 hours PRN Temp greater or equal to 38C (100.4F), Mild Pain (1 - 3)  acetaminophen  Suppository .. 650 milliGRAM(s) Rectal every 4 hours PRN Temp greater or equal to 38C (100.4F)  LORazepam   Injectable 2 milliGRAM(s) IV Push every 6 hours PRN seizure activity         Vitals log        ICU Vital Signs Last 24 Hrs  T(C): 36.3 (12 Apr 2020 10:00), Max: 36.7 (11 Apr 2020 15:20)  T(F): 97.4 (12 Apr 2020 10:00), Max: 98 (11 Apr 2020 15:20)  HR: 58 (12 Apr 2020 10:00) (52 - 70)  BP: 127/79 (12 Apr 2020 10:00) (108/62 - 128/92)  BP(mean): 99 (11 Apr 2020 22:00) (89 - 99)  ABP: --  ABP(mean): --  RR: 22 (12 Apr 2020 10:00) (20 - 26)  SpO2: 96% (12 Apr 2020 10:00) (89% - 97%)       Mode: AC/ CMV (Assist Control/ Continuous Mandatory Ventilation)  RR (machine): 20  TV (machine): 320  FiO2: 60  PEEP: 5  ITime: 1  MAP: 11  PIP: 48      Input and Output:  I&O's Detail    11 Apr 2020 07:01  -  12 Apr 2020 07:00  --------------------------------------------------------  IN:    dexmedetomidine Infusion: 45 mL    dextrose 5%.: 550 mL    Enteral Tube Flush: 960 mL    IV PiggyBack: 500 mL    norepinephrine Infusion: 281.6 mL    ns in tub fed  [id number]: 1080 mL    propofol Infusion: 164.6 mL  Total IN: 3581.2 mL    OUT:    Indwelling Catheter - Urethral: 3350 mL  Total OUT: 3350 mL    Total NET: 231.2 mL      12 Apr 2020 07:01  -  12 Apr 2020 12:14  --------------------------------------------------------  IN:    dexmedetomidine Infusion: 18 mL    Enteral Tube Flush: 120 mL    IV PiggyBack: 100 mL    norepinephrine Infusion: 51.2 mL    ns in tub fed  [id number]: 180 mL    propofol Infusion: 24.4 mL  Total IN: 493.6 mL    OUT:  Total OUT: 0 mL    Total NET: 493.6 mL          Lab Data                        11.1   9.34  )-----------( 134      ( 12 Apr 2020 06:13 )             33.6     04-12    149<H>  |  109<H>  |  13  ----------------------------<  198<H>  3.7   |  35<H>  |  0.61    Ca    7.6<L>      12 Apr 2020 06:13  Phos  3.3     04-12  Mg     1.9     04-12    TPro  5.9<L>  /  Alb  1.8<L>  /  TBili  0.2  /  DBili  x   /  AST  68<H>  /  ALT  69<H>  /  AlkPhos  139<H>  04-12    ABG - ( 12 Apr 2020 05:50 )  pH, Arterial: x     pH, Blood: 7.34  /  pCO2: 62    /  pO2: 67    / HCO3: 29    / Base Excess: 7.2   /  SaO2: 90                CARDIAC MARKERS ( 12 Apr 2020 06:13 )  .606 ng/mL / x     / x     / x     / x      CARDIAC MARKERS ( 11 Apr 2020 06:28 )  1.407 ng/mL / x     / 60 U/L / x     / x            Review of Systems	      Objective     Physical Examination    heart s1s2  lung dec BS      Pertinent Lab findings & Imaging      Deolris:  NO   Adequate UO     I&O's Detail    11 Apr 2020 07:01  -  12 Apr 2020 07:00  --------------------------------------------------------  IN:    dexmedetomidine Infusion: 45 mL    dextrose 5%.: 550 mL    Enteral Tube Flush: 960 mL    IV PiggyBack: 500 mL    norepinephrine Infusion: 281.6 mL    ns in tub fed  [id number]: 1080 mL    propofol Infusion: 164.6 mL  Total IN: 3581.2 mL    OUT:    Indwelling Catheter - Urethral: 3350 mL  Total OUT: 3350 mL    Total NET: 231.2 mL      12 Apr 2020 07:01  -  12 Apr 2020 12:14  --------------------------------------------------------  IN:    dexmedetomidine Infusion: 18 mL    Enteral Tube Flush: 120 mL    IV PiggyBack: 100 mL    norepinephrine Infusion: 51.2 mL    ns in tub fed  [id number]: 180 mL    propofol Infusion: 24.4 mL  Total IN: 493.6 mL    OUT:  Total OUT: 0 mL    Total NET: 493.6 mL               Discussed with:     Cultures:	        Radiology

## 2020-04-12 NOTE — PROGRESS NOTE ADULT - SUBJECTIVE AND OBJECTIVE BOX
HPI:        Hosp day #  Vent day #  Vent settings:  --->   pF ratio:   --->     Vitalsigns/reviewed and physical exam performed where pertinent and urgently required    Lab/radiology studies/ABG/Meds reviewed and interpreted into the assesment and treatment plan. HPI:  60yoF baseline  nonverbal  from group home presented to ED with  fever, sob, cough, low o2 sat, with possible covid exposure. Per ems, pt on levaquin for aspiration pna. She was found ot be hypernatremic and ADAM at admission.  also has hx of hypothyroidism and down syndrome.      Hosp day # 8  Vent day #  Vent settings:  ---> AC  pF ratio:   --->     Vitalsigns/reviewed and physical exam performed where pertinent and urgently required    Lab/radiology studies/ABG/Meds reviewed and interpreted into the assesment and treatment plan.    A/P     Neuro: sedation/NMB as required to facilitate safe effective ventilation   CV: pressor support as needed to maintain MAP>65. Avoid volume challenges. QTC monitoring while on zithro/plaquenil. On lovenox 40mg SC BID for hypercoag state   Pulm: 4-6cc/kg IBW TV as able to maintain plateau <30. Prone ventilation consideration as able.  GI: PPI. Feed as tolerated in tandem with NMB and prone ventilation.  Renal: even to negative fluid balance as tolerated by hemodynamics and renal fx. Feeds to be provided in lieu of IV fluids.  Heme: Parmacolgogic DVT ppx and SCD's  ID: Antibiotic discontinuation based on discussion with infectious disease in conjunction with clinical features, culture data and discretionary prpcalcitonin monitoring.  Endo: glycemic control with aggressive intervention to limit FS glucose<180mg/dl.    COVID-19 specific considerations and terhapeutic options based on the available and rapidly changing literature.   GOC: ongoing assessment for patient specific treatment options based on progression or decline.    36 min CC time spent in the management of this critical ill COVID-19/PUI patient with continious assessment and intervention on interpretation of multiple databases. HPI:  60yoF baseline  nonverbal  from group home presented to ED with  fever, sob, cough, low o2 sat, with possible covid exposure. Per ems, pt on levaquin for aspiration pna. She was found ot be hypernatremic and ADAM at admission.  also has hx of hypothyroidism and down syndrome.      Hosp day # 8  Vent day #  Vent settings:  ---> AC 20/320/60/+5  pF ratio:   --->     Vitalsigns/reviewed and physical exam performed where pertinent and urgently required    Lab/radiology studies/ABG/Meds reviewed and interpreted into the assesment and treatment plan.    A/P     Neuro: sedation/NMB as required to facilitate safe effective ventilation   CV: pressor support as needed to maintain MAP>65. Avoid volume challenges. QTC monitoring while on zithro/plaquenil. On lovenox 40mg SC BID for hypercoag state   Pulm: 4-6cc/kg IBW TV as able to maintain plateau <30. Prone ventilation consideration as able.  GI: PPI. Feed as tolerated in tandem with NMB and prone ventilation.  Renal: even to negative fluid balance as tolerated by hemodynamics and renal fx. Feeds to be provided in lieu of IV fluids.  Heme: Parmacolgogic DVT ppx and SCD's  ID: Antibiotic discontinuation based on discussion with infectious disease in conjunction with clinical features, culture data and discretionary prpcalcitonin monitoring.  Endo: glycemic control with aggressive intervention to limit FS glucose<180mg/dl.    COVID-19 specific considerations and terhapeutic options based on the available and rapidly changing literature.   GOC: ongoing assessment for patient specific treatment options based on progression or decline.    36 min CC time spent in the management of this critical ill COVID-19/PUI patient with continious assessment and intervention on interpretation of multiple databases.

## 2020-04-12 NOTE — PROGRESS NOTE ADULT - SUBJECTIVE AND OBJECTIVE BOX
MIGNON WALTER    [facility]    Patient is a 60y old  Female who presents with a chief complaint of sob (12 Apr 2020 13:29)       Allergies    amoxicillin (Rash)  penicillin (Rash)    Intolerances        HPI:  Patient is a 60y old  Female who presents with a chief complaint of sob and fever    HPI:This is a nonverbal [facility], sob, cough, low o2 sat, with possible covid exposure. per ems, pt on levaquin for aspiration pna. no further hx available.pt has hx of chronic constipation an dis on multiple stool softners.  she was found ot be hypernatremic and in renal failure at admission.  also has hx of hypothyroidism and down syndrome        PAST MEDICAL & SURGICAL HISTORY:  Frequent urinary tract infections  Hypothyroidism, unspecified type  Down syndrome      FAMILY HISTORY:can not obtain due to mental status      SOCIAL HISTORY:    Allergies    amoxicillin (Rash)  penicillin (Rash)    Intolerances          REVIEW OF SYSEMS:  negative except form above mentioned      Vital Signs Last 24 Hrs  T(C): 37.8 (04 Apr 2020 09:46), Max: 37.8 (04 Apr 2020 09:46)  T(F): 100 (04 Apr 2020 09:46), Max: 100 (04 Apr 2020 09:46)  HR: 68 (04 Apr 2020 11:00) (66 - 69)  BP: 89/52 (04 Apr 2020 11:00) (87/51 - 91/52)  BP(mean): --  RR: 24 (04 Apr 2020 11:00) (24 - 24)  SpO2: 100% (04 Apr 2020 11:00) (89% - 100%)  I&O's Summary      PHYSICAL EXAM:  GENERAL: onnc  HEAD:  Atraumatic, Normocephalic  EYES: EOMI, PERRLA, conjunctiva and sclera clear  NECK: Supple, No JVD  CHEST/LUNG: dec bs at bases and rhonchi  HEART: Regular rate and rhythm; No murmurs, rubs, or gallops  ABDOMEN: Soft, Nontender, Nondistended; Bowel sounds present  SKIN: No rashes or lesions    LABS:                        14.7   2.88  )-----------( 52       ( 04 Apr 2020 10:49 )             45.9     04-04    154<H>  |  117<H>  |  31<H>  ----------------------------<  107<H>  4.7   |  30  |  1.48<H>    Ca    8.5      04 Apr 2020 10:49    TPro  7.1  /  Alb  2.3<L>  /  TBili  0.2  /  DBili  x   /  AST  73<H>  /  ALT  66<H>  /  AlkPhos  118  04-04      CAPILLARY BLOOD GLUCOSE                RADIOLOGY & ADDITIONAL TESTS:    Imaging Personally Reviewed:  [x] YES  [ ] NO    Consultant(s) Notes Reviewed:  [x] YES  [ ] NO      MEDICATIONS  (STANDING):  azithromycin   Tablet 500 milliGRAM(s) Oral daily  dextrose 5%. 1000 milliLiter(s) (100 mL/Hr) IV Continuous <Continuous>  diVALproex  milliGRAM(s) Oral three times a day  enoxaparin Injectable 30 milliGRAM(s) SubCutaneous daily  levETIRAcetam 1000 milliGRAM(s) Oral two times a day  levothyroxine 125 MICROGram(s) Oral daily  memantine 5 milliGRAM(s) Oral daily  pantoprazole    Tablet 40 milliGRAM(s) Oral before breakfast  polyethylene glycol 3350 17 Gram(s) Oral daily    MEDICATIONS  (PRN):  acetaminophen   Tablet .. 650 milliGRAM(s) Oral every 6 hours PRN Temp greater or equal to 38C (100.4F), Mild Pain (1 - 3)      Care Discussed with Consultants/Other Providers [x] YES  [ ] NO    HEALTH ISSUES - PROBLEM Dx: (04 Apr 2020 13:11)      PAST MEDICAL & SURGICAL HISTORY:  Frequent urinary tract infections  Hypothyroidism, unspecified type  Down syndrome      FAMILY HISTORY:        MEDICATIONS   acetaminophen   Tablet .. 650 milliGRAM(s) Oral every 6 hours PRN  acetaminophen  Suppository .. 650 milliGRAM(s) Rectal every 4 hours PRN  aspirin  chewable 81 milliGRAM(s) Oral daily  chlorhexidine 0.12% Liquid 15 milliLiter(s) Oral Mucosa every 12 hours  chlorhexidine 2% Cloths 1 Application(s) Topical daily  dexMEDEtomidine Infusion 0.4 MICROgram(s)/kG/Hr IV Continuous <Continuous>  enoxaparin Injectable 40 milliGRAM(s) SubCutaneous daily  immune   globulin 10% (GAMMAGARD) IVPB 20 Gram(s) IV Intermittent daily  lactobacillus acidophilus 1 Tablet(s) Oral three times a day with meals  levETIRAcetam  IVPB 1000 milliGRAM(s) IV Intermittent every 12 hours  levoFLOXacin IVPB      levoFLOXacin IVPB 500 milliGRAM(s) IV Intermittent every 24 hours  levothyroxine Injectable 62.5 MICROGram(s) IV Push at bedtime  LORazepam   Injectable 2 milliGRAM(s) IV Push every 6 hours PRN  memantine 5 milliGRAM(s) Oral daily  midodrine 10 milliGRAM(s) Oral every 8 hours  norepinephrine Infusion 0.05 MICROgram(s)/kG/Min IV Continuous <Continuous>  pantoprazole  Injectable 40 milliGRAM(s) IV Push daily  propofol Infusion 10 MICROgram(s)/kG/Min IV Continuous <Continuous>  valproate sodium IVPB 250 milliGRAM(s) IV Intermittent every 8 hours  vancomycin    Solution 125 milliGRAM(s) Oral every 6 hours      Vital Signs Last 24 Hrs  T(C): 36.5 (12 Apr 2020 14:00), Max: 36.5 (12 Apr 2020 14:00)  T(F): 97.7 (12 Apr 2020 14:00), Max: 97.7 (12 Apr 2020 14:00)  HR: 55 (12 Apr 2020 14:00) (52 - 70)  BP: 110/65 (12 Apr 2020 14:00) (108/62 - 128/92)  BP(mean): 99 (11 Apr 2020 22:00) (99 - 99)  RR: 20 (12 Apr 2020 14:00) (20 - 25)  SpO2: 97% (12 Apr 2020 14:00) (89% - 97%)      04-11-20 @ 07:01  -  04-12-20 @ 07:00  --------------------------------------------------------  IN: 3581.2 mL / OUT: 3350 mL / NET: 231.2 mL    04-12-20 @ 07:01  -  04-12-20 @ 15:48  --------------------------------------------------------  IN: 634.4 mL / OUT: 900 mL / NET: -265.6 mL        Mode: AC/ CMV (Assist Control/ Continuous Mandatory Ventilation), RR (machine): 20, TV (machine): 320, FiO2: 60, PEEP: 5, ITime: 1, MAP: 11, PIP: 48    LABS:                        11.1   9.34  )-----------( 134      ( 12 Apr 2020 06:13 )             33.6     04-12    149<H>  |  109<H>  |  13  ----------------------------<  198<H>  3.7   |  35<H>  |  0.61    Ca    7.6<L>      12 Apr 2020 06:13  Phos  3.3     04-12  Mg     1.9     04-12    TPro  5.9<L>  /  Alb  1.8<L>  /  TBili  0.2  /  DBili  x   /  AST  68<H>  /  ALT  69<H>  /  AlkPhos  139<H>  04-12    PT/INR - ( 12 Apr 2020 06:13 )   PT: 11.1 sec;   INR: 1.00 ratio         PTT - ( 11 Apr 2020 10:54 )  PTT:29.3 sec      ABG - ( 12 Apr 2020 05:50 )  pH, Arterial: x     pH, Blood: 7.34  /  pCO2: 62    /  pO2: 67    / HCO3: 29    / Base Excess: 7.2   /  SaO2: 90                  WBC:  WBC Count: 9.34 K/uL (04-12 @ 06:13)  WBC Count: 8.88 K/uL (04-11 @ 06:28)  WBC Count: 6.23 K/uL (04-10 @ 07:28)  WBC Count: 4.24 K/uL (04-09 @ 07:09)      MICROBIOLOGY:  RECENT CULTURES:  04-09 .Sputum Sputum XXXX   Numerous polymorphonuclear leukocytes seen per low power field  Rare Squamous epithelial cells seen per low power field  Rare Gram positive cocci in pairs seen per oil power field   Normal Respiratory Jackelin present    04-06 .Blood Blood XXXX XXXX   No Growth Final          CARDIAC MARKERS ( 12 Apr 2020 06:13 )  .606 ng/mL / x     / x     / x     / x      CARDIAC MARKERS ( 11 Apr 2020 06:28 )  1.407 ng/mL / x     / 60 U/L / x     / x            PT/INR - ( 12 Apr 2020 06:13 )   PT: 11.1 sec;   INR: 1.00 ratio         PTT - ( 11 Apr 2020 10:54 )  PTT:29.3 sec    Sodium:  Sodium, Serum: 149 mmol/L (04-12 @ 06:13)  Sodium, Serum: 148 mmol/L (04-11 @ 06:28)  Sodium, Serum: 154 mmol/L (04-10 @ 07:28)  Sodium, Serum: 149 mmol/L (04-09 @ 07:09)      0.61 mg/dL 04-12 @ 06:13  0.66 mg/dL 04-11 @ 06:28  0.62 mg/dL 04-10 @ 07:28  0.73 mg/dL 04-09 @ 07:09      Hemoglobin:  Hemoglobin: 11.1 g/dL (04-12 @ 06:13)  Hemoglobin: 10.7 g/dL (04-11 @ 06:28)  Hemoglobin: 10.6 g/dL (04-10 @ 07:28)  Hemoglobin: 10.9 g/dL (04-09 @ 07:09)      Platelets: Platelet Count - Automated: 134 K/uL (04-12 @ 06:13)  Platelet Count - Automated: 163 K/uL (04-11 @ 06:28)  Platelet Count - Automated: 136 K/uL (04-10 @ 07:28)  Platelet Count - Automated: 109 K/uL (04-09 @ 07:09)      LIVER FUNCTIONS - ( 12 Apr 2020 06:13 )  Alb: 1.8 g/dL / Pro: 5.9 g/dL / ALK PHOS: 139 U/L / ALT: 69 U/L DA / AST: 68 U/L / GGT: x                 RADIOLOGY & ADDITIONAL STUDIES:

## 2020-04-12 NOTE — PROGRESS NOTE ADULT - SUBJECTIVE AND OBJECTIVE BOX
Chief Complaint: Shortness of breath    Interval Events: No events overnight.    Review of Systems:  General: No fevers, chills, weight loss or gain  Skin: No rashes, color changes  Cardiovascular: No chest pain, orthopnea  Respiratory: No shortness of breath, cough  Gastrointestinal: No nausea, abdominal pain  Genitourinary: No incontinence, pain with urination  Musculoskeletal: No pain, swelling, decreased range of motion  Neurological: No headache, weakness  Psychiatric: No depression, anxiety  Endocrine: No weight loss or gain, increased thirst  All other systems are comprehensively negative.    Physical Exam:  Vital Signs Last 24 Hrs  T(C): 36.1 (12 Apr 2020 06:00), Max: 36.7 (11 Apr 2020 15:20)  T(F): 97 (12 Apr 2020 06:00), Max: 98 (11 Apr 2020 15:20)  HR: 69 (12 Apr 2020 09:30) (52 - 70)  BP: 114/65 (12 Apr 2020 07:15) (108/62 - 128/92)  BP(mean): 99 (11 Apr 2020 22:00) (89 - 99)  RR: 20 (12 Apr 2020 07:15) (20 - 26)  SpO2: 93% (12 Apr 2020 09:30) (89% - 98%)  General: NAD  HEENT: MMM  Neck: No JVD, no carotid bruit  Lungs: CTAB  CV: RRR, nl S1/S2, no M/R/G  Abdomen: S/NT/ND, +BS  Extremities: No LE edema, no cyanosis  Neuro: AAOx3, non-focal  Skin: No rash    Labs:             04-12    149<H>  |  109<H>  |  13  ----------------------------<  198<H>  3.7   |  35<H>  |  0.61    Ca    7.6<L>      12 Apr 2020 06:13  Phos  3.3     04-12  Mg     1.9     04-12    TPro  5.9<L>  /  Alb  1.8<L>  /  TBili  0.2  /  DBili  x   /  AST  68<H>  /  ALT  69<H>  /  AlkPhos  139<H>  04-12                        11.1   9.34  )-----------( 134      ( 12 Apr 2020 06:13 )             33.6       Telemetry: Sinus rhythm

## 2020-04-12 NOTE — PROGRESS NOTE ADULT - ASSESSMENT
The patient is a 60 year old female with a history of MRDD, hypothyroidism, seizure d/o who is admitted with COVID-19, acute respiratory failure, septic shock, NSTEMI.    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

## 2020-04-13 NOTE — PROGRESS NOTE ADULT - SUBJECTIVE AND OBJECTIVE BOX
Chief Complaint: Shortness of breath    Interval Events: No events overnight.    Review of Systems:  General: No fevers, chills, weight loss or gain  Skin: No rashes, color changes  Cardiovascular: No chest pain, orthopnea  Respiratory: No shortness of breath, cough  Gastrointestinal: No nausea, abdominal pain  Genitourinary: No incontinence, pain with urination  Musculoskeletal: No pain, swelling, decreased range of motion  Neurological: No headache, weakness  Psychiatric: No depression, anxiety  Endocrine: No weight loss or gain, increased thirst  All other systems are comprehensively negative.    Physical Exam:  Vital Signs Last 24 Hrs  T(C): 36.3 (13 Apr 2020 02:00), Max: 36.8 (12 Apr 2020 18:00)  T(F): 97.4 (13 Apr 2020 02:00), Max: 98.2 (12 Apr 2020 18:00)  HR: 62 (13 Apr 2020 07:30) (55 - 69)  BP: 92/68 (13 Apr 2020 07:30) (92/68 - 127/79)  BP(mean): 87 (13 Apr 2020 06:00) (87 - 87)  RR: 19 (13 Apr 2020 07:30) (18 - 26)  SpO2: 94% (13 Apr 2020 07:30) (91% - 97%)  General: NAD  HEENT: MMM  Neck: No JVD, no carotid bruit  Lungs: CTAB  CV: RRR, nl S1/S2, no M/R/G  Abdomen: S/NT/ND, +BS  Extremities: No LE edema, no cyanosis  Neuro: AAOx3, non-focal  Skin: No rash    Labs:             04-13    145  |  108  |  16  ----------------------------<  90  4.6   |  38<H>  |  0.58    Ca    7.5<L>      13 Apr 2020 07:38  Phos  2.4     04-13  Mg     2.6     04-13    TPro  6.2  /  Alb  1.5<L>  /  TBili  0.1<L>  /  DBili  x   /  AST  58<H>  /  ALT  59  /  AlkPhos  115  04-13                        10.4   11.92 )-----------( 153      ( 13 Apr 2020 07:41 )             31.1       Telemetry: Sinus rhythm

## 2020-04-13 NOTE — PROGRESS NOTE ADULT - SUBJECTIVE AND OBJECTIVE BOX
Patient seen and examined at bedside.  Chart notes reviewed    vitals: stable  noted from nursing notes patients vent settings increased.  ETT tube requiring positoning  vent requirement now remain at 60 /8    Lab: hb stable  Cr stable  on IV levoquin    Review of Systems:  unable  Objective:  Vitals  T(C): 36.4 (04-13-20 @ 14:00), Max: 36.8 (04-12-20 @ 18:00)  HR: 60 (04-13-20 @ 14:00) (55 - 69)  BP: 91/55 (04-13-20 @ 14:00) (91/55 - 117/79)  RR: 22 (04-13-20 @ 14:00) (16 - 26)  SpO2: 92% (04-13-20 @ 14:00) (91% - 100%)    Physical Exam:  General: comfortable, sedated RASS 0    HEENT: Atraumatic, no LAD, trachea midline, PERRLA  Cardiovascular: normal s1s2, no murmurs, gallops or fricition rubs  Pulmonary: decreased, no wheezing , rhonchi  Gastrointestinal: soft non tender non distended, no masses felt, no organomegally  Muscloskeletal: no lower extremity edema, intact bilateral lower extremity pulses  Neurological: CN II-12 intact. No focal weakness  Psychiatrical: sedated  SKIN: no rash, lesions or ulcers    ~  Labs:                          10.4   11.92 )-----------( 153      ( 13 Apr 2020 07:41 )             31.1     04-13    145  |  108  |  16  ----------------------------<  90  4.6   |  38<H>  |  0.58    Ca    7.5<L>      13 Apr 2020 07:38  Phos  2.4     04-13  Mg     2.6     04-13    TPro  6.2  /  Alb  1.5<L>  /  TBili  0.1<L>  /  DBili  x   /  AST  58<H>  /  ALT  59  /  AlkPhos  115  04-13    LIVER FUNCTIONS - ( 13 Apr 2020 07:38 )  Alb: 1.5 g/dL / Pro: 6.2 g/dL / ALK PHOS: 115 U/L / ALT: 59 U/L DA / AST: 58 U/L / GGT: x           PT/INR - ( 13 Apr 2020 07:41 )   PT: 11.5 sec;   INR: 1.03 ratio               Active Medications  MEDICATIONS  (STANDING):  aspirin  chewable 81 milliGRAM(s) Oral daily  chlorhexidine 0.12% Liquid 15 milliLiter(s) Oral Mucosa every 12 hours  chlorhexidine 2% Cloths 1 Application(s) Topical daily  dexMEDEtomidine Infusion 0.4 MICROgram(s)/kG/Hr (4.54 mL/Hr) IV Continuous <Continuous>  enoxaparin Injectable 40 milliGRAM(s) SubCutaneous every 12 hours  lactobacillus acidophilus 1 Tablet(s) Oral three times a day with meals  levETIRAcetam  IVPB 1000 milliGRAM(s) IV Intermittent every 12 hours  levothyroxine Injectable 62.5 MICROGram(s) IV Push at bedtime  memantine 5 milliGRAM(s) Oral daily  midodrine 10 milliGRAM(s) Oral every 8 hours  norepinephrine Infusion 0.05 MICROgram(s)/kG/Min (4.26 mL/Hr) IV Continuous <Continuous>  pantoprazole  Injectable 40 milliGRAM(s) IV Push daily  propofol Infusion 10 MICROgram(s)/kG/Min (2.72 mL/Hr) IV Continuous <Continuous>  valproate sodium IVPB 500 milliGRAM(s) IV Intermittent every 12 hours    MEDICATIONS  (PRN):  acetaminophen   Tablet .. 650 milliGRAM(s) Oral every 6 hours PRN Temp greater or equal to 38C (100.4F), Mild Pain (1 - 3)  acetaminophen  Suppository .. 650 milliGRAM(s) Rectal every 4 hours PRN Temp greater or equal to 38C (100.4F)  LORazepam   Injectable 2 milliGRAM(s) IV Push every 6 hours PRN seizure activity

## 2020-04-13 NOTE — PROGRESS NOTE ADULT - SUBJECTIVE AND OBJECTIVE BOX
MIGNON WALTER    [facility] 09    Patient is a 60y old  Female who presents with a chief complaint of sob (13 Apr 2020 14:38)       Allergies    amoxicillin (Rash)  penicillin (Rash)    Intolerances        HPI:  Patient is a 60y old  Female who presents with a chief complaint of sob and fever    HPI:This is a nonverbal pt bib ems from residential for fever, sob, cough, low o2 sat, with possible covid exposure. per ems, pt on levaquin for aspiration pna. no further hx available.pt has hx of chronic constipation an dis on multiple stool softners.  she was found ot be hypernatremic and in renal failure at admission.  also has hx of hypothyroidism and down syndrome        PAST MEDICAL & SURGICAL HISTORY:  Frequent urinary tract infections  Hypothyroidism, unspecified type  Down syndrome      FAMILY HISTORY:can not obtain due to mental status      SOCIAL HISTORY:    Allergies    amoxicillin (Rash)  penicillin (Rash)    Intolerances          REVIEW OF SYSEMS:  negative except form above mentioned      Vital Signs Last 24 Hrs  T(C): 37.8 (04 Apr 2020 09:46), Max: 37.8 (04 Apr 2020 09:46)  T(F): 100 (04 Apr 2020 09:46), Max: 100 (04 Apr 2020 09:46)  HR: 68 (04 Apr 2020 11:00) (66 - 69)  BP: 89/52 (04 Apr 2020 11:00) (87/51 - 91/52)  BP(mean): --  RR: 24 (04 Apr 2020 11:00) (24 - 24)  SpO2: 100% (04 Apr 2020 11:00) (89% - 100%)  I&O's Summary      PHYSICAL EXAM:  GENERAL: onnc  HEAD:  Atraumatic, Normocephalic  EYES: EOMI, PERRLA, conjunctiva and sclera clear  NECK: Supple, No JVD  CHEST/LUNG: dec bs at bases and rhonchi  HEART: Regular rate and rhythm; No murmurs, rubs, or gallops  ABDOMEN: Soft, Nontender, Nondistended; Bowel sounds present  SKIN: No rashes or lesions    LABS:                        14.7   2.88  )-----------( 52       ( 04 Apr 2020 10:49 )             45.9     04-04    154<H>  |  117<H>  |  31<H>  ----------------------------<  107<H>  4.7   |  30  |  1.48<H>    Ca    8.5      04 Apr 2020 10:49    TPro  7.1  /  Alb  2.3<L>  /  TBili  0.2  /  DBili  x   /  AST  73<H>  /  ALT  66<H>  /  AlkPhos  118  04-04      CAPILLARY BLOOD GLUCOSE                RADIOLOGY & ADDITIONAL TESTS:    Imaging Personally Reviewed:  [x] YES  [ ] NO    Consultant(s) Notes Reviewed:  [x] YES  [ ] NO      MEDICATIONS  (STANDING):  azithromycin   Tablet 500 milliGRAM(s) Oral daily  dextrose 5%. 1000 milliLiter(s) (100 mL/Hr) IV Continuous <Continuous>  diVALproex  milliGRAM(s) Oral three times a day  enoxaparin Injectable 30 milliGRAM(s) SubCutaneous daily  levETIRAcetam 1000 milliGRAM(s) Oral two times a day  levothyroxine 125 MICROGram(s) Oral daily  memantine 5 milliGRAM(s) Oral daily  pantoprazole    Tablet 40 milliGRAM(s) Oral before breakfast  polyethylene glycol 3350 17 Gram(s) Oral daily    MEDICATIONS  (PRN):  acetaminophen   Tablet .. 650 milliGRAM(s) Oral every 6 hours PRN Temp greater or equal to 38C (100.4F), Mild Pain (1 - 3)      Care Discussed with Consultants/Other Providers [x] YES  [ ] NO    HEALTH ISSUES - PROBLEM Dx: (04 Apr 2020 13:11)      PAST MEDICAL & SURGICAL HISTORY:  Frequent urinary tract infections  Hypothyroidism, unspecified type  Down syndrome      FAMILY HISTORY:        MEDICATIONS   acetaminophen   Tablet .. 650 milliGRAM(s) Oral every 6 hours PRN  acetaminophen  Suppository .. 650 milliGRAM(s) Rectal every 4 hours PRN  aspirin  chewable 81 milliGRAM(s) Oral daily  chlorhexidine 0.12% Liquid 15 milliLiter(s) Oral Mucosa every 12 hours  chlorhexidine 2% Cloths 1 Application(s) Topical daily  dexMEDEtomidine Infusion 0.4 MICROgram(s)/kG/Hr IV Continuous <Continuous>  enoxaparin Injectable 40 milliGRAM(s) SubCutaneous every 12 hours  lactobacillus acidophilus 1 Tablet(s) Oral three times a day with meals  levETIRAcetam  IVPB 1000 milliGRAM(s) IV Intermittent every 12 hours  levothyroxine Injectable 62.5 MICROGram(s) IV Push at bedtime  LORazepam   Injectable 2 milliGRAM(s) IV Push every 6 hours PRN  memantine 5 milliGRAM(s) Oral daily  midodrine 10 milliGRAM(s) Oral every 8 hours  norepinephrine Infusion 0.05 MICROgram(s)/kG/Min IV Continuous <Continuous>  pantoprazole  Injectable 40 milliGRAM(s) IV Push daily  propofol Infusion 10 MICROgram(s)/kG/Min IV Continuous <Continuous>  valproate sodium IVPB 500 milliGRAM(s) IV Intermittent every 12 hours      Vital Signs Last 24 Hrs  T(C): 36.4 (13 Apr 2020 14:00), Max: 36.8 (12 Apr 2020 18:00)  T(F): 97.6 (13 Apr 2020 14:00), Max: 98.2 (12 Apr 2020 18:00)  HR: 60 (13 Apr 2020 14:00) (58 - 69)  BP: 91/55 (13 Apr 2020 14:00) (91/55 - 117/79)  BP(mean): 87 (13 Apr 2020 06:00) (87 - 87)  RR: 22 (13 Apr 2020 14:00) (16 - 26)  SpO2: 92% (13 Apr 2020 14:00) (91% - 100%)      04-12-20 @ 07:01  -  04-13-20 @ 07:00  --------------------------------------------------------  IN: 2437.3 mL / OUT: 2540 mL / NET: -102.7 mL    04-13-20 @ 07:01  -  04-13-20 @ 15:47  --------------------------------------------------------  IN: 0 mL / OUT: 850 mL / NET: -850 mL        Mode: AC/ CMV (Assist Control/ Continuous Mandatory Ventilation), RR (machine): 20, TV (machine): 320, FiO2: 80, PEEP: 5, ITime: 1, MAP: 14, PIP: 27    LABS:                        10.4   11.92 )-----------( 153      ( 13 Apr 2020 07:41 )             31.1     04-13    145  |  108  |  16  ----------------------------<  90  4.6   |  38<H>  |  0.58    Ca    7.5<L>      13 Apr 2020 07:38  Phos  2.4     04-13  Mg     2.6     04-13    TPro  6.2  /  Alb  1.5<L>  /  TBili  0.1<L>  /  DBili  x   /  AST  58<H>  /  ALT  59  /  AlkPhos  115  04-13    PT/INR - ( 13 Apr 2020 07:41 )   PT: 11.5 sec;   INR: 1.03 ratio               ABG - ( 13 Apr 2020 06:26 )  pH, Arterial: x     pH, Blood: 7.47  /  pCO2: 50    /  pO2: 64    / HCO3: 35    / Base Excess: 12.5  /  SaO2: 93                  WBC:  WBC Count: 11.92 K/uL (04-13 @ 07:41)  WBC Count: 9.34 K/uL (04-12 @ 06:13)  WBC Count: 8.88 K/uL (04-11 @ 06:28)  WBC Count: 6.23 K/uL (04-10 @ 07:28)      MICROBIOLOGY:  RECENT CULTURES:  04-09 .Sputum Sputum XXXX   Numerous polymorphonuclear leukocytes seen per low power field  Rare Squamous epithelial cells seen per low power field  Rare Gram positive cocci in pairs seen per oil power field   Normal Respiratory Jackelin present    04-06 .Blood Blood XXXX XXXX   No Growth Final          CARDIAC MARKERS ( 12 Apr 2020 06:13 )  .606 ng/mL / x     / x     / x     / x            PT/INR - ( 13 Apr 2020 07:41 )   PT: 11.5 sec;   INR: 1.03 ratio             Sodium:  Sodium, Serum: 145 mmol/L (04-13 @ 07:38)  Sodium, Serum: 149 mmol/L (04-12 @ 06:13)  Sodium, Serum: 148 mmol/L (04-11 @ 06:28)  Sodium, Serum: 154 mmol/L (04-10 @ 07:28)      0.58 mg/dL 04-13 @ 07:38  0.61 mg/dL 04-12 @ 06:13  0.66 mg/dL 04-11 @ 06:28  0.62 mg/dL 04-10 @ 07:28      Hemoglobin:  Hemoglobin: 10.4 g/dL (04-13 @ 07:41)  Hemoglobin: 11.1 g/dL (04-12 @ 06:13)  Hemoglobin: 10.7 g/dL (04-11 @ 06:28)  Hemoglobin: 10.6 g/dL (04-10 @ 07:28)      Platelets: Platelet Count - Automated: 153 K/uL (04-13 @ 07:41)  Platelet Count - Automated: 134 K/uL (04-12 @ 06:13)  Platelet Count - Automated: 163 K/uL (04-11 @ 06:28)  Platelet Count - Automated: 136 K/uL (04-10 @ 07:28)      LIVER FUNCTIONS - ( 13 Apr 2020 07:38 )  Alb: 1.5 g/dL / Pro: 6.2 g/dL / ALK PHOS: 115 U/L / ALT: 59 U/L DA / AST: 58 U/L / GGT: x                 RADIOLOGY & ADDITIONAL STUDIES:

## 2020-04-13 NOTE — CHART NOTE - NSCHARTNOTEFT_GEN_A_CORE
called to bedside by RT 2/2 to low TV earlier this morning, pt suction multiple times without improvements, vent changed no improvement, and attempted chest PT and ETT noted at 19cm on the lip, per intubation noted ETT was placed at 21cm, ETT advanced to 21cm at the lip and TV became normal, repeat CXR reviewed noted to have ETT in satisfactory position also note to have air-filled distended bowel loop on the left side, ABd xray was ordered to r/o SBO and noted to have finding consistent of ileus, will d/c TF and NGT use for now and make pt NPO and obtain serial abd xrays/xrays

## 2020-04-13 NOTE — PROGRESS NOTE ADULT - ASSESSMENT
59 yo F  , nonverbal, MRDD, hypothyroidism and down syndrome, hx of seizures BIBA  from group home for fever, sob, cough, low o2 sat. Admitted to [facility] for evaluation of r/o COVBID 19 PNA. also found to be hypernatremic and with evidence of Acute Renal Failure. Now admitted to ICU for acute severe respiratory decompensation secondary to COVID 19 PNA      Acute Severe Hypoxemic Respiratory Failure MF due to COVID 19 PNA with evidence of Mucus plug++Aspiration PNA  Septic Shock due to the above  CT with evidence of Right sided Mucus Plugging  s/p zithromax and meropenam  on pressors  completed course of  plaq and steroids  Patient ++secretions from ET tube  EKG 4/9 at 443  Levoquin added  sputum negative  Plan:  maintain low tidal  Titrate sat to 92 percent    followup q48h ldh ferritin and ddimer  daily cbc with diff    Breakthrough Generalized Seizures  may be contributing factor to aspiration PNA  on Keppra IV q12th + ativan rescue + depakote  Neuro called to evaluate :: added bolus depakote    Diarrhea liikely from antibiotics  on ddx is viral although hard to stay if bout started today  giving probiotics , checking cdiff and stool  flexiseal    Troponemia NSTEMI type II likely due to septic cardiomyopathy now with new evidence of Congestive Heart failure EF of 40 percent  trop from 1 to 2.7  Cards consulted  Discussed case with ICU team and cardiology will start IVIG      Hypernatremia.     Was on d5w at 100ml hr  nephro consulted, recs appreciated.       ADAM likely prerenal oliguria  Resolved    Hypothyroidism, unspecified type.  Plan: levothyroxine.     Constipation.  miralax      code: Full  dvt ppx: lovenox subq	  dispo: back to facility when stable    Spoke to brother in length. discussed echo findings of new CHF  also discussed patient remains on vent, vent setting have not improved.  Primary team has exhausted all efforts in treating patient pna    Brother asking again , tearfully if there are any other treatments avaiable    Discussed again with intensivist , and cardiology  toci not approved nor with proven data  Intensivist and Cardiology Advised against continued IVIG    At this point, patient has prolonged stay. with prolonged vent requirements      brother has asked to give him to process information. will give decision on DNR/DNI within the next 24. hours

## 2020-04-13 NOTE — PROGRESS NOTE ADULT - ASSESSMENT
cont rx VITALS/LABS     2020 afeb 58 92/68   2020 8a DEXM .396 m/k/h   2020 8a nore .15 m/k/m   2020 8a prop 30 m/k/m   2020 w 11.9 Hb 10.4 Plt 153  2020 Na 145 K 4.6 CO2 38 Cr .5      2020 ac 20/320/+5/70%     PT DATA/BEST PRACTICE  ALLERGY     NOTEWORTHY  POINTS/CHANGES ROS/PE   2020 started on po vanco for diarrhea   2020 ivig given by hospitalist   2020 Had seizure  2020 a Intubated tr to SICU   2020 fc given 945a Puentes ordered    Transferred to ICU May need intubn and fob dw brother                                WT  45 (2020)                    BMI     20 (2020)     ADVANCED DIRECTIVE       Goals of care discussion                                                                                      HEAD OF BED ELEVATION Yes  DYSPHAGIA EVAL                                  DIET      dys 1 puree () --> jevity 1.5 720 ()--> 1080 (4/10) --> npo (  (CCPA MP ) (ileus)    .4 ()  (Dr RICHARDSON)                                   IV F        D5 100 ()   --> D5 1/2 75 (() --> D5 75 ()  --> d5 5 (4/10-)      (DCEd by Dr RICHARDSON)                                        DVT PROPHYLAXIS      lvnx 30 ()      --> Lvnx 40 ()   --> lvnx 40.2 ()      PATIENT SUMMARY   60 f nonverbal downs syndrome from group home HO COVID exposure per transfer papers on Rx for aspiration pneumonia and shortness of breath   er vitals 90/50 66 100f 92%    Pt found to have pneumonia pl effsn hypernatremia and adam on arrival COVID palma started Pulm consulted     Pulm consulted  CT ch showed l lung collapse Pt ruled out for COVID     Home meds memantine 28 keppra 1.2                                Pt tr sicu 2020 itubated hemaodynamci intability               PLAN   DVT P On lvnx 30 ()      --> Lvnx 40.2 ()      RESP FAIL Sec COVID 19  GAS EXCHANGE   2020  ac 20/320/+5/70%  747/50/64   Target PaO2 60 pH 730 Ppl 30   ltvv vent   POSS BACT PNEU sp levaquin (-) pc downtrend  DIARRHEA 2020 axr ileus   Made npo 2020   vanco 125.4 () (diarrhea) --> dced 2020  COVID 19     --4/10---2020 nlr 6.1 -7.1- 5.3 - 1.3 -5.5 -3.5  ---2020 ferritin [id number] - 1404  - 1039   --2020 procal .27-.34- .1  -2020 crp 2.6 - 3.8  -2020 esr 26 - 25  -2020 d-dimer [phone]       HCQ ()   2020 qtc 442   IVIG 20g/d.4d () (Dr RICHARDSON) --> DCED 2020    solumed 80.5d ()     Cont supp rx  ARDS ltvv   HEMODYNAMICS In shock sec COVID 19 Remains on nore () Mido () Given alb   NONOLIGURIC ADAM Monitor lytes   HYPERNATR   ----4/10---2020 Na 151 -151-143 -[phone]  IV D5 50 startd 4/10/2020 DCED 2020 Dr RICHARDSON  monitor serially   On fw 240.4 (4/10)   SZ On keppra and ativan for break thru  GOC 2020 Discussions under way with brother  PLAN   Wean off vasopressors and vent as tolerated     TIME SPENT Over 36 minutes aggregate critical  care time spent on encounter; activities included   direct pt care, counseling and/or coordinating care reviewing notes, lab data/ imaging , discussion with multidisciplinary team/ pt /family. Risks, benefits, alternatives  discussed in detail.    [facility] [phone]   1960 DOA 2020 DR NORTH MEDLEY           cont rx

## 2020-04-13 NOTE — PROGRESS NOTE ADULT - SUBJECTIVE AND OBJECTIVE BOX
Date/Time Patient Seen:  		  Referring MD:   Data Reviewed	       Patient is a 60y old  Female who presents with a chief complaint of sob (13 Apr 2020 08:55)      Subjective/HPI     PAST MEDICAL & SURGICAL HISTORY:  Frequent urinary tract infections  Hypothyroidism, unspecified type  Down syndrome        Medication list         MEDICATIONS  (STANDING):  acetaZOLAMIDE  IVPB 250 milliGRAM(s) IV Intermittent once  aspirin  chewable 81 milliGRAM(s) Oral daily  chlorhexidine 0.12% Liquid 15 milliLiter(s) Oral Mucosa every 12 hours  chlorhexidine 2% Cloths 1 Application(s) Topical daily  dexMEDEtomidine Infusion 0.4 MICROgram(s)/kG/Hr (4.54 mL/Hr) IV Continuous <Continuous>  enoxaparin Injectable 40 milliGRAM(s) SubCutaneous daily  immune   globulin 10% (GAMMAGARD) IVPB 20 Gram(s) IV Intermittent daily  lactobacillus acidophilus 1 Tablet(s) Oral three times a day with meals  levETIRAcetam  IVPB 1000 milliGRAM(s) IV Intermittent every 12 hours  levoFLOXacin IVPB      levoFLOXacin IVPB 500 milliGRAM(s) IV Intermittent every 24 hours  levothyroxine Injectable 62.5 MICROGram(s) IV Push at bedtime  memantine 5 milliGRAM(s) Oral daily  midodrine 10 milliGRAM(s) Oral every 8 hours  norepinephrine Infusion 0.05 MICROgram(s)/kG/Min (4.26 mL/Hr) IV Continuous <Continuous>  pantoprazole  Injectable 40 milliGRAM(s) IV Push daily  propofol Infusion 10 MICROgram(s)/kG/Min (2.72 mL/Hr) IV Continuous <Continuous>  valproate sodium IVPB 250 milliGRAM(s) IV Intermittent every 8 hours  vancomycin    Solution 125 milliGRAM(s) Oral every 6 hours    MEDICATIONS  (PRN):  acetaminophen   Tablet .. 650 milliGRAM(s) Oral every 6 hours PRN Temp greater or equal to 38C (100.4F), Mild Pain (1 - 3)  acetaminophen  Suppository .. 650 milliGRAM(s) Rectal every 4 hours PRN Temp greater or equal to 38C (100.4F)  LORazepam   Injectable 2 milliGRAM(s) IV Push every 6 hours PRN seizure activity         Vitals log        ICU Vital Signs Last 24 Hrs  T(C): 36.3 (13 Apr 2020 02:00), Max: 36.8 (12 Apr 2020 18:00)  T(F): 97.4 (13 Apr 2020 02:00), Max: 98.2 (12 Apr 2020 18:00)  HR: 62 (13 Apr 2020 07:30) (55 - 69)  BP: 92/68 (13 Apr 2020 07:30) (92/68 - 127/79)  BP(mean): 87 (13 Apr 2020 06:00) (87 - 87)  ABP: --  ABP(mean): --  RR: 19 (13 Apr 2020 07:30) (18 - 26)  SpO2: 94% (13 Apr 2020 07:30) (91% - 97%)       Mode: AC/ CMV (Assist Control/ Continuous Mandatory Ventilation)  RR (machine): 20  TV (machine): 320  FiO2: 60  PEEP: 5  ITime: 1  MAP: 13  PIP: 40      Input and Output:  I&O's Detail    12 Apr 2020 07:01  -  13 Apr 2020 07:00  --------------------------------------------------------  IN:    dexmedetomidine Infusion: 112.5 mL    Enteral Tube Flush: 480 mL    IV PiggyBack: 650 mL    norepinephrine Infusion: 320 mL    ns in tub fed  [id number]: 720 mL    propofol Infusion: 154.8 mL  Total IN: 2437.3 mL    OUT:    Indwelling Catheter - Urethral: 2540 mL  Total OUT: 2540 mL    Total NET: -102.7 mL          Lab Data                        10.4   11.92 )-----------( 153      ( 13 Apr 2020 07:41 )             31.1     04-13    145  |  108  |  16  ----------------------------<  90  4.6   |  38<H>  |  0.58    Ca    7.5<L>      13 Apr 2020 07:38  Phos  2.4     04-13  Mg     2.6     04-13    TPro  6.2  /  Alb  1.5<L>  /  TBili  0.1<L>  /  DBili  x   /  AST  58<H>  /  ALT  59  /  AlkPhos  115  04-13    ABG - ( 13 Apr 2020 06:26 )  pH, Arterial: x     pH, Blood: 7.47  /  pCO2: 50    /  pO2: 64    / HCO3: 35    / Base Excess: 12.5  /  SaO2: 93                CARDIAC MARKERS ( 12 Apr 2020 06:13 )  .606 ng/mL / x     / x     / x     / x            Review of Systems	      Objective     Physical Examination    heart s1s2  lung dec BS  abd soft      Pertinent Lab findings & Imaging      Deloris:  NO   Adequate UO     I&O's Detail    12 Apr 2020 07:01  -  13 Apr 2020 07:00  --------------------------------------------------------  IN:    dexmedetomidine Infusion: 112.5 mL    Enteral Tube Flush: 480 mL    IV PiggyBack: 650 mL    norepinephrine Infusion: 320 mL    ns in tub fed  [id number]: 720 mL    propofol Infusion: 154.8 mL  Total IN: 2437.3 mL    OUT:    Indwelling Catheter - Urethral: 2540 mL  Total OUT: 2540 mL    Total NET: -102.7 mL               Discussed with:     Cultures:	        Radiology

## 2020-04-13 NOTE — PROGRESS NOTE ADULT - SUBJECTIVE AND OBJECTIVE BOX
Patient is a 60y old  Female who presents with a chief complaint of sob (06 Apr 2020 11:30)    Patient seen in follow up for ADAM, Hypernatremia.   COVID +. Chart reviewed.        PAST MEDICAL HISTORY:  Frequent urinary tract infections  Hypothyroidism, unspecified type  Down syndrome        MEDICATIONS  (STANDING):  aspirin  chewable 81 milliGRAM(s) Oral daily  chlorhexidine 0.12% Liquid 15 milliLiter(s) Oral Mucosa every 12 hours  chlorhexidine 2% Cloths 1 Application(s) Topical daily  dexMEDEtomidine Infusion 0.4 MICROgram(s)/kG/Hr (4.54 mL/Hr) IV Continuous <Continuous>  enoxaparin Injectable 40 milliGRAM(s) SubCutaneous every 12 hours  lactobacillus acidophilus 1 Tablet(s) Oral three times a day with meals  levETIRAcetam  IVPB 1000 milliGRAM(s) IV Intermittent every 12 hours  levothyroxine Injectable 62.5 MICROGram(s) IV Push at bedtime  memantine 5 milliGRAM(s) Oral daily  midodrine 10 milliGRAM(s) Oral every 8 hours  norepinephrine Infusion 0.05 MICROgram(s)/kG/Min (4.26 mL/Hr) IV Continuous <Continuous>  pantoprazole  Injectable 40 milliGRAM(s) IV Push daily  propofol Infusion 10 MICROgram(s)/kG/Min (2.72 mL/Hr) IV Continuous <Continuous>  tocilizumab IVPB 400 milliGRAM(s) IV Intermittent once  valproate sodium IVPB 500 milliGRAM(s) IV Intermittent every 12 hours    MEDICATIONS  (PRN):  acetaminophen   Tablet .. 650 milliGRAM(s) Oral every 6 hours PRN Temp greater or equal to 38C (100.4F), Mild Pain (1 - 3)  acetaminophen  Suppository .. 650 milliGRAM(s) Rectal every 4 hours PRN Temp greater or equal to 38C (100.4F)  LORazepam   Injectable 2 milliGRAM(s) IV Push every 6 hours PRN seizure activity    T(C): 36.6 (04-13-20 @ 10:00), Max: 36.8 (04-12-20 @ 18:00)  HR: 58 (04-13-20 @ 10:00) (52 - 70)  BP: 92/68 (04-13-20 @ 10:00) (92/68 - 128/92)  RR: 16 (04-13-20 @ 10:00)  SpO2: 100% (04-13-20 @ 10:00)  Wt(kg): --  I&O's Detail    12 Apr 2020 07:01  -  13 Apr 2020 07:00  --------------------------------------------------------  IN:    dexmedetomidine Infusion: 112.5 mL    Enteral Tube Flush: 480 mL    IV PiggyBack: 650 mL    norepinephrine Infusion: 320 mL    ns in tub fed  [id number]: 720 mL    propofol Infusion: 154.8 mL  Total IN: 2437.3 mL    OUT:    Indwelling Catheter - Urethral: 2540 mL  Total OUT: 2540 mL    Total NET: -102.7 mL      PHYSICAL EXAM:  Pt on COVID precautions. Chart reviewed and previous physical examination noted.                 LABORATORY:                        10.4   11.92 )-----------( 153      ( 13 Apr 2020 07:41 )             31.1     04-13    145  |  108  |  16  ----------------------------<  90  4.6   |  38<H>  |  0.58    Ca    7.5<L>      13 Apr 2020 07:38  Phos  2.4     04-13  Mg     2.6     04-13    TPro  6.2  /  Alb  1.5<L>  /  TBili  0.1<L>  /  DBili  x   /  AST  58<H>  /  ALT  59  /  AlkPhos  115  04-13    Sodium, Serum: 145 mmol/L (04-13 @ 07:38)  Sodium, Serum: 149 mmol/L (04-12 @ 06:13)    Potassium, Serum: 4.6 mmol/L (04-13 @ 07:38)  Potassium, Serum: 3.7 mmol/L (04-12 @ 06:13)    Hemoglobin: 10.4 g/dL (04-13 @ 07:41)  Hemoglobin: 11.1 g/dL (04-12 @ 06:13)  Hemoglobin: 10.7 g/dL (04-11 @ 06:28)    Creatinine, Serum 0.58 (04-13 @ 07:38)  Creatinine, Serum 0.61 (04-12 @ 06:13)  Creatinine, Serum 0.66 (04-11 @ 06:28)    CARDIAC MARKERS ( 12 Apr 2020 06:13 )  .606 ng/mL / x     / x     / x     / x          LIVER FUNCTIONS - ( 13 Apr 2020 07:38 )  Alb: 1.5 g/dL / Pro: 6.2 g/dL / ALK PHOS: 115 U/L / ALT: 59 U/L DA / AST: 58 U/L / GGT: x             ABG - ( 13 Apr 2020 06:26 )  pH, Arterial: x     pH, Blood: 7.47  /  pCO2: 50    /  pO2: 64    / HCO3: 35    / Base Excess: 12.5  /  SaO2: 93

## 2020-04-13 NOTE — PROGRESS NOTE ADULT - SUBJECTIVE AND OBJECTIVE BOX
Neurology follow up note    MIGNON [id number]      Interval History:      Patient intubated sedated     MEDICATIONS    acetaminophen   Tablet .. 650 milliGRAM(s) Oral every 6 hours PRN  acetaminophen  Suppository .. 650 milliGRAM(s) Rectal every 4 hours PRN  acetaZOLAMIDE  IVPB 250 milliGRAM(s) IV Intermittent once  aspirin  chewable 81 milliGRAM(s) Oral daily  chlorhexidine 0.12% Liquid 15 milliLiter(s) Oral Mucosa every 12 hours  chlorhexidine 2% Cloths 1 Application(s) Topical daily  dexMEDEtomidine Infusion 0.4 MICROgram(s)/kG/Hr IV Continuous <Continuous>  enoxaparin Injectable 40 milliGRAM(s) SubCutaneous every 12 hours  immune   globulin 10% (GAMMAGARD) IVPB 20 Gram(s) IV Intermittent daily  lactobacillus acidophilus 1 Tablet(s) Oral three times a day with meals  levETIRAcetam  IVPB 1000 milliGRAM(s) IV Intermittent every 12 hours  levoFLOXacin IVPB      levoFLOXacin IVPB 500 milliGRAM(s) IV Intermittent every 24 hours  levothyroxine Injectable 62.5 MICROGram(s) IV Push at bedtime  LORazepam   Injectable 2 milliGRAM(s) IV Push every 6 hours PRN  memantine 5 milliGRAM(s) Oral daily  midodrine 10 milliGRAM(s) Oral every 8 hours  norepinephrine Infusion 0.05 MICROgram(s)/kG/Min IV Continuous <Continuous>  pantoprazole  Injectable 40 milliGRAM(s) IV Push daily  propofol Infusion 10 MICROgram(s)/kG/Min IV Continuous <Continuous>  valproate sodium IVPB 250 milliGRAM(s) IV Intermittent every 8 hours  vancomycin    Solution 125 milliGRAM(s) Oral every 6 hours      Allergies    amoxicillin (Rash)  penicillin (Rash)    Intolerances            Vital Signs Last 24 Hrs  T(C): 36.6 (13 Apr 2020 10:00), Max: 36.8 (12 Apr 2020 18:00)  T(F): 97.9 (13 Apr 2020 10:00), Max: 98.2 (12 Apr 2020 18:00)  HR: 58 (13 Apr 2020 10:00) (55 - 69)  BP: 92/68 (13 Apr 2020 10:00) (92/68 - 117/79)  BP(mean): 87 (13 Apr 2020 06:00) (87 - 87)  RR: 16 (13 Apr 2020 10:00) (16 - 26)  SpO2: 100% (13 Apr 2020 10:00) (91% - 100%)        REVIEW OF SYSTEMS:  Could not be obtained secondary to the patient being nonverbal.    PHYSICAL EXAMINATION:    HEENT:  Head:  Normocephalic and atraumatic.  Eyes:  No scleral icterus.  Ears:  Hearing hard to evaluate secondary to the patient being sedated and intubated.  RESPIRATORY:  Good air entry bilaterally.  CARDIOVASCULAR:  S1 and S2 are heard.  ABDOMEN:  Soft and nontender.  EXTREMITIES:  No clubbing or cyanosis were noted.      NEUROLOGICAL:  Limited secondary to the patient being intubated and sedated.  No response to verbal stimuli.  I opened the patient's eyes, no gaze preference.  I applied stimuli to all four extremities with slight withdrawal bilateral upper and lower.  Tone; bilateral upper and lower was increased.                  LABS:  CBC Full  -  ( 13 Apr 2020 07:41 )  WBC Count : 11.92 K/uL  RBC Count : 3.06 M/uL  Hemoglobin : 10.4 g/dL  Hematocrit : 31.1 %  Platelet Count - Automated : 153 K/uL  Mean Cell Volume : 101.6 fl  Mean Cell Hemoglobin : 34.0 pg  Mean Cell Hemoglobin Concentration : 33.4 gm/dL  Auto Neutrophil # : 8.32 K/uL  Auto Lymphocyte # : 2.33 K/uL  Auto Monocyte # : 0.52 K/uL  Auto Eosinophil # : 0.00 K/uL  Auto Basophil # : 0.04 K/uL  Auto Neutrophil % : 69.8 %  Auto Lymphocyte % : 19.5 %  Auto Monocyte % : 4.4 %  Auto Eosinophil % : 0.0 %  Auto Basophil % : 0.3 %      04-13    145  |  108  |  16  ----------------------------<  90  4.6   |  38<H>  |  0.58    Ca    7.5<L>      13 Apr 2020 07:38  Phos  2.4     04-13  Mg     2.6     04-13    TPro  6.2  /  Alb  1.5<L>  /  TBili  0.1<L>  /  DBili  x   /  AST  58<H>  /  ALT  59  /  AlkPhos  115  04-13    Hemoglobin A1C:     LIVER FUNCTIONS - ( 13 Apr 2020 07:38 )  Alb: 1.5 g/dL / Pro: 6.2 g/dL / ALK PHOS: 115 U/L / ALT: 59 U/L DA / AST: 58 U/L / GGT: x           Vitamin B12   PT/INR - ( 13 Apr 2020 07:41 )   PT: 11.5 sec;   INR: 1.03 ratio         PTT - ( 11 Apr 2020 10:54 )  PTT:29.3 sec      RADIOLOGY      ANALYSIS AND PLAN:  This is a 60-year-old with altered mental status and episode of seizure.  1.	For seizure event, suspect this could be secondary to the patient's interaction with antibiotics. will change Depakote to 500 bid  no new seizures monitor levels.   2.	I will recommend Ativan 1 mg IV push q.6 h. p.r.n. for any type of breakthrough seizures.  3.	Seizure precautions.  4.	For altered mental status, secondary to metabolic encephalopathy secondary to COVID-19 infection.  5.	For hypotension, continue the patient on midodrine.  If possible, please maintain systolic blood pressure of above 100 and help maintain cerebral perfusion.  6.	For history of hypothyroidism, continue the patient on Synthroid.  7.	spoke to staff no new events   Greater than 34 minutes spent in direct patient care reviewing  the notes, lab data/ imaging

## 2020-04-13 NOTE — PROGRESS NOTE ADULT - ASSESSMENT
The patient is a 60 year old female with a history of MRDD, hypothyroidism, seizure d/o who is admitted with COVID-19, acute respiratory failure, septic shock, NSTEMI.    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 There may be a segmental component suggesting underlying CAD.  - Continue aspirin 81 mg daily  - On vancomycin PO, levofloxacin  - On enoxaparin DVT prophylaxis  - Overall prognosis poor The patient is a 60 year old female with a history of MRDD, hypothyroidism, seizure d/o who is admitted with COVID-19, acute respiratory failure, septic shock, NSTEMI.    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 There may be a segmental component suggesting underlying CAD.  - Receiving IVIg for the possibility of fulminant myocarditis although no data to suggest benefit  - Continue aspirin 81 mg daily  - On vancomycin PO, levofloxacin  - On enoxaparin DVT prophylaxis  - Overall prognosis poor

## 2020-04-14 NOTE — ED PROVIDER NOTE - HEAD SHAPE
this Virtual Visit was conducted with patient's (and/or legal guardian's) consent, to reduce the patient's risk of exposure to COVID-19 and provide necessary medical care. The patient (and/or legal guardian) has also been advised to contact this office for worsening conditions or problems, and seek emergency medical treatment and/or call 911 if deemed necessary. Services were provided through a video synchronous discussion virtually to substitute for in-person clinic visit. Patient and provider were located at their individual homes. --KUN Delgadillo on 4/14/2020 at 8:55 AM    An electronic signature was used to authenticate this note. MICROCEPHALY

## 2020-04-14 NOTE — PROGRESS NOTE ADULT - SUBJECTIVE AND OBJECTIVE BOX
Date/Time Patient Seen:  		  Referring MD:   Data Reviewed	       Patient is a 60y old  Female who presents with a chief complaint of sob (13 Apr 2020 15:46)      Subjective/HPI     PAST MEDICAL & SURGICAL HISTORY:  Frequent urinary tract infections  Hypothyroidism, unspecified type  Down syndrome        Medication list         MEDICATIONS  (STANDING):  aspirin  chewable 81 milliGRAM(s) Oral daily  cefepime   IVPB 1000 milliGRAM(s) IV Intermittent every 8 hours  cefepime   IVPB      chlorhexidine 0.12% Liquid 15 milliLiter(s) Oral Mucosa every 12 hours  chlorhexidine 2% Cloths 1 Application(s) Topical daily  dexMEDEtomidine Infusion 0.4 MICROgram(s)/kG/Hr (4.54 mL/Hr) IV Continuous <Continuous>  enoxaparin Injectable 40 milliGRAM(s) SubCutaneous every 12 hours  lactobacillus acidophilus 1 Tablet(s) Oral three times a day with meals  levETIRAcetam  IVPB 1000 milliGRAM(s) IV Intermittent every 12 hours  levothyroxine Injectable 62.5 MICROGram(s) IV Push at bedtime  memantine 5 milliGRAM(s) Oral daily  midodrine 10 milliGRAM(s) Oral every 8 hours  norepinephrine Infusion 0.05 MICROgram(s)/kG/Min (4.26 mL/Hr) IV Continuous <Continuous>  pantoprazole  Injectable 40 milliGRAM(s) IV Push daily  propofol Infusion 10 MICROgram(s)/kG/Min (2.72 mL/Hr) IV Continuous <Continuous>  valproate sodium IVPB 500 milliGRAM(s) IV Intermittent every 12 hours    MEDICATIONS  (PRN):  acetaminophen   Tablet .. 650 milliGRAM(s) Oral every 6 hours PRN Temp greater or equal to 38C (100.4F), Mild Pain (1 - 3)  acetaminophen  Suppository .. 650 milliGRAM(s) Rectal every 4 hours PRN Temp greater or equal to 38C (100.4F)  LORazepam   Injectable 2 milliGRAM(s) IV Push every 6 hours PRN seizure activity         Vitals log        ICU Vital Signs Last 24 Hrs  T(C): 37.6 (14 Apr 2020 07:30), Max: 37.6 (14 Apr 2020 07:30)  T(F): 99.6 (14 Apr 2020 07:30), Max: 99.6 (14 Apr 2020 07:30)  HR: 57 (14 Apr 2020 07:30) (56 - 78)  BP: 105/70 (14 Apr 2020 07:30) (90/59 - 118/67)  BP(mean): 69 (13 Apr 2020 23:52) (67 - 69)  ABP: --  ABP(mean): --  RR: 20 (14 Apr 2020 07:30) (16 - 25)  SpO2: 96% (14 Apr 2020 07:30) (92% - 100%)       Mode: AC/ CMV (Assist Control/ Continuous Mandatory Ventilation)  RR (machine): 20  TV (machine): 320  FiO2: 70  PEEP: 5  ITime: 1  MAP: 10  PIP: 37      Input and Output:  I&O's Detail    13 Apr 2020 07:01  -  14 Apr 2020 07:00  --------------------------------------------------------  IN:    dexmedetomidine Infusion: 76.5 mL    IV PiggyBack: 300 mL    norepinephrine Infusion: 147.6 mL    propofol Infusion: 103 mL  Total IN: 627.1 mL    OUT:    Indwelling Catheter - Urethral: 2550 mL  Total OUT: 2550 mL    Total NET: -1922.9 mL          Lab Data                        10.4   9.98  )-----------( 136      ( 14 Apr 2020 07:14 )             31.3     04-14    147<H>  |  111<H>  |  16  ----------------------------<  100<H>  3.5   |  35<H>  |  0.69    Ca    8.1<L>      14 Apr 2020 07:06  Phos  3.2     04-14  Mg     1.9     04-14    TPro  6.0  /  Alb  1.4<L>  /  TBili  0.2  /  DBili  x   /  AST  43<H>  /  ALT  44  /  AlkPhos  92  04-14    ABG - ( 14 Apr 2020 06:56 )  pH, Arterial: x     pH, Blood: 7.44  /  pCO2: 48    /  pO2: 54    / HCO3: 31    / Base Excess: 8.8   /  SaO2: 86                      Review of Systems	      Objective     Physical Examination    heart s1s2  lung dec BS  abd soft  head nc      Pertinent Lab findings & Imaging      Puentes:  NO   Adequate UO     I&O's Detail    13 Apr 2020 07:01  -  14 Apr 2020 07:00  --------------------------------------------------------  IN:    dexmedetomidine Infusion: 76.5 mL    IV PiggyBack: 300 mL    norepinephrine Infusion: 147.6 mL    propofol Infusion: 103 mL  Total IN: 627.1 mL    OUT:    Indwelling Catheter - Urethral: 2550 mL  Total OUT: 2550 mL    Total NET: -1922.9 mL               Discussed with:     Cultures:	        Radiology

## 2020-04-14 NOTE — PROGRESS NOTE ADULT - SUBJECTIVE AND OBJECTIVE BOX
Chief Complaint: Shortness of breath    Interval Events: No significant changes.    Review of Systems:  General: No fevers, chills, weight loss or gain  Skin: No rashes, color changes  Cardiovascular: No chest pain, orthopnea  Respiratory: No shortness of breath, cough  Gastrointestinal: No nausea, abdominal pain  Genitourinary: No incontinence, pain with urination  Musculoskeletal: No pain, swelling, decreased range of motion  Neurological: No headache, weakness  Psychiatric: No depression, anxiety  Endocrine: No weight loss or gain, increased thirst  All other systems are comprehensively negative.    Physical Exam:  Vital Signs Last 24 Hrs  T(C): 37.6 (14 Apr 2020 07:30), Max: 37.6 (14 Apr 2020 07:30)  T(F): 99.6 (14 Apr 2020 07:30), Max: 99.6 (14 Apr 2020 07:30)  HR: 56 (14 Apr 2020 11:09) (56 - 78)  BP: 105/70 (14 Apr 2020 07:30) (90/59 - 118/67)  BP(mean): 69 (13 Apr 2020 23:52) (67 - 69)  RR: 20 (14 Apr 2020 07:30) (20 - 25)  SpO2: 94% (14 Apr 2020 11:09) (92% - 100%)  General: NAD  HEENT: MMM  Neck: No JVD, no carotid bruit  Lungs: CTAB  CV: RRR, nl S1/S2, no M/R/G  Abdomen: S/NT/ND, +BS  Extremities: No LE edema, no cyanosis  Neuro: AAOx3, non-focal  Skin: No rash    Labs:             04-14    147<H>  |  111<H>  |  16  ----------------------------<  100<H>  3.5   |  35<H>  |  0.69    Ca    8.1<L>      14 Apr 2020 07:06  Phos  3.2     04-14  Mg     1.9     04-14    TPro  6.0  /  Alb  1.4<L>  /  TBili  0.2  /  DBili  x   /  AST  43<H>  /  ALT  44  /  AlkPhos  92  04-14                        10.4   9.98  )-----------( 136      ( 14 Apr 2020 07:14 )             31.3       Telemetry: Sinus rhythm

## 2020-04-14 NOTE — PROGRESS NOTE ADULT - ASSESSMENT
The patient is a 60 year old female with a history of MRDD, hypothyroidism, seizure d/o who is admitted with COVID-19, acute respiratory failure, septic shock, NSTEMI.    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 There may be a segmental component suggesting underlying CAD.  - IVIg discontinued  - Continue aspirin 81 mg daily  - On vancomycin PO, levofloxacin  - On enoxaparin DVT prophylaxis  - On midodrine 10 mg tid  - Continue norepinephrine and titrate to MAP of 65  - On cefepime  - Mechanical ventilation  - ICU care  - Overall prognosis poor

## 2020-04-14 NOTE — PROGRESS NOTE ADULT - SUBJECTIVE AND OBJECTIVE BOX
MIGNON WALTER    [facility]    Patient is a 60y old  Female who presents with a chief complaint of sob (14 Apr 2020 13:52)       Allergies    amoxicillin (Rash)  penicillin (Rash)    Intolerances        HPI:  Patient is a 60y old  Female who presents with a chief complaint of sob and fever    HPI:This is a nonverbal pt bib ems from intermediate for fever, sob, cough, low o2 sat, with possible covid exposure. per ems, pt on levaquin for aspiration pna. no further hx available.pt has hx of chronic constipation an dis on multiple stool softners.  she was found ot be hypernatremic and in renal failure at admission.  also has hx of hypothyroidism and down syndrome        PAST MEDICAL & SURGICAL HISTORY:  Frequent urinary tract infections  Hypothyroidism, unspecified type  Down syndrome      FAMILY HISTORY:can not obtain due to mental status      SOCIAL HISTORY:    Allergies    amoxicillin (Rash)  penicillin (Rash)    Intolerances          REVIEW OF SYSEMS:  negative except form above mentioned      Vital Signs Last 24 Hrs  T(C): 37.8 (04 Apr 2020 09:46), Max: 37.8 (04 Apr 2020 09:46)  T(F): 100 (04 Apr 2020 09:46), Max: 100 (04 Apr 2020 09:46)  HR: 68 (04 Apr 2020 11:00) (66 - 69)  BP: 89/52 (04 Apr 2020 11:00) (87/51 - 91/52)  BP(mean): --  RR: 24 (04 Apr 2020 11:00) (24 - 24)  SpO2: 100% (04 Apr 2020 11:00) (89% - 100%)  I&O's Summary      PHYSICAL EXAM:  GENERAL: onnc  HEAD:  Atraumatic, Normocephalic  EYES: EOMI, PERRLA, conjunctiva and sclera clear  NECK: Supple, No JVD  CHEST/LUNG: dec bs at bases and rhonchi  HEART: Regular rate and rhythm; No murmurs, rubs, or gallops  ABDOMEN: Soft, Nontender, Nondistended; Bowel sounds present  SKIN: No rashes or lesions    LABS:                        14.7   2.88  )-----------( 52       ( 04 Apr 2020 10:49 )             45.9     04-04    154<H>  |  117<H>  |  31<H>  ----------------------------<  107<H>  4.7   |  30  |  1.48<H>    Ca    8.5      04 Apr 2020 10:49    TPro  7.1  /  Alb  2.3<L>  /  TBili  0.2  /  DBili  x   /  AST  73<H>  /  ALT  66<H>  /  AlkPhos  118  04-04      CAPILLARY BLOOD GLUCOSE                RADIOLOGY & ADDITIONAL TESTS:    Imaging Personally Reviewed:  [x] YES  [ ] NO    Consultant(s) Notes Reviewed:  [x] YES  [ ] NO      MEDICATIONS  (STANDING):  azithromycin   Tablet 500 milliGRAM(s) Oral daily  dextrose 5%. 1000 milliLiter(s) (100 mL/Hr) IV Continuous <Continuous>  diVALproex  milliGRAM(s) Oral three times a day  enoxaparin Injectable 30 milliGRAM(s) SubCutaneous daily  levETIRAcetam 1000 milliGRAM(s) Oral two times a day  levothyroxine 125 MICROGram(s) Oral daily  memantine 5 milliGRAM(s) Oral daily  pantoprazole    Tablet 40 milliGRAM(s) Oral before breakfast  polyethylene glycol 3350 17 Gram(s) Oral daily    MEDICATIONS  (PRN):  acetaminophen   Tablet .. 650 milliGRAM(s) Oral every 6 hours PRN Temp greater or equal to 38C (100.4F), Mild Pain (1 - 3)      Care Discussed with Consultants/Other Providers [x] YES  [ ] NO    HEALTH ISSUES - PROBLEM Dx: (04 Apr 2020 13:11)      PAST MEDICAL & SURGICAL HISTORY:  Frequent urinary tract infections  Hypothyroidism, unspecified type  Down syndrome      FAMILY HISTORY:        MEDICATIONS   acetaminophen   Tablet .. 650 milliGRAM(s) Oral every 6 hours PRN  acetaminophen  Suppository .. 650 milliGRAM(s) Rectal every 4 hours PRN  aspirin  chewable 81 milliGRAM(s) Oral daily  cefepime   IVPB 1000 milliGRAM(s) IV Intermittent every 8 hours  cefepime   IVPB      chlorhexidine 0.12% Liquid 15 milliLiter(s) Oral Mucosa every 12 hours  chlorhexidine 2% Cloths 1 Application(s) Topical daily  dexMEDEtomidine Infusion 0.4 MICROgram(s)/kG/Hr IV Continuous <Continuous>  enoxaparin Injectable 40 milliGRAM(s) SubCutaneous every 12 hours  lactobacillus acidophilus 1 Tablet(s) Oral three times a day with meals  levETIRAcetam  IVPB 1000 milliGRAM(s) IV Intermittent every 12 hours  levothyroxine Injectable 62.5 MICROGram(s) IV Push at bedtime  LORazepam   Injectable 2 milliGRAM(s) IV Push every 6 hours PRN  memantine 5 milliGRAM(s) Oral daily  midodrine 10 milliGRAM(s) Oral every 8 hours  norepinephrine Infusion 0.05 MICROgram(s)/kG/Min IV Continuous <Continuous>  pantoprazole  Injectable 40 milliGRAM(s) IV Push daily  propofol Infusion 10 MICROgram(s)/kG/Min IV Continuous <Continuous>  valproate sodium IVPB 500 milliGRAM(s) IV Intermittent every 12 hours      Vital Signs Last 24 Hrs  T(C): 36.2 (14 Apr 2020 15:30), Max: 37.6 (14 Apr 2020 07:30)  T(F): 97.1 (14 Apr 2020 15:30), Max: 99.6 (14 Apr 2020 07:30)  HR: 58 (14 Apr 2020 15:30) (56 - 85)  BP: 102/67 (14 Apr 2020 15:30) (90/59 - 118/67)  BP(mean): 69 (13 Apr 2020 23:52) (67 - 69)  RR: 20 (14 Apr 2020 15:30) (20 - 25)  SpO2: 100% (14 Apr 2020 15:30) (92% - 100%)      04-13-20 @ 07:01  -  04-14-20 @ 07:00  --------------------------------------------------------  IN: 627.1 mL / OUT: 2550 mL / NET: -1922.9 mL    04-14-20 @ 07:01  -  04-14-20 @ 17:55  --------------------------------------------------------  IN: 0 mL / OUT: 1150 mL / NET: -1150 mL        Mode: AC/ CMV (Assist Control/ Continuous Mandatory Ventilation), RR (machine): 20, TV (machine): 320, FiO2: 60, PEEP: 5, ITime: 1, MAP: 12, PIP: 40    LABS:                        10.4   9.98  )-----------( 136      ( 14 Apr 2020 07:14 )             31.3     04-14    147<H>  |  111<H>  |  16  ----------------------------<  100<H>  3.5   |  35<H>  |  0.69    Ca    8.1<L>      14 Apr 2020 07:06  Phos  3.2     04-14  Mg     1.9     04-14    TPro  6.0  /  Alb  1.4<L>  /  TBili  0.2  /  DBili  x   /  AST  43<H>  /  ALT  44  /  AlkPhos  92  04-14    PT/INR - ( 14 Apr 2020 07:06 )   PT: 11.9 sec;   INR: 1.07 ratio         PTT - ( 14 Apr 2020 07:06 )  PTT:33.9 sec      ABG - ( 14 Apr 2020 06:56 )  pH, Arterial: x     pH, Blood: 7.44  /  pCO2: 48    /  pO2: 54    / HCO3: 31    / Base Excess: 8.8   /  SaO2: 86                  WBC:  WBC Count: 9.98 K/uL (04-14 @ 07:14)  WBC Count: 11.92 K/uL (04-13 @ 07:41)  WBC Count: 9.34 K/uL (04-12 @ 06:13)  WBC Count: 8.88 K/uL (04-11 @ 06:28)      MICROBIOLOGY:  RECENT CULTURES:  04-09 .Sputum Sputum XXXX   Numerous polymorphonuclear leukocytes seen per low power field  Rare Squamous epithelial cells seen per low power field  Rare Gram positive cocci in pairs seen per oil power field   Normal Respiratory Jackelin present                PT/INR - ( 14 Apr 2020 07:06 )   PT: 11.9 sec;   INR: 1.07 ratio         PTT - ( 14 Apr 2020 07:06 )  PTT:33.9 sec    Sodium:  Sodium, Serum: 147 mmol/L (04-14 @ 07:06)  Sodium, Serum: 145 mmol/L (04-13 @ 07:38)  Sodium, Serum: 149 mmol/L (04-12 @ 06:13)  Sodium, Serum: 148 mmol/L (04-11 @ 06:28)      0.69 mg/dL 04-14 @ 07:06  0.58 mg/dL 04-13 @ 07:38  0.61 mg/dL 04-12 @ 06:13  0.66 mg/dL 04-11 @ 06:28      Hemoglobin:  Hemoglobin: 10.4 g/dL (04-14 @ 07:14)  Hemoglobin: 10.4 g/dL (04-13 @ 07:41)  Hemoglobin: 11.1 g/dL (04-12 @ 06:13)  Hemoglobin: 10.7 g/dL (04-11 @ 06:28)      Platelets: Platelet Count - Automated: 136 K/uL (04-14 @ 07:14)  Platelet Count - Automated: 153 K/uL (04-13 @ 07:41)  Platelet Count - Automated: 134 K/uL (04-12 @ 06:13)  Platelet Count - Automated: 163 K/uL (04-11 @ 06:28)      LIVER FUNCTIONS - ( 14 Apr 2020 07:06 )  Alb: 1.4 g/dL / Pro: 6.0 g/dL / ALK PHOS: 92 U/L / ALT: 44 U/L DA / AST: 43 U/L / GGT: x                 RADIOLOGY & ADDITIONAL STUDIES:

## 2020-04-14 NOTE — PROGRESS NOTE ADULT - SUBJECTIVE AND OBJECTIVE BOX
Patient is a 60y old  Female who presents with a chief complaint of sob (06 Apr 2020 11:30)    Patient seen in follow up for ADAM, Hypernatremia.   COVID +. Chart reviewed.        PAST MEDICAL HISTORY:  Frequent urinary tract infections  Hypothyroidism, unspecified type  Down syndrome        MEDICATIONS  (STANDING):  acetaZOLAMIDE  IVPB 250 milliGRAM(s) IV Intermittent once  aspirin  chewable 81 milliGRAM(s) Oral daily  cefepime   IVPB 1000 milliGRAM(s) IV Intermittent every 8 hours  cefepime   IVPB      chlorhexidine 0.12% Liquid 15 milliLiter(s) Oral Mucosa every 12 hours  chlorhexidine 2% Cloths 1 Application(s) Topical daily  dexMEDEtomidine Infusion 0.4 MICROgram(s)/kG/Hr (4.54 mL/Hr) IV Continuous <Continuous>  enoxaparin Injectable 40 milliGRAM(s) SubCutaneous every 12 hours  lactobacillus acidophilus 1 Tablet(s) Oral three times a day with meals  levETIRAcetam  IVPB 1000 milliGRAM(s) IV Intermittent every 12 hours  levothyroxine Injectable 62.5 MICROGram(s) IV Push at bedtime  memantine 5 milliGRAM(s) Oral daily  midodrine 10 milliGRAM(s) Oral every 8 hours  norepinephrine Infusion 0.05 MICROgram(s)/kG/Min (4.26 mL/Hr) IV Continuous <Continuous>  pantoprazole  Injectable 40 milliGRAM(s) IV Push daily  propofol Infusion 10 MICROgram(s)/kG/Min (2.72 mL/Hr) IV Continuous <Continuous>  valproate sodium IVPB 500 milliGRAM(s) IV Intermittent every 12 hours    MEDICATIONS  (PRN):  acetaminophen   Tablet .. 650 milliGRAM(s) Oral every 6 hours PRN Temp greater or equal to 38C (100.4F), Mild Pain (1 - 3)  acetaminophen  Suppository .. 650 milliGRAM(s) Rectal every 4 hours PRN Temp greater or equal to 38C (100.4F)  LORazepam   Injectable 2 milliGRAM(s) IV Push every 6 hours PRN seizure activity    T(C): 37.3 (04-14-20 @ 11:30), Max: 37.6 (04-14-20 @ 07:30)  HR: 85 (04-14-20 @ 11:30) (55 - 85)  BP: 102/67 (04-14-20 @ 11:30) (90/59 - 118/67)  RR: 20 (04-14-20 @ 07:30)  SpO2: 94% (04-14-20 @ 11:09)  Wt(kg): --  I&O's Detail    13 Apr 2020 07:01  -  14 Apr 2020 07:00  --------------------------------------------------------  IN:    dexmedetomidine Infusion: 76.5 mL    IV PiggyBack: 300 mL    norepinephrine Infusion: 147.6 mL    propofol Infusion: 103 mL  Total IN: 627.1 mL    OUT:    Indwelling Catheter - Urethral: 2550 mL  Total OUT: 2550 mL    Total NET: -1922.9 mL      14 Apr 2020 07:01  -  14 Apr 2020 13:52  --------------------------------------------------------  IN:  Total IN: 0 mL    OUT:    Indwelling Catheter - Urethral: 850 mL  Total OUT: 850 mL    Total NET: -850 mL          PHYSICAL EXAM:  Pt on COVID precautions. Chart reviewed and previous physical examination noted.                LABORATORY:                        10.4   9.98  )-----------( 136      ( 14 Apr 2020 07:14 )             31.3     04-14    147<H>  |  111<H>  |  16  ----------------------------<  100<H>  3.5   |  35<H>  |  0.69    Ca    8.1<L>      14 Apr 2020 07:06  Phos  3.2     04-14  Mg     1.9     04-14    TPro  6.0  /  Alb  1.4<L>  /  TBili  0.2  /  DBili  x   /  AST  43<H>  /  ALT  44  /  AlkPhos  92  04-14    Sodium, Serum: 147 mmol/L (04-14 @ 07:06)  Sodium, Serum: 145 mmol/L (04-13 @ 07:38)    Potassium, Serum: 3.5 mmol/L (04-14 @ 07:06)  Potassium, Serum: 4.6 mmol/L (04-13 @ 07:38)    Hemoglobin: 10.4 g/dL (04-14 @ 07:14)  Hemoglobin: 10.4 g/dL (04-13 @ 07:41)  Hemoglobin: 11.1 g/dL (04-12 @ 06:13)    Creatinine, Serum 0.69 (04-14 @ 07:06)  Creatinine, Serum 0.58 (04-13 @ 07:38)  Creatinine, Serum 0.61 (04-12 @ 06:13)        LIVER FUNCTIONS - ( 14 Apr 2020 07:06 )  Alb: 1.4 g/dL / Pro: 6.0 g/dL / ALK PHOS: 92 U/L / ALT: 44 U/L DA / AST: 43 U/L / GGT: x             ABG - ( 14 Apr 2020 06:56 )  pH, Arterial: x     pH, Blood: 7.44  /  pCO2: 48    /  pO2: 54    / HCO3: 31    / Base Excess: 8.8   /  SaO2: 86

## 2020-04-14 NOTE — PROGRESS NOTE ADULT - SUBJECTIVE AND OBJECTIVE BOX
60y year old Female admitted for Patient is a 60y old  Female who presents with a chief complaint of sob (14 Apr 2020 17:57)        59 yo F with hypoxic respiratory failure from COVID 19 pneumonia, intubated, this evening vent alarming for low expiratory volumes of , vent programmed TV is 350, ET tube in place on CXR, air added to balloon without resolution, air audibly passing around cuff.        PMH:  Other general symptom or sign  Handoff  MEWS Score  Frequent urinary tract infections  Hypothyroidism, unspecified type  Down syndrome  Suspected [facility] coronavirus infection  COVID-19  Fever  Constipation  Hypothyroidism, unspecified type  ADAM (acute kidney injury)  Hypernatremia  Hypoxia  PNA (pneumonia)  COVID EXPOSURE  90+  Hypoxia  PNA (pneumonia)        MEDICATIONS  (STANDING):  aspirin  chewable 81 milliGRAM(s) Oral daily  cefepime   IVPB 1000 milliGRAM(s) IV Intermittent every 8 hours  cefepime   IVPB      chlorhexidine 0.12% Liquid 15 milliLiter(s) Oral Mucosa every 12 hours  chlorhexidine 2% Cloths 1 Application(s) Topical daily  dexMEDEtomidine Infusion 0.4 MICROgram(s)/kG/Hr (4.54 mL/Hr) IV Continuous <Continuous>  enoxaparin Injectable 40 milliGRAM(s) SubCutaneous every 12 hours  lactobacillus acidophilus 1 Tablet(s) Oral three times a day with meals  levETIRAcetam  IVPB 1000 milliGRAM(s) IV Intermittent every 12 hours  levothyroxine Injectable 62.5 MICROGram(s) IV Push at bedtime  memantine 5 milliGRAM(s) Oral daily  midodrine 10 milliGRAM(s) Oral every 8 hours  norepinephrine Infusion 0.05 MICROgram(s)/kG/Min (4.26 mL/Hr) IV Continuous <Continuous>  pantoprazole  Injectable 40 milliGRAM(s) IV Push daily  propofol Infusion 10 MICROgram(s)/kG/Min (2.72 mL/Hr) IV Continuous <Continuous>  valproate sodium IVPB 500 milliGRAM(s) IV Intermittent every 12 hours    MEDICATIONS  (PRN):  acetaminophen   Tablet .. 650 milliGRAM(s) Oral every 6 hours PRN Temp greater or equal to 38C (100.4F), Mild Pain (1 - 3)  acetaminophen  Suppository .. 650 milliGRAM(s) Rectal every 4 hours PRN Temp greater or equal to 38C (100.4F)  LORazepam   Injectable 2 milliGRAM(s) IV Push every 6 hours PRN seizure activity      I&O's Summary    13 Apr 2020 07:01  -  14 Apr 2020 07:00  --------------------------------------------------------  IN: 627.1 mL / OUT: 2550 mL / NET: -1922.9 mL    14 Apr 2020 07:01  -  15 Apr 2020 00:46  --------------------------------------------------------  IN: 150 mL / OUT: 1425 mL / NET: -1275 mL      ICU Vital Signs Last 24 Hrs  T(C): 36.6 (14 Apr 2020 21:13), Max: 37.6 (14 Apr 2020 07:30)  T(F): 97.8 (14 Apr 2020 21:13), Max: 99.6 (14 Apr 2020 07:30)  HR: 68 (14 Apr 2020 23:30) (56 - 85)  BP: 104/79 (14 Apr 2020 23:30) (102/67 - 118/67)  BP(mean): 83 (14 Apr 2020 23:30) (83 - 85)  ABP: --  ABP(mean): --  RR: 20 (14 Apr 2020 23:30) (20 - 20)  SpO2: 100% (14 Apr 2020 23:48) (92% - 100%)      PHYSICAL EXAM:  General: Intubated and sedated  chest rise with each breath  right IJ TLC      04-14    147<H>  |  111<H>  |  16  ----------------------------<  100<H>  3.5   |  35<H>  |  0.69    Ca    8.1<L>      14 Apr 2020 07:06  Phos  3.2     04-14  Mg     1.9     04-14    TPro  6.0  /  Alb  1.4<L>  /  TBili  0.2  /  DBili  x   /  AST  43<H>  /  ALT  44  /  AlkPhos  92  04-14                          10.4   9.98  )-----------( 136      ( 14 Apr 2020 07:14 )             31.3     ABG - ( 14 Apr 2020 20:08 )  pH, Arterial: x     pH, Blood: 7.48  /  pCO2: 40    /  pO2: 68    / HCO3: 29    / Base Excess: 5.5   /  SaO2: 93                        GI PCR Panel, Stool (collected 04-14-20 @ 16:46)  Source: .Stool Feces  Final Report (04-14-20 @ 19:26):    GI PCR Results: NOT detected    *******Please Note:*******    GI panel PCR evaluates for:    Campylobacter, Plesiomonas shigelloides, Salmonella,    Vibrio, Yersinia enterocolitica, Enteroaggregative    Escherichia coli (EAEC), Enteropathogenic E.coli (EPEC),    Enterotoxigenic E. coli (ETEC) lt/st, Shiga-like    toxin-producing E. coli (STEC) stx1/stx2,    Shigella/ Enteroinvasive E. coli (EIEC), Cryptosporidium,    Cyclospora cayetanensis, Entamoeba histolytica,    Giardia lamblia, Adenovirus F 40/41, Astrovirus,    Norovirus GI/GII, Rotavirus A, Sapovirus         GI PCR Panel, Stool (collected 14 Apr 2020 16:46)  Source: .Stool Feces  Final Report (14 Apr 2020 19:26):    GI PCR Results: NOT detected    *******Please Note:*******    GI panel PCR evaluates for:    Campylobacter, Plesiomonas shigelloides, Salmonella,    Vibrio, Yersinia enterocolitica, Enteroaggregative    Escherichia coli (EAEC), Enteropathogenic E.coli (EPEC),    Enterotoxigenic E. coli (ETEC) lt/st, Shiga-like    toxin-producing E. coli (STEC) stx1/stx2,    Shigella/ Enteroinvasive E. coli (EIEC), Cryptosporidium,    Cyclospora cayetanensis, Entamoeba histolytica,    Giardia lamblia, Adenovirus F 40/41, Astrovirus,    Norovirus GI/GII, Rotavirus A, Sapovirus        Mode: AC/ CMV (Assist Control/ Continuous Mandatory Ventilation), RR (machine): 20, TV (machine): 320, FiO2: 60, PEEP: 5, ITime: 1, MAP: 10, PIP: 33    Assessment: 59 yo F with Downs syndrome, hypothyroidism, constipation,     Plan:  Neuro: Continue sedation, titrate to RASS -3 to -4.  NMB.     Cardio: titrate pressors for map 65-75. continuous QTc monitoring on tele while on QT prolonging medications. Troponins-    Pulm: ARDS 2/2 COVID 19- Lung protective strategy , titrate fi02/peep to goal sp02 90-96%, goal PlatP <30 permissive hypercapnea as needed.    Prone, NMB.  Avoid aerosolizing medications     Renal: Cr , avoid nephrotoxins, daily chemistry to trend Cr, optimize lytes.  Lasix. strict I/Os.     GI: NPO with tube feeding.  GI ppx. Transaiminitis from COVID 19 infection  trend LFTs. Avoid hepatotoxic medications    ID: COVID 19 hydroxychloroqine, IL 6 antagonist, pepcid, steroids.  Antibiotics for bacterial superinfection. follow up cultures  monitor daily chemistry, CBC with Diff.  trend CRP, D dimer, LDH, CPK, Coags  APAP prn fever, avoid NSAIDs    Heme: DVT ppx, full AC. daily CBC.     Endo: monitor FS, goal 140 to 180. insulin therapy     Dispo:    CC Time 30 min including time spent reviewing chart, ordering tests and treatments, evaluating and treating patient at bedside, 60y year old Female admitted for Patient is a 60y old  Female who presents with a chief complaint of sob (14 Apr 2020 17:57)        61 yo F with hypoxic respiratory failure from COVID 19 pneumonia, intubated, this evening vent alarming for low expiratory volumes of , vent programmed TV is 350, ET tube in place on CXR, air added to balloon without resolution, air audibly passing around cuff.        PMH:  Other general symptom or sign  Handoff  MEWS Score  Frequent urinary tract infections  Hypothyroidism, unspecified type  Down syndrome  Suspected [facility] coronavirus infection  COVID-19  Fever  Constipation  Hypothyroidism, unspecified type  ADAM (acute kidney injury)  Hypernatremia  Hypoxia  PNA (pneumonia)  COVID EXPOSURE  90+  Hypoxia  PNA (pneumonia)        MEDICATIONS  (STANDING):  aspirin  chewable 81 milliGRAM(s) Oral daily  cefepime   IVPB 1000 milliGRAM(s) IV Intermittent every 8 hours  cefepime   IVPB      chlorhexidine 0.12% Liquid 15 milliLiter(s) Oral Mucosa every 12 hours  chlorhexidine 2% Cloths 1 Application(s) Topical daily  dexMEDEtomidine Infusion 0.4 MICROgram(s)/kG/Hr (4.54 mL/Hr) IV Continuous <Continuous>  enoxaparin Injectable 40 milliGRAM(s) SubCutaneous every 12 hours  lactobacillus acidophilus 1 Tablet(s) Oral three times a day with meals  levETIRAcetam  IVPB 1000 milliGRAM(s) IV Intermittent every 12 hours  levothyroxine Injectable 62.5 MICROGram(s) IV Push at bedtime  memantine 5 milliGRAM(s) Oral daily  midodrine 10 milliGRAM(s) Oral every 8 hours  norepinephrine Infusion 0.05 MICROgram(s)/kG/Min (4.26 mL/Hr) IV Continuous <Continuous>  pantoprazole  Injectable 40 milliGRAM(s) IV Push daily  propofol Infusion 10 MICROgram(s)/kG/Min (2.72 mL/Hr) IV Continuous <Continuous>  valproate sodium IVPB 500 milliGRAM(s) IV Intermittent every 12 hours    MEDICATIONS  (PRN):  acetaminophen   Tablet .. 650 milliGRAM(s) Oral every 6 hours PRN Temp greater or equal to 38C (100.4F), Mild Pain (1 - 3)  acetaminophen  Suppository .. 650 milliGRAM(s) Rectal every 4 hours PRN Temp greater or equal to 38C (100.4F)  LORazepam   Injectable 2 milliGRAM(s) IV Push every 6 hours PRN seizure activity      I&O's Summary    13 Apr 2020 07:01  -  14 Apr 2020 07:00  --------------------------------------------------------  IN: 627.1 mL / OUT: 2550 mL / NET: -1922.9 mL    14 Apr 2020 07:01  -  15 Apr 2020 00:46  --------------------------------------------------------  IN: 150 mL / OUT: 1425 mL / NET: -1275 mL      ICU Vital Signs Last 24 Hrs  T(C): 36.6 (14 Apr 2020 21:13), Max: 37.6 (14 Apr 2020 07:30)  T(F): 97.8 (14 Apr 2020 21:13), Max: 99.6 (14 Apr 2020 07:30)  HR: 68 (14 Apr 2020 23:30) (56 - 85)  BP: 104/79 (14 Apr 2020 23:30) (102/67 - 118/67)  BP(mean): 83 (14 Apr 2020 23:30) (83 - 85)  ABP: --  ABP(mean): --  RR: 20 (14 Apr 2020 23:30) (20 - 20)  SpO2: 100% (14 Apr 2020 23:48) (92% - 100%)      PHYSICAL EXAM:  General: Intubated and sedated  chest rise with each breath  right IJ TLC      04-14    147<H>  |  111<H>  |  16  ----------------------------<  100<H>  3.5   |  35<H>  |  0.69    Ca    8.1<L>      14 Apr 2020 07:06  Phos  3.2     04-14  Mg     1.9     04-14    TPro  6.0  /  Alb  1.4<L>  /  TBili  0.2  /  DBili  x   /  AST  43<H>  /  ALT  44  /  AlkPhos  92  04-14                          10.4   9.98  )-----------( 136      ( 14 Apr 2020 07:14 )             31.3     ABG - ( 14 Apr 2020 20:08 )  pH, Arterial: x     pH, Blood: 7.48  /  pCO2: 40    /  pO2: 68    / HCO3: 29    / Base Excess: 5.5   /  SaO2: 93                        GI PCR Panel, Stool (collected 04-14-20 @ 16:46)  Source: .Stool Feces  Final Report (04-14-20 @ 19:26):    GI PCR Results: NOT detected    *******Please Note:*******    GI panel PCR evaluates for:    Campylobacter, Plesiomonas shigelloides, Salmonella,    Vibrio, Yersinia enterocolitica, Enteroaggregative    Escherichia coli (EAEC), Enteropathogenic E.coli (EPEC),    Enterotoxigenic E. coli (ETEC) lt/st, Shiga-like    toxin-producing E. coli (STEC) stx1/stx2,    Shigella/ Enteroinvasive E. coli (EIEC), Cryptosporidium,    Cyclospora cayetanensis, Entamoeba histolytica,    Giardia lamblia, Adenovirus F 40/41, Astrovirus,    Norovirus GI/GII, Rotavirus A, Sapovirus         GI PCR Panel, Stool (collected 14 Apr 2020 16:46)  Source: .Stool Feces  Final Report (14 Apr 2020 19:26):    GI PCR Results: NOT detected    *******Please Note:*******    GI panel PCR evaluates for:    Campylobacter, Plesiomonas shigelloides, Salmonella,    Vibrio, Yersinia enterocolitica, Enteroaggregative    Escherichia coli (EAEC), Enteropathogenic E.coli (EPEC),    Enterotoxigenic E. coli (ETEC) lt/st, Shiga-like    toxin-producing E. coli (STEC) stx1/stx2,    Shigella/ Enteroinvasive E. coli (EIEC), Cryptosporidium,    Cyclospora cayetanensis, Entamoeba histolytica,    Giardia lamblia, Adenovirus F 40/41, Astrovirus,    Norovirus GI/GII, Rotavirus A, Sapovirus        Mode: AC/ CMV (Assist Control/ Continuous Mandatory Ventilation), RR (machine): 20, TV (machine): 320, FiO2: 60, PEEP: 5, ITime: 1, MAP: 10, PIP: 33    Assessment: 61 yo F with Downs syndrome, hypothyroidism, constipation, sezisure disorder with hypoxic respirtory failure from COVID 19 pneumonia, course complicated by episode of seizure, type 2 NSTEMI, ADAM, gram variable bacteremia.  This evening low expiratory volumes on ventilator.  Anesthesia called, ET tube changed with resolution.     Plan:  Neuro: Continue sedation with precedex and propofol,. titrate to RASS -3 to -4.    seizures- depakote changed to 500 bid,and ativan added by neurology. continue keppra 1g q12  continue memantine    Cardio: Off IV pressors but continues to require midodrine 10mg q8h.   Type 2 NSTEMI, Orion peaked at 2.7 on April 10th, most recent 0.6 on 4/12. continue asa    Pulm: ARDS 2/2 COVID 19- Lung protective strategy , titrate fi02/peep to goal sp02 90-96%, goal PlatP <30 permissive hypercapnea as needed.    Avoid aerosolizing medications.  ET tube exchanged this evening for cuff malfunction.     Renal: Cr 0.63 , avoid nephrotoxins, daily chemistry to trend Cr, optimize lytes. hypernatremia with Na 147 continue free water via NGT  strict I/os.      GI: NPO with tube feeding.  GI ppx. Transaiminitis from COVID 19 infection  trend LFTs. Avoid hepatotoxic medications    ID: COVID 19  s/p hydroxychloroqine, IL 6 antagonist toci 4/13,  steroids stopped 4/12.  Antibiotics for bacterial superinfection. follow up cultures  monitor daily chemistry, CBC with Diff.  trend CRP, D dimer, LDH, CPK, Coags  APAP prn fever, avoid NSAIDs    Heme: DVT ppx, full AC. daily CBC.     Endo: monitor FS, goal 140 to 180. insulin therapy     Dispo:    CC Time 30 min including time spent reviewing chart, ordering tests and treatments, evaluating and treating patient at bedside, 60y year old Female admitted for Patient is a 60y old  Female who presents with a chief complaint of sob (14 Apr 2020 17:57)        59 yo F with hypoxic respiratory failure from COVID 19 pneumonia, intubated, this evening vent alarming for low expiratory volumes of , vent programmed TV is 350, ET tube in place on CXR, air added to balloon without resolution, air audibly passing around cuff.        PMH:  Other general symptom or sign  Handoff  MEWS Score  Frequent urinary tract infections  Hypothyroidism, unspecified type  Down syndrome  Suspected [facility] coronavirus infection  COVID-19  Fever  Constipation  Hypothyroidism, unspecified type  ADAM (acute kidney injury)  Hypernatremia  Hypoxia  PNA (pneumonia)  COVID EXPOSURE  90+  Hypoxia  PNA (pneumonia)        MEDICATIONS  (STANDING):  aspirin  chewable 81 milliGRAM(s) Oral daily  cefepime   IVPB 1000 milliGRAM(s) IV Intermittent every 8 hours  cefepime   IVPB      chlorhexidine 0.12% Liquid 15 milliLiter(s) Oral Mucosa every 12 hours  chlorhexidine 2% Cloths 1 Application(s) Topical daily  dexMEDEtomidine Infusion 0.4 MICROgram(s)/kG/Hr (4.54 mL/Hr) IV Continuous <Continuous>  enoxaparin Injectable 40 milliGRAM(s) SubCutaneous every 12 hours  lactobacillus acidophilus 1 Tablet(s) Oral three times a day with meals  levETIRAcetam  IVPB 1000 milliGRAM(s) IV Intermittent every 12 hours  levothyroxine Injectable 62.5 MICROGram(s) IV Push at bedtime  memantine 5 milliGRAM(s) Oral daily  midodrine 10 milliGRAM(s) Oral every 8 hours  norepinephrine Infusion 0.05 MICROgram(s)/kG/Min (4.26 mL/Hr) IV Continuous <Continuous>  pantoprazole  Injectable 40 milliGRAM(s) IV Push daily  propofol Infusion 10 MICROgram(s)/kG/Min (2.72 mL/Hr) IV Continuous <Continuous>  valproate sodium IVPB 500 milliGRAM(s) IV Intermittent every 12 hours    MEDICATIONS  (PRN):  acetaminophen   Tablet .. 650 milliGRAM(s) Oral every 6 hours PRN Temp greater or equal to 38C (100.4F), Mild Pain (1 - 3)  acetaminophen  Suppository .. 650 milliGRAM(s) Rectal every 4 hours PRN Temp greater or equal to 38C (100.4F)  LORazepam   Injectable 2 milliGRAM(s) IV Push every 6 hours PRN seizure activity      I&O's Summary    13 Apr 2020 07:01  -  14 Apr 2020 07:00  --------------------------------------------------------  IN: 627.1 mL / OUT: 2550 mL / NET: -1922.9 mL    14 Apr 2020 07:01  -  15 Apr 2020 00:46  --------------------------------------------------------  IN: 150 mL / OUT: 1425 mL / NET: -1275 mL      ICU Vital Signs Last 24 Hrs  T(C): 36.6 (14 Apr 2020 21:13), Max: 37.6 (14 Apr 2020 07:30)  T(F): 97.8 (14 Apr 2020 21:13), Max: 99.6 (14 Apr 2020 07:30)  HR: 68 (14 Apr 2020 23:30) (56 - 85)  BP: 104/79 (14 Apr 2020 23:30) (102/67 - 118/67)  BP(mean): 83 (14 Apr 2020 23:30) (83 - 85)  ABP: --  ABP(mean): --  RR: 20 (14 Apr 2020 23:30) (20 - 20)  SpO2: 100% (14 Apr 2020 23:48) (92% - 100%)      PHYSICAL EXAM:  General: Intubated and sedated  chest rise with each breath  right IJ TLC      04-14    147<H>  |  111<H>  |  16  ----------------------------<  100<H>  3.5   |  35<H>  |  0.69    Ca    8.1<L>      14 Apr 2020 07:06  Phos  3.2     04-14  Mg     1.9     04-14    TPro  6.0  /  Alb  1.4<L>  /  TBili  0.2  /  DBili  x   /  AST  43<H>  /  ALT  44  /  AlkPhos  92  04-14                          10.4   9.98  )-----------( 136      ( 14 Apr 2020 07:14 )             31.3     ABG - ( 14 Apr 2020 20:08 )  pH, Arterial: x     pH, Blood: 7.48  /  pCO2: 40    /  pO2: 68    / HCO3: 29    / Base Excess: 5.5   /  SaO2: 93                        GI PCR Panel, Stool (collected 04-14-20 @ 16:46)  Source: .Stool Feces  Final Report (04-14-20 @ 19:26):    GI PCR Results: NOT detected    *******Please Note:*******    GI panel PCR evaluates for:    Campylobacter, Plesiomonas shigelloides, Salmonella,    Vibrio, Yersinia enterocolitica, Enteroaggregative    Escherichia coli (EAEC), Enteropathogenic E.coli (EPEC),    Enterotoxigenic E. coli (ETEC) lt/st, Shiga-like    toxin-producing E. coli (STEC) stx1/stx2,    Shigella/ Enteroinvasive E. coli (EIEC), Cryptosporidium,    Cyclospora cayetanensis, Entamoeba histolytica,    Giardia lamblia, Adenovirus F 40/41, Astrovirus,    Norovirus GI/GII, Rotavirus A, Sapovirus         GI PCR Panel, Stool (collected 14 Apr 2020 16:46)  Source: .Stool Feces  Final Report (14 Apr 2020 19:26):    GI PCR Results: NOT detected    *******Please Note:*******    GI panel PCR evaluates for:    Campylobacter, Plesiomonas shigelloides, Salmonella,    Vibrio, Yersinia enterocolitica, Enteroaggregative    Escherichia coli (EAEC), Enteropathogenic E.coli (EPEC),    Enterotoxigenic E. coli (ETEC) lt/st, Shiga-like    toxin-producing E. coli (STEC) stx1/stx2,    Shigella/ Enteroinvasive E. coli (EIEC), Cryptosporidium,    Cyclospora cayetanensis, Entamoeba histolytica,    Giardia lamblia, Adenovirus F 40/41, Astrovirus,    Norovirus GI/GII, Rotavirus A, Sapovirus        Mode: AC/ CMV (Assist Control/ Continuous Mandatory Ventilation), RR (machine): 20, TV (machine): 320, FiO2: 45, PEEP: 5, ITime: 1, MAP: 10, PIP: 33    Assessment: 59 yo F with Downs syndrome, hypothyroidism, constipation, sezisure disorder with hypoxic respirtory failure from COVID 19 pneumonia, course complicated by episode of seizure, type 2 NSTEMI, ADAM, gram variable bacteremia.  This evening low expiratory volumes on ventilator.  Anesthesia called, ET tube changed with resolution.     Plan:  Neuro: Continue sedation with precedex and propofol,. titrate to RASS -3 to -4.    seizures- depakote changed to 500 bid,and ativan added by neurology. continue keppra 1g q12  continue memantine    Cardio: Off IV pressors but continues to require midodrine 10mg q8h.   Type 2 NSTEMI, Orion peaked at 2.7 on April 10th, most recent 0.6 on 4/12. continue asa    Pulm: ARDS 2/2 COVID 19- intubated 4/7, Lung protective strategy , titrate fi02/peep to goal sp02 90-96%, goal PlatP <30 permissive hypercapnea as needed.    Avoid aerosolizing medications.  ET tube exchanged this evening for cuff malfunction.     Renal: Cr 0.63 , avoid nephrotoxins, daily chemistry to trend Cr, optimize lytes. hypernatremia with Na 147 continue free water via NGT  strict I/os.      GI: NPO with tube feeding.  GI ppx. Transaminitis from COVID 19 infection  trend LFTs. Avoid hepatotoxic medications    ID: COVID 19  s/p hydroxychloroqine, IL 6 antagonist toci 4/13,  steroids stopped 4/12.  Antibiotics for bacterial superinfection. follow up cultures  monitor daily chemistry, CBC with Diff.  trend CRP, D dimer, LDH, CPK, Coags  APAP prn fever, avoid NSAIDs    Heme: DVT ppx lovenox 40 q12 daily CBC.     Dispo: criticall ill with hypoxic respiratory failure from COVID 19 pneumonia. continue ICU care    CC Time 30 min including time spent reviewing chart, ordering tests and treatments, evaluating and treating patient at bedside, not including procedures

## 2020-04-14 NOTE — PROGRESS NOTE ADULT - ASSESSMENT
1.	ADAM: Prerenal azotemia, ATN  2.	Shock, Sepsis  3.	Pneumonia, COVID +  4.	Hypophosphatemia: imporved    Stable renal indices. To continue current meds. Pressor support as needed. Treatment for COVID pneumonia. COVID precautions.  Avoid nephrotoxic meds as possible. ICU management. Overall prognosis poor.

## 2020-04-14 NOTE — CHART NOTE - NSCHARTNOTEFT_GEN_A_CORE
Assessment: Pt seen for nutrition f/u.  Pt is a 61 yo female admit from [facility] with SOB< fever, cough(+COVID19).  Pt on po diet 4/5-4/6 (dysphagia 1 pureed with honey thick liquids), pt decompensated and required intubation.  Remains on vent support. OGT previously placed for meds/feedings.  Pt was on bolus feeds of Jevity 1.5 180mL every 4 hrs with 60ml free water flush before and after each bolus, however, order discontinued yesterday 4/13 as abd xray findings were consistent with ileus. Hypoactive bowel sounds; flexiseal in place. NPO status maintained.  Recommend restart IVFs/D5 while NPO.  RD to continue to follow closely.    Factors impacting intake: [ ] none [ ] nausea  [ ] vomiting [ ] diarrhea [ ] constipation  [ ]chewing problems [ ] swallowing issues  [ ] other:     Diet Prescription: Diet, NPO (04-13-20 @ 17:26)    Intake: NPO    Current Weight: no updated bw to assess ; 1+ generalized edema noted   % Weight Change    Pertinent Medications: MEDICATIONS  (STANDING):  acetaZOLAMIDE  IVPB 250 milliGRAM(s) IV Intermittent once  aspirin  chewable 81 milliGRAM(s) Oral daily  cefepime   IVPB 1000 milliGRAM(s) IV Intermittent every 8 hours  cefepime   IVPB      chlorhexidine 0.12% Liquid 15 milliLiter(s) Oral Mucosa every 12 hours  chlorhexidine 2% Cloths 1 Application(s) Topical daily  dexMEDEtomidine Infusion 0.4 MICROgram(s)/kG/Hr (4.54 mL/Hr) IV Continuous <Continuous>  enoxaparin Injectable 40 milliGRAM(s) SubCutaneous every 12 hours  lactobacillus acidophilus 1 Tablet(s) Oral three times a day with meals  levETIRAcetam  IVPB 1000 milliGRAM(s) IV Intermittent every 12 hours  levothyroxine Injectable 62.5 MICROGram(s) IV Push at bedtime  memantine 5 milliGRAM(s) Oral daily  midodrine 10 milliGRAM(s) Oral every 8 hours  norepinephrine Infusion 0.05 MICROgram(s)/kG/Min (4.26 mL/Hr) IV Continuous <Continuous>  pantoprazole  Injectable 40 milliGRAM(s) IV Push daily  propofol Infusion 10 MICROgram(s)/kG/Min (2.72 mL/Hr) IV Continuous <Continuous>  valproate sodium IVPB 500 milliGRAM(s) IV Intermittent every 12 hours    MEDICATIONS  (PRN):  acetaminophen   Tablet .. 650 milliGRAM(s) Oral every 6 hours PRN Temp greater or equal to 38C (100.4F), Mild Pain (1 - 3)  acetaminophen  Suppository .. 650 milliGRAM(s) Rectal every 4 hours PRN Temp greater or equal to 38C (100.4F)  LORazepam   Injectable 2 milliGRAM(s) IV Push every 6 hours PRN seizure activity    Pertinent Labs: 04-14 Na147 mmol/L<H> Glu 100 mg/dL<H> K+ 3.5 mmol/L Cr  0.69 mg/dL BUN 16 mg/dL 04-14 Phos 3.2 mg/dL 04-14 Alb 1.4 g/dL<L> 04-14 Chol --    LDL --    HDL --    Trig 289 mg/dL<H>     CAPILLARY BLOOD GLUCOSE      POCT Blood Glucose.: 80 mg/dL (14 Apr 2020 11:26)  POCT Blood Glucose.: 72 mg/dL (14 Apr 2020 05:33)  POCT Blood Glucose.: 87 mg/dL (13 Apr 2020 23:27)  POCT Blood Glucose.: 70 mg/dL (13 Apr 2020 20:01)  POCT Blood Glucose.: 58 mg/dL (13 Apr 2020 19:23)  POCT Blood Glucose.: 63 mg/dL (13 Apr 2020 18:35)  POCT Blood Glucose.: 61 mg/dL (13 Apr 2020 18:33)    Skin: DTI of Jesus heels, stg 2 of the Rt sacrum, and DTI of sacrum    Estimated Needs:   [ x] no change since previous assessment  [ ] recalculated:     Previous Nutrition Diagnosis:   [ ] Inadequate Energy Intake [ ]Inadequate Oral Intake [ ] Excessive Energy Intake   [ ] Underweight [ ] Increased Nutrient Needs [ ] Overweight/Obesity [x] swallow difficulty   [ ] Altered GI Function [ ] Unintended Weight Loss [ ] Food & Nutrition Related Knowledge Deficit [ ] Malnutrition     Nutrition Diagnosis is [ ] ongoing  [ ] resolved [ ] not applicable     New Nutrition Diagnosis: [ ] not applicable       Interventions: NPO  Recommend  [ ] Change Diet To:  [ ] Nutrition Supplement  [ x] Nutrition Support- consider IVF/D5 while npo   [ ] Other:     Monitoring and Evaluation:   [ ] PO intake [ x ] Tolerance to diet prescription [ x ] weights [ x ] labs[ x ] follow up per protocol  [ ] other:

## 2020-04-14 NOTE — PROGRESS NOTE ADULT - SUBJECTIVE AND OBJECTIVE BOX
Neurology follow up note    MIGNON [id number]      Interval History:    Patient intubated sedated     MEDICATIONS    acetaminophen   Tablet .. 650 milliGRAM(s) Oral every 6 hours PRN  acetaminophen  Suppository .. 650 milliGRAM(s) Rectal every 4 hours PRN  aspirin  chewable 81 milliGRAM(s) Oral daily  cefepime   IVPB 1000 milliGRAM(s) IV Intermittent every 8 hours  cefepime   IVPB      chlorhexidine 0.12% Liquid 15 milliLiter(s) Oral Mucosa every 12 hours  chlorhexidine 2% Cloths 1 Application(s) Topical daily  dexMEDEtomidine Infusion 0.4 MICROgram(s)/kG/Hr IV Continuous <Continuous>  enoxaparin Injectable 40 milliGRAM(s) SubCutaneous every 12 hours  lactobacillus acidophilus 1 Tablet(s) Oral three times a day with meals  levETIRAcetam  IVPB 1000 milliGRAM(s) IV Intermittent every 12 hours  levothyroxine Injectable 62.5 MICROGram(s) IV Push at bedtime  LORazepam   Injectable 2 milliGRAM(s) IV Push every 6 hours PRN  memantine 5 milliGRAM(s) Oral daily  midodrine 10 milliGRAM(s) Oral every 8 hours  norepinephrine Infusion 0.05 MICROgram(s)/kG/Min IV Continuous <Continuous>  pantoprazole  Injectable 40 milliGRAM(s) IV Push daily  propofol Infusion 10 MICROgram(s)/kG/Min IV Continuous <Continuous>  valproate sodium IVPB 500 milliGRAM(s) IV Intermittent every 12 hours      Allergies    amoxicillin (Rash)  penicillin (Rash)    Intolerances            Vital Signs Last 24 Hrs  T(C): 37.6 (14 Apr 2020 07:30), Max: 37.6 (14 Apr 2020 07:30)  T(F): 99.6 (14 Apr 2020 07:30), Max: 99.6 (14 Apr 2020 07:30)  HR: 57 (14 Apr 2020 07:30) (56 - 78)  BP: 105/70 (14 Apr 2020 07:30) (90/59 - 118/67)  BP(mean): 69 (13 Apr 2020 23:52) (67 - 69)  RR: 20 (14 Apr 2020 07:30) (20 - 25)  SpO2: 96% (14 Apr 2020 07:30) (92% - 100%)        REVIEW OF SYSTEMS:  Could not be obtained secondary to the patient being nonverbal.    PHYSICAL EXAMINATION:    HEENT:  Head:  Normocephalic and atraumatic.  Eyes:  No scleral icterus.  Ears:  Hearing hard to evaluate secondary to the patient being sedated and intubated.  RESPIRATORY:  Good air entry bilaterally.  CARDIOVASCULAR:  S1 and S2 are heard.  ABDOMEN:  Soft and nontender.  EXTREMITIES:  No clubbing or cyanosis were noted.      NEUROLOGICAL:  Limited secondary to the patient being intubated and sedated.  No response to verbal stimuli.  I opened the patient's eyes, no gaze preference.  I applied stimuli to all four extremities with slight withdrawal bilateral upper and lower.  Tone; bilateral upper and lower was increased.                       LABS:  CBC Full  -  ( 14 Apr 2020 07:14 )  WBC Count : 9.98 K/uL  RBC Count : 3.07 M/uL  Hemoglobin : 10.4 g/dL  Hematocrit : 31.3 %  Platelet Count - Automated : 136 K/uL  Mean Cell Volume : 102.0 fl  Mean Cell Hemoglobin : 33.9 pg  Mean Cell Hemoglobin Concentration : 33.2 gm/dL  Auto Neutrophil # : 6.53 K/uL  Auto Lymphocyte # : 2.72 K/uL  Auto Monocyte # : 0.24 K/uL  Auto Eosinophil # : 0.13 K/uL  Auto Basophil # : 0.02 K/uL  Auto Neutrophil % : 65.4 %  Auto Lymphocyte % : 27.3 %  Auto Monocyte % : 2.4 %  Auto Eosinophil % : 1.3 %  Auto Basophil % : 0.2 %      04-14    147<H>  |  111<H>  |  16  ----------------------------<  100<H>  3.5   |  35<H>  |  0.69    Ca    8.1<L>      14 Apr 2020 07:06  Phos  3.2     04-14  Mg     1.9     04-14    TPro  6.0  /  Alb  1.4<L>  /  TBili  0.2  /  DBili  x   /  AST  43<H>  /  ALT  44  /  AlkPhos  92  04-14    Hemoglobin A1C:   Lipid Panel 04-13 @ 13:08  Total Cholesterol, Serum --  LDL --  Triglycerides 261    LIVER FUNCTIONS - ( 14 Apr 2020 07:06 )  Alb: 1.4 g/dL / Pro: 6.0 g/dL / ALK PHOS: 92 U/L / ALT: 44 U/L DA / AST: 43 U/L / GGT: x           Vitamin B12   PT/INR - ( 14 Apr 2020 07:06 )   PT: 11.9 sec;   INR: 1.07 ratio         PTT - ( 14 Apr 2020 07:06 )  PTT:33.9 sec      RADIOLOGY        ANALYSIS AND PLAN:  This is a 60-year-old with altered mental status and episode of seizure.  1.	For seizure event, suspect this could be secondary to the patient's interaction with antibiotics. will change Depakote to 500 bid  no new seizures monitor levels.   2.	I will recommend Ativan 1 mg IV push q.6 h. p.r.n. for any type of breakthrough seizures.  3.	Seizure precautions.  4.	For altered mental status, secondary to metabolic encephalopathy secondary to COVID-19 infection.  5.	For hypotension, continue the patient on midodrine.  If possible, please maintain systolic blood pressure of above 100 and help maintain cerebral perfusion.  6.	For history of hypothyroidism, continue the patient on Synthroid.  7.	spoke to staff no new events   Greater than 30 minutes spent in direct patient care reviewing  the notes, lab data/ imaging

## 2020-04-14 NOTE — PROGRESS NOTE ADULT - SUBJECTIVE AND OBJECTIVE BOX
Patient seen and examined at bedside    Remains NPO for ileus found on imaging    Vitals:   on pressors  remains in septic shock  vent settings now at 45 over 5  ileus shown on kub    now on cefepime - changed from levoquin  ++ diarrhea    Review of Systems:  unable to obtain    Objective:  Vitals  T(C): 36.2 (04-14-20 @ 15:30), Max: 37.6 (04-14-20 @ 07:30)  HR: 58 (04-14-20 @ 15:30) (56 - 85)  BP: 102/67 (04-14-20 @ 15:30) (90/59 - 118/67)  RR: 20 (04-14-20 @ 15:30) (20 - 25)  SpO2: 100% (04-14-20 @ 15:30) (92% - 100%)    Physical Exam:  General: comfortable, sedated  HEENT: Atraumatic, no LAD, trachea midline, PERRLA  Cardiovascular: normal s1s2,   Pulmonary: decreased, no wheezing , rhonchi  Gastrointestinal: soft non tender non distended, no masses felt, no organomegally  Muscloskeletal: no lower extremity edema, intact bilateral lower extremity pulses  Neurological: sedated  Psychiatrical: normal mood, cooperative  SKIN: no rash, lesions or ulcers      Labs:                          10.4   9.98  )-----------( 136      ( 14 Apr 2020 07:14 )             31.3     04-14    147<H>  |  111<H>  |  16  ----------------------------<  100<H>  3.5   |  35<H>  |  0.69    Ca    8.1<L>      14 Apr 2020 07:06  Phos  3.2     04-14  Mg     1.9     04-14    TPro  6.0  /  Alb  1.4<L>  /  TBili  0.2  /  DBili  x   /  AST  43<H>  /  ALT  44  /  AlkPhos  92  04-14    LIVER FUNCTIONS - ( 14 Apr 2020 07:06 )  Alb: 1.4 g/dL / Pro: 6.0 g/dL / ALK PHOS: 92 U/L / ALT: 44 U/L DA / AST: 43 U/L / GGT: x           PT/INR - ( 14 Apr 2020 07:06 )   PT: 11.9 sec;   INR: 1.07 ratio         PTT - ( 14 Apr 2020 07:06 )  PTT:33.9 sec      Active Medications  MEDICATIONS  (STANDING):  aspirin  chewable 81 milliGRAM(s) Oral daily  cefepime   IVPB 1000 milliGRAM(s) IV Intermittent every 8 hours  cefepime   IVPB      chlorhexidine 0.12% Liquid 15 milliLiter(s) Oral Mucosa every 12 hours  chlorhexidine 2% Cloths 1 Application(s) Topical daily  dexMEDEtomidine Infusion 0.4 MICROgram(s)/kG/Hr (4.54 mL/Hr) IV Continuous <Continuous>  enoxaparin Injectable 40 milliGRAM(s) SubCutaneous every 12 hours  lactobacillus acidophilus 1 Tablet(s) Oral three times a day with meals  levETIRAcetam  IVPB 1000 milliGRAM(s) IV Intermittent every 12 hours  levothyroxine Injectable 62.5 MICROGram(s) IV Push at bedtime  memantine 5 milliGRAM(s) Oral daily  midodrine 10 milliGRAM(s) Oral every 8 hours  norepinephrine Infusion 0.05 MICROgram(s)/kG/Min (4.26 mL/Hr) IV Continuous <Continuous>  pantoprazole  Injectable 40 milliGRAM(s) IV Push daily  propofol Infusion 10 MICROgram(s)/kG/Min (2.72 mL/Hr) IV Continuous <Continuous>  valproate sodium IVPB 500 milliGRAM(s) IV Intermittent every 12 hours    MEDICATIONS  (PRN):  acetaminophen   Tablet .. 650 milliGRAM(s) Oral every 6 hours PRN Temp greater or equal to 38C (100.4F), Mild Pain (1 - 3)  acetaminophen  Suppository .. 650 milliGRAM(s) Rectal every 4 hours PRN Temp greater or equal to 38C (100.4F)  LORazepam   Injectable 2 milliGRAM(s) IV Push every 6 hours PRN seizure activity

## 2020-04-14 NOTE — PROGRESS NOTE ADULT - ASSESSMENT
[phone]   1960 DOA 2020 [address] NORTH MEDLEY      REVIEW OF SYMPTOMS      Able to give ROS  NO     PHYSICAL EXAM    HEENT Unremarkable PERRLA atraumatic   RESP Fair air entry EXP prolonged    Harsh breath sound Resp distres mild   CARDIAC S1 S2 No S3     NO JVD    ABDOMEN SOFT BS PRESENT NOT DISTENDED No hepatosplenomegaly PEDAL EDEMA present No calf tenderness  NO rash   GENERAL Not TOXIC looking    VITALS/LABS     2020 58 102/67   2020 11a dexm 0.396 m/k/h   2020 11a nore .15 m/k/m   2020 11a prop 19 m/k/m   2020 w 9.9 Hb 10.4 Plt 136  Na 147 K 3.5 CO2 35 Cr .6   2020 744/48/54   2020 2p 20/320/+5/60%     PT DATA/BEST PRACTICE  ALLERGY     NOTEWORTHY  POINTS/CHANGES ROS/PE   2020 started on cefepime by hospitalist   2020 started on po vanco for diarrhea                                WT  45 (2020)                    BMI     20 (2020)     ADVANCED DIRECTIVE       Goals of care discussion                                                                                      HEAD OF BED ELEVATION Yes  DYSPHAGIA EVAL                                  DIET      dys 1 puree () --> jevity 1.5 720 ()--> 1080 (4/10) --> npo (  (CCPA MP ) (ileus)    .4 ()  (Dr RICHARDSON)                                   IV F        D5 100 ()   --> D5 1/2 75 (() --> D5 75 ()  --> d5 5 (4/10-)      (DCEd by Dr RICHARDSON)                                        DVT PROPHYLAXIS      lvnx 30 ()      --> Lvnx 40 ()   --> lvnx 40.2 ()      PATIENT SUMMARY   60 f nonverbal downs syndrome from group home HO COVID exposure per transfer papers on Rx for aspiration pneumonia and shortness of breath   er vitals 90/50 66 100f 92%    Pt found to have pneumonia pl effsn hypernatremia and luz on arrival COVID palma started Pulm consulted     Pulm consulted  CT ch showed l lung collapse Pt ruled out for COVID     Home meds memantine 28 keppra 1.2                                Pt tr sicu 2020 itubated hemaodynamci intability                 PLAN   DVT P On lvnx 30 ()      --> Lvnx 40.2 ()      RESP FAIL Sec COVID 19  GAS EXCHANGE   2020 744/48/54   2020 2p 20/320/+5/60%   Target PaO2 60 pH 730 Ppl 30   ltvv vent     POSS BACT PNEU sp levaquin (-) pc downtrend  A cefepime 1.3 () (DR RICHARDSON) started by hospitalist   DIARRHEA 2020 axr ileus   Made npo 2020   vanco 125.4 () (diarrhea) --> dced 2020  COVID 19     --4/10---2020 nlr 6.1 -7.1- 5.3 - 1.3 -5.5 -3.5  ---2020 ferritin [id number] - 1404  - 1039   --2020 procal .27-.34- .1  -2020 crp 2.6 - 3.8  -2020 esr 26 - 25  -2020 d-dimer [phone]       HCQ ()   2020 qtc 442   IVIG 20g/d.4d () (Dr RICHARDSON) --> DCED 2020    solumed 80.5d ()     Cont supp rx    ARDS ltvv   HEMODYNAMICS In shock sec COVID 19 Remains on nore () Mido () Given alb   NONOLIGURIC LUZ Monitor lytes   HYPERNATR   ----4/10---2020 Na 151 -151-143 -[phone]  On fw 240.4 (4/10)   A/R improved   SZ On keppra and ativan for break thru  GOC 2020 Discussions under way with brother  PLAN   Wean off vasopressors and vent as tolerated       TIME SPENT Over 36 minutes aggregate critical  care time spent on encounter; activities included   direct pt care, counseling and/or coordinating care reviewing notes, lab data/ imaging , discussion with multidisciplinary team/ pt /family. Risks, benefits, alternatives  discussed in detail.    [facility] MIGNON [phone]   1960 DOA 2020 DR NORTH MEDLEY

## 2020-04-14 NOTE — PROGRESS NOTE ADULT - ASSESSMENT
61 yo F  , nonverbal, MRDD, hypothyroidism and down syndrome, hx of seizures BIBA  from group home for fever, sob, cough, low o2 sat. Admitted to [facility] for evaluation of r/o COVBID 19 PNA. also found to be hypernatremic and with evidence of Acute Renal Failure. Now admitted to ICU for acute severe respiratory decompensation secondary to COVID 19 PNA      Acute Severe Hypoxemic Respiratory Failure MF due to COVID 19 PNA with evidence of Mucus plug++Aspiration PNA  Septic Shock due to the above  CT with evidence of Right sided Mucus Plugging  s/p zithromax and meropenam  on pressors  completed course of  plaq and steroids  Patient ++secretions from ET tube  EKG 4/9 at 443  trial of levoquin - failed  Sputum negative  Plan:  maintain low tidal  Titrate sat to 92 percent  followup q48h ldh ferritin and ddimer  daily cbc with diff    Breakthrough Generalized Seizures  may be contributing factor to aspiration PNA  on Keppra IV q12th + ativan rescue + depakote  neuro consulted appreciated    Diarrhea liikely from antibiotics  ddx includes cdiff vs viral  flexiseal  cdiff sent today    Troponemia NSTEMI type II likely due to septic cardiomyopathy now with new evidence of Congestive Heart failure EF of 40 percent  trop from 1 to 2.7  Cards consulted  Poor candidate for IVIG    Hypernatremia.     Was on d5w at 100ml hr  nephro consulted, recs appreciated.       ADAM likely prerenal oliguria  Resolved    Hypothyroidism, unspecified type.  Plan: levothyroxine.     Constipation.  miralax      code: Full  dvt ppx: lovenox subq	  dispo: back to facility when stable    Updated brother on clinical status  brother reported speaking to ICU team. ICU team in concurrence with poor prognosis  Brother understanding of poor prognosis. and low rate of survival  although as vent settings are improving    I have explained that patient is NPO and is without food likely patients lungs are clearing as she is not aspirating    Goals of care To Be Continued    Recheckig cxr and KUB now  +lactic acid + amylase and lipase

## 2020-04-15 NOTE — PROGRESS NOTE ADULT - SUBJECTIVE AND OBJECTIVE BOX
24 hour events: was reintubated overnight. This AM, ET tube was repositioned. Failed SBT.    Review of Systems:  unable to obtain    Wt(kg): --    Mode: AC/ CMV (Assist Control/ Continuous Mandatory Ventilation), RR (machine): 18, TV (machine): 320, FiO2: 30, PEEP: 5  04-14-20 @ 07:01  -  04-15-20 @ 07:00  --------------------------------------------------------  IN: 300 mL / OUT: 2275 mL / NET: -1975 mL    04-15-20 @ 07:01  -  04-15-20 @ 18:18  --------------------------------------------------------  IN: 0 mL / OUT: 400 mL / NET: -400 mL        CAPILLARY BLOOD GLUCOSE      POCT Blood Glucose.: 86 mg/dL (15 Apr 2020 05:35)      I&O's Summary    14 Apr 2020 07:01  -  15 Apr 2020 07:00  --------------------------------------------------------  IN: 300 mL / OUT: 2275 mL / NET: -1975 mL    15 Apr 2020 07:01  -  15 Apr 2020 18:18  --------------------------------------------------------  IN: 0 mL / OUT: 400 mL / NET: -400 mL        Physical Exam:   T(F): 97 (04-15-20 @ 16:08), Max: 97.8 (04-14-20 @ 21:13)  HR: 68 (04-15-20 @ 16:52) (58 - 78)  BP: 111/77 (04-15-20 @ 16:08) (85/63 - 148/99)  RR: 18 (04-15-20 @ 16:08) (18 - 20)  SpO2: 100% (04-15-20 @ 16:52) (94% - 100%)    Gen: ill appearing female, vented  Neuro: sedated, nonfocal  HEENT: MMM, ET/NGT in place  Resp: vented coarse breath sounds anteriorly  CVS: S1S2 RRR  Abd: soft, nontender, nondistended, bowel sounds present  Ext: no edema  Skin: warm, well perfused; right IJ CVC    Meds:    midodrine Oral  norepinephrine Infusion IV Continuous    levothyroxine Injectable IV Push      acetaminophen   Tablet .. Oral PRN  acetaminophen  Suppository .. Rectal PRN  dexMEDEtomidine Infusion IV Continuous  levETIRAcetam  IVPB IV Intermittent  memantine Oral  propofol Infusion IV Continuous  valproate sodium IVPB IV Intermittent      aspirin  chewable Oral  enoxaparin Injectable SubCutaneous    pantoprazole  Injectable IV Push      potassium chloride  20 mEq/100 mL IVPB IV Intermittent      chlorhexidine 0.12% Liquid Oral Mucosa  chlorhexidine 2% Cloths Topical    lactobacillus acidophilus Oral                            10.4   7.67  )-----------( 133      ( 15 Apr 2020 06:18 )             31.3       04-15    148<H>  |  111<H>  |  13  ----------------------------<  100<H>  3.1<L>   |  29  |  0.69    Ca    8.2<L>      15 Apr 2020 06:18  Phos  4.0     04-15  Mg     1.8     04-15    TPro  6.0  /  Alb  1.3<L>  /  TBili  0.4  /  DBili  x   /  AST  54<H>  /  ALT  45  /  AlkPhos  85  04-15    Lactate 1.5           04-14 @ 19:18      CARDIAC MARKERS ( 15 Apr 2020 06:18 )  .465 ng/mL / x     / 31 U/L / x     / x          PT/INR - ( 15 Apr 2020 06:18 )   PT: 11.9 sec;   INR: 1.07 ratio         PTT - ( 15 Apr 2020 06:18 )  PTT:29.9 sec    .Stool Feces   No enteric pathogens to date: Final culture pending  No enteric gram negative rods isolated -- 04-14 @ 16:46    C Diff by PCR Result: NotDetec (04-14 @ 15:05)            Radiology:   < from: Xray Chest 1 View-PORTABLE IMMEDIATE (04.15.20 @ 13:04) >    EXAM:  XR CHEST PORTABLE IMMED 1V                                  PROCEDURE DATE:  04/15/2020          INTERPRETATION:  INDICATION: ETT pulled back    PRIORS: Respiratory failure; Covid exposure.    VIEWS: Portable AP radiography of the chest performed.    FINDINGS: Since prior evaluation the ETT has been repositioned, the distal tip located approximately 2.5 cm superior to the tracheal bifurcation. There is no change in position of the indwelling right IJ CVC. Bilateral lung parenchymal airspace opacities are identified. No pleural effusion or pneumothorax is demonstrated. No mediastinal shift is noted. Degenerative changes the thoracic spine noted. There is no change in position of the indwelling NGT.    IMPRESSION: : Since prior evaluation the ETT has been repositioned, the distal tip located approximately 2.5 cm superior to the tracheal bifurcation. Bilateral lung parenchymal airspace opacities.      DISCRETE X-RAY DATA:  Percent of LEFT lung opacification: 34-66%  Percent of RIGHTlung opacification: 34-66%  Change in lung opacification from most recent x-ray (<=3 days): Stable  Change from prior dated 3 or more days (same admission): Stable      EDWIN ZALDIVAR   This document has been electronically signed. Apr 15 2020  1:22PM                < end of copied text >      CENTRAL LINE: LUIS HOWARD: LUIS    GLOBAL ISSUE/BEST PRACTICE:  Analgesia: Y  Sedation: Y  [facility]ICU: [facility]  HOB elevation: yes  Stress ulcer prophylaxis: Y  VTE prophylaxis: Y  Glycemic control: Y  Nutrition: Y    CODE STATUS: Full Code - brother wants to make patient DNR

## 2020-04-15 NOTE — PROCEDURE NOTE - NSTRACHPOSTINTU_RESP_A_CORE
Chest excursion noted/Chest X-Ray/Breath sounds bilateral
Positive end tidal Co2 noted/Breath sounds equal/Appropriate capnography/Breath sounds bilateral/Chest excursion noted

## 2020-04-15 NOTE — PROGRESS NOTE ADULT - ASSESSMENT
cont rx JOSE ANGEL CROW [phone]   1960 DOA 2020 DR NORTH MEDLEY      REVIEW OF SYMPTOMS      Able to give ROS  NO     PHYSICAL EXAM    HEENT Unremarkable PERRLA atraumatic   RESP Fair air entry EXP prolonged    Harsh breath sound Resp distres mild   CARDIAC S1 S2 No S3     NO JVD    ABDOMEN SOFT BS PRESENT NOT DISTENDED No hepatosplenomegaly PEDAL EDEMA present No calf tenderness  NO rash   GENERAL Not TOXIC looking    VITALS/LABS     4/15/2020 afeb 66 110/70 18 100%   4/15/2020 11a dexm .39 m/k/h   4/15/2020 11a nore .15 m/k/m   4/15/2020 11a prop 19 m/k/m   4/15/2020 u 400   4/15/2020 3a 750/35/49   4/15/2020 ac 18/320/+5/30%   4/15/2020 w 7.6 Hb 10.4 Plt 133 Na 148 K 3.1 CO2 29 Cr .6       PT DATA/BEST PRACTICE  ALLERGY     NOTEWORTHY  POINTS/CHANGES ROS/PE   4/15/2020 cefepime dced                                WT  45 (2020)                    BMI     20 (2020)     ADVANCED DIRECTIVE       Goals of care discussion                                                                                      HEAD OF BED ELEVATION Yes  DYSPHAGIA EVAL                                  DIET      dys 1 puree () --> jevity 1.5 720 ()--> 1080 (4/10) --> npo (  (CCPA MP ) (ileus)    .4 ()  (Dr RICHARDSON)                                   IV F        D5 100 ()   --> D5 1/2 75 (() --> D5 75 ()  --> d5 5 (4/10-)      (DCEd by Dr RICHARDSON)                                        DVT PROPHYLAXIS      lvnx 30 ()      --> Lvnx 40 ()   --> lvnx 40.2 ()      PATIENT SUMMARY   60 f nonverbal downs syndrome from group home HO COVID exposure per transfer papers on Rx for aspiration pneumonia and shortness of breath   er vitals 90/50 66 100f 92%    Pt found to have pneumonia pl effsn hypernatremia and lzu on arrival COVID palma started Pulm consulted     Pulm consulted  CT ch showed l lung collapse Pt ruled out for COVID     Home meds memantine 28 keppra 1.2                                Pt tr sicu 2020 itubated hemaodynamci intability                 PLAN   DVT P On lvnx 30 ()      --> Lvnx 40.2 ()      RESP FAIL Sec COVID 19  GAS EXCHANGE   4/15/2020 3a 750/35/49   4/15/2020 ac 18/320/+5/30%   Target PaO2 60 pH 730 Ppl 30   ltvv vent     POSS BACT PNEU sp levaquin (-) pc downtrend  A cefepime 1.3 () (DR RICHARDSON) started by hospitalist  DCed 4/15/2020   DIARRHEA 2020 axr ileus   Made npo 2020   vanco 125.4 () (diarrhea) --> dced 2020    COVID 19     --4/10----4/15/2020 nlr 6.1 -7.1- 5.3 - 1.3 -5.5 -3.5-1.8  ----4/15/2020 ferritin [id number] - 1404  - 1039 - 1524   ---4/15/2020 procal .27-.34- .1-.15   --4/15/2020 crp 2.6 - 3.8-16.9   -2020 esr 26 - 25  --4/15/2020 d-dimer [phone]    4/15/2020 d-dimer 786        HCQ ()   2020 qtc 442   IVIG 20g/d.4d () (Dr RICHARDSON) --> DCED 2020    solumed 80.5d ()     Cont supp rx  ARDS ltvv   HEMODYNAMICS In shock sec COVID 19 Remains on nore () Mido () Given alb   NONOLIGURIC LUZ Monitor lytes   HYPERNATR   ----4/10----4/15/2020 Na 151 -151-143 -[phone]  On fw 240.4 (4/10)   A/R improved   SZ On keppra and ativan for break thru  GO 2020 Discussions under way with brother  PLAN   Wean off vasopressors and vent as tolerated       TIME SPENT Over 36 minutes aggregate critical care time spent on encounter; activities included   direct pt care, counseling and/or coordinating care reviewing notes, lab data/ imaging , discussion with multidisciplinary team/ pt /family. Risks, benefits, alternatives  discussed in detail.    [facility] [phone]   1960 DOA 2020 DR NORTH MEDLEY           cont rx

## 2020-04-15 NOTE — PROGRESS NOTE ADULT - ASSESSMENT
59 yo F with Downs syndrome, hypothyroidism, constipation, seizure disorder with acute hypoxic respiratory failure from COVID 19 pneumonia, course complicated by episode of seizure, type 2 NSTEMI, ADAM (resolved), gram variable bacteremia (repeat cx ngtd); ET tube replaced 4/14 due to low expiratory volumes on ventilator.    1. Acute hypoxic respiratory failure  2. COVID-19 Pneumonia  3. ADAM (resolved)  4. Type 2 NSTEMI (demand ischemia)  5. Seizure Disorder  6. Gram variable bacteremia (of uncertain clinical significance)    Neuro: Continue sedation with precedex and propofol. Continue depacon 500mg BID and keppra 1g q12 for seizures. Continue home memantine.  Cardio: Off levophed but on midodrine 10mg q8h. Type 2 NSTEMI, demand ischemia - troponin downtrended. continue asa.  Pulm: reintubated 4/14 and ET tube was repositioned. Weaning trials as tolerated. Placed on SIMV, failed weaning trial 4/15 will re-attempt when feasible.  Renal: renal function stable. monitor UOP. monitor and replete lytes. replete K with IV K riders.  GI: NPO with tube feeding. GI ppx. Transaminitis from COVID 19 infection. trend LFTs. Avoid hepatotoxic medications  ID: COVID 19 pneumonia s/p hydroxychloroquine, toci 4/13, steroids stopped 4/12. Will d/c abx and monitor off for now. monitor biomarkers.  Heme: DVT ppx lovenox 40 q12. monitor for bleeding  Dispo: critically ill with hypoxic respiratory failure from COVID 19 pneumonia. continue ICU care. Brother would like to make patient DNR - will address with [facility]LS and fill appropriate paperwork.    Critical care time including time spent reviewing chart, ordering tests and treatments, evaluating and treating patient at bedside was 31 minutes.

## 2020-04-15 NOTE — CONSULT NOTE ADULT - SUBJECTIVE AND OBJECTIVE BOX
:    HPI:  61YO F nonverbal, MRDD, hypothyroidism and down syndrome, hx of seizures BIBA  from group home admitted to ICU for acute severe respiratory decompensation witg severe sepsis and septic shock secondary to COVID 19 PNA. Had been on multiple antibiotics--Meropenam Levaquin and now Cefepime. Had single bottle in blood cultures with gram variable rods ( ID still pending and sent to [facility] lab ) Repeat blood culture NGTD. Pt remains on vent and on pressors.    Infectious Disease consult was called to evaluate pt and for antibiotic management.    Past Medical & Surgical Hx:  PAST MEDICAL & SURGICAL HISTORY:  Frequent urinary tract infections  Hypothyroidism, unspecified type  Down syndrome      Social History--  EtOH: denies   Tobacco: denies   Drug Use: denies       FAMILY HISTORY:  Noncontributory    Allergies  amoxicillin (Rash)  penicillin (Rash)    Intolerances  NONE      Home Medications:  Bacid (LAC) oral tablet: 1 tab(s) orally 2 times a day (04 Apr 2020 13:14)  Colace: 10 milliliter(s) orally 2 times a day, As Needed (04 Apr 2020 13:14)  Cranberry oral tablet: 450 milligram(s) orally 2 times a day (04 Apr 2020 13:14)  divalproex sodium 125 mg oral delayed release tablet: 2 tab(s) orally 3 times a day (04 Apr 2020 13:14)  Fleet Enema 7 g-19 g rectal enema: 133 milliliter(s) rectal once, As Needed (04 Apr 2020 13:14)  Fortical 200 intl units/inh nasal spray: 1 spray(s) nasal once a day (04 Apr 2020 13:14)  Fosamax 70 mg oral tablet: 1 tab(s) orally once a week (04 Apr 2020 13:14)  Keppra 1000 mg oral tablet: 1 tab(s) orally 2 times a day (04 Apr 2020 13:14)  Levaquin 500 mg oral tablet: 1 tab(s) orally every 24 hours (04 Apr 2020 13:14)  levothyroxine 125 mcg (0.125 mg) oral tablet: 1 tab(s) orally once a day (04 Apr 2020 13:14)  memantine 28 mg oral capsule, extended release: 1 cap(s) orally once a day (04 Apr 2020 10:02)  Milk of Magnesia: 30 milliliter(s) orally , As Needed (04 Apr 2020 13:14)  MiraLax oral powder for reconstitution: 17 gram(s) orally once a day (04 Apr 2020 13:14)  multivitamin: orally once a day (04 Apr 2020 13:14)  nitrofurantoin macrocrystals 50 mg oral capsule: 1 cap(s) orally once a day (04 Apr 2020 13:14)  omeprazole 40 mg oral delayed release capsule: 1 cap(s) orally once a day (04 Apr 2020 13:14)  Oyster Mitch 500 oral tablet: orally once a day (04 Apr 2020 10:02)  Vitamin D3 1000 intl units (25 mcg) oral capsule: 2 cap(s) orally once a day (04 Apr 2020 13:14)      Current Inpatient Medications :    ANTIBIOTICS:   cefepime   IVPB 1000 milliGRAM(s) IV Intermittent every 8 hours  cefepime   IVPB          OTHER RELEVANT MEDICATIONS :  acetaminophen   Tablet .. 650 milliGRAM(s) Oral every 6 hours PRN  acetaminophen  Suppository .. 650 milliGRAM(s) Rectal every 4 hours PRN  aspirin  chewable 81 milliGRAM(s) Oral daily  chlorhexidine 0.12% Liquid 15 milliLiter(s) Oral Mucosa every 12 hours  chlorhexidine 2% Cloths 1 Application(s) Topical daily  dexMEDEtomidine Infusion 0.4 MICROgram(s)/kG/Hr IV Continuous <Continuous>  enoxaparin Injectable 40 milliGRAM(s) SubCutaneous every 12 hours  levETIRAcetam  IVPB 1000 milliGRAM(s) IV Intermittent every 12 hours  levothyroxine Injectable 62.5 MICROGram(s) IV Push at bedtime  memantine 5 milliGRAM(s) Oral daily  midodrine 10 milliGRAM(s) Oral every 8 hours  norepinephrine Infusion 0.05 MICROgram(s)/kG/Min IV Continuous <Continuous>  pantoprazole  Injectable 40 milliGRAM(s) IV Push daily  potassium chloride  20 mEq/100 mL IVPB 20 milliEquivalent(s) IV Intermittent every 2 hours  propofol Infusion 10 MICROgram(s)/kG/Min IV Continuous <Continuous>  valproate sodium IVPB 500 milliGRAM(s) IV Intermittent every 12 hours      ROS:  Unable to obtain due to : pt's condition      I&O's Detail    14 Apr 2020 07:01  -  15 Apr 2020 07:00  --------------------------------------------------------  IN:    IV PiggyBack: 300 mL  Total IN: 300 mL    OUT:    Indwelling Catheter - Urethral: 2275 mL  Total OUT: 2275 mL    Total NET: -1975 mL      15 Apr 2020 07:01  -  15 Apr 2020 17:29  --------------------------------------------------------  IN:  Total IN: 0 mL    OUT:    Indwelling Catheter - Urethral: 400 mL  Total OUT: 400 mL    Total NET: -400 mL      Physical Exam:  Vital Signs Last 24 Hrs  T(C): 36.1 (15 Apr 2020 16:08), Max: 36.6 (14 Apr 2020 21:13)  T(F): 97 (15 Apr 2020 16:08), Max: 97.8 (14 Apr 2020 21:13)  HR: 66 (15 Apr 2020 16:08) (58 - 78)  BP: 111/77 (15 Apr 2020 16:08) (85/63 - 148/99)  BP(mean): 83 (14 Apr 2020 23:30) (83 - 85)  RR: 18 (15 Apr 2020 16:08) (18 - 20)  SpO2: 100% (15 Apr 2020 16:08) (94% - 100%)      General: vented sedated  Eyes: sclera anicteric, pupils equal and reactive to light  ENMT: buccal mucosa moist, pharynx not injected  Neck: supple, trachea midline  Lungs: Decreased no wheeze/rhonchi  Cardiovascular: regular rate and rhythm, S1 S2  Abdomen: soft, nontender, no organomegaly present, bowel sounds normal  Neurological:  Sedated  Skin:no increased ecchymosis/petechiae/purpura  Lymph Nodes: no palpable cervical/supraclavicular lymph nodes enlargements  Extremities: no cyanosis/clubbing/edema    Labs:                         10.4   7.67  )-----------( 133      ( 15 Apr 2020 06:18 )             31.3   04-15    148<H>  |  111<H>  |  13  ----------------------------<  100<H>  3.1<L>   |  29  |  0.69    Ca    8.2<L>      15 Apr 2020 06:18  Phos  4.0     04-15  Mg     1.8     04-15    TPro  6.0  /  Alb  1.3<L>  /  TBili  0.4  /  DBili  x   /  AST  54<H>  /  ALT  45  /  AlkPhos  85  04-15      RECENT CULTURES:  Culture - Blood (04.06.20 @ 16:37)    Specimen Source: .Blood Blood    Culture Results:   No Growth Final    GI PCR Panel, Stool (collected 14 Apr 2020 16:46)  Source: .Stool Feces  Final Report (14 Apr 2020 19:26):    GI PCR Results: NOT detected    *******Please Note:*******    GI panel PCR evaluates for:    Campylobacter, Plesiomonas shigelloides, Salmonella,    Vibrio, Yersinia enterocolitica, Enteroaggregative    Escherichia coli (EAEC), Enteropathogenic E.coli (EPEC),    Enterotoxigenic E. coli (ETEC) lt/st, Shiga-like    toxin-producing E. coli (STEC) stx1/stx2,    Shigella/ Enteroinvasive E. coli (EIEC), Cryptosporidium,    Cyclospora cayetanensis, Entamoeba histolytica,    Giardia lamblia, Adenovirus F 40/41, Astrovirus,    Norovirus GI/GII, Rotavirus A, Sapovirus    Culture - Stool (collected 14 Apr 2020 16:46)  Source: .Stool Feces  Preliminary Report (15 Apr 2020 15:09):    No enteric pathogens to date: Final culture pending    No enteric gram negative rods isolated    Culture - Sputum . (04.09.20 @ 16:26)    Gram Stain:   Numerous polymorphonuclear leukocytes seen per low power field  Rare Squamous epithelial cells seen per low power field  Rare Gram positive cocci in pairs seen per oil power field    Specimen Source: .Sputum Sputum    Culture Results:   Normal Respiratory Jackelin present    RADIOLOGY & ADDITIONAL STUDIES:    EXAM:  XR CHEST PORTABLE IMMED 1V                                  PROCEDURE DATE:  04/15/2020          INTERPRETATION:  INDICATION: ETT pulled back    PRIORS: Respiratory failure; Covid exposure.    VIEWS: Portable AP radiography of the chest performed.    FINDINGS: Since prior evaluation the ETT has been repositioned, the distal tip located approximately 2.5 cm superior to the tracheal bifurcation. There is no change in position of the indwelling right IJ CVC. Bilateral lung parenchymal airspace opacities are identified. No pleural effusion or pneumothorax is demonstrated. No mediastinal shift is noted. Degenerative changes the thoracic spine noted. There is no change in position of the indwelling NGT.    IMPRESSION: : Since prior evaluation the ETT has been repositioned, the distal tip located approximately 2.5 cm superior to the tracheal bifurcation. Bilateral lung parenchymal airspace opacities.      DISCRETE X-RAY DATA:  Percent of LEFT lung opacification: 34-66%  Percent of RIGHTlung opacification: 34-66%  Change in lung opacification from most recent x-ray (<=3 days): Stable  Change from prior dated 3 or more days (same admission): Stable        Assessment :   61YO F nonverbal, MRDD, hypothyroidism and down syndrome, hx of seizures BIBA  from group home admitted to ICU for acute severe respiratory decompensation with severe sepsis and septic shock secondary to COVID 19 PNA. Had been on multiple antibiotics--Meropenam Levaquin and now Cefepime. Had single bottle in blood cultures with gram variable rods ( ID still pending and sent to [facility] lab ) Repeat blood culture NGTD. Pt remains on vent and on pressors. Blood culture likely contaminant. Severe sepsis with septic shock and respiratory failure sec COVID 19 pneumonia. Procalcitonin unimpressive.    Plan :   Will dc antibiotic and monitor off antibiotic  Trend temps and cbc  Vent per ICU   Wean off pressors  COVID-19--critical period for decompensation  (7-14 days post symptom onset), avoid aerosolizing procedures, NSAIDs   -monitor respiratory status closely ( route of 02 and saturation) and titrate when able  -isolation precautions per protocol  -avoid excessive blood draws, blood cultures and frequent CXRs  -monitor biomarkers- CBC w diff  for NLRNLR <3 low vs >5 high) Ferritin (lower risk <450 vs >850) CRP (low risk <2 and higher risk >6) and LDH, D Dimer, procalcitonin  -therapies remains investigational-- including Hydroxychloroquine  -antibiotics only if there is concern for a bacterial process  -Steroids short course ( 5 days)  --for hypoxia/ARDS /shock    D/w ICU team      Continue with present regime .  Approptiate use of antibiotics and adverse effects reviewed.      I have discussed the above plan of care with patient/family in detail. They expressed understanding of the treatment plan . Risks, benefits and alternatives discussed in detail. I have asked if they have any questions or concerns and appropriately addressed them to the best of my ability .      > 45 minutes spent in direct patient care reviewing  the notes, lab data/ imaging , discussion with multidisciplinary team. All questions were addressed and answered to the best of my capacity .    Thank you for allowing me to participate in the care of your patient .      John iRvera MD  Infectious Disease  [phone]

## 2020-04-15 NOTE — PROGRESS NOTE ADULT - SUBJECTIVE AND OBJECTIVE BOX
Neurology follow up note    MIGNON [id number]      Interval History:    Patient intubated sedated     MEDICATIONS    acetaminophen   Tablet .. 650 milliGRAM(s) Oral every 6 hours PRN  acetaminophen  Suppository .. 650 milliGRAM(s) Rectal every 4 hours PRN  aspirin  chewable 81 milliGRAM(s) Oral daily  cefepime   IVPB 1000 milliGRAM(s) IV Intermittent every 8 hours  cefepime   IVPB      chlorhexidine 0.12% Liquid 15 milliLiter(s) Oral Mucosa every 12 hours  chlorhexidine 2% Cloths 1 Application(s) Topical daily  dexMEDEtomidine Infusion 0.4 MICROgram(s)/kG/Hr IV Continuous <Continuous>  enoxaparin Injectable 40 milliGRAM(s) SubCutaneous every 12 hours  lactobacillus acidophilus 1 Tablet(s) Oral three times a day with meals  levETIRAcetam  IVPB 1000 milliGRAM(s) IV Intermittent every 12 hours  levothyroxine Injectable 62.5 MICROGram(s) IV Push at bedtime  memantine 5 milliGRAM(s) Oral daily  midodrine 10 milliGRAM(s) Oral every 8 hours  norepinephrine Infusion 0.05 MICROgram(s)/kG/Min IV Continuous <Continuous>  pantoprazole  Injectable 40 milliGRAM(s) IV Push daily  propofol Infusion 10 MICROgram(s)/kG/Min IV Continuous <Continuous>  valproate sodium IVPB 500 milliGRAM(s) IV Intermittent every 12 hours      Allergies    amoxicillin (Rash)  penicillin (Rash)    Intolerances            Vital Signs Last 24 Hrs  T(C): 36.1 (15 Apr 2020 10:00), Max: 37.3 (14 Apr 2020 11:30)  T(F): 97 (15 Apr 2020 10:00), Max: 99.1 (14 Apr 2020 11:30)  HR: 70 (15 Apr 2020 10:00) (56 - 85)  BP: 85/63 (15 Apr 2020 10:00) (85/63 - 109/80)  BP(mean): 83 (14 Apr 2020 23:30) (83 - 85)  RR: 20 (15 Apr 2020 10:00) (18 - 20)  SpO2: 100% (15 Apr 2020 10:00) (92% - 100%)      REVIEW OF SYSTEMS:  Could not be obtained secondary to the patient being nonverbal.    PHYSICAL EXAMINATION:    HEENT:  Head:  Normocephalic and atraumatic.  Eyes:  No scleral icterus.  Ears:  Hearing hard to evaluate secondary to the patient being sedated and intubated.  RESPIRATORY:  Good air entry bilaterally.  CARDIOVASCULAR:  S1 and S2 are heard.  ABDOMEN:  Soft and nontender.  EXTREMITIES:  No clubbing or cyanosis were noted.      NEUROLOGICAL:  Limited secondary to the patient being intubated and sedated.  No response to verbal stimuli.  I opened the patient's eyes, no gaze preference.  I applied stimuli to all four extremities with slight withdrawal bilateral upper and lower.  Tone; bilateral upper and lower was increased.               LABS:  CBC Full  -  ( 15 Apr 2020 06:18 )  WBC Count : 7.67 K/uL  RBC Count : 3.08 M/uL  Hemoglobin : 10.4 g/dL  Hematocrit : 31.3 %  Platelet Count - Automated : 133 K/uL  Mean Cell Volume : 101.6 fl  Mean Cell Hemoglobin : 33.8 pg  Mean Cell Hemoglobin Concentration : 33.2 gm/dL  Auto Neutrophil # : 4.61 K/uL  Auto Lymphocyte # : 2.51 K/uL  Auto Monocyte # : 0.24 K/uL  Auto Eosinophil # : 0.07 K/uL  Auto Basophil # : 0.01 K/uL  Auto Neutrophil % : 60.2 %  Auto Lymphocyte % : 32.7 %  Auto Monocyte % : 3.1 %  Auto Eosinophil % : 0.9 %  Auto Basophil % : 0.1 %      04-15    148<H>  |  111<H>  |  13  ----------------------------<  100<H>  3.1<L>   |  29  |  0.69    Ca    8.2<L>      15 Apr 2020 06:18  Phos  4.0     04-15  Mg     1.8     04-15    TPro  6.0  /  Alb  1.3<L>  /  TBili  0.4  /  DBili  x   /  AST  54<H>  /  ALT  45  /  AlkPhos  85  04-15    Hemoglobin A1C:   Lipid Panel 04-14 @ 12:21  Total Cholesterol, Serum --  LDL --  Triglycerides 289    LIVER FUNCTIONS - ( 15 Apr 2020 06:18 )  Alb: 1.3 g/dL / Pro: 6.0 g/dL / ALK PHOS: 85 U/L / ALT: 45 U/L DA / AST: 54 U/L / GGT: x           Vitamin B12   PT/INR - ( 15 Apr 2020 06:18 )   PT: 11.9 sec;   INR: 1.07 ratio         PTT - ( 15 Apr 2020 06:18 )  PTT:29.9 sec      RADIOLOGY    ANALYSIS AND PLAN:  This is a 60-year-old with altered mental status and episode of seizure.  1.	For seizure event, suspect this could be secondary to the patient's interaction with antibiotics. will change Depakote to 500 bid  no new seizures monitor levels.   2.	I will recommend Ativan 1 mg IV push q.6 h. p.r.n. for any type of breakthrough seizures.  3.	Seizure precautions.  4.	For altered mental status, secondary to metabolic encephalopathy secondary to COVID-19 infection.  5.	For hypotension, continue the patient on midodrine.  If possible, please maintain systolic blood pressure of above 100 and help maintain cerebral perfusion.  6.	For history of hypothyroidism, continue the patient on Synthroid.  7.	spoke to staff no new events   Greater than 25 minutes spent in direct patient care reviewing  the notes, lab data/ imaging

## 2020-04-15 NOTE — PROCEDURE NOTE - NSINDICATIONS_GEN_A_CORE
Meds/feeds
critical illness/emergency venous access
ETT exchange/critical patient
respiratory failure/critical patient/airway protection

## 2020-04-15 NOTE — PROGRESS NOTE ADULT - ASSESSMENT
The patient is a 60 year old female with a history of MRDD, hypothyroidism, seizure d/o who is admitted with COVID-19, acute respiratory failure, septic shock, NSTEMI.    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 There may be a segmental component suggesting underlying CAD.  - IVIg discontinued  - Continue aspirin 81 mg daily  - On cefepime  - On enoxaparin DVT prophylaxis  - On midodrine 10 mg tid  - Continue norepinephrine and titrate to MAP of 65  - Mechanical ventilation  - ICU care  - Overall prognosis poor

## 2020-04-15 NOTE — PROCEDURE NOTE - NSPROCDETAILS_GEN_ALL_CORE
connected to ventilator
orogastric/audible air bolus/placement confirmed by auscultation
patient pre-oxygenated, tube inserted, placement confirmed/connected to ventilator
guidewire recovered/sterile technique, catheter placed/ultrasound guidance/sterile dressing applied/lumen(s) aspirated and flushed

## 2020-04-15 NOTE — PROGRESS NOTE ADULT - SUBJECTIVE AND OBJECTIVE BOX
Date/Time Patient Seen:  		  Referring MD:   Data Reviewed	       Patient is a 60y old  Female who presents with a chief complaint of sob (14 Apr 2020 20:00)      Subjective/HPI     PAST MEDICAL & SURGICAL HISTORY:  Frequent urinary tract infections  Hypothyroidism, unspecified type  Down syndrome        Medication list         MEDICATIONS  (STANDING):  aspirin  chewable 81 milliGRAM(s) Oral daily  cefepime   IVPB 1000 milliGRAM(s) IV Intermittent every 8 hours  cefepime   IVPB      chlorhexidine 0.12% Liquid 15 milliLiter(s) Oral Mucosa every 12 hours  chlorhexidine 2% Cloths 1 Application(s) Topical daily  dexMEDEtomidine Infusion 0.4 MICROgram(s)/kG/Hr (4.54 mL/Hr) IV Continuous <Continuous>  enoxaparin Injectable 40 milliGRAM(s) SubCutaneous every 12 hours  lactobacillus acidophilus 1 Tablet(s) Oral three times a day with meals  levETIRAcetam  IVPB 1000 milliGRAM(s) IV Intermittent every 12 hours  levothyroxine Injectable 62.5 MICROGram(s) IV Push at bedtime  memantine 5 milliGRAM(s) Oral daily  midodrine 10 milliGRAM(s) Oral every 8 hours  norepinephrine Infusion 0.05 MICROgram(s)/kG/Min (4.26 mL/Hr) IV Continuous <Continuous>  pantoprazole  Injectable 40 milliGRAM(s) IV Push daily  propofol Infusion 10 MICROgram(s)/kG/Min (2.72 mL/Hr) IV Continuous <Continuous>  valproate sodium IVPB 500 milliGRAM(s) IV Intermittent every 12 hours    MEDICATIONS  (PRN):  acetaminophen   Tablet .. 650 milliGRAM(s) Oral every 6 hours PRN Temp greater or equal to 38C (100.4F), Mild Pain (1 - 3)  acetaminophen  Suppository .. 650 milliGRAM(s) Rectal every 4 hours PRN Temp greater or equal to 38C (100.4F)         Vitals log        ICU Vital Signs Last 24 Hrs  T(C): 36.1 (15 Apr 2020 06:00), Max: 37.3 (14 Apr 2020 11:30)  T(F): 97 (15 Apr 2020 06:00), Max: 99.1 (14 Apr 2020 11:30)  HR: 75 (15 Apr 2020 08:00) (56 - 85)  BP: 96/62 (15 Apr 2020 06:00) (96/62 - 109/80)  BP(mean): 83 (14 Apr 2020 23:30) (83 - 85)  ABP: --  ABP(mean): --  RR: 18 (15 Apr 2020 06:00) (18 - 20)  SpO2: 100% (15 Apr 2020 08:00) (92% - 100%)       Mode: SIMV with PS  RR (machine): 10  TV (machine): 320  FiO2: 21  PEEP: 5  PS: 12  ITime: 1  MAP: 9  PIP: 26      Input and Output:  I&O's Detail    14 Apr 2020 07:01  -  15 Apr 2020 07:00  --------------------------------------------------------  IN:    IV PiggyBack: 300 mL  Total IN: 300 mL    OUT:    Indwelling Catheter - Urethral: 2275 mL  Total OUT: 2275 mL    Total NET: -1975 mL          Lab Data                        10.4   7.67  )-----------( 133      ( 15 Apr 2020 06:18 )             31.3     04-15    148<H>  |  111<H>  |  13  ----------------------------<  100<H>  3.1<L>   |  29  |  0.69    Ca    8.2<L>      15 Apr 2020 06:18  Phos  4.0     04-15  Mg     1.8     04-15    TPro  6.0  /  Alb  1.3<L>  /  TBili  0.4  /  DBili  x   /  AST  54<H>  /  ALT  45  /  AlkPhos  85  04-15    ABG - ( 15 Apr 2020 03:30 )  pH, Arterial: x     pH, Blood: 7.50  /  pCO2: 35    /  pO2: 49    / HCO3: 28    / Base Excess: 4.7   /  SaO2: 85                CARDIAC MARKERS ( 15 Apr 2020 06:18 )  .465 ng/mL / x     / 31 U/L / x     / x            Review of Systems	      Objective     Physical Examination    heart s1s2  lung dec BS      Pertinent Lab findings & Imaging      Deloris:  NO   Adequate UO     I&O's Detail    14 Apr 2020 07:01  -  15 Apr 2020 07:00  --------------------------------------------------------  IN:    IV PiggyBack: 300 mL  Total IN: 300 mL    OUT:    Indwelling Catheter - Urethral: 2275 mL  Total OUT: 2275 mL    Total NET: -1975 mL               Discussed with:     Cultures:	  Culture Results:   GI PCR Results: NOT detected  *******Please Note:*******  GI panel PCR evaluates for:  Campylobacter, Plesiomonas shigelloides, Salmonella,  Vibrio, Yersinia enterocolitica, Enteroaggregative  Escherichia coli (EAEC), Enteropathogenic E.coli (EPEC),  Enterotoxigenic E. coli (ETEC) lt/st, Shiga-like  toxin-producing E. coli (STEC) stx1/stx2,  Shigella/ Enteroinvasive E. coli (EIEC), Cryptosporidium,  Cyclospora cayetanensis, Entamoeba histolytica,  Giardia lamblia, Adenovirus F 40/41, Astrovirus,  Norovirus GI/GII, Rotavirus A, Sapovirus (04-14 @ 16:46)        Radiology

## 2020-04-15 NOTE — PROGRESS NOTE ADULT - SUBJECTIVE AND OBJECTIVE BOX
MIGNON WALTER    [facility]    Patient is a 60y old  Female who presents with a chief complaint of sob (15 Apr 2020 14:00)       Allergies    amoxicillin (Rash)  penicillin (Rash)    Intolerances        HPI:  Patient is a 60y old  Female who presents with a chief complaint of sob and fever    HPI:This is a nonverbal pt bib ems from halfway for fever, sob, cough, low o2 sat, with possible covid exposure. per ems, pt on levaquin for aspiration pna. no further hx available.pt has hx of chronic constipation an dis on multiple stool softners.  she was found ot be hypernatremic and in renal failure at admission.  also has hx of hypothyroidism and down syndrome        PAST MEDICAL & SURGICAL HISTORY:  Frequent urinary tract infections  Hypothyroidism, unspecified type  Down syndrome      FAMILY HISTORY:can not obtain due to mental status      SOCIAL HISTORY:    Allergies    amoxicillin (Rash)  penicillin (Rash)    Intolerances          REVIEW OF SYSEMS:  negative except form above mentioned      Vital Signs Last 24 Hrs  T(C): 37.8 (04 Apr 2020 09:46), Max: 37.8 (04 Apr 2020 09:46)  T(F): 100 (04 Apr 2020 09:46), Max: 100 (04 Apr 2020 09:46)  HR: 68 (04 Apr 2020 11:00) (66 - 69)  BP: 89/52 (04 Apr 2020 11:00) (87/51 - 91/52)  BP(mean): --  RR: 24 (04 Apr 2020 11:00) (24 - 24)  SpO2: 100% (04 Apr 2020 11:00) (89% - 100%)  I&O's Summary      PHYSICAL EXAM:  GENERAL: onnc  HEAD:  Atraumatic, Normocephalic  EYES: EOMI, PERRLA, conjunctiva and sclera clear  NECK: Supple, No JVD  CHEST/LUNG: dec bs at bases and rhonchi  HEART: Regular rate and rhythm; No murmurs, rubs, or gallops  ABDOMEN: Soft, Nontender, Nondistended; Bowel sounds present  SKIN: No rashes or lesions    LABS:                        14.7   2.88  )-----------( 52       ( 04 Apr 2020 10:49 )             45.9     04-04    154<H>  |  117<H>  |  31<H>  ----------------------------<  107<H>  4.7   |  30  |  1.48<H>    Ca    8.5      04 Apr 2020 10:49    TPro  7.1  /  Alb  2.3<L>  /  TBili  0.2  /  DBili  x   /  AST  73<H>  /  ALT  66<H>  /  AlkPhos  118  04-04      CAPILLARY BLOOD GLUCOSE                RADIOLOGY & ADDITIONAL TESTS:    Imaging Personally Reviewed:  [x] YES  [ ] NO    Consultant(s) Notes Reviewed:  [x] YES  [ ] NO      MEDICATIONS  (STANDING):  azithromycin   Tablet 500 milliGRAM(s) Oral daily  dextrose 5%. 1000 milliLiter(s) (100 mL/Hr) IV Continuous <Continuous>  diVALproex  milliGRAM(s) Oral three times a day  enoxaparin Injectable 30 milliGRAM(s) SubCutaneous daily  levETIRAcetam 1000 milliGRAM(s) Oral two times a day  levothyroxine 125 MICROGram(s) Oral daily  memantine 5 milliGRAM(s) Oral daily  pantoprazole    Tablet 40 milliGRAM(s) Oral before breakfast  polyethylene glycol 3350 17 Gram(s) Oral daily    MEDICATIONS  (PRN):  acetaminophen   Tablet .. 650 milliGRAM(s) Oral every 6 hours PRN Temp greater or equal to 38C (100.4F), Mild Pain (1 - 3)      Care Discussed with Consultants/Other Providers [x] YES  [ ] NO    HEALTH ISSUES - PROBLEM Dx: (04 Apr 2020 13:11)      PAST MEDICAL & SURGICAL HISTORY:  Frequent urinary tract infections  Hypothyroidism, unspecified type  Down syndrome      FAMILY HISTORY:        MEDICATIONS   acetaminophen   Tablet .. 650 milliGRAM(s) Oral every 6 hours PRN  acetaminophen  Suppository .. 650 milliGRAM(s) Rectal every 4 hours PRN  aspirin  chewable 81 milliGRAM(s) Oral daily  cefepime   IVPB 1000 milliGRAM(s) IV Intermittent every 8 hours  cefepime   IVPB      chlorhexidine 0.12% Liquid 15 milliLiter(s) Oral Mucosa every 12 hours  chlorhexidine 2% Cloths 1 Application(s) Topical daily  dexMEDEtomidine Infusion 0.4 MICROgram(s)/kG/Hr IV Continuous <Continuous>  enoxaparin Injectable 40 milliGRAM(s) SubCutaneous every 12 hours  lactobacillus acidophilus 1 Tablet(s) Oral three times a day with meals  levETIRAcetam  IVPB 1000 milliGRAM(s) IV Intermittent every 12 hours  levothyroxine Injectable 62.5 MICROGram(s) IV Push at bedtime  memantine 5 milliGRAM(s) Oral daily  midodrine 10 milliGRAM(s) Oral every 8 hours  norepinephrine Infusion 0.05 MICROgram(s)/kG/Min IV Continuous <Continuous>  pantoprazole  Injectable 40 milliGRAM(s) IV Push daily  potassium chloride  20 mEq/100 mL IVPB 20 milliEquivalent(s) IV Intermittent every 2 hours  propofol Infusion 10 MICROgram(s)/kG/Min IV Continuous <Continuous>  valproate sodium IVPB 500 milliGRAM(s) IV Intermittent every 12 hours      Vital Signs Last 24 Hrs  T(C): 36.1 (15 Apr 2020 16:08), Max: 36.6 (14 Apr 2020 21:13)  T(F): 97 (15 Apr 2020 16:08), Max: 97.8 (14 Apr 2020 21:13)  HR: 66 (15 Apr 2020 16:08) (58 - 78)  BP: 111/77 (15 Apr 2020 16:08) (85/63 - 148/99)  BP(mean): 83 (14 Apr 2020 23:30) (83 - 85)  RR: 18 (15 Apr 2020 16:08) (18 - 20)  SpO2: 100% (15 Apr 2020 16:08) (94% - 100%)      04-14-20 @ 07:01  -  04-15-20 @ 07:00  --------------------------------------------------------  IN: 300 mL / OUT: 2275 mL / NET: -1975 mL    04-15-20 @ 07:01  -  04-15-20 @ 17:01  --------------------------------------------------------  IN: 0 mL / OUT: 400 mL / NET: -400 mL        Mode: SIMV with PS, RR (machine): 10, TV (machine): 320, FiO2: 21, PEEP: 5, PS: 12, ITime: 1, MAP: 9, PIP: 26    LABS:                        10.4   7.67  )-----------( 133      ( 15 Apr 2020 06:18 )             31.3     04-15    148<H>  |  111<H>  |  13  ----------------------------<  100<H>  3.1<L>   |  29  |  0.69    Ca    8.2<L>      15 Apr 2020 06:18  Phos  4.0     04-15  Mg     1.8     04-15    TPro  6.0  /  Alb  1.3<L>  /  TBili  0.4  /  DBili  x   /  AST  54<H>  /  ALT  45  /  AlkPhos  85  04-15    PT/INR - ( 15 Apr 2020 06:18 )   PT: 11.9 sec;   INR: 1.07 ratio         PTT - ( 15 Apr 2020 06:18 )  PTT:29.9 sec      ABG - ( 15 Apr 2020 03:30 )  pH, Arterial: x     pH, Blood: 7.50  /  pCO2: 35    /  pO2: 49    / HCO3: 28    / Base Excess: 4.7   /  SaO2: 85                  WBC:  WBC Count: 7.67 K/uL (04-15 @ 06:18)  WBC Count: 9.98 K/uL (04-14 @ 07:14)  WBC Count: 11.92 K/uL (04-13 @ 07:41)  WBC Count: 9.34 K/uL (04-12 @ 06:13)      MICROBIOLOGY:  RECENT CULTURES:  04-14 .Stool Feces XXXX XXXX   No enteric pathogens to date: Final culture pending  No enteric gram negative rods isolated    04-09 .Sputum Sputum XXXX   Numerous polymorphonuclear leukocytes seen per low power field  Rare Squamous epithelial cells seen per low power field  Rare Gram positive cocci in pairs seen per oil power field   Normal Respiratory Jackelin present          CARDIAC MARKERS ( 15 Apr 2020 06:18 )  .465 ng/mL / x     / 31 U/L / x     / x            PT/INR - ( 15 Apr 2020 06:18 )   PT: 11.9 sec;   INR: 1.07 ratio         PTT - ( 15 Apr 2020 06:18 )  PTT:29.9 sec    Sodium:  Sodium, Serum: 148 mmol/L (04-15 @ 06:18)  Sodium, Serum: 147 mmol/L (04-14 @ 07:06)  Sodium, Serum: 145 mmol/L (04-13 @ 07:38)  Sodium, Serum: 149 mmol/L (04-12 @ 06:13)      0.69 mg/dL 04-15 @ 06:18  0.69 mg/dL 04-14 @ 07:06  0.58 mg/dL 04-13 @ 07:38  0.61 mg/dL 04-12 @ 06:13      Hemoglobin:  Hemoglobin: 10.4 g/dL (04-15 @ 06:18)  Hemoglobin: 10.4 g/dL (04-14 @ 07:14)  Hemoglobin: 10.4 g/dL (04-13 @ 07:41)  Hemoglobin: 11.1 g/dL (04-12 @ 06:13)      Platelets: Platelet Count - Automated: 133 K/uL (04-15 @ 06:18)  Platelet Count - Automated: 136 K/uL (04-14 @ 07:14)  Platelet Count - Automated: 153 K/uL (04-13 @ 07:41)  Platelet Count - Automated: 134 K/uL (04-12 @ 06:13)      LIVER FUNCTIONS - ( 15 Apr 2020 06:18 )  Alb: 1.3 g/dL / Pro: 6.0 g/dL / ALK PHOS: 85 U/L / ALT: 45 U/L DA / AST: 54 U/L / GGT: x                 RADIOLOGY & ADDITIONAL STUDIES:

## 2020-04-15 NOTE — PHARMACOTHERAPY INTERVENTION NOTE - COMMENTS
Patient has order for Cefepime 1gm IVPB q8h (calculated CrCl = 60.5ml/min). Was on Merrem last week. Spoke to attending hospitalist. Made aware this is a restricted antibiotic and recommended ID consult. MD agreed and will reach out to ID MD.

## 2020-04-15 NOTE — PROGRESS NOTE ADULT - SUBJECTIVE AND OBJECTIVE BOX
Chief Complaint: Shortness of breath    Interval Events: Remains intubated on pressors.    Review of Systems:  Unable to obtain    Physical Exam:  Vital Signs Last 24 Hrs  T(C): 36.1 (15 Apr 2020 06:00), Max: 37.3 (14 Apr 2020 11:30)  T(F): 97 (15 Apr 2020 06:00), Max: 99.1 (14 Apr 2020 11:30)  HR: 65 (15 Apr 2020 06:00) (56 - 85)  BP: 96/62 (15 Apr 2020 06:00) (96/62 - 109/80)  BP(mean): 83 (14 Apr 2020 23:30) (83 - 85)  RR: 18 (15 Apr 2020 06:00) (18 - 20)  SpO2: 100% (15 Apr 2020 06:00) (92% - 100%)  General: NAD  HEENT: MMM  Neck: No JVD, no carotid bruit  Lungs: Coarse bilaterally  CV: RRR, nl S1/S2, no M/R/G  Abdomen: S/NT/ND, +BS  Extremities: No LE edema, no cyanosis  Neuro: Non-focal  Skin: No rash    Labs:             04-15    148<H>  |  111<H>  |  13  ----------------------------<  100<H>  3.1<L>   |  29  |  0.69    Ca    8.2<L>      15 Apr 2020 06:18  Phos  4.0     04-15  Mg     1.8     04-15    TPro  6.0  /  Alb  1.3<L>  /  TBili  0.4  /  DBili  x   /  AST  54<H>  /  ALT  45  /  AlkPhos  85  04-15                        10.4   7.67  )-----------( 133      ( 15 Apr 2020 06:18 )             31.3     Telemetry: Sinus rhythm

## 2020-04-15 NOTE — PROGRESS NOTE ADULT - SUBJECTIVE AND OBJECTIVE BOX
Patient is a 60y old  Female who presents with a chief complaint of sob (06 Apr 2020 11:30)    Patient seen in follow up for ADAM, Hypernatremia.   COVID +. Chart reviewed.        PAST MEDICAL HISTORY:  Frequent urinary tract infections  Hypothyroidism, unspecified type  Down syndrome        MEDICATIONS  (STANDING):  aspirin  chewable 81 milliGRAM(s) Oral daily  cefepime   IVPB 1000 milliGRAM(s) IV Intermittent every 8 hours  cefepime   IVPB      chlorhexidine 0.12% Liquid 15 milliLiter(s) Oral Mucosa every 12 hours  chlorhexidine 2% Cloths 1 Application(s) Topical daily  dexMEDEtomidine Infusion 0.4 MICROgram(s)/kG/Hr (4.54 mL/Hr) IV Continuous <Continuous>  enoxaparin Injectable 40 milliGRAM(s) SubCutaneous every 12 hours  lactobacillus acidophilus 1 Tablet(s) Oral three times a day with meals  levETIRAcetam  IVPB 1000 milliGRAM(s) IV Intermittent every 12 hours  levothyroxine Injectable 62.5 MICROGram(s) IV Push at bedtime  memantine 5 milliGRAM(s) Oral daily  midodrine 10 milliGRAM(s) Oral every 8 hours  norepinephrine Infusion 0.05 MICROgram(s)/kG/Min (4.26 mL/Hr) IV Continuous <Continuous>  pantoprazole  Injectable 40 milliGRAM(s) IV Push daily  potassium chloride  20 mEq/100 mL IVPB 20 milliEquivalent(s) IV Intermittent every 2 hours  propofol Infusion 10 MICROgram(s)/kG/Min (2.72 mL/Hr) IV Continuous <Continuous>  valproate sodium IVPB 500 milliGRAM(s) IV Intermittent every 12 hours    MEDICATIONS  (PRN):  acetaminophen   Tablet .. 650 milliGRAM(s) Oral every 6 hours PRN Temp greater or equal to 38C (100.4F), Mild Pain (1 - 3)  acetaminophen  Suppository .. 650 milliGRAM(s) Rectal every 4 hours PRN Temp greater or equal to 38C (100.4F)    T(C): 36.1 (04-15-20 @ 10:00), Max: 37.6 (04-14-20 @ 07:30)  HR: 78 (04-15-20 @ 12:00) (56 - 85)  BP: 143/100 (04-15-20 @ 12:30) (85/63 - 148/99)  RR: 20 (04-15-20 @ 12:00)  SpO2: 100% (04-15-20 @ 12:00)  Wt(kg): --  I&O's Detail    14 Apr 2020 07:01  -  15 Apr 2020 07:00  --------------------------------------------------------  IN:    IV PiggyBack: 300 mL  Total IN: 300 mL    OUT:    Indwelling Catheter - Urethral: 2275 mL  Total OUT: 2275 mL    Total NET: -1975 mL      PHYSICAL EXAM:  Pt on COVID precautions. Chart reviewed and previous physical examination noted.                    LABORATORY:                        10.4   7.67  )-----------( 133      ( 15 Apr 2020 06:18 )             31.3     04-15    148<H>  |  111<H>  |  13  ----------------------------<  100<H>  3.1<L>   |  29  |  0.69    Ca    8.2<L>      15 Apr 2020 06:18  Phos  4.0     04-15  Mg     1.8     04-15    TPro  6.0  /  Alb  1.3<L>  /  TBili  0.4  /  DBili  x   /  AST  54<H>  /  ALT  45  /  AlkPhos  85  04-15    Sodium, Serum: 148 mmol/L (04-15 @ 06:18)  Sodium, Serum: 147 mmol/L (04-14 @ 07:06)    Potassium, Serum: 3.1 mmol/L (04-15 @ 06:18)  Potassium, Serum: 3.5 mmol/L (04-14 @ 07:06)    Hemoglobin: 10.4 g/dL (04-15 @ 06:18)  Hemoglobin: 10.4 g/dL (04-14 @ 07:14)  Hemoglobin: 10.4 g/dL (04-13 @ 07:41)    Creatinine, Serum 0.69 (04-15 @ 06:18)  Creatinine, Serum 0.69 (04-14 @ 07:06)  Creatinine, Serum 0.58 (04-13 @ 07:38)    CARDIAC MARKERS ( 15 Apr 2020 06:18 )  .465 ng/mL / x     / 31 U/L / x     / x          LIVER FUNCTIONS - ( 15 Apr 2020 06:18 )  Alb: 1.3 g/dL / Pro: 6.0 g/dL / ALK PHOS: 85 U/L / ALT: 45 U/L DA / AST: 54 U/L / GGT: x             ABG - ( 15 Apr 2020 03:30 )  pH, Arterial: x     pH, Blood: 7.50  /  pCO2: 35    /  pO2: 49    / HCO3: 28    / Base Excess: 4.7   /  SaO2: 85

## 2020-04-16 NOTE — ANESTHESIA FOLLOW-UP NOTE - NSEVALATIONFT_GEN_ALL_CORE
Called to intubate patient in resp. distress.[id number] and sux 100 given. Glidescope used OETT# 7.0 placed. BLBS= Xray requested

## 2020-04-16 NOTE — PROGRESS NOTE ADULT - SUBJECTIVE AND OBJECTIVE BOX
Patient is a 60y old  Female who presents with a chief complaint of sob (06 Apr 2020 11:30)    Patient seen in follow up for ADAM, Hypernatremia.   COVID +. Chart reviewed. Pt extubated.        PAST MEDICAL HISTORY:  Frequent urinary tract infections  Hypothyroidism, unspecified type  Down syndrome          MEDICATIONS  (STANDING):  aspirin  chewable 81 milliGRAM(s) Oral daily  chlorhexidine 0.12% Liquid 15 milliLiter(s) Oral Mucosa every 12 hours  chlorhexidine 2% Cloths 1 Application(s) Topical daily  dexMEDEtomidine Infusion 0.4 MICROgram(s)/kG/Hr (4.54 mL/Hr) IV Continuous <Continuous>  enoxaparin Injectable 40 milliGRAM(s) SubCutaneous every 12 hours  lactobacillus acidophilus 1 Tablet(s) Oral three times a day with meals  levETIRAcetam  IVPB 1000 milliGRAM(s) IV Intermittent every 12 hours  levothyroxine Injectable 62.5 MICROGram(s) IV Push at bedtime  magnesium sulfate  IVPB 2 Gram(s) IV Intermittent once  memantine 5 milliGRAM(s) Oral daily  midodrine 10 milliGRAM(s) Oral every 8 hours  norepinephrine Infusion 0.05 MICROgram(s)/kG/Min (4.26 mL/Hr) IV Continuous <Continuous>  pantoprazole  Injectable 40 milliGRAM(s) IV Push daily  propofol Infusion 10 MICROgram(s)/kG/Min (2.72 mL/Hr) IV Continuous <Continuous>  valproate sodium IVPB 500 milliGRAM(s) IV Intermittent every 12 hours    MEDICATIONS  (PRN):  acetaminophen   Tablet .. 650 milliGRAM(s) Oral every 6 hours PRN Temp greater or equal to 38C (100.4F), Mild Pain (1 - 3)  acetaminophen  Suppository .. 650 milliGRAM(s) Rectal every 4 hours PRN Temp greater or equal to 38C (100.4F)    T(C): 36.7 (04-16-20 @ 12:00), Max: 36.7 (04-16-20 @ 12:00)  HR: 69 (04-16-20 @ 12:00) (58 - 78)  BP: 119/87 (04-16-20 @ 12:00) (85/63 - 148/99)  RR: 13 (04-16-20 @ 12:00)  SpO2: 100% (04-16-20 @ 12:00)  Wt(kg): --  I&O's Detail    15 Apr 2020 07:01  -  16 Apr 2020 07:00  --------------------------------------------------------  IN:    dexmedetomidine Infusion: 59.5 mL    IV PiggyBack: 150 mL    norepinephrine Infusion: 252 mL    propofol Infusion: 5.4 mL  Total IN: 466.9 mL    OUT:    Indwelling Catheter - Urethral: 1280 mL  Total OUT: 1280 mL    Total NET: -813.1 mL      16 Apr 2020 07:01  -  16 Apr 2020 13:56  --------------------------------------------------------  IN:  Total IN: 0 mL    OUT:    Indwelling Catheter - Urethral: 550 mL  Total OUT: 550 mL    Total NET: -550 mL      PHYSICAL EXAM:  Pt on COVID precautions. Chart reviewed and previous physical examination noted.                    LABORATORY:                        10.3   5.53  )-----------( 129      ( 16 Apr 2020 06:38 )             30.3     04-16    142  |  107  |  12  ----------------------------<  100<H>  3.7   |  29  |  0.70    Ca    8.1<L>      16 Apr 2020 06:38  Phos  3.6     04-16  Mg     1.8     04-16    TPro  5.8<L>  /  Alb  1.4<L>  /  TBili  0.3  /  DBili  x   /  AST  47<H>  /  ALT  39  /  AlkPhos  85  04-16    Sodium, Serum: 142 mmol/L (04-16 @ 06:38)  Sodium, Serum: 148 mmol/L (04-15 @ 06:18)    Potassium, Serum: 3.7 mmol/L (04-16 @ 06:38)  Potassium, Serum: 3.1 mmol/L (04-15 @ 06:18)    Hemoglobin: 10.3 g/dL (04-16 @ 06:38)  Hemoglobin: 10.4 g/dL (04-15 @ 06:18)  Hemoglobin: 10.4 g/dL (04-14 @ 07:14)    Creatinine, Serum 0.70 (04-16 @ 06:38)  Creatinine, Serum 0.69 (04-15 @ 06:18)  Creatinine, Serum 0.69 (04-14 @ 07:06)    CARDIAC MARKERS ( 15 Apr 2020 06:18 )  .465 ng/mL / x     / 31 U/L / x     / x          LIVER FUNCTIONS - ( 16 Apr 2020 06:38 )  Alb: 1.4 g/dL / Pro: 5.8 g/dL / ALK PHOS: 85 U/L / ALT: 39 U/L DA / AST: 47 U/L / GGT: x             ABG - ( 16 Apr 2020 05:43 )  pH, Arterial: x     pH, Blood: 7.48  /  pCO2: 35    /  pO2: 63    / HCO3: 27    / Base Excess: 2.7   /  SaO2: 91

## 2020-04-16 NOTE — PROGRESS NOTE ADULT - SUBJECTIVE AND OBJECTIVE BOX
MIGNON WALTER is a 60yFemale , patient examined and chart reviewed.    INTERVAL HPI/ OVERNIGHT EVENTS:  Extubated. Lethargic. On NRB.  Afebrile.    Past Medical History--  PAST MEDICAL & SURGICAL HISTORY:  Frequent urinary tract infections  Hypothyroidism, unspecified type  Down syndrome      For details regarding the patient's social history, family history, and other miscellaneous elements, please refer the initial infectious diseases consultation and/or the admitting history and physical examination for this admission.      ROS:  Unable to obtain due to : Pt's condition    ALLERGIES:  amoxicillin (Rash)  penicillin (Rash)      Current inpatient medications :    ANTIBIOTICS/RELEVANT:  lactobacillus acidophilus 1 Tablet(s) Oral three times a day with meals      acetaminophen   Tablet .. 650 milliGRAM(s) Oral every 6 hours PRN  acetaminophen  Suppository .. 650 milliGRAM(s) Rectal every 4 hours PRN  aspirin  chewable 81 milliGRAM(s) Oral daily  chlorhexidine 0.12% Liquid 15 milliLiter(s) Oral Mucosa every 12 hours  chlorhexidine 2% Cloths 1 Application(s) Topical daily  dexMEDEtomidine Infusion 0.4 MICROgram(s)/kG/Hr IV Continuous <Continuous>  enoxaparin Injectable 40 milliGRAM(s) SubCutaneous every 12 hours  levETIRAcetam  IVPB 1000 milliGRAM(s) IV Intermittent every 12 hours  levothyroxine Injectable 62.5 MICROGram(s) IV Push at bedtime  magnesium sulfate  IVPB 2 Gram(s) IV Intermittent once  memantine 5 milliGRAM(s) Oral daily  midodrine 10 milliGRAM(s) Oral every 8 hours  norepinephrine Infusion 0.05 MICROgram(s)/kG/Min IV Continuous <Continuous>  pantoprazole  Injectable 40 milliGRAM(s) IV Push daily  propofol Infusion 10 MICROgram(s)/kG/Min IV Continuous <Continuous>  valproate sodium IVPB 500 milliGRAM(s) IV Intermittent every 12 hours      Objective:    04-15 @ 07:01 - 04-16 @ 07:00  --------------------------------------------------------  IN: 466.9 mL / OUT: 1280 mL / NET: -813.1 mL    04-16 @ 07:01  -  04-16 @ 13:58  --------------------------------------------------------  IN: 0 mL / OUT: 550 mL / NET: -550 mL      T(C): 36.7 (04-16-20 @ 12:00), Max: 36.7 (04-16-20 @ 12:00)  HR: 69 (04-16-20 @ 12:00) (59 - 73)  BP: 119/87 (04-16-20 @ 12:00) (104/78 - 127/86)  RR: 13 (04-16-20 @ 12:00) (13 - 21)  SpO2: 100% (04-16-20 @ 12:00) (99% - 100%)  Mode: CPAP with PS, FiO2: 306, PEEP: 5, PS: 10    Physical Exam:  GEN: Weak On NRB  HEENT: normocephalic and atraumatic. EOMI. ANASTASIYA. Moist mucosa. Clear Posterior pharynx.  NECK: Supple. No carotid bruits.  No lymphadenopathy or thyromegaly.  LUNGS: Decreased to auscultation.  HEART: Regular rate and rhythm without murmur.  ABDOMEN: Soft, nontender, and nondistended.  Positive bowel sounds.  No hepatosplenomegaly was noted.  EXTREMITIES: Without any cyanosis, clubbing, rash, lesions or edema.  NEUROLOGIC: Lethargic MRDD  SKIN: No ulceration or induration present.      LABS:                        10.3   5.53  )-----------( 129      ( 16 Apr 2020 06:38 )             30.3       04-16    142  |  107  |  12  ----------------------------<  100<H>  3.7   |  29  |  0.70    Ca    8.1<L>      16 Apr 2020 06:38  Phos  3.6     04-16  Mg     1.8     04-16    TPro  5.8<L>  /  Alb  1.4<L>  /  TBili  0.3  /  DBili  x   /  AST  47<H>  /  ALT  39  /  AlkPhos  85  04-16      PT/INR - ( 16 Apr 2020 06:38 )   PT: 11.8 sec;   INR: 1.06 ratio         PTT - ( 16 Apr 2020 06:38 )  PTT:33.8 sec    ABG - ( 16 Apr 2020 05:43 )  pH, Arterial: x     pH, Blood: 7.48  /  pCO2: 35    /  pO2: 63    / HCO3: 27    / Base Excess: 2.7   /  SaO2: 91        MICROBIOLOGY:  Culture - Blood (04.06.20 @ 16:37)    Specimen Source: .Blood Blood    Culture Results:   No Growth Final    GI PCR Panel, Stool (collected 14 Apr 2020 16:46)  Source: .Stool Feces  Final Report (14 Apr 2020 19:26):    GI PCR Results: NOT detected    *******Please Note:*******    GI panel PCR evaluates for:    Campylobacter, Plesiomonas shigelloides, Salmonella,    Vibrio, Yersinia enterocolitica, Enteroaggregative    Escherichia coli (EAEC), Enteropathogenic E.coli (EPEC),    Enterotoxigenic E. coli (ETEC) lt/st, Shiga-like    toxin-producing E. coli (STEC) stx1/stx2,    Shigella/ Enteroinvasive E. coli (EIEC), Cryptosporidium,    Cyclospora cayetanensis, Entamoeba histolytica,    Giardia lamblia, Adenovirus F 40/41, Astrovirus,    Norovirus GI/GII, Rotavirus A, Sapovirus    Culture - Stool (collected 14 Apr 2020 16:46)  Source: .Stool Feces  Preliminary Report (15 Apr 2020 15:09):    No enteric pathogens to date: Final culture pending    No enteric gram negative rods isolated    Culture - Sputum . (04.09.20 @ 16:26)    Gram Stain:   Numerous polymorphonuclear leukocytes seen per low power field  Rare Squamous epithelial cells seen per low power field  Rare Gram positive cocci in pairs seen per oil power field    Specimen Source: .Sputum Sputum    Culture Results:   Normal Respiratory Jackelin present    RADIOLOGY & ADDITIONAL STUDIES:    EXAM:  XR CHEST PORTABLE IMMED 1V                                  PROCEDURE DATE:  04/15/2020          INTERPRETATION:  INDICATION: ETT pulled back    PRIORS: Respiratory failure; Covid exposure.    VIEWS: Portable AP radiography of the chest performed.    FINDINGS: Since prior evaluation the ETT has been repositioned, the distal tip located approximately 2.5 cm superior to the tracheal bifurcation. There is no change in position of the indwelling right IJ CVC. Bilateral lung parenchymal airspace opacities are identified. No pleural effusion or pneumothorax is demonstrated. No mediastinal shift is noted. Degenerative changes the thoracic spine noted. There is no change in position of the indwelling NGT.    IMPRESSION: : Since prior evaluation the ETT has been repositioned, the distal tip located approximately 2.5 cm superior to the tracheal bifurcation. Bilateral lung parenchymal airspace opacities.      DISCRETE X-RAY DATA:  Percent of LEFT lung opacification: 34-66%  Percent of RIGHTlung opacification: 34-66%  Change in lung opacification from most recent x-ray (<=3 days): Stable  Change from prior dated 3 or more days (same admission): Stable        Assessment :   61YO F nonverbal, MRDD, hypothyroidism and down syndrome, hx of seizures BIBA  from group home admitted to ICU for acute severe respiratory decompensation with severe sepsis and septic shock secondary to COVID 19 PNA. Had been on multiple antibiotics--Meropenam Levaquin and now Cefepime. Had single bottle in blood cultures with gram variable rods ( ID still pending and sent to [facility] lab ) Repeat blood culture NGTD. Pt remains on vent and on pressors. Blood culture likely contaminant. Severe sepsis with septic shock and respiratory failure sec COVID 19 pneumonia. Procalcitonin unimpressive. Extubated this am.    Plan :   Monitor off antibiotic  Trend temps and cbc  Wean off pressors  Asp precautions  COVID-19--critical period for decompensation  (7-14 days post symptom onset), avoid aerosolizing procedures, NSAIDs   -monitor respiratory status closely ( route of 02 and saturation) and titrate when able  -isolation precautions per protocol  -avoid excessive blood draws, blood cultures and frequent CXRs  -monitor biomarkers- CBC w diff  for NLRNLR <3 low vs >5 high) Ferritin (lower risk <450 vs >850) CRP (low risk <2 and higher risk >6) and LDH, D Dimer, procalcitonin  -therapies remains investigational-- including Hydroxychloroquine  -antibiotics only if there is concern for a bacterial process  -Steroids short course ( 5 days)  --for hypoxia/ARDS /shock    D/w ICU team    Continue with present regiment.  Appropriate use of antibiotics and adverse effects reviewed.    I have discussed the above plan of care with patient/ family in detail. They expressed understanding of the the treatment plan . Risks, benefits and alternatives discussed in detail. I have asked if they have any questions or concerns and appropriately addressed them to the best of my ability .      Critical care time greater then 35 minutes reviewing notes, labs data/ imaging , discussion with multidisciplinary team.    Thank you for allowing me to participate in care of your patient .        John Rivera MD  Infectious Disease  [phone]

## 2020-04-16 NOTE — PROGRESS NOTE ADULT - ASSESSMENT
1.	ADAM: Prerenal azotemia, ATN  2.	Shock, Sepsis  3.	Pneumonia, COVID +  4.	Hypophosphatemia: imporved    Pt extubated. Stable renal indices. To continue current meds. Treatment for COVID pneumonia. COVID precautions.  Avoid nephrotoxic meds as possible. ICU management.

## 2020-04-16 NOTE — PROGRESS NOTE ADULT - SUBJECTIVE AND OBJECTIVE BOX
Date/Time Patient Seen:  		  Referring MD:   Data Reviewed	       Patient is a 60y old  Female who presents with a chief complaint of sob (15 Apr 2020 18:18)      Subjective/HPI     PAST MEDICAL & SURGICAL HISTORY:  Frequent urinary tract infections  Hypothyroidism, unspecified type  Down syndrome        Medication list         MEDICATIONS  (STANDING):  aspirin  chewable 81 milliGRAM(s) Oral daily  chlorhexidine 0.12% Liquid 15 milliLiter(s) Oral Mucosa every 12 hours  chlorhexidine 2% Cloths 1 Application(s) Topical daily  dexMEDEtomidine Infusion 0.4 MICROgram(s)/kG/Hr (4.54 mL/Hr) IV Continuous <Continuous>  enoxaparin Injectable 40 milliGRAM(s) SubCutaneous every 12 hours  lactobacillus acidophilus 1 Tablet(s) Oral three times a day with meals  levETIRAcetam  IVPB 1000 milliGRAM(s) IV Intermittent every 12 hours  levothyroxine Injectable 62.5 MICROGram(s) IV Push at bedtime  memantine 5 milliGRAM(s) Oral daily  midodrine 10 milliGRAM(s) Oral every 8 hours  norepinephrine Infusion 0.05 MICROgram(s)/kG/Min (4.26 mL/Hr) IV Continuous <Continuous>  pantoprazole  Injectable 40 milliGRAM(s) IV Push daily  propofol Infusion 10 MICROgram(s)/kG/Min (2.72 mL/Hr) IV Continuous <Continuous>  valproate sodium IVPB 500 milliGRAM(s) IV Intermittent every 12 hours    MEDICATIONS  (PRN):  acetaminophen   Tablet .. 650 milliGRAM(s) Oral every 6 hours PRN Temp greater or equal to 38C (100.4F), Mild Pain (1 - 3)  acetaminophen  Suppository .. 650 milliGRAM(s) Rectal every 4 hours PRN Temp greater or equal to 38C (100.4F)         Vitals log        ICU Vital Signs Last 24 Hrs  T(C): 36.6 (16 Apr 2020 08:00), Max: 36.6 (16 Apr 2020 08:00)  T(F): 97.9 (16 Apr 2020 08:00), Max: 97.9 (16 Apr 2020 08:00)  HR: 73 (16 Apr 2020 08:00) (59 - 78)  BP: 105/59 (16 Apr 2020 08:00) (85/63 - 148/99)  BP(mean): --  ABP: --  ABP(mean): --  RR: 17 (16 Apr 2020 08:00) (17 - 21)  SpO2: 100% (16 Apr 2020 08:00) (97% - 100%)       Mode: AC/ CMV (Assist Control/ Continuous Mandatory Ventilation)  RR (machine): 18  TV (machine): 320  FiO2: 30  PEEP: 5  ITime: 1  MAP: 9.2  PIP: 27      Input and Output:  I&O's Detail    15 Apr 2020 07:01  -  16 Apr 2020 07:00  --------------------------------------------------------  IN:    dexmedetomidine Infusion: 59.5 mL    IV PiggyBack: 150 mL    norepinephrine Infusion: 252 mL    propofol Infusion: 5.4 mL  Total IN: 466.9 mL    OUT:    Indwelling Catheter - Urethral: 1280 mL  Total OUT: 1280 mL    Total NET: -813.1 mL          Lab Data                        10.3   5.53  )-----------( 129      ( 16 Apr 2020 06:38 )             30.3     04-16    142  |  107  |  12  ----------------------------<  100<H>  3.7   |  29  |  0.70    Ca    8.1<L>      16 Apr 2020 06:38  Phos  3.6     04-16  Mg     1.8     04-16    TPro  5.8<L>  /  Alb  1.4<L>  /  TBili  0.3  /  DBili  x   /  AST  47<H>  /  ALT  39  /  AlkPhos  85  04-16    ABG - ( 16 Apr 2020 05:43 )  pH, Arterial: x     pH, Blood: 7.48  /  pCO2: 35    /  pO2: 63    / HCO3: 27    / Base Excess: 2.7   /  SaO2: 91                CARDIAC MARKERS ( 15 Apr 2020 06:18 )  .465 ng/mL / x     / 31 U/L / x     / x            Review of Systems	      Objective     Physical Examination    heart s1s2  lung dec BS    Pertinent Lab findings & Imaging      Deloris:  NO   Adequate UO     I&O's Detail    15 Apr 2020 07:01  -  16 Apr 2020 07:00  --------------------------------------------------------  IN:    dexmedetomidine Infusion: 59.5 mL    IV PiggyBack: 150 mL    norepinephrine Infusion: 252 mL    propofol Infusion: 5.4 mL  Total IN: 466.9 mL    OUT:    Indwelling Catheter - Urethral: 1280 mL  Total OUT: 1280 mL    Total NET: -813.1 mL               Discussed with:     Cultures:	        Radiology

## 2020-04-16 NOTE — PROGRESS NOTE ADULT - ASSESSMENT
VITALS/LABS       2020 afeb 84 100/69   2020 12p nore .176 m/k/m  2020 w 5.5 Hb 10.3 Plt 129  2020 Na 142 K 3.7 Cr .7       PT DATA/BEST PRACTICE  ALLERGY     NOTEWORTHY  POINTS/CHANGES ROS/PE   2020 Extubated   2020 Had seizure  2020 a Intubated tr to SICU   2020 fc given 945a Puentes ordered    Transferred to ICU May need intubn and fob dw brother                                WT  45 (2020)                    BMI     20 (2020)     ADVANCED DIRECTIVE       Goals of care discussion                                                                                      HEAD OF BED ELEVATION Yes  DYSPHAGIA EVAL                                  DIET      dys 1 puree () --> jevity 1.5 720 ()--> 1080 (4/10) --> npo (  (CCPA MP ) (ileus)    .4 ()  (Dr RICHARDSON)                                   IV F        D5 100 ()   --> D5 1/2 75 (() --> D5 75 ()  --> d5 5 (4/10-)      (DCEd by Dr RICHARDSON)                                        DVT PROPHYLAXIS      lvnx 30 ()      --> Lvnx 40 ()   --> lvnx 40.2 ()      PATIENT SUMMARY   60 f nonverbal downs syndrome from group home HO COVID exposure per transfer papers on Rx for aspiration pneumonia and shortness of breath   er vitals 90/50 66 100f 92%    Pt found to have pneumonia pl effsn hypernatremia and adam on arrival COVID palma started Pulm consulted     Pulm consulted  CT ch showed l lung collapse Pt ruled out for COVID     Home meds memantine 28 keppra 1.2                                Pt tr sicu 2020 itubated hemaodynamci intability                   PLAN   ARDS ltvv   HEMODYNAMICS In shock sec COVID 19 Remains on nore () Mido () Given alb   NONOLIGURIC ADAM Monitor lytes   HYPERNATR   ----4/10----4/ Na 151 -151-143 -[phone]  On fw 240.4 (4/10)   A/R improved   SZ On keppra and ativan for break thru  GOC 2020 Discussions under way with brother  PLAN   Extubated 2020 Monitor abg po           COVID  2020 COVID pcr detc   Supp care     SYMPTOM ONSET        OXYGENATION  2020 Intubated ON 90% ox     MARKERS   --4/10----4/15/2020 nlr 6.1 -7.1- 5.3 - 1.3 -5.5 -3.5-1.8  ----4/15/2020 ferritin [id number] - 1404  - 1039 - 1524   ---4/15/2020 procal .27-.34- .1-.15   --4/15/2020 crp 2.6 - 3.8-16.9   -2020 esr 26 - 25  --4/15/2020 d-dimer [id number]  D-dimer  4/15/2020 d-dimer 786   lvnx 40.2 ()       MEDS   HCQ ()   2020 qtc 442   IVIG 20g/d.4d () (Dr RICHARDSON) --> DCED 2020    STEROIDS   solumed 80.5d ()        TIME SPENT Over 36 minutes aggregate critical  care time spent on encounter; activities included   direct pt care, counseling and/or coordinating care reviewing notes, lab data/ imaging , discussion with multidisciplinary team/ pt /family. Risks, benefits, alternatives  discussed in detail.    [facility] MIGNON [phone]   1960 DOA 2020 DR NORTH MEDLEY

## 2020-04-16 NOTE — PROGRESS NOTE ADULT - SUBJECTIVE AND OBJECTIVE BOX
Neurology follow up note    MIGNON [id number]      Interval History:    Patient intubated     MEDICATIONS    acetaminophen   Tablet .. 650 milliGRAM(s) Oral every 6 hours PRN  acetaminophen  Suppository .. 650 milliGRAM(s) Rectal every 4 hours PRN  aspirin  chewable 81 milliGRAM(s) Oral daily  chlorhexidine 0.12% Liquid 15 milliLiter(s) Oral Mucosa every 12 hours  chlorhexidine 2% Cloths 1 Application(s) Topical daily  dexMEDEtomidine Infusion 0.4 MICROgram(s)/kG/Hr IV Continuous <Continuous>  enoxaparin Injectable 40 milliGRAM(s) SubCutaneous every 12 hours  lactobacillus acidophilus 1 Tablet(s) Oral three times a day with meals  levETIRAcetam  IVPB 1000 milliGRAM(s) IV Intermittent every 12 hours  levothyroxine Injectable 62.5 MICROGram(s) IV Push at bedtime  magnesium sulfate  IVPB 2 Gram(s) IV Intermittent once  memantine 5 milliGRAM(s) Oral daily  midodrine 10 milliGRAM(s) Oral every 8 hours  norepinephrine Infusion 0.05 MICROgram(s)/kG/Min IV Continuous <Continuous>  pantoprazole  Injectable 40 milliGRAM(s) IV Push daily  propofol Infusion 10 MICROgram(s)/kG/Min IV Continuous <Continuous>  valproate sodium IVPB 500 milliGRAM(s) IV Intermittent every 12 hours      Allergies    amoxicillin (Rash)  penicillin (Rash)    Intolerances            Vital Signs Last 24 Hrs  T(C): 36.6 (16 Apr 2020 08:00), Max: 36.6 (16 Apr 2020 08:00)  T(F): 97.9 (16 Apr 2020 08:00), Max: 97.9 (16 Apr 2020 08:00)  HR: 73 (16 Apr 2020 08:00) (59 - 78)  BP: 105/59 (16 Apr 2020 08:00) (105/59 - 148/99)  BP(mean): --  RR: 17 (16 Apr 2020 08:00) (17 - 21)  SpO2: 100% (16 Apr 2020 08:00) (97% - 100%)        REVIEW OF SYSTEMS:  Could not be obtained secondary to the patient being nonverbal.    PHYSICAL EXAMINATION:    HEENT:  Head:  Normocephalic and atraumatic.  Eyes:  No scleral icterus.  Ears:  Hearing hard to evaluate secondary to the patient being sedated and intubated.  RESPIRATORY:  Good air entry bilaterally.  CARDIOVASCULAR:  S1 and S2 are heard.  ABDOMEN:  Soft and nontender.  EXTREMITIES:  No clubbing or cyanosis were noted.      NEUROLOGICAL:  Limited secondary to the patient being intubated and sedated.  No response to verbal stimuli.  I opened the patient's eyes, no gaze preference.  I applied stimuli to all four extremities with slight withdrawal bilateral upper and lower.  Tone; bilateral upper and lower was increased.               LABS:  CBC Full  -  ( 16 Apr 2020 06:38 )  WBC Count : 5.53 K/uL  RBC Count : 3.03 M/uL  Hemoglobin : 10.3 g/dL  Hematocrit : 30.3 %  Platelet Count - Automated : 129 K/uL  Mean Cell Volume : 100.0 fl  Mean Cell Hemoglobin : 34.0 pg  Mean Cell Hemoglobin Concentration : 34.0 gm/dL  Auto Neutrophil # : 3.05 K/uL  Auto Lymphocyte # : 2.05 K/uL  Auto Monocyte # : 0.28 K/uL  Auto Eosinophil # : 0.04 K/uL  Auto Basophil # : 0.00 K/uL  Auto Neutrophil % : 55.1 %  Auto Lymphocyte % : 37.1 %  Auto Monocyte % : 5.1 %  Auto Eosinophil % : 0.7 %  Auto Basophil % : 0.0 %      04-16    142  |  107  |  12  ----------------------------<  100<H>  3.7   |  29  |  0.70    Ca    8.1<L>      16 Apr 2020 06:38  Phos  3.6     04-16  Mg     1.8     04-16    TPro  5.8<L>  /  Alb  1.4<L>  /  TBili  0.3  /  DBili  x   /  AST  47<H>  /  ALT  39  /  AlkPhos  85  04-16    Hemoglobin A1C:   Lipid Panel 04-15 @ 12:04  Total Cholesterol, Serum --  LDL --  Triglycerides 345    LIVER FUNCTIONS - ( 16 Apr 2020 06:38 )  Alb: 1.4 g/dL / Pro: 5.8 g/dL / ALK PHOS: 85 U/L / ALT: 39 U/L DA / AST: 47 U/L / GGT: x           Vitamin B12   PT/INR - ( 16 Apr 2020 06:38 )   PT: 11.8 sec;   INR: 1.06 ratio         PTT - ( 16 Apr 2020 06:38 )  PTT:33.8 sec      RADIOLOGY    ANALYSIS AND PLAN:  This is a 60-year-old with altered mental status and episode of seizure.  1.	For seizure event, suspect this could be secondary to the patient's interaction with antibiotics. will change Depakote to 500 bid  no new seizures monitor levels.   2.	I will recommend Ativan 1 mg IV push q.6 h. p.r.n. for any type of breakthrough seizures.  3.	Seizure precautions.  4.	For altered mental status, secondary to metabolic encephalopathy secondary to COVID-19 infection.  5.	For hypotension, continue the patient on midodrine.  If possible, please maintain systolic blood pressure of above 100 and help maintain cerebral perfusion.  6.	For history of hypothyroidism, continue the patient on Synthroid.  7.	spoke to staff no new events   Greater than 20 minutes spent in direct patient care reviewing  the notes, lab data/ imaging

## 2020-04-16 NOTE — PROGRESS NOTE ADULT - ASSESSMENT
59 yo F with Downs syndrome, hypothyroidism, constipation, seizure disorder with acute hypoxic respiratory failure from COVID 19 pneumonia, course complicated by episode of seizure, type 2 NSTEMI, ADAM (resolved), gram variable bacteremia (repeat cx ngtd); ET tube replaced 4/14 due to low expiratory volumes on ventilator.    1. Acute hypoxic respiratory failure  2. COVID-19 Pneumonia  3. ADAM (resolved)  4. Type 2 NSTEMI (demand ischemia)  5. Seizure Disorder  6. Gram variable bacteremia (of uncertain clinical significance)    Neuro: titrate sedation with precedex/propofol. Continue depacon 500mg BID and keppra 1g q12 for seizures. Continue home memantine.  Cardio: resumed on levophed (as midodrine was being held). Type 2 NSTEMI, demand ischemia - troponin downtrended. continue asa.  Pulm: reintubated 4/14; failed weaning trial 4/15. will re-attempt to wean today to evaluate for extubation.  Renal: renal function stable. monitor UOP. monitor and replete lytes. replete K with IV K riders.  GI: made NPO due to concern for ileus. tube feeding held for now. GI ppx. KUB without evidence of ileus or obstruction.  ID: COVID 19 pneumonia s/p hydroxychloroquine, was not candidate for toci, steroids stopped 4/12. monitor off abx for now. monitor biomarkers.  Heme: DVT ppx lovenox 40 q12. monitor for bleeding  Dispo: critically ill with hypoxic respiratory failure from COVID 19 pneumonia. continue ICU care. Brother would like to make patient DNR - will address with [facility] and fill appropriate paperwork.    Critical care time including time spent reviewing chart, ordering tests and treatments, evaluating and treating patient at bedside and speaking to family (brother)/social work regarding goals of care was 26 minutes.

## 2020-04-16 NOTE — PROGRESS NOTE ADULT - ASSESSMENT
The patient is a 60 year old female with a history of MRDD, hypothyroidism, seizure d/o who is admitted with COVID-19, acute respiratory failure, septic shock, NSTEMI.    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 There may be a segmental component suggesting underlying CAD.  - Continue aspirin 81 mg daily  - On cefepime  - On enoxaparin 40 mg bid  - On midodrine 10 mg tid but not receiving due to ileus  - Continue norepinephrine and titrate to MAP of 65  - Mechanical ventilation. Wean as tolerated.  - ICU care

## 2020-04-16 NOTE — PROGRESS NOTE ADULT - SUBJECTIVE AND OBJECTIVE BOX
MIGNON WALTER    [facility] 09    Patient is a 60y old  Female who presents with a chief complaint of sob (16 Apr 2020 13:58)       Allergies    amoxicillin (Rash)  penicillin (Rash)    Intolerances        HPI:  Patient is a 60y old  Female who presents with a chief complaint of sob and fever    HPI:This is a nonverbal pt bib ems from [facility] for fever, sob, cough, low o2 sat, with possible covid exposure. per ems, pt on levaquin for aspiration pna. no further hx available.pt has hx of chronic constipation an dis on multiple stool softners.  she was found ot be hypernatremic and in renal failure at admission.  also has hx of hypothyroidism and down syndrome        PAST MEDICAL & SURGICAL HISTORY:  Frequent urinary tract infections  Hypothyroidism, unspecified type  Down syndrome      FAMILY HISTORY:can not obtain due to mental status      SOCIAL HISTORY:    Allergies    amoxicillin (Rash)  penicillin (Rash)    Intolerances          REVIEW OF SYSEMS:  negative except form above mentioned      Vital Signs Last 24 Hrs  T(C): 37.8 (04 Apr 2020 09:46), Max: 37.8 (04 Apr 2020 09:46)  T(F): 100 (04 Apr 2020 09:46), Max: 100 (04 Apr 2020 09:46)  HR: 68 (04 Apr 2020 11:00) (66 - 69)  BP: 89/52 (04 Apr 2020 11:00) (87/51 - 91/52)  BP(mean): --  RR: 24 (04 Apr 2020 11:00) (24 - 24)  SpO2: 100% (04 Apr 2020 11:00) (89% - 100%)  I&O's Summary      PHYSICAL EXAM:  GENERAL: onnc  HEAD:  Atraumatic, Normocephalic  EYES: EOMI, PERRLA, conjunctiva and sclera clear  NECK: Supple, No JVD  CHEST/LUNG: dec bs at bases and rhonchi  HEART: Regular rate and rhythm; No murmurs, rubs, or gallops  ABDOMEN: Soft, Nontender, Nondistended; Bowel sounds present  SKIN: No rashes or lesions    LABS:                        14.7   2.88  )-----------( 52       ( 04 Apr 2020 10:49 )             45.9     04-04    154<H>  |  117<H>  |  31<H>  ----------------------------<  107<H>  4.7   |  30  |  1.48<H>    Ca    8.5      04 Apr 2020 10:49    TPro  7.1  /  Alb  2.3<L>  /  TBili  0.2  /  DBili  x   /  AST  73<H>  /  ALT  66<H>  /  AlkPhos  118  04-04      CAPILLARY BLOOD GLUCOSE                RADIOLOGY & ADDITIONAL TESTS:    Imaging Personally Reviewed:  [x] YES  [ ] NO    Consultant(s) Notes Reviewed:  [x] YES  [ ] NO      MEDICATIONS  (STANDING):  azithromycin   Tablet 500 milliGRAM(s) Oral daily  dextrose 5%. 1000 milliLiter(s) (100 mL/Hr) IV Continuous <Continuous>  diVALproex  milliGRAM(s) Oral three times a day  enoxaparin Injectable 30 milliGRAM(s) SubCutaneous daily  levETIRAcetam 1000 milliGRAM(s) Oral two times a day  levothyroxine 125 MICROGram(s) Oral daily  memantine 5 milliGRAM(s) Oral daily  pantoprazole    Tablet 40 milliGRAM(s) Oral before breakfast  polyethylene glycol 3350 17 Gram(s) Oral daily    MEDICATIONS  (PRN):  acetaminophen   Tablet .. 650 milliGRAM(s) Oral every 6 hours PRN Temp greater or equal to 38C (100.4F), Mild Pain (1 - 3)      Care Discussed with Consultants/Other Providers [x] YES  [ ] NO    HEALTH ISSUES - PROBLEM Dx: (04 Apr 2020 13:11)      PAST MEDICAL & SURGICAL HISTORY:  Frequent urinary tract infections  Hypothyroidism, unspecified type  Down syndrome      FAMILY HISTORY:        MEDICATIONS   acetaminophen   Tablet .. 650 milliGRAM(s) Oral every 6 hours PRN  acetaminophen  Suppository .. 650 milliGRAM(s) Rectal every 4 hours PRN  aspirin  chewable 81 milliGRAM(s) Oral daily  chlorhexidine 0.12% Liquid 15 milliLiter(s) Oral Mucosa every 12 hours  chlorhexidine 2% Cloths 1 Application(s) Topical daily  enoxaparin Injectable 40 milliGRAM(s) SubCutaneous every 12 hours  lactobacillus acidophilus 1 Tablet(s) Oral three times a day with meals  levETIRAcetam  IVPB 1000 milliGRAM(s) IV Intermittent every 12 hours  levothyroxine Injectable 62.5 MICROGram(s) IV Push at bedtime  memantine 5 milliGRAM(s) Oral daily  midodrine 10 milliGRAM(s) Oral every 8 hours  norepinephrine Infusion 0.05 MICROgram(s)/kG/Min IV Continuous <Continuous>  pantoprazole  Injectable 40 milliGRAM(s) IV Push daily  valproate sodium IVPB 500 milliGRAM(s) IV Intermittent every 12 hours      Vital Signs Last 24 Hrs  T(C): 36.2 (16 Apr 2020 15:30), Max: 36.7 (16 Apr 2020 12:00)  T(F): 97.1 (16 Apr 2020 15:30), Max: 98 (16 Apr 2020 12:00)  HR: 88 (16 Apr 2020 17:00) (59 - 90)  BP: 107/66 (16 Apr 2020 17:00) (78/58 - 127/86)  BP(mean): --  RR: 26 (16 Apr 2020 17:00) (13 - 26)  SpO2: 95% (16 Apr 2020 17:00) (95% - 100%)      04-15-20 @ 07:01  -  04-16-20 @ 07:00  --------------------------------------------------------  IN: 466.9 mL / OUT: 1280 mL / NET: -813.1 mL    04-16-20 @ 07:01  -  04-16-20 @ 18:55  --------------------------------------------------------  IN: 0 mL / OUT: 750 mL / NET: -750 mL        Mode: CPAP with PS, FiO2: 306, PEEP: 5, PS: 10    LABS:                        10.3   5.53  )-----------( 129      ( 16 Apr 2020 06:38 )             30.3     04-16    142  |  107  |  12  ----------------------------<  100<H>  3.7   |  29  |  0.70    Ca    8.1<L>      16 Apr 2020 06:38  Phos  3.6     04-16  Mg     1.8     04-16    TPro  5.8<L>  /  Alb  1.4<L>  /  TBili  0.3  /  DBili  x   /  AST  47<H>  /  ALT  39  /  AlkPhos  85  04-16    PT/INR - ( 16 Apr 2020 06:38 )   PT: 11.8 sec;   INR: 1.06 ratio         PTT - ( 16 Apr 2020 06:38 )  PTT:33.8 sec      ABG - ( 16 Apr 2020 05:43 )  pH, Arterial: x     pH, Blood: 7.48  /  pCO2: 35    /  pO2: 63    / HCO3: 27    / Base Excess: 2.7   /  SaO2: 91                  WBC:  WBC Count: 5.53 K/uL (04-16 @ 06:38)  WBC Count: 7.67 K/uL (04-15 @ 06:18)  WBC Count: 9.98 K/uL (04-14 @ 07:14)  WBC Count: 11.92 K/uL (04-13 @ 07:41)      MICROBIOLOGY:  RECENT CULTURES:  04-14 .Stool Feces XXXX XXXX   No enteric pathogens isolated.  (Stool culture examined for Salmonella,  Shigella, Campylobacter, Aeromonas, Plesiomonas,  Vibrio, E.coli O157 and Yersinia)          CARDIAC MARKERS ( 15 Apr 2020 06:18 )  .465 ng/mL / x     / 31 U/L / x     / x            PT/INR - ( 16 Apr 2020 06:38 )   PT: 11.8 sec;   INR: 1.06 ratio         PTT - ( 16 Apr 2020 06:38 )  PTT:33.8 sec    Sodium:  Sodium, Serum: 142 mmol/L (04-16 @ 06:38)  Sodium, Serum: 148 mmol/L (04-15 @ 06:18)  Sodium, Serum: 147 mmol/L (04-14 @ 07:06)  Sodium, Serum: 145 mmol/L (04-13 @ 07:38)      0.70 mg/dL 04-16 @ 06:38  0.69 mg/dL 04-15 @ 06:18  0.69 mg/dL 04-14 @ 07:06  0.58 mg/dL 04-13 @ 07:38      Hemoglobin:  Hemoglobin: 10.3 g/dL (04-16 @ 06:38)  Hemoglobin: 10.4 g/dL (04-15 @ 06:18)  Hemoglobin: 10.4 g/dL (04-14 @ 07:14)  Hemoglobin: 10.4 g/dL (04-13 @ 07:41)      Platelets: Platelet Count - Automated: 129 K/uL (04-16 @ 06:38)  Platelet Count - Automated: 133 K/uL (04-15 @ 06:18)  Platelet Count - Automated: 136 K/uL (04-14 @ 07:14)  Platelet Count - Automated: 153 K/uL (04-13 @ 07:41)      LIVER FUNCTIONS - ( 16 Apr 2020 06:38 )  Alb: 1.4 g/dL / Pro: 5.8 g/dL / ALK PHOS: 85 U/L / ALT: 39 U/L DA / AST: 47 U/L / GGT: x                 RADIOLOGY & ADDITIONAL STUDIES:

## 2020-04-16 NOTE — PROGRESS NOTE ADULT - SUBJECTIVE AND OBJECTIVE BOX
24 hour events: remains intubated on FiO2 of 30% and PEEP of 5. Restarted on pressors (not getting midodrine - given worry for ileus)    Review of Systems:  unable to obtain    Wt(kg): --    Mode: AC/ CMV (Assist Control/ Continuous Mandatory Ventilation), RR (machine): 18, TV (machine): 320, FiO2: 30, PEEP: 5  04-15-20 @ 07:01  -  04-16-20 @ 07:00  --------------------------------------------------------  IN: 466.9 mL / OUT: 1280 mL / NET: -813.1 mL        CAPILLARY BLOOD GLUCOSE      POCT Blood Glucose.: 89 mg/dL (16 Apr 2020 05:46)      I&O's Summary    15 Apr 2020 07:01  -  16 Apr 2020 07:00  --------------------------------------------------------  IN: 466.9 mL / OUT: 1280 mL / NET: -813.1 mL        Physical Exam:  T(F): 97.9 (04-16-20 @ 08:00), Max: 97.9 (04-16-20 @ 08:00)  HR: 73 (04-16-20 @ 08:00) (59 - 78)  BP: 105/59 (04-16-20 @ 08:00) (85/63 - 148/99)  RR: 17 (04-16-20 @ 08:00) (17 - 21)  SpO2: 100% (04-16-20 @ 08:00) (97% - 100%)    Gen: ill appearing female, vented  Neuro: sedated, nonfocal  HEENT: MMM, ET/NGT in place  Resp: vented coarse breath sounds anteriorly  CVS: S1S2 RRR  Abd: soft, nontender, mildly distended, bowel sounds present  Ext: no edema  Skin: warm, well perfused; right IJ CVC    Meds:    midodrine Oral  norepinephrine Infusion IV Continuous    levothyroxine Injectable IV Push      acetaminophen   Tablet .. Oral PRN  acetaminophen  Suppository .. Rectal PRN  dexMEDEtomidine Infusion IV Continuous  levETIRAcetam  IVPB IV Intermittent  memantine Oral  propofol Infusion IV Continuous  valproate sodium IVPB IV Intermittent      aspirin  chewable Oral  enoxaparin Injectable SubCutaneous    pantoprazole  Injectable IV Push      magnesium sulfate  IVPB IV Intermittent      chlorhexidine 0.12% Liquid Oral Mucosa  chlorhexidine 2% Cloths Topical    lactobacillus acidophilus Oral                            10.3   5.53  )-----------( 129      ( 16 Apr 2020 06:38 )             30.3       04-16    142  |  107  |  12  ----------------------------<  100<H>  3.7   |  29  |  0.70    Ca    8.1<L>      16 Apr 2020 06:38  Phos  3.6     04-16  Mg     1.8     04-16    TPro  5.8<L>  /  Alb  1.4<L>  /  TBili  0.3  /  DBili  x   /  AST  47<H>  /  ALT  39  /  AlkPhos  85  04-16      CARDIAC MARKERS ( 15 Apr 2020 06:18 )  .465 ng/mL / x     / 31 U/L / x     / x          PT/INR - ( 16 Apr 2020 06:38 )   PT: 11.8 sec;   INR: 1.06 ratio         PTT - ( 16 Apr 2020 06:38 )  PTT:33.8 sec    .Stool Feces   No enteric pathogens to date: Final culture pending  No enteric gram negative rods isolated -- 04-14 @ 16:46    C Diff by PCR Result: NotDetec (04-14 @ 15:05)      Radiology:   < from: Xray Kidney Ureter Bladder (04.16.20 @ 08:18) >    EXAM:  XR KUB 1 VIEW                              PROCEDURE DATE:  04/16/2020        INTERPRETATION:  Clinical information: COVID-19 exposure. Evaluate for ileus.    TECHNIQUE: Portable supine AP view of the abdomen.    COMPARISON: Prior x-ray examination of the abdomen from 4/14/2020.    FINDINGS: There is redemonstration of an enteric tube with the tip in the distal stomach.    There is a nonobstructive bowel gas pattern. No free air is noted. Multilevel degenerative changes are noted within the imaged potions of the spine. A mild leftward convex curvature is notable to the lower lumbar spine.    IMPRESSION: As above, no interval change.        TANYA LEYVA M.D., ATTENDING RADIOLOGIST  This document has been electronically signed. Apr 16 2020  8:32AM    < end of copied text >      CENTRAL LINE: LUIS HOWARD: LUIS    GLOBAL ISSUE/BEST PRACTICE:  Analgesia: Y  Sedation: Y  CAM-ICU: ANNE  HOB elevation: yes  Stress ulcer prophylaxis: Y  VTE prophylaxis: Y  Glycemic control: Y  Nutrition: Y    CODE STATUS: Full Code - brother wants to make patient DNR

## 2020-04-16 NOTE — PROGRESS NOTE ADULT - SUBJECTIVE AND OBJECTIVE BOX
Chief Complaint: Shortness of breath    Interval Events: Remains intubated on pressors.    Review of Systems:  Unable to obtain    Physical Exam:  Vital Signs Last 24 Hrs  T(C): 36.6 (16 Apr 2020 08:00), Max: 36.6 (16 Apr 2020 08:00)  T(F): 97.9 (16 Apr 2020 08:00), Max: 97.9 (16 Apr 2020 08:00)  HR: 73 (16 Apr 2020 08:00) (59 - 78)  BP: 105/59 (16 Apr 2020 08:00) (85/63 - 148/99)  BP(mean): --  RR: 17 (16 Apr 2020 08:00) (17 - 21)  SpO2: 100% (16 Apr 2020 08:00) (97% - 100%)  General: Sedated  HEENT: Intubated  Neck: No JVD, no carotid bruit  Lungs: Coarse bilaterally  CV: RRR, nl S1/S2, no M/R/G  Abdomen: S/NT/ND, +BS  Extremities: No LE edema, no cyanosis  Neuro: Non-focal  Skin: No rash    Labs:             04-16    142  |  107  |  12  ----------------------------<  100<H>  3.7   |  29  |  0.70    Ca    8.1<L>      16 Apr 2020 06:38  Phos  3.6     04-16  Mg     1.8     04-16    TPro  5.8<L>  /  Alb  1.4<L>  /  TBili  0.3  /  DBili  x   /  AST  47<H>  /  ALT  39  /  AlkPhos  85  04-16                        10.3   5.53  )-----------( 129      ( 16 Apr 2020 06:38 )             30.3       Telemetry: Sinus rhythm, PVCs

## 2020-04-17 NOTE — PROGRESS NOTE ADULT - SUBJECTIVE AND OBJECTIVE BOX
MIGNON WALTER    [facility] 09    Patient is a 60y old  Female who presents with a chief complaint of sob (17 Apr 2020 13:41)       Allergies    amoxicillin (Rash)  penicillin (Rash)    Intolerances        HPI:  Patient is a 60y old  Female who presents with a chief complaint of sob and fever    HPI:This is a nonverbal pt bib ems from alf for fever, sob, cough, low o2 sat, with possible covid exposure. per ems, pt on levaquin for aspiration pna. no further hx available.pt has hx of chronic constipation an dis on multiple stool softners.  she was found ot be hypernatremic and in renal failure at admission.  also has hx of hypothyroidism and down syndrome        PAST MEDICAL & SURGICAL HISTORY:  Frequent urinary tract infections  Hypothyroidism, unspecified type  Down syndrome      FAMILY HISTORY:can not obtain due to mental status      SOCIAL HISTORY:    Allergies    amoxicillin (Rash)  penicillin (Rash)    Intolerances          REVIEW OF SYSEMS:  negative except form above mentioned      Vital Signs Last 24 Hrs  T(C): 37.8 (04 Apr 2020 09:46), Max: 37.8 (04 Apr 2020 09:46)  T(F): 100 (04 Apr 2020 09:46), Max: 100 (04 Apr 2020 09:46)  HR: 68 (04 Apr 2020 11:00) (66 - 69)  BP: 89/52 (04 Apr 2020 11:00) (87/51 - 91/52)  BP(mean): --  RR: 24 (04 Apr 2020 11:00) (24 - 24)  SpO2: 100% (04 Apr 2020 11:00) (89% - 100%)  I&O's Summary      PHYSICAL EXAM:  GENERAL: onnc  HEAD:  Atraumatic, Normocephalic  EYES: EOMI, PERRLA, conjunctiva and sclera clear  NECK: Supple, No JVD  CHEST/LUNG: dec bs at bases and rhonchi  HEART: Regular rate and rhythm; No murmurs, rubs, or gallops  ABDOMEN: Soft, Nontender, Nondistended; Bowel sounds present  SKIN: No rashes or lesions    LABS:                        14.7   2.88  )-----------( 52       ( 04 Apr 2020 10:49 )             45.9     04-04    154<H>  |  117<H>  |  31<H>  ----------------------------<  107<H>  4.7   |  30  |  1.48<H>    Ca    8.5      04 Apr 2020 10:49    TPro  7.1  /  Alb  2.3<L>  /  TBili  0.2  /  DBili  x   /  AST  73<H>  /  ALT  66<H>  /  AlkPhos  118  04-04      CAPILLARY BLOOD GLUCOSE                RADIOLOGY & ADDITIONAL TESTS:    Imaging Personally Reviewed:  [x] YES  [ ] NO    Consultant(s) Notes Reviewed:  [x] YES  [ ] NO      MEDICATIONS  (STANDING):  azithromycin   Tablet 500 milliGRAM(s) Oral daily  dextrose 5%. 1000 milliLiter(s) (100 mL/Hr) IV Continuous <Continuous>  diVALproex  milliGRAM(s) Oral three times a day  enoxaparin Injectable 30 milliGRAM(s) SubCutaneous daily  levETIRAcetam 1000 milliGRAM(s) Oral two times a day  levothyroxine 125 MICROGram(s) Oral daily  memantine 5 milliGRAM(s) Oral daily  pantoprazole    Tablet 40 milliGRAM(s) Oral before breakfast  polyethylene glycol 3350 17 Gram(s) Oral daily    MEDICATIONS  (PRN):  acetaminophen   Tablet .. 650 milliGRAM(s) Oral every 6 hours PRN Temp greater or equal to 38C (100.4F), Mild Pain (1 - 3)      Care Discussed with Consultants/Other Providers [x] YES  [ ] NO    HEALTH ISSUES - PROBLEM Dx: (04 Apr 2020 13:11)      PAST MEDICAL & SURGICAL HISTORY:  Frequent urinary tract infections  Hypothyroidism, unspecified type  Down syndrome      FAMILY HISTORY:        MEDICATIONS   acetaminophen   Tablet .. 650 milliGRAM(s) Oral every 6 hours PRN  acetaminophen  Suppository .. 650 milliGRAM(s) Rectal every 4 hours PRN  aspirin  chewable 81 milliGRAM(s) Oral daily  chlorhexidine 0.12% Liquid 15 milliLiter(s) Oral Mucosa every 12 hours  chlorhexidine 2% Cloths 1 Application(s) Topical daily  dexMEDEtomidine Infusion 0.5 MICROgram(s)/kG/Hr IV Continuous <Continuous>  enoxaparin Injectable 40 milliGRAM(s) SubCutaneous every 12 hours  lactobacillus acidophilus 1 Tablet(s) Oral three times a day with meals  levETIRAcetam  IVPB 1000 milliGRAM(s) IV Intermittent every 12 hours  levothyroxine Injectable 62.5 MICROGram(s) IV Push at bedtime  memantine 5 milliGRAM(s) Oral daily  midodrine 10 milliGRAM(s) Oral every 8 hours  norepinephrine Infusion 0.05 MICROgram(s)/kG/Min IV Continuous <Continuous>  pantoprazole  Injectable 40 milliGRAM(s) IV Push daily  valproate sodium IVPB 500 milliGRAM(s) IV Intermittent every 12 hours      Vital Signs Last 24 Hrs  T(C): 36.4 (17 Apr 2020 12:00), Max: 36.6 (17 Apr 2020 08:00)  T(F): 97.6 (17 Apr 2020 12:00), Max: 97.9 (17 Apr 2020 08:00)  HR: 78 (17 Apr 2020 14:18) (69 - 123)  BP: 153/98 (17 Apr 2020 12:00) (70/33 - 153/98)  BP(mean): 100 (17 Apr 2020 06:00) (71 - 100)  RR: 20 (17 Apr 2020 12:00) (19 - 32)  SpO2: 100% (17 Apr 2020 14:18) (80% - 100%)      04-16-20 @ 07:01  -  04-17-20 @ 07:00  --------------------------------------------------------  IN: 622 mL / OUT: 2670 mL / NET: -2048 mL    04-17-20 @ 07:01  -  04-17-20 @ 16:21  --------------------------------------------------------  IN: 0 mL / OUT: 758 mL / NET: -758 mL        Mode: AC/ CMV (Assist Control/ Continuous Mandatory Ventilation), RR (machine): 20, TV (machine): 320, FiO2: 50, PEEP: 5, ITime: 1, MAP: 10, PIP: 21    LABS:                        10.6   8.39  )-----------( 149      ( 17 Apr 2020 07:06 )             31.2     04-17    146<H>  |  109<H>  |  10  ----------------------------<  98  3.2<L>   |  27  |  0.78    Ca    8.3<L>      17 Apr 2020 07:06  Phos  3.0     04-17  Mg     2.0     04-17    TPro  6.8  /  Alb  1.6<L>  /  TBili  0.5  /  DBili  x   /  AST  90<H>  /  ALT  72<H>  /  AlkPhos  98  04-17    PT/INR - ( 17 Apr 2020 07:06 )   PT: 12.1 sec;   INR: 1.09 ratio         PTT - ( 17 Apr 2020 07:06 )  PTT:33.5 sec      ABG - ( 17 Apr 2020 05:49 )  pH, Arterial: x     pH, Blood: 7.57  /  pCO2: 28    /  pO2: 91    / HCO3: 28    / Base Excess: 3.3   /  SaO2: 98                  WBC:  WBC Count: 8.39 K/uL (04-17 @ 07:06)  WBC Count: 5.53 K/uL (04-16 @ 06:38)  WBC Count: 7.67 K/uL (04-15 @ 06:18)  WBC Count: 9.98 K/uL (04-14 @ 07:14)      MICROBIOLOGY:  RECENT CULTURES:  04-14 .Stool Feces XXXX XXXX   No enteric pathogens isolated.  (Stool culture examined for Salmonella,  Shigella, Campylobacter, Aeromonas, Plesiomonas,  Vibrio, E.coli O157 and Yersinia)          CARDIAC MARKERS ( 17 Apr 2020 07:06 )  .243 ng/mL / x     / 30 U/L / x     / x            PT/INR - ( 17 Apr 2020 07:06 )   PT: 12.1 sec;   INR: 1.09 ratio         PTT - ( 17 Apr 2020 07:06 )  PTT:33.5 sec    Sodium:  Sodium, Serum: 146 mmol/L (04-17 @ 07:06)  Sodium, Serum: 142 mmol/L (04-16 @ 06:38)  Sodium, Serum: 148 mmol/L (04-15 @ 06:18)  Sodium, Serum: 147 mmol/L (04-14 @ 07:06)      0.78 mg/dL 04-17 @ 07:06  0.70 mg/dL 04-16 @ 06:38  0.69 mg/dL 04-15 @ 06:18  0.69 mg/dL 04-14 @ 07:06      Hemoglobin:  Hemoglobin: 10.6 g/dL (04-17 @ 07:06)  Hemoglobin: 10.3 g/dL (04-16 @ 06:38)  Hemoglobin: 10.4 g/dL (04-15 @ 06:18)  Hemoglobin: 10.4 g/dL (04-14 @ 07:14)      Platelets: Platelet Count - Automated: 149 K/uL (04-17 @ 07:06)  Platelet Count - Automated: 129 K/uL (04-16 @ 06:38)  Platelet Count - Automated: 133 K/uL (04-15 @ 06:18)  Platelet Count - Automated: 136 K/uL (04-14 @ 07:14)      LIVER FUNCTIONS - ( 17 Apr 2020 07:06 )  Alb: 1.6 g/dL / Pro: 6.8 g/dL / ALK PHOS: 98 U/L / ALT: 72 U/L DA / AST: 90 U/L / GGT: x                 RADIOLOGY & ADDITIONAL STUDIES:

## 2020-04-17 NOTE — PROGRESS NOTE ADULT - ASSESSMENT
61 yo F with Downs syndrome, hypothyroidism, constipation, seizure disorder with acute hypoxic respiratory failure from COVID 19 pneumonia, course complicated by episode of seizure, type 2 NSTEMI, ADAM (resolved), gram variable bacteremia (repeat cx ngtd); ET tube replaced 4/14 due to low expiratory volumes on ventilator.    1. Acute hypoxic respiratory failure  2. COVID-19 Pneumonia  3. ADAM (resolved)  4. Type 2 NSTEMI (demand ischemia)  5. Seizure Disorder  6. Gram variable bacteremia (of uncertain clinical significance)    Neuro: titrate sedation with precedex/propofol. Continue depacon 500mg BID and keppra 1g q12 for seizures. Continue home memantine.  Cardio: on levophed (as midodrine being held). Type 2 NSTEMI, demand ischemia - troponin downtrended. continue asa.  Pulm: weaned and extubated 4/16 and unfortunately required reintubation the same day. titrate FiO2 and PEEP.  Renal/lytes: renal function stable. monitor UOP. monitor and replete lytes. hypokalemic - replete K with IV K riders.   GI: NPO - meds and tube feeding held for now. GI ppx. KUB without evidence of ileus or obstruction. Restart diet  ID: COVID 19 pneumonia s/p hydroxychloroquine, was not candidate for toci, steroids stopped 4/12. monitor off abx for now. monitor biomarkers.  Heme: DVT ppx lovenox 40 q12. monitor for bleeding  Dispo: DNR. critically ill with hypoxic respiratory failure from COVID 19 pneumonia. continue ICU care.  . 59 yo F with Downs syndrome, hypothyroidism, constipation, seizure disorder with acute hypoxic respiratory failure from COVID 19 pneumonia, course complicated by episode of seizure, type 2 NSTEMI, ADAM (resolved), gram variable bacteremia (repeat cx ngtd); ET tube replaced 4/14 due to low expiratory volumes on ventilator.    1. Acute hypoxic respiratory failure  2. COVID-19 Pneumonia  3. ADAM (resolved)  4. Type 2 NSTEMI (demand ischemia)  5. Seizure Disorder  6. Gram variable bacteremia (of uncertain clinical significance)    Neuro: titrate sedation with precedex/propofol. Continue depacon 500mg BID and keppra 1g q12 for seizures. Continue home memantine.  Cardio: on levophed (as midodrine being held). Type 2 NSTEMI, demand ischemia - troponin downtrended. continue asa.  Pulm: weaned and extubated 4/16 and unfortunately required reintubation the same day. titrate FiO2 and PEEP.  Renal/lytes: renal function stable. monitor UOP. monitor and replete lytes. hypokalemic - replete K with IV K riders. hypernatremic - free water and feeds.  GI: NPO - meds and tube feeding held for now. GI ppx. KUB without evidence of ileus or obstruction. Attempt to resume TF if no issues.  ID: COVID 19 pneumonia s/p hydroxychloroquine, was not candidate for toci, steroids stopped 4/12. repeat cultures negative. monitor off abx for now. monitor biomarkers.  Heme: DVT ppx lovenox 40 q12. monitor for bleeding. stable/improving thrombocytopenia likely from acute illness.  Dispo: DNR. critically ill with hypoxic respiratory failure from COVID 19 pneumonia. continue ICU care.  .

## 2020-04-17 NOTE — PROGRESS NOTE ADULT - ASSESSMENT
VITALS/LABS       2020 78 150/90   2020 3p dexm 1 m/k/h   2020 3p nore .5 m/k/m   2020 u ok   2020 9a ac 20/320/5/50%   2020 w 8.3 Hb 10.6 Plt 149 Na 146 K 3.2 CO2 27 Cr .7   2020 nlr 4   2020 ferr 2326   2020 crp 11.2   2020 bnp 80746   2020 757/28/91 (50%)   2020 pc .46     PT DATA/BEST PRACTICE  ALLERGY     NOTEWORTHY  POINTS/CHANGES ROS/PE   2020 Back on vent   2020 u 758   2020 Extubated   2020 Had seizure  2020 a Intubated tr to SICU   2020 fc given 945a Puentes ordered    Transferred to ICU May need intubn and fob dw brother                                WT  45 (2020)                    BMI     20 (2020)     ADVANCED DIRECTIVE     dnr ()   Goals of care discussion                                                                                      HEAD OF BED ELEVATION Yes  DYSPHAGIA EVAL                                  DIET      dys 1 puree () --> jevity 1.5 720 ()--> 1080 (4/10) --> npo (  (CCPA MP ) (ileus)    .4 ()  (Dr RICHARDSON)                                   IV F        D5 100 ()   --> D5 1/2 75 (() --> D5 75 ()  --> d5 5 (4/10-)      (DCEd by Dr RICHARDSON)                                        DVT PROPHYLAXIS      lvnx 30 ()      --> Lvnx 40 ()   --> lvnx 40.2 ()      PATIENT SUMMARY   60 f nonverbal downs syndrome from group home HO COVID exposure per transfer papers on Rx for aspiration pneumonia and shortness of breath   er vitals 90/50 66 100f 92%    Pt found to have pneumonia pl effsn hypernatremia and adam on arrival COVID palma started Pulm consulted     Pulm consulted  CT ch showed l lung collapse Pt ruled out for COVID     Home meds memantine 28 keppra 1.2                                Pt tr sicu 2020 itubated hemaodynamci intability                 PLAN   GAS EXCHANGE   2020 757/28/91 (50%)   2020 9a ac 20/320/5/50%   A/R Target po 90-95 Ppl belo 30 DP belo 15        NEED FOR TRACH  Intubated    May need trach within few d if cannot get extubated    COVID  2020 COVID pcr detc   Supp care   SYMTOM ONSET      OXYGENATION  2020 On 50% O2    2020 Intubated ON 90% ox     MARKERS   n --4/10----4/ nlr 6.1 -7.1- 5.3 - 1.3 -5.5 -3.5-1.8-4  f ----4/15/2020 ferritin 5595-3012 - 1404  - 1039 - 1524 - 2326   p ---4/ procal .27-.34- .1-.15 -.46   c --4/15/2020 crp 2.6 - 3.8-16.9 - 11.2   -2020 esr 26 - 25  --4/15/2020 d-dimer [id number]  D-dimer  4/15/2020 d-dimer 786   lvnx 40.2 ()       MEDS   HCQ () COMPLETED COURSE   2020 qtc 442   IVIG 20g/d.4d () (Dr RICHARDSON) --> DCED 2020    STEROIDS   solumed 80.5d () COMPLETED COURSE           ARDS ltvv   HEMODYNAMICS In shock sec COVID 19 Remains on nore () Mido () Given alb   NONOLIGURIC ADAM Monitor lytes   HYPERNATR RESOLVED  ----4/10----4/ Na 151 -151-143 -[phone]  On fw 240.4 (4/10)   A/R improved   SZ On keppra and ativan for break thru  GOC 2020 Discussions under way with brother  2020 PT MADE DNR   PLAN   Reintubated few h after  extubation because of secretions   Plan is to stabiulize hemodmnamics wean off levophed and gradual weaning off vent If secretions continue may need trach  Extubated 2020 Monitor abg po       TIME SPENT Over 36 minutes aggregate critical  care time spent on encounter; activities included   direct pt care, counseling and/or coordinating care reviewing notes, lab data/ imaging , discussion with multidisciplinary team/ pt /family. Risks, benefits, alternatives  discussed in detail.    [facility] [phone]   1960 DOA 2020 DR NORTH MEDLEY

## 2020-04-17 NOTE — PROGRESS NOTE ADULT - SUBJECTIVE AND OBJECTIVE BOX
Chief Complaint: Shortness of breath    Interval Events: Extubated yesterday but needed to be reintubated.    Review of Systems:  Unable to obtain    Physical Exam:  Vital Signs Last 24 Hrs  T(C): 36.6 (17 Apr 2020 08:00), Max: 36.7 (16 Apr 2020 12:00)  T(F): 97.9 (17 Apr 2020 08:00), Max: 98 (16 Apr 2020 12:00)  HR: 88 (17 Apr 2020 08:00) (60 - 123)  BP: 153/90 (17 Apr 2020 08:00) (70/33 - 153/90)  BP(mean): 100 (17 Apr 2020 06:00) (71 - 100)  RR: 24 (17 Apr 2020 06:00) (13 - 32)  SpO2: 100% (17 Apr 2020 06:00) (80% - 100%)  General: Sedated  HEENT: Intubated  Neck: No JVD, no carotid bruit  Lungs: Coarse bilaterally  CV: RRR, nl S1/S2, no M/R/G  Abdomen: S/NT/ND, +BS  Extremities: No LE edema, no cyanosis  Neuro: Non-focal  Skin: No rash    Labs:             04-17    146<H>  |  109<H>  |  10  ----------------------------<  98  3.2<L>   |  27  |  0.78    Ca    8.3<L>      17 Apr 2020 07:06  Phos  3.0     04-17  Mg     2.0     04-17    TPro  6.8  /  Alb  1.6<L>  /  TBili  0.5  /  DBili  x   /  AST  90<H>  /  ALT  72<H>  /  AlkPhos  98  04-17                        10.6   8.39  )-----------( 149      ( 17 Apr 2020 07:06 )             31.2       Telemetry: Sinus rhythm, PVCs

## 2020-04-17 NOTE — PROGRESS NOTE ADULT - ASSESSMENT
1.	ADAM: Prerenal azotemia, ATN  2.	Shock, Sepsis  3.	Pneumonia, COVID +  4.	Hypophosphatemia: imporved    Events noted. Potassium supps. To continue current meds. Treatment for COVID pneumonia. COVID precautions.  Avoid nephrotoxic meds as possible. ICU management. Overall prognosis poor. 1.	ADAM: Prerenal azotemia, ATN  2.	Shock, Sepsis  3.	Pneumonia, COVID +  4.	Hypophosphatemia: imporved    Events noted. Potassium supps. To continue current meds. Treatment for COVID pneumonia. COVID precautions.  Potassium supps. Avoid nephrotoxic meds as possible. ICU management. Overall prognosis poor.

## 2020-04-17 NOTE — PROGRESS NOTE ADULT - SUBJECTIVE AND OBJECTIVE BOX
ICU Progress Note    HPI:    S:    Pt seen and examined  HD # 14  PMHx  Downs syndrome, hypothyroidism, constipation, seizure disorder with acute hypoxic respiratory failure from COVID 19 pneumonia  Pt here for resp failure requiring intubation    4/17 PM: Remains intubated. s/p extubation and reintubation. Breakthrough sz tonight requiring ativan.     ROS: Unable to obtain 2/2 Pt condition    Allergies    amoxicillin (Rash)  penicillin (Rash)    Intolerances        MEDICATIONS  (STANDING):  acetylcysteine 20%  Inhalation 4 milliLiter(s) Inhalation three times a day  aspirin  chewable 81 milliGRAM(s) Oral daily  chlorhexidine 0.12% Liquid 15 milliLiter(s) Oral Mucosa every 12 hours  chlorhexidine 2% Cloths 1 Application(s) Topical daily  dexMEDEtomidine Infusion 0.5 MICROgram(s)/kG/Hr (5.68 mL/Hr) IV Continuous <Continuous>  enoxaparin Injectable 40 milliGRAM(s) SubCutaneous every 12 hours  lactobacillus acidophilus 1 Tablet(s) Oral three times a day with meals  levETIRAcetam  IVPB 1000 milliGRAM(s) IV Intermittent every 12 hours  levothyroxine Injectable 62.5 MICROGram(s) IV Push at bedtime  memantine 5 milliGRAM(s) Oral daily  midodrine 10 milliGRAM(s) Oral every 8 hours  norepinephrine Infusion 0.05 MICROgram(s)/kG/Min (4.26 mL/Hr) IV Continuous <Continuous>  pantoprazole  Injectable 40 milliGRAM(s) IV Push daily  valproate sodium IVPB 500 milliGRAM(s) IV Intermittent every 12 hours    MEDICATIONS  (PRN):  acetaminophen   Tablet .. 650 milliGRAM(s) Oral every 6 hours PRN Temp greater or equal to 38C (100.4F), Mild Pain (1 - 3)  acetaminophen  Suppository .. 650 milliGRAM(s) Rectal every 4 hours PRN Temp greater or equal to 38C (100.4F)      Drug Dosing Weight  Height (cm): 149.86 (04 Apr 2020 09:46)  Weight (kg): 45.4 (04 Apr 2020 09:46)  BMI (kg/m2): 20.2 (04 Apr 2020 09:46)  BSA (m2): 1.37 (04 Apr 2020 09:46)        PAST MEDICAL & SURGICAL HISTORY:  Frequent urinary tract infections  Hypothyroidism, unspecified type  Down syndrome      FAMILY HISTORY:          ROS: See HPI; otherwise, all systems reviewed and negative.    O:    ICU Vital Signs Last 24 Hrs  T(C): 36.1 (17 Apr 2020 20:00), Max: 36.6 (17 Apr 2020 08:00)  T(F): 96.9 (17 Apr 2020 20:00), Max: 97.9 (17 Apr 2020 08:00)  HR: 77 (17 Apr 2020 22:00) (70 - 97)  BP: 114/71 (17 Apr 2020 22:00) (114/71 - 153/98)  BP(mean): 100 (17 Apr 2020 06:00) (71 - 100)  ABP: --  ABP(mean): --  RR: 20 (17 Apr 2020 22:00) (20 - 24)  SpO2: 100% (17 Apr 2020 22:00) (100% - 100%)      ABG - ( 17 Apr 2020 05:49 )  pH, Arterial: x     pH, Blood: 7.57  /  pCO2: 28    /  pO2: 91    / HCO3: 28    / Base Excess: 3.3   /  SaO2: 98                  I&O's Detail    16 Apr 2020 07:01  -  17 Apr 2020 07:00  --------------------------------------------------------  IN:    dexmedetomidine Infusion: 22 mL    Enteral Tube Flush: 60 mL    IV PiggyBack: 50 mL    norepinephrine Infusion: 250 mL    ns in tub fed  [id number]: 240 mL  Total IN: 622 mL    OUT:    Indwelling Catheter - Urethral: 2670 mL  Total OUT: 2670 mL    Total NET: -2048 mL      17 Apr 2020 07:01  -  17 Apr 2020 22:58  --------------------------------------------------------  IN:    dexmedetomidine Infusion: 79.8 mL    IV PiggyBack: 100 mL    norepinephrine Infusion: 318 mL    ns in tub fed  [id number]: 180 mL  Total IN: 677.8 mL    OUT:    Indwelling Catheter - Urethral: 1258 mL  Total OUT: 1258 mL    Total NET: -580.2 mL          Mode: AC/ CMV (Assist Control/ Continuous Mandatory Ventilation)  RR (machine): 20  TV (machine): 320  FiO2: 50  PEEP: 5  ITime: 1  MAP: 10  PIP: 21      PE:    Adult F lying in bed  + ETT + NGT  Intubated, sedated    Remainder of exam deferred 2/2 global COVID pandemic; will not change mgmt and will only utilize valuable PPE    LABS:    CBC Full  -  ( 17 Apr 2020 07:06 )  WBC Count : 8.39 K/uL  RBC Count : 3.15 M/uL  Hemoglobin : 10.6 g/dL  Hematocrit : 31.2 %  Platelet Count - Automated : 149 K/uL  Mean Cell Volume : 99.0 fl  Mean Cell Hemoglobin : 33.7 pg  Mean Cell Hemoglobin Concentration : 34.0 gm/dL  Auto Neutrophil # : 5.15 K/uL  Auto Lymphocyte # : 2.43 K/uL  Auto Monocyte # : 0.69 K/uL  Auto Eosinophil # : 0.01 K/uL  Auto Basophil # : 0.01 K/uL  Auto Neutrophil % : 61.4 %  Auto Lymphocyte % : 29.0 %  Auto Monocyte % : 8.2 %  Auto Eosinophil % : 0.1 %  Auto Basophil % : 0.1 %    04-17    146<H>  |  109<H>  |  10  ----------------------------<  98  3.2<L>   |  27  |  0.78    Ca    8.3<L>      17 Apr 2020 07:06  Phos  3.0     04-17  Mg     2.0     04-17    TPro  6.8  /  Alb  1.6<L>  /  TBili  0.5  /  DBili  x   /  AST  90<H>  /  ALT  72<H>  /  AlkPhos  98  04-17    PT/INR - ( 17 Apr 2020 07:06 )   PT: 12.1 sec;   INR: 1.09 ratio         PTT - ( 17 Apr 2020 07:06 )  PTT:33.5 sec    CAPILLARY BLOOD GLUCOSE      POCT Blood Glucose.: 90 mg/dL (17 Apr 2020 05:42)  POCT Blood Glucose.: 97 mg/dL (16 Apr 2020 23:58)    CARDIAC MARKERS ( 17 Apr 2020 07:06 )  .243 ng/mL / x     / 30 U/L / x     / x          LIVER FUNCTIONS - ( 17 Apr 2020 07:06 )  Alb: 1.6 g/dL / Pro: 6.8 g/dL / ALK PHOS: 98 U/L / ALT: 72 U/L DA / AST: 90 U/L / GGT: x

## 2020-04-17 NOTE — PROGRESS NOTE ADULT - SUBJECTIVE AND OBJECTIVE BOX
Neurology follow up note    MIGNON [id number]      Interval History:    Patient intubated     MEDICATIONS    acetaminophen   Tablet .. 650 milliGRAM(s) Oral every 6 hours PRN  acetaminophen  Suppository .. 650 milliGRAM(s) Rectal every 4 hours PRN  aspirin  chewable 81 milliGRAM(s) Oral daily  chlorhexidine 0.12% Liquid 15 milliLiter(s) Oral Mucosa every 12 hours  chlorhexidine 2% Cloths 1 Application(s) Topical daily  dexMEDEtomidine Infusion 0.5 MICROgram(s)/kG/Hr IV Continuous <Continuous>  enoxaparin Injectable 40 milliGRAM(s) SubCutaneous every 12 hours  lactobacillus acidophilus 1 Tablet(s) Oral three times a day with meals  levETIRAcetam  IVPB 1000 milliGRAM(s) IV Intermittent every 12 hours  levothyroxine Injectable 62.5 MICROGram(s) IV Push at bedtime  memantine 5 milliGRAM(s) Oral daily  midodrine 10 milliGRAM(s) Oral every 8 hours  norepinephrine Infusion 0.05 MICROgram(s)/kG/Min IV Continuous <Continuous>  pantoprazole  Injectable 40 milliGRAM(s) IV Push daily  valproate sodium IVPB 500 milliGRAM(s) IV Intermittent every 12 hours      Allergies    amoxicillin (Rash)  penicillin (Rash)    Intolerances            Vital Signs Last 24 Hrs  T(C): 36.6 (17 Apr 2020 08:00), Max: 36.7 (16 Apr 2020 12:00)  T(F): 97.9 (17 Apr 2020 08:00), Max: 98 (16 Apr 2020 12:00)  HR: 88 (17 Apr 2020 08:00) (60 - 123)  BP: 153/90 (17 Apr 2020 08:00) (70/33 - 153/90)  BP(mean): 100 (17 Apr 2020 06:00) (71 - 100)  RR: 24 (17 Apr 2020 06:00) (13 - 32)  SpO2: 100% (17 Apr 2020 06:00) (80% - 100%)    REVIEW OF SYSTEMS:  Could not be obtained secondary to the patient being nonverbal.    PHYSICAL EXAMINATION:    HEENT:  Head:  Normocephalic and atraumatic.  Eyes:  No scleral icterus.  Ears:  Hearing hard to evaluate secondary to the patient being sedated and intubated.  RESPIRATORY:  Good air entry bilaterally.  CARDIOVASCULAR:  S1 and S2 are heard.  ABDOMEN:  Soft and nontender.  EXTREMITIES:  No clubbing or cyanosis were noted.      NEUROLOGICAL:  Limited secondary to the patient being intubated and sedated.  No response to verbal stimuli.  I opened the patient's eyes, no gaze preference.  I applied stimuli to all four extremities with slight withdrawal bilateral upper and lower.  Tone; bilateral upper and lower was increased.                    LABS:  CBC Full  -  ( 17 Apr 2020 07:06 )  WBC Count : 8.39 K/uL  RBC Count : 3.15 M/uL  Hemoglobin : 10.6 g/dL  Hematocrit : 31.2 %  Platelet Count - Automated : 149 K/uL  Mean Cell Volume : 99.0 fl  Mean Cell Hemoglobin : 33.7 pg  Mean Cell Hemoglobin Concentration : 34.0 gm/dL  Auto Neutrophil # : 5.15 K/uL  Auto Lymphocyte # : 2.43 K/uL  Auto Monocyte # : 0.69 K/uL  Auto Eosinophil # : 0.01 K/uL  Auto Basophil # : 0.01 K/uL  Auto Neutrophil % : 61.4 %  Auto Lymphocyte % : 29.0 %  Auto Monocyte % : 8.2 %  Auto Eosinophil % : 0.1 %  Auto Basophil % : 0.1 %      04-17    146<H>  |  109<H>  |  10  ----------------------------<  98  3.2<L>   |  27  |  0.78    Ca    8.3<L>      17 Apr 2020 07:06  Phos  3.0     04-17  Mg     2.0     04-17    TPro  6.8  /  Alb  1.6<L>  /  TBili  0.5  /  DBili  x   /  AST  90<H>  /  ALT  72<H>  /  AlkPhos  98  04-17    Hemoglobin A1C:     LIVER FUNCTIONS - ( 17 Apr 2020 07:06 )  Alb: 1.6 g/dL / Pro: 6.8 g/dL / ALK PHOS: 98 U/L / ALT: 72 U/L DA / AST: 90 U/L / GGT: x           Vitamin B12   PT/INR - ( 17 Apr 2020 07:06 )   PT: 12.1 sec;   INR: 1.09 ratio         PTT - ( 17 Apr 2020 07:06 )  PTT:33.5 sec      RADIOLOGY    ANALYSIS AND PLAN:  This is a 60-year-old with altered mental status and episode of seizure.  1.	For seizure event, suspect this could be secondary to the patient's interaction with antibiotics. will change Depakote to 500 bid  no new seizures monitor levels.   2.	I will recommend Ativan 1 mg IV push q.6 h. p.r.n. for any type of breakthrough seizures.  3.	Seizure precautions.  4.	For altered mental status, secondary to metabolic encephalopathy secondary to COVID-19 infection.  5.	For hypotension, continue the patient on midodrine.  If possible, please maintain systolic blood pressure of above 100 and help maintain cerebral perfusion.  6.	For history of hypothyroidism, continue the patient on Synthroid.  7.	spoke to staff no new events   Greater than 20 minutes spent in direct patient care reviewing  the notes, lab data/ imaging

## 2020-04-17 NOTE — PROGRESS NOTE ADULT - SUBJECTIVE AND OBJECTIVE BOX
MIGNON WALTER is a 60yFemale , patient examined and chart reviewed.    INTERVAL HPI/ OVERNIGHT EVENTS:  Reintubated last night. Sedated.  Afebrile.    Past Medical History--  PAST MEDICAL & SURGICAL HISTORY:  Frequent urinary tract infections  Hypothyroidism, unspecified type  Down syndrome      For details regarding the patient's social history, family history, and other miscellaneous elements, please refer the initial infectious diseases consultation and/or the admitting history and physical examination for this admission.      ROS:  Unable to obtain due to : Pt's condition    ALLERGIES:  amoxicillin (Rash)  penicillin (Rash)      Current inpatient medications :  MEDICATIONS  (STANDING):  acetylcysteine 20%  Inhalation 4 milliLiter(s) Inhalation three times a day  aspirin  chewable 81 milliGRAM(s) Oral daily  chlorhexidine 0.12% Liquid 15 milliLiter(s) Oral Mucosa every 12 hours  chlorhexidine 2% Cloths 1 Application(s) Topical daily  dexMEDEtomidine Infusion 0.5 MICROgram(s)/kG/Hr (5.68 mL/Hr) IV Continuous <Continuous>  enoxaparin Injectable 40 milliGRAM(s) SubCutaneous every 12 hours  lactobacillus acidophilus 1 Tablet(s) Oral three times a day with meals  levETIRAcetam  IVPB 1000 milliGRAM(s) IV Intermittent every 12 hours  levothyroxine Injectable 62.5 MICROGram(s) IV Push at bedtime  memantine 5 milliGRAM(s) Oral daily  midodrine 10 milliGRAM(s) Oral every 8 hours  norepinephrine Infusion 0.05 MICROgram(s)/kG/Min (4.26 mL/Hr) IV Continuous <Continuous>  pantoprazole  Injectable 40 milliGRAM(s) IV Push daily  valproate sodium IVPB 500 milliGRAM(s) IV Intermittent every 12 hours    MEDICATIONS  (PRN):  acetaminophen   Tablet .. 650 milliGRAM(s) Oral every 6 hours PRN Temp greater or equal to 38C (100.4F), Mild Pain (1 - 3)  acetaminophen  Suppository .. 650 milliGRAM(s) Rectal every 4 hours PRN Temp greater or equal to 38C (100.4F)      Objective:  ICU Vital Signs Last 24 Hrs  T(C): 36.4 (17 Apr 2020 12:00), Max: 36.6 (17 Apr 2020 08:00)  T(F): 97.6 (17 Apr 2020 12:00), Max: 97.9 (17 Apr 2020 08:00)  HR: 78 (17 Apr 2020 14:18) (69 - 123)  BP: 153/98 (17 Apr 2020 12:00) (70/33 - 153/98)  BP(mean): 100 (17 Apr 2020 06:00) (71 - 100)  RR: 20 (17 Apr 2020 12:00) (19 - 32)  SpO2: 100% (17 Apr 2020 14:18) (80% - 100%)    Mode: AC/ CMV (Assist Control/ Continuous Mandatory Ventilation)  RR (machine): 20  TV (machine): 320  FiO2: 50  PEEP: 5  ITime: 1  MAP: 10  PIP: 21    Physical Exam:  GEN: Vented sedated  HEENT: normocephalic and atraumatic. EOMI. ANASTASIYA. Moist mucosa. Clear Posterior pharynx.  NECK: Supple. No carotid bruits.  No lymphadenopathy or thyromegaly.  LUNGS: Decreased to auscultation.  HEART: Regular rate and rhythm without murmur.  ABDOMEN: Soft, nontender, and nondistended.  Positive bowel sounds.  No hepatosplenomegaly was noted.  EXTREMITIES: Without any cyanosis, clubbing, rash, lesions or edema.  NEUROLOGIC: Sedated  SKIN: No ulceration or induration present.      LABS:                        10.6   8.39  )-----------( 149      ( 17 Apr 2020 07:06 )             31.2   04-17    146<H>  |  109<H>  |  10  ----------------------------<  98  3.2<L>   |  27  |  0.78    Ca    8.3<L>      17 Apr 2020 07:06  Phos  3.0     04-17  Mg     2.0     04-17    TPro  6.8  /  Alb  1.6<L>  /  TBili  0.5  /  DBili  x   /  AST  90<H>  /  ALT  72<H>  /  AlkPhos  98  04-17    MICROBIOLOGY:  Culture - Blood (04.06.20 @ 16:37)    Specimen Source: .Blood Blood    Culture Results:   No Growth Final    GI PCR Panel, Stool (collected 14 Apr 2020 16:46)  Source: .Stool Feces  Final Report (14 Apr 2020 19:26):    GI PCR Results: NOT detected    *******Please Note:*******    GI panel PCR evaluates for:    Campylobacter, Plesiomonas shigelloides, Salmonella,    Vibrio, Yersinia enterocolitica, Enteroaggregative    Escherichia coli (EAEC), Enteropathogenic E.coli (EPEC),    Enterotoxigenic E. coli (ETEC) lt/st, Shiga-like    toxin-producing E. coli (STEC) stx1/stx2,    Shigella/ Enteroinvasive E. coli (EIEC), Cryptosporidium,    Cyclospora cayetanensis, Entamoeba histolytica,    Giardia lamblia, Adenovirus F 40/41, Astrovirus,    Norovirus GI/GII, Rotavirus A, Sapovirus    Culture - Stool (collected 14 Apr 2020 16:46)  Source: .Stool Feces  Preliminary Report (15 Apr 2020 15:09):    No enteric pathogens to date: Final culture pending    No enteric gram negative rods isolated    Culture - Sputum . (04.09.20 @ 16:26)    Gram Stain:   Numerous polymorphonuclear leukocytes seen per low power field  Rare Squamous epithelial cells seen per low power field  Rare Gram positive cocci in pairs seen per oil power field    Specimen Source: .Sputum Sputum    Culture Results:   Normal Respiratory Jackelin present    RADIOLOGY & ADDITIONAL STUDIES:    EXAM:  XR CHEST PORTABLE IMMED 1V                                  PROCEDURE DATE:  04/15/2020          INTERPRETATION:  INDICATION: ETT pulled back    PRIORS: Respiratory failure; Covid exposure.    VIEWS: Portable AP radiography of the chest performed.    FINDINGS: Since prior evaluation the ETT has been repositioned, the distal tip located approximately 2.5 cm superior to the tracheal bifurcation. There is no change in position of the indwelling right IJ CVC. Bilateral lung parenchymal airspace opacities are identified. No pleural effusion or pneumothorax is demonstrated. No mediastinal shift is noted. Degenerative changes the thoracic spine noted. There is no change in position of the indwelling NGT.    IMPRESSION: : Since prior evaluation the ETT has been repositioned, the distal tip located approximately 2.5 cm superior to the tracheal bifurcation. Bilateral lung parenchymal airspace opacities.      DISCRETE X-RAY DATA:  Percent of LEFT lung opacification: 34-66%  Percent of RIGHTlung opacification: 34-66%  Change in lung opacification from most recent x-ray (<=3 days): Stable  Change from prior dated 3 or more days (same admission): Stable        Assessment :   61YO F nonverbal, MRDD, hypothyroidism and down syndrome, hx of seizures BIBA  from group home admitted to ICU for acute severe respiratory decompensation with severe sepsis and septic shock secondary to COVID 19 PNA. Had been on multiple antibiotics--Meropenam Levaquin and now Cefepime. Had single bottle in blood cultures with gram variable rods ( ID still pending and sent to [facility] lab ) Repeat blood culture NGTD. Pt remains on vent and on pressors. Blood culture likely contaminant. Severe sepsis with septic shock and respiratory failure sec COVID 19 pneumonia. Procalcitonin unimpressive. Extubated 4/17/2020 but reintubated same day.    Plan :   Supportive care  Trend temps and cbc  Vent per ICU  Wean off pressors  Asp precautions  COVID-19--critical period for decompensation  (7-14 days post symptom onset), avoid aerosolizing procedures, NSAIDs   -monitor respiratory status closely ( route of 02 and saturation) and titrate when able  -isolation precautions per protocol  -avoid excessive blood draws, blood cultures and frequent CXRs  -monitor biomarkers- CBC w diff  for NLRNLR <3 low vs >5 high) Ferritin (lower risk <450 vs >850) CRP (low risk <2 and higher risk >6) and LDH, D Dimer, procalcitonin  -therapies remains investigational-- including Hydroxychloroquine  -antibiotics only if there is concern for a bacterial process  -Steroids short course ( 5 days)  --for hypoxia/ARDS /shock  -Prognosis porr  DNR    D/w ICU team    Continue with present regiment.  Appropriate use of antibiotics and adverse effects reviewed.    I have discussed the above plan of care with patient/ family in detail. They expressed understanding of the the treatment plan . Risks, benefits and alternatives discussed in detail. I have asked if they have any questions or concerns and appropriately addressed them to the best of my ability .      Critical care time greater then 35 minutes reviewing notes, labs data/ imaging , discussion with multidisciplinary team.    Thank you for allowing me to participate in care of your patient .        John Rivera MD  Infectious Disease  [phone] MIGNON WALTER is a 60yFemale , patient examined and chart reviewed.    INTERVAL HPI/ OVERNIGHT EVENTS:  Reintubated last night. Sedated.  Afebrile.    Past Medical History--  PAST MEDICAL & SURGICAL HISTORY:  Frequent urinary tract infections  Hypothyroidism, unspecified type  Down syndrome      For details regarding the patient's social history, family history, and other miscellaneous elements, please refer the initial infectious diseases consultation and/or the admitting history and physical examination for this admission.      ROS:  Unable to obtain due to : Pt's condition    ALLERGIES:  amoxicillin (Rash)  penicillin (Rash)      Current inpatient medications :  MEDICATIONS  (STANDING):  acetylcysteine 20%  Inhalation 4 milliLiter(s) Inhalation three times a day  aspirin  chewable 81 milliGRAM(s) Oral daily  chlorhexidine 0.12% Liquid 15 milliLiter(s) Oral Mucosa every 12 hours  chlorhexidine 2% Cloths 1 Application(s) Topical daily  dexMEDEtomidine Infusion 0.5 MICROgram(s)/kG/Hr (5.68 mL/Hr) IV Continuous <Continuous>  enoxaparin Injectable 40 milliGRAM(s) SubCutaneous every 12 hours  lactobacillus acidophilus 1 Tablet(s) Oral three times a day with meals  levETIRAcetam  IVPB 1000 milliGRAM(s) IV Intermittent every 12 hours  levothyroxine Injectable 62.5 MICROGram(s) IV Push at bedtime  memantine 5 milliGRAM(s) Oral daily  midodrine 10 milliGRAM(s) Oral every 8 hours  norepinephrine Infusion 0.05 MICROgram(s)/kG/Min (4.26 mL/Hr) IV Continuous <Continuous>  pantoprazole  Injectable 40 milliGRAM(s) IV Push daily  valproate sodium IVPB 500 milliGRAM(s) IV Intermittent every 12 hours    MEDICATIONS  (PRN):  acetaminophen   Tablet .. 650 milliGRAM(s) Oral every 6 hours PRN Temp greater or equal to 38C (100.4F), Mild Pain (1 - 3)  acetaminophen  Suppository .. 650 milliGRAM(s) Rectal every 4 hours PRN Temp greater or equal to 38C (100.4F)      Objective:  ICU Vital Signs Last 24 Hrs  T(C): 36.4 (17 Apr 2020 12:00), Max: 36.6 (17 Apr 2020 08:00)  T(F): 97.6 (17 Apr 2020 12:00), Max: 97.9 (17 Apr 2020 08:00)  HR: 78 (17 Apr 2020 14:18) (69 - 123)  BP: 153/98 (17 Apr 2020 12:00) (70/33 - 153/98)  BP(mean): 100 (17 Apr 2020 06:00) (71 - 100)  RR: 20 (17 Apr 2020 12:00) (19 - 32)  SpO2: 100% (17 Apr 2020 14:18) (80% - 100%)    Mode: AC/ CMV (Assist Control/ Continuous Mandatory Ventilation)  RR (machine): 20  TV (machine): 320  FiO2: 50  PEEP: 5  ITime: 1  MAP: 10  PIP: 21    Physical Exam:  GEN: Vented sedated  HEENT: normocephalic and atraumatic. EOMI. ANASTASIYA. Moist mucosa. Clear Posterior pharynx.  NECK: Supple. No carotid bruits.  No lymphadenopathy or thyromegaly.  LUNGS: Decreased to auscultation.  HEART: Regular rate and rhythm without murmur.  ABDOMEN: Soft, nontender, and nondistended.  Positive bowel sounds.  No hepatosplenomegaly was noted.  EXTREMITIES: Without any cyanosis, clubbing, rash, lesions or edema.  NEUROLOGIC: Sedated  SKIN: No ulceration or induration present.      LABS:                        10.6   8.39  )-----------( 149      ( 17 Apr 2020 07:06 )             31.2   04-17    146<H>  |  109<H>  |  10  ----------------------------<  98  3.2<L>   |  27  |  0.78    Ca    8.3<L>      17 Apr 2020 07:06  Phos  3.0     04-17  Mg     2.0     04-17    TPro  6.8  /  Alb  1.6<L>  /  TBili  0.5  /  DBili  x   /  AST  90<H>  /  ALT  72<H>  /  AlkPhos  98  04-17    MICROBIOLOGY:  Culture - Blood (04.06.20 @ 16:37)    Specimen Source: .Blood Blood    Culture Results:   No Growth Final    GI PCR Panel, Stool (collected 14 Apr 2020 16:46)  Source: .Stool Feces  Final Report (14 Apr 2020 19:26):    GI PCR Results: NOT detected    *******Please Note:*******    GI panel PCR evaluates for:    Campylobacter, Plesiomonas shigelloides, Salmonella,    Vibrio, Yersinia enterocolitica, Enteroaggregative    Escherichia coli (EAEC), Enteropathogenic E.coli (EPEC),    Enterotoxigenic E. coli (ETEC) lt/st, Shiga-like    toxin-producing E. coli (STEC) stx1/stx2,    Shigella/ Enteroinvasive E. coli (EIEC), Cryptosporidium,    Cyclospora cayetanensis, Entamoeba histolytica,    Giardia lamblia, Adenovirus F 40/41, Astrovirus,    Norovirus GI/GII, Rotavirus A, Sapovirus    Culture - Stool (collected 14 Apr 2020 16:46)  Source: .Stool Feces  Preliminary Report (15 Apr 2020 15:09):    No enteric pathogens to date: Final culture pending    No enteric gram negative rods isolated    Culture - Sputum . (04.09.20 @ 16:26)    Gram Stain:   Numerous polymorphonuclear leukocytes seen per low power field  Rare Squamous epithelial cells seen per low power field  Rare Gram positive cocci in pairs seen per oil power field    Specimen Source: .Sputum Sputum    Culture Results:   Normal Respiratory Jackelin present    RADIOLOGY & ADDITIONAL STUDIES:    EXAM:  XR CHEST PORTABLE IMMED 1V                                  PROCEDURE DATE:  04/15/2020          INTERPRETATION:  INDICATION: ETT pulled back    PRIORS: Respiratory failure; Covid exposure.    VIEWS: Portable AP radiography of the chest performed.    FINDINGS: Since prior evaluation the ETT has been repositioned, the distal tip located approximately 2.5 cm superior to the tracheal bifurcation. There is no change in position of the indwelling right IJ CVC. Bilateral lung parenchymal airspace opacities are identified. No pleural effusion or pneumothorax is demonstrated. No mediastinal shift is noted. Degenerative changes the thoracic spine noted. There is no change in position of the indwelling NGT.    IMPRESSION: : Since prior evaluation the ETT has been repositioned, the distal tip located approximately 2.5 cm superior to the tracheal bifurcation. Bilateral lung parenchymal airspace opacities.      DISCRETE X-RAY DATA:  Percent of LEFT lung opacification: 34-66%  Percent of RIGHTlung opacification: 34-66%  Change in lung opacification from most recent x-ray (<=3 days): Stable  Change from prior dated 3 or more days (same admission): Stable        Assessment :   61YO F nonverbal, MRDD, hypothyroidism and down syndrome, hx of seizures BIBA  from group home admitted to ICU for acute severe respiratory decompensation with severe sepsis and septic shock secondary to COVID 19 PNA. Had been on multiple antibiotics--Meropenam Levaquin and now Cefepime. Had single bottle in blood cultures with gram variable rods ( ID still pending and sent to [facility] lab ) Repeat blood culture NGTD. Pt remains on vent and on pressors. Blood culture likely contaminant. Severe sepsis with septic shock and respiratory failure sec COVID 19 pneumonia. Procalcitonin unimpressive. Extubated 4/17/2020 but reintubated same day.    Plan :   Supportive care  Trend temps and cbc  Vent per ICU  Wean off pressors  Asp precautions  COVID-19--critical period for decompensation  (7-14 days post symptom onset), avoid aerosolizing procedures, NSAIDs   -monitor respiratory status closely ( route of 02 and saturation) and titrate when able  -isolation precautions per protocol  -avoid excessive blood draws, blood cultures and frequent CXRs  -monitor biomarkers- CBC w diff  for NLRNLR <3 low vs >5 high) Ferritin (lower risk <450 vs >850) CRP (low risk <2 and higher risk >6) and LDH, D Dimer, procalcitonin  -therapies remains investigational-- including Hydroxychloroquine  -antibiotics only if there is concern for a bacterial process  -Steroids short course ( 5 days)  --for hypoxia/ARDS /shock  -Prognosis poor  DNR    D/w ICU team    Continue with present regiment.  Appropriate use of antibiotics and adverse effects reviewed.    I have discussed the above plan of care with patient/ family in detail. They expressed understanding of the the treatment plan . Risks, benefits and alternatives discussed in detail. I have asked if they have any questions or concerns and appropriately addressed them to the best of my ability .      Critical care time greater then 35 minutes reviewing notes, labs data/ imaging , discussion with multidisciplinary team.    Thank you for allowing me to participate in care of your patient .        John Rivera MD  Infectious Disease  [phone]

## 2020-04-17 NOTE — PROGRESS NOTE ADULT - SUBJECTIVE AND OBJECTIVE BOX
Date/Time Patient Seen:  		  Referring MD:   Data Reviewed	       Patient is a 60y old  Female who presents with a chief complaint of sob (17 Apr 2020 08:29)      Subjective/HPI     PAST MEDICAL & SURGICAL HISTORY:  Frequent urinary tract infections  Hypothyroidism, unspecified type  Down syndrome        Medication list         MEDICATIONS  (STANDING):  aspirin  chewable 81 milliGRAM(s) Oral daily  chlorhexidine 0.12% Liquid 15 milliLiter(s) Oral Mucosa every 12 hours  chlorhexidine 2% Cloths 1 Application(s) Topical daily  dexMEDEtomidine Infusion 0.5 MICROgram(s)/kG/Hr (5.68 mL/Hr) IV Continuous <Continuous>  enoxaparin Injectable 40 milliGRAM(s) SubCutaneous every 12 hours  lactobacillus acidophilus 1 Tablet(s) Oral three times a day with meals  levETIRAcetam  IVPB 1000 milliGRAM(s) IV Intermittent every 12 hours  levothyroxine Injectable 62.5 MICROGram(s) IV Push at bedtime  memantine 5 milliGRAM(s) Oral daily  midodrine 10 milliGRAM(s) Oral every 8 hours  norepinephrine Infusion 0.05 MICROgram(s)/kG/Min (4.26 mL/Hr) IV Continuous <Continuous>  pantoprazole  Injectable 40 milliGRAM(s) IV Push daily  valproate sodium IVPB 500 milliGRAM(s) IV Intermittent every 12 hours    MEDICATIONS  (PRN):  acetaminophen   Tablet .. 650 milliGRAM(s) Oral every 6 hours PRN Temp greater or equal to 38C (100.4F), Mild Pain (1 - 3)  acetaminophen  Suppository .. 650 milliGRAM(s) Rectal every 4 hours PRN Temp greater or equal to 38C (100.4F)         Vitals log        ICU Vital Signs Last 24 Hrs  T(C): 36.6 (17 Apr 2020 08:00), Max: 36.7 (16 Apr 2020 12:00)  T(F): 97.9 (17 Apr 2020 08:00), Max: 98 (16 Apr 2020 12:00)  HR: 88 (17 Apr 2020 08:00) (60 - 123)  BP: 153/90 (17 Apr 2020 08:00) (70/33 - 153/90)  BP(mean): 100 (17 Apr 2020 06:00) (71 - 100)  ABP: --  ABP(mean): --  RR: 24 (17 Apr 2020 06:00) (13 - 32)  SpO2: 100% (17 Apr 2020 06:00) (80% - 100%)       Mode: AC/ CMV (Assist Control/ Continuous Mandatory Ventilation)  RR (machine): 20  TV (machine): 320  FiO2: 60  PEEP: 5  ITime: 1  MAP: 12  PIP: 23      Input and Output:  I&O's Detail    16 Apr 2020 07:01  -  17 Apr 2020 07:00  --------------------------------------------------------  IN:    dexmedetomidine Infusion: 22 mL    Enteral Tube Flush: 60 mL    IV PiggyBack: 50 mL    norepinephrine Infusion: 250 mL    ns in tub fed  [id number]: 240 mL  Total IN: 622 mL    OUT:    Indwelling Catheter - Urethral: 2670 mL  Total OUT: 2670 mL    Total NET: -2048 mL          Lab Data                        10.6   8.39  )-----------( 149      ( 17 Apr 2020 07:06 )             31.2     04-17    146<H>  |  109<H>  |  10  ----------------------------<  98  3.2<L>   |  27  |  0.78    Ca    8.3<L>      17 Apr 2020 07:06  Phos  3.0     04-17  Mg     2.0     04-17    TPro  6.8  /  Alb  1.6<L>  /  TBili  0.5  /  DBili  x   /  AST  90<H>  /  ALT  72<H>  /  AlkPhos  98  04-17    ABG - ( 17 Apr 2020 05:49 )  pH, Arterial: x     pH, Blood: 7.57  /  pCO2: 28    /  pO2: 91    / HCO3: 28    / Base Excess: 3.3   /  SaO2: 98                CARDIAC MARKERS ( 17 Apr 2020 07:06 )  .243 ng/mL / x     / 30 U/L / x     / x            Review of Systems	      Objective     Physical Examination    heart s1s2  lung dec BS  abd soft  head nc  et tube in place      Pertinent Lab findings & Imaging      Deloris:  NO   Adequate UO     I&O's Detail    16 Apr 2020 07:01  -  17 Apr 2020 07:00  --------------------------------------------------------  IN:    dexmedetomidine Infusion: 22 mL    Enteral Tube Flush: 60 mL    IV PiggyBack: 50 mL    norepinephrine Infusion: 250 mL    ns in tub fed  [id number]: 240 mL  Total IN: 622 mL    OUT:    Indwelling Catheter - Urethral: 2670 mL  Total OUT: 2670 mL    Total NET: -2048 mL               Discussed with:     Cultures:	        Radiology

## 2020-04-17 NOTE — PROGRESS NOTE ADULT - SUBJECTIVE AND OBJECTIVE BOX
Patient is a 60y old  Female who presents with a chief complaint of sob (06 Apr 2020 11:30)    Patient seen in follow up for ADAM, Hypernatremia.   COVID +. Chart reviewed. Events noted. Pt reintubated.        PAST MEDICAL HISTORY:  Frequent urinary tract infections  Hypothyroidism, unspecified type  Down syndrome      MEDICATIONS  (STANDING):  aspirin  chewable 81 milliGRAM(s) Oral daily  chlorhexidine 0.12% Liquid 15 milliLiter(s) Oral Mucosa every 12 hours  chlorhexidine 2% Cloths 1 Application(s) Topical daily  dexMEDEtomidine Infusion 0.5 MICROgram(s)/kG/Hr (5.68 mL/Hr) IV Continuous <Continuous>  enoxaparin Injectable 40 milliGRAM(s) SubCutaneous every 12 hours  lactobacillus acidophilus 1 Tablet(s) Oral three times a day with meals  levETIRAcetam  IVPB 1000 milliGRAM(s) IV Intermittent every 12 hours  levothyroxine Injectable 62.5 MICROGram(s) IV Push at bedtime  memantine 5 milliGRAM(s) Oral daily  midodrine 10 milliGRAM(s) Oral every 8 hours  norepinephrine Infusion 0.05 MICROgram(s)/kG/Min (4.26 mL/Hr) IV Continuous <Continuous>  pantoprazole  Injectable 40 milliGRAM(s) IV Push daily  valproate sodium IVPB 500 milliGRAM(s) IV Intermittent every 12 hours    MEDICATIONS  (PRN):  acetaminophen   Tablet .. 650 milliGRAM(s) Oral every 6 hours PRN Temp greater or equal to 38C (100.4F), Mild Pain (1 - 3)  acetaminophen  Suppository .. 650 milliGRAM(s) Rectal every 4 hours PRN Temp greater or equal to 38C (100.4F)    T(C): 36.4 (04-17-20 @ 12:00), Max: 36.7 (04-16-20 @ 12:00)  HR: 80 (04-17-20 @ 09:53) (59 - 123)  BP: 153/98 (04-17-20 @ 12:00) (70/33 - 153/98)  RR: 20 (04-17-20 @ 12:00)  SpO2: 100% (04-17-20 @ 12:00)  Wt(kg): --  I&O's Detail    16 Apr 2020 07:01  -  17 Apr 2020 07:00  --------------------------------------------------------  IN:    dexmedetomidine Infusion: 22 mL    Enteral Tube Flush: 60 mL    IV PiggyBack: 50 mL    norepinephrine Infusion: 250 mL    ns in tub fed  [id number]: 240 mL  Total IN: 622 mL    OUT:    Indwelling Catheter - Urethral: 2670 mL  Total OUT: 2670 mL    Total NET: -2048 mL      PHYSICAL EXAM:  Pt on COVID precautions. Chart reviewed and previous physical examination noted.                    LABORATORY:                        10.6   8.39  )-----------( 149      ( 17 Apr 2020 07:06 )             31.2     04-17    146<H>  |  109<H>  |  10  ----------------------------<  98  3.2<L>   |  27  |  0.78    Ca    8.3<L>      17 Apr 2020 07:06  Phos  3.0     04-17  Mg     2.0     04-17    TPro  6.8  /  Alb  1.6<L>  /  TBili  0.5  /  DBili  x   /  AST  90<H>  /  ALT  72<H>  /  AlkPhos  98  04-17    Sodium, Serum: 146 mmol/L (04-17 @ 07:06)  Sodium, Serum: 142 mmol/L (04-16 @ 06:38)    Potassium, Serum: 3.2 mmol/L (04-17 @ 07:06)  Potassium, Serum: 3.7 mmol/L (04-16 @ 06:38)    Hemoglobin: 10.6 g/dL (04-17 @ 07:06)  Hemoglobin: 10.3 g/dL (04-16 @ 06:38)  Hemoglobin: 10.4 g/dL (04-15 @ 06:18)    Creatinine, Serum 0.78 (04-17 @ 07:06)  Creatinine, Serum 0.70 (04-16 @ 06:38)  Creatinine, Serum 0.69 (04-15 @ 06:18)    CARDIAC MARKERS ( 17 Apr 2020 07:06 )  .243 ng/mL / x     / 30 U/L / x     / x          LIVER FUNCTIONS - ( 17 Apr 2020 07:06 )  Alb: 1.6 g/dL / Pro: 6.8 g/dL / ALK PHOS: 98 U/L / ALT: 72 U/L DA / AST: 90 U/L / GGT: x             ABG - ( 17 Apr 2020 05:49 )  pH, Arterial: x     pH, Blood: 7.57  /  pCO2: 28    /  pO2: 91    / HCO3: 28    / Base Excess: 3.3   /  SaO2: 98

## 2020-04-17 NOTE — PROGRESS NOTE ADULT - ASSESSMENT
The patient is a 60 year old female with a history of MRDD, hypothyroidism, seizure d/o who is admitted with COVID-19, acute respiratory failure, septic shock, NSTEMI.    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 There may be a segmental component suggesting underlying CAD.  - Continue aspirin 81 mg daily  - On enoxaparin 40 mg bid  - On midodrine 10 mg tid but not receiving due to ileus  - Continue norepinephrine and titrate to MAP of 65  - Reintubated. Mechanical ventilation. Wean as tolerated.  - ICU care

## 2020-04-17 NOTE — CHART NOTE - NSCHARTNOTEFT_GEN_A_CORE
Assessment: Pt for nutrition f/u.  Pt is a 61 yo female admit from [facility] with SOB< fever, cough(+COVID19).  Pt on po diet 4/5-4/6 (dysphagia 1 pureed with honey thick liquids), pt decompensated and required intubation.  Pt extubated on 4/16, however, decomensated on NRB and was reintubation again overnight. OGT in place for meds/feedings.  Noted tube feeds held since 4/13 as abd xray findings were consistent with ileus. Noted per recent KUB is w/o evidence of ileus or obstruction.  PA called re: tube feeding recommendations.  Trophic feeds are recommended for COVID19+ patients.  Recommend restart Jevity 1.5 180mL every 6 hrs with free water flush before and after each bolus (provides 1080kcal based on 24kcal/kg Actual BW, 45.9g protein based on 1.0g/kg Actual BW).  Hypernatremia noted; provider to RN order active for 240ml every 6 hrs with 120ml flush before and after each bolus.  TF formula provides 547ml water, recommend c/w 120 ml flushes before and after each bolus (960ml/d), total ~1500ml fluid/day from formula/flushes (33ml/kg cbw). 1+ generalized edema noted.  RD to continue to follow closely.    Factors impacting intake: [ ] none [ ] nausea  [ ] vomiting [ ] diarrhea [ ] constipation  [ ]chewing problems [ ] swallowing issues  [ ] other:     Diet Prescription: Diet, NPO (04-13-20 @ 17:26)    Intake: NPO, TF to be restarted today    Current Weight:   % Weight Change no updated bw to assess    Pertinent Medications: MEDICATIONS  (STANDING):  aspirin  chewable 81 milliGRAM(s) Oral daily  chlorhexidine 0.12% Liquid 15 milliLiter(s) Oral Mucosa every 12 hours  chlorhexidine 2% Cloths 1 Application(s) Topical daily  dexMEDEtomidine Infusion 0.5 MICROgram(s)/kG/Hr (5.68 mL/Hr) IV Continuous <Continuous>  enoxaparin Injectable 40 milliGRAM(s) SubCutaneous every 12 hours  lactobacillus acidophilus 1 Tablet(s) Oral three times a day with meals  levETIRAcetam  IVPB 1000 milliGRAM(s) IV Intermittent every 12 hours  levothyroxine Injectable 62.5 MICROGram(s) IV Push at bedtime  memantine 5 milliGRAM(s) Oral daily  midodrine 10 milliGRAM(s) Oral every 8 hours  norepinephrine Infusion 0.05 MICROgram(s)/kG/Min (4.26 mL/Hr) IV Continuous <Continuous>  pantoprazole  Injectable 40 milliGRAM(s) IV Push daily  potassium chloride  10 mEq/100 mL IVPB 10 milliEquivalent(s) IV Intermittent every 1 hour  valproate sodium IVPB 500 milliGRAM(s) IV Intermittent every 12 hours    MEDICATIONS  (PRN):  acetaminophen   Tablet .. 650 milliGRAM(s) Oral every 6 hours PRN Temp greater or equal to 38C (100.4F), Mild Pain (1 - 3)  acetaminophen  Suppository .. 650 milliGRAM(s) Rectal every 4 hours PRN Temp greater or equal to 38C (100.4F)    Pertinent Labs: 04-17 Na146 mmol/L<H> Glu 98 mg/dL K+ 3.2 mmol/L<L> Cr  0.78 mg/dL BUN 10 mg/dL 04-17 Phos 3.0 mg/dL 04-17 Alb 1.6 g/dL<L> 04-15 Chol --    LDL --    HDL --    Trig 345 mg/dL<H>     CAPILLARY BLOOD GLUCOSE      POCT Blood Glucose.: 90 mg/dL (17 Apr 2020 05:42)  POCT Blood Glucose.: 97 mg/dL (16 Apr 2020 23:58)  POCT Blood Glucose.: 137 mg/dL (16 Apr 2020 18:38)  POCT Blood Glucose.: 66 mg/dL (16 Apr 2020 17:56)  POCT Blood Glucose.: 65 mg/dL (16 Apr 2020 17:54)    Skin: DTI of Jesus heels, stg 2 of the Rt sacrum, and DTI of sacrum    Estimated Needs:   [ x] no change since previous assessment  [ ] recalculated:     Previous Nutrition Diagnosis:   [ ] Inadequate Energy Intake [ ]Inadequate Oral Intake [ ] Excessive Energy Intake   [ ] Underweight [ ] Increased Nutrient Needs [ ] Overweight/Obesity [x] swallowing diff  [ ] Altered GI Function [ ] Unintended Weight Loss [ ] Food & Nutrition Related Knowledge Deficit [ ] Malnutrition     Nutrition Diagnosis is [ x] ongoing  [ ] resolved [ ] not applicable     New Nutrition Diagnosis: [ ] not applicable       Interventions: restart tube feeds  Recommend  [ ] Change Diet To:  [ ] Nutrition Supplement  [ ] Nutrition Support  [ ] Other:     Monitoring and Evaluation:   [ ] PO intake [ x ] Tolerance to diet prescription/EN [ x ] weights [ x ] labs[ x ] follow up per protocol  [ ] other:

## 2020-04-17 NOTE — PROGRESS NOTE ADULT - ASSESSMENT
A:    60yFemale  HD # 14  DNR    Here for:    1. Acute hypoxic resp failure 2/2  2. B/L PNA 2/2  3. COVID 19  4. Seizure disorder 2/ breakthru  5. Hypokalemia    This patient requires critical care for support of one or more vital organ systems with a high probability of imminent or life threatening deterioration in his/her condition    P:    Analgosedation with precedex drip. Continue keppra 1g IV BID + depakote 500mg IV BID. Breakthru sz tonight responded to ativan 2mg IV x 1. Neuro involved.  HD monitoring. Continue on levophed PRN to maintain MAP > 65. Continue midodrine 10mg PO TID.  s/p re intubation for continued acute hypoxic resp failure. ABG 7.57/28/91 on VAC 20/320/5/.40. Daily SBT's. Started on mucomyst.   NPO + tube feeds.  ID involved. s/p broad spectrum abx. Newest Cx's NGTD.   Full dose lovenox BID. PUD ppx.  Making urine.   Maintain invasive lines and tubes.  f/u AM labs.    Dispo: Cont critical care.    TOTAL CRITICAL CARE TIME:  37 minutes  (EXCLUSIVE of any non bundled procedures)    Note: This time spent INCLUDES time spent directly as this patient's bedside with evaluation, review of chart including review of laboratory and imaging studies, interpretation of vital signs and cardiac output measurements, any necessary ventilator management, and time spent discussing plan of care with patient and family, including goals of care discussion.

## 2020-04-17 NOTE — PROGRESS NOTE ADULT - SUBJECTIVE AND OBJECTIVE BOX
24 hour events: extubated yesterday and unfortunately decompensated while on NRB requiring re-intubation after a few hours. remains on vent support.    Review of Systems:  unable to obtain      Wt(kg): --    Mode: AC/ CMV (Assist Control/ Continuous Mandatory Ventilation), RR (machine): 20, TV (machine): 320, FiO2: 60, PEEP: 5  04-16-20 @ 07:01  -  04-17-20 @ 07:00  --------------------------------------------------------  IN: 622 mL / OUT: 2670 mL / NET: -2048 mL        CAPILLARY BLOOD GLUCOSE      POCT Blood Glucose.: 90 mg/dL (17 Apr 2020 05:42)      I&O's Summary    16 Apr 2020 07:01  -  17 Apr 2020 07:00  --------------------------------------------------------  IN: 622 mL / OUT: 2670 mL / NET: -2048 mL        Physical Exam:   T(F): 97.9 (04-17-20 @ 08:00), Max: 98 (04-16-20 @ 12:00)  HR: 88 (04-17-20 @ 08:00) (60 - 123)  BP: 153/90 (04-17-20 @ 08:00) (70/33 - 153/90)  RR: 24 (04-17-20 @ 06:00) (13 - 32)  SpO2: 100% (04-17-20 @ 06:00) (80% - 100%)    Gen: vented, sedated  Neuro: sedated  HEENT: MMM, ET/NGT in place  Resp: vented breath sounds  CVS: S1S2 RRR  Abd: soft, nontender, mildly distended, bowel sounds present  Ext: no edema  Skin: warm, well perfused; right IJ CVC    Meds:    midodrine Oral  norepinephrine Infusion IV Continuous    levothyroxine Injectable IV Push      acetaminophen   Tablet .. Oral PRN  acetaminophen  Suppository .. Rectal PRN  dexMEDEtomidine Infusion IV Continuous  levETIRAcetam  IVPB IV Intermittent  memantine Oral  valproate sodium IVPB IV Intermittent      aspirin  chewable Oral  enoxaparin Injectable SubCutaneous    pantoprazole  Injectable IV Push          chlorhexidine 0.12% Liquid Oral Mucosa  chlorhexidine 2% Cloths Topical    lactobacillus acidophilus Oral                            10.6   8.39  )-----------( 149      ( 17 Apr 2020 07:06 )             31.2       04-17    146<H>  |  109<H>  |  10  ----------------------------<  98  3.2<L>   |  27  |  0.78    Ca    8.3<L>      17 Apr 2020 07:06  Phos  3.0     04-17  Mg     2.0     04-17    TPro  6.8  /  Alb  1.6<L>  /  TBili  0.5  /  DBili  x   /  AST  90<H>  /  ALT  72<H>  /  AlkPhos  98  04-17          PT/INR - ( 17 Apr 2020 07:06 )   PT: 12.1 sec;   INR: 1.09 ratio         PTT - ( 17 Apr 2020 07:06 )  PTT:33.5 sec    .Stool Feces   No enteric pathogens isolated.  (Stool culture examined for Salmonella,  Shigella, Campylobacter, Aeromonas, Plesiomonas,  Vibrio, E.coli O157 and Yersinia) -- 04-14 @ 16:46    C Diff by PCR Result: NotDetec (04-14 @ 15:05)            Radiology:  < from: Xray Chest 1 View- PORTABLE-Urgent (04.16.20 @ 22:12) >    EXAM:  XR CHEST PORTABLE URGENT 1V                                  PROCEDURE DATE:  04/16/2020          INTERPRETATION:  CLINICAL INFORMATION:  reposition NGT    TECHNIQUE:  AP view the chest.    COMPARISON:  4/16/2020.    FINDINGS:  Endotracheal tube tip is slightly above the teo.. Enteric tube has been retracted with the tip situated within the body of the stomach. IJ approach central venous catheter tip is seen in the distal SVC. There are patchy airspace opacities throughout both lungs.Hemidiaphragms are sharp; no evidence of a pleural effusion. Cardiomediastinal silhouette is exaggerated by AP technique. Degenerative changes of the spine.    IMPRESSION:    Endotracheal tube tip slightly above the teo. Enteric tube has been retracted with the tip situated within the body of the stomach.    Patchy airspace opacities throughout both lungs, unchanged.          DILEEP PARRA D.O. ATTENDING RADIOLOGIST  This document has been electronically signed. Apr 17 2020  3:11AM                < end of copied text >        CENTRAL LINE: LUIS HOWARD: LUIS    GLOBAL ISSUE/BEST PRACTICE:  Analgesia: Y  Sedation: Y  CAM-ICU: ANNE  HOB elevation: yes  Stress ulcer prophylaxis: Y  VTE prophylaxis: Y  Glycemic control: Y  Nutrition: NPO    CODE STATUS: DNR 24 hour events: extubated yesterday and unfortunately decompensated while on NRB requiring re-intubation after a few hours. remains on vent support.    Review of Systems:  unable to obtain      Wt(kg): --    Mode: AC/ CMV (Assist Control/ Continuous Mandatory Ventilation), RR (machine): 20, TV (machine): 320, FiO2: 60, PEEP: 5  04-16-20 @ 07:01  -  04-17-20 @ 07:00  --------------------------------------------------------  IN: 622 mL / OUT: 2670 mL / NET: -2048 mL        CAPILLARY BLOOD GLUCOSE      POCT Blood Glucose.: 90 mg/dL (17 Apr 2020 05:42)      I&O's Summary    16 Apr 2020 07:01  -  17 Apr 2020 07:00  --------------------------------------------------------  IN: 622 mL / OUT: 2670 mL / NET: -2048 mL        Physical Exam:   T(F): 97.9 (04-17-20 @ 08:00), Max: 98 (04-16-20 @ 12:00)  HR: 88 (04-17-20 @ 08:00) (60 - 123)  BP: 153/90 (04-17-20 @ 08:00) (70/33 - 153/90)  RR: 24 (04-17-20 @ 06:00) (13 - 32)  SpO2: 100% (04-17-20 @ 06:00) (80% - 100%)    Gen: vented, sedated  Neuro: sedated  HEENT: MMM, ET/NGT in place  Resp: vented breath sounds  CVS: S1S2 RRR  Abd: soft, nontender, nondistended, bowel sounds present  Ext: no edema  Skin: warm, well perfused; right IJ CVC    Meds:    midodrine Oral  norepinephrine Infusion IV Continuous    levothyroxine Injectable IV Push      acetaminophen   Tablet .. Oral PRN  acetaminophen  Suppository .. Rectal PRN  dexMEDEtomidine Infusion IV Continuous  levETIRAcetam  IVPB IV Intermittent  memantine Oral  valproate sodium IVPB IV Intermittent      aspirin  chewable Oral  enoxaparin Injectable SubCutaneous    pantoprazole  Injectable IV Push          chlorhexidine 0.12% Liquid Oral Mucosa  chlorhexidine 2% Cloths Topical    lactobacillus acidophilus Oral                            10.6   8.39  )-----------( 149      ( 17 Apr 2020 07:06 )             31.2       04-17    146<H>  |  109<H>  |  10  ----------------------------<  98  3.2<L>   |  27  |  0.78    Ca    8.3<L>      17 Apr 2020 07:06  Phos  3.0     04-17  Mg     2.0     04-17    TPro  6.8  /  Alb  1.6<L>  /  TBili  0.5  /  DBili  x   /  AST  90<H>  /  ALT  72<H>  /  AlkPhos  98  04-17          PT/INR - ( 17 Apr 2020 07:06 )   PT: 12.1 sec;   INR: 1.09 ratio         PTT - ( 17 Apr 2020 07:06 )  PTT:33.5 sec    .Stool Feces   No enteric pathogens isolated.  (Stool culture examined for Salmonella,  Shigella, Campylobacter, Aeromonas, Plesiomonas,  Vibrio, E.coli O157 and Yersinia) -- 04-14 @ 16:46    C Diff by PCR Result: NotDetec (04-14 @ 15:05)            Radiology:  < from: Xray Chest 1 View- PORTABLE-Urgent (04.16.20 @ 22:12) >    EXAM:  XR CHEST PORTABLE URGENT 1V                                  PROCEDURE DATE:  04/16/2020          INTERPRETATION:  CLINICAL INFORMATION:  reposition NGT    TECHNIQUE:  AP view the chest.    COMPARISON:  4/16/2020.    FINDINGS:  Endotracheal tube tip is slightly above the teo.. Enteric tube has been retracted with the tip situated within the body of the stomach. IJ approach central venous catheter tip is seen in the distal SVC. There are patchy airspace opacities throughout both lungs.Hemidiaphragms are sharp; no evidence of a pleural effusion. Cardiomediastinal silhouette is exaggerated by AP technique. Degenerative changes of the spine.    IMPRESSION:    Endotracheal tube tip slightly above the teo. Enteric tube has been retracted with the tip situated within the body of the stomach.    Patchy airspace opacities throughout both lungs, unchanged.          DILEEP PARRA D.O. ATTENDING RADIOLOGIST  This document has been electronically signed. Apr 17 2020  3:11AM                < end of copied text >        CENTRAL LINE: LUIS HOWARD: LUIS    GLOBAL ISSUE/BEST PRACTICE:  Analgesia: Y  Sedation: Y  CAM-ICU: ANNE  HOB elevation: yes  Stress ulcer prophylaxis: Y  VTE prophylaxis: Y  Glycemic control: Y  Nutrition: NPO    CODE STATUS: DNR

## 2020-04-18 NOTE — PROGRESS NOTE ADULT - ASSESSMENT
The patient is a 60 year old female with a history of MRDD, hypothyroidism, seizure d/o who is admitted with COVID-19, acute respiratory failure, septic shock, NSTEMI.    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 There may be a segmental component suggesting underlying CAD.  - Continue aspirin 81 mg daily  - On enoxaparin 40 mg bid  - On midodrine 10 mg tid but not receiving due to ileus  - Continue norepinephrine and titrate to MAP of 65  - Mechanical ventilation. Wean as tolerated.  - ICU care

## 2020-04-18 NOTE — PROGRESS NOTE ADULT - SUBJECTIVE AND OBJECTIVE BOX
Date/Time Patient Seen:  		  Referring MD:   Data Reviewed	       Patient is a 60y old  Female who presents with a chief complaint of sob (18 Apr 2020 09:35)      Subjective/HPI     PAST MEDICAL & SURGICAL HISTORY:  Frequent urinary tract infections  Hypothyroidism, unspecified type  Down syndrome        Medication list         MEDICATIONS  (STANDING):  acetylcysteine 20%  Inhalation 4 milliLiter(s) Inhalation three times a day  aspirin  chewable 81 milliGRAM(s) Oral daily  chlorhexidine 0.12% Liquid 15 milliLiter(s) Oral Mucosa every 12 hours  chlorhexidine 2% Cloths 1 Application(s) Topical daily  dexMEDEtomidine Infusion 0.5 MICROgram(s)/kG/Hr (5.68 mL/Hr) IV Continuous <Continuous>  enoxaparin Injectable 40 milliGRAM(s) SubCutaneous every 12 hours  lactobacillus acidophilus 1 Tablet(s) Oral three times a day with meals  levETIRAcetam  IVPB 1000 milliGRAM(s) IV Intermittent every 12 hours  levothyroxine Injectable 62.5 MICROGram(s) IV Push at bedtime  memantine 5 milliGRAM(s) Oral daily  midodrine 10 milliGRAM(s) Oral every 8 hours  norepinephrine Infusion 0.05 MICROgram(s)/kG/Min (4.26 mL/Hr) IV Continuous <Continuous>  pantoprazole  Injectable 40 milliGRAM(s) IV Push daily  valproate sodium IVPB 500 milliGRAM(s) IV Intermittent every 12 hours    MEDICATIONS  (PRN):  acetaminophen   Tablet .. 650 milliGRAM(s) Oral every 6 hours PRN Temp greater or equal to 38C (100.4F), Mild Pain (1 - 3)  acetaminophen  Suppository .. 650 milliGRAM(s) Rectal every 4 hours PRN Temp greater or equal to 38C (100.4F)         Vitals log        ICU Vital Signs Last 24 Hrs  T(C): 35.7 (18 Apr 2020 08:00), Max: 36.4 (17 Apr 2020 12:00)  T(F): 96.2 (18 Apr 2020 08:00), Max: 97.6 (17 Apr 2020 12:00)  HR: 79 (18 Apr 2020 08:00) (70 - 96)  BP: 136/74 (18 Apr 2020 08:00) (99/65 - 153/98)  BP(mean): 85 (18 Apr 2020 08:00) (76 - 85)  ABP: --  ABP(mean): --  RR: 20 (18 Apr 2020 08:00) (20 - 20)  SpO2: 100% (18 Apr 2020 08:00) (100% - 100%)       Mode: AC/ CMV (Assist Control/ Continuous Mandatory Ventilation)  RR (machine): 20  TV (machine): 320  FiO2: 40  PEEP: 5  ITime: 1  MAP: 9  PIP: 20      Input and Output:  I&O's Detail    17 Apr 2020 07:01  -  18 Apr 2020 07:00  --------------------------------------------------------  IN:    dexmedetomidine Infusion: 91.2 mL    Enteral Tube Flush: 240 mL    IV PiggyBack: 100 mL    norepinephrine Infusion: 358 mL    ns in tub fed  [id number]: 540 mL  Total IN: 1329.2 mL    OUT:    Indwelling Catheter - Urethral: 1458 mL  Total OUT: 1458 mL    Total NET: -128.8 mL          Lab Data                        10.5   8.75  )-----------( 142      ( 18 Apr 2020 06:41 )             31.6     04-18    142  |  105  |  8   ----------------------------<  112<H>  3.0<L>   |  24  |  0.81    Ca    8.1<L>      18 Apr 2020 06:41  Phos  3.5     04-18  Mg     1.7     04-18    TPro  6.2  /  Alb  1.4<L>  /  TBili  0.5  /  DBili  x   /  AST  60<H>  /  ALT  65<H>  /  AlkPhos  87  04-18    ABG - ( 17 Apr 2020 05:49 )  pH, Arterial: x     pH, Blood: 7.57  /  pCO2: 28    /  pO2: 91    / HCO3: 28    / Base Excess: 3.3   /  SaO2: 98                CARDIAC MARKERS ( 17 Apr 2020 07:06 )  .243 ng/mL / x     / 30 U/L / x     / x            Review of Systems	      Objective     Physical Examination    heart s1s2  lung dec BS      Pertinent Lab findings & Imaging      Deloris:  NO   Adequate UO     I&O's Detail    17 Apr 2020 07:01  -  18 Apr 2020 07:00  --------------------------------------------------------  IN:    dexmedetomidine Infusion: 91.2 mL    Enteral Tube Flush: 240 mL    IV PiggyBack: 100 mL    norepinephrine Infusion: 358 mL    ns in tub fed  [id number]: 540 mL  Total IN: 1329.2 mL    OUT:    Indwelling Catheter - Urethral: 1458 mL  Total OUT: 1458 mL    Total NET: -128.8 mL               Discussed with:     Cultures:	        Radiology

## 2020-04-18 NOTE — PROGRESS NOTE ADULT - ASSESSMENT
VITALS/LABS       202078 150/90   2020 amiodarone   2020 7p dexm .6 m/k/h   2020 7p nore .9 m/k/m   2020 u 700   2020 ac 20/320/5/40%   2020 w 8.7 Hb 10.5 Plt 142 inr 1.4 Na 142 K 3.4 CO2 29 Cr .6   2020 nlr 2.2     PT DATA/BEST PRACTICE  ALLERGY     NOTEWORTHY  POINTS/CHANGES ROS/PE   2020 amiodarone droip   2020 Back on vent                                  WT  45 (2020)                    BMI     20 (2020)     ADVANCED DIRECTIVE     dnr ()   Goals of care discussion                                                                                      HEAD OF BED ELEVATION Yes  DYSPHAGIA EVAL            DIET Jevity 1.5 180.4 ()     DVT PROPHYLAXIS    lvnx 40.2 ()           .4 ()  (Dr RICHARDSON)                     PATIENT SUMMARY   60 f nonverbal downs syndrome from group home HO COVID exposure per transfer papers on Rx for aspiration pneumonia and shortness of breath   er vitals 90/50 66 100f 92%    Pt found to have pneumonia pl effsn hypernatremia and adam on arrival COVID palma started Pulm consulted     Pulm consulted  CT ch showed l lung collapse Pt ruled out for COVID     Home meds memantine 28 keppra 1.2                                Pt tr sicu 2020 itubated hemaodynamci intability                 PLAN   GAS EXCHANGE   2020 757/28/91 (50%)   2020 9a ac 20/320/5/50%   A/R Target po 90-95 Ppl belo 30 DP belo 15        NEED FOR TRACH  Intubated    May need trach within few d if cannot get extubated    COVID  2020 COVID pcr detc   Supp care   SYMTOM ONSET      OXYGENATION  -2020 On 50% O2 -40% O2    2020 Intubated ON 90% ox   MARKERS   n --4/10----4/ nlr 6.1 -7.1- 5.3 - 1.3 -5.5 -3.5-1.8-4  f ----4/15/2020 ferritin [id number] - 1404  - 1039 - 1524 - 2326   p ---4/ procal .27-.34- .1-.15 -.46   c --4/15/2020 crp 2.6 - 3.8-16.9 - 11.2   -2020 esr 26 - 25  --4/15/2020 d-dimer [phone]  D-dimer  4/15/2020 d-dimer 786   lvnx 40.2 ()       MEDS   HCQ () COMPLETED COURSE   2020 qtc 442   IVIG 20g/d.4d () (Dr RICHARDSON) --> DCED 2020    STEROIDS   solumed 80.5d () COMPLETED COURSE         ARDS ltvv   HEMODYNAMICS In shock sec COVID 19 Remains on nore () Mido () Given alb   NONOLIGURIC ADAM Monitor maty   SZ On keppra and ativan for break thru  GOC 2020 Discussions under way with brother  2020 PT MADE DNR   PLAN   Reintubated few h after  extubation because of secretions   Amiodarone drip started 2020   Plan is to stabiulize hemodmnamics wean off levophed and gradual weaning off vent If secretions continue may need trach    TIME SPENT Over 36 minutes aggregate critical  care time spent on encounter; activities included   direct pt care, counseling and/or coordinating care reviewing notes, lab data/ imaging , discussion with multidisciplinary team/ pt /family. Risks, benefits, alternatives  discussed in detail.    [facility] [phone]   [address] DOA 2020 DR NORTH MEDLEY

## 2020-04-18 NOTE — PROGRESS NOTE ADULT - SUBJECTIVE AND OBJECTIVE BOX
24 hour events: remains intubated and on pressors. on low vent settings.    Review of Systems:  unable to obtain      Wt(kg): --    Mode: AC/ CMV (Assist Control/ Continuous Mandatory Ventilation), RR (machine): 20, TV (machine): 320, FiO2: 40, PEEP: 5  04-17-20 @ 07:01  -  04-18-20 @ 07:00  --------------------------------------------------------  IN: 1329.2 mL / OUT: 1458 mL / NET: -128.8 mL        CAPILLARY BLOOD GLUCOSE      POCT Blood Glucose.: 90 mg/dL (17 Apr 2020 05:42)      I&O's Summary    17 Apr 2020 07:01  -  18 Apr 2020 07:00  --------------------------------------------------------  IN: 1329.2 mL / OUT: 1458 mL / NET: -128.8 mL        Physical Exam:   T(F): 96.2 (04-18-20 @ 08:00), Max: 97.6 (04-18-20 @ 05:00)  HR: 85 (04-18-20 @ 10:43) (70 - 96)  BP: 136/74 (04-18-20 @ 08:00) (99/65 - 148/94)  RR: 20 (04-18-20 @ 08:00) (20 - 20)  SpO2: 100% (04-18-20 @ 10:43) (100% - 100%)    Gen: vented, sedated  Neuro: sedated  HEENT: MMM, ET/NGT in place  Resp: vented breath sounds  CVS: S1S2 RRR  Abd: soft, nontender, nondistended, bowel sounds present  Ext: no edema  Skin: warm, well perfused; right IJ CVC    Meds:    midodrine Oral  norepinephrine Infusion IV Continuous    levothyroxine Injectable IV Push      acetaminophen   Tablet .. Oral PRN  acetaminophen  Suppository .. Rectal PRN  dexMEDEtomidine Infusion IV Continuous  levETIRAcetam  IVPB IV Intermittent  memantine Oral  valproate sodium IVPB IV Intermittent      aspirin  chewable Oral  enoxaparin Injectable SubCutaneous    pantoprazole  Injectable IV Push      magnesium sulfate  IVPB IV Intermittent      chlorhexidine 0.12% Liquid Oral Mucosa  chlorhexidine 2% Cloths Topical    lactobacillus acidophilus Oral                            10.5   8.75  )-----------( 142      ( 18 Apr 2020 06:41 )             31.6       04-18    142  |  105  |  8   ----------------------------<  112<H>  3.0<L>   |  24  |  0.81    Ca    8.1<L>      18 Apr 2020 06:41  Phos  3.5     04-18  Mg     1.7     04-18    TPro  6.2  /  Alb  1.4<L>  /  TBili  0.5  /  DBili  x   /  AST  60<H>  /  ALT  65<H>  /  AlkPhos  87  04-18      CARDIAC MARKERS ( 17 Apr 2020 07:06 )  .243 ng/mL / x     / 30 U/L / x     / x          PT/INR - ( 18 Apr 2020 06:41 )   PT: 16.2 sec;   INR: 1.44 ratio         PTT - ( 18 Apr 2020 06:41 )  PTT:37.2 sec    .Stool Feces   No enteric pathogens isolated.  (Stool culture examined for Salmonella,  Shigella, Campylobacter, Aeromonas, Plesiomonas,  Vibrio, E.coli O157 and Yersinia) -- 04-14 @ 16:46    C Diff by PCR Result: NotDetec (04-14 @ 15:05)            Radiology:    CENTRAL LINE: Y  HOWARD: Y    GLOBAL ISSUE/BEST PRACTICE:  Analgesia: Y  Sedation: Y  CAM-ICU: ANNE  HOB elevation: yes  Stress ulcer prophylaxis: Y  VTE prophylaxis: Y  Glycemic control: Y  Nutrition: NPO with TF    CODE STATUS: DNR

## 2020-04-18 NOTE — PROGRESS NOTE ADULT - SUBJECTIVE AND OBJECTIVE BOX
60y year old Female admitted for Patient is a 60y old  Female who presents with a chief complaint of sob (18 Apr 2020 18:55)        HPI      PMH:  Other general symptom or sign  Yes  Handoff  MEWS Score  Frequent urinary tract infections  Hypothyroidism, unspecified type  Down syndrome  Suspected [place] coronavirus infection  COVID-19  Fever  Constipation  Hypothyroidism, unspecified type  ADAM (acute kidney injury)  Hypernatremia  Hypoxia  PNA (pneumonia)  COVID EXPOSURE  90+  Hypoxia  PNA (pneumonia)        MEDICATIONS  (STANDING):  aMIOdarone Infusion 1 mG/Min (33.3 mL/Hr) IV Continuous <Continuous>  aspirin  chewable 81 milliGRAM(s) Oral daily  chlorhexidine 0.12% Liquid 15 milliLiter(s) Oral Mucosa every 12 hours  chlorhexidine 2% Cloths 1 Application(s) Topical daily  dexMEDEtomidine Infusion 0.5 MICROgram(s)/kG/Hr (5.68 mL/Hr) IV Continuous <Continuous>  enoxaparin Injectable 40 milliGRAM(s) SubCutaneous every 12 hours  lactobacillus acidophilus 1 Tablet(s) Oral three times a day with meals  levETIRAcetam  IVPB 1000 milliGRAM(s) IV Intermittent every 12 hours  levothyroxine Injectable 62.5 MICROGram(s) IV Push at bedtime  memantine 5 milliGRAM(s) Oral daily  midodrine 10 milliGRAM(s) Oral every 8 hours  norepinephrine Infusion 0.05 MICROgram(s)/kG/Min (4.26 mL/Hr) IV Continuous <Continuous>  pantoprazole  Injectable 40 milliGRAM(s) IV Push daily  potassium chloride  20 mEq/100 mL IVPB 20 milliEquivalent(s) IV Intermittent every 2 hours  valproate sodium IVPB 500 milliGRAM(s) IV Intermittent every 12 hours  vasopressin Infusion 0.06 Unit(s)/Min (3.6 mL/Hr) IV Continuous <Continuous>    MEDICATIONS  (PRN):  acetaminophen   Tablet .. 650 milliGRAM(s) Oral every 6 hours PRN Temp greater or equal to 38C (100.4F), Mild Pain (1 - 3)  acetaminophen  Suppository .. 650 milliGRAM(s) Rectal every 4 hours PRN Temp greater or equal to 38C (100.4F)      I&O's Summary    17 Apr 2020 07:01  -  18 Apr 2020 07:00  --------------------------------------------------------  IN: 1329.2 mL / OUT: 1458 mL / NET: -128.8 mL    18 Apr 2020 07:01  -  18 Apr 2020 21:17  --------------------------------------------------------  IN: 1925.2 mL / OUT: 700 mL / NET: 1225.2 mL      ICU Vital Signs Last 24 Hrs  T(C): 35.7 (18 Apr 2020 08:00), Max: 36.4 (18 Apr 2020 05:00)  T(F): 96.2 (18 Apr 2020 08:00), Max: 97.6 (18 Apr 2020 05:00)  HR: 65 (18 Apr 2020 21:00) (65 - 96)  BP: 119/90 (18 Apr 2020 21:00) (99/65 - 156/99)  BP(mean): 85 (18 Apr 2020 08:00) (76 - 85)  ABP: --  ABP(mean): --  RR: 16 (18 Apr 2020 21:00) (16 - 20)  SpO2: 98% (18 Apr 2020 21:00) (98% - 100%)      PHYSICAL EXAM:  General: Intubated and sedated        04-18    142  |  107  |  10  ----------------------------<  130<H>  3.4<L>   |  29  |  0.67    Ca    8.0<L>      18 Apr 2020 15:08  Phos  3.5     04-18  Mg     1.7     04-18    TPro  6.2  /  Alb  1.4<L>  /  TBili  0.5  /  DBili  x   /  AST  60<H>  /  ALT  65<H>  /  AlkPhos  87  04-18                          10.5   8.75  )-----------( 142      ( 18 Apr 2020 06:41 )             31.6     ABG - ( 17 Apr 2020 05:49 )  pH, Arterial: x     pH, Blood: 7.57  /  pCO2: 28    /  pO2: 91    / HCO3: 28    / Base Excess: 3.3   /  SaO2: 98                CARDIAC MARKERS ( 17 Apr 2020 07:06 )  .243 ng/mL / x     / 30 U/L / x     / x                   Mode: AC/ CMV (Assist Control/ Continuous Mandatory Ventilation), RR (machine): 20, TV (machine): 320, FiO2: 40, PEEP: 5, ITime: 1, MAP: 9, PIP: 20    Assessment:  1. ARDS from COVID 19 infection  2.   3.    Plan:  Neuro: Continue sedation, titrate to RASS -3 to -4.  NMB.     Cardio: titrate pressors for map 65-75. continuous QTc monitoring on tele while on QT prolonging medications. Troponins-    Pulm: ARDS 2/2 COVID 19- Lung protective strategy , titrate fi02/peep to goal sp02 90-96%, goal PlatP <30 permissive hypercapnea as needed.    Prone, NMB.  Avoid aerosolizing medications     Renal: Cr , avoid nephrotoxins, daily chemistry to trend Cr, optimize lytes.  Lasix. strict I/Os.     GI: NPO with tube feeding.  GI ppx. Transaiminitis from COVID 19 infection  trend LFTs. Avoid hepatotoxic medications    ID: COVID 19 hydroxychloroqine, IL 6 antagonist, pepcid, steroids.  Antibiotics for bacterial superinfection. follow up cultures  monitor daily chemistry, CBC with Diff.  trend CRP, D dimer, LDH, CPK, Coags  APAP prn fever, avoid NSAIDs    Heme: DVT ppx, full AC. daily CBC.     Endo: monitor FS, goal 140 to 180. insulin therapy     Dispo:    CC Time 30 min including time spent reviewing chart, ordering tests and treatments, evaluating and treating patient at bedside, 60y year old Female admitted for Patient is a 60y old  Female who presents with a chief complaint of sob (18 Apr 2020 18:55)        HPI: 61 yo F with downs syndrome, hypothyroidism, seizure d/o with hypoxic respiratory failure form COVID 19 pneumonia intubated, course complicated by ADAM, hypernatremia, seizure, septic shock, type 2 NSTEMI, hypokalemia today had episode of wide complex tachycardia now on amio. SHe is on very high dose of levophed.       PMH:  Other general symptom or sign  Yes  Handoff  MEWS Score  Frequent urinary tract infections  Hypothyroidism, unspecified type  Down syndrome  Suspected [place] coronavirus infection  COVID-19  Fever  Constipation  Hypothyroidism, unspecified type  ADAM (acute kidney injury)  Hypernatremia  Hypoxia  PNA (pneumonia)  COVID EXPOSURE  90+  Hypoxia  PNA (pneumonia)        MEDICATIONS  (STANDING):  aMIOdarone Infusion 1 mG/Min (33.3 mL/Hr) IV Continuous <Continuous>  aspirin  chewable 81 milliGRAM(s) Oral daily  chlorhexidine 0.12% Liquid 15 milliLiter(s) Oral Mucosa every 12 hours  chlorhexidine 2% Cloths 1 Application(s) Topical daily  dexMEDEtomidine Infusion 0.5 MICROgram(s)/kG/Hr (5.68 mL/Hr) IV Continuous <Continuous>  enoxaparin Injectable 40 milliGRAM(s) SubCutaneous every 12 hours  lactobacillus acidophilus 1 Tablet(s) Oral three times a day with meals  levETIRAcetam  IVPB 1000 milliGRAM(s) IV Intermittent every 12 hours  levothyroxine Injectable 62.5 MICROGram(s) IV Push at bedtime  memantine 5 milliGRAM(s) Oral daily  midodrine 10 milliGRAM(s) Oral every 8 hours  norepinephrine Infusion 0.05 MICROgram(s)/kG/Min (4.26 mL/Hr) IV Continuous <Continuous>  pantoprazole  Injectable 40 milliGRAM(s) IV Push daily  potassium chloride  20 mEq/100 mL IVPB 20 milliEquivalent(s) IV Intermittent every 2 hours  valproate sodium IVPB 500 milliGRAM(s) IV Intermittent every 12 hours  vasopressin Infusion 0.06 Unit(s)/Min (3.6 mL/Hr) IV Continuous <Continuous>    MEDICATIONS  (PRN):  acetaminophen   Tablet .. 650 milliGRAM(s) Oral every 6 hours PRN Temp greater or equal to 38C (100.4F), Mild Pain (1 - 3)  acetaminophen  Suppository .. 650 milliGRAM(s) Rectal every 4 hours PRN Temp greater or equal to 38C (100.4F)      I&O's Summary    17 Apr 2020 07:01  -  18 Apr 2020 07:00  --------------------------------------------------------  IN: 1329.2 mL / OUT: 1458 mL / NET: -128.8 mL    18 Apr 2020 07:01  -  18 Apr 2020 21:17  --------------------------------------------------------  IN: 1925.2 mL / OUT: 700 mL / NET: 1225.2 mL      ICU Vital Signs Last 24 Hrs  T(C): 35.7 (18 Apr 2020 08:00), Max: 36.4 (18 Apr 2020 05:00)  T(F): 96.2 (18 Apr 2020 08:00), Max: 97.6 (18 Apr 2020 05:00)  HR: 65 (18 Apr 2020 21:00) (65 - 96)  BP: 119/90 (18 Apr 2020 21:00) (99/65 - 156/99)  BP(mean): 85 (18 Apr 2020 08:00) (76 - 85)  ABP: --  ABP(mean): --  RR: 16 (18 Apr 2020 21:00) (16 - 20)  SpO2: 98% (18 Apr 2020 21:00) (98% - 100%)      PHYSICAL EXAM:  General: Intubated and sedated  right IJ central line  abdomen not distended          04-18    142  |  107  |  10  ----------------------------<  130<H>  3.4<L>   |  29  |  0.67    Ca    8.0<L>      18 Apr 2020 15:08  Phos  3.5     04-18  Mg     1.7     04-18    TPro  6.2  /  Alb  1.4<L>  /  TBili  0.5  /  DBili  x   /  AST  60<H>  /  ALT  65<H>  /  AlkPhos  87  04-18                          10.5   8.75  )-----------( 142      ( 18 Apr 2020 06:41 )             31.6     ABG - ( 17 Apr 2020 05:49 )  pH, Arterial: x     pH, Blood: 7.57  /  pCO2: 28    /  pO2: 91    / HCO3: 28    / Base Excess: 3.3   /  SaO2: 98                CARDIAC MARKERS ( 17 Apr 2020 07:06 )  .243 ng/mL / x     / 30 U/L / x     / x                   Mode: AC/ CMV (Assist Control/ Continuous Mandatory Ventilation), RR (machine): 20, TV (machine): 320, FiO2: 40, PEEP: 5, ITime: 1, MAP: 9, PIP: 20    Assessment: 61 yo F with Downs syndrome, hypothyroidism, constipation, sezisure disorder with hypoxic respirtory failure from COVID 19 pneumonia, course complicated by episode of seizure, type 2 NSTEMI, ADAM, gram variable bacteremia, septic shock, hypernatremia, hypokalemia, extubated and reintubated on 4/15, today with WCT now on amio.     Plan:  Neuro: Continue sedation with precedex and propofol,.    seizures- depakote changed to 500 bid,and ativan added by neurology. continue keppra 1g q12  continue memantine    Cardio: On high dose Levo 0.7 mcg/kg/min, maybe contributed to WCT, add vasopressin and vy, try to titrate off levo, titrate pressors to maintain map 65-75.  give additional 40meq KCL, goal >4. continue amio gtt.     Pulm: ARDS 2/2 COVID 19- intubated 4/7, extubated and reintubated 4/15, Lung protective strategy , titrate fi02/peep to goal sp02 90-96%, goal PlatP <30 permissive hypercapnea as needed.    Avoid aerosolizing medications.     Renal: ADAM resolved, Cr stable , avoid nephrotoxins, daily chemistry to trend Cr, optimize lytes. hypernatremia improved continue free water via NGT  strict I/os.  replete K+ optimize lytes.     GI: NPO with tube feeding.  GI ppx. Transaminitis improving  trend LFTs. Avoid hepatotoxic medications    ID: COVID 19  s/p hydroxychloroqine, IL 6 antagonist toci 4/13,  steroids stopped 4/12.  Antibiotics for bacterial superinfection. follow up cultures  monitor daily chemistry, CBC with Diff.  trend CRP, D dimer, LDH, CPK, Coags  APAP prn fever, avoid NSAIDs    Heme: DVT ppx lovenox 40 q12 daily CBC.     Endo: check AM cortisol     Dispo: criticall ill with hypoxic respiratory failure from COVID 19 pneumonia. continue ICU care    CC Time 30 min including time spent reviewing chart, ordering tests and treatments, evaluating and treating patient at bedside, not including procedures         Plan:  Neuro: Continue sedation, titrate to RASS -3 to -4.  NMB.     Cardio: titrate pressors for map 65-75. continuous QTc monitoring on tele while on QT prolonging medications. Troponins-    Pulm: ARDS 2/2 COVID 19- Lung protective strategy , titrate fi02/peep to goal sp02 90-96%, goal PlatP <30 permissive hypercapnea as needed.    Prone, NMB.  Avoid aerosolizing medications     Renal: Cr , avoid nephrotoxins, daily chemistry to trend Cr, optimize lytes.  Lasix. strict I/Os.     GI: NPO with tube feeding.  GI ppx. Transaiminitis from COVID 19 infection  trend LFTs. Avoid hepatotoxic medications    ID: COVID 19 hydroxychloroqine, IL 6 antagonist, pepcid, steroids.  Antibiotics for bacterial superinfection. follow up cultures  monitor daily chemistry, CBC with Diff.  trend CRP, D dimer, LDH, CPK, Coags  APAP prn fever, avoid NSAIDs    Heme: DVT ppx, full AC. daily CBC.     Endo: monitor FS, goal 140 to 180. insulin therapy     Dispo:    CC Time 30 min including time spent reviewing chart, ordering tests and treatments, evaluating and treating patient at bedside,

## 2020-04-18 NOTE — PROGRESS NOTE ADULT - SUBJECTIVE AND OBJECTIVE BOX
MIGNON WALTER    [facility]    Patient is a 60y old  Female who presents with a chief complaint of sob (18 Apr 2020 14:42)       Allergies    amoxicillin (Rash)  penicillin (Rash)    Intolerances        HPI:  Patient is a 60y old  Female who presents with a chief complaint of sob and fever    HPI:This is a nonverbal [facility] for fever, sob, cough, low o2 sat, with possible covid exposure. per ems, pt on levaquin for aspiration pna. no further hx available.pt has hx of chronic constipation an dis on multiple stool softners.  she was found ot be hypernatremic and in renal failure at admission.  also has hx of hypothyroidism and down syndrome        PAST MEDICAL & SURGICAL HISTORY:  Frequent urinary tract infections  Hypothyroidism, unspecified type  Down syndrome      FAMILY HISTORY:can not obtain due to mental status      SOCIAL HISTORY:    Allergies    amoxicillin (Rash)  penicillin (Rash)    Intolerances          REVIEW OF SYSEMS:  negative except form above mentioned      Vital Signs Last 24 Hrs  T(C): 37.8 (04 Apr 2020 09:46), Max: 37.8 (04 Apr 2020 09:46)  T(F): 100 (04 Apr 2020 09:46), Max: 100 (04 Apr 2020 09:46)  HR: 68 (04 Apr 2020 11:00) (66 - 69)  BP: 89/52 (04 Apr 2020 11:00) (87/51 - 91/52)  BP(mean): --  RR: 24 (04 Apr 2020 11:00) (24 - 24)  SpO2: 100% (04 Apr 2020 11:00) (89% - 100%)  I&O's Summary      PHYSICAL EXAM:  GENERAL: onnc  HEAD:  Atraumatic, Normocephalic  EYES: EOMI, PERRLA, conjunctiva and sclera clear  NECK: Supple, No JVD  CHEST/LUNG: dec bs at bases and rhonchi  HEART: Regular rate and rhythm; No murmurs, rubs, or gallops  ABDOMEN: Soft, Nontender, Nondistended; Bowel sounds present  SKIN: No rashes or lesions    LABS:                        14.7   2.88  )-----------( 52       ( 04 Apr 2020 10:49 )             45.9     04-04    154<H>  |  117<H>  |  31<H>  ----------------------------<  107<H>  4.7   |  30  |  1.48<H>    Ca    8.5      04 Apr 2020 10:49    TPro  7.1  /  Alb  2.3<L>  /  TBili  0.2  /  DBili  x   /  AST  73<H>  /  ALT  66<H>  /  AlkPhos  118  04-04      CAPILLARY BLOOD GLUCOSE                RADIOLOGY & ADDITIONAL TESTS:    Imaging Personally Reviewed:  [x] YES  [ ] NO    Consultant(s) Notes Reviewed:  [x] YES  [ ] NO      MEDICATIONS  (STANDING):  azithromycin   Tablet 500 milliGRAM(s) Oral daily  dextrose 5%. 1000 milliLiter(s) (100 mL/Hr) IV Continuous <Continuous>  diVALproex  milliGRAM(s) Oral three times a day  enoxaparin Injectable 30 milliGRAM(s) SubCutaneous daily  levETIRAcetam 1000 milliGRAM(s) Oral two times a day  levothyroxine 125 MICROGram(s) Oral daily  memantine 5 milliGRAM(s) Oral daily  pantoprazole    Tablet 40 milliGRAM(s) Oral before breakfast  polyethylene glycol 3350 17 Gram(s) Oral daily    MEDICATIONS  (PRN):  acetaminophen   Tablet .. 650 milliGRAM(s) Oral every 6 hours PRN Temp greater or equal to 38C (100.4F), Mild Pain (1 - 3)      Care Discussed with Consultants/Other Providers [x] YES  [ ] NO    HEALTH ISSUES - PROBLEM Dx: (04 Apr 2020 13:11)      PAST MEDICAL & SURGICAL HISTORY:  Frequent urinary tract infections  Hypothyroidism, unspecified type  Down syndrome      FAMILY HISTORY:        MEDICATIONS   acetaminophen   Tablet .. 650 milliGRAM(s) Oral every 6 hours PRN  acetaminophen  Suppository .. 650 milliGRAM(s) Rectal every 4 hours PRN  aMIOdarone Infusion 1 mG/Min IV Continuous <Continuous>  aMIOdarone IVPB 150 milliGRAM(s) IV Intermittent once  aspirin  chewable 81 milliGRAM(s) Oral daily  chlorhexidine 0.12% Liquid 15 milliLiter(s) Oral Mucosa every 12 hours  chlorhexidine 2% Cloths 1 Application(s) Topical daily  dexMEDEtomidine Infusion 0.5 MICROgram(s)/kG/Hr IV Continuous <Continuous>  enoxaparin Injectable 40 milliGRAM(s) SubCutaneous every 12 hours  lactobacillus acidophilus 1 Tablet(s) Oral three times a day with meals  levETIRAcetam  IVPB 1000 milliGRAM(s) IV Intermittent every 12 hours  levothyroxine Injectable 62.5 MICROGram(s) IV Push at bedtime  magnesium sulfate  IVPB 2 Gram(s) IV Intermittent once  memantine 5 milliGRAM(s) Oral daily  midodrine 10 milliGRAM(s) Oral every 8 hours  norepinephrine Infusion 0.05 MICROgram(s)/kG/Min IV Continuous <Continuous>  pantoprazole  Injectable 40 milliGRAM(s) IV Push daily  potassium chloride  10 mEq/100 mL IVPB 10 milliEquivalent(s) IV Intermittent every 1 hour  valproate sodium IVPB 500 milliGRAM(s) IV Intermittent every 12 hours      Vital Signs Last 24 Hrs  T(C): 35.7 (18 Apr 2020 08:00), Max: 36.4 (18 Apr 2020 05:00)  T(F): 96.2 (18 Apr 2020 08:00), Max: 97.6 (18 Apr 2020 05:00)  HR: 78 (18 Apr 2020 18:00) (70 - 96)  BP: 151/94 (18 Apr 2020 18:00) (99/65 - 151/94)  BP(mean): 85 (18 Apr 2020 08:00) (76 - 85)  RR: 20 (18 Apr 2020 18:00) (20 - 20)  SpO2: 100% (18 Apr 2020 18:00) (100% - 100%)      04-17-20 @ 07:01  -  04-18-20 @ 07:00  --------------------------------------------------------  IN: 1329.2 mL / OUT: 1458 mL / NET: -128.8 mL    04-18-20 @ 07:01  -  04-18-20 @ 18:55  --------------------------------------------------------  IN: 1445.2 mL / OUT: 700 mL / NET: 745.2 mL        Mode: AC/ CMV (Assist Control/ Continuous Mandatory Ventilation), RR (machine): 20, TV (machine): 320, FiO2: 40, PEEP: 5, ITime: 1, MAP: 9, PIP: 20    LABS:                        10.5   8.75  )-----------( 142      ( 18 Apr 2020 06:41 )             31.6     04-18    142  |  107  |  10  ----------------------------<  130<H>  3.4<L>   |  29  |  0.67    Ca    8.0<L>      18 Apr 2020 15:08  Phos  3.5     04-18  Mg     1.7     04-18    TPro  6.2  /  Alb  1.4<L>  /  TBili  0.5  /  DBili  x   /  AST  60<H>  /  ALT  65<H>  /  AlkPhos  87  04-18    PT/INR - ( 18 Apr 2020 06:41 )   PT: 16.2 sec;   INR: 1.44 ratio         PTT - ( 18 Apr 2020 06:41 )  PTT:37.2 sec      ABG - ( 17 Apr 2020 05:49 )  pH, Arterial: x     pH, Blood: 7.57  /  pCO2: 28    /  pO2: 91    / HCO3: 28    / Base Excess: 3.3   /  SaO2: 98                  WBC:  WBC Count: 8.75 K/uL (04-18 @ 06:41)  WBC Count: 8.39 K/uL (04-17 @ 07:06)  WBC Count: 5.53 K/uL (04-16 @ 06:38)  WBC Count: 7.67 K/uL (04-15 @ 06:18)      MICROBIOLOGY:  RECENT CULTURES:  04-14 .Stool Feces XXXX XXXX   No enteric pathogens isolated.  (Stool culture examined for Salmonella,  Shigella, Campylobacter, Aeromonas, Plesiomonas,  Vibrio, E.coli O157 and Yersinia)          CARDIAC MARKERS ( 17 Apr 2020 07:06 )  .243 ng/mL / x     / 30 U/L / x     / x            PT/INR - ( 18 Apr 2020 06:41 )   PT: 16.2 sec;   INR: 1.44 ratio         PTT - ( 18 Apr 2020 06:41 )  PTT:37.2 sec    Sodium:  Sodium, Serum: 142 mmol/L (04-18 @ 15:08)  Sodium, Serum: 142 mmol/L (04-18 @ 06:41)  Sodium, Serum: 146 mmol/L (04-17 @ 07:06)  Sodium, Serum: 142 mmol/L (04-16 @ 06:38)  Sodium, Serum: 148 mmol/L (04-15 @ 06:18)      0.67 mg/dL 04-18 @ 15:08  0.81 mg/dL 04-18 @ 06:41  0.78 mg/dL 04-17 @ 07:06  0.70 mg/dL 04-16 @ 06:38  0.69 mg/dL 04-15 @ 06:18      Hemoglobin:  Hemoglobin: 10.5 g/dL (04-18 @ 06:41)  Hemoglobin: 10.6 g/dL (04-17 @ 07:06)  Hemoglobin: 10.3 g/dL (04-16 @ 06:38)  Hemoglobin: 10.4 g/dL (04-15 @ 06:18)      Platelets: Platelet Count - Automated: 142 K/uL (04-18 @ 06:41)  Platelet Count - Automated: 149 K/uL (04-17 @ 07:06)  Platelet Count - Automated: 129 K/uL (04-16 @ 06:38)  Platelet Count - Automated: 133 K/uL (04-15 @ 06:18)      LIVER FUNCTIONS - ( 18 Apr 2020 06:41 )  Alb: 1.4 g/dL / Pro: 6.2 g/dL / ALK PHOS: 87 U/L / ALT: 65 U/L DA / AST: 60 U/L / GGT: x                 RADIOLOGY & ADDITIONAL STUDIES:

## 2020-04-18 NOTE — PROGRESS NOTE ADULT - SUBJECTIVE AND OBJECTIVE BOX
Patient is a 60y old  Female who presents with a chief complaint of sob (06 Apr 2020 11:30)    Patient seen in follow up for ADAM, Hypernatremia.   COVID +. Chart reviewed. Events noted. Pt reintubated.        PAST MEDICAL HISTORY:  Frequent urinary tract infections  Hypothyroidism, unspecified type  Down syndrome      MEDICATIONS  (STANDING):  aspirin  chewable 81 milliGRAM(s) Oral daily  chlorhexidine 0.12% Liquid 15 milliLiter(s) Oral Mucosa every 12 hours  chlorhexidine 2% Cloths 1 Application(s) Topical daily  dexMEDEtomidine Infusion 0.5 MICROgram(s)/kG/Hr (5.68 mL/Hr) IV Continuous <Continuous>  enoxaparin Injectable 40 milliGRAM(s) SubCutaneous every 12 hours  lactobacillus acidophilus 1 Tablet(s) Oral three times a day with meals  levETIRAcetam  IVPB 1000 milliGRAM(s) IV Intermittent every 12 hours  levothyroxine Injectable 62.5 MICROGram(s) IV Push at bedtime  magnesium sulfate  IVPB 2 Gram(s) IV Intermittent once  memantine 5 milliGRAM(s) Oral daily  midodrine 10 milliGRAM(s) Oral every 8 hours  norepinephrine Infusion 0.05 MICROgram(s)/kG/Min (4.26 mL/Hr) IV Continuous <Continuous>  pantoprazole  Injectable 40 milliGRAM(s) IV Push daily  valproate sodium IVPB 500 milliGRAM(s) IV Intermittent every 12 hours    MEDICATIONS  (PRN):  acetaminophen   Tablet .. 650 milliGRAM(s) Oral every 6 hours PRN Temp greater or equal to 38C (100.4F), Mild Pain (1 - 3)  acetaminophen  Suppository .. 650 milliGRAM(s) Rectal every 4 hours PRN Temp greater or equal to 38C (100.4F)    T(C): 35.7 (04-18-20 @ 08:00), Max: 36.6 (04-17-20 @ 08:00)  HR: 85 (04-18-20 @ 10:43) (69 - 123)  BP: 136/74 (04-18-20 @ 08:00) (70/33 - 153/98)  RR: 20 (04-18-20 @ 08:00)  SpO2: 100% (04-18-20 @ 10:43)  Wt(kg): --  I&O's Detail    17 Apr 2020 07:01  -  18 Apr 2020 07:00  --------------------------------------------------------  IN:    dexmedetomidine Infusion: 91.2 mL    Enteral Tube Flush: 240 mL    IV PiggyBack: 100 mL    norepinephrine Infusion: 358 mL    ns in tub fed  yajvhh22: 540 mL  Total IN: 1329.2 mL    OUT:    Indwelling Catheter - Urethral: 1458 mL  Total OUT: 1458 mL    Total NET: -128.8 mL          PHYSICAL EXAM:  Pt on COVID precautions. Chart reviewed and previous physical examination noted.                    LABORATORY:                        10.5   8.75  )-----------( 142      ( 18 Apr 2020 06:41 )             31.6     04-18    142  |  105  |  8   ----------------------------<  112<H>  3.0<L>   |  24  |  0.81    Ca    8.1<L>      18 Apr 2020 06:41  Phos  3.5     04-18  Mg     1.7     04-18    TPro  6.2  /  Alb  1.4<L>  /  TBili  0.5  /  DBili  x   /  AST  60<H>  /  ALT  65<H>  /  AlkPhos  87  04-18    Sodium, Serum: 142 mmol/L (04-18 @ 06:41)  Sodium, Serum: 146 mmol/L (04-17 @ 07:06)    Potassium, Serum: 3.0 mmol/L (04-18 @ 06:41)  Potassium, Serum: 3.2 mmol/L (04-17 @ 07:06)    Hemoglobin: 10.5 g/dL (04-18 @ 06:41)  Hemoglobin: 10.6 g/dL (04-17 @ 07:06)  Hemoglobin: 10.3 g/dL (04-16 @ 06:38)    Creatinine, Serum 0.81 (04-18 @ 06:41)  Creatinine, Serum 0.78 (04-17 @ 07:06)  Creatinine, Serum 0.70 (04-16 @ 06:38)    CARDIAC MARKERS ( 17 Apr 2020 07:06 )  .243 ng/mL / x     / 30 U/L / x     / x          LIVER FUNCTIONS - ( 18 Apr 2020 06:41 )  Alb: 1.4 g/dL / Pro: 6.2 g/dL / ALK PHOS: 87 U/L / ALT: 65 U/L DA / AST: 60 U/L / GGT: x             ABG - ( 17 Apr 2020 05:49 )  pH, Arterial: x     pH, Blood: 7.57  /  pCO2: 28    /  pO2: 91    / HCO3: 28    / Base Excess: 3.3   /  SaO2: 98

## 2020-04-18 NOTE — PROGRESS NOTE ADULT - ASSESSMENT
1.	ADAM: Prerenal azotemia, ATN  2.	Shock, Sepsis  3.	Pneumonia, COVID +  4.	Hypophosphatemia: imporved    Stable renal indices. Potassium supps. To continue current meds. On tube feeds. Free water with tube feeds. Treatment for COVID pneumonia. COVID precautions.  Pulmonary and ID follow up. ICU management. Avoid nephrotoxic meds as possible. ICU management. Overall prognosis poor.

## 2020-04-18 NOTE — PROGRESS NOTE ADULT - SUBJECTIVE AND OBJECTIVE BOX
Neurology follow up note    MIGNON [id number]      Interval History:    Patient intubated     MEDICATIONS    acetaminophen   Tablet .. 650 milliGRAM(s) Oral every 6 hours PRN  acetaminophen  Suppository .. 650 milliGRAM(s) Rectal every 4 hours PRN  acetylcysteine 20%  Inhalation 4 milliLiter(s) Inhalation three times a day  aspirin  chewable 81 milliGRAM(s) Oral daily  chlorhexidine 0.12% Liquid 15 milliLiter(s) Oral Mucosa every 12 hours  chlorhexidine 2% Cloths 1 Application(s) Topical daily  dexMEDEtomidine Infusion 0.5 MICROgram(s)/kG/Hr IV Continuous <Continuous>  enoxaparin Injectable 40 milliGRAM(s) SubCutaneous every 12 hours  lactobacillus acidophilus 1 Tablet(s) Oral three times a day with meals  levETIRAcetam  IVPB 1000 milliGRAM(s) IV Intermittent every 12 hours  levothyroxine Injectable 62.5 MICROGram(s) IV Push at bedtime  memantine 5 milliGRAM(s) Oral daily  midodrine 10 milliGRAM(s) Oral every 8 hours  norepinephrine Infusion 0.05 MICROgram(s)/kG/Min IV Continuous <Continuous>  pantoprazole  Injectable 40 milliGRAM(s) IV Push daily  valproate sodium IVPB 500 milliGRAM(s) IV Intermittent every 12 hours      Allergies    amoxicillin (Rash)  penicillin (Rash)    Intolerances            Vital Signs Last 24 Hrs  T(C): 35.7 (18 Apr 2020 08:00), Max: 36.4 (17 Apr 2020 12:00)  T(F): 96.2 (18 Apr 2020 08:00), Max: 97.6 (17 Apr 2020 12:00)  HR: 79 (18 Apr 2020 08:00) (70 - 96)  BP: 136/74 (18 Apr 2020 08:00) (99/65 - 153/98)  BP(mean): 85 (18 Apr 2020 08:00) (76 - 85)  RR: 20 (18 Apr 2020 08:00) (20 - 20)  SpO2: 100% (18 Apr 2020 08:00) (100% - 100%)    REVIEW OF SYSTEMS:  Could not be obtained secondary to the patient being nonverbal.    PHYSICAL EXAMINATION:    HEENT:  Head:  Normocephalic and atraumatic.  Eyes:  No scleral icterus.  Ears:  Hearing hard to evaluate secondary to the patient being sedated and intubated.  RESPIRATORY:  Good air entry bilaterally.  CARDIOVASCULAR:  S1 and S2 are heard.  ABDOMEN:  Soft and nontender.  EXTREMITIES:  No clubbing or cyanosis were noted.      NEUROLOGICAL:  Limited secondary to the patient being intubated and sedated.  No response to verbal stimuli.  I opened the patient's eyes, no gaze preference.  I applied stimuli to all four extremities with slight withdrawal bilateral upper and lower.  Tone; bilateral upper and lower was increased.                    LABS:  CBC Full  -  ( 18 Apr 2020 06:41 )  WBC Count : 8.75 K/uL  RBC Count : 3.15 M/uL  Hemoglobin : 10.5 g/dL  Hematocrit : 31.6 %  Platelet Count - Automated : 142 K/uL  Mean Cell Volume : 100.3 fl  Mean Cell Hemoglobin : 33.3 pg  Mean Cell Hemoglobin Concentration : 33.2 gm/dL  Auto Neutrophil # : 5.24 K/uL  Auto Lymphocyte # : 2.34 K/uL  Auto Monocyte # : 1.02 K/uL  Auto Eosinophil # : 0.02 K/uL  Auto Basophil # : 0.02 K/uL  Auto Neutrophil % : 59.9 %  Auto Lymphocyte % : 26.7 %  Auto Monocyte % : 11.7 %  Auto Eosinophil % : 0.2 %  Auto Basophil % : 0.2 %      04-18    142  |  105  |  8   ----------------------------<  112<H>  3.0<L>   |  24  |  0.81    Ca    8.1<L>      18 Apr 2020 06:41  Phos  3.5     04-18  Mg     1.7     04-18    TPro  6.2  /  Alb  1.4<L>  /  TBili  0.5  /  DBili  x   /  AST  60<H>  /  ALT  65<H>  /  AlkPhos  87  04-18    Hemoglobin A1C:   Lipid Panel 04-17 @ 12:43  Total Cholesterol, Serum --  LDL --  Triglycerides 191    LIVER FUNCTIONS - ( 18 Apr 2020 06:41 )  Alb: 1.4 g/dL / Pro: 6.2 g/dL / ALK PHOS: 87 U/L / ALT: 65 U/L DA / AST: 60 U/L / GGT: x           Vitamin B12   PT/INR - ( 18 Apr 2020 06:41 )   PT: 16.2 sec;   INR: 1.44 ratio         PTT - ( 18 Apr 2020 06:41 )  PTT:37.2 sec      RADIOLOGY        ANALYSIS AND PLAN:  This is a 60-year-old with altered mental status and episode of seizure.  1.	For seizure event, suspect this could be secondary to the patient's interaction with antibiotics. will change Depakote to 500 bid  no new seizures monitor levels.   2.	I will recommend Ativan 1 mg IV push q.6 h. p.r.n. for any type of breakthrough seizures.  3.	Seizure precautions.  4.	For altered mental status, secondary to metabolic encephalopathy secondary to COVID-19 infection.  5.	For hypotension, continue the patient on midodrine.  If possible, please maintain systolic blood pressure of above 100 and help maintain cerebral perfusion.  6.	For history of hypothyroidism, continue the patient on Synthroid.  7.	spoke to staff no new events   Greater than 20 minutes spent in direct patient care reviewing  the notes, lab data/ imaging

## 2020-04-18 NOTE — PROGRESS NOTE ADULT - SUBJECTIVE AND OBJECTIVE BOX
Chief Complaint: Shortness of breath    Interval Events: Remains intubated on pressors.    Review of Systems:  Unable to obtain    Physical Exam:  Vital Signs Last 24 Hrs  T(C): 35.7 (18 Apr 2020 08:00), Max: 36.4 (17 Apr 2020 12:00)  T(F): 96.2 (18 Apr 2020 08:00), Max: 97.6 (17 Apr 2020 12:00)  HR: 79 (18 Apr 2020 08:00) (70 - 96)  BP: 136/74 (18 Apr 2020 08:00) (99/65 - 153/98)  BP(mean): 85 (18 Apr 2020 08:00) (76 - 85)  RR: 20 (18 Apr 2020 08:00) (20 - 20)  SpO2: 100% (18 Apr 2020 08:00) (100% - 100%)  General: Sedated  HEENT: Intubated  Neck: No JVD, no carotid bruit  Lungs: Coarse bilaterally  CV: RRR, nl S1/S2, no M/R/G  Abdomen: S/NT/ND, +BS  Extremities: No LE edema, no cyanosis  Neuro: Non-focal  Skin: No rash    Labs:             04-18    142  |  105  |  8   ----------------------------<  112<H>  3.0<L>   |  24  |  0.81    Ca    8.1<L>      18 Apr 2020 06:41  Phos  3.5     04-18  Mg     1.7     04-18    TPro  6.2  /  Alb  1.4<L>  /  TBili  0.5  /  DBili  x   /  AST  60<H>  /  ALT  65<H>  /  AlkPhos  87  04-18                        10.5   8.75  )-----------( 142      ( 18 Apr 2020 06:41 )             31.6       Telemetry: Sinus rhythm, PVCs

## 2020-04-18 NOTE — PROGRESS NOTE ADULT - ASSESSMENT
59 yo F with Downs syndrome, hypothyroidism, constipation, seizure disorder with acute hypoxic respiratory failure from COVID 19 pneumonia, course complicated by episode of seizure, type 2 NSTEMI, ADAM (resolved), gram variable bacteremia (repeat cx ngtd); ET tube replaced 4/14 due to low expiratory volumes on ventilator.    1. Acute hypoxic respiratory failure  2. COVID-19 Pneumonia  3. ADAM (resolved)  4. Type 2 NSTEMI (demand ischemia)  5. Seizure Disorder  6. Gram variable bacteremia (of uncertain clinical significance)    Neuro: titrate sedation with precedex/propofol. Continue depacon 500mg BID and keppra 1g q12 for seizures. Continue home memantine.  Cardio: on levophed (as midodrine being held). Type 2 NSTEMI, demand ischemia - troponin downtrended. continue asa.  Pulm: weaned and extubated 4/16 and unfortunately required reintubation the same day. titrate FiO2 and PEEP.  Renal/lytes: renal function stable. monitor UOP. monitor and replete lytes. hypokalemic - replete K. hypernatremic - free water and feeds.  GI: NPO with tube feeds. GI ppx. KUB without evidence of ileus or obstruction.  ID: COVID 19 pneumonia s/p hydroxychloroquine, was not candidate for toci, steroids stopped 4/12. repeat cultures negative. monitor off abx for now. monitor biomarkers.  Heme: DVT ppx lovenox 40 q12. monitor for bleeding. stable thrombocytopenia likely from acute illness.  Dispo: DNR. critically ill with hypoxic respiratory failure from COVID 19 pneumonia. continue ICU care.  .

## 2020-04-19 NOTE — PROGRESS NOTE ADULT - SUBJECTIVE AND OBJECTIVE BOX
Date/Time Patient Seen:  		  Referring MD:   Data Reviewed	       Patient is a 60y old  Female who presents with a chief complaint of sob (19 Apr 2020 08:25)      Subjective/HPI     PAST MEDICAL & SURGICAL HISTORY:  Frequent urinary tract infections  Hypothyroidism, unspecified type  Down syndrome        Medication list         MEDICATIONS  (STANDING):  aspirin  chewable 81 milliGRAM(s) Oral daily  chlorhexidine 0.12% Liquid 15 milliLiter(s) Oral Mucosa every 12 hours  chlorhexidine 2% Cloths 1 Application(s) Topical daily  dexMEDEtomidine Infusion 0.5 MICROgram(s)/kG/Hr (5.68 mL/Hr) IV Continuous <Continuous>  enoxaparin Injectable 40 milliGRAM(s) SubCutaneous every 12 hours  lactobacillus acidophilus 1 Tablet(s) Oral three times a day with meals  levETIRAcetam  IVPB 1000 milliGRAM(s) IV Intermittent every 12 hours  levothyroxine Injectable 62.5 MICROGram(s) IV Push at bedtime  memantine 5 milliGRAM(s) Oral daily  midodrine 10 milliGRAM(s) Oral every 8 hours  norepinephrine Infusion 0.05 MICROgram(s)/kG/Min (4.26 mL/Hr) IV Continuous <Continuous>  pantoprazole  Injectable 40 milliGRAM(s) IV Push daily  valproate sodium IVPB 500 milliGRAM(s) IV Intermittent every 12 hours  vasopressin Infusion 0.06 Unit(s)/Min (3.6 mL/Hr) IV Continuous <Continuous>    MEDICATIONS  (PRN):  acetaminophen   Tablet .. 650 milliGRAM(s) Oral every 6 hours PRN Temp greater or equal to 38C (100.4F), Mild Pain (1 - 3)  acetaminophen  Suppository .. 650 milliGRAM(s) Rectal every 4 hours PRN Temp greater or equal to 38C (100.4F)  fentaNYL    Injectable 25 MICROGram(s) IV Push every 2 hours PRN agitation         Vitals log        ICU Vital Signs Last 24 Hrs  T(C): 36.5 (19 Apr 2020 10:00), Max: 36.5 (19 Apr 2020 10:00)  T(F): 97.7 (19 Apr 2020 10:00), Max: 97.7 (19 Apr 2020 10:00)  HR: 97 (19 Apr 2020 10:00) (48 - 97)  BP: 127/89 (19 Apr 2020 10:00) (70/50 - 156/99)  BP(mean): --  ABP: --  ABP(mean): --  RR: 20 (19 Apr 2020 10:00) (15 - 20)  SpO2: 95% (19 Apr 2020 10:00) (93% - 100%)       Mode: AC/ CMV (Assist Control/ Continuous Mandatory Ventilation)  RR (machine): 20  TV (machine): 320  FiO2: 40  PEEP: 5  ITime: 1  MAP: 10  PIP: 22      Input and Output:  I&O's Detail    18 Apr 2020 07:01  -  19 Apr 2020 07:00  --------------------------------------------------------  IN:    amiodarone Infusion: 66.6 mL    dexmedetomidine Infusion: 117.3 mL    Enteral Tube Flush: 480 mL    IV PiggyBack: 600 mL    norepinephrine Infusion: 740.5 mL    ns in tub fed  gcopoe23: 720 mL    Solution: 300 mL    vasopressin Infusion: 27 mL  Total IN: 3051.4 mL    OUT:    Indwelling Catheter - Urethral: 1600 mL    Rectal Tube: 5 mL  Total OUT: 1605 mL    Total NET: 1446.4 mL          Lab Data                        9.2    5.81  )-----------( 113      ( 19 Apr 2020 06:17 )             26.8     04-19    136  |  105  |  8   ----------------------------<  99  4.2   |  28  |  0.62    Ca    7.9<L>      19 Apr 2020 06:17  Phos  2.7     04-19  Mg     2.0     04-19    TPro  6.1  /  Alb  1.4<L>  /  TBili  0.3  /  DBili  x   /  AST  39  /  ALT  46  /  AlkPhos  88  04-19    ABG - ( 19 Apr 2020 08:41 )  pH, Arterial: x     pH, Blood: 7.48  /  pCO2: 32    /  pO2: 63    / HCO3: 25    / Base Excess: 0.3   /  SaO2: 92                CARDIAC MARKERS ( 19 Apr 2020 06:17 )  .095 ng/mL / x     / 26 U/L / x     / x            Review of Systems	      Objective     Physical Examination    heart s1s2  lung dec BS      Pertinent Lab findings & Imaging      Puentes:  NO   Adequate UO     I&O's Detail    18 Apr 2020 07:01  -  19 Apr 2020 07:00  --------------------------------------------------------  IN:    amiodarone Infusion: 66.6 mL    dexmedetomidine Infusion: 117.3 mL    Enteral Tube Flush: 480 mL    IV PiggyBack: 600 mL    norepinephrine Infusion: 740.5 mL    ns in tub fed  fppqdt60: 720 mL    Solution: 300 mL    vasopressin Infusion: 27 mL  Total IN: 3051.4 mL    OUT:    Indwelling Catheter - Urethral: 1600 mL    Rectal Tube: 5 mL  Total OUT: 1605 mL    Total NET: 1446.4 mL               Discussed with:     Cultures:	        Radiology

## 2020-04-19 NOTE — PROGRESS NOTE ADULT - ASSESSMENT
[phone]   1960 DOA 2020 DR NORHT MEDLEY      REVIEW OF SYMPTOMS      Able to give ROS  NO     PHYSICAL EXAM    HEENT Unremarkable PERRLA atraumatic   RESP Fair air entry EXP prolonged    Harsh breath sound Resp distres mild   CARDIAC S1 S2 No S3     NO JVD    ABDOMEN SOFT BS PRESENT NOT DISTENDED No hepatosplenomegaly PEDAL EDEMA present No calf tenderness  NO rash   GENERAL Not TOXIC looking    VITALS/LABS      2020 afeb 57 120/80   2020 9a dexm 0.8 m/k/h   2020 3p nore .3 m/k/m   2020 3p vaso 0.08 u/m   2020 1p ac 20/320/5/.4 ppl 19   2020 w 5.8 Hb 9.2 plt 113 Na 136 K 4.2 CO2 28 Cr .6       PT DATA/BEST PRACTICE  ALLERGY     NOTEWORTHY  POINTS/CHANGES ROS/PE   2020 amiodarone droip   2020 Back on vent                                  WT  45 (2020)                    BMI     20 (2020)     ADVANCED DIRECTIVE     dnr ()   Goals of care discussion                                                                                      HEAD OF BED ELEVATION Yes  DYSPHAGIA EVAL      DIET Jevity 1.5 180.4 ()     DVT PROPHYLAXIS    lvnx 40.2 ()           .4 ()  (Dr RICHARDSON)                                                 PATIENT SUMMARY   60 f nonverbal downs syndrome from group home HO COVID exposure per transfer papers on Rx for aspiration pneumonia and shortness of breath   er vitals 90/50 66 100f 92%    Pt found to have pneumonia pl effsn hypernatremia and adam on arrival COVID palma started Pulm consulted     Pulm consulted  CT ch showed l lung collapse Pt ruled out for COVID     Home meds memantine 28 keppra 1.2                                Pt tr sicu 2020 itubated hemaodynamci intability            NEED FOR TRACH  Intubated    May need trach within few d if cannot get extubated    COVID  2020 COVID pcr detc   Supp care   SYMTOM ONSET      OXYGENATION  2020 On 50% O2    2020 Intubated ON 90% ox   2020 748/32/63   2020 1p ac 20/320/5/.4 ppl 19   Gas exchange acceptable   Target po 90 ppl 30 dp 15       MARKERS   n --4/10----4/15--2020 nlr 6.1 -7.1- 5.3 - 1.3 -5.5 -3.5-1.8-4-2.5  f ----4/15/2020 ferritin [id number] - 1404  - 1039 - 1524 - 2326   p ---4/ procal .27-.34- .1-.15 -.46   c --4/ crp 2.6 - 3.8-16.9 - 11.2 - 10   e --2020 esr 26 - 25- 101   --4/15/2020 d-dimer [phone]  D-dimer  4/ d-dimer [phone]   lvnx 40.2 ()       MEDS   HCQ () COMPLETED COURSE   2020 qtc 442   IVIG 20g/d.4d () (Dr RICHARDSON) --> DCED 2020    STEROIDS   solumed 80.5d () COMPLETED COURSE           ARDS ltvv   CAD asa 81 ()   SEDATION  On dexm Fentanyl 25.12p ()   HEMODYNAMICS   In shock sec COVID 19 Remains on nore () Mido 10.3 () Given alb   albumin 12/5x4x8 do ()   Vasopressin added 2020   Will add stress steroids 2020   NONOLIGURIC ADAM Resolved   SZ On keppra and ativan for break thru  GOC 2020 Discussions under way with brother  2020 PT MADE DNR   PLAN   RESP Hemodyanamically unstable has been reintubated two times Will need slow wean Trach to be planned uncless in conflict with advanced directtives   INFECTION sp hcq sp steroids for coviud No other active infection suspctd   CARDIAC Remains vasopressor dep Albu added 2020 Vasopress added 2020 Hydrocort 50.3 added 2020   HEMAT Target hb 7 Is on dvt p   META Stable       TIME SPENT Over 36 minutes aggregate critical  care time spent on encounter; activities included   direct pt care, counseling and/or coordinating care reviewing notes, lab data/ imaging , discussion with multidisciplinary team/ pt /family. Risks, benefits, alternatives  discussed in detail.    UTTHERESA MIGNON [phone]   1960 DOA 2020 DR NORTH MEDLEY

## 2020-04-19 NOTE — PROGRESS NOTE ADULT - ASSESSMENT
61 yo F with Downs syndrome, hypothyroidism, constipation, seizure disorder with acute hypoxic respiratory failure from COVID 19 pneumonia, course complicated by episode of seizure, type 2 NSTEMI, ADAM (resolved), gram variable bacteremia (repeat cx ngtd); ET tube replaced 4/14 due to low expiratory volumes on ventilator.    1. Acute hypoxic respiratory failure  2. COVID-19 Pneumonia  3. ADAM (resolved)  4. Type 2 NSTEMI (demand ischemia)  5. Seizure Disorder  6. Gram variable bacteremia (of uncertain clinical significance)  7. Wide Complex Tachycardia    Neuro: titrate sedation with precedex/propofol. Continue depacon 500mg BID and keppra 1g q12 for seizures. Continue home memantine.  Cardio: on levophed/vasopressin. Type 2 NSTEMI, demand ischemia - troponin downtrended. continue asa. Was on IV amiodarone for WCT - thought to be due to metabolic derangements and also high dose of levophed - was weaned with addition of vasopression. Monitor telemetry.  Pulm: weaned and extubated 4/16 and unfortunately required reintubation the same day. titrate FiO2 and PEEP. CXR with right lung opacification and collapse. Continue suction and lung protective ventilation. Repeat CXR in AM.  Renal/lytes: renal function stable. monitor UOP. monitor and replete lytes. hypokalemia - repleted. hypernatremia - resolved.  GI: NPO with tube feeds. GI ppx.  ID: COVID 19 pneumonia s/p hydroxychloroquine, was not candidate for toci, steroids stopped 4/12. repeat cultures negative. monitor off abx for now. monitor biomarkers.  Heme: DVT ppx lovenox 40 q12. monitor for bleeding. stable thrombocytopenia likely from acute illness.  Dispo: DNR. critically ill with hypoxic respiratory failure from COVID 19 pneumonia. continue ICU care. Poor prognosis.

## 2020-04-19 NOTE — PROGRESS NOTE ADULT - SUBJECTIVE AND OBJECTIVE BOX
Neurology follow up note    MIGNON [id number]      Interval History:    Patient intubated     MEDICATIONS    acetaminophen   Tablet .. 650 milliGRAM(s) Oral every 6 hours PRN  acetaminophen  Suppository .. 650 milliGRAM(s) Rectal every 4 hours PRN  aspirin  chewable 81 milliGRAM(s) Oral daily  chlorhexidine 0.12% Liquid 15 milliLiter(s) Oral Mucosa every 12 hours  chlorhexidine 2% Cloths 1 Application(s) Topical daily  dexMEDEtomidine Infusion 0.5 MICROgram(s)/kG/Hr IV Continuous <Continuous>  enoxaparin Injectable 40 milliGRAM(s) SubCutaneous every 12 hours  fentaNYL    Injectable 25 MICROGram(s) IV Push every 2 hours PRN  lactobacillus acidophilus 1 Tablet(s) Oral three times a day with meals  levETIRAcetam  IVPB 1000 milliGRAM(s) IV Intermittent every 12 hours  levothyroxine Injectable 62.5 MICROGram(s) IV Push at bedtime  memantine 5 milliGRAM(s) Oral daily  midodrine 10 milliGRAM(s) Oral every 8 hours  norepinephrine Infusion 0.05 MICROgram(s)/kG/Min IV Continuous <Continuous>  pantoprazole  Injectable 40 milliGRAM(s) IV Push daily  valproate sodium IVPB 500 milliGRAM(s) IV Intermittent every 12 hours  vasopressin Infusion 0.06 Unit(s)/Min IV Continuous <Continuous>      Allergies    amoxicillin (Rash)  penicillin (Rash)    Intolerances            Vital Signs Last 24 Hrs  T(C): 35.9 (19 Apr 2020 07:30), Max: 36.4 (19 Apr 2020 06:30)  T(F): 96.7 (19 Apr 2020 07:30), Max: 97.5 (19 Apr 2020 06:30)  HR: 60 (19 Apr 2020 07:45) (48 - 85)  BP: 119/75 (19 Apr 2020 07:30) (70/50 - 156/99)  BP(mean): --  RR: 20 (19 Apr 2020 07:30) (15 - 20)  SpO2: 93% (19 Apr 2020 07:45) (93% - 100%)        REVIEW OF SYSTEMS:  Could not be obtained secondary to the patient being nonverbal.    PHYSICAL EXAMINATION:    HEENT:  Head:  Normocephalic and atraumatic.  Eyes:  No scleral icterus.  Ears:  Hearing hard to evaluate secondary to the patient being sedated and intubated.  RESPIRATORY:  Good air entry bilaterally.  CARDIOVASCULAR:  S1 and S2 are heard.  ABDOMEN:  Soft and nontender.  EXTREMITIES:  No clubbing or cyanosis were noted.      NEUROLOGICAL:  Limited secondary to the patient being intubated and sedated.  No response to verbal stimuli.  I opened the patient's eyes, no gaze preference.  I applied stimuli to all four extremities with slight withdrawal bilateral upper and lower.  Tone; bilateral upper and lower was increased.               LABS:  CBC Full  -  ( 19 Apr 2020 06:17 )  WBC Count : 5.81 K/uL  RBC Count : 2.65 M/uL  Hemoglobin : 9.2 g/dL  Hematocrit : 26.8 %  Platelet Count - Automated : 113 K/uL  Mean Cell Volume : 101.1 fl  Mean Cell Hemoglobin : 34.7 pg  Mean Cell Hemoglobin Concentration : 34.3 gm/dL  Auto Neutrophil # : 3.65 K/uL  Auto Lymphocyte # : 1.48 K/uL  Auto Monocyte # : 0.60 K/uL  Auto Eosinophil # : 0.01 K/uL  Auto Basophil # : 0.01 K/uL  Auto Neutrophil % : 62.8 %  Auto Lymphocyte % : 25.5 %  Auto Monocyte % : 10.3 %  Auto Eosinophil % : 0.2 %  Auto Basophil % : 0.2 %      04-19    136  |  105  |  8   ----------------------------<  99  4.2   |  28  |  0.62    Ca    7.9<L>      19 Apr 2020 06:17  Phos  2.7     04-19  Mg     2.0     04-19    TPro  6.1  /  Alb  1.4<L>  /  TBili  0.3  /  DBili  x   /  AST  39  /  ALT  46  /  AlkPhos  88  04-19    Hemoglobin A1C:     LIVER FUNCTIONS - ( 19 Apr 2020 06:17 )  Alb: 1.4 g/dL / Pro: 6.1 g/dL / ALK PHOS: 88 U/L / ALT: 46 U/L DA / AST: 39 U/L / GGT: x           Vitamin B12   PT/INR - ( 19 Apr 2020 06:17 )   PT: 12.3 sec;   INR: 1.10 ratio         PTT - ( 19 Apr 2020 06:17 )  PTT:33.0 sec      RADIOLOGY        ANALYSIS AND PLAN:  This is a 60-year-old with altered mental status and episode of seizure.  1.	For seizure event, suspect this could be secondary to the patient's interaction with antibiotics. will change Depakote to 500 bid  no new seizures monitor levels.   2.	I will recommend Ativan 1 mg IV push q.6 h. p.r.n. for any type of breakthrough seizures.  3.	Seizure precautions.  4.	For altered mental status, secondary to metabolic encephalopathy secondary to COVID-19 infection.  5.	For hypotension, continue the patient on midodrine.  If possible, please maintain systolic blood pressure of above 100 and help maintain cerebral perfusion.  6.	For history of hypothyroidism, continue the patient on Synthroid.  7.	spoke to staff no new events   Greater than 20 minutes spent in direct patient care reviewing  the notes, lab data/ imaging

## 2020-04-19 NOTE — PROGRESS NOTE ADULT - SUBJECTIVE AND OBJECTIVE BOX
Chief Complaint: Shortness of breath    Interval Events: Remains intubated on pressors.    Review of Systems:  Unable to obtain    Physical Exam:  Vital Signs Last 24 Hrs  T(C): 36.5 (19 Apr 2020 10:00), Max: 36.5 (19 Apr 2020 10:00)  T(F): 97.7 (19 Apr 2020 10:00), Max: 97.7 (19 Apr 2020 10:00)  HR: 97 (19 Apr 2020 10:00) (48 - 97)  BP: 127/89 (19 Apr 2020 10:00) (70/50 - 156/99)  BP(mean): --  RR: 20 (19 Apr 2020 10:00) (15 - 20)  SpO2: 95% (19 Apr 2020 10:00) (93% - 100%)  General: Sedated  HEENT: Intubated  Neck: No JVD, no carotid bruit  Lungs: Coarse bilaterally  CV: RRR, nl S1/S2, no M/R/G  Abdomen: S/NT/ND, +BS  Extremities: No LE edema, no cyanosis  Neuro: Non-focal  Skin: No rash    Labs:             04-19    136  |  105  |  8   ----------------------------<  99  4.2   |  28  |  0.62    Ca    7.9<L>      19 Apr 2020 06:17  Phos  2.7     04-19  Mg     2.0     04-19    TPro  6.1  /  Alb  1.4<L>  /  TBili  0.3  /  DBili  x   /  AST  39  /  ALT  46  /  AlkPhos  88  04-19                        9.2    5.81  )-----------( 113      ( 19 Apr 2020 06:17 )             26.8       Telemetry: Sinus rhythm, PVCs

## 2020-04-19 NOTE — PROGRESS NOTE ADULT - ASSESSMENT
The patient is a 60 year old female with a history of MRDD, hypothyroidism, seizure d/o who is admitted with COVID-19, acute respiratory failure, septic shock, NSTEMI.    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 There may be a segmental component suggesting underlying CAD.  - Continue aspirin 81 mg daily  - On enoxaparin 40 mg bid  - Continue midodrine 10 mg tid  - Continue norepinephrine and titrate to MAP of 65  - Can probably wean off vasopressin  - Mechanical ventilation. Wean as tolerated.  - ICU care

## 2020-04-19 NOTE — PROGRESS NOTE ADULT - SUBJECTIVE AND OBJECTIVE BOX
MIGNON WALTER    [facility]    Patient is a 60y old  Female who presents with a chief complaint of sob (19 Apr 2020 13:01)       Allergies    amoxicillin (Rash)  penicillin (Rash)    Intolerances        HPI:  Patient is a 60y old  Female who presents with a chief complaint of sob and fever    HPI:This is a nonverbal pt bib ems from correction for fever, sob, cough, low o2 sat, with possible covid exposure. per ems, pt on levaquin for aspiration pna. no further hx available.pt has hx of chronic constipation an dis on multiple stool softners.  she was found ot be hypernatremic and in renal failure at admission.  also has hx of hypothyroidism and down syndrome        PAST MEDICAL & SURGICAL HISTORY:  Frequent urinary tract infections  Hypothyroidism, unspecified type  Down syndrome      FAMILY HISTORY:can not obtain due to mental status      SOCIAL HISTORY:    Allergies    amoxicillin (Rash)  penicillin (Rash)    Intolerances          REVIEW OF SYSEMS:  negative except form above mentioned      Vital Signs Last 24 Hrs  T(C): 37.8 (04 Apr 2020 09:46), Max: 37.8 (04 Apr 2020 09:46)  T(F): 100 (04 Apr 2020 09:46), Max: 100 (04 Apr 2020 09:46)  HR: 68 (04 Apr 2020 11:00) (66 - 69)  BP: 89/52 (04 Apr 2020 11:00) (87/51 - 91/52)  BP(mean): --  RR: 24 (04 Apr 2020 11:00) (24 - 24)  SpO2: 100% (04 Apr 2020 11:00) (89% - 100%)  I&O's Summary      PHYSICAL EXAM:  GENERAL: onnc  HEAD:  Atraumatic, Normocephalic  EYES: EOMI, PERRLA, conjunctiva and sclera clear  NECK: Supple, No JVD  CHEST/LUNG: dec bs at bases and rhonchi  HEART: Regular rate and rhythm; No murmurs, rubs, or gallops  ABDOMEN: Soft, Nontender, Nondistended; Bowel sounds present  SKIN: No rashes or lesions    LABS:                        14.7   2.88  )-----------( 52       ( 04 Apr 2020 10:49 )             45.9     04-04    154<H>  |  117<H>  |  31<H>  ----------------------------<  107<H>  4.7   |  30  |  1.48<H>    Ca    8.5      04 Apr 2020 10:49    TPro  7.1  /  Alb  2.3<L>  /  TBili  0.2  /  DBili  x   /  AST  73<H>  /  ALT  66<H>  /  AlkPhos  118  04-04      CAPILLARY BLOOD GLUCOSE                RADIOLOGY & ADDITIONAL TESTS:    Imaging Personally Reviewed:  [x] YES  [ ] NO    Consultant(s) Notes Reviewed:  [x] YES  [ ] NO      MEDICATIONS  (STANDING):  azithromycin   Tablet 500 milliGRAM(s) Oral daily  dextrose 5%. 1000 milliLiter(s) (100 mL/Hr) IV Continuous <Continuous>  diVALproex  milliGRAM(s) Oral three times a day  enoxaparin Injectable 30 milliGRAM(s) SubCutaneous daily  levETIRAcetam 1000 milliGRAM(s) Oral two times a day  levothyroxine 125 MICROGram(s) Oral daily  memantine 5 milliGRAM(s) Oral daily  pantoprazole    Tablet 40 milliGRAM(s) Oral before breakfast  polyethylene glycol 3350 17 Gram(s) Oral daily    MEDICATIONS  (PRN):  acetaminophen   Tablet .. 650 milliGRAM(s) Oral every 6 hours PRN Temp greater or equal to 38C (100.4F), Mild Pain (1 - 3)      Care Discussed with Consultants/Other Providers [x] YES  [ ] NO    HEALTH ISSUES - PROBLEM Dx: (04 Apr 2020 13:11)      PAST MEDICAL & SURGICAL HISTORY:  Frequent urinary tract infections  Hypothyroidism, unspecified type  Down syndrome      FAMILY HISTORY:        MEDICATIONS   acetaminophen   Tablet .. 650 milliGRAM(s) Oral every 6 hours PRN  acetaminophen  Suppository .. 650 milliGRAM(s) Rectal every 4 hours PRN  albumin human 25% IVPB 50 milliLiter(s) IV Intermittent every 6 hours  aspirin  chewable 81 milliGRAM(s) Oral daily  chlorhexidine 0.12% Liquid 15 milliLiter(s) Oral Mucosa every 12 hours  chlorhexidine 2% Cloths 1 Application(s) Topical daily  dexMEDEtomidine Infusion 0.5 MICROgram(s)/kG/Hr IV Continuous <Continuous>  enoxaparin Injectable 40 milliGRAM(s) SubCutaneous every 12 hours  fentaNYL    Injectable 25 MICROGram(s) IV Push every 2 hours PRN  hydrocortisone sodium succinate Injectable 50 milliGRAM(s) IV Push every 8 hours  lactobacillus acidophilus 1 Tablet(s) Oral three times a day with meals  levETIRAcetam  IVPB 1000 milliGRAM(s) IV Intermittent every 12 hours  levothyroxine Injectable 62.5 MICROGram(s) IV Push at bedtime  memantine 5 milliGRAM(s) Oral daily  metoclopramide 5 milliGRAM(s) Oral three times a day before meals  midodrine 10 milliGRAM(s) Oral every 8 hours  norepinephrine Infusion 0.05 MICROgram(s)/kG/Min IV Continuous <Continuous>  pantoprazole  Injectable 40 milliGRAM(s) IV Push daily  valproate sodium IVPB 500 milliGRAM(s) IV Intermittent every 12 hours  vasopressin Infusion 0.06 Unit(s)/Min IV Continuous <Continuous>      Vital Signs Last 24 Hrs  T(C): 36.5 (19 Apr 2020 10:00), Max: 36.5 (19 Apr 2020 10:00)  T(F): 97.7 (19 Apr 2020 10:00), Max: 97.7 (19 Apr 2020 10:00)  HR: 62 (19 Apr 2020 16:00) (48 - 78)  BP: 143/81 (19 Apr 2020 16:00) (70/50 - 156/99)  BP(mean): --  RR: 20 (19 Apr 2020 16:00) (15 - 20)  SpO2: 97% (19 Apr 2020 16:00) (93% - 100%)      04-18-20 @ 07:01  -  04-19-20 @ 07:00  --------------------------------------------------------  IN: 3051.4 mL / OUT: 1605 mL / NET: 1446.4 mL    04-19-20 @ 07:01  -  04-19-20 @ 17:28  --------------------------------------------------------  IN: 863.2 mL / OUT: 1900 mL / NET: -1036.8 mL        Mode: AC/ CMV (Assist Control/ Continuous Mandatory Ventilation), RR (machine): 20, TV (machine): 320, FiO2: 40, PEEP: 5, ITime: 1, MAP: 10, PIP: 22    LABS:                        9.2    5.81  )-----------( 113      ( 19 Apr 2020 06:17 )             26.8     04-19    136  |  105  |  8   ----------------------------<  99  4.2   |  28  |  0.62    Ca    7.9<L>      19 Apr 2020 06:17  Phos  2.7     04-19  Mg     2.0     04-19    TPro  6.1  /  Alb  1.4<L>  /  TBili  0.3  /  DBili  x   /  AST  39  /  ALT  46  /  AlkPhos  88  04-19    PT/INR - ( 19 Apr 2020 06:17 )   PT: 12.3 sec;   INR: 1.10 ratio         PTT - ( 19 Apr 2020 06:17 )  PTT:33.0 sec      ABG - ( 19 Apr 2020 08:41 )  pH, Arterial: x     pH, Blood: 7.48  /  pCO2: 32    /  pO2: 63    / HCO3: 25    / Base Excess: 0.3   /  SaO2: 92                  WBC:  WBC Count: 5.81 K/uL (04-19 @ 06:17)  WBC Count: 8.75 K/uL (04-18 @ 06:41)  WBC Count: 8.39 K/uL (04-17 @ 07:06)  WBC Count: 5.53 K/uL (04-16 @ 06:38)      MICROBIOLOGY:  RECENT CULTURES:  04-14 .Stool Feces XXXX XXXX   No enteric pathogens isolated.  (Stool culture examined for Salmonella,  Shigella, Campylobacter, Aeromonas, Plesiomonas,  Vibrio, E.coli O157 and Yersinia)          CARDIAC MARKERS ( 19 Apr 2020 06:17 )  .095 ng/mL / x     / 26 U/L / x     / x            PT/INR - ( 19 Apr 2020 06:17 )   PT: 12.3 sec;   INR: 1.10 ratio         PTT - ( 19 Apr 2020 06:17 )  PTT:33.0 sec    Sodium:  Sodium, Serum: 136 mmol/L (04-19 @ 06:17)  Sodium, Serum: 137 mmol/L (04-19 @ 01:44)  Sodium, Serum: 142 mmol/L (04-18 @ 15:08)  Sodium, Serum: 142 mmol/L (04-18 @ 06:41)  Sodium, Serum: 146 mmol/L (04-17 @ 07:06)  Sodium, Serum: 142 mmol/L (04-16 @ 06:38)      0.62 mg/dL 04-19 @ 06:17  0.62 mg/dL 04-19 @ 01:44  0.67 mg/dL 04-18 @ 15:08  0.81 mg/dL 04-18 @ 06:41  0.78 mg/dL 04-17 @ 07:06  0.70 mg/dL 04-16 @ 06:38      Hemoglobin:  Hemoglobin: 9.2 g/dL (04-19 @ 06:17)  Hemoglobin: 10.5 g/dL (04-18 @ 06:41)  Hemoglobin: 10.6 g/dL (04-17 @ 07:06)  Hemoglobin: 10.3 g/dL (04-16 @ 06:38)      Platelets: Platelet Count - Automated: 113 K/uL (04-19 @ 06:17)  Platelet Count - Automated: 142 K/uL (04-18 @ 06:41)  Platelet Count - Automated: 149 K/uL (04-17 @ 07:06)  Platelet Count - Automated: 129 K/uL (04-16 @ 06:38)      LIVER FUNCTIONS - ( 19 Apr 2020 06:17 )  Alb: 1.4 g/dL / Pro: 6.1 g/dL / ALK PHOS: 88 U/L / ALT: 46 U/L DA / AST: 39 U/L / GGT: x                 RADIOLOGY & ADDITIONAL STUDIES:

## 2020-04-19 NOTE — PROGRESS NOTE ADULT - SUBJECTIVE AND OBJECTIVE BOX
24 hour events: had WCT yesterday and was placed on amio, discontinued today. she remains on mechanical ventilation. net positive i's and o's - given lasix. on pressors.    Review of Systems:  unable to obtain       Wt(kg): --    Mode: AC/ CMV (Assist Control/ Continuous Mandatory Ventilation), RR (machine): 20, TV (machine): 320, FiO2: 40, PEEP: 5  04-18-20 @ 07:01  -  04-19-20 @ 07:00  --------------------------------------------------------  IN: 3051.4 mL / OUT: 1605 mL / NET: 1446.4 mL        CAPILLARY BLOOD GLUCOSE      POCT Blood Glucose.: 144 mg/dL (18 Apr 2020 20:41)      I&O's Summary    18 Apr 2020 07:01  -  19 Apr 2020 07:00  --------------------------------------------------------  IN: 3051.4 mL / OUT: 1605 mL / NET: 1446.4 mL        Physical Exam:  T(F): 97.7 (04-19-20 @ 10:00), Max: 97.7 (04-19-20 @ 10:00)  HR: 97 (04-19-20 @ 10:00) (48 - 97)  BP: 127/89 (04-19-20 @ 10:00) (70/50 - 156/99)  RR: 20 (04-19-20 @ 10:00) (15 - 20)  SpO2: 95% (04-19-20 @ 10:00) (93% - 100%)    Gen: vented, sedated  Neuro: sedated  HEENT: MMM, ET/NGT in place  Resp: vented breath sounds  CVS: S1S2 RRR  Abd: soft, nontender, nondistended, bowel sounds present  Ext: no edema  Skin: warm, well perfused; right IJ CVC    Meds:    midodrine Oral  norepinephrine Infusion IV Continuous    levothyroxine Injectable IV Push  vasopressin Infusion IV Continuous      acetaminophen   Tablet .. Oral PRN  acetaminophen  Suppository .. Rectal PRN  dexMEDEtomidine Infusion IV Continuous  fentaNYL    Injectable IV Push PRN  levETIRAcetam  IVPB IV Intermittent  memantine Oral  valproate sodium IVPB IV Intermittent      aspirin  chewable Oral  enoxaparin Injectable SubCutaneous    pantoprazole  Injectable IV Push      albumin human 25% IVPB IV Intermittent      chlorhexidine 0.12% Liquid Oral Mucosa  chlorhexidine 2% Cloths Topical    lactobacillus acidophilus Oral                            9.2    5.81  )-----------( 113      ( 19 Apr 2020 06:17 )             26.8       04-19    136  |  105  |  8   ----------------------------<  99  4.2   |  28  |  0.62    Ca    7.9<L>      19 Apr 2020 06:17  Phos  2.7     04-19  Mg     2.0     04-19    TPro  6.1  /  Alb  1.4<L>  /  TBili  0.3  /  DBili  x   /  AST  39  /  ALT  46  /  AlkPhos  88  04-19      CARDIAC MARKERS ( 19 Apr 2020 06:17 )  .095 ng/mL / x     / 26 U/L / x     / x          PT/INR - ( 19 Apr 2020 06:17 )   PT: 12.3 sec;   INR: 1.10 ratio         PTT - ( 19 Apr 2020 06:17 )  PTT:33.0 sec    .Stool Feces   No enteric pathogens isolated.  (Stool culture examined for Salmonella,  Shigella, Campylobacter, Aeromonas, Plesiomonas,  Vibrio, E.coli O157 and Yersinia) -- 04-14 @ 16:46    C Diff by PCR Result: NotDetec (04-14 @ 15:05)            Radiology: ***    < from: Xray Chest 1 View- PORTABLE-Urgent (04.19.20 @ 02:18) >    EXAM:  XR CHEST PORTABLE URGENT 1V                          EXAM:  XR CHEST PORTABLE IMMED 1V                                  PROCEDURE DATE:  04/19/2020          INTERPRETATION:  Clinical history feeding tube placement    4/19/2020 1:54 AM    A single frontal view of the chest demonstrates significantly increased opacity in the right chest with probable rightward mediastinal shift suspicious for right lung collapse. Right internal jugular venous catheter is seen with tip in distal SVC and feeding tube is seen with tip in distal esophagus and endotracheal tube tip is seen 2 cm above the teo.    4/19/2020 1:56 AM    A single frontal view of the chest again demonstrates right internal jugular venous catheter with tip in distal SVC and endotracheal tube with tip 2 cm above the teo. The feeding tube has been adjusted and now courses to the stomach. The right lung remains very opacified and there is rightward mediastinal shift suggesting a significant degree of collapse.    Impression:    Findings most consistent with right lung collapse, possible pneumonia as well.        HINA INMAN M.D. ATTENDING RADIOLOGIST  This document has been electronically signed. Apr 19 2020 10:11AM                < end of copied text >    CENTRAL LINE: Y  HOWARD: Y  A-LINE: N    GLOBAL ISSUE/BEST PRACTICE:  Analgesia: Y  Sedation: Y  CAM-ICU: ANNE  HOB elevation: yes  Stress ulcer prophylaxis: Y  VTE prophylaxis: Y  Glycemic control: Y  Nutrition: NPO with TF    CODE STATUS: DNR

## 2020-04-20 NOTE — PROGRESS NOTE ADULT - SUBJECTIVE AND OBJECTIVE BOX
Patient is a 60y old  Female who presents with a chief complaint of sob (06 Apr 2020 11:30)    Patient seen in follow up for ADAM, Hypernatremia.   COVID +. Chart reviewed.        PAST MEDICAL HISTORY:  Frequent urinary tract infections  Hypothyroidism, unspecified type  Down syndrome      MEDICATIONS  (STANDING):  albumin human 25% IVPB 50 milliLiter(s) IV Intermittent every 6 hours  aspirin  chewable 81 milliGRAM(s) Oral daily  chlorhexidine 0.12% Liquid 15 milliLiter(s) Oral Mucosa every 12 hours  chlorhexidine 2% Cloths 1 Application(s) Topical daily  dexMEDEtomidine Infusion 0.5 MICROgram(s)/kG/Hr (5.68 mL/Hr) IV Continuous <Continuous>  enoxaparin Injectable 40 milliGRAM(s) SubCutaneous every 12 hours  hydrocortisone sodium succinate Injectable 50 milliGRAM(s) IV Push every 8 hours  lactobacillus acidophilus 1 Tablet(s) Oral three times a day with meals  levETIRAcetam  IVPB 1000 milliGRAM(s) IV Intermittent every 12 hours  levothyroxine Injectable 62.5 MICROGram(s) IV Push at bedtime  memantine 5 milliGRAM(s) Oral daily  metoclopramide 5 milliGRAM(s) Oral three times a day before meals  midodrine 10 milliGRAM(s) Oral every 8 hours  norepinephrine Infusion 0.05 MICROgram(s)/kG/Min (4.26 mL/Hr) IV Continuous <Continuous>  pantoprazole  Injectable 40 milliGRAM(s) IV Push daily  sodium chloride 1 Gram(s) Oral two times a day  valproate sodium IVPB 500 milliGRAM(s) IV Intermittent every 12 hours  vasopressin Infusion 0.06 Unit(s)/Min (3.6 mL/Hr) IV Continuous <Continuous>    MEDICATIONS  (PRN):  acetaminophen   Tablet .. 650 milliGRAM(s) Oral every 6 hours PRN Temp greater or equal to 38C (100.4F), Mild Pain (1 - 3)  acetaminophen  Suppository .. 650 milliGRAM(s) Rectal every 4 hours PRN Temp greater or equal to 38C (100.4F)  fentaNYL    Injectable 25 MICROGram(s) IV Push every 2 hours PRN agitation    T(C): 36.7 (04-20-20 @ 12:00), Max: 36.7 (04-19-20 @ 23:42)  HR: 63 (04-20-20 @ 12:00) (48 - 80)  BP: 123/69 (04-20-20 @ 12:00) (70/50 - 156/99)  RR: 20 (04-20-20 @ 12:00)  SpO2: 94% (04-20-20 @ 12:00)  Wt(kg): --  I&O's Detail    19 Apr 2020 07:01  -  20 Apr 2020 07:00  --------------------------------------------------------  IN:    Albumin 25%: 50 mL    dexmedetomidine Infusion: 218.6 mL    Enteral Tube Flush: 1200 mL    IV PiggyBack: 50 mL    norepinephrine Infusion: 423 mL    ns in tub fed  [id number]: 720 mL    Solution: 250 mL    vasopressin Infusion: 115.2 mL  Total IN: 3026.8 mL    OUT:    Indwelling Catheter - Urethral: 4145 mL  Total OUT: 4145 mL    Total NET: -1118.2 mL      PHYSICAL EXAM:  Pt on COVID precautions. Chart reviewed and previous physical examination noted.                    Mode: AC/ CMV (Assist Control/ Continuous Mandatory Ventilation), RR (machine): 20, TV (machine): 320, FiO2: 100, PEEP: 5, ITime: 1, MAP: 12, PIP: 28  LABORATORY:                        8.1    4.58  )-----------( 103      ( 20 Apr 2020 07:03 )             23.7     04-20    128<L>  |  91<L>  |  9   ----------------------------<  142<H>  3.4<L>   |  29  |  0.67    Ca    8.6      20 Apr 2020 12:03  Phos  3.2     04-20  Mg     2.3     04-20    TPro  7.5  /  Alb  3.4  /  TBili  0.5  /  DBili  x   /  AST  29  /  ALT  34  /  AlkPhos  80  04-20    Sodium, Serum: 128 mmol/L (04-20 @ 12:03)  Sodium, Serum: 125 mmol/L (04-20 @ 07:03)  Sodium, Serum: 136 mmol/L (04-19 @ 06:17)  Sodium, Serum: 137 mmol/L (04-19 @ 01:44)    Potassium, Serum: 3.4 mmol/L (04-20 @ 12:03)  Potassium, Serum: 3.9 mmol/L (04-20 @ 07:03)  Potassium, Serum: 4.2 mmol/L (04-19 @ 06:17)  Potassium, Serum: 5.0 mmol/L (04-19 @ 01:44)    Hemoglobin: 8.1 g/dL (04-20 @ 07:03)  Hemoglobin: 9.2 g/dL (04-19 @ 06:17)  Hemoglobin: 10.5 g/dL (04-18 @ 06:41)    Creatinine, Serum 0.67 (04-20 @ 12:03)  Creatinine, Serum 0.59 (04-20 @ 07:03)  Creatinine, Serum 0.62 (04-19 @ 06:17)  Creatinine, Serum 0.62 (04-19 @ 01:44)    CARDIAC MARKERS ( 20 Apr 2020 07:25 )  .044 ng/mL / x     / x     / x     / x      CARDIAC MARKERS ( 19 Apr 2020 06:17 )  .095 ng/mL / x     / 26 U/L / x     / x          LIVER FUNCTIONS - ( 20 Apr 2020 12:03 )  Alb: 3.4 g/dL / Pro: 7.5 g/dL / ALK PHOS: 80 U/L / ALT: 34 U/L DA / AST: 29 U/L / GGT: x             ABG - ( 19 Apr 2020 08:41 )  pH, Arterial: x     pH, Blood: 7.48  /  pCO2: 32    /  pO2: 63    / HCO3: 25    / Base Excess: 0.3   /  SaO2: 92 PNA (pneumonia)

## 2020-04-20 NOTE — PROGRESS NOTE ADULT - SUBJECTIVE AND OBJECTIVE BOX
Chief Complaint: Shortness of breath    Interval Events: Remains intubated on pressors.    Review of Systems:  Unable to obtain    Physical Exam:  Vital Signs Last 24 Hrs  T(C): 36.1 (20 Apr 2020 07:30), Max: 36.7 (19 Apr 2020 23:42)  T(F): 97 (20 Apr 2020 07:30), Max: 98 (19 Apr 2020 23:42)  HR: 57 (20 Apr 2020 07:30) (56 - 63)  BP: 120/72 (20 Apr 2020 07:30) (96/76 - 143/81)  BP(mean): --  RR: 20 (20 Apr 2020 07:30) (20 - 20)  SpO2: 94% (20 Apr 2020 07:30) (93% - 98%)  General: Sedated  HEENT: Intubated  Neck: No JVD, no carotid bruit  Lungs: Coarse bilaterally  CV: RRR, nl S1/S2, no M/R/G  Abdomen: S/NT/ND, +BS  Extremities: No LE edema, no cyanosis  Neuro: Non-focal  Skin: No rash    Labs:             04-20    125<L>  |  93<L>  |  8   ----------------------------<  130<H>  3.9   |  28  |  0.59    Ca    8.1<L>      20 Apr 2020 07:03  Phos  3.2     04-20  Mg     2.3     04-20    TPro  6.8  /  Alb  2.6<L>  /  TBili  0.4  /  DBili  x   /  AST  36  /  ALT  36  /  AlkPhos  79  04-20                        8.1    4.58  )-----------( 103      ( 20 Apr 2020 07:03 )             23.7         Telemetry: Sinus rhythm, PVCs, AIVR

## 2020-04-20 NOTE — PROGRESS NOTE ADULT - SUBJECTIVE AND OBJECTIVE BOX
Date/Time Patient Seen:  		  Referring MD:   Data Reviewed	       Patient is a 60y old  Female who presents with a chief complaint of sob (19 Apr 2020 17:28)      Subjective/HPI     PAST MEDICAL & SURGICAL HISTORY:  Frequent urinary tract infections  Hypothyroidism, unspecified type  Down syndrome        Medication list         MEDICATIONS  (STANDING):  albumin human 25% IVPB 50 milliLiter(s) IV Intermittent every 6 hours  aspirin  chewable 81 milliGRAM(s) Oral daily  chlorhexidine 0.12% Liquid 15 milliLiter(s) Oral Mucosa every 12 hours  chlorhexidine 2% Cloths 1 Application(s) Topical daily  dexMEDEtomidine Infusion 0.5 MICROgram(s)/kG/Hr (5.68 mL/Hr) IV Continuous <Continuous>  enoxaparin Injectable 40 milliGRAM(s) SubCutaneous every 12 hours  hydrocortisone sodium succinate Injectable 50 milliGRAM(s) IV Push every 8 hours  lactobacillus acidophilus 1 Tablet(s) Oral three times a day with meals  levETIRAcetam  IVPB 1000 milliGRAM(s) IV Intermittent every 12 hours  levothyroxine Injectable 62.5 MICROGram(s) IV Push at bedtime  memantine 5 milliGRAM(s) Oral daily  metoclopramide 5 milliGRAM(s) Oral three times a day before meals  midodrine 10 milliGRAM(s) Oral every 8 hours  norepinephrine Infusion 0.05 MICROgram(s)/kG/Min (4.26 mL/Hr) IV Continuous <Continuous>  pantoprazole  Injectable 40 milliGRAM(s) IV Push daily  valproate sodium IVPB 500 milliGRAM(s) IV Intermittent every 12 hours  vasopressin Infusion 0.06 Unit(s)/Min (3.6 mL/Hr) IV Continuous <Continuous>    MEDICATIONS  (PRN):  acetaminophen   Tablet .. 650 milliGRAM(s) Oral every 6 hours PRN Temp greater or equal to 38C (100.4F), Mild Pain (1 - 3)  acetaminophen  Suppository .. 650 milliGRAM(s) Rectal every 4 hours PRN Temp greater or equal to 38C (100.4F)  fentaNYL    Injectable 25 MICROGram(s) IV Push every 2 hours PRN agitation         Vitals log        ICU Vital Signs Last 24 Hrs  T(C): 36.1 (20 Apr 2020 07:30), Max: 36.7 (19 Apr 2020 23:42)  T(F): 97 (20 Apr 2020 07:30), Max: 98 (19 Apr 2020 23:42)  HR: 61 (20 Apr 2020 09:00) (56 - 63)  BP: 120/72 (20 Apr 2020 07:30) (96/76 - 143/81)  BP(mean): --  ABP: --  ABP(mean): --  RR: 20 (20 Apr 2020 07:30) (20 - 20)  SpO2: 87% (20 Apr 2020 09:00) (87% - 98%)       Mode: AC/ CMV (Assist Control/ Continuous Mandatory Ventilation)  RR (machine): 20  TV (machine): 320  FiO2: 100  PEEP: 5  ITime: 1  MAP: 12  PIP: 28      Input and Output:  I&O's Detail    19 Apr 2020 07:01  -  20 Apr 2020 07:00  --------------------------------------------------------  IN:    Albumin 25%: 50 mL    dexmedetomidine Infusion: 218.6 mL    Enteral Tube Flush: 1200 mL    IV PiggyBack: 50 mL    norepinephrine Infusion: 423 mL    ns in tub fed  [id number]: 720 mL    Solution: 250 mL    vasopressin Infusion: 115.2 mL  Total IN: 3026.8 mL    OUT:    Indwelling Catheter - Urethral: 4145 mL  Total OUT: 4145 mL    Total NET: -1118.2 mL          Lab Data                        8.1    4.58  )-----------( 103      ( 20 Apr 2020 07:03 )             23.7     04-20    125<L>  |  93<L>  |  8   ----------------------------<  130<H>  3.9   |  28  |  0.59    Ca    8.1<L>      20 Apr 2020 07:03  Phos  3.2     04-20  Mg     2.3     04-20    TPro  6.8  /  Alb  2.6<L>  /  TBili  0.4  /  DBili  x   /  AST  36  /  ALT  36  /  AlkPhos  79  04-20    ABG - ( 19 Apr 2020 08:41 )  pH, Arterial: x     pH, Blood: 7.48  /  pCO2: 32    /  pO2: 63    / HCO3: 25    / Base Excess: 0.3   /  SaO2: 92                CARDIAC MARKERS ( 20 Apr 2020 07:25 )  .044 ng/mL / x     / x     / x     / x      CARDIAC MARKERS ( 19 Apr 2020 06:17 )  .095 ng/mL / x     / 26 U/L / x     / x            Review of Systems	      Objective     Physical Examination    heart s1s2  lung dec BS      Pertinent Lab findings & Imaging      Deloris:  NO   Adequate UO     I&O's Detail    19 Apr 2020 07:01  -  20 Apr 2020 07:00  --------------------------------------------------------  IN:    Albumin 25%: 50 mL    dexmedetomidine Infusion: 218.6 mL    Enteral Tube Flush: 1200 mL    IV PiggyBack: 50 mL    norepinephrine Infusion: 423 mL    ns in tub fed  [id number]: 720 mL    Solution: 250 mL    vasopressin Infusion: 115.2 mL  Total IN: 3026.8 mL    OUT:    Indwelling Catheter - Urethral: 4145 mL  Total OUT: 4145 mL    Total NET: -1118.2 mL               Discussed with:     Cultures:	        Radiology

## 2020-04-20 NOTE — PROGRESS NOTE ADULT - SUBJECTIVE AND OBJECTIVE BOX
MIGNON WALTER is a 60yFemale , patient examined and chart reviewed.    INTERVAL HPI/ OVERNIGHT EVENTS:  Remains intubated. Sedated. On 2  pressors.  Afebrile.    Past Medical History--  PAST MEDICAL & SURGICAL HISTORY:  Frequent urinary tract infections  Hypothyroidism, unspecified type  Down syndrome      For details regarding the patient's social history, family history, and other miscellaneous elements, please refer the initial infectious diseases consultation and/or the admitting history and physical examination for this admission.      ROS:  Unable to obtain due to : Pt's condition    ALLERGIES:  amoxicillin (Rash)  penicillin (Rash)      Current inpatient medications :  MEDICATIONS  (STANDING):  albumin human 25% IVPB 50 milliLiter(s) IV Intermittent every 6 hours  aspirin  chewable 81 milliGRAM(s) Oral daily  chlorhexidine 0.12% Liquid 15 milliLiter(s) Oral Mucosa every 12 hours  chlorhexidine 2% Cloths 1 Application(s) Topical daily  dexMEDEtomidine Infusion 0.5 MICROgram(s)/kG/Hr (5.68 mL/Hr) IV Continuous <Continuous>  enoxaparin Injectable 40 milliGRAM(s) SubCutaneous every 12 hours  hydrocortisone sodium succinate Injectable 50 milliGRAM(s) IV Push every 8 hours  lactobacillus acidophilus 1 Tablet(s) Oral three times a day with meals  levETIRAcetam  IVPB 1000 milliGRAM(s) IV Intermittent every 12 hours  levothyroxine Injectable 62.5 MICROGram(s) IV Push at bedtime  memantine 5 milliGRAM(s) Oral daily  metoclopramide 5 milliGRAM(s) Oral three times a day before meals  midodrine 10 milliGRAM(s) Oral every 8 hours  norepinephrine Infusion 0.05 MICROgram(s)/kG/Min (4.26 mL/Hr) IV Continuous <Continuous>  pantoprazole  Injectable 40 milliGRAM(s) IV Push daily  sodium chloride 1 Gram(s) Oral two times a day  valproate sodium IVPB 500 milliGRAM(s) IV Intermittent every 12 hours  vasopressin Infusion 0.06 Unit(s)/Min (3.6 mL/Hr) IV Continuous <Continuous>    MEDICATIONS  (PRN):  acetaminophen   Tablet .. 650 milliGRAM(s) Oral every 6 hours PRN Temp greater or equal to 38C (100.4F), Mild Pain (1 - 3)  acetaminophen  Suppository .. 650 milliGRAM(s) Rectal every 4 hours PRN Temp greater or equal to 38C (100.4F)  fentaNYL    Injectable 25 MICROGram(s) IV Push every 2 hours PRN agitation      Objective:  ICU Vital Signs Last 24 Hrs  T(C): 36.7 (20 Apr 2020 12:00), Max: 36.7 (19 Apr 2020 23:42)  T(F): 98.1 (20 Apr 2020 12:00), Max: 98.1 (20 Apr 2020 12:00)  HR: 63 (20 Apr 2020 12:00) (56 - 63)  BP: 123/69 (20 Apr 2020 12:00) (96/76 - 143/81)  BP(mean): --  ABP: --  ABP(mean): --  RR: 20 (20 Apr 2020 12:00) (20 - 20)  SpO2: 94% (20 Apr 2020 12:00) (87% - 97%)      Mode: AC/ CMV (Assist Control/ Continuous Mandatory Ventilation)  RR (machine): 20  TV (machine): 320  FiO2: 100  PEEP: 5  ITime: 1  MAP: 12  PIP: 28    Physical Exam:  GEN: Vented sedated  HEENT: normocephalic and atraumatic. EOMI. ANASTASIYA. Moist mucosa. Clear Posterior pharynx.  NECK: Supple. No carotid bruits.  No lymphadenopathy or thyromegaly.  LUNGS: Decreased to auscultation.  HEART: Regular rate and rhythm without murmur.  ABDOMEN: Soft, nontender, and nondistended.  Positive bowel sounds.  No hepatosplenomegaly was noted.  EXTREMITIES: Without any cyanosis, clubbing, rash, lesions or edema.  NEUROLOGIC: Sedated  SKIN: No ulceration or induration present.    LABS:                       8.1    4.58  )-----------( 103      ( 20 Apr 2020 07:03 )             23.7   04-20    128<L>  |  91<L>  |  9   ----------------------------<  142<H>  3.4<L>   |  29  |  0.67    Ca    8.6      20 Apr 2020 12:03  Phos  3.2     04-20  Mg     2.3     04-20    TPro  7.5  /  Alb  3.4  /  TBili  0.5  /  DBili  x   /  AST  29  /  ALT  34  /  AlkPhos  80  04-20    Ferritin, Serum (04.20.20 @ 13:02)    Ferritin, Serum: 1011 ng/mL    Lactate Dehydrogenase, Serum (04.20.20 @ 13:03)    Lactate Dehydrogenase, Serum: 385 U/L    D-Dimer Assay, Quantitative (04.20.20 @ 07:25)    D-Dimer Assay, Quantitative: 228 ng/mL DDU      MICROBIOLOGY:  Culture - Blood (04.06.20 @ 16:37)    Specimen Source: .Blood Blood    Culture Results:   No Growth Final    GI PCR Panel, Stool (collected 14 Apr 2020 16:46)  Source: .Stool Feces  Final Report (14 Apr 2020 19:26):    GI PCR Results: NOT detected    *******Please Note:*******    GI panel PCR evaluates for:    Campylobacter, Plesiomonas shigelloides, Salmonella,    Vibrio, Yersinia enterocolitica, Enteroaggregative    Escherichia coli (EAEC), Enteropathogenic E.coli (EPEC),    Enterotoxigenic E. coli (ETEC) lt/st, Shiga-like    toxin-producing E. coli (STEC) stx1/stx2,    Shigella/ Enteroinvasive E. coli (EIEC), Cryptosporidium,    Cyclospora cayetanensis, Entamoeba histolytica,    Giardia lamblia, Adenovirus F 40/41, Astrovirus,    Norovirus GI/GII, Rotavirus A, Sapovirus    Culture - Stool (collected 14 Apr 2020 16:46)  Source: .Stool Feces  Preliminary Report (15 Apr 2020 15:09):    No enteric pathogens to date: Final culture pending    No enteric gram negative rods isolated    Culture - Sputum . (04.09.20 @ 16:26)    Gram Stain:   Numerous polymorphonuclear leukocytes seen per low power field  Rare Squamous epithelial cells seen per low power field  Rare Gram positive cocci in pairs seen per oil power field    Specimen Source: .Sputum Sputum    Culture Results:   Normal Respiratory Jackelin present    RADIOLOGY & ADDITIONAL STUDIES:    EXAM:  XR CHEST PORTABLE IMMED 1V                                  PROCEDURE DATE:  04/15/2020          INTERPRETATION:  INDICATION: ETT pulled back    PRIORS: Respiratory failure; Covid exposure.    VIEWS: Portable AP radiography of the chest performed.    FINDINGS: Since prior evaluation the ETT has been repositioned, the distal tip located approximately 2.5 cm superior to the tracheal bifurcation. There is no change in position of the indwelling right IJ CVC. Bilateral lung parenchymal airspace opacities are identified. No pleural effusion or pneumothorax is demonstrated. No mediastinal shift is noted. Degenerative changes the thoracic spine noted. There is no change in position of the indwelling NGT.    IMPRESSION: : Since prior evaluation the ETT has been repositioned, the distal tip located approximately 2.5 cm superior to the tracheal bifurcation. Bilateral lung parenchymal airspace opacities.      DISCRETE X-RAY DATA:  Percent of LEFT lung opacification: 34-66%  Percent of RIGHTlung opacification: 34-66%  Change in lung opacification from most recent x-ray (<=3 days): Stable  Change from prior dated 3 or more days (same admission): Stable      EXAM:  XR CHEST PORTABLE ROUTINE 1V                                  PROCEDURE DATE:  04/20/2020          INTERPRETATION:  Chest one view    HISTORY: Respiratory failure    COMPARISON STUDY: 4/19/2020    Frontal expiratory view of the chest shows the heart to be similar in size. Endotracheal tube, nasogastric tube and right jugular line remain present.    The lungs show progression of right infiltrate with effusion and similar left base infiltrate. There is no evidence of pneumothorax nor pleural effusion.    IMPRESSION:  Progression of right infiltrate.    Thank you for the courtesy of this referral.    Assessment :   59YO F nonverbal, MRDD, hypothyroidism and down syndrome, hx of seizures BIBA  from group home admitted to ICU for acute severe respiratory decompensation with severe sepsis and septic shock secondary to COVID 19 PNA. Had been on multiple antibiotics--Meropenam Levaquin and Cefepime. Had single bottle in blood cultures with gram variable rods ( ID still pending and sent to [facility] lab ) Repeat blood culture NGTD. Pt remains on vent and on pressors. Blood culture likely contaminant. Severe sepsis with septic shock and respiratory failure sec COVID 19 pneumonia. Procalcitonin unimpressive. Extubated 4/17/2020 but reintubated same day. Now on 2 pressors.     Plan :   Supportive care  Trend temps and cbc  Vent per ICU  Wean off pressors  Asp precautions  COVID-19--critical period for decompensation  (7-14 days post symptom onset), avoid aerosolizing procedures, NSAIDs   -monitor respiratory status closely ( route of 02 and saturation) and titrate when able  -isolation precautions per protocol  -avoid excessive blood draws, blood cultures and frequent CXRs  -monitor biomarkers- CBC w diff  for NLRNLR <3 low vs >5 high) Ferritin (lower risk <450 vs >850) CRP (low risk <2 and higher risk >6) and LDH, D Dimer, procalcitonin  -therapies remains investigational-- including Hydroxychloroquine  -antibiotics only if there is concern for a bacterial process  -Steroids short course ( 5 days)  --for hypoxia/ARDS /shock  -Prognosis poor  DNR    D/w ICU team    Continue with present regiment.  Appropriate use of antibiotics and adverse effects reviewed.    I have discussed the above plan of care with patient/ family in detail. They expressed understanding of the the treatment plan . Risks, benefits and alternatives discussed in detail. I have asked if they have any questions or concerns and appropriately addressed them to the best of my ability .      Critical care time greater then 35 minutes reviewing notes, labs data/ imaging , discussion with multidisciplinary team.    Thank you for allowing me to participate in care of your patient .        John Rivera MD  Infectious Disease  [phone]

## 2020-04-20 NOTE — PROGRESS NOTE ADULT - SUBJECTIVE AND OBJECTIVE BOX
MIGNON WALTER    [facility] 09    Patient is a 60y old  Female who presents with a chief complaint of sob (20 Apr 2020 12:58)       Allergies    amoxicillin (Rash)  penicillin (Rash)    Intolerances        HPI:  Patient is a 60y old  Female who presents with a chief complaint of sob and fever    HPI:This is a nonverbal pt bib ems from nursing home for fever, sob, cough, low o2 sat, with possible covid exposure. per ems, pt on levaquin for aspiration pna. no further hx available.pt has hx of chronic constipation an dis on multiple stool softners.  she was found ot be hypernatremic and in renal failure at admission.  also has hx of hypothyroidism and down syndrome        PAST MEDICAL & SURGICAL HISTORY:  Frequent urinary tract infections  Hypothyroidism, unspecified type  Down syndrome      FAMILY HISTORY:can not obtain due to mental status      SOCIAL HISTORY:    Allergies    amoxicillin (Rash)  penicillin (Rash)    Intolerances          REVIEW OF SYSEMS:  negative except form above mentioned      Vital Signs Last 24 Hrs  T(C): 37.8 (04 Apr 2020 09:46), Max: 37.8 (04 Apr 2020 09:46)  T(F): 100 (04 Apr 2020 09:46), Max: 100 (04 Apr 2020 09:46)  HR: 68 (04 Apr 2020 11:00) (66 - 69)  BP: 89/52 (04 Apr 2020 11:00) (87/51 - 91/52)  BP(mean): --  RR: 24 (04 Apr 2020 11:00) (24 - 24)  SpO2: 100% (04 Apr 2020 11:00) (89% - 100%)  I&O's Summary      PHYSICAL EXAM:  GENERAL: onnc  HEAD:  Atraumatic, Normocephalic  EYES: EOMI, PERRLA, conjunctiva and sclera clear  NECK: Supple, No JVD  CHEST/LUNG: dec bs at bases and rhonchi  HEART: Regular rate and rhythm; No murmurs, rubs, or gallops  ABDOMEN: Soft, Nontender, Nondistended; Bowel sounds present  SKIN: No rashes or lesions    LABS:                        14.7   2.88  )-----------( 52       ( 04 Apr 2020 10:49 )             45.9     04-04    154<H>  |  117<H>  |  31<H>  ----------------------------<  107<H>  4.7   |  30  |  1.48<H>    Ca    8.5      04 Apr 2020 10:49    TPro  7.1  /  Alb  2.3<L>  /  TBili  0.2  /  DBili  x   /  AST  73<H>  /  ALT  66<H>  /  AlkPhos  118  04-04      CAPILLARY BLOOD GLUCOSE                RADIOLOGY & ADDITIONAL TESTS:    Imaging Personally Reviewed:  [x] YES  [ ] NO    Consultant(s) Notes Reviewed:  [x] YES  [ ] NO      MEDICATIONS  (STANDING):  azithromycin   Tablet 500 milliGRAM(s) Oral daily  dextrose 5%. 1000 milliLiter(s) (100 mL/Hr) IV Continuous <Continuous>  diVALproex  milliGRAM(s) Oral three times a day  enoxaparin Injectable 30 milliGRAM(s) SubCutaneous daily  levETIRAcetam 1000 milliGRAM(s) Oral two times a day  levothyroxine 125 MICROGram(s) Oral daily  memantine 5 milliGRAM(s) Oral daily  pantoprazole    Tablet 40 milliGRAM(s) Oral before breakfast  polyethylene glycol 3350 17 Gram(s) Oral daily    MEDICATIONS  (PRN):  acetaminophen   Tablet .. 650 milliGRAM(s) Oral every 6 hours PRN Temp greater or equal to 38C (100.4F), Mild Pain (1 - 3)      Care Discussed with Consultants/Other Providers [x] YES  [ ] NO    HEALTH ISSUES - PROBLEM Dx: (04 Apr 2020 13:11)      PAST MEDICAL & SURGICAL HISTORY:  Frequent urinary tract infections  Hypothyroidism, unspecified type  Down syndrome      FAMILY HISTORY:        MEDICATIONS   acetaminophen   Tablet .. 650 milliGRAM(s) Oral every 6 hours PRN  acetaminophen  Suppository .. 650 milliGRAM(s) Rectal every 4 hours PRN  albumin human 25% IVPB 50 milliLiter(s) IV Intermittent every 6 hours  aspirin  chewable 81 milliGRAM(s) Oral daily  chlorhexidine 0.12% Liquid 15 milliLiter(s) Oral Mucosa every 12 hours  chlorhexidine 2% Cloths 1 Application(s) Topical daily  dexMEDEtomidine Infusion 0.5 MICROgram(s)/kG/Hr IV Continuous <Continuous>  enoxaparin Injectable 40 milliGRAM(s) SubCutaneous every 12 hours  fentaNYL    Injectable 25 MICROGram(s) IV Push every 2 hours PRN  hydrocortisone sodium succinate Injectable 50 milliGRAM(s) IV Push every 8 hours  lactobacillus acidophilus 1 Tablet(s) Oral three times a day with meals  levETIRAcetam  IVPB 1000 milliGRAM(s) IV Intermittent every 12 hours  levothyroxine Injectable 62.5 MICROGram(s) IV Push at bedtime  memantine 5 milliGRAM(s) Oral daily  metoclopramide 5 milliGRAM(s) Oral three times a day before meals  midodrine 10 milliGRAM(s) Oral every 8 hours  norepinephrine Infusion 0.05 MICROgram(s)/kG/Min IV Continuous <Continuous>  pantoprazole  Injectable 40 milliGRAM(s) IV Push daily  sodium chloride 1 Gram(s) Oral two times a day  valproate sodium IVPB 500 milliGRAM(s) IV Intermittent every 12 hours  vasopressin Infusion 0.06 Unit(s)/Min IV Continuous <Continuous>      Vital Signs Last 24 Hrs  T(C): 36.7 (20 Apr 2020 12:00), Max: 36.7 (19 Apr 2020 23:42)  T(F): 98.1 (20 Apr 2020 12:00), Max: 98.1 (20 Apr 2020 12:00)  HR: 63 (20 Apr 2020 12:00) (56 - 63)  BP: 123/69 (20 Apr 2020 12:00) (96/76 - 143/81)  BP(mean): --  RR: 20 (20 Apr 2020 12:00) (20 - 20)  SpO2: 94% (20 Apr 2020 12:00) (87% - 97%)      04-19-20 @ 07:01  -  04-20-20 @ 07:00  --------------------------------------------------------  IN: 3026.8 mL / OUT: 4145 mL / NET: -1118.2 mL        Mode: AC/ CMV (Assist Control/ Continuous Mandatory Ventilation), RR (machine): 20, TV (machine): 320, FiO2: 100, PEEP: 5, ITime: 1, MAP: 12, PIP: 28    LABS:                        8.1    4.58  )-----------( 103      ( 20 Apr 2020 07:03 )             23.7     04-20    128<L>  |  91<L>  |  9   ----------------------------<  142<H>  3.4<L>   |  29  |  0.67    Ca    8.6      20 Apr 2020 12:03  Phos  3.2     04-20  Mg     2.3     04-20    TPro  7.5  /  Alb  3.4  /  TBili  0.5  /  DBili  x   /  AST  29  /  ALT  34  /  AlkPhos  80  04-20    PT/INR - ( 20 Apr 2020 07:03 )   PT: 13.9 sec;   INR: 1.24 ratio         PTT - ( 19 Apr 2020 06:17 )  PTT:33.0 sec      ABG - ( 19 Apr 2020 08:41 )  pH, Arterial: x     pH, Blood: 7.48  /  pCO2: 32    /  pO2: 63    / HCO3: 25    / Base Excess: 0.3   /  SaO2: 92                  WBC:  WBC Count: 4.58 K/uL (04-20 @ 07:03)  WBC Count: 5.81 K/uL (04-19 @ 06:17)  WBC Count: 8.75 K/uL (04-18 @ 06:41)  WBC Count: 8.39 K/uL (04-17 @ 07:06)      MICROBIOLOGY:  RECENT CULTURES:  04-14 .Stool Feces XXXX XXXX   No enteric pathogens isolated.  (Stool culture examined for Salmonella,  Shigella, Campylobacter, Aeromonas, Plesiomonas,  Vibrio, E.coli O157 and Yersinia)          CARDIAC MARKERS ( 20 Apr 2020 07:25 )  .044 ng/mL / x     / x     / x     / x      CARDIAC MARKERS ( 19 Apr 2020 06:17 )  .095 ng/mL / x     / 26 U/L / x     / x            PT/INR - ( 20 Apr 2020 07:03 )   PT: 13.9 sec;   INR: 1.24 ratio         PTT - ( 19 Apr 2020 06:17 )  PTT:33.0 sec    Sodium:  Sodium, Serum: 128 mmol/L (04-20 @ 12:03)  Sodium, Serum: 125 mmol/L (04-20 @ 07:03)  Sodium, Serum: 136 mmol/L (04-19 @ 06:17)  Sodium, Serum: 137 mmol/L (04-19 @ 01:44)  Sodium, Serum: 142 mmol/L (04-18 @ 15:08)  Sodium, Serum: 142 mmol/L (04-18 @ 06:41)  Sodium, Serum: 146 mmol/L (04-17 @ 07:06)      0.67 mg/dL 04-20 @ 12:03  0.59 mg/dL 04-20 @ 07:03  0.62 mg/dL 04-19 @ 06:17  0.62 mg/dL 04-19 @ 01:44  0.67 mg/dL 04-18 @ 15:08  0.81 mg/dL 04-18 @ 06:41  0.78 mg/dL 04-17 @ 07:06      Hemoglobin:  Hemoglobin: 8.1 g/dL (04-20 @ 07:03)  Hemoglobin: 9.2 g/dL (04-19 @ 06:17)  Hemoglobin: 10.5 g/dL (04-18 @ 06:41)  Hemoglobin: 10.6 g/dL (04-17 @ 07:06)      Platelets: Platelet Count - Automated: 103 K/uL (04-20 @ 07:03)  Platelet Count - Automated: 113 K/uL (04-19 @ 06:17)  Platelet Count - Automated: 142 K/uL (04-18 @ 06:41)  Platelet Count - Automated: 149 K/uL (04-17 @ 07:06)      LIVER FUNCTIONS - ( 20 Apr 2020 12:03 )  Alb: 3.4 g/dL / Pro: 7.5 g/dL / ALK PHOS: 80 U/L / ALT: 34 U/L DA / AST: 29 U/L / GGT: x                 RADIOLOGY & ADDITIONAL STUDIES:

## 2020-04-20 NOTE — ADVANCED PRACTICE NURSE CONSULT - ASSESSMENT
Patient is a 61 yo female admitted 4/4/2020 with PNA + COVID-19 with acute kidney injury: PMHX Down syndrome, frequent urinary tract infections, hypothyroidism, non-verbal: Presents with a right hallux tip serous filled bullae periwound intact no erythema noted.

## 2020-04-20 NOTE — PROGRESS NOTE ADULT - SUBJECTIVE AND OBJECTIVE BOX
60y year old Female admitted for Patient is a 60y old  Female who presents with a chief complaint of sob (20 Apr 2020 17:27)      59 yo F intubated with ARDS from COVID 19 complicated with seizure, gram variable bacteremia, septic shock on pressors, WCT, this evening with BRBPR after attempted placement of fecal management system.     PMH:  Other general symptom or sign  Yes  Handoff  MEWS Score  Frequent urinary tract infections  Hypothyroidism, unspecified type  Down syndrome  Suspected [place] coronavirus infection  COVID-19  Fever  Constipation  Hypothyroidism, unspecified type  ADAM (acute kidney injury)  Hypernatremia  Hypoxia  PNA (pneumonia)  COVID EXPOSURE  90+  Hypoxia  PNA (pneumonia)        MEDICATIONS  (STANDING):  albumin human 25% IVPB 50 milliLiter(s) IV Intermittent every 6 hours  aspirin  chewable 81 milliGRAM(s) Oral daily  chlorhexidine 0.12% Liquid 15 milliLiter(s) Oral Mucosa every 12 hours  chlorhexidine 2% Cloths 1 Application(s) Topical daily  dexMEDEtomidine Infusion 0.5 MICROgram(s)/kG/Hr (5.68 mL/Hr) IV Continuous <Continuous>  enoxaparin Injectable 40 milliGRAM(s) SubCutaneous every 12 hours  hydrocortisone sodium succinate Injectable 50 milliGRAM(s) IV Push every 8 hours  lactobacillus acidophilus 1 Tablet(s) Oral three times a day with meals  levETIRAcetam  IVPB 1000 milliGRAM(s) IV Intermittent every 12 hours  levothyroxine Injectable 62.5 MICROGram(s) IV Push at bedtime  memantine 5 milliGRAM(s) Oral daily  metoclopramide 5 milliGRAM(s) Oral three times a day before meals  midodrine 10 milliGRAM(s) Oral every 8 hours  norepinephrine Infusion 0.05 MICROgram(s)/kG/Min (4.26 mL/Hr) IV Continuous <Continuous>  pantoprazole  Injectable 40 milliGRAM(s) IV Push daily  sodium chloride 1 Gram(s) Oral two times a day  valproate sodium IVPB 500 milliGRAM(s) IV Intermittent every 12 hours  vasopressin Infusion 0.06 Unit(s)/Min (3.6 mL/Hr) IV Continuous <Continuous>    MEDICATIONS  (PRN):  acetaminophen   Tablet .. 650 milliGRAM(s) Oral every 6 hours PRN Temp greater or equal to 38C (100.4F), Mild Pain (1 - 3)  acetaminophen  Suppository .. 650 milliGRAM(s) Rectal every 4 hours PRN Temp greater or equal to 38C (100.4F)  fentaNYL    Injectable 25 MICROGram(s) IV Push every 2 hours PRN agitation  fentaNYL    Injectable 25 MICROGram(s) IV Push every 1 hour PRN discomfort      I&O's Summary    19 Apr 2020 07:01  -  20 Apr 2020 07:00  --------------------------------------------------------  IN: 3026.8 mL / OUT: 4145 mL / NET: -1118.2 mL    20 Apr 2020 07:01  -  20 Apr 2020 23:31  --------------------------------------------------------  IN: 1580.2 mL / OUT: 1750 mL / NET: -169.8 mL      ICU Vital Signs Last 24 Hrs  T(C): 37.8 (20 Apr 2020 19:00), Max: 37.8 (20 Apr 2020 19:00)  T(F): 100.1 (20 Apr 2020 19:00), Max: 100.1 (20 Apr 2020 19:00)  HR: 67 (20 Apr 2020 23:00) (57 - 77)  BP: 132/76 (20 Apr 2020 23:00) (96/76 - 134/82)  BP(mean): --  ABP: --  ABP(mean): --  RR: 67 (20 Apr 2020 23:00) (18 - 67)  SpO2: 93% (20 Apr 2020 23:00) (87% - 97%)      PHYSICAL EXAM:  General: Intubated and sedated  bilateral air entry  RRR  abdomen soft, active bleeding from rectum, external hemorrhoids           04-20    128<L>  |  91<L>  |  9   ----------------------------<  142<H>  3.4<L>   |  29  |  0.67    Ca    8.6      20 Apr 2020 12:03  Phos  3.2     04-20  Mg     2.3     04-20    TPro  7.5  /  Alb  3.4  /  TBili  0.5  /  DBili  x   /  AST  29  /  ALT  34  /  AlkPhos  80  04-20                          8.1    4.58  )-----------( 103      ( 20 Apr 2020 07:03 )             23.7     ABG - ( 19 Apr 2020 08:41 )  pH, Arterial: x     pH, Blood: 7.48  /  pCO2: 32    /  pO2: 63    / HCO3: 25    / Base Excess: 0.3   /  SaO2: 92                CARDIAC MARKERS ( 20 Apr 2020 07:25 )  .044 ng/mL / x     / x     / x     / x      CARDIAC MARKERS ( 19 Apr 2020 06:17 )  .095 ng/mL / x     / 26 U/L / x     / x                   Mode: AC/ CMV (Assist Control/ Continuous Mandatory Ventilation), RR (machine): 20, TV (machine): 320, FiO2: 50, PEEP: 5, ITime: 1, MAP: 11, PIP: 22    Assessment:  1. ARDS from COVID 19 infection  2. seizure  3. GIB  4. bacteremia  5. Septic shock.   6. WCT    Plan:  Neuro: Continue sedation, titrate to RASS -3 to -4.      Cardio: Shes off Vaso, remains on levo titrate for map 65-75.     Pulm: ARDS 2/2 COVID 19- Lung protective strategy , titrate fi02/peep to goal sp02 90-96%, goal PlatP <30 permissive hypercapnea as needed.    Prone, NMB.  Avoid aerosolizing medications     Renal: ADAM resolved Cr now normal , avoid nephrotoxins, daily chemistry to trend Cr, optimize lytes.  strict I/Os.     GI: NPO with tube feeding.  GI ppx. BRBPR this evening, GI paged (Dr. Gardiner group)  hold lovenox, serial CBCs sent stat now, check coags, transfuse for hgb <7 or hemodynamic instability    ID: COVID 19 s/p hydroxychloroqine azithro and steroids, s/p  cefepime for bacteremia.   monitor daily chemistry, CBC with Diff.  trend CRP, D dimer, LDH, CPK, Coags  APAP prn fever, avoid NSAIDs    Heme: DVT ppx, full AC on hold for rectal bleeding.      Endo: monitor FS, goal 140 to 180. insulin therapy     Dispo: DNR, poor prognosis overall     CC Time 30 min including time spent reviewing chart, ordering tests and treatments, evaluating and treating patient at bedside, not including procedures 60y year old Female admitted for Patient is a 60y old  Female who presents with a chief complaint of sob (20 Apr 2020 17:27)      59 yo F intubated with ARDS from COVID 19 complicated with seizure, gram variable bacteremia, septic shock on pressors, WCT, this evening with vaginal bleeding after rectal tube insertion .     PMH:  Other general symptom or sign  Yes  Handoff  MEWS Score  Frequent urinary tract infections  Hypothyroidism, unspecified type  Down syndrome  Suspected [place] coronavirus infection  COVID-19  Fever  Constipation  Hypothyroidism, unspecified type  ADAM (acute kidney injury)  Hypernatremia  Hypoxia  PNA (pneumonia)  COVID EXPOSURE  90+  Hypoxia  PNA (pneumonia)        MEDICATIONS  (STANDING):  albumin human 25% IVPB 50 milliLiter(s) IV Intermittent every 6 hours  aspirin  chewable 81 milliGRAM(s) Oral daily  chlorhexidine 0.12% Liquid 15 milliLiter(s) Oral Mucosa every 12 hours  chlorhexidine 2% Cloths 1 Application(s) Topical daily  dexMEDEtomidine Infusion 0.5 MICROgram(s)/kG/Hr (5.68 mL/Hr) IV Continuous <Continuous>  enoxaparin Injectable 40 milliGRAM(s) SubCutaneous every 12 hours  hydrocortisone sodium succinate Injectable 50 milliGRAM(s) IV Push every 8 hours  lactobacillus acidophilus 1 Tablet(s) Oral three times a day with meals  levETIRAcetam  IVPB 1000 milliGRAM(s) IV Intermittent every 12 hours  levothyroxine Injectable 62.5 MICROGram(s) IV Push at bedtime  memantine 5 milliGRAM(s) Oral daily  metoclopramide 5 milliGRAM(s) Oral three times a day before meals  midodrine 10 milliGRAM(s) Oral every 8 hours  norepinephrine Infusion 0.05 MICROgram(s)/kG/Min (4.26 mL/Hr) IV Continuous <Continuous>  pantoprazole  Injectable 40 milliGRAM(s) IV Push daily  sodium chloride 1 Gram(s) Oral two times a day  valproate sodium IVPB 500 milliGRAM(s) IV Intermittent every 12 hours  vasopressin Infusion 0.06 Unit(s)/Min (3.6 mL/Hr) IV Continuous <Continuous>    MEDICATIONS  (PRN):  acetaminophen   Tablet .. 650 milliGRAM(s) Oral every 6 hours PRN Temp greater or equal to 38C (100.4F), Mild Pain (1 - 3)  acetaminophen  Suppository .. 650 milliGRAM(s) Rectal every 4 hours PRN Temp greater or equal to 38C (100.4F)  fentaNYL    Injectable 25 MICROGram(s) IV Push every 2 hours PRN agitation  fentaNYL    Injectable 25 MICROGram(s) IV Push every 1 hour PRN discomfort      I&O's Summary    19 Apr 2020 07:01  -  20 Apr 2020 07:00  --------------------------------------------------------  IN: 3026.8 mL / OUT: 4145 mL / NET: -1118.2 mL    20 Apr 2020 07:01  -  20 Apr 2020 23:31  --------------------------------------------------------  IN: 1580.2 mL / OUT: 1750 mL / NET: -169.8 mL      ICU Vital Signs Last 24 Hrs  T(C): 37.8 (20 Apr 2020 19:00), Max: 37.8 (20 Apr 2020 19:00)  T(F): 100.1 (20 Apr 2020 19:00), Max: 100.1 (20 Apr 2020 19:00)  HR: 67 (20 Apr 2020 23:00) (57 - 77)  BP: 132/76 (20 Apr 2020 23:00) (96/76 - 134/82)  BP(mean): --  ABP: --  ABP(mean): --  RR: 67 (20 Apr 2020 23:00) (18 - 67)  SpO2: 93% (20 Apr 2020 23:00) (87% - 97%)      PHYSICAL EXAM:  General: Intubated and sedated  bilateral air entry  RRR  abdomen soft, active bleeding from vagina          04-20    128<L>  |  91<L>  |  9   ----------------------------<  142<H>  3.4<L>   |  29  |  0.67    Ca    8.6      20 Apr 2020 12:03  Phos  3.2     04-20  Mg     2.3     04-20    TPro  7.5  /  Alb  3.4  /  TBili  0.5  /  DBili  x   /  AST  29  /  ALT  34  /  AlkPhos  80  04-20                          8.1    4.58  )-----------( 103      ( 20 Apr 2020 07:03 )             23.7     ABG - ( 19 Apr 2020 08:41 )  pH, Arterial: x     pH, Blood: 7.48  /  pCO2: 32    /  pO2: 63    / HCO3: 25    / Base Excess: 0.3   /  SaO2: 92                CARDIAC MARKERS ( 20 Apr 2020 07:25 )  .044 ng/mL / x     / x     / x     / x      CARDIAC MARKERS ( 19 Apr 2020 06:17 )  .095 ng/mL / x     / 26 U/L / x     / x                   Mode: AC/ CMV (Assist Control/ Continuous Mandatory Ventilation), RR (machine): 20, TV (machine): 320, FiO2: 50, PEEP: 5, ITime: 1, MAP: 11, PIP: 22    Assessment:  1. ARDS from COVID 19 infection  2. seizure  3. GIB  4. bacteremia  5. Septic shock.   6. WCT    Plan:  Neuro: Continue sedation, titrate to RASS -3 to -4.      Cardio: Shes off Vaso, remains on levo titrate for map 65-75.     Pulm: ARDS 2/2 COVID 19- Lung protective strategy , titrate fi02/peep to goal sp02 90-96%, goal PlatP <30 permissive hypercapnea as needed.    Prone, NMB.  Avoid aerosolizing medications     Renal: ADAM resolved Cr now normal , avoid nephrotoxins, daily chemistry to trend Cr, optimize lytes.  strict I/Os.     GI: NPO with tube feeding.  GI ppx. Vaginal bleeding after rectal tube insertion, concern for tear,  hold lovenox, serial CBCs sent stat now, check coags, transfuse for hgb <7 or hemodynamic instability    ID: COVID 19 s/p hydroxychloroqine azithro and steroids, s/p  cefepime for bacteremia.   monitor daily chemistry, CBC with Diff.  trend CRP, D dimer, LDH, CPK, Coags  APAP prn fever, avoid NSAIDs    Heme: DVT ppx, full AC on hold for rectal bleeding.      Endo: monitor FS, goal 140 to 180. insulin therapy     Dispo: DNR, poor prognosis overall       CC Time 30 min including time spent reviewing chart, ordering tests and treatments, evaluating and treating patient at bedside, not including procedures 60y year old Female admitted for Patient is a 60y old  Female who presents with a chief complaint of sob (20 Apr 2020 17:27)      61 yo F intubated with ARDS from COVID 19 complicated with seizure, gram variable bacteremia, septic shock on pressors, WCT, this evening with vaginal bleeding after rectal tube insertion .     PMH:  Other general symptom or sign  Yes  Handoff  MEWS Score  Frequent urinary tract infections  Hypothyroidism, unspecified type  Down syndrome  Suspected [place] coronavirus infection  COVID-19  Fever  Constipation  Hypothyroidism, unspecified type  ADAM (acute kidney injury)  Hypernatremia  Hypoxia  PNA (pneumonia)  COVID EXPOSURE  90+  Hypoxia  PNA (pneumonia)        MEDICATIONS  (STANDING):  albumin human 25% IVPB 50 milliLiter(s) IV Intermittent every 6 hours  aspirin  chewable 81 milliGRAM(s) Oral daily  chlorhexidine 0.12% Liquid 15 milliLiter(s) Oral Mucosa every 12 hours  chlorhexidine 2% Cloths 1 Application(s) Topical daily  dexMEDEtomidine Infusion 0.5 MICROgram(s)/kG/Hr (5.68 mL/Hr) IV Continuous <Continuous>  enoxaparin Injectable 40 milliGRAM(s) SubCutaneous every 12 hours  hydrocortisone sodium succinate Injectable 50 milliGRAM(s) IV Push every 8 hours  lactobacillus acidophilus 1 Tablet(s) Oral three times a day with meals  levETIRAcetam  IVPB 1000 milliGRAM(s) IV Intermittent every 12 hours  levothyroxine Injectable 62.5 MICROGram(s) IV Push at bedtime  memantine 5 milliGRAM(s) Oral daily  metoclopramide 5 milliGRAM(s) Oral three times a day before meals  midodrine 10 milliGRAM(s) Oral every 8 hours  norepinephrine Infusion 0.05 MICROgram(s)/kG/Min (4.26 mL/Hr) IV Continuous <Continuous>  pantoprazole  Injectable 40 milliGRAM(s) IV Push daily  sodium chloride 1 Gram(s) Oral two times a day  valproate sodium IVPB 500 milliGRAM(s) IV Intermittent every 12 hours  vasopressin Infusion 0.06 Unit(s)/Min (3.6 mL/Hr) IV Continuous <Continuous>    MEDICATIONS  (PRN):  acetaminophen   Tablet .. 650 milliGRAM(s) Oral every 6 hours PRN Temp greater or equal to 38C (100.4F), Mild Pain (1 - 3)  acetaminophen  Suppository .. 650 milliGRAM(s) Rectal every 4 hours PRN Temp greater or equal to 38C (100.4F)  fentaNYL    Injectable 25 MICROGram(s) IV Push every 2 hours PRN agitation  fentaNYL    Injectable 25 MICROGram(s) IV Push every 1 hour PRN discomfort      I&O's Summary    19 Apr 2020 07:01  -  20 Apr 2020 07:00  --------------------------------------------------------  IN: 3026.8 mL / OUT: 4145 mL / NET: -1118.2 mL    20 Apr 2020 07:01  -  20 Apr 2020 23:31  --------------------------------------------------------  IN: 1580.2 mL / OUT: 1750 mL / NET: -169.8 mL      ICU Vital Signs Last 24 Hrs  T(C): 37.8 (20 Apr 2020 19:00), Max: 37.8 (20 Apr 2020 19:00)  T(F): 100.1 (20 Apr 2020 19:00), Max: 100.1 (20 Apr 2020 19:00)  HR: 67 (20 Apr 2020 23:00) (57 - 77)  BP: 132/76 (20 Apr 2020 23:00) (96/76 - 134/82)  BP(mean): --  ABP: --  ABP(mean): --  RR: 67 (20 Apr 2020 23:00) (18 - 67)  SpO2: 93% (20 Apr 2020 23:00) (87% - 97%)      PHYSICAL EXAM:  General: Intubated and sedated  bilateral air entry  RRR  abdomen soft, active bleeding from vagina          04-20    128<L>  |  91<L>  |  9   ----------------------------<  142<H>  3.4<L>   |  29  |  0.67    Ca    8.6      20 Apr 2020 12:03  Phos  3.2     04-20  Mg     2.3     04-20    TPro  7.5  /  Alb  3.4  /  TBili  0.5  /  DBili  x   /  AST  29  /  ALT  34  /  AlkPhos  80  04-20                          8.1    4.58  )-----------( 103      ( 20 Apr 2020 07:03 )             23.7     ABG - ( 19 Apr 2020 08:41 )  pH, Arterial: x     pH, Blood: 7.48  /  pCO2: 32    /  pO2: 63    / HCO3: 25    / Base Excess: 0.3   /  SaO2: 92                CARDIAC MARKERS ( 20 Apr 2020 07:25 )  .044 ng/mL / x     / x     / x     / x      CARDIAC MARKERS ( 19 Apr 2020 06:17 )  .095 ng/mL / x     / 26 U/L / x     / x                   Mode: AC/ CMV (Assist Control/ Continuous Mandatory Ventilation), RR (machine): 20, TV (machine): 320, FiO2: 50, PEEP: 5, ITime: 1, MAP: 11, PIP: 22    Assessment:  1. ARDS from COVID 19 infection  2. seizure  3. GIB  4. bacteremia  5. Septic shock.   6. WCT    Plan:  Neuro: Continue sedation, titrate to RASS -3 to -4.  Keppra and valproate for seizures    Cardio: Shes off Vaso, remains on levo titrate for map 65-75.     Pulm: ARDS 2/2 COVID 19- Lung protective strategy , titrate fi02/peep to goal sp02 90-96%, goal PlatP <30 permissive hypercapnea as needed.    Prone, NMB.  Avoid aerosolizing medications     Renal: ADAM resolved Cr now normal , avoid nephrotoxins, daily chemistry to trend Cr, optimize lytes.  strict I/Os.     GI: NPO with tube feeding.  GI ppx. Vaginal bleeding after rectal tube insertion, concern for tear,  hold lovenox, serial CBCs sent stat now, check coags, transfuse for hgb <7 or hemodynamic instability    ID: COVID 19 s/p hydroxychloroquine azithro and steroids, s/p  meropenem cefepime and levaquin, bacteremia likely contaminant .   monitor daily chemistry, CBC with Diff.  trend CRP, D dimer, LDH, CPK, Coags  APAP prn fever, avoid NSAIDs    Heme: DVT ppx, full AC on hold for vaginal bleeding.       Endo: monitor FS, goal 140 to 180. insulin therapy     Dispo: DNR, poor prognosis overall. Spoke with brother jordy, updated him on her condition.       CC Time 30 min including time spent reviewing chart, ordering tests and treatments, discussing care with family,  evaluating and treating patient at bedside, not including procedures

## 2020-04-20 NOTE — PROGRESS NOTE ADULT - SUBJECTIVE AND OBJECTIVE BOX
24 hour events: remains intubated. awake but not restless. on pressors.    Review of Systems:  unable to obtain    Mode: AC/ CMV (Assist Control/ Continuous Mandatory Ventilation), RR (machine): 20, TV (machine): 320, FiO2: 40, PEEP: 5  04-19-20 @ 07:01  -  04-20-20 @ 07:00  --------------------------------------------------------  IN: 3026.8 mL / OUT: 4145 mL / NET: -1118.2 mL    04-20-20 @ 07:01  -  04-20-20 @ 17:27  --------------------------------------------------------  IN: 593.8 mL / OUT: 1400 mL / NET: -806.2 mL        CAPILLARY BLOOD GLUCOSE      POCT Blood Glucose.: 144 mg/dL (18 Apr 2020 20:41)      I&O's Summary    19 Apr 2020 07:01  -  20 Apr 2020 07:00  --------------------------------------------------------  IN: 3026.8 mL / OUT: 4145 mL / NET: -1118.2 mL    20 Apr 2020 07:01  -  20 Apr 2020 17:27  --------------------------------------------------------  IN: 593.8 mL / OUT: 1400 mL / NET: -806.2 mL        Physical Exam:   T(F): 98.1 (04-20-20 @ 12:00), Max: 98.1 (04-20-20 @ 12:00)  HR: 72 (04-20-20 @ 17:00) (56 - 72)  BP: 133/84 (04-20-20 @ 17:00) (96/76 - 135/78)  RR: 20 (04-20-20 @ 17:00) (20 - 26)  SpO2: 95% (04-20-20 @ 17:00) (87% - 97%)  Wt(kg): --    Gen: vented, in NAD  Neuro: sedated but awake  HEENT: MMM, ET/NGT in place  Resp: vented breath sounds - decreased on the right  CVS: S1S2 RRR  Abd: soft, nontender, nondistended, bowel sounds present  Ext: no edema  Skin: warm, well perfused; right IJ CVC    Meds:    midodrine Oral  norepinephrine Infusion IV Continuous    hydrocortisone sodium succinate Injectable IV Push  levothyroxine Injectable IV Push  vasopressin Infusion IV Continuous      acetaminophen   Tablet .. Oral PRN  acetaminophen  Suppository .. Rectal PRN  dexMEDEtomidine Infusion IV Continuous  fentaNYL    Injectable IV Push PRN  levETIRAcetam  IVPB IV Intermittent  memantine Oral  valproate sodium IVPB IV Intermittent      aspirin  chewable Oral  enoxaparin Injectable SubCutaneous    metoclopramide Oral  pantoprazole  Injectable IV Push      albumin human 25% IVPB IV Intermittent  sodium chloride Oral      chlorhexidine 0.12% Liquid Oral Mucosa  chlorhexidine 2% Cloths Topical    lactobacillus acidophilus Oral                            8.1    4.58  )-----------( 103      ( 20 Apr 2020 07:03 )             23.7       04-20    128<L>  |  91<L>  |  9   ----------------------------<  142<H>  3.4<L>   |  29  |  0.67    Ca    8.6      20 Apr 2020 12:03  Phos  3.2     04-20  Mg     2.3     04-20    TPro  7.5  /  Alb  3.4  /  TBili  0.5  /  DBili  x   /  AST  29  /  ALT  34  /  AlkPhos  80  04-20      CARDIAC MARKERS ( 20 Apr 2020 07:25 )  .044 ng/mL / x     / x     / x     / x      CARDIAC MARKERS ( 19 Apr 2020 06:17 )  .095 ng/mL / x     / 26 U/L / x     / x          PT/INR - ( 20 Apr 2020 07:03 )   PT: 13.9 sec;   INR: 1.24 ratio         PTT - ( 19 Apr 2020 06:17 )  PTT:33.0 sec        CENTRAL LINE: Y  HOWARD: Y  A-LINE: N    GLOBAL ISSUE/BEST PRACTICE:  Analgesia: Y  Sedation: Y  CAM-ICU: ANNE  HOB elevation: yes  Stress ulcer prophylaxis: Y  VTE prophylaxis: Y  Glycemic control: Y  Nutrition: NPO with TF    CODE STATUS: DNR

## 2020-04-20 NOTE — CHART NOTE - NSCHARTNOTEFT_GEN_A_CORE
Assessment:  Pt for nutrition f/u.  Pt is a 61 yo female admit from [facility] home with SOB< fever, cough(+COVID19).  Pt on po diet 4/5-4/6 (dysphagia 1 pureed with honey thick liquids), pt decompensated and required intubation.  Pt extubated on 4/16, however, decompensated on NRB and was reintubated again overnight; continues on pressor support.  Noted tube feeds held since 4/13 as abd xray findings were consistent with ileus, however, recent KUB is w/o evidence of ileus or obstruction and tube feedings restarted on 4/17.  Bolus feeding order active for Jevity 1.5 180mL every 6 hrs with free water flush before and after each bolus (provides 1080kcal based on 24kcal/kg Actual BW, 45.9g protein based on 1.0g/kg Actual BW).  Pt hypernatremic for most of admission, free water flushes increased to 120ml flush before and after each bolus- noted today 4/20, pt is hyponatremic- recommend c/w current tube feeding order and decrease flushes to 60ml before and after each bolus.  Noted pt s/p lasix dose 4/20.  1+ generalized edema. S/p IV albumin 4/19.  +BM/liquid mauro via flexiseal 4/20.  Noted weaning trials as tolerated.  RD to continue to follow closely.    Factors impacting intake: [ ] none [ ] nausea  [ ] vomiting [ ] diarrhea [ ] constipation  [ ]chewing problems [ ] swallowing issues  [ x] other: intubation     Diet Prescription: Diet, NPO with Tube Feed:   Tube Feeding Modality: Orogastric  Jevity 1.5 Mitch  Total Volume for 24 Hours (mL): 720  Bolus  Total Volume of Bolus (mL):  180  Total # of Feeds: 4  Tube Feed Frequency: Every 6 hours   Tube Feed Start Time: 12:00  Bolus Feed Rate (mL per Hour): 180   Bolus Feed Duration (in Hours): 1  Bolus Feed Instructions:  give bolus every 4 hrs with 120ml water flush before and after each bolus feeding (04-17-20 @ 11:39)    Intake: bolus feedings    Current Weight: no updated bw to assess   % Weight Change    Pertinent Medications: MEDICATIONS  (STANDING):  albumin human 25% IVPB 50 milliLiter(s) IV Intermittent every 6 hours  aspirin  chewable 81 milliGRAM(s) Oral daily  chlorhexidine 0.12% Liquid 15 milliLiter(s) Oral Mucosa every 12 hours  chlorhexidine 2% Cloths 1 Application(s) Topical daily  dexMEDEtomidine Infusion 0.5 MICROgram(s)/kG/Hr (5.68 mL/Hr) IV Continuous <Continuous>  enoxaparin Injectable 40 milliGRAM(s) SubCutaneous every 12 hours  hydrocortisone sodium succinate Injectable 50 milliGRAM(s) IV Push every 8 hours  lactobacillus acidophilus 1 Tablet(s) Oral three times a day with meals  levETIRAcetam  IVPB 1000 milliGRAM(s) IV Intermittent every 12 hours  levothyroxine Injectable 62.5 MICROGram(s) IV Push at bedtime  memantine 5 milliGRAM(s) Oral daily  metoclopramide 5 milliGRAM(s) Oral three times a day before meals  midodrine 10 milliGRAM(s) Oral every 8 hours  norepinephrine Infusion 0.05 MICROgram(s)/kG/Min (4.26 mL/Hr) IV Continuous <Continuous>  pantoprazole  Injectable 40 milliGRAM(s) IV Push daily  valproate sodium IVPB 500 milliGRAM(s) IV Intermittent every 12 hours  vasopressin Infusion 0.06 Unit(s)/Min (3.6 mL/Hr) IV Continuous <Continuous>    MEDICATIONS  (PRN):  acetaminophen   Tablet .. 650 milliGRAM(s) Oral every 6 hours PRN Temp greater or equal to 38C (100.4F), Mild Pain (1 - 3)  acetaminophen  Suppository .. 650 milliGRAM(s) Rectal every 4 hours PRN Temp greater or equal to 38C (100.4F)  fentaNYL    Injectable 25 MICROGram(s) IV Push every 2 hours PRN agitation    Pertinent Labs: 04-20 Na128 mmol/L<L> Glu 142 mg/dL<H> K+ 3.4 mmol/L<L> Cr  0.67 mg/dL BUN 9 mg/dL 04-20 Phos 3.2 mg/dL 04-20 Alb 2.6 g/dL<L> 04-19 Chol --    LDL --    HDL --    Trig 122 mg/dL     CAPILLARY BLOOD GLUCOSE        Skin: DTI of Jesus heels, stg 2 of the Rt sacrum, and DTI of sacrum    Estimated Needs:   [x ] no change since previous assessment  [ ] recalculated:     Previous Nutrition Diagnosis:   [ ] Inadequate Energy Intake [ ]Inadequate Oral Intake [ ] Excessive Energy Intake   [ ] Underweight [ ] Increased Nutrient Needs [ ] Overweight/Obesity [x] swallowing difficulty   [ ] Altered GI Function [ ] Unintended Weight Loss [ ] Food & Nutrition Related Knowledge Deficit [ ] Malnutrition     Nutrition Diagnosis is [x ] ongoing  [ ] resolved [ ] not applicable     New Nutrition Diagnosis: [ ] not applicable       Interventions: bolus feedings, flushes   Recommend  [ ] Change Diet To:  [ ] Nutrition Supplement  [ x] Nutrition Support- decrease free water flushes, will send pending order to hospitalist   [ ] Other:     Monitoring and Evaluation:   [ ] PO intake [ x ] Tolerance to diet prescription/EN [ x ] weights [ x ] labs[ x ] follow up per protocol  [ ] other:

## 2020-04-20 NOTE — PROGRESS NOTE ADULT - ASSESSMENT
59 yo F with Downs syndrome, hypothyroidism, constipation, seizure disorder with acute hypoxic respiratory failure from COVID 19 pneumonia, course complicated by episode of seizure, type 2 NSTEMI, ADAM (resolved), gram variable bacteremia (repeat cx ngtd); ET tube replaced 4/14 due to low expiratory volumes on ventilator.    1. Acute hypoxic respiratory failure  2. COVID-19 Pneumonia  3. ADAM (resolved)  4. Type 2 NSTEMI (demand ischemia)  5. Seizure Disorder  6. Gram variable bacteremia (of uncertain clinical significance)  7. Wide Complex Tachycardia    Neuro: titrate sedation with precedex/propofol. Continue depacon 500mg BID and keppra 1g q12 for seizures. Continue home memantine.  Cardio: on levophed/vasopressin. Type 2 NSTEMI, demand ischemia - troponin downtrended. continue asa. Was on IV amiodarone for WCT - thought to be due to metabolic derangements and also high dose of levophed - was weaned with addition of vasopression. Monitor telemetry.  Pulm: weaned and extubated 4/16 and unfortunately required reintubation the same day. titrate FiO2 and PEEP. CXR with right lung opacification and collapse. Continue chest PT and suction.  Renal/lytes: renal function stable. monitor UOP. monitor and replete lytes. hypokalemia - repleted. hypernatremia - resolved.  GI: NPO with tube feeds. GI ppx.  ID: COVID 19 pneumonia s/p hydroxychloroquine, was not candidate for toci, steroids stopped 4/12. repeat cultures negative. monitor off abx for now. monitor biomarkers.  Heme: DVT ppx lovenox 40 q12. monitor for bleeding. monitor thrombocytopenia likely from acute illness.  Dispo: DNR. critically ill with hypoxic respiratory failure from COVID 19 pneumonia. continue ICU care. Poor prognosis.

## 2020-04-20 NOTE — ADVANCED PRACTICE NURSE CONSULT - RECOMMEDATIONS
1. Paint with Cavilon daily  2. Off load with z-flex boots  RN educated in the care of this patients skin injury  Surgical PA made aware of recommendations with write orders.

## 2020-04-20 NOTE — PROGRESS NOTE ADULT - ASSESSMENT
1.	ADAM: Prerenal azotemia, ATN  2.	Shock, Sepsis  3.	Pneumonia, COVID +  4.	Hyponatremia  5.	Hypokalemia    Stable renal indices. D/c free water. Monitor sodium trend. Pt received IV lasix. Potassium supps. To continue current meds.   On tube feeds. Treatment for COVID pneumonia. COVID precautions. Pulmonary and ID follow up. ICU management. Avoid nephrotoxic meds as possible.   ICU management. Overall prognosis poor.

## 2020-04-20 NOTE — PROGRESS NOTE ADULT - ASSESSMENT
[phone]   1960 DOA 2020 DR [facility]      REVIEW OF SYMPTOMS      Able to give ROS  NO     PHYSICAL EXAM    HEENT Unremarkable PERRLA atraumatic   RESP Fair air entry EXP prolonged    Harsh breath sound Resp distres mild   CARDIAC S1 S2 No S3     NO JVD    ABDOMEN SOFT BS PRESENT NOT DISTENDED No hepatosplenomegaly PEDAL EDEMA present No calf tenderness  NO rash   GENERAL Not TOXIC looking    VITALS/LABS      2020 afeb 60 120/60   2020 12p dexm 1 m/k/h   2020 7a nore .14 m/k/m   2020 7a vasopressin .08 u/m   2020 w 4.5 Hb 8.1 Plt 103 Na 128 K 3.4 CO2 29 Cr .6   2020 9a 20320/5/.4       PT DATA/BEST PRACTICE  ALLERGY     NOTEWORTHY  POINTS/CHANGES ROS/PE   2020 Remains on 2 vasopressors   2020 developed hyponatremia                                  WT  45 (2020)                    BMI     20 (2020)     ADVANCED DIRECTIVE     dnr ()   Goals of care discussion                                                                                      HEAD OF BED ELEVATION Yes  DYSPHAGIA EVAL      DIET Jevity 1.5 180.4 ()     DVT PROPHYLAXIS    lvnx 40.2 ()           .4 (-)  (Dr RICHARDSON)                                                 PATIENT SUMMARY   60 f nonverbal downs syndrome from group home HO COVID exposure was admitted  for aspiration pneumonia and shortness of breath     Pt found to have pneumonia pl effsn hypernatremia and adam on arrival COVID palma started Pulm consulted   CT ch showed l lung collapse Pt initially ruled out for COVID     Pt tr sicu 2020 itubated hemaodynamci intability  and repeat test was positive     Home meds memantine 28 keppra 1.2                                PATIENT DATA ASSESSMENT PLAN      RESP   ARDS   ltvv vent bundle   GAS EXCHANGE   Intubated   Gas exchange acceptable   Target po 90 ppl 30 dp 15   2020 748/32/63   2020 1p ac 20/320/5/.4 ppl 19     NEED FOR TRACH  Intubated  Now D13  May need trach within few d if cannot get extubated      INFECTION  COVID  2020 COVID pcr detc   SYMTOM ONSET    IMAGING 2020 Cxr progression of r infiltr  OXYGENATION  On vent     MARKERS   n --4/10----4/15---2020   nlr 6.1 -7.1- 5.3 - 1.3 -5.5 -3.5-1.8-4-2.5-4.3  f ----4/15/2020   ferritin 2922-1539 - 1404  - 1039 - 1524 - 2326   p ---4/   procal .27-.34- .1-.15 -.46   c --4/   crp 2.6 - 3.8-16.9 - 11.2 - 10   e --2020 esr 26 - 25- 101     D-dimer  --4/ d-dimer [id number]  lvnx 40.2 ()       MEDS   HCQ () COMPLETED COURSE   2020 qtc 442   IVIG 20g/d.4d () (Dr RICHARDSON) --> DCED 2020    STEROIDS   solumed 80.5d () COMPLETED COURSE       CAD asa 81 ()   SEDATION  On dexm Fentanyl 25.12p ()   HEMODYNAMICS   In shock sec COVID 19 Remains on nore () Mido 10.3 () Given alb   albumin 12/5x4x8 do ()   Vasopressin added 2020   Will add stress steroids 2020   ANEMIA 2020 Hb 8.1   NONOLIGURIC ADAM Resolved   HYPONATREMIA  2020 Na 1282020 Started salt tab   SZ On keppra and ativan for break thru  GOC 2020 Discussions under way with brother  2020 PT MADE DNR   PLAN   RESP Hemodyanamically unstable has been reintubated two times Will need slow wean Trach to be planned uncless in conflict with advanced directtives   INFECTION sp hcq sp steroids for covid No other active infection suspctd   CARDIAC Remains vasopressor dep Vasopressin and norepi Albu added 2020 Vasopress added 2020 Hydrocort 50.3.3d  added 2020   HEMAT Target hb 7 Is on dvt p   META HYPONATREMIA ON 2020 started nacl 1.2.2d ()    GOC DNR ()    TIME SPENT Over 36 minutes aggregate critical  care time spent on encounter; activities included   direct pt care, counseling and/or coordinating care reviewing notes, lab data/ imaging , discussion with multidisciplinary team/ pt /family. Risks, benefits, alternatives  discussed in detail.    JOSE ANGEL CROW [phone]   1960 DOA 2020 DR NORTH MEDLEY

## 2020-04-20 NOTE — PROGRESS NOTE ADULT - SUBJECTIVE AND OBJECTIVE BOX
Neurology follow up note    MIGNON [id number]      Interval History:    Patient intubated     MEDICATIONS    acetaminophen   Tablet .. 650 milliGRAM(s) Oral every 6 hours PRN  acetaminophen  Suppository .. 650 milliGRAM(s) Rectal every 4 hours PRN  albumin human 25% IVPB 50 milliLiter(s) IV Intermittent every 6 hours  aspirin  chewable 81 milliGRAM(s) Oral daily  chlorhexidine 0.12% Liquid 15 milliLiter(s) Oral Mucosa every 12 hours  chlorhexidine 2% Cloths 1 Application(s) Topical daily  dexMEDEtomidine Infusion 0.5 MICROgram(s)/kG/Hr IV Continuous <Continuous>  enoxaparin Injectable 40 milliGRAM(s) SubCutaneous every 12 hours  fentaNYL    Injectable 25 MICROGram(s) IV Push every 2 hours PRN  hydrocortisone sodium succinate Injectable 50 milliGRAM(s) IV Push every 8 hours  lactobacillus acidophilus 1 Tablet(s) Oral three times a day with meals  levETIRAcetam  IVPB 1000 milliGRAM(s) IV Intermittent every 12 hours  levothyroxine Injectable 62.5 MICROGram(s) IV Push at bedtime  memantine 5 milliGRAM(s) Oral daily  metoclopramide 5 milliGRAM(s) Oral three times a day before meals  midodrine 10 milliGRAM(s) Oral every 8 hours  norepinephrine Infusion 0.05 MICROgram(s)/kG/Min IV Continuous <Continuous>  pantoprazole  Injectable 40 milliGRAM(s) IV Push daily  sodium chloride 1 Gram(s) Oral two times a day  valproate sodium IVPB 500 milliGRAM(s) IV Intermittent every 12 hours  vasopressin Infusion 0.06 Unit(s)/Min IV Continuous <Continuous>      Allergies    amoxicillin (Rash)  penicillin (Rash)    Intolerances            Vital Signs Last 24 Hrs  T(C): 36.7 (20 Apr 2020 12:00), Max: 36.7 (19 Apr 2020 23:42)  T(F): 98.1 (20 Apr 2020 12:00), Max: 98.1 (20 Apr 2020 12:00)  HR: 68 (20 Apr 2020 14:00) (56 - 68)  BP: 112/66 (20 Apr 2020 14:00) (96/76 - 143/81)  BP(mean): --  RR: 21 (20 Apr 2020 14:00) (20 - 21)  SpO2: 97% (20 Apr 2020 14:00) (87% - 97%)      REVIEW OF SYSTEMS:  Could not be obtained secondary to the patient being nonverbal.    PHYSICAL EXAMINATION:    HEENT:  Head:  Normocephalic and atraumatic.  Eyes:  No scleral icterus.  Ears:  Hearing hard to evaluate secondary to the patient being sedated and intubated.  RESPIRATORY:  Good air entry bilaterally.  CARDIOVASCULAR:  S1 and S2 are heard.  ABDOMEN:  Soft and nontender.  EXTREMITIES:  No clubbing or cyanosis were noted.      NEUROLOGICAL:  Limited secondary to the patient being intubated and sedated.  No response to verbal stimuli.  I opened the patient's eyes, no gaze preference.  I applied stimuli to all four extremities with slight withdrawal bilateral upper and lower.  Tone; bilateral upper and lower was increased.               LABS:  CBC Full  -  ( 20 Apr 2020 07:03 )  WBC Count : 4.58 K/uL  RBC Count : 2.34 M/uL  Hemoglobin : 8.1 g/dL  Hematocrit : 23.7 %  Platelet Count - Automated : 103 K/uL  Mean Cell Volume : 101.3 fl  Mean Cell Hemoglobin : 34.6 pg  Mean Cell Hemoglobin Concentration : 34.2 gm/dL  Auto Neutrophil # : 3.56 K/uL  Auto Lymphocyte # : 0.81 K/uL  Auto Monocyte # : 0.17 K/uL  Auto Eosinophil # : 0.00 K/uL  Auto Basophil # : 0.00 K/uL  Auto Neutrophil % : 77.7 %  Auto Lymphocyte % : 17.7 %  Auto Monocyte % : 3.7 %  Auto Eosinophil % : 0.0 %  Auto Basophil % : 0.0 %      04-20    128<L>  |  91<L>  |  9   ----------------------------<  142<H>  3.4<L>   |  29  |  0.67    Ca    8.6      20 Apr 2020 12:03  Phos  3.2     04-20  Mg     2.3     04-20    TPro  7.5  /  Alb  3.4  /  TBili  0.5  /  DBili  x   /  AST  29  /  ALT  34  /  AlkPhos  80  04-20    Hemoglobin A1C:     LIVER FUNCTIONS - ( 20 Apr 2020 12:03 )  Alb: 3.4 g/dL / Pro: 7.5 g/dL / ALK PHOS: 80 U/L / ALT: 34 U/L DA / AST: 29 U/L / GGT: x           Vitamin B12   PT/INR - ( 20 Apr 2020 07:03 )   PT: 13.9 sec;   INR: 1.24 ratio         PTT - ( 19 Apr 2020 06:17 )  PTT:33.0 sec      RADIOLOGY      ANALYSIS AND PLAN:  This is a 60-year-old with altered mental status and episode of seizure.  1.	For seizure event, suspect this could be secondary to the patient's interaction with antibiotics. will change Depakote to 500 bid  no new seizures monitor levels.   2.	I will recommend Ativan 1 mg IV push q.6 h. p.r.n. for any type of breakthrough seizures.  3.	Seizure precautions.  4.	For altered mental status, secondary to metabolic encephalopathy secondary to COVID-19 infection.  5.	For hypotension, continue the patient on midodrine.  If possible, please maintain systolic blood pressure of above 100 and help maintain cerebral perfusion.  6.	For history of hypothyroidism, continue the patient on Synthroid.  7.	spoke to staff no new events   Greater than 20 minutes spent in direct patient care reviewing  the notes, lab data/ imaging

## 2020-04-20 NOTE — PROGRESS NOTE ADULT - ATTENDING COMMENTS
31 minutes of critical care time spent with the patient and coordinating care with nursing, hospitalist, and consultants. Patient is critically ill requiring ICU care. The patient is high risk for deterioration and death.
32 minutes of critical care time spent with the patient and coordinating care with nursing, hospitalist, and consultants. Patient is critically ill requiring ICU care. The patient is high risk for deterioration and death.
Father wants to continue full code
Start tube feeds

## 2020-04-21 NOTE — CONSULT NOTE ADULT - PROVIDER SPECIALTY LIST ADULT
Cardiology
Infectious Disease
Neurology
TeleCritical Care
Nephrology
Pulmonology
GYN
Palliative Care
Gastroenterology

## 2020-04-21 NOTE — PROGRESS NOTE ADULT - SUBJECTIVE AND OBJECTIVE BOX
Neurology follow up note    MIGNON [id number]      Interval History:    Patient intubated     MEDICATIONS    acetaminophen   Tablet .. 650 milliGRAM(s) Oral every 6 hours PRN  acetaminophen  Suppository .. 650 milliGRAM(s) Rectal every 4 hours PRN  chlorhexidine 0.12% Liquid 15 milliLiter(s) Oral Mucosa every 12 hours  chlorhexidine 2% Cloths 1 Application(s) Topical daily  dexMEDEtomidine Infusion 0.5 MICROgram(s)/kG/Hr IV Continuous <Continuous>  fentaNYL    Injectable 25 MICROGram(s) IV Push every 1 hour PRN  hydrocortisone sodium succinate Injectable 25 milliGRAM(s) IV Push every 8 hours  lactobacillus acidophilus 1 Tablet(s) Oral three times a day with meals  levETIRAcetam  IVPB 1000 milliGRAM(s) IV Intermittent every 12 hours  levothyroxine Injectable 62.5 MICROGram(s) IV Push at bedtime  memantine 5 milliGRAM(s) Oral daily  metoclopramide 5 milliGRAM(s) Oral three times a day before meals  midazolam Injectable 1 milliGRAM(s) IV Push every 3 hours PRN  midodrine 10 milliGRAM(s) Oral every 8 hours  norepinephrine Infusion 0.05 MICROgram(s)/kG/Min IV Continuous <Continuous>  pantoprazole  Injectable 40 milliGRAM(s) IV Push daily  sodium chloride 1 Gram(s) Oral two times a day  valproate sodium IVPB 500 milliGRAM(s) IV Intermittent every 12 hours      Allergies    amoxicillin (Rash)  penicillin (Rash)    Intolerances            Vital Signs Last 24 Hrs  T(C): 36.3 (21 Apr 2020 13:00), Max: 37.8 (20 Apr 2020 19:00)  T(F): 97.3 (21 Apr 2020 13:00), Max: 100.1 (20 Apr 2020 19:00)  HR: 55 (21 Apr 2020 14:00) (54 - 77)  BP: 139/85 (21 Apr 2020 14:00) (109/68 - 156/90)  BP(mean): --  RR: 18 (21 Apr 2020 14:00) (18 - 67)  SpO2: 98% (21 Apr 2020 14:00) (90% - 98%)    REVIEW OF SYSTEMS:  Could not be obtained secondary to the patient being nonverbal.    PHYSICAL EXAMINATION:    HEENT:  Head:  Normocephalic and atraumatic.  Eyes:  No scleral icterus.  Ears:  Hearing hard to evaluate secondary to the patient being sedated and intubated.  RESPIRATORY:  Good air entry bilaterally.  CARDIOVASCULAR:  S1 and S2 are heard.  ABDOMEN:  Soft and nontender.  EXTREMITIES:  No clubbing or cyanosis were noted.      NEUROLOGICAL:  Limited secondary to the patient being intubated and sedated.  No response to verbal stimuli.  I opened the patient's eyes, no gaze preference.  I applied stimuli to all four extremities with slight withdrawal bilateral upper and lower.  Tone; bilateral upper and lower was increased.                 LABS:  CBC Full  -  ( 21 Apr 2020 06:51 )  WBC Count : 8.49 K/uL  RBC Count : 3.00 M/uL  Hemoglobin : 9.8 g/dL  Hematocrit : 27.7 %  Platelet Count - Automated : 109 K/uL  Mean Cell Volume : 92.3 fl  Mean Cell Hemoglobin : 32.7 pg  Mean Cell Hemoglobin Concentration : 35.4 gm/dL  Auto Neutrophil # : 6.61 K/uL  Auto Lymphocyte # : 1.05 K/uL  Auto Monocyte # : 0.75 K/uL  Auto Eosinophil # : 0.00 K/uL  Auto Basophil # : 0.00 K/uL  Auto Neutrophil % : 77.9 %  Auto Lymphocyte % : 12.4 %  Auto Monocyte % : 8.8 %  Auto Eosinophil % : 0.0 %  Auto Basophil % : 0.0 %      04-21    148<H>  |  108  |  9   ----------------------------<  115<H>  3.2<L>   |  33<H>  |  0.63    Ca    9.1      21 Apr 2020 14:03  Phos  3.2     04-21  Mg     1.7     04-21    TPro  7.1  /  Alb  3.5  /  TBili  0.6  /  DBili  x   /  AST  36  /  ALT  32  /  AlkPhos  78  04-21    Hemoglobin A1C:     LIVER FUNCTIONS - ( 21 Apr 2020 06:29 )  Alb: 3.5 g/dL / Pro: 7.1 g/dL / ALK PHOS: 78 U/L / ALT: 32 U/L DA / AST: 36 U/L / GGT: x           Vitamin B12   PT/INR - ( 21 Apr 2020 06:29 )   PT: 13.2 sec;   INR: 1.18 ratio         PTT - ( 21 Apr 2020 06:29 )  PTT:30.8 sec      RADIOLOGY      ANALYSIS AND PLAN:  This is a 60-year-old with altered mental status and episode of seizure.  1.	For seizure event, suspect this could be secondary to the patient's interaction with antibiotics. will change Depakote to 500 bid  no new seizures monitor levels.   2.	I will recommend Ativan 1 mg IV push q.6 h. p.r.n. for any type of breakthrough seizures.  3.	Seizure precautions.  4.	For altered mental status, secondary to metabolic encephalopathy secondary to COVID-19 infection.  5.	For hypotension, continue the patient on midodrine.  If possible, please maintain systolic blood pressure of above 100 and help maintain cerebral perfusion.  6.	For history of hypothyroidism, continue the patient on Synthroid.  7.	spoke to staff no new events   Greater than 20 minutes spent in direct patient care reviewing  the notes, lab data/ imaging

## 2020-04-21 NOTE — PROGRESS NOTE ADULT - SUBJECTIVE AND OBJECTIVE BOX
Patient is a 60y old  Female who presents with a chief complaint of sob (06 Apr 2020 11:30)  Patient seen in follow up for ADAM, Hypernatremia.   COVID +.     Chart reviewed.        PAST MEDICAL HISTORY:  Frequent urinary tract infections  Hypothyroidism, unspecified type  Down syndrome      MEDICATIONS  (STANDING):  chlorhexidine 0.12% Liquid 15 milliLiter(s) Oral Mucosa every 12 hours  chlorhexidine 2% Cloths 1 Application(s) Topical daily  dexMEDEtomidine Infusion 0.5 MICROgram(s)/kG/Hr (5.68 mL/Hr) IV Continuous <Continuous>  hydrocortisone sodium succinate Injectable 25 milliGRAM(s) IV Push every 8 hours  lactobacillus acidophilus 1 Tablet(s) Oral three times a day with meals  levETIRAcetam  IVPB 1000 milliGRAM(s) IV Intermittent every 12 hours  levothyroxine Injectable 62.5 MICROGram(s) IV Push at bedtime  memantine 5 milliGRAM(s) Oral daily  metoclopramide 5 milliGRAM(s) Oral three times a day before meals  midodrine 10 milliGRAM(s) Oral every 8 hours  norepinephrine Infusion 0.05 MICROgram(s)/kG/Min (4.26 mL/Hr) IV Continuous <Continuous>  pantoprazole  Injectable 40 milliGRAM(s) IV Push daily  sodium chloride 1 Gram(s) Oral two times a day  valproate sodium IVPB 500 milliGRAM(s) IV Intermittent every 12 hours    MEDICATIONS  (PRN):  acetaminophen   Tablet .. 650 milliGRAM(s) Oral every 6 hours PRN Temp greater or equal to 38C (100.4F), Mild Pain (1 - 3)  acetaminophen  Suppository .. 650 milliGRAM(s) Rectal every 4 hours PRN Temp greater or equal to 38C (100.4F)  fentaNYL    Injectable 25 MICROGram(s) IV Push every 1 hour PRN discomfort  midazolam Injectable 1 milliGRAM(s) IV Push every 3 hours PRN Agitation    T(C): 36.3 (04-21-20 @ 13:00), Max: 37.8 (04-20-20 @ 19:00)  HR: 55 (04-21-20 @ 14:00) (54 - 77)  BP: 139/85 (04-21-20 @ 14:00) (96/76 - 156/90)  RR: 18 (04-21-20 @ 14:00)  SpO2: 98% (04-21-20 @ 14:00)  Wt(kg): --  I&O's Detail    20 Apr 2020 07:01  -  21 Apr 2020 07:00  --------------------------------------------------------  IN:    Albumin 25%: 150 mL    dexmedetomidine Infusion: 193.8 mL    Enteral Tube Flush: 280 mL    IV PiggyBack: 150 mL    norepinephrine Infusion: 204 mL    ns in tub fed  [id number]: 540 mL    Packed Red Blood Cells: 500 mL    vasopressin Infusion: 62.4 mL  Total IN: 2080.2 mL    OUT:    Indwelling Catheter - Urethral: 3750 mL  Total OUT: 3750 mL    Total NET: -1669.8 mL      21 Apr 2020 07:01  -  21 Apr 2020 14:45  --------------------------------------------------------  IN:    dexmedetomidine Infusion: 79.8 mL    IV PiggyBack: 100 mL    norepinephrine Infusion: 63 mL    Solution: 300 mL  Total IN: 542.8 mL    OUT:    Indwelling Catheter - Urethral: 2275 mL  Total OUT: 2275 mL    Total NET: -1732.2 mL      PHYSICAL EXAM:  Pt on COVID precautions. Chart reviewed and previous physical examination noted.                  Mode: AC/ CMV (Assist Control/ Continuous Mandatory Ventilation), RR (machine): 20, TV (machine): 320, FiO2: 50, PEEP: 5, ITime: 1, MAP: 11, PIP: 26  LABORATORY:                        9.8    8.49  )-----------( 109      ( 21 Apr 2020 06:51 )             27.7     04-21    148<H>  |  108  |  9   ----------------------------<  115<H>  3.2<L>   |  33<H>  |  0.63    Ca    9.1      21 Apr 2020 14:03  Phos  3.2     04-21  Mg     1.7     04-21    TPro  7.1  /  Alb  3.5  /  TBili  0.6  /  DBili  x   /  AST  36  /  ALT  32  /  AlkPhos  78  04-21    Sodium, Serum: 148 mmol/L (04-21 @ 14:03)  Sodium, Serum: 137 mmol/L (04-21 @ 06:29)  Sodium, Serum: 128 mmol/L (04-20 @ 12:03)  Sodium, Serum: 125 mmol/L (04-20 @ 07:03)    Potassium, Serum: 3.2 mmol/L (04-21 @ 14:03)  Potassium, Serum: 3.2 mmol/L (04-21 @ 06:29)  Potassium, Serum: 3.4 mmol/L (04-20 @ 12:03)  Potassium, Serum: 3.9 mmol/L (04-20 @ 07:03)    Hemoglobin: 9.8 g/dL (04-21 @ 06:51)  Hemoglobin: 7.8 g/dL (04-20 @ 23:37)  Hemoglobin: 8.1 g/dL (04-20 @ 07:03)  Hemoglobin: 9.2 g/dL (04-19 @ 06:17)    Creatinine, Serum 0.63 (04-21 @ 14:03)  Creatinine, Serum 0.61 (04-21 @ 06:29)  Creatinine, Serum 0.67 (04-20 @ 12:03)  Creatinine, Serum 0.59 (04-20 @ 07:03)    CARDIAC MARKERS ( 21 Apr 2020 06:29 )  .191 ng/mL / x     / x     / x     / x      CARDIAC MARKERS ( 20 Apr 2020 07:25 )  .044 ng/mL / x     / x     / x     / x          LIVER FUNCTIONS - ( 21 Apr 2020 06:29 )  Alb: 3.5 g/dL / Pro: 7.1 g/dL / ALK PHOS: 78 U/L / ALT: 32 U/L DA / AST: 36 U/L / GGT: x             ABG - ( 21 Apr 2020 05:22 )  pH, Arterial: x     pH, Blood: 7.51  /  pCO2: 35    /  pO2: 79    / HCO3: 29    / Base Excess: 5.1   /  SaO2: 96

## 2020-04-21 NOTE — PROGRESS NOTE ADULT - SUBJECTIVE AND OBJECTIVE BOX
Patient is a 60y old  Female who presents with a chief complaint of sob (21 Apr 2020 19:13)      BRIEF HOSPITAL COURSE:   59 yo F intubated with ARDS from COVID 19 complicated with seizure, gram variable bacteremia, septic shock on pressors, WCT, this evening with vaginal bleeding after rectal tube insertion .     PAST MEDICAL & SURGICAL HISTORY:  Frequent urinary tract infections  Hypothyroidism, unspecified type  Down syndrome        Hosp day #17d    Vent day #  Mode: AC/ CMV (Assist Control/ Continuous Mandatory Ventilation)  RR (machine): 20  TV (machine): 320  FiO2: 40  PEEP: 5  ITime: 1  MAP: 10  PIP: 25        Vital signs / Reviewed and Physical Exam Performed where pertinent and urgently required    Lab / Radiology  studies / ABG / Meds -  reviewed and interpreted into the assessment and treatment plan.      Assessment/Plan/Therapeutic interventions      Neuro - Precedex gtt to facilitate safe ventilation. Utilize further sedation as needed    CV -  Levophed gtt for shock state to maintain MAP 65. Additional BP support w/ Solu-cortef and Midodrine           Avoiding fluid challenges            Pulm -  Acute hypoxic respiratory failure. FiO2 at 40% and PEEP +5. Arragne for SBT in AM.              ARDS-NET 4-6cc/kg IBW TV as able to maintain plateau pressures <30               Prone ventilation consideration as feasible  Pa02/Fi02 < 150 on Fi02 >60% and PEEP at least 5                 Vent bundle Reviewed     GI -  PPI  Enteric feeds as tolerated in tandem with NMB and prone ventilation    Renal - Hyponatremia resolved now hypernatremia, discontinue NaCl tablets. Even to negative fluid balance as tolerated by hemodynamics and renal fx.  Feeds to be provided in lieu of IVF.     Heme -  Acute blood loss anemia s/p vaginal bleeding. Resolved at this point, can resume pharmacologic DVT PPx and monitor  in addition to SCD's    ID - COVID 19+ s/p course Plaquenil and Steroids. ABX discontinuation based on discussion with ID in conjunction with clinical features, culture data, and judicious procalcitonin monitoring.      Endo -  Aggressive glycemic control to limit FS glucose to < 180mg/dl.      COVID 19 specific considerations and therapeutic  options based on the available and rapidly changing literature    Goals of care considerations:  Ongoing assessment for patient specific treatment options based on progression or decline.  I have involved the family with updates and requests in guidance for medical decision making.          36  Minutes of critical care tiem spent in the management of this critically ill COVID-19 patient/PUI patient with continuous assessments and interventions based on the interpretation of multiple databases. Patient is a 60y old  Female who presents with a chief complaint of sob (21 Apr 2020 19:13)      BRIEF HOSPITAL COURSE:   61 yo F intubated with ARDS from COVID 19 complicated with seizure, gram variable bacteremia, septic shock on pressors, WCT, this evening with vaginal bleeding after rectal tube insertion .     PAST MEDICAL & SURGICAL HISTORY:  Frequent urinary tract infections  Hypothyroidism, unspecified type  Down syndrome        Hosp day #17d    Vent day #  Mode: AC/ CMV (Assist Control/ Continuous Mandatory Ventilation)  RR (machine): 20  TV (machine): 320  FiO2: 40  PEEP: 5  ITime: 1  MAP: 10  PIP: 25        Vital signs / Reviewed and Physical Exam Performed where pertinent and urgently required    Lab / Radiology  studies / ABG / Meds -  reviewed and interpreted into the assessment and treatment plan.      Assessment/Plan/Therapeutic interventions      Neuro - Precedex gtt to facilitate safe ventilation. Utilize further sedation as needed    CV -  Levophed gtt for shock state to maintain MAP 65. Additional BP support w/ Solu-cortef and Midodrine           Avoiding fluid challenges            Pulm -  Acute hypoxic respiratory failure. FiO2 at 40% and PEEP +5. Arragne for SBT in AM.              ARDS-NET 4-6cc/kg IBW TV as able to maintain plateau pressures <30               Prone ventilation consideration as feasible  Pa02/Fi02 < 150 on Fi02 >60% and PEEP at least 5                 Vent bundle Reviewed     GI -  PPI  Enteric feeds as tolerated in tandem with NMB and prone ventilation    Renal - Hyponatremia resolved now hypernatremia, discontinue NaCl tablets. Even to negative fluid balance as tolerated by hemodynamics and renal fx.  Feeds to be provided in lieu of IVF.     Heme -  Acute blood loss anemia secondary to vaginal bleeding; recieved 2u PRBC w/ appropriate improvement. Resolved at this point, can resume pharmacologic DVT PPx (holding full AC) and monitor  in addition to SCD's    ID - COVID 19+ s/p course Plaquenil and Steroids. ABX discontinuation based on discussion with ID in conjunction with clinical features, culture data, and judicious procalcitonin monitoring.      Endo -  Aggressive glycemic control to limit FS glucose to < 180mg/dl.      COVID 19 specific considerations and therapeutic  options based on the available and rapidly changing literature    Goals of care considerations:  Ongoing assessment for patient specific treatment options based on progression or decline.  I have involved the family with updates and requests in guidance for medical decision making.          36  Minutes of critical care tiem spent in the management of this critically ill COVID-19 patient/PUI patient with continuous assessments and interventions based on the interpretation of multiple databases. Patient is a 60y old  Female who presents with a chief complaint of sob (21 Apr 2020 19:13)      BRIEF HOSPITAL COURSE:   59 yo F intubated with ARDS from COVID 19 complicated with seizure, gram variable bacteremia, septic shock on pressors, and acute blood loss anemia    At bedside sedated on ventilator.     PAST MEDICAL & SURGICAL HISTORY:  Frequent urinary tract infections  Hypothyroidism, unspecified type  Down syndrome        Hosp day #17d    Vent day #  Mode: AC/ CMV (Assist Control/ Continuous Mandatory Ventilation)  RR (machine): 20  TV (machine): 320  FiO2: 40  PEEP: 5  ITime: 1  MAP: 10  PIP: 25        Vital signs / Reviewed and Physical Exam Performed where pertinent and urgently required    Lab / Radiology  studies / ABG / Meds -  reviewed and interpreted into the assessment and treatment plan.      Assessment/Plan/Therapeutic interventions      Neuro - Precedex gtt to facilitate safe ventilation. Utilize further sedation as needed    CV -  Levophed gtt for shock state to maintain MAP 65. Additional BP support w/ Solu-cortef and Midodrine           Avoiding fluid challenges            Pulm -  Acute hypoxic respiratory failure. FiO2 at 40% and PEEP +5. Arragne for SBT in AM.              ARDS-NET 4-6cc/kg IBW TV as able to maintain plateau pressures <30               Prone ventilation consideration as feasible  Pa02/Fi02 < 150 on Fi02 >60% and PEEP at least 5                 Vent bundle Reviewed     GI -  PPI  Enteric feeds as tolerated in tandem with NMB and prone ventilation    Renal - Hyponatremia resolved now hypernatremia, discontinue NaCl tablets. Even to negative fluid balance as tolerated by hemodynamics and renal fx.  Feeds to be provided in lieu of IVF.     Heme -  Acute blood loss anemia secondary to vaginal bleeding; recieved 2u PRBC w/ appropriate improvement. Resolved at this point, can resume pharmacologic DVT PPx (holding full AC) and monitor  in addition to SCD's    ID - COVID 19+ s/p course Plaquenil and Steroids. Started on empiric Clindamycin for possible toxic shock; add bacid. Discontinuation based on discussion with ID in conjunction with clinical features, culture data, and judicious procalcitonin monitoring.      Endo -  Aggressive glycemic control to limit FS glucose to < 180mg/dl.      COVID 19 specific considerations and therapeutic  options based on the available and rapidly changing literature    Goals of care considerations:  Ongoing assessment for patient specific treatment options based on progression or decline.  I have involved the family with updates and requests in guidance for medical decision making.          36  Minutes of critical care tiem spent in the management of this critically ill COVID-19 patient/PUI patient with continuous assessments and interventions based on the interpretation of multiple databases.

## 2020-04-21 NOTE — PROGRESS NOTE ADULT - SUBJECTIVE AND OBJECTIVE BOX
MIGNNO WALTER    [facility]    Patient is a 60y old  Female who presents with a chief complaint of sob (21 Apr 2020 16:58)       Allergies    amoxicillin (Rash)  penicillin (Rash)    Intolerances        HPI:  Patient is a 60y old  Female who presents with a chief complaint of sob and fever    HPI:This is a nonverbal pt bib ems from [facility] for fever, sob, cough, low o2 sat, with possible covid exposure. per ems, pt on levaquin for aspiration pna. no further hx available.pt has hx of chronic constipation an dis on multiple stool softners.  she was found ot be hypernatremic and in renal failure at admission.  also has hx of hypothyroidism and down syndrome        PAST MEDICAL & SURGICAL HISTORY:  Frequent urinary tract infections  Hypothyroidism, unspecified type  Down syndrome      FAMILY HISTORY:can not obtain due to mental status      SOCIAL HISTORY:    Allergies    amoxicillin (Rash)  penicillin (Rash)    Intolerances          REVIEW OF SYSEMS:  negative except form above mentioned      Vital Signs Last 24 Hrs  T(C): 37.8 (04 Apr 2020 09:46), Max: 37.8 (04 Apr 2020 09:46)  T(F): 100 (04 Apr 2020 09:46), Max: 100 (04 Apr 2020 09:46)  HR: 68 (04 Apr 2020 11:00) (66 - 69)  BP: 89/52 (04 Apr 2020 11:00) (87/51 - 91/52)  BP(mean): --  RR: 24 (04 Apr 2020 11:00) (24 - 24)  SpO2: 100% (04 Apr 2020 11:00) (89% - 100%)  I&O's Summary      PHYSICAL EXAM:  GENERAL: onnc  HEAD:  Atraumatic, Normocephalic  EYES: EOMI, PERRLA, conjunctiva and sclera clear  NECK: Supple, No JVD  CHEST/LUNG: dec bs at bases and rhonchi  HEART: Regular rate and rhythm; No murmurs, rubs, or gallops  ABDOMEN: Soft, Nontender, Nondistended; Bowel sounds present  SKIN: No rashes or lesions    LABS:                        14.7   2.88  )-----------( 52       ( 04 Apr 2020 10:49 )             45.9     04-04    154<H>  |  117<H>  |  31<H>  ----------------------------<  107<H>  4.7   |  30  |  1.48<H>    Ca    8.5      04 Apr 2020 10:49    TPro  7.1  /  Alb  2.3<L>  /  TBili  0.2  /  DBili  x   /  AST  73<H>  /  ALT  66<H>  /  AlkPhos  118  04-04      CAPILLARY BLOOD GLUCOSE                RADIOLOGY & ADDITIONAL TESTS:    Imaging Personally Reviewed:  [x] YES  [ ] NO    Consultant(s) Notes Reviewed:  [x] YES  [ ] NO      MEDICATIONS  (STANDING):  azithromycin   Tablet 500 milliGRAM(s) Oral daily  dextrose 5%. 1000 milliLiter(s) (100 mL/Hr) IV Continuous <Continuous>  diVALproex  milliGRAM(s) Oral three times a day  enoxaparin Injectable 30 milliGRAM(s) SubCutaneous daily  levETIRAcetam 1000 milliGRAM(s) Oral two times a day  levothyroxine 125 MICROGram(s) Oral daily  memantine 5 milliGRAM(s) Oral daily  pantoprazole    Tablet 40 milliGRAM(s) Oral before breakfast  polyethylene glycol 3350 17 Gram(s) Oral daily    MEDICATIONS  (PRN):  acetaminophen   Tablet .. 650 milliGRAM(s) Oral every 6 hours PRN Temp greater or equal to 38C (100.4F), Mild Pain (1 - 3)      Care Discussed with Consultants/Other Providers [x] YES  [ ] NO    HEALTH ISSUES - PROBLEM Dx: (04 Apr 2020 13:11)      PAST MEDICAL & SURGICAL HISTORY:  Frequent urinary tract infections  Hypothyroidism, unspecified type  Down syndrome      FAMILY HISTORY:        MEDICATIONS   acetaminophen   Tablet .. 650 milliGRAM(s) Oral every 6 hours PRN  acetaminophen  Suppository .. 650 milliGRAM(s) Rectal every 4 hours PRN  chlorhexidine 0.12% Liquid 15 milliLiter(s) Oral Mucosa every 12 hours  chlorhexidine 2% Cloths 1 Application(s) Topical daily  clindamycin IVPB      clindamycin IVPB 600 milliGRAM(s) IV Intermittent every 8 hours  dexMEDEtomidine Infusion 0.5 MICROgram(s)/kG/Hr IV Continuous <Continuous>  fentaNYL    Injectable 25 MICROGram(s) IV Push every 1 hour PRN  hydrocortisone sodium succinate Injectable 25 milliGRAM(s) IV Push every 8 hours  lactobacillus acidophilus 1 Tablet(s) Oral three times a day with meals  levETIRAcetam  IVPB 1000 milliGRAM(s) IV Intermittent every 12 hours  levothyroxine Injectable 62.5 MICROGram(s) IV Push at bedtime  memantine 5 milliGRAM(s) Oral daily  metoclopramide 5 milliGRAM(s) Oral three times a day before meals  midazolam Injectable 1 milliGRAM(s) IV Push every 3 hours PRN  midodrine 10 milliGRAM(s) Oral every 8 hours  norepinephrine Infusion 0.05 MICROgram(s)/kG/Min IV Continuous <Continuous>  pantoprazole  Injectable 40 milliGRAM(s) IV Push daily  valproate sodium IVPB 500 milliGRAM(s) IV Intermittent every 12 hours      Vital Signs Last 24 Hrs  T(C): 36.6 (21 Apr 2020 16:00), Max: 37.2 (21 Apr 2020 00:00)  T(F): 97.8 (21 Apr 2020 16:00), Max: 98.9 (21 Apr 2020 00:00)  HR: 56 (21 Apr 2020 19:00) (54 - 76)  BP: 132/96 (21 Apr 2020 19:00) (109/68 - 156/90)  BP(mean): --  RR: 20 (21 Apr 2020 19:00) (8 - 67)  SpO2: 100% (21 Apr 2020 19:00) (90% - 100%)      04-20-20 @ 07:01  -  04-21-20 @ 07:00  --------------------------------------------------------  IN: 2080.2 mL / OUT: 3750 mL / NET: -1669.8 mL    04-21-20 @ 07:01  -  04-21-20 @ 19:14  --------------------------------------------------------  IN: 1044.8 mL / OUT: 2275 mL / NET: -1230.2 mL        Mode: AC/ CMV (Assist Control/ Continuous Mandatory Ventilation), RR (machine): 20, TV (machine): 320, FiO2: 50, PEEP: 5, ITime: 1, MAP: 10, PIP: 24    LABS:                        9.8    8.49  )-----------( 109      ( 21 Apr 2020 06:51 )             27.7     04-21    148<H>  |  109<H>  |  11  ----------------------------<  143<H>  3.7   |  31  |  0.64    Ca    8.8      21 Apr 2020 18:36  Phos  3.2     04-21  Mg     2.6     04-21    TPro  7.1  /  Alb  3.5  /  TBili  0.6  /  DBili  x   /  AST  36  /  ALT  32  /  AlkPhos  78  04-21    PT/INR - ( 21 Apr 2020 06:29 )   PT: 13.2 sec;   INR: 1.18 ratio         PTT - ( 21 Apr 2020 06:29 )  PTT:30.8 sec      ABG - ( 21 Apr 2020 05:22 )  pH, Arterial: x     pH, Blood: 7.51  /  pCO2: 35    /  pO2: 79    / HCO3: 29    / Base Excess: 5.1   /  SaO2: 96                  WBC:  WBC Count: 8.49 K/uL (04-21 @ 06:51)  WBC Count: 8.10 K/uL (04-20 @ 23:37)  WBC Count: 4.58 K/uL (04-20 @ 07:03)  WBC Count: 5.81 K/uL (04-19 @ 06:17)  WBC Count: 8.75 K/uL (04-18 @ 06:41)      MICROBIOLOGY:  RECENT CULTURES:        CARDIAC MARKERS ( 21 Apr 2020 06:29 )  .191 ng/mL / x     / x     / x     / x      CARDIAC MARKERS ( 20 Apr 2020 07:25 )  .044 ng/mL / x     / x     / x     / x            PT/INR - ( 21 Apr 2020 06:29 )   PT: 13.2 sec;   INR: 1.18 ratio         PTT - ( 21 Apr 2020 06:29 )  PTT:30.8 sec    Sodium:  Sodium, Serum: 148 mmol/L (04-21 @ 18:36)  Sodium, Serum: 148 mmol/L (04-21 @ 14:03)  Sodium, Serum: 137 mmol/L (04-21 @ 06:29)  Sodium, Serum: 128 mmol/L (04-20 @ 12:03)  Sodium, Serum: 125 mmol/L (04-20 @ 07:03)  Sodium, Serum: 136 mmol/L (04-19 @ 06:17)  Sodium, Serum: 137 mmol/L (04-19 @ 01:44)  Sodium, Serum: 142 mmol/L (04-18 @ 15:08)  Sodium, Serum: 142 mmol/L (04-18 @ 06:41)      0.64 mg/dL 04-21 @ 18:36  0.63 mg/dL 04-21 @ 14:03  0.61 mg/dL 04-21 @ 06:29  0.67 mg/dL 04-20 @ 12:03  0.59 mg/dL 04-20 @ 07:03  0.62 mg/dL 04-19 @ 06:17  0.62 mg/dL 04-19 @ 01:44  0.67 mg/dL 04-18 @ 15:08  0.81 mg/dL 04-18 @ 06:41      Hemoglobin:  Hemoglobin: 9.8 g/dL (04-21 @ 06:51)  Hemoglobin: 7.8 g/dL (04-20 @ 23:37)  Hemoglobin: 8.1 g/dL (04-20 @ 07:03)  Hemoglobin: 9.2 g/dL (04-19 @ 06:17)  Hemoglobin: 10.5 g/dL (04-18 @ 06:41)      Platelets: Platelet Count - Automated: 109 K/uL (04-21 @ 06:51)  Platelet Count - Automated: 115 K/uL (04-20 @ 23:37)  Platelet Count - Automated: 103 K/uL (04-20 @ 07:03)  Platelet Count - Automated: 113 K/uL (04-19 @ 06:17)  Platelet Count - Automated: 142 K/uL (04-18 @ 06:41)      LIVER FUNCTIONS - ( 21 Apr 2020 06:29 )  Alb: 3.5 g/dL / Pro: 7.1 g/dL / ALK PHOS: 78 U/L / ALT: 32 U/L DA / AST: 36 U/L / GGT: x                 RADIOLOGY & ADDITIONAL STUDIES: ambulatory

## 2020-04-21 NOTE — PROGRESS NOTE ADULT - ASSESSMENT
[phone]   1960 DO[facility] [address] [facility]      REVIEW OF SYMPTOMS      Able to give ROS  NO     PHYSICAL EXAM    HEENT Unremarkable PERRLA atraumatic   RESP Fair air entry EXP prolonged    Harsh breath sound Resp distres mild   CARDIAC S1 S2 No S3     NO JVD    ABDOMEN SOFT BS PRESENT NOT DISTENDED No hepatosplenomegaly PEDAL EDEMA present No calf tenderness  NO rash   GENERAL Not TOXIC looking    VITALS/LABS      2020 afeb 54 130/80   2020 7a dexm 1 m/k/h   2020 7a nore .1 m/k/m   2020 u 2275   2020 ac 20/320 5/0.5 ppl 19   2020 w 8.4 Hb 9.8 plt 109 Na 148 k 3.7 CO2 31 Cr .6     PT DATA/BEST PRACTICE  ALLERGY     NOTEWORTHY  POINTS/CHANGES ROS/PE   2020 clinda added poss toxic shock    PT DATA/BEST PRACTICE  ALLERGY     NOTEWORTHY  POINTS/CHANGES ROS/PE   2020 clinda added poss toxic shock                                 WT  45 (2020)                    BMI     20 (2020)     ADVANCED DIRECTIVE     dnr ()   Goals of care discussion                                                                                      HEAD OF BED ELEVATION Yes  DYSPHAGIA EVAL      DIET Jevity 1.5 180.4 ()     DVT PROPHYLAXIS    lvnx 40.2 ()  --> Lvnx 40 ()         .4 (-)  (Dr RICHARDSON)                                                 PATIENT SUMMARY   60 f nonverbal downs syndrome from group home HO COVID exposure was admitted  for aspiration pneumonia and shortness of breath     Pt found to have pneumonia pl effsn hypernatremia and adam on arrival COVID palma started Pulm consulted   CT ch showed l lung collapse Pt initially ruled out for COVID     Pt tr sicu 2020 itubated hemaodynamci intability  and repeat test was positive     Home meds memantine 28 keppra 1.2     PATIENT DATA ASSESSMENT PLAN      RESP   ARDS   ltvv vent bundle   GAS EXCHANGE   Intubated   Gas exchange acceptable   Target po 90 ppl 30 dp 15  2020 ac 20/320 5/0.5 ppl 19   2020 751/35/79    NEED FOR TRACH  Intubated  Now D13  May need trach within few d if cannot get extubated      INFECTION  POSSIBLE TOXIC SHOCK  Clinda 600.3 ()   COVID  2020 COVID pcr detc   SYMTOM ONSET    IMAGING 2020 Cxr progression of r infiltr  OXYGENATION  On vent   MARKERS   n --4/10----4/15----2020   nlr 6.1 -7.1- 5.3 - 1.3 -5.5 -3.5-1.8-4-2.5-4.3-6.6   f ----4/15/2020   ferritin 2031-5768 - 1404  - 1039 - 1524 - 4656   p ---4/   procal .27-.34- .1-.15 -.46   c --4/   crp 2.6 - 3.8-16.9 - 11.2 - 10   e --2020 esr 26 - 25- 101   D-dimer  --4/ d-dimer [phone]  lvnx 40.2 ()   --> Lvnx 40 ()      MEDS   HCQ () COMPLETED COURSE   2020 qtc 442   IVIG 20g/d.4d () (Dr RICHARDSON) --> DCED 2020    STEROIDS   solumed 80.5d () COMPLETED COURSE     CAD asa 81 ()   SEDATION  On dexm Fentanyl 25.12p ()   HEMODYNAMICS   Remains on nore  In shock sec COVID 19 Remains on nore () Mido 10.3 () Given alb   albumin 12/5x4x8 do ()   Vasopressin added 2020   Will add stress steroids 2020   janis added 2020 for poss tpxic shock  ANEMIA -2020 Hb 8.1-9.8  TRANSFUSION 1 u prbc (2020)    NONOLIGURIC ADAM Resolved   HYPONATREMIA RESOLVED 2020 Na 1282020 Started salt tab   SZ On keppra and ativan for break thru  GOC 2020 Discussions under way with brother  2020 PT MADE DNR   PLAN   RESP Hemodyanamically unstable has been reintubated two times Will need slow wean Trach to be planned uncless in conflict with advanced directtives   INFECTION sp hcq sp steroids for covid No other active infection suspctd   Possible toxic shock Started on clinda 2020   CARDIAC Remains vasopressor dep (Vasopressin now dced and) norepi Albu added 2020 Vasopress added 2020 Hydrocort 50.3.3d  added 2020   HEMAT Target hb 7 Is on dvt p   META HYPONATREMIA ON 2020 RESOLVED started nacl 1.2.2d (-)    GOC DNR ()    TIME SPENT Over 36 minutes aggregate critical  care time spent on encounter; activities included   direct pt care, counseling and/or coordinating care reviewing notes, lab data/ imaging , discussion with multidisciplinary team/ pt /family. Risks, benefits, alternatives  discussed in detail.    UTTHERESA MIGNON [phone]   1960 DOA 2020 [address] NORTH MEDLEY

## 2020-04-21 NOTE — PROGRESS NOTE ADULT - NSREFPHYEXINPTDOCREFER_GEN_ALL_CORE
[facility] note
[facility] note
medical team and staff
[facility] note

## 2020-04-21 NOTE — CONSULT NOTE ADULT - SUBJECTIVE AND OBJECTIVE BOX
Chief Complaint:  Patient is a 60y old  Female who presents with a chief complaint of sob (21 Apr 2020 08:54)    Frequent urinary tract infections  Hypothyroidism, unspecified type  Down syndrome     HPI:  Patient is a 60y old  Female who presents with a chief complaint of sob and fever    HPI:This is a nonverbal pt bib ems from half-way for fever, sob, cough, low o2 sat, with possible covid exposure. per ems, pt on levaquin for aspiration pna. no further hx available.pt has hx of chronic constipation an dis on multiple stool softners.  she was found ot be hypernatremic and in renal failure at admission.  also has hx of hypothyroidism and down syndrome        PAST MEDICAL & SURGICAL HISTORY:  Frequent urinary tract infections  Hypothyroidism, unspecified type  Down syndrome      FAMILY HISTORY:can not obtain due to mental status      SOCIAL HISTORY:    Allergies    amoxicillin (Rash)  penicillin (Rash)    Intolerances          REVIEW OF SYSEMS:  negative except form above mentioned      Vital Signs Last 24 Hrs  T(C): 37.8 (04 Apr 2020 09:46), Max: 37.8 (04 Apr 2020 09:46)  T(F): 100 (04 Apr 2020 09:46), Max: 100 (04 Apr 2020 09:46)  HR: 68 (04 Apr 2020 11:00) (66 - 69)  BP: 89/52 (04 Apr 2020 11:00) (87/51 - 91/52)  BP(mean): --  RR: 24 (04 Apr 2020 11:00) (24 - 24)  SpO2: 100% (04 Apr 2020 11:00) (89% - 100%)  I&O's Summary      PHYSICAL EXAM:  GENERAL: onnc  HEAD:  Atraumatic, Normocephalic  EYES: EOMI, PERRLA, conjunctiva and sclera clear  NECK: Supple, No JVD  CHEST/LUNG: dec bs at bases and rhonchi  HEART: Regular rate and rhythm; No murmurs, rubs, or gallops  ABDOMEN: Soft, Nontender, Nondistended; Bowel sounds present  SKIN: No rashes or lesions    LABS:                        14.7   2.88  )-----------( 52       ( 04 Apr 2020 10:49 )             45.9     04-04    154<H>  |  117<H>  |  31<H>  ----------------------------<  107<H>  4.7   |  30  |  1.48<H>    Ca    8.5      04 Apr 2020 10:49    TPro  7.1  /  Alb  2.3<L>  /  TBili  0.2  /  DBili  x   /  AST  73<H>  /  ALT  66<H>  /  AlkPhos  118  04-04      CAPILLARY BLOOD GLUCOSE                RADIOLOGY & ADDITIONAL TESTS:    Imaging Personally Reviewed:  [x] YES  [ ] NO    Consultant(s) Notes Reviewed:  [x] YES  [ ] NO      MEDICATIONS  (STANDING):  azithromycin   Tablet 500 milliGRAM(s) Oral daily  dextrose 5%. 1000 milliLiter(s) (100 mL/Hr) IV Continuous <Continuous>  diVALproex  milliGRAM(s) Oral three times a day  enoxaparin Injectable 30 milliGRAM(s) SubCutaneous daily  levETIRAcetam 1000 milliGRAM(s) Oral two times a day  levothyroxine 125 MICROGram(s) Oral daily  memantine 5 milliGRAM(s) Oral daily  pantoprazole    Tablet 40 milliGRAM(s) Oral before breakfast  polyethylene glycol 3350 17 Gram(s) Oral daily    MEDICATIONS  (PRN):  acetaminophen   Tablet .. 650 milliGRAM(s) Oral every 6 hours PRN Temp greater or equal to 38C (100.4F), Mild Pain (1 - 3)      Care Discussed with Consultants/Other Providers [x] YES  [ ] NO    HEALTH ISSUES - PROBLEM Dx: (04 Apr 2020 13:11)      amoxicillin (Rash)  penicillin (Rash)      acetaminophen   Tablet .. 650 milliGRAM(s) Oral every 6 hours PRN  acetaminophen  Suppository .. 650 milliGRAM(s) Rectal every 4 hours PRN  chlorhexidine 0.12% Liquid 15 milliLiter(s) Oral Mucosa every 12 hours  chlorhexidine 2% Cloths 1 Application(s) Topical daily  dexMEDEtomidine Infusion 0.5 MICROgram(s)/kG/Hr IV Continuous <Continuous>  fentaNYL    Injectable 25 MICROGram(s) IV Push every 1 hour PRN  hydrocortisone sodium succinate Injectable 25 milliGRAM(s) IV Push every 8 hours  lactobacillus acidophilus 1 Tablet(s) Oral three times a day with meals  levETIRAcetam  IVPB 1000 milliGRAM(s) IV Intermittent every 12 hours  levothyroxine Injectable 62.5 MICROGram(s) IV Push at bedtime  magnesium sulfate  IVPB 2 Gram(s) IV Intermittent once  memantine 5 milliGRAM(s) Oral daily  metoclopramide 5 milliGRAM(s) Oral three times a day before meals  midodrine 10 milliGRAM(s) Oral every 8 hours  norepinephrine Infusion 0.05 MICROgram(s)/kG/Min IV Continuous <Continuous>  pantoprazole  Injectable 40 milliGRAM(s) IV Push daily  potassium chloride  10 mEq/100 mL IVPB 10 milliEquivalent(s) IV Intermittent every 1 hour  sodium chloride 1 Gram(s) Oral two times a day  valproate sodium IVPB 500 milliGRAM(s) IV Intermittent every 12 hours        FAMILY HISTORY:        Review of Systems:    General:  No wt loss, fevers, chills, night sweats,fatigue,   Eyes:  Good vision, no reported pain  ENT:  No sore throat, pain, runny nose, dysphagia  CV:  No pain, palpitatioins, hypo/hypertension  Resp:  No dyspnea, cough, tachypnea, wheezing  :  No pain, bleeding, incontinence, nocturia  Muscle:  No pain, weakness  Neuro:  No weakness, tingling, memory problems  Psych:  No fatigue, insomnia, mood problems, depression  Endocrine:  No polyuria, polydypsia, cold/heat intolerance  Heme:  No petechiae, ecchymosis, easy bruisability  Skin:  No rash, tattoos, scars, edema    Relevant Family History:       Relevant Social History:       Physical Exam:    Vital Signs:  Vital Signs Last 24 Hrs  T(C): 35.6 (21 Apr 2020 10:00), Max: 37.8 (20 Apr 2020 19:00)  T(F): 96.1 (21 Apr 2020 10:00), Max: 100.1 (20 Apr 2020 19:00)  HR: 58 (21 Apr 2020 10:00) (55 - 77)  BP: 149/87 (21 Apr 2020 10:00) (109/68 - 156/90)  BP(mean): --  RR: 20 (21 Apr 2020 10:00) (18 - 67)  SpO2: 98% (21 Apr 2020 10:00) (90% - 98%)  Daily     Daily     General:  Appears stated age, well-groomed, well-nourished, no distress  HEENT:  NC/AT,  conjunctivae clear and pink, no thyromegaly, nodules, adenopathy, no JVD  Chest:  Full & symmetric excursion, no increased effort, breath sounds clear  Cardiovascular:  Regular rhythm, S1, S2, no murmur/rub/S3/S4, no abdominal bruit, no edema  Abdomen:  Soft, non-tender, non-distended, normoactive bowel sounds,  no masses ,no hepatosplenomeagaly, no signs of chronic liver disease  Extremities:  no cyanosis,clubbing or edema  Skin:  No rash/erythema/ecchymoses/petechiae/wounds/abscess/warm/dry  Neuro/Psych:  Alert, oriented, no asterixis, no tremor, no encephalopathy    Laboratory:                            9.8    8.49  )-----------( 109      ( 21 Apr 2020 06:51 )             27.7     04-21    137  |  99  |  11  ----------------------------<  119<H>  3.2<L>   |  32<H>  |  0.61    Ca    8.7      21 Apr 2020 06:29  Phos  3.2     04-21  Mg     1.7     04-21    TPro  7.1  /  Alb  3.5  /  TBili  0.6  /  DBili  x   /  AST  36  /  ALT  32  /  AlkPhos  78  04-21    LIVER FUNCTIONS - ( 21 Apr 2020 06:29 )  Alb: 3.5 g/dL / Pro: 7.1 g/dL / ALK PHOS: 78 U/L / ALT: 32 U/L DA / AST: 36 U/L / GGT: x           PT/INR - ( 21 Apr 2020 06:29 )   PT: 13.2 sec;   INR: 1.18 ratio         PTT - ( 21 Apr 2020 06:29 )  PTT:30.8 sec      Imaging:

## 2020-04-21 NOTE — PROGRESS NOTE ADULT - SUBJECTIVE AND OBJECTIVE BOX
24 hour events: pt with vaginal bleeding  - transfused 2U PRBC overnight. AC held and GYN evaluated - tamponaded acute bleeding with gauze. received report that brother wanted to pursue DNI if pt extubated - spoke to him and he states DNR not DNI - would still want intubation if needed.    Review of Systems:  unable to obtain    Mode: AC/ CMV (Assist Control/ Continuous Mandatory Ventilation), RR (machine): 20, TV (machine): 320, FiO2: 50, PEEP: 5  04-20-20 @ 07:01  -  04-21-20 @ 07:00  --------------------------------------------------------  IN: 2080.2 mL / OUT: 3750 mL / NET: -1669.8 mL    04-21-20 @ 07:01  -  04-21-20 @ 11:37  --------------------------------------------------------  IN: 211.2 mL / OUT: 1775 mL / NET: -1563.8 mL        CAPILLARY BLOOD GLUCOSE          I&O's Summary    20 Apr 2020 07:01  -  21 Apr 2020 07:00  --------------------------------------------------------  IN: 2080.2 mL / OUT: 3750 mL / NET: -1669.8 mL    21 Apr 2020 07:01  -  21 Apr 2020 11:37  --------------------------------------------------------  IN: 211.2 mL / OUT: 1775 mL / NET: -1563.8 mL        Physical Exam:   T(F): 96.1 (04-21-20 @ 10:00), Max: 100.1 (04-20-20 @ 19:00)  HR: 58 (04-21-20 @ 10:49) (55 - 77)  BP: 149/87 (04-21-20 @ 10:00) (109/68 - 156/90)  RR: 20 (04-21-20 @ 10:00) (18 - 67)  SpO2: 98% (04-21-20 @ 10:49) (90% - 98%)  Wt(kg): --    Gen: vented, in NAD  Neuro: sedated  HEENT: MMM, ET/NGT in place  Resp: vented breath sounds  CVS: S1S2 RRR  Abd: soft, nontender, nondistended, bowel sounds present  Ext: no edema  Skin: warm, well perfused; right IJ CVC      Meds:    midodrine Oral  norepinephrine Infusion IV Continuous    hydrocortisone sodium succinate Injectable IV Push  levothyroxine Injectable IV Push      acetaminophen   Tablet .. Oral PRN  acetaminophen  Suppository .. Rectal PRN  dexMEDEtomidine Infusion IV Continuous  fentaNYL    Injectable IV Push PRN  levETIRAcetam  IVPB IV Intermittent  memantine Oral  midazolam Injectable IV Push PRN  valproate sodium IVPB IV Intermittent        metoclopramide Oral  pantoprazole  Injectable IV Push      potassium chloride  10 mEq/100 mL IVPB IV Intermittent  sodium chloride Oral      chlorhexidine 0.12% Liquid Oral Mucosa  chlorhexidine 2% Cloths Topical    lactobacillus acidophilus Oral                            9.8    8.49  )-----------( 109      ( 21 Apr 2020 06:51 )             27.7       04-21    137  |  99  |  11  ----------------------------<  119<H>  3.2<L>   |  32<H>  |  0.61    Ca    8.7      21 Apr 2020 06:29  Phos  3.2     04-21  Mg     1.7     04-21    TPro  7.1  /  Alb  3.5  /  TBili  0.6  /  DBili  x   /  AST  36  /  ALT  32  /  AlkPhos  78  04-21      CARDIAC MARKERS ( 21 Apr 2020 06:29 )  .191 ng/mL / x     / x     / x     / x      CARDIAC MARKERS ( 20 Apr 2020 07:25 )  .044 ng/mL / x     / x     / x     / x          PT/INR - ( 21 Apr 2020 06:29 )   PT: 13.2 sec;   INR: 1.18 ratio         PTT - ( 21 Apr 2020 06:29 )  PTT:30.8 sec      Radiology: ***    CENTRAL LINE: Y  HOWARD: Y  A-LINE: N    GLOBAL ISSUE/BEST PRACTICE:  Analgesia: Y  Sedation: Y  CAM-ICU: ANNE  HOB elevation: yes  Stress ulcer prophylaxis: Y  VTE prophylaxis: Y  Glycemic control: Y  Nutrition: NPO with TF    CODE STATUS: DNR

## 2020-04-21 NOTE — PROGRESS NOTE ADULT - SUBJECTIVE AND OBJECTIVE BOX
MIGNON WALTER is a 60yFemale , patient examined and chart reviewed.    INTERVAL HPI/ OVERNIGHT EVENTS:  Remains intubated. Sedated. On Levophed.  Afebrile. Events noted. Had vaginal bleed and seen by GYN.  Started on Clindamycin.    Past Medical History--  PAST MEDICAL & SURGICAL HISTORY:  Frequent urinary tract infections  Hypothyroidism, unspecified type  Down syndrome      For details regarding the patient's social history, family history, and other miscellaneous elements, please refer the initial infectious diseases consultation and/or the admitting history and physical examination for this admission.      ROS:  Unable to obtain due to : Pt's condition    ALLERGIES:  amoxicillin (Rash)  penicillin (Rash)      Current inpatient medications :  MEDICATIONS  (STANDING):  chlorhexidine 0.12% Liquid 15 milliLiter(s) Oral Mucosa every 12 hours  chlorhexidine 2% Cloths 1 Application(s) Topical daily  clindamycin IVPB      clindamycin IVPB 600 milliGRAM(s) IV Intermittent once  clindamycin IVPB 600 milliGRAM(s) IV Intermittent every 8 hours  dexMEDEtomidine Infusion 0.5 MICROgram(s)/kG/Hr (5.68 mL/Hr) IV Continuous <Continuous>  hydrocortisone sodium succinate Injectable 25 milliGRAM(s) IV Push every 8 hours  lactobacillus acidophilus 1 Tablet(s) Oral three times a day with meals  levETIRAcetam  IVPB 1000 milliGRAM(s) IV Intermittent every 12 hours  levothyroxine Injectable 62.5 MICROGram(s) IV Push at bedtime  memantine 5 milliGRAM(s) Oral daily  metoclopramide 5 milliGRAM(s) Oral three times a day before meals  midodrine 10 milliGRAM(s) Oral every 8 hours  norepinephrine Infusion 0.05 MICROgram(s)/kG/Min (4.26 mL/Hr) IV Continuous <Continuous>  pantoprazole  Injectable 40 milliGRAM(s) IV Push daily  valproate sodium IVPB 500 milliGRAM(s) IV Intermittent every 12 hours    MEDICATIONS  (PRN):  acetaminophen   Tablet .. 650 milliGRAM(s) Oral every 6 hours PRN Temp greater or equal to 38C (100.4F), Mild Pain (1 - 3)  acetaminophen  Suppository .. 650 milliGRAM(s) Rectal every 4 hours PRN Temp greater or equal to 38C (100.4F)  fentaNYL    Injectable 25 MICROGram(s) IV Push every 1 hour PRN discomfort  midazolam Injectable 1 milliGRAM(s) IV Push every 3 hours PRN Agitation      Objective:  ICU Vital Signs Last 24 Hrs  T(C): 36.6 (21 Apr 2020 16:00), Max: 37.8 (20 Apr 2020 19:00)  T(F): 97.8 (21 Apr 2020 16:00), Max: 100.1 (20 Apr 2020 19:00)  HR: 58 (21 Apr 2020 17:00) (54 - 77)  BP: 133/88 (21 Apr 2020 17:00) (109/68 - 156/90)  RR: 20 (21 Apr 2020 17:00) (8 - 67)  SpO2: 99% (21 Apr 2020 17:00) (90% - 99%)    Mode: AC/ CMV (Assist Control/ Continuous Mandatory Ventilation)  RR (machine): 20  TV (machine): 320  FiO2: 50  PEEP: 5  ITime: 1  MAP: 10  PIP: 24    Physical Exam:  GEN: Vented sedated  HEENT: normocephalic and atraumatic. EOMI. ANASTASIYA. Moist mucosa. Clear Posterior pharynx.  NECK: Supple. No carotid bruits.  No lymphadenopathy or thyromegaly.  LUNGS: Decreased to auscultation.  HEART: Regular rate and rhythm without murmur.  ABDOMEN: Soft, nontender, and nondistended.  Positive bowel sounds.  No hepatosplenomegaly was noted.  EXTREMITIES: Without any cyanosis, clubbing, rash, lesions or edema.  NEUROLOGIC: Sedated  SKIN: No ulceration or induration present.    LABS:                                  9.8    8.49  )-----------( 109      ( 21 Apr 2020 06:51 )             27.7   04-21    148<H>  |  108  |  9   ----------------------------<  115<H>  3.2<L>   |  33<H>  |  0.63    Ca    9.1      21 Apr 2020 14:03  Phos  3.2     04-21  Mg     2.6     04-21    TPro  7.1  /  Alb  3.5  /  TBili  0.6  /  DBili  x   /  AST  36  /  ALT  32  /  AlkPhos  78  04-21    Ferritin, Serum (04.20.20 @ 13:02)    Ferritin, Serum: 1011 ng/mL    Lactate Dehydrogenase, Serum (04.20.20 @ 13:03)    Lactate Dehydrogenase, Serum: 385 U/L    D-Dimer Assay, Quantitative (04.20.20 @ 07:25)    D-Dimer Assay, Quantitative: 228 ng/mL DDU      MICROBIOLOGY:  Culture - Blood (04.06.20 @ 16:37)    Specimen Source: .Blood Blood    Culture Results:   No Growth Final    GI PCR Panel, Stool (collected 14 Apr 2020 16:46)  Source: .Stool Feces  Final Report (14 Apr 2020 19:26):    GI PCR Results: NOT detected    *******Please Note:*******    GI panel PCR evaluates for:    Campylobacter, Plesiomonas shigelloides, Salmonella,    Vibrio, Yersinia enterocolitica, Enteroaggregative    Escherichia coli (EAEC), Enteropathogenic E.coli (EPEC),    Enterotoxigenic E. coli (ETEC) lt/st, Shiga-like    toxin-producing E. coli (STEC) stx1/stx2,    Shigella/ Enteroinvasive E. coli (EIEC), Cryptosporidium,    Cyclospora cayetanensis, Entamoeba histolytica,    Giardia lamblia, Adenovirus F 40/41, Astrovirus,    Norovirus GI/GII, Rotavirus A, Sapovirus    Culture - Stool (collected 14 Apr 2020 16:46)  Source: .Stool Feces  Preliminary Report (15 Apr 2020 15:09):    No enteric pathogens to date: Final culture pending    No enteric gram negative rods isolated    Culture - Sputum . (04.09.20 @ 16:26)    Gram Stain:   Numerous polymorphonuclear leukocytes seen per low power field  Rare Squamous epithelial cells seen per low power field  Rare Gram positive cocci in pairs seen per oil power field    Specimen Source: .Sputum Sputum    Culture Results:   Normal Respiratory Jackelin present    RADIOLOGY & ADDITIONAL STUDIES:    EXAM:  XR CHEST PORTABLE IMMED 1V                                  PROCEDURE DATE:  04/15/2020          INTERPRETATION:  INDICATION: ETT pulled back    PRIORS: Respiratory failure; Covid exposure.    VIEWS: Portable AP radiography of the chest performed.    FINDINGS: Since prior evaluation the ETT has been repositioned, the distal tip located approximately 2.5 cm superior to the tracheal bifurcation. There is no change in position of the indwelling right IJ CVC. Bilateral lung parenchymal airspace opacities are identified. No pleural effusion or pneumothorax is demonstrated. No mediastinal shift is noted. Degenerative changes the thoracic spine noted. There is no change in position of the indwelling NGT.    IMPRESSION: : Since prior evaluation the ETT has been repositioned, the distal tip located approximately 2.5 cm superior to the tracheal bifurcation. Bilateral lung parenchymal airspace opacities.      DISCRETE X-RAY DATA:  Percent of LEFT lung opacification: 34-66%  Percent of RIGHTlung opacification: 34-66%  Change in lung opacification from most recent x-ray (<=3 days): Stable  Change from prior dated 3 or more days (same admission): Stable      EXAM:  XR CHEST PORTABLE ROUTINE 1V                                  PROCEDURE DATE:  04/20/2020          INTERPRETATION:  Chest one view    HISTORY: Respiratory failure    COMPARISON STUDY: 4/19/2020    Frontal expiratory view of the chest shows the heart to be similar in size. Endotracheal tube, nasogastric tube and right jugular line remain present.    The lungs show progression of right infiltrate with effusion and similar left base infiltrate. There is no evidence of pneumothorax nor pleural effusion.    IMPRESSION:  Progression of right infiltrate.    Thank you for the courtesy of this referral.    Assessment :   61YO F nonverbal, MRDD, hypothyroidism and down syndrome, hx of seizures BIBA  from group home admitted to ICU for acute severe respiratory decompensation with severe sepsis and septic shock secondary to COVID 19 PNA. Had been on multiple antibiotics--Meropenam Levaquin and Cefepime. Had single bottle in blood cultures with gram variable rods ( ID still pending and sent to [facility] lab ) Repeat blood culture NGTD. Pt remains on vent and on pressors. Blood culture likely contaminant. Severe sepsis with septic shock and respiratory failure sec COVID 19 pneumonia. Procalcitonin unimpressive. Extubated 4/17/2020 but reintubated same day. On pressors.     Events noted_ Introital iatrogenic trauma after attempted rectal tube placement. Acute bleeding arrested with gauze packing tamponade by gyn. On empiric Clindamycin.     Plan :   Supportive care  Trend temps and cbc  Vent per ICU  Wean off pressors  Asp precautions  COVID-19--critical period for decompensation  (7-14 days post symptom onset), avoid aerosolizing procedures, NSAIDs   -monitor respiratory status closely ( route of 02 and saturation) and titrate when able  -isolation precautions per protocol  -avoid excessive blood draws, blood cultures and frequent CXRs  -monitor biomarkers- CBC w diff  for NLRNLR <3 low vs >5 high) Ferritin (lower risk <450 vs >850) CRP (low risk <2 and higher risk >6) and LDH, D Dimer, procalcitonin  -therapies remains investigational-- including Hydroxychloroquine  -antibiotics only if there is concern for a bacterial process  -Steroids short course ( 5 days)  --for hypoxia/ARDS /shock  -Prognosis poor  DNR    D/w ICU team    Continue with present regiment.  Appropriate use of antibiotics and adverse effects reviewed.    I have discussed the above plan of care with patient/ family in detail. They expressed understanding of the the treatment plan . Risks, benefits and alternatives discussed in detail. I have asked if they have any questions or concerns and appropriately addressed them to the best of my ability .      Critical care time greater then 35 minutes reviewing notes, labs data/ imaging , discussion with multidisciplinary team.    Thank you for allowing me to participate in care of your patient .        John Rivera MD  Infectious Disease  [phone]

## 2020-04-21 NOTE — PROGRESS NOTE ADULT - SUBJECTIVE AND OBJECTIVE BOX
Chief Complaint: Shortness of breath    Interval Events: Remains intubated on pressors.    Review of Systems:  Unable to obtain    Physical Exam:  Vital Signs Last 24 Hrs  T(C): 36 (21 Apr 2020 09:30), Max: 37.8 (20 Apr 2020 19:00)  T(F): 96.8 (21 Apr 2020 09:30), Max: 100.1 (20 Apr 2020 19:00)  HR: 57 (21 Apr 2020 09:30) (55 - 77)  BP: 150/90 (21 Apr 2020 09:30) (109/68 - 156/90)  BP(mean): --  RR: 20 (21 Apr 2020 09:30) (18 - 67)  SpO2: 98% (21 Apr 2020 09:30) (90% - 98%)  General: Sedated  HEENT: Intubated  Neck: No JVD, no carotid bruit  Lungs: Coarse bilaterally  CV: RRR, nl S1/S2, no M/R/G  Abdomen: S/NT/ND, +BS  Extremities: No LE edema, no cyanosis  Neuro: Non-focal  Skin: No rash    Labs:             04-21    137  |  99  |  11  ----------------------------<  119<H>  3.2<L>   |  32<H>  |  0.61    Ca    8.7      21 Apr 2020 06:29  Phos  3.2     04-21  Mg     1.7     04-21    TPro  7.1  /  Alb  3.5  /  TBili  0.6  /  DBili  x   /  AST  36  /  ALT  32  /  AlkPhos  78  04-21                        9.8    8.49  )-----------( 109      ( 21 Apr 2020 06:51 )             27.7       Telemetry: Sinus rhythm, PVCs

## 2020-04-21 NOTE — PROGRESS NOTE ADULT - ASSESSMENT
The patient is a 60 year old female with a history of MRDD, hypothyroidism, seizure d/o who is admitted with COVID-19, acute respiratory failure, septic shock, NSTEMI.    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 There may be a segmental component suggesting underlying CAD.  - Continue aspirin 81 mg daily  - Enoxaparin on hold due to vaginal bleeding  - Continue midodrine 10 mg tid  - Continue norepinephrine and titrate to MAP of 65  - Off of vasopressin  - Mechanical ventilation. Wean as tolerated.  - ICU care

## 2020-04-21 NOTE — PROGRESS NOTE ADULT - SUBJECTIVE AND OBJECTIVE BOX
Date/Time Patient Seen:  		  Referring MD:   Data Reviewed	       Patient is a 60y old  Female who presents with a chief complaint of sob (21 Apr 2020 01:16)      Subjective/HPI     PAST MEDICAL & SURGICAL HISTORY:  Frequent urinary tract infections  Hypothyroidism, unspecified type  Down syndrome        Medication list         MEDICATIONS  (STANDING):  chlorhexidine 0.12% Liquid 15 milliLiter(s) Oral Mucosa every 12 hours  chlorhexidine 2% Cloths 1 Application(s) Topical daily  dexMEDEtomidine Infusion 0.5 MICROgram(s)/kG/Hr (5.68 mL/Hr) IV Continuous <Continuous>  hydrocortisone sodium succinate Injectable 50 milliGRAM(s) IV Push every 8 hours  lactobacillus acidophilus 1 Tablet(s) Oral three times a day with meals  levETIRAcetam  IVPB 1000 milliGRAM(s) IV Intermittent every 12 hours  levothyroxine Injectable 62.5 MICROGram(s) IV Push at bedtime  magnesium sulfate  IVPB 2 Gram(s) IV Intermittent once  memantine 5 milliGRAM(s) Oral daily  metoclopramide 5 milliGRAM(s) Oral three times a day before meals  midodrine 10 milliGRAM(s) Oral every 8 hours  norepinephrine Infusion 0.05 MICROgram(s)/kG/Min (4.26 mL/Hr) IV Continuous <Continuous>  pantoprazole  Injectable 40 milliGRAM(s) IV Push daily  potassium chloride  10 mEq/100 mL IVPB 10 milliEquivalent(s) IV Intermittent every 1 hour  sodium chloride 1 Gram(s) Oral two times a day  valproate sodium IVPB 500 milliGRAM(s) IV Intermittent every 12 hours    MEDICATIONS  (PRN):  acetaminophen   Tablet .. 650 milliGRAM(s) Oral every 6 hours PRN Temp greater or equal to 38C (100.4F), Mild Pain (1 - 3)  acetaminophen  Suppository .. 650 milliGRAM(s) Rectal every 4 hours PRN Temp greater or equal to 38C (100.4F)  fentaNYL    Injectable 25 MICROGram(s) IV Push every 1 hour PRN discomfort         Vitals log        ICU Vital Signs Last 24 Hrs  T(C): 35.4 (21 Apr 2020 07:30), Max: 37.8 (20 Apr 2020 19:00)  T(F): 95.7 (21 Apr 2020 07:30), Max: 100.1 (20 Apr 2020 19:00)  HR: 55 (21 Apr 2020 07:30) (55 - 77)  BP: 151/87 (21 Apr 2020 07:30) (109/68 - 156/90)  BP(mean): --  ABP: --  ABP(mean): --  RR: 20 (21 Apr 2020 07:30) (18 - 67)  SpO2: 97% (21 Apr 2020 08:00) (87% - 98%)       Mode: AC/ CMV (Assist Control/ Continuous Mandatory Ventilation)  RR (machine): 20  TV (machine): 320  FiO2: 50  PEEP: 5  ITime: 1  MAP: 11  PIP: 25      Input and Output:  I&O's Detail    20 Apr 2020 07:01  -  21 Apr 2020 07:00  --------------------------------------------------------  IN:    Albumin 25%: 150 mL    dexmedetomidine Infusion: 193.8 mL    Enteral Tube Flush: 280 mL    IV PiggyBack: 150 mL    norepinephrine Infusion: 204 mL    ns in tub fed  [id number]: 540 mL    Packed Red Blood Cells: 500 mL    vasopressin Infusion: 62.4 mL  Total IN: 2080.2 mL    OUT:    Indwelling Catheter - Urethral: 3750 mL  Total OUT: 3750 mL    Total NET: -1669.8 mL          Lab Data                        9.8    8.49  )-----------( 109      ( 21 Apr 2020 06:51 )             27.7     04-21    137  |  99  |  11  ----------------------------<  119<H>  3.2<L>   |  32<H>  |  0.61    Ca    8.7      21 Apr 2020 06:29  Phos  3.2     04-21  Mg     1.7     04-21    TPro  7.1  /  Alb  3.5  /  TBili  0.6  /  DBili  x   /  AST  36  /  ALT  32  /  AlkPhos  78  04-21    ABG - ( 21 Apr 2020 05:22 )  pH, Arterial: x     pH, Blood: 7.51  /  pCO2: 35    /  pO2: 79    / HCO3: 29    / Base Excess: 5.1   /  SaO2: 96                CARDIAC MARKERS ( 21 Apr 2020 06:29 )  .191 ng/mL / x     / x     / x     / x      CARDIAC MARKERS ( 20 Apr 2020 07:25 )  .044 ng/mL / x     / x     / x     / x            Review of Systems	      Objective     Physical Examination    heart s1s2  lung dc BS      Pertinent Lab findings & Imaging      Deloris:  NO   Adequate UO     I&O's Detail    20 Apr 2020 07:01  -  21 Apr 2020 07:00  --------------------------------------------------------  IN:    Albumin 25%: 150 mL    dexmedetomidine Infusion: 193.8 mL    Enteral Tube Flush: 280 mL    IV PiggyBack: 150 mL    norepinephrine Infusion: 204 mL    ns in tub fed  [id number]: 540 mL    Packed Red Blood Cells: 500 mL    vasopressin Infusion: 62.4 mL  Total IN: 2080.2 mL    OUT:    Indwelling Catheter - Urethral: 3750 mL  Total OUT: 3750 mL    Total NET: -1669.8 mL               Discussed with:     Cultures:	        Radiology

## 2020-04-21 NOTE — CONSULT NOTE ADULT - ASSESSMENT
A: Introital iatrogenic trauma after attempted rectal tube placement.  Acute bleeding arrested with gauze packing tamponade.    P: Leave packing in for approximately 48-72hrs then may attempt removal.  Re-pack with 2" Gauze PRN

## 2020-04-21 NOTE — PROGRESS NOTE ADULT - ASSESSMENT
59 yo F with Downs syndrome, hypothyroidism, constipation, seizure disorder with acute hypoxic respiratory failure from COVID 19 pneumonia, course complicated by episode of seizure, type 2 NSTEMI, ADAM (resolved), gram variable bacteremia (repeat cx ngtd); ET tube replaced 4/14 due to low expiratory volumes on ventilator. Was extubated 4/16 and reintubated same day due to secretion issues.    1. Acute hypoxic respiratory failure  2. COVID-19 Pneumonia  3. ADAM (resolved)  4. Type 2 NSTEMI (demand ischemia)  5. Seizure Disorder  6. Gram variable bacteremia (of uncertain clinical significance)  7. Wide Complex Tachycardia  8. Acute blood loss anemia/Vaginal bleeding    Neuro: titrate sedation with precedex/propofol. Continue depacon 500mg BID and keppra 1g q12 for seizures. Continue home memantine.  Cardio: on levophed/vasopressin. Type 2 NSTEMI, demand ischemia - troponin downtrended. continue asa. Was on IV amiodarone for WCT - thought to be due to metabolic derangements and also high dose of levophed - was weaned with addition of vasopression. Monitor telemetry.  Pulm: weaned and extubated 4/16 and unfortunately required reintubation the same day. titrate FiO2 and PEEP. CXR with right lung opacification and collapse. Continue chest PT and suction. Weaning trials as tolerated  Renal/lytes: renal function stable. monitor UOP. monitor and replete lytes. hypokalemia - repleted. hypernatremia - resolved.  GI: NPO with tube feeds. GI ppx.  ID: COVID 19 pneumonia s/p hydroxychloroquine, was not candidate for toci, steroids stopped 4/12. repeat cultures negative. monitor off abx for now. monitor biomarkers.  Heme: DVT ppx SCDs for now. full dose LMWH on hold given acute blood loss anemia from vaginal bleeding (iatrogenic from rectal tube placement per GYN) - acute bleeding tamponaded and patient transfused 2U with appropriate response. monitor hgb and platelets - assess to resume AC.  Dispo: DNR. critically ill with hypoxic respiratory failure from COVID 19 pneumonia. continue ICU care. Poor prognosis. Spoke to brother Сергей 4/21 - updated him on situation - aware of poor prognosis, pt remains DNR but he would like to her to be re-intubated if she were to fail extubation when weaned and optimized for this.

## 2020-04-21 NOTE — CONSULT NOTE ADULT - SUBJECTIVE AND OBJECTIVE BOX
HPI:  Patient is a 60y old  Female who presents with a chief complaint of sob and fever    HPI:This is a nonverbal pt bib ems from intermediate for fever, sob, cough, low o2 sat, with possible covid exposure. per ems, pt on levaquin for aspiration pna. no further hx available.pt has hx of chronic constipation an dis on multiple stool softners.  she was found ot be hypernatremic and in renal failure at admission.  also has hx of hypothyroidism and down syndrome        PAST MEDICAL & SURGICAL HISTORY:  Frequent urinary tract infections  Hypothyroidism, unspecified type  Down syndrome      FAMILY HISTORY:can not obtain due to mental status      SOCIAL HISTORY:    Allergies    amoxicillin (Rash)  penicillin (Rash)    Intolerances          REVIEW OF SYSEMS:  negative except form above mentioned      Vital Signs Last 24 Hrs  T(C): 37.8 (04 Apr 2020 09:46), Max: 37.8 (04 Apr 2020 09:46)  T(F): 100 (04 Apr 2020 09:46), Max: 100 (04 Apr 2020 09:46)  HR: 68 (04 Apr 2020 11:00) (66 - 69)  BP: 89/52 (04 Apr 2020 11:00) (87/51 - 91/52)  BP(mean): --  RR: 24 (04 Apr 2020 11:00) (24 - 24)  SpO2: 100% (04 Apr 2020 11:00) (89% - 100%)  I&O's Summary      PHYSICAL EXAM:  GENERAL: onnc  HEAD:  Atraumatic, Normocephalic  EYES: EOMI, PERRLA, conjunctiva and sclera clear  NECK: Supple, No JVD  CHEST/LUNG: dec bs at bases and rhonchi  HEART: Regular rate and rhythm; No murmurs, rubs, or gallops  ABDOMEN: Soft, Nontender, Nondistended; Bowel sounds present  SKIN: No rashes or lesions    LABS:                        14.7   2.88  )-----------( 52       ( 04 Apr 2020 10:49 )             45.9     04-04    154<H>  |  117<H>  |  31<H>  ----------------------------<  107<H>  4.7   |  30  |  1.48<H>    Ca    8.5      04 Apr 2020 10:49    TPro  7.1  /  Alb  2.3<L>  /  TBili  0.2  /  DBili  x   /  AST  73<H>  /  ALT  66<H>  /  AlkPhos  118  04-04      CAPILLARY BLOOD GLUCOSE                RADIOLOGY & ADDITIONAL TESTS:    Imaging Personally Reviewed:  [x] YES  [ ] NO    Consultant(s) Notes Reviewed:  [x] YES  [ ] NO      MEDICATIONS  (STANDING):  azithromycin   Tablet 500 milliGRAM(s) Oral daily  dextrose 5%. 1000 milliLiter(s) (100 mL/Hr) IV Continuous <Continuous>  diVALproex  milliGRAM(s) Oral three times a day  enoxaparin Injectable 30 milliGRAM(s) SubCutaneous daily  levETIRAcetam 1000 milliGRAM(s) Oral two times a day  levothyroxine 125 MICROGram(s) Oral daily  memantine 5 milliGRAM(s) Oral daily  pantoprazole    Tablet 40 milliGRAM(s) Oral before breakfast  polyethylene glycol 3350 17 Gram(s) Oral daily    MEDICATIONS  (PRN):  acetaminophen   Tablet .. 650 milliGRAM(s) Oral every 6 hours PRN Temp greater or equal to 38C (100.4F), Mild Pain (1 - 3)      Care Discussed with Consultants/Other Providers [x] YES  [ ] NO    HEALTH ISSUES - PROBLEM Dx: (04 Apr 2020 13:11)      MEDICATIONS  (STANDING):  albumin human 25% IVPB 50 milliLiter(s) IV Intermittent every 6 hours  chlorhexidine 0.12% Liquid 15 milliLiter(s) Oral Mucosa every 12 hours  chlorhexidine 2% Cloths 1 Application(s) Topical daily  dexMEDEtomidine Infusion 0.5 MICROgram(s)/kG/Hr (5.68 mL/Hr) IV Continuous <Continuous>  hydrocortisone sodium succinate Injectable 50 milliGRAM(s) IV Push every 8 hours  lactobacillus acidophilus 1 Tablet(s) Oral three times a day with meals  levETIRAcetam  IVPB 1000 milliGRAM(s) IV Intermittent every 12 hours  levothyroxine Injectable 62.5 MICROGram(s) IV Push at bedtime  memantine 5 milliGRAM(s) Oral daily  metoclopramide 5 milliGRAM(s) Oral three times a day before meals  midodrine 10 milliGRAM(s) Oral every 8 hours  norepinephrine Infusion 0.05 MICROgram(s)/kG/Min (4.26 mL/Hr) IV Continuous <Continuous>  pantoprazole  Injectable 40 milliGRAM(s) IV Push daily  sodium chloride 1 Gram(s) Oral two times a day  valproate sodium IVPB 500 milliGRAM(s) IV Intermittent every 12 hours  vasopressin Infusion 0.06 Unit(s)/Min (3.6 mL/Hr) IV Continuous <Continuous>    MEDICATIONS  (PRN):  acetaminophen   Tablet .. 650 milliGRAM(s) Oral every 6 hours PRN Temp greater or equal to 38C (100.4F), Mild Pain (1 - 3)  acetaminophen  Suppository .. 650 milliGRAM(s) Rectal every 4 hours PRN Temp greater or equal to 38C (100.4F)  fentaNYL    Injectable 25 MICROGram(s) IV Push every 2 hours PRN agitation  fentaNYL    Injectable 25 MICROGram(s) IV Push every 1 hour PRN discomfort      Allergies    amoxicillin (Rash)  penicillin (Rash)    Intolerances        PAST MEDICAL & SURGICAL HISTORY:  Frequent urinary tract infections  Hypothyroidism, unspecified type  Down syndrome      OB/GYN HISTORY: Unable to obtain, as pt is intubated; however, pt is a 61 y/o Menopausal female. Apparently, bleeding noted per vagina after a rectal tube placement was attempted. Pt is on anticoagulation as well.     	    Vital Signs Last 24 Hrs  T(C): 37.2 (21 Apr 2020 00:00), Max: 37.8 (20 Apr 2020 19:00)  T(F): 98.9 (21 Apr 2020 00:00), Max: 100.1 (20 Apr 2020 19:00)  HR: 63 (21 Apr 2020 01:00) (57 - 77)  BP: 138/84 (21 Apr 2020 01:00) (103/72 - 138/84)  BP(mean): --  RR: 20 (21 Apr 2020 01:00) (18 - 67)  SpO2: 96% (21 Apr 2020 01:00) (87% - 97%)    Last Menstrual Period      PHYSICAL EXAM: Intubated with minimal response      Genitourinary: Dark Red clots mixed with bright red mild bleeding noted. Bedside exam performed but suboptimal. Narrow <1 finger introitus. bleeding appears to be coming from introital area. Vaginal packed with 2#gauze, approx 2.5-3cm depth with resolution of bleeding. Perineum and thighs/buttocks cleaned. V-Pad placed.    Rectal: No ascertainable defect appreciated along recto-vaginal septum.            LABS:                        7.8    8.10  )-----------( 115      ( 20 Apr 2020 23:37 )             22.4     04-20    128<L>  |  91<L>  |  9   ----------------------------<  142<H>  3.4<L>   |  29  |  0.67    Ca    8.6      20 Apr 2020 12:03  Phos  3.2     04-20  Mg     2.3     04-20    TPro  7.5  /  Alb  3.4  /  TBili  0.5  /  DBili  x   /  AST  29  /  ALT  34  /  AlkPhos  80  04-20    I&O's Detail    19 Apr 2020 07:01  -  20 Apr 2020 07:00  --------------------------------------------------------  IN:    Albumin 25%: 50 mL    dexmedetomidine Infusion: 218.6 mL    Enteral Tube Flush: 1200 mL    IV PiggyBack: 50 mL    norepinephrine Infusion: 423 mL    ns in tub fed  [id number]: 720 mL    Solution: 250 mL    vasopressin Infusion: 115.2 mL  Total IN: 3026.8 mL    OUT:    Indwelling Catheter - Urethral: 4145 mL  Total OUT: 4145 mL    Total NET: -1118.2 mL      20 Apr 2020 07:01  -  21 Apr 2020 01:17  --------------------------------------------------------  IN:    Albumin 25%: 150 mL    dexmedetomidine Infusion: 193.8 mL    Enteral Tube Flush: 280 mL    IV PiggyBack: 150 mL    norepinephrine Infusion: 204 mL    ns in tub fed  [id number]: 540 mL    vasopressin Infusion: 62.4 mL  Total IN: 1580.2 mL    OUT:    Indwelling Catheter - Urethral: 1750 mL  Total OUT: 1750 mL    Total NET: -169.8 mL        PT/INR - ( 20 Apr 2020 23:37 )   PT: 14.3 sec;   INR: 1.28 ratio         PTT - ( 20 Apr 2020 23:37 )  PTT:32.5 sec      RADIOLOGY & ADDITIONAL STUDIES:

## 2020-04-21 NOTE — PROGRESS NOTE ADULT - ASSESSMENT
1.	ADAM: Prerenal azotemia, ATN  2.	Shock, Sepsis  3.	Pneumonia, COVID +  4.	Hyponatremia  5.	Hypokalemia    Stable renal indices. Sodium levels better. D/c sodium chloride tabs. Potassium supps. To continue current meds.   On tube feeds. Treatment for COVID pneumonia. COVID precautions. Pulmonary and ID follow up. ICU management.   Avoid nephrotoxic meds as possible. ICU management. Overall prognosis poor.

## 2020-04-22 ENCOUNTER — TRANSCRIPTION ENCOUNTER (OUTPATIENT)
Age: 60
End: 2020-04-22

## 2020-04-22 NOTE — DISCHARGE NOTE PROVIDER - HOSPITAL COURSE
Hospital Course:        59 yo F with Downs syndrome, hypothyroidism, constipation, seizure disorder with acute hypoxic respiratory failure from COVID 19 pneumonia, course complicated by episode of seizure, type 2 NSTEMI, ADAM (resolved), gram variable bacteremia (repeat cx ngtd); ET tube replaced 4/14 due to low expiratory volumes on ventilator. Was extubated 4/16 and reintubated same day due to secretion issues.        1. Acute hypoxic respiratory failure    2. COVID-19 Pneumonia    3. ADAM (resolved)    4. Type 2 NSTEMI (demand ischemia)    5. Seizure Disorder    6. Gram variable bacteremia (of uncertain clinical significance)    7. Wide Complex Tachycardia    8. Acute blood loss anemia/Vaginal bleeding        Neuro: titrate sedation with precedex/propofol. Continue depacon 500mg BID and keppra 1g q12 for seizures. Continue home memantine.    Cardio: on levophed. Type 2 NSTEMI, demand ischemia - troponin downtrended. continue asa. Was on IV amiodarone (discontinued) for WCT - thought to be due to metabolic derangements and also high dose of levophed - was weaned down. Monitor telemetry.    Pulm: weaned and extubated 4/16 and unfortunately required reintubation the same day. titrate FiO2 and PEEP - on 40%/5. Still candidate for weaning trials as tolerated - extubation is an issue due to secretion issues.    Renal/lytes: renal function stable. monitor UOP. monitor and replete lytes. hypokalemia - repleted. hypernatremia - resolved.    GI: NPO with tube feeds. GI ppx.    ID: COVID 19 pneumonia s/p hydroxychloroquine, was not candidate for toci, steroids stopped 4/12. repeat cultures negative. On clindamycin for toxic shock ppx while vaginal bleed is tamponaded - remove either 4/22 afternoon or 4/23 morning. taper steroids. Recheck lactate and markers.    Heme: DVT ppx SCDs for now. full dose LMWH on hold given acute blood loss anemia from vaginal bleeding (iatrogenic from rectal tube placement per GYN) - acute bleeding tamponaded and patient transfused 2U with appropriate response. monitor hgb and platelets - resume prophylactic AC with 40mg lovenox daily - full dose if stable.    Dispo: DNR. critically ill with hypoxic respiratory failure from COVID 19 pneumonia. continue ICU care. Poor prognosis. Spoke to brother, pt remains DNR but he would like to her to be re-intubated if she were to fail extubation when weaned and optimized for this.     -----    Vital Signs Last 24 Hrs    T(C): 36.2 (22 Apr 2020 11:00), Max: 36.7 (22 Apr 2020 00:00)    T(F): 97.1 (22 Apr 2020 11:00), Max: 98.1 (22 Apr 2020 00:00)    HR: 47 (22 Apr 2020 11:00) (47 - 62)    BP: 145/83 (22 Apr 2020 11:00) (14/87 - 150/92)    RR: 16 (22 Apr 2020 11:00) (8 - 20)    SpO2: 96% (22 Apr 2020 11:00) (95% - 100%)        Gen: vented, in NAD    Neuro: sedated    HEENT: MMM, ET/NGT in place    Resp: vented breath sounds    CVS: S1S2 bradycardic    Abd: soft, nontender, nondistended, bowel sounds present    Ext: no edema    Skin: warm, well perfused; right IJ CVC            Accepting Physician: Dr. Migue Tripathi        Time spent arranging discharge, examining/managing patient, d/w CM/SW, d/w consultants and brother Сергей:  42 min

## 2020-04-22 NOTE — DISCHARGE NOTE PROVIDER - NSDCMRMEDTOKEN_GEN_ALL_CORE_FT
acetaminophen 325 mg oral tablet: 2 tab(s) orally every 6 hours, As needed, Temp greater or equal to 38C (100.4F), Mild Pain (1 - 3)  acetaminophen 650 mg rectal suppository: 1 suppository(ies) rectal every 4 hours, As needed, Temp greater or equal to 38C (100.4F)  clindamycin: toxic shock ppx  dexmedetomidine:   enoxaparin: 40 milligram(s) subcutaneous once a day  fentaNYL 5 mcg/mL-NaCl 0.9% intravenous solution: 5 milliliter(s) intravenous every hour, As needed, discomfort  hydrocortisone: 25 milligram(s) injectable 2 times a day and taper  levETIRAcetam 100 mg/mL intravenous solution: 10 milliliter(s) intravenous every 12 hours  levothyroxine: 62.5 microgram(s) injectable once a day  metoclopramide 5 mg oral tablet: 1 tab(s) orally 3 times a day (before meals)  midazolam:   midodrine 10 mg oral tablet: 1 tab(s) orally every 8 hours  norepinephrine:   pantoprazole 40 mg intravenous injection: 40 milligram(s) intravenous once a day  valproic acid (as valproate sodium) 100 mg/mL intravenous solution: 5 milliliter(s) intravenous every 12 hours

## 2020-04-22 NOTE — PROVIDER CONTACT NOTE (CRITICAL VALUE NOTIFICATION) - SITUATION
Lactate level 3.1  Dr Li to eval labs
Lactate 2.1, rectal temp 101.1 F
Pt morning labs drawn and resulted with critical values of Lactate- 2.1, Troponin- 0.637.

## 2020-04-22 NOTE — PROGRESS NOTE ADULT - PROBLEM SELECTOR PROBLEM 1
Acute blood loss anemia
COVID-19
PNA (pneumonia)

## 2020-04-22 NOTE — PROGRESS NOTE ADULT - PROVIDER SPECIALTY LIST ADULT
CCU
Cardiology
Critical Care
Gastroenterology
Hospitalist
Infectious Disease
MICU
MICU
Nephrology
Neurology
Palliative Care
Pulmonology
Critical Care

## 2020-04-22 NOTE — CONSULT NOTE ADULT - SUBJECTIVE AND OBJECTIVE BOX
HISTORY OF PRESENT ILLNESS:  MIGNON WALTER is a 60y Female with history of Down Syndrome, hypothyroidism, and recurrent UTI who presented to [facility] on 4/4 for fever, cough, and SOB,     PAST MEDICAL HISTORY: Frequent urinary tract infections  Hypothyroidism, unspecified type  Down syndrome      PAST SURGICAL HISTORY:     FAMILY HISTORY:     SOCIAL HISTORY:    CODE STATUS:     HOME MEDICATIONS:    ALLERGIES: amoxicillin (Rash)  penicillin (Rash)      VITAL SIGNS:  ICU Vital Signs Last 24 Hrs  T(C): 36.2 (22 Apr 2020 11:00), Max: 36.7 (22 Apr 2020 00:00)  T(F): 97.1 (22 Apr 2020 11:00), Max: 98.1 (22 Apr 2020 00:00)  HR: 47 (22 Apr 2020 11:00) (47 - 62)  BP: 145/83 (22 Apr 2020 11:00) (14/87 - 150/92)  BP(mean): --  ABP: --  ABP(mean): --  RR: 16 (22 Apr 2020 11:00) (8 - 20)  SpO2: 96% (22 Apr 2020 11:00) (95% - 100%)      NEURO  Exam:      RESPIRATORY  Mechanical Ventilation:   ABG - ( 22 Apr 2020 07:03 )  pH: x     /  pCO2: x     /  pO2: x     / HCO3: x     / Base Excess: x     /  SaO2: x       Lactate: 3.2              Exam:      CARDIOVASCULAR    Exam:  Cardiac Rhythm:      GI/NUTRITION  Exam:  Diet:      GENITOURINARY/RENAL        04-22    142  |  104  |  12  ----------------------------<  154<H>  3.5   |  30  |  0.66    Ca    8.1<L>      22 Apr 2020 07:03  Phos  3.3     04-22  Mg     3.2     04-22    TPro  6.1  /  Alb  3.0<L>  /  TBili  0.5  /  DBili  x   /  AST  29  /  ALT  27  /  AlkPhos  78  04-22    [ ] Puentes catheter, indication: urine output monitoring in critically ill patient    HEMATOLOGIC  [ ] VTE Prophylaxis:                          9.0    7.75  )-----------( 81       ( 22 Apr 2020 07:03 )             27.3     PT/INR - ( 22 Apr 2020 07:03 )   PT: 12.8 sec;   INR: 1.15 ratio         PTT - ( 22 Apr 2020 07:03 )  PTT:25.2 sec  Transfusion: [ ] PRBC	[ ] Platelets	[ ] FFP	[ ] Cryoprecipitate      INFECTIOUS DISEASES    RECENT CULTURES:      ENDOCRINE    CAPILLARY BLOOD GLUCOSE          PATIENT CARE ACCESS DEVICES:  [ ] Peripheral IV  [ ] Central Venous Line	[ ] R	[ ] L	[ ] IJ	[ ] Fem	[ ] SC	Placed:   [ ] Arterial Line		[ ] R	[ ] L	[ ] Fem	[ ] Rad	[ ] Ax	Placed:   [ ] PICC:					[ ] Mediport  [ ] Urinary Catheter, Date Placed:   [x] Necessity of urinary, arterial, and venous catheters discussed    OTHER MEDICATIONS:     IMAGING STUDIES: HISTORY OF PRESENT ILLNESS:  MIGNON WALTER is a 60y Female with history of Down Syndrome, hypothyroidism, and recurrent UTI who presented to [facility] on 4/4 for fever, cough, and SOB. The patient was intubated on 4/7 for worsening hypercapnic respiratory failure. Extubated and reintubated on 4/16. During hospitalization, required proning to maintain oxygenation.    The patient's hospital course was further complicated by NSTEMI. Patient seen an evaluated by cardiology, Zeyad Millard, on 4/9. No invasive procedures were performed at that time. Also complicated by seizure, treated with Depakote 500 BID.     Patient seen multiple times by palliative care prior to transfer. Discussion with brother ([phone]); stated full code. Signed MOLST form from 4/16 states that the patient is DNR; no compressions to be performed.     Patient also followed by nephrology for ATN.    Vaginal bleeding noted on 4/21, so OB/GYN consulted. Required 2u pRBC. Found to have iatrogenic trauma from a rectal tube, causing the bleeding. Vaginal packing placed. Plan to leave in place for 48-72 hours.       PAST MEDICAL HISTORY: Frequent urinary tract infections  Hypothyroidism, unspecified type  Down syndrome    SOCIAL HISTORY: Lives in group home.     CODE STATUS: Full Code    ALLERGIES:   amoxicillin (Rash)  penicillin (Rash)    VITAL SIGNS:  ICU Vital Signs Last 24 Hrs  T(C): 36.2 (22 Apr 2020 11:00), Max: 36.7 (22 Apr 2020 00:00)  T(F): 97.1 (22 Apr 2020 11:00), Max: 98.1 (22 Apr 2020 00:00)  HR: 47 (22 Apr 2020 11:00) (47 - 62)  BP: 145/83 (22 Apr 2020 11:00) (14/87 - 150/92)  RR: 16 (22 Apr 2020 11:00) (8 - 20)  SpO2: 96% (22 Apr 2020 11:00) (95% - 100%)    NEURO  Exam: sedated, does not respond to voice, moving extremities and head    RESPIRATORY  Mechanical Ventilation:   ABG - ( 22 Apr 2020 07:03 )  pH: x     /  pCO2: x     /  pO2: x     / HCO3: x     / Base Excess: x     /  SaO2: x       Lactate: 3.2      Exam: coarse bilaterally     CARDIOVASCULAR    Exam: bradycardic  Cardiac Rhythm: regular on monitor     GI/NUTRITION  Exam: soft, nondistended   Diet: bolus TF    GENITOURINARY/RENAL  04-22    142  |  104  |  12  ----------------------------<  154<H>  3.5   |  30  |  0.66    Ca    8.1<L>      22 Apr 2020 07:03  Phos  3.3     04-22  Mg     3.2     04-22    TPro  6.1  /  Alb  3.0<L>  /  TBili  0.5  /  DBili  x   /  AST  29  /  ALT  27  /  AlkPhos  78  04-22    [x] Puentes catheter, indication: urine output monitoring in critically ill patient    HEMATOLOGIC  [x VTE Prophylaxis: lovenox                         9.0    7.75  )-----------( 81       ( 22 Apr 2020 07:03 )             27.3     PT/INR - ( 22 Apr 2020 07:03 )   PT: 12.8 sec;   INR: 1.15 ratio         PTT - ( 22 Apr 2020 07:03 )  PTT:25.2 sec  Transfusion: [ ] PRBC	[ ] Platelets	[ ] FFP	[ ] Cryoprecipitate      INFECTIOUS DISEASES    RECENT CULTURES:  - COVID (+) 4/6   - MRSA (-) 4/6  - BCx (4/4): gram variable rods  - BCx (4/6): NGTD  - RPV (-) 4/4   - SCx (4/9): NGTD  - C. Diff (-) 4/14  - Stool Cx (4/14): negative     ENDOCRINE  Hydrocortisone taper  Synthroid 125 mcg oral / 62.5 mcg IV    PATIENT CARE ACCESS DEVICES:  [x Peripheral IV  [x] Central Venous Line	[ ] R	[x] L	[x] IJ	[ ] Fem	[ ] SC	Placed: 4/7  [x] Arterial Line		[x] R	[ ] L	[ ] Fem	[x] Rad	[ ] Ax	Placed: 4/7  [ ] PICC:					[ ] Mediport  [x] Urinary Catheter, Date Placed: 4/7  [x] Necessity of urinary, arterial, and venous catheters discussed

## 2020-04-22 NOTE — PROGRESS NOTE ADULT - SUBJECTIVE AND OBJECTIVE BOX
Neurology follow up note    MIGNON [id number]      Interval History:    Patient intubated   MEDICATIONS    acetaminophen   Tablet .. 650 milliGRAM(s) Oral every 6 hours PRN  acetaminophen  Suppository .. 650 milliGRAM(s) Rectal every 4 hours PRN  chlorhexidine 0.12% Liquid 15 milliLiter(s) Oral Mucosa every 12 hours  chlorhexidine 2% Cloths 1 Application(s) Topical daily  clindamycin IVPB      clindamycin IVPB 600 milliGRAM(s) IV Intermittent every 8 hours  dexMEDEtomidine Infusion 0.5 MICROgram(s)/kG/Hr IV Continuous <Continuous>  enoxaparin Injectable 40 milliGRAM(s) SubCutaneous daily  fentaNYL    Injectable 25 MICROGram(s) IV Push every 1 hour PRN  hydrocortisone sodium succinate Injectable 25 milliGRAM(s) IV Push every 8 hours  lactobacillus acidophilus 1 Tablet(s) Oral three times a day with meals  lactobacillus acidophilus 1 Tablet(s) Oral daily  levETIRAcetam  IVPB 1000 milliGRAM(s) IV Intermittent every 12 hours  levothyroxine Injectable 62.5 MICROGram(s) IV Push at bedtime  memantine 5 milliGRAM(s) Oral daily  metoclopramide 5 milliGRAM(s) Oral three times a day before meals  midazolam Injectable 1 milliGRAM(s) IV Push every 3 hours PRN  midodrine 10 milliGRAM(s) Oral every 8 hours  norepinephrine Infusion 0.05 MICROgram(s)/kG/Min IV Continuous <Continuous>  pantoprazole  Injectable 40 milliGRAM(s) IV Push daily  valproate sodium IVPB 500 milliGRAM(s) IV Intermittent every 12 hours      Allergies    amoxicillin (Rash)  penicillin (Rash)    Intolerances            Vital Signs Last 24 Hrs  T(C): 36.2 (22 Apr 2020 11:00), Max: 36.7 (22 Apr 2020 00:00)  T(F): 97.1 (22 Apr 2020 11:00), Max: 98.1 (22 Apr 2020 00:00)  HR: 47 (22 Apr 2020 11:00) (47 - 62)  BP: 145/83 (22 Apr 2020 11:00) (14/87 - 150/92)  BP(mean): --  RR: 16 (22 Apr 2020 11:00) (8 - 20)  SpO2: 96% (22 Apr 2020 11:00) (95% - 100%)      REVIEW OF SYSTEMS:  Could not be obtained secondary to the patient being nonverbal.    PHYSICAL EXAMINATION:    HEENT:  Head:  Normocephalic and atraumatic.  Eyes:  No scleral icterus.  Ears:  Hearing hard to evaluate secondary to the patient being sedated and intubated.  RESPIRATORY:  Good air entry bilaterally.  CARDIOVASCULAR:  S1 and S2 are heard.  ABDOMEN:  Soft and nontender.  EXTREMITIES:  No clubbing or cyanosis were noted.      NEUROLOGICAL:  Limited secondary to the patient being intubated and sedated.  No response to verbal stimuli.  I opened the patient's eyes, no gaze preference.  I applied stimuli to all four extremities with slight withdrawal bilateral upper and lower.  Tone; bilateral upper and lower was increased.               LABS:  CBC Full  -  ( 22 Apr 2020 07:03 )  WBC Count : 7.75 K/uL  RBC Count : 2.79 M/uL  Hemoglobin : 9.0 g/dL  Hematocrit : 27.3 %  Platelet Count - Automated : 81 K/uL  Mean Cell Volume : 97.8 fl  Mean Cell Hemoglobin : 32.3 pg  Mean Cell Hemoglobin Concentration : 33.0 gm/dL  Auto Neutrophil # : 6.13 K/uL  Auto Lymphocyte # : 0.76 K/uL  Auto Monocyte # : 0.73 K/uL  Auto Eosinophil # : 0.00 K/uL  Auto Basophil # : 0.00 K/uL  Auto Neutrophil % : 79.1 %  Auto Lymphocyte % : 9.8 %  Auto Monocyte % : 9.4 %  Auto Eosinophil % : 0.0 %  Auto Basophil % : 0.0 %      04-22    142  |  104  |  12  ----------------------------<  154<H>  3.5   |  30  |  0.66    Ca    8.1<L>      22 Apr 2020 07:03  Phos  3.3     04-22  Mg     3.2     04-22    TPro  6.1  /  Alb  3.0<L>  /  TBili  0.5  /  DBili  x   /  AST  29  /  ALT  27  /  AlkPhos  78  04-22    Hemoglobin A1C:     LIVER FUNCTIONS - ( 22 Apr 2020 07:03 )  Alb: 3.0 g/dL / Pro: 6.1 g/dL / ALK PHOS: 78 U/L / ALT: 27 U/L DA / AST: 29 U/L / GGT: x           Vitamin B12   PT/INR - ( 22 Apr 2020 07:03 )   PT: 12.8 sec;   INR: 1.15 ratio         PTT - ( 22 Apr 2020 07:03 )  PTT:25.2 sec      RADIOLOGY        ANALYSIS AND PLAN:  This is a 60-year-old with altered mental status and episode of seizure.  1.	For seizure event, suspect this could be secondary to the patient's interaction with antibiotics. will change Depakote to 500 bid  no new seizures monitor levels.   2.	I will recommend Ativan 1 mg IV push q.6 h. p.r.n. for any type of breakthrough seizures.  3.	Seizure precautions.  4.	For altered mental status, secondary to metabolic encephalopathy secondary to COVID-19 infection.  5.	For hypotension, continue the patient on midodrine.  If possible, please maintain systolic blood pressure of above 100 and help maintain cerebral perfusion.  6.	For history of hypothyroidism, continue the patient on Synthroid.  7.	spoke to staff no new events   8.	for transfer  Greater than 15 minutes spent in direct patient care reviewing  the notes, lab data/ imaging

## 2020-04-22 NOTE — CHART NOTE - NSCHARTNOTEFT_GEN_A_CORE
spoke to brothmai Rushing regarding transfer to [facility] ICU. Updated him on plan and O2 requirements. Risks/benefits of transfer explained to brother. He is aware of the risks of deterioration and/or death en route on this ICU patient. Agreeable to transfer. To resume care at [facility] MICU.

## 2020-04-22 NOTE — DISCHARGE NOTE PROVIDER - NSDCCPCAREPLAN_GEN_ALL_CORE_FT
PRINCIPAL DISCHARGE DIAGNOSIS  Diagnosis: COVID-19  Assessment and Plan of Treatment:       SECONDARY DISCHARGE DIAGNOSES  Diagnosis: Hypoxia  Assessment and Plan of Treatment:     Diagnosis: PNA (pneumonia)  Assessment and Plan of Treatment:     Diagnosis: Hypothyroidism, unspecified type  Assessment and Plan of Treatment: Hypothyroidism, unspecified type    Diagnosis: Vaginal bleeding  Assessment and Plan of Treatment:     Diagnosis: Acute blood loss anemia  Assessment and Plan of Treatment:

## 2020-04-22 NOTE — PROGRESS NOTE ADULT - SUBJECTIVE AND OBJECTIVE BOX
INTERVAL HPI/OVERNIGHT EVENTS:  no gi bleed    MEDICATIONS  (STANDING):  chlorhexidine 0.12% Liquid 15 milliLiter(s) Oral Mucosa every 12 hours  chlorhexidine 2% Cloths 1 Application(s) Topical daily  clindamycin IVPB      clindamycin IVPB 600 milliGRAM(s) IV Intermittent every 8 hours  dexMEDEtomidine Infusion 0.5 MICROgram(s)/kG/Hr (5.68 mL/Hr) IV Continuous <Continuous>  enoxaparin Injectable 40 milliGRAM(s) SubCutaneous daily  hydrocortisone sodium succinate Injectable 25 milliGRAM(s) IV Push every 8 hours  lactobacillus acidophilus 1 Tablet(s) Oral three times a day with meals  lactobacillus acidophilus 1 Tablet(s) Oral daily  levETIRAcetam  IVPB 1000 milliGRAM(s) IV Intermittent every 12 hours  levothyroxine Injectable 62.5 MICROGram(s) IV Push at bedtime  memantine 5 milliGRAM(s) Oral daily  metoclopramide 5 milliGRAM(s) Oral three times a day before meals  midodrine 10 milliGRAM(s) Oral every 8 hours  norepinephrine Infusion 0.05 MICROgram(s)/kG/Min (4.26 mL/Hr) IV Continuous <Continuous>  pantoprazole  Injectable 40 milliGRAM(s) IV Push daily  valproate sodium IVPB 500 milliGRAM(s) IV Intermittent every 12 hours    MEDICATIONS  (PRN):  acetaminophen   Tablet .. 650 milliGRAM(s) Oral every 6 hours PRN Temp greater or equal to 38C (100.4F), Mild Pain (1 - 3)  acetaminophen  Suppository .. 650 milliGRAM(s) Rectal every 4 hours PRN Temp greater or equal to 38C (100.4F)  fentaNYL    Injectable 25 MICROGram(s) IV Push every 1 hour PRN discomfort  midazolam Injectable 1 milliGRAM(s) IV Push every 3 hours PRN Agitation      Allergies    amoxicillin (Rash)  penicillin (Rash)    Intolerances        Review of Systems:    General:  No wt loss, fevers, chills, night sweats,fatigue,   Eyes:  Good vision, no reported pain  ENT:  No sore throat, pain, runny nose, dysphagia  CV:  No pain, palpitatioins, hypo/hypertension  Resp:  No dyspnea, cough, tachypnea, wheezing  GI:  No pain, No nausea, No vomiting, No diarrhea, No constipatiion, No weight loss, No fever, No pruritis, No rectal bleeding, No tarry stools, No dysphagia,  :  No pain, bleeding, incontinence, nocturia  Muscle:  No pain, weakness  Neuro:  No weakness, tingling, memory problems  Psych:  No fatigue, insomnia, mood problems, depression  Endocrine:  No polyuria, polydypsia, cold/heat intolerance  Heme:  No petechiae, ecchymosis, easy bruisability  Skin:  No rash, tattoos, scars, edema      Vital Signs Last 24 Hrs  T(C): 35.3 (22 Apr 2020 12:45), Max: 36.7 (22 Apr 2020 00:00)  T(F): 95.6 (22 Apr 2020 12:45), Max: 98.1 (22 Apr 2020 00:00)  HR: 52 (22 Apr 2020 14:06) (46 - 58)  BP: 145/67 (22 Apr 2020 12:45) (14/87 - 150/92)  BP(mean): --  RR: 16 (22 Apr 2020 11:00) (14 - 20)  SpO2: 94% (22 Apr 2020 14:06) (94% - 100%)    PHYSICAL EXAM:    Constitutional: NAD, well-developed  HEENT: EOMI, throat clear  Neck: No LAD, supple  Respiratory: CTA and P  Cardiovascular: S1 and S2, RRR, no M  Gastrointestinal: BS+, soft, NT/ND, neg HSM,  Extremities: No peripheral edema, neg clubing, cyanosis  Vascular: 2+ peripheral pulses  Neurological: A/O x 3, no focal deficits  Psychiatric: Normal mood, normal affect  Skin: No rashes      LABS:                        8.9    7.20  )-----------( 100      ( 22 Apr 2020 14:30 )             26.5     04-22    144  |  105  |  12  ----------------------------<  110<H>  3.4<L>   |  29  |  0.46<L>    Ca    8.6      22 Apr 2020 14:30  Phos  3.2     04-22  Mg     2.0     04-22    TPro  6.2  /  Alb  3.2<L>  /  TBili  0.4  /  DBili  x   /  AST  26  /  ALT  17  /  AlkPhos  64  04-22    PT/INR - ( 22 Apr 2020 14:30 )   PT: 12.7 SEC;   INR: 1.10          PTT - ( 22 Apr 2020 14:30 )  PTT:27.1 SEC      RADIOLOGY & ADDITIONAL TESTS:

## 2020-04-22 NOTE — PROGRESS NOTE ADULT - REASON FOR ADMISSION
SOB

## 2020-04-22 NOTE — PROGRESS NOTE ADULT - SUBJECTIVE AND OBJECTIVE BOX
Chief Complaint: Shortness of breath    Interval Events: Remains intubated on pressors.    Review of Systems:  Unable to obtain    Physical Exam:  Vital Signs Last 24 Hrs  T(C): 36.5 (22 Apr 2020 07:00), Max: 36.7 (22 Apr 2020 00:00)  T(F): 97.7 (22 Apr 2020 07:00), Max: 98.1 (22 Apr 2020 00:00)  HR: 47 (22 Apr 2020 07:00) (47 - 62)  BP: 129/74 (22 Apr 2020 07:00) (94/54 - 150/92)  BP(mean): --  RR: 116 (22 Apr 2020 07:00) (8 - 116)  SpO2: 98% (22 Apr 2020 07:40) (95% - 100%)  General: Sedated  HEENT: Intubated  Neck: No JVD, no carotid bruit  Lungs: Coarse bilaterally  CV: RRR, nl S1/S2, no M/R/G  Abdomen: S/NT/ND, +BS  Extremities: No LE edema, no cyanosis  Neuro: Non-focal  Skin: No rash    Labs:    04-22    142  |  104  |  12  ----------------------------<  154<H>  3.5   |  30  |  0.66    Ca    8.1<L>      22 Apr 2020 07:03  Phos  3.3     04-22  Mg     3.2     04-22    TPro  6.1  /  Alb  3.0<L>  /  TBili  0.5  /  DBili  x   /  AST  29  /  ALT  27  /  AlkPhos  78  04-22                        9.0    7.75  )-----------( 81       ( 22 Apr 2020 07:03 )             27.3       Telemetry: Sinus rhythm, PVCs, bradycardic at times

## 2020-04-22 NOTE — PROGRESS NOTE ADULT - NSREFPHYEXREFERTOOTHER_GEN_ALL_CORE
medical team and staff
ccpa note

## 2020-04-22 NOTE — CHART NOTE - NSCHARTNOTEFT_GEN_A_CORE
GYN team called for a consult to remove vaginal packing. Chart reviewed. Patient is a transfer from [facility] for further management of COVID-19 disease. Patient is currently intubated. She had vaginal packing placed 4/21 approx. 1am for acute vaginal bleeding 2/2 iatrogenic injury from an attempted placement of a rectal tube, in the setting of anticoagulation. Patient was transfused 2 units of pRBCs w/ an appropriate rise in Hct. AC held. Per discussion with the Gynecologist who placed the vaginal packing, bleeding was likely from the introitus, although it was difficult to evaluate given the inability to properly position the patient (i.e. elevation of pelvis) in an ICU setting. The vaginal packing acted as a tamponade for the source of bleeding and it is recommended it be left in place for at least 48 hours. On chart review, patient was resumed on a prophylactic dose of AC.    Per the primary team who consulted GYN, there is currently no active bleeding. There is dried blood on the gauze.     I called the patient's brother, Сергей Rico, to discuss what level of intervention he would like pursued in the event of continued bleeding. The brother endorses that if continued packing is required, he authorizes for this. Discussed that sometimes a laceration can continue to bleed, especially if in the setting of AC, and placement of sutures could be required. He expressed he would like to avoid surgical intervention. At this time, the GYN team is in agreement that the patient should be managed with vaginal packing as needed. If the patient is not bleeding through the packing at this time, the packing should remain in place.     The GYN team will evaluate for potential removal and/or replacement of the vaginal packing on 4/23 or 4/24.    D/w Dr. Tigist Love PGY-2 GYN team called for a consult to remove vaginal packing. Chart reviewed. Patient is a transfer from [facility] for further management of COVID-19 disease. Patient is currently intubated. She had vaginal packing placed 4/21 approx. 1am for acute vaginal bleeding 2/2 iatrogenic injury from an attempted placement of a rectal tube, in the setting of anticoagulation. Patient was transfused 2 units of pRBCs w/ an appropriate rise in Hct. AC held. Per discussion with the Gynecologist who placed the vaginal packing, bleeding was likely from the introitus, although it was difficult to evaluate given the inability to properly position the patient (i.e. elevation of pelvis) in an ICU setting. The vaginal packing acted as a tamponade for the source of bleeding and it is recommended it be left in place for at least 48 hours. On chart review, patient was resumed on a prophylactic dose of AC.    Per the primary team who consulted GYN, there is currently no active bleeding. There is dried blood on the gauze.     I called the patient's brother, Сергей Rico, to discuss what level of intervention he would like pursued in the event of continued bleeding. The brother endorses that if continued packing is required, he authorizes for this. Discussed that sometimes a laceration can continue to bleed, especially if in the setting of AC, and placement of sutures could be required. He expressed he would like to avoid surgical intervention. He prefers minimal intervention if possible. At this time, the GYN team is in agreement that the patient should be managed with vaginal packing as needed. If the patient is not bleeding through the packing at this time, the packing should remain in place.     The GYN team will evaluate for potential removal and/or replacement of the vaginal packing on 4/23 or 4/24.    D/w Dr. Tigist Love PGY-2

## 2020-04-22 NOTE — PROGRESS NOTE ADULT - PROBLEM SELECTOR PLAN 1
covid  mrdd  dementia  prob HF - vol overload - I and O noted  may need TTE  cardio following  will give Albumin - will give Diuresis -   supportive ICU care and management  sedation, pressors as needed - keep MAP > 60  fio2 and peep titration in progress - keep sat > 90 pct  DVT p bid - lovenox -
covid  resp failure  I and O  on sedation protocol  on emp ABX  overnight events noted  et tube in place  will attempt for weaning - fio2 and peep optimized -   keep sat > 90 pct  prognosis guarded  TF  DVT p
covid  resp failure  atelectasis  mrdd  dementia  depressed LV function   I and O noted  cardio follow up reviewed  vent support in progress -   monitor VS and HD and Sat - keep sat > 90 pct - fio2 and peep titration -   sedation - trials of awake  dvt p BID - lovenox  cont with weaning trials as tolerated -   monitor for secretions  prognosis guarded  brother Vu in [place] - aware and is considering DNR and possibly DNI if she gets extubated
covid  resp failure  mrdd  dementia  atelectasis and pulm congestion  oral and skin care  assist with ADL  vent support  fio2 and peep weaning  on ABX rx regimen  chest pt and suction PRN  seizure break through reported  neuro input pending  prognosis guarded  labs and imaging reviewed  dvt p  TF
covid  resp failure  mrdd  dementia  epilepsy - AED optimization in progress - pt is on Propofol  I and O - 3 L pos  oral and skin care  assist with ADL  supportive medical regimen  on steroids and plaquenil  suction PRN  fio2 and peep titration - keep sat > 90 pct  monitor for needs, distress, suffering  pt with Mrdd - dementia -   prognosis guarded  pt is full code
covid  resp failure  mrdd  dementia  mucus plugging  atelectasis  weaning as tolerated  oral hygiene  trial of mucomyst for mucus plugging  pt is now DNR  supportive medical regimen  hopefully a candidate for another trial of extubation
covid  resp failure  pulm congestion   asp pna  on plaquenil  chest pt  suction prn  oral hygiene  keep MAP > 60  keep sat > 90 pct  wean fio2 and peep as tolerated  GOC discussion with family  prognosis guarded  mrdd - assist with all ADL  will follow
covid - mrdd - dementia  sedation in progress - TG noted, pt is on Propofol -   I and O noted - 500 cc positive  oral and skin care  on ABX for poss LRTI  supportive medical regimen and fio2 and peep weaning as tolerated  keep sat > 90 pct  prognosis remains guarded  pt is full code
covid - mrdd - dementia - episode of desaturation -   fio2 and peep titration - keep sat > 90 pct  I and O monitored and reviewed -   ordered for Diamox this am -  AED for epilepsy  on ABX for poss sepsis - LRTI   cardio follow up noted  no evidence of benefit from IvIg in this situation  will follow  prognosis guarded  pt is full coded
covid - resp failure - dysphagia - mrdd - dementia -   oral and skin care   ABX change noted  AED for epilepsy -   spoke with brother - discussed GOC and code status  sedation  dvt p  nutrition  serial labs  serial clinical exam   dvt p - BID proph - Lovenox
covid - resp failure - mrdd - atelectasis - unable to protect her airway and clear secretions  I and O noted  vs and HD and Sat reviewed  labs reviewed  reintubated overnight - copious secretions - inability to clear secretions -   proBNP eval - CM eval in progress - trops noted - Cardio following -   completed ABX on this admission  on DVT p BID   pt is tolerating low fio2 and peep - oxygenation is preserved -
covid - resp failure - mrdd - pna - atelectasis - pulm congestion  on ABX  on steroids and plaquenil  I and O noted - will give LASIX this am   oral and skin care - assist with ADL  cont ICU monitoring and care  prognosis guarded  fio2 and peep titration in progress - keep sat > 90 pct  sedation in effect  check Tg while on Propofol
covid - resp failure - pna - atelectasis - mrdd - dementia - epilepsy -   fio2 and peep titration   on Abx regimen  oral hygiene  skin care  cardio eval noted  trops noted  dvt p  nutrition  sedation vacation   monitor for needs  prognosis remains guarded to poor
gyn bleed - overnight events noted -   am Hgb noted  remains ventilated  oral and skin care  I and O  monitor HD and Hgb  GYN cx noted - packing intravaginal -   fio2 and peep weaning as tolerated - keep sat > 90 pct  keep MAP > 60  dvt p  serial labs serial clinical assessment  sedation - awake trials  monitor for SBT readiness  overall prognosis guarded to poor  pt reintubated on this admission
propofol on hold this am   IMV mode vent support - with hope to transition to CPAP PS  I and O noted  TF  on AC  completed ABX  mrdd and covid and resp failure  labs and imaging reviewed  full code  spoke with brother in [place] Zach Womack
secondary to vaginal bleed  cbc stable   poor px
Covid + now  possible aspiration  will consult ID  D/C zithromax and meropenum  Pulmonary wants to bronch, but covid +    on pressors  start plaquinil
Covid negative x 2  possible aspiration  will consult ID  D/C zithromax, on meropenum  Pulmonary wants to bronch.    pt hypotensive, will need pressors.
Covid negative x 2  possible aspiration  will consult ID  D/C zithromax, will start meropenum  will consult pulmonology (dr foster)

## 2020-04-23 NOTE — DIETITIAN INITIAL EVALUATION ADULT. - OTHER INFO
Per chart review patient with medical history of history of Down Syndrome, hypothyroidism, and recurrent UTI who presented to [facility] on 4/4 for fever, cough, and SOB. Patient intubated on 4/7 for worsening hypercapnic respiratory failure. Extubated and reintubated on 4/16. Now admitted to [facility].     Patient intubated/sedated. Unable to obtain subjective information regarding PO intake/diet and weight history prior to admission at this time. Patient ordered for bolus feeds, Jevity 1.2, 100mL bolus q8hrs (provides 360Kcal, 16g protein daily). Noted to be tolerating feeds at this time. No abdominal distention, vomiting, diarrhea reported. Abdomen nondistended.

## 2020-04-23 NOTE — PROGRESS NOTE ADULT - ASSESSMENT
ASSESSMENT:  MIGNON WALTER is a 60y Female with history of Down Syndrome, hypothyroidism, and recurrent UTI who presented to [facility] on 4/4 for fever, cough, and SOB. The patient was intubated on 4/7 for worsening hypercapnic respiratory failure. Extubated and reintubated on 4/16. Hospitalization further complicated by NSTEMI (type 2), vaginal bleeding requiring 2 uPRBC.    Patient is DNR; no compressions, but continue medical therapy    PLAN:    NEURO:  - Precedex    RESPIRATORY:   - Wean vent as able    CARDIOVASCULAR:  - Levo, keep MAP >65    GI/NUTRITION:  - Pepcid  - Senna  - Miralax   - TF Q8H    GENITOURINARY/RENAL:  - Monitor UOP    HEMATOLOGIC:  - Lovenox 40 QD    INFECTIOUS DISEASE:  - Plaquinil finished  - Clindamycin for vaginal packing    ENDOCRINE:  - Steroid taper  - Synthroid   -ISS Q6 HOUR

## 2020-04-23 NOTE — DIETITIAN INITIAL EVALUATION ADULT. - ENERGY NEEDS
Height (cm): 149.86 (04 Apr 2020)  Weight (kg): 45 (22 Apr 2020)  BMI (kg/m2): 20   IBW: 44.5kg +/-10%

## 2020-04-23 NOTE — PROGRESS NOTE ADULT - ASSESSMENT
MIGNON WALTER is a 60y Female with history of Down Syndrome, hypothyroidism, and recurrent UTI who presented to [facility] on 4/4 for fever, cough, and SOB. The patient was intubated on 4/7 for worsening hypercapnic respiratory failure. Extubated and reintubated on 4/16. Hospitalization further complicated by NSTEMI (type 2), vaginal bleeding requiring 2 uPRBC.    Problem/Plan - 1:  ·  Problem: Acute blood loss anemia.   Likely 2/2 to vaginal bleeding   No evidence of GI bleeding   cbc appears stable       COVID   AS per primary team     Will follow   Advanced care planning was discussed with patient and family.  Advanced care planning forms were reviewed and discussed.  Risks, benefits and alternatives of gastroenterologic procedures were discussed in detail and all questions were answered.

## 2020-04-23 NOTE — DIETITIAN INITIAL EVALUATION ADULT. - ENTERAL
1. Bolus feeds as medically appropriate and tolerated: Recommend Jevity 1.2 via OGT, 250mL bolus q6hrs + No Carb Prosource 2x daily [provides 1000mL total volume, 1200Kcal, 85g protein, 807mL free water daily].

## 2020-04-23 NOTE — DIETITIAN INITIAL EVALUATION ADULT. - REASON INDICATOR FOR ASSESSMENT
Critical care LOS. Unable to conduct a face to face interview or nutrition-focused physical exam due to limited contact restrictions related to Pt's medical condition and isolation precautions.

## 2020-04-23 NOTE — DIETITIAN INITIAL EVALUATION ADULT. - PHYSICAL APPEARANCE
other (specify) Skin: stage 2 sacral spine, right heel stage 2, right heel unstageable pressure injuries, right first toe suspected DTI.   Edema: none noted

## 2020-04-23 NOTE — PROGRESS NOTE ADULT - ATTENDING COMMENTS
I was physically present for the key portions of the evaluation and management (E/M) service provided.  The patient was personally seen and examined at bedside.    Thank you for your consultation and allowing  me to participate in the care of your patients. If you have further questions please contact me at [phone].     Efrem Herrera M.D.       _________________________________________________________________________________________________  [facility]  [address] [place] [address]  Office: [phone]    Sterling Irvin PA-C  ___________________________________________________________________________________________________

## 2020-04-23 NOTE — DIETITIAN INITIAL EVALUATION ADULT. - PERTINENT MEDS FT
acetylcysteine 20%  Inhalation  chlorhexidine 0.12% Liquid  dexMEDEtomidine Infusion  enoxaparin Injectable  famotidine Injectable  hydrocortisone sodium succinate Injectable  levETIRAcetam  IVPB  levothyroxine Injectable  midodrine  norepinephrine Infusion  polyethylene glycol 3350  senna  valproate sodium IVPB

## 2020-04-23 NOTE — PROGRESS NOTE ADULT - SUBJECTIVE AND OBJECTIVE BOX
Progress Note:   · Provider Specialty	Gastroenterology	    Reason for Admission:   Reason for Admission:  · Reason for Admission	sob	      · Subjective and Objective: 	  INTERVAL HPI/OVERNIGHT EVENTS:  no gi bleed    MEDICATIONS  (STANDING):  chlorhexidine 0.12% Liquid 15 milliLiter(s) Oral Mucosa every 12 hours  chlorhexidine 2% Cloths 1 Application(s) Topical daily  clindamycin IVPB      clindamycin IVPB 600 milliGRAM(s) IV Intermittent every 8 hours  dexMEDEtomidine Infusion 0.5 MICROgram(s)/kG/Hr (5.68 mL/Hr) IV Continuous <Continuous>  enoxaparin Injectable 40 milliGRAM(s) SubCutaneous daily  hydrocortisone sodium succinate Injectable 25 milliGRAM(s) IV Push every 8 hours  lactobacillus acidophilus 1 Tablet(s) Oral three times a day with meals  lactobacillus acidophilus 1 Tablet(s) Oral daily  levETIRAcetam  IVPB 1000 milliGRAM(s) IV Intermittent every 12 hours  levothyroxine Injectable 62.5 MICROGram(s) IV Push at bedtime  memantine 5 milliGRAM(s) Oral daily  metoclopramide 5 milliGRAM(s) Oral three times a day before meals  midodrine 10 milliGRAM(s) Oral every 8 hours  norepinephrine Infusion 0.05 MICROgram(s)/kG/Min (4.26 mL/Hr) IV Continuous <Continuous>  pantoprazole  Injectable 40 milliGRAM(s) IV Push daily  valproate sodium IVPB 500 milliGRAM(s) IV Intermittent every 12 hours    MEDICATIONS  (PRN):  acetaminophen   Tablet .. 650 milliGRAM(s) Oral every 6 hours PRN Temp greater or equal to 38C (100.4F), Mild Pain (1 - 3)  acetaminophen  Suppository .. 650 milliGRAM(s) Rectal every 4 hours PRN Temp greater or equal to 38C (100.4F)  fentaNYL    Injectable 25 MICROGram(s) IV Push every 1 hour PRN discomfort  midazolam Injectable 1 milliGRAM(s) IV Push every 3 hours PRN Agitation      Allergies    amoxicillin (Rash)  penicillin (Rash)    Intolerances        Review of Systems:    General:  No wt loss, fevers, chills, night sweats,fatigue,   Eyes:  Good vision, no reported pain  ENT:  No sore throat, pain, runny nose, dysphagia  CV:  No pain, palpitatioins, hypo/hypertension  Resp:  No dyspnea, cough, tachypnea, wheezing  GI:  No pain, No nausea, No vomiting, No diarrhea, No constipatiion, No weight loss, No fever, No pruritis, No rectal bleeding, No tarry stools, No dysphagia,  :  No pain, bleeding, incontinence, nocturia  Muscle:  No pain, weakness  Neuro:  No weakness, tingling, memory problems  Psych:  No fatigue, insomnia, mood problems, depression  Endocrine:  No polyuria, polydypsia, cold/heat intolerance  Heme:  No petechiae, ecchymosis, easy bruisability  Skin:  No rash, tattoos, scars, edema      Vital Signs Last 24 Hrs  T(C): 35.3 (22 Apr 2020 12:45), Max: 36.7 (22 Apr 2020 00:00)  T(F): 95.6 (22 Apr 2020 12:45), Max: 98.1 (22 Apr 2020 00:00)  HR: 52 (22 Apr 2020 14:06) (46 - 58)  BP: 145/67 (22 Apr 2020 12:45) (14/87 - 150/92)  BP(mean): --  RR: 16 (22 Apr 2020 11:00) (14 - 20)  SpO2: 94% (22 Apr 2020 14:06) (94% - 100%)    PHYSICAL EXAM:    Constitutional: NAD, well-developed  HEENT: EOMI, throat clear  Neck: No LAD, supple  Respiratory: CTA and P  Cardiovascular: S1 and S2, RRR, no M  Gastrointestinal: BS+, soft, NT/ND, neg HSM,  Extremities: No peripheral edema, neg clubing, cyanosis  Vascular: 2+ peripheral pulses  Neurological: A/O x 3, no focal deficits  Psychiatric: Normal mood, normal affect  Skin: No rashes      LABS:                        8.9    7.20  )-----------( 100      ( 22 Apr 2020 14:30 )             26.5     04-22    144  |  105  |  12  ----------------------------<  110<H>  3.4<L>   |  29  |  0.46<L>    Ca    8.6      22 Apr 2020 14:30  Phos  3.2     04-22  Mg     2.0     04-22    TPro  6.2  /  Alb  3.2<L>  /  TBili  0.4  /  DBili  x   /  AST  26  /  ALT  17  /  AlkPhos  64  04-22    PT/INR - ( 22 Apr 2020 14:30 )   PT: 12.7 SEC;   INR: 1.10          PTT - ( 22 Apr 2020 14:30 )  PTT:27.1 SEC      RADIOLOGY & ADDITIONAL TESTS:        Problem/Plan - 1:  ·  Problem: Acute blood loss anemia.  Plan: secondary to vaginal bleed  cbc stable   poor px.

## 2020-04-23 NOTE — PROGRESS NOTE ADULT - SUBJECTIVE AND OBJECTIVE BOX
ICU DAILY PROGRESS NOTE    MIGNON WALTER is a 60y Female with history of Down Syndrome, hypothyroidism, and recurrent UTI who presented to [facility] on 4/4 for fever, cough, and SOB. The patient was intubated on 4/7 for worsening hypercapnic respiratory failure. Extubated and reintubated on 4/16. During hospitalization, required proning to maintain oxygenation.    The patient's hospital course was further complicated by NSTEMI. Patient seen an evaluated by cardiology, Zeyad Millard, on 4/9. No invasive procedures were performed at that time. Also complicated by seizure, treated with Depakote 500 BID.     Patient seen multiple times by palliative care prior to transfer. Discussion with brother ([phone]); stated full code. Signed MOLST form from 4/16 states that the patient is DNR; no compressions to be performed.     Patient also followed by nephrology for ATN.    Vaginal bleeding noted on 4/21, so OB/GYN consulted. Required 2u pRBC. Found to have iatrogenic trauma from a rectal tube, causing the bleeding. Vaginal packing placed. Plan to leave in place for 48-72 hours.     24 HOUR EVENTS:  Placed on pressure support this morning  awaiting GYN follow up for vaginal bleeding    SUBJECTIVE/ROS:  [ ] A ten-point review of systems was otherwise negative except as noted.  [x] Due to altered mental status/intubation, subjective information were not able to be obtained from the patient. History was obtained, to the extent possible, from review of the chart and collateral sources of information.      NEURO  RASS:    -1  Exam: intubated  Meds: dexMEDEtomidine Infusion 0.05 MICROgram(s)/kG/Hr IV Continuous <Continuous>  levETIRAcetam  IVPB 1000 milliGRAM(s) IV Intermittent every 12 hours  valproate sodium IVPB 500 milliGRAM(s) IV Intermittent every 12 hours    [x] Adequacy of sedation and pain control has been assessed and adjusted      RESPIRATORY  RR: 20 (04-23-20 @ 07:00) (20 - 20)  SpO2: 98% (04-23-20 @ 08:37) (93% - 98%)  Wt(kg): --  Exam: on vent  Mechanical Ventilation: Mode: AC/ CMV (Assist Control/ Continuous Mandatory Ventilation), RR (machine): 20, RR (patient): 20, TV (machine): 320, FiO2: 50, PEEP: 5, ITime: 0.8, MAP: 10, PIP: 24  ABG - ( 23 Apr 2020 02:50 )  pH: 7.43  /  pCO2: 44    /  pO2: 81    / HCO3: 29    / Base Excess: 4.7   /  SaO2: 96.4    Lactate: 2.2          [x] Extubation Readiness Assessed  Meds: acetylcysteine 20%  Inhalation 4 milliLiter(s) Inhalation every 8 hours    CARDIOVASCULAR  HR: 41 (04-23-20 @ 08:37) (41 - 52)  BP: 138/83 (04-23-20 @ 07:00) (104/71 - 145/67)  BP(mean): 98 (04-23-20 @ 07:00) (81 - 98)  ABP: 154/77 (04-23-20 @ 07:00) (115/57 - 154/77)  ABP(mean): 102 (04-23-20 @ 07:00) (75 - 102)    Exam: regular rate and rhythm  Cardiac Rhythm: sinus  Perfusion     [x]Adequate   [ ]Inadequate  Mentation   [x]Normal       [ ]Reduced  Extremities  [x]Warm         [ ]Cool  Volume Status [ ]Hypervolemic [x]Euvolemic [ ]Hypovolemic  Meds: midodrine 10 milliGRAM(s) Oral every 8 hours  norepinephrine Infusion 0.05 MICROgram(s)/kG/Min IV Continuous <Continuous>      GI/NUTRITION  Exam: soft, nontender, nondistended  Diet: TFs - jevity  Meds: famotidine Injectable 20 milliGRAM(s) IV Push daily  polyethylene glycol 3350 17 Gram(s) Oral daily  senna 2 Tablet(s) Oral at bedtime      GENITOURINARY  I&O's Detail    04-22 @ 07:01  -  04-23 @ 07:00  --------------------------------------------------------  IN:    dexmedetomidine Infusion: 179 mL    IV PiggyBack: 244 mL    norepinephrine Infusion: 25 mL    ns in tub fed  [id number]: 200 mL  Total IN: 648 mL    OUT:    Indwelling Catheter - Urethral: 335 mL  Total OUT: 335 mL    Total NET: 313 mL      04-23 @ 07:01  -  04-23 @ 11:57  --------------------------------------------------------  IN:    dexmedetomidine Infusion: 20.4 mL    norepinephrine Infusion: 1.2 mL    ns in tub fed  [id number]: 100 mL  Total IN: 121.6 mL    OUT:    Indwelling Catheter - Urethral: 135 mL  Total OUT: 135 mL    Total NET: -13.4 mL        Weight (kg): 45 (04-22 @ 14:11)  04-23    145  |  107  |  15  ----------------------------<  132<H>  4.2   |  27  |  0.55    Ca    8.9      23 Apr 2020 02:50  Phos  3.4     04-23  Mg     1.9     04-23    TPro  6.2  /  Alb  3.1<L>  /  TBili  0.3  /  DBili  x   /  AST  21  /  ALT  18  /  AlkPhos  64  04-23    [x] Puentes catheter  Meds:       HEMATOLOGIC  Meds: enoxaparin Injectable 40 milliGRAM(s) SubCutaneous daily    [x] VTE Prophylaxis                        8.9    8.25  )-----------( 89       ( 23 Apr 2020 02:50 )             26.4     PT/INR - ( 23 Apr 2020 02:50 )   PT: 12.8 SEC;   INR: 1.11          PTT - ( 23 Apr 2020 02:50 )  PTT:24.7 SEC  Transfusion     [ ] PRBC   [ ] Platelets   [ ] FFP   [ ] Cryoprecipitate      INFECTIOUS DISEASES  WBC Count: 8.25 K/uL (04-23 @ 02:50)  WBC Count: 8.25 K/uL (04-23 @ 02:50)  WBC Count: 7.20 K/uL (04-22 @ 14:30)    RECENT CULTURES:    Meds: clindamycin IVPB      clindamycin IVPB 900 milliGRAM(s) IV Intermittent every 8 hours      ENDOCRINE  CAPILLARY BLOOD GLUCOSE    Meds: hydrocortisone sodium succinate Injectable 12.5 milliGRAM(s) IV Push <User Schedule>  levothyroxine Injectable 62.5 MICROGram(s) IV Push at bedtime        ACCESS DEVICES:  [ ] Peripheral IV  [x] Central Venous Line	[x] R	[ ] L	[x] IJ	[ ] Fem	[ ] SC	Placed: OSH  [X] Arterial Line		[X] R	[ ] L	[ ] Fem	[X] Rad	[ ] Ax	Placed: 4/22  [ ] PICC:					[ ] Mediport  [X] Urinary Catheter, Date Placed:   [x] Necessity of urinary, arterial, and venous catheters discussed    OTHER MEDICATIONS:  chlorhexidine 0.12% Liquid 15 milliLiter(s) Oral Mucosa every 12 hours      IMAGING: Reviewed

## 2020-04-24 NOTE — PROGRESS NOTE ADULT - SUBJECTIVE AND OBJECTIVE BOX
ASU / ICU PROGRESS NOTE:      Subjective:  NAEO. Failed PS trial on  for tachypnea this AM.       Objective:    PE:  Gen: Sedated, makes eye contact  Resp: ETT in place, coarse breaths sounds b/l  CVS: RRR  Abd: soft, ND  Ext: Palp distal pulses  Neuro: no focal deficits    Vital Signs Last 24 Hrs  T(C): 37 (2020 08:00), Max: 37 (2020 08:00)  T(F): 98.6 (2020 08:00), Max: 98.6 (2020 08:00)  HR: 101 (2020 09:30) (45 - 101)  BP: 119/68 (2020 08:00) (97/64 - 124/72)  BP(mean): 84 (2020 08:00) (75 - 89)  RR: 31 (2020 09:30) (16 - 31)  SpO2: 88% (2020 09:30) (88% - 98%)    I&O's Detail    2020 07:01  -  2020 07:00  --------------------------------------------------------  IN:    dexmedetomidine Infusion: 122.4 mL    norepinephrine Infusion: 54.8 mL    ns in tub fed  [id number]: 850 mL  Total IN: 1027.2 mL    OUT:    Indwelling Catheter - Urethral: 1405 mL  Total OUT: 1405 mL    Total NET: -377.8 mL      2020 07:01  -  2020 10:17  --------------------------------------------------------  IN:    dexmedetomidine Infusion: 5.1 mL    norepinephrine Infusion: 5.1 mL  Total IN: 10.2 mL    OUT:    Indwelling Catheter - Urethral: 250 mL  Total OUT: 250 mL    Total NET: -239.8 mL          Daily     Daily Weight in k (2020 11:31)    MEDICATIONS  (STANDING):  chlorhexidine 0.12% Liquid 15 milliLiter(s) Oral Mucosa every 12 hours  clindamycin IVPB      clindamycin IVPB 900 milliGRAM(s) IV Intermittent every 8 hours  dexMEDEtomidine Infusion 0.05 MICROgram(s)/kG/Hr (0.56 mL/Hr) IV Continuous <Continuous>  dextrose 5%. 1000 milliLiter(s) (50 mL/Hr) IV Continuous <Continuous>  dextrose 50% Injectable 12.5 Gram(s) IV Push once  dextrose 50% Injectable 25 Gram(s) IV Push once  dextrose 50% Injectable 25 Gram(s) IV Push once  enoxaparin Injectable 40 milliGRAM(s) SubCutaneous daily  famotidine Injectable 20 milliGRAM(s) IV Push daily  hydrocortisone sodium succinate Injectable 7 milliGRAM(s) IV Push <User Schedule>  insulin lispro (HumaLOG) corrective regimen sliding scale   SubCutaneous every 6 hours  levETIRAcetam  IVPB 1000 milliGRAM(s) IV Intermittent every 12 hours  levothyroxine Injectable 62.5 MICROGram(s) IV Push at bedtime  midodrine 10 milliGRAM(s) Oral every 8 hours  norepinephrine Infusion 0.05 MICROgram(s)/kG/Min (2.11 mL/Hr) IV Continuous <Continuous>  polyethylene glycol 3350 17 Gram(s) Oral daily  senna 2 Tablet(s) Oral at bedtime  valproate sodium IVPB 500 milliGRAM(s) IV Intermittent every 12 hours    MEDICATIONS  (PRN):  dextrose 40% Gel 15 Gram(s) Oral once PRN Blood Glucose LESS THAN 70 milliGRAM(s)/deciliter  glucagon  Injectable 1 milliGRAM(s) IntraMuscular once PRN Glucose LESS THAN 70 milligrams/deciliter      LABS:                        9.6    11.11 )-----------( 167      ( 2020 03:00 )             29.1     04-24    143  |  103  |  16  ----------------------------<  101<H>  4.1   |  28  |  0.53    Ca    8.1<L>      2020 03:00  Phos  3.9     04-24  Mg     1.8     04-24    TPro  6.1  /  Alb  2.9<L>  /  TBili  0.3  /  DBili  x   /  AST  21  /  ALT  19  /  AlkPhos  62  04-24    PT/INR - ( 2020 03:00 )   PT: 12.7 SEC;   INR: 1.11          PTT - ( 2020 03:00 )  PTT:27.1 SEC      RADIOLOGY & ADDITIONAL STUDIES:

## 2020-04-24 NOTE — PROGRESS NOTE ADULT - ASSESSMENT
ASSESSMENT:  MIGNON WALTER is a 60y Female with history of Down Syndrome, hypothyroidism, and recurrent UTI who presented to [facility] on 4/4 for fever, cough, and SOB. The patient was intubated on 4/7 for worsening hypercapnic respiratory failure. Extubated and reintubated on 4/16. Hospitalization further complicated by NSTEMI (type 2), vaginal bleeding requiring 2 uPRBC.    Patient is DNR; no compressions, but continue medical therapy    PLAN:  Neuro:  - monitor mental status  - sedation w/ Precedex  - c/w Keppra and Valproate for seizure d/o    Resp:  - monitor resp status  - c/w mech vent, wean as tolerated  - Daily PS trials    CV:  - c/w Levo and Vaso gtts, wean as tolerated  - Midodrine 10mg TID  - trend lactate    GI:  - NPO w/ TF Jevity 100 TID  - Pepcid 20 BID  - Senna, Miralax    :  - GYN to re-eval vaginal bleeding t/d  - monitor UOP  - replete lytes PRN    ID:  - c/w Clinda while vaginal packing in place  - monitor WBC and temp    Heme:  - VTE ppx w/ Lovenox  - monitor CBC    Endo:  - ISS for DM  - c/w Synthroid 62.2 mcg  - Solu-medrol 7mg BID  - monitor BG on BMP    Dispo:  - SICU  - DNR

## 2020-04-24 NOTE — CONSULT NOTE ADULT - SUBJECTIVE AND OBJECTIVE BOX
(History obtained from chart review and discussion with other care providers given patient's current condition)  R2 GYN CONSULT NOTE    HPI:  61y/o virginal postmenopausal female, nonverbal w/ a h/o Down Syndrome, is a transfer from [facility] for further management of COVID-19 disease, currently intubated. She had vaginal packing placed 4/21 for acute vaginal bleeding 2/2 iatrogenic injury from an attempted placement of a rectal tube, in the setting of anticoagulation. Patient was transfused 2 units of pRBCs w/ an appropriate rise in Hct. AC held. Per discussion with the Gynecologist who placed the vaginal packing, bleeding was likely from the introitus, although it was difficult to evaluate given the inability to properly position the patient (i.e. elevation of pelvis) in an ICU setting.     On chart review, patient was resumed on a prophylactic dose of AC. Per the primary team, there is currently no active bleeding. There is dried blood on the gauze.     OBH: Virginal  GYNH: cannot obtain due to current medical status  PMSH: Frequent UTIs, hypothyroidism, Down syndrome  MEDS: as per med rec  ALL: amoxicillin (Rash), penicillin (Rash)    ROS: unable to obtain    OBJECTIVE:    VITAL SIGNS:  Vital Signs Last 24 Hrs  T(C): 37.2 (24 Apr 2020 12:00), Max: 37.2 (24 Apr 2020 12:00)  T(F): 99 (24 Apr 2020 12:00), Max: 99 (24 Apr 2020 12:00)  HR: 73 (24 Apr 2020 12:00) (45 - 101)  BP: 119/68 (24 Apr 2020 08:00) (97/64 - 124/72)  BP(mean): 84 (24 Apr 2020 08:00) (75 - 89)  RR: 24 (24 Apr 2020 12:00) (16 - 31)  SpO2: 94% (24 Apr 2020 12:00) (88% - 98%)    CAPILLARY BLOOD GLUCOSE      POCT Blood Glucose.: 94 mg/dL (24 Apr 2020 10:57)  POCT Blood Glucose.: 87 mg/dL (24 Apr 2020 05:22)  POCT Blood Glucose.: 126 mg/dL (23 Apr 2020 22:35)  POCT Blood Glucose.: 70 mg/dL (23 Apr 2020 17:04)  POCT Blood Glucose.: 67 mg/dL (23 Apr 2020 17:01)      04-23-20 @ 07:01  -  04-24-20 @ 07:00  --------------------------------------------------------  IN: 1027.2 mL / OUT: 1405 mL / NET: -377.8 mL    04-24-20 @ 07:01  -  04-24-20 @ 13:23  --------------------------------------------------------  IN: 260.2 mL / OUT: 650 mL / NET: -389.8 mL        PHYSICAL EXAM:  GEN: sedated  LUNGS: intubated, on a vent  PELVIC: staining on peripad; vaginal packing noted to be dry externally; vaginal packing removed and no active bleeding noted on removal      LABS:                        9.6    11.11 )-----------( 167      ( 24 Apr 2020 03:00 )             29.1   baso 0.2    eos 0.0    imm gran 1.7    lymph 11.0   mono 12.2   poly 74.9                         8.9    8.25  )-----------( 89       ( 23 Apr 2020 02:50 )             26.4   baso 0.1    eos 0.0    imm gran 1.5    lymph 13.1   mono 9.0    poly 76.3                         8.9    7.20  )-----------( 100      ( 22 Apr 2020 14:30 )             26.5   baso x      eos x      imm gran x      lymph x      mono x      poly x                            9.0    7.75  )-----------( 81       ( 22 Apr 2020 07:03 )             27.3   baso 0.0    eos 0.0    imm gran 1.7    lymph 9.8    mono 9.4    poly 79.1       04-24    143  |  103  |  16  ----------------------------<  101<H>  4.1   |  28  |  0.53    Ca    8.1<L>      24 Apr 2020 03:00  Phos  3.9     04-24  Mg     1.8     04-24    TPro  6.1  /  Alb  2.9<L>  /  TBili  0.3  /  DBili  x   /  AST  21  /  ALT  19  /  AlkPhos  62  04-24

## 2020-04-24 NOTE — PROGRESS NOTE ADULT - ATTENDING COMMENTS
I was physically present for the key portions of the evaluation and management (E/M) service provided.  The patient was personally seen and examined at bedside.    Thank you for your consultation and allowing  me to participate in the care of your patients. If you have further questions please contact me at [phone].     Efrem Herrera M.D.       _________________________________________________________________________________________________  [facility]  [address] [place] [address]  Office: [phone]    Sterling Irvin PA-C  ___________________________________________________________________________________________________

## 2020-04-24 NOTE — PROGRESS NOTE ADULT - ASSESSMENT
MIGNON WALTER is a 60y Female with history of Down Syndrome, hypothyroidism, and recurrent UTI who presented to [facility] on 4/4 for fever, cough, and SOB. The patient was intubated on 4/7 for worsening hypercapnic respiratory failure. Extubated and reintubated on 4/16. Hospitalization further complicated by NSTEMI (type 2), vaginal bleeding requiring 2 uPRBC.    Problem/Plan - 1:  ·  Problem: Acute blood loss anemia.   Likely 2/2 to vaginal bleeding   No evidence of GI bleeding   cbc appears stable       COVID   AS per primary team     Will follow   Advanced care planning was discussed with patient and family.  Advanced care planning forms were reviewed and discussed.  Risks, benefits and alternatives of gastroenterologic procedures were discussed in detail and all questions were answered.

## 2020-04-25 NOTE — PROGRESS NOTE ADULT - SUBJECTIVE AND OBJECTIVE BOX
INTERVAL HPI/OVERNIGHT EVENTS:  Difficulty weaning. No evidence of GI bleeding. Hemoglobin remains stable     MEDICATIONS  (STANDING):  chlorhexidine 0.12% Liquid 15 milliLiter(s) Oral Mucosa every 12 hours  chlorhexidine 2% Cloths 1 Application(s) Topical daily  clindamycin IVPB      clindamycin IVPB 600 milliGRAM(s) IV Intermittent every 8 hours  dexMEDEtomidine Infusion 0.5 MICROgram(s)/kG/Hr (5.68 mL/Hr) IV Continuous <Continuous>  enoxaparin Injectable 40 milliGRAM(s) SubCutaneous daily  hydrocortisone sodium succinate Injectable 25 milliGRAM(s) IV Push every 8 hours  lactobacillus acidophilus 1 Tablet(s) Oral three times a day with meals  lactobacillus acidophilus 1 Tablet(s) Oral daily  levETIRAcetam  IVPB 1000 milliGRAM(s) IV Intermittent every 12 hours  levothyroxine Injectable 62.5 MICROGram(s) IV Push at bedtime  memantine 5 milliGRAM(s) Oral daily  metoclopramide 5 milliGRAM(s) Oral three times a day before meals  midodrine 10 milliGRAM(s) Oral every 8 hours  norepinephrine Infusion 0.05 MICROgram(s)/kG/Min (4.26 mL/Hr) IV Continuous <Continuous>  pantoprazole  Injectable 40 milliGRAM(s) IV Push daily  valproate sodium IVPB 500 milliGRAM(s) IV Intermittent every 12 hours    MEDICATIONS  (PRN):  acetaminophen   Tablet .. 650 milliGRAM(s) Oral every 6 hours PRN Temp greater or equal to 38C (100.4F), Mild Pain (1 - 3)  acetaminophen  Suppository .. 650 milliGRAM(s) Rectal every 4 hours PRN Temp greater or equal to 38C (100.4F)  fentaNYL    Injectable 25 MICROGram(s) IV Push every 1 hour PRN discomfort  midazolam Injectable 1 milliGRAM(s) IV Push every 3 hours PRN Agitation      Allergies    amoxicillin (Rash)  penicillin (Rash)    Intolerances        Review of Systems:    General:  No wt loss, fevers, chills, night sweats,fatigue,   Eyes:  Good vision, no reported pain  ENT:  No sore throat, pain, runny nose, dysphagia  CV:  No pain, palpitatioins, hypo/hypertension  Resp:  No dyspnea, cough, tachypnea, wheezing  GI:  No pain, No nausea, No vomiting, No diarrhea, No constipatiion, No weight loss, No fever, No pruritis, No rectal bleeding, No tarry stools, No dysphagia,  :  No pain, bleeding, incontinence, nocturia  Muscle:  No pain, weakness  Neuro:  No weakness, tingling, memory problems  Psych:  No fatigue, insomnia, mood problems, depression  Endocrine:  No polyuria, polydypsia, cold/heat intolerance  Heme:  No petechiae, ecchymosis, easy bruisability  Skin:  No rash, tattoos, scars, edema      Vital Signs Last 24 Hrs  T(C): 35.3 (22 Apr 2020 12:45), Max: 36.7 (22 Apr 2020 00:00)  T(F): 95.6 (22 Apr 2020 12:45), Max: 98.1 (22 Apr 2020 00:00)  HR: 52 (22 Apr 2020 14:06) (46 - 58)  BP: 145/67 (22 Apr 2020 12:45) (14/87 - 150/92)  BP(mean): --  RR: 16 (22 Apr 2020 11:00) (14 - 20)  SpO2: 94% (22 Apr 2020 14:06) (94% - 100%)    PHYSICAL EXAM:    Constitutional: NAD, well-developed  HEENT: EOMI, throat clear  Neck: No LAD, supple  Respiratory: CTA and P  Cardiovascular: S1 and S2, RRR, no M  Gastrointestinal: BS+, soft, NT/ND, neg HSM,  Extremities: No peripheral edema, neg clubing, cyanosis  Vascular: 2+ peripheral pulses  Neurological: A/O x 3, no focal deficits  Psychiatric: Normal mood, normal affect  Skin: No rashes      LABS:                        8.9    7.20  )-----------( 100      ( 22 Apr 2020 14:30 )             26.5     04-22    144  |  105  |  12  ----------------------------<  110<H>  3.4<L>   |  29  |  0.46<L>    Ca    8.6      22 Apr 2020 14:30  Phos  3.2     04-22  Mg     2.0     04-22    TPro  6.2  /  Alb  3.2<L>  /  TBili  0.4  /  DBili  x   /  AST  26  /  ALT  17  /  AlkPhos  64  04-22    PT/INR - ( 22 Apr 2020 14:30 )   PT: 12.7 SEC;   INR: 1.10          PTT - ( 22 Apr 2020 14:30 )  PTT:27.1 SEC      RADIOLOGY & ADDITIONAL TESTS:        Problem/Plan - 1:  ·  Problem: Acute blood loss anemia.  Plan: secondary to vaginal bleed  cbc stable   poor px.

## 2020-04-25 NOTE — PROGRESS NOTE ADULT - ASSESSMENT
MIGNON WALTER is a 60y Female with history of Down Syndrome, hypothyroidism, and recurrent UTI who presented to [facility] on 4/4 for fever, cough, and SOB. The patient was intubated on 4/7 for worsening hypercapnic respiratory failure. Extubated and reintubated on 4/16. Hospitalization further complicated by NSTEMI (type 2), vaginal bleeding requiring 2 uPRBC.    Patient is DNR; no compressions, but continue medical therapy    PLAN:  Neuro:  - monitor mental status  - sedation w/ Precedex  - c/w Keppra and Valproate for seizure d/o    Resp:  - monitor resp status  - c/w mech vent, wean as tolerated  - Daily PS trials    CV:  - c/w Levo gtt, wean as tolerated  - Midodrine 10mg TID  - trend lactate    GI:  - NPO w/ TF Jevity 100 TID  - Pepcid 20 BID  - Senna, Miralax    :  - GYN removed vaginal packing   - monitor UOP  - replete lytes PRN    ID:  - Clindamycin stopped as vaginal packing removed  - monitor WBC and temp    Heme:  - VTE ppx w/ Lovenox  - monitor CBC    Endo:  - ISS for DM  - c/w Synthroid 62.2 mcg  - Solu-medrol 7mg BID  - monitor BG on BMP    Dispo:  - ICU  - DNR

## 2020-04-25 NOTE — PROGRESS NOTE ADULT - SUBJECTIVE AND OBJECTIVE BOX
ASU / ICU PROGRESS NOTE:      Subjective:  Attempted pressure support 10/5 this morning for one hour, became hypoxic and tachypneic  Placed back on volume control.        Objective:    PE:  Gen: Sedated, makes eye contact  Resp: ETT in place, coarse breaths sounds b/l  CVS: RRR  Abd: soft, ND  Ext: Palp distal pulses  Neuro: no focal deficits    ICU Vital Signs Last 24 Hrs  T(C): 37.2 (25 Apr 2020 08:00), Max: 37.2 (25 Apr 2020 08:00)  T(F): 99 (25 Apr 2020 08:00), Max: 99 (25 Apr 2020 08:00)  HR: 55 (25 Apr 2020 08:02) (44 - 63)  BP: 119/66 (25 Apr 2020 08:00) (112/65 - 127/75)  BP(mean): 83 (25 Apr 2020 08:00) (79 - 90)  ABP: 147/67 (25 Apr 2020 08:00) (127/58 - 155/73)  ABP(mean): 93 (25 Apr 2020 08:00) (82 - 101)  RR: 20 (25 Apr 2020 08:00) (20 - 27)  SpO2: 96% (25 Apr 2020 08:02) (94% - 97%)    I&O's Detail    24 Apr 2020 07:01  -  25 Apr 2020 07:00  --------------------------------------------------------  IN:    dexmedetomidine Infusion: 184 mL    IV PiggyBack: 200 mL    norepinephrine Infusion: 152.1 mL    ns in tub fed  bodbaw31: 1000 mL  Total IN: 1536.1 mL    OUT:    Gastrostomy Tube: 350 mL    Indwelling Catheter - Urethral: 1530 mL  Total OUT: 1880 mL    Total NET: -344 mL      25 Apr 2020 07:01  -  25 Apr 2020 12:02  --------------------------------------------------------  IN:    dexmedetomidine Infusion: 7.9 mL    norepinephrine Infusion: 6.8 mL  Total IN: 14.7 mL    OUT:    Indwelling Catheter - Urethral: 200 mL  Total OUT: 200 mL    Total NET: -185.3 mL    MEDICATIONS  (STANDING):  dexMEDEtomidine Infusion 0.05 MICROgram(s)/kG/Hr (0.56 mL/Hr) IV Continuous <Continuous>  dextrose 5%. 1000 milliLiter(s) (50 mL/Hr) IV Continuous <Continuous>  dextrose 50% Injectable 12.5 Gram(s) IV Push once  dextrose 50% Injectable 25 Gram(s) IV Push once  dextrose 50% Injectable 25 Gram(s) IV Push once  enoxaparin Injectable 40 milliGRAM(s) SubCutaneous daily  famotidine Injectable 20 milliGRAM(s) IV Push daily  hydrocortisone sodium succinate Injectable 7 milliGRAM(s) IV Push once  insulin lispro (HumaLOG) corrective regimen sliding scale   SubCutaneous every 6 hours  levETIRAcetam  IVPB 1000 milliGRAM(s) IV Intermittent every 12 hours  levothyroxine Injectable 62.5 MICROGram(s) IV Push at bedtime  midodrine 10 milliGRAM(s) Oral every 8 hours  norepinephrine Infusion 0.05 MICROgram(s)/kG/Min (2.11 mL/Hr) IV Continuous <Continuous>  polyethylene glycol 3350 17 Gram(s) Oral daily  senna 2 Tablet(s) Oral at bedtime  valproate sodium IVPB 500 milliGRAM(s) IV Intermittent every 12 hours    MEDICATIONS  (PRN):  dextrose 40% Gel 15 Gram(s) Oral once PRN Blood Glucose LESS THAN 70 milliGRAM(s)/deciliter  glucagon  Injectable 1 milliGRAM(s) IntraMuscular once PRN Glucose LESS THAN 70 milligrams/deciliter                          9.7    10.52 )-----------( 182      ( 25 Apr 2020 03:30 )             29.7   04-25      04-25    147<H>  |  105  |  16  ----------------------------<  109<H>  4.1   |  29  |  0.48<L>    Ca    8.2<L>      25 Apr 2020 03:30  Phos  3.2     04-25  Mg     1.9     04-25    TPro  5.9<L>  /  Alb  2.5<L>  /  TBili  0.3  /  DBili  x   /  AST  17  /  ALT  16  /  AlkPhos  60  04-25

## 2020-04-25 NOTE — PROGRESS NOTE ADULT - ATTENDING COMMENTS
I was physically present for the key portions of the evaluation and management (E/M) service provided.  The patient was personally seen and examined at bedside.    Thank you for your consultation and allowing  me to participate in the care of your patients. If you have further questions please contact me at [phone].     Efrem Herrera M.D.       _________________________________________________________________________________________________  [facility]  [address] [place] [address]  Office: [phone]    Sterling Irvin PA-C  ___________________________________________________________________________________________________

## 2020-04-26 NOTE — PROGRESS NOTE ADULT - ASSESSMENT
MIGNON WALTER is a 60y Female with history of Down Syndrome, hypothyroidism, and recurrent UTI who presented to [facility] on 4/4 for fever, cough, and SOB. The patient was intubated on 4/7 for worsening hypercapnic respiratory failure. Extubated and reintubated on 4/16. Hospitalization further complicated by NSTEMI (type 2), vaginal bleeding requiring 2 uPRBC. The pateint was being followed by my practice at [facility]. She is s.p transfer to [facility].     Problem/Plan - 1:  ·  Problem: Acute blood loss anemia.   Likely 2/2 to vaginal bleeding   No evidence of GI bleeding   cbc appears stable   On Lovenox 40 (can increase from Gi standpoint if necessary  from primary team)    COVID   AS per primary team     Will follow   Advanced care planning was discussed with patient and family.  Advanced care planning forms were reviewed and discussed.  Risks, benefits and alternatives of gastroenterologic procedures were discussed in detail and all questions were answered.

## 2020-04-26 NOTE — PROGRESS NOTE ADULT - ASSESSMENT
ASSESSMENT:  MIGNON WALTER is a 60y Female with history of Down Syndrome, hypothyroidism, and recurrent UTI who presented to [facility] on 4/4 for fever, cough, and SOB. The patient was intubated on 4/7 for worsening hypercapnic respiratory failure. Extubated and reintubated on 4/16. Hospitalization further complicated by NSTEMI (type 2), vaginal bleeding requiring 2 uPRBC.    Patient is DNR; no compressions, but continue medical therapy    PLAN:    NEURO:  - Precedex    RESPIRATORY:   - Wean vent as able    CARDIOVASCULAR:  - Levo, keep MAP >65    GI/NUTRITION:  - Pepcid  - Senna  - Miralax   - TF Q8H    GENITOURINARY/RENAL:  - Monitor UOP    HEMATOLOGIC:  - Lovenox 40 QD  -h/h stable, no evidence of gi or vaginal bleeding    INFECTIOUS DISEASE:  - Plaquinil finished  - Clindamycin completed,  vaginal packing removed    ENDOCRINE:  - Steroid taper  - Synthroid   -ISS Q6 HOUR

## 2020-04-26 NOTE — PROGRESS NOTE ADULT - ATTENDING COMMENTS
I was physically present for the key portions of the evaluation and management (E/M) service provided.  The patient was personally seen and examined at bedside.    Thank you for your consultation and allowing  me to participate in the care of your patients. If you have further questions please contact me at [phone].     Efrem Herrera M.D.       _________________________________________________________________________________________________  [facility]  [address] [place] [address]  Office: [phone]    Sterling Irvin PA-C  ___________________________________________________________________________________________________

## 2020-04-26 NOTE — PROGRESS NOTE ADULT - SUBJECTIVE AND OBJECTIVE BOX
SICU DAILY PROGRESS NOTE    60y Female    24 HOUR EVENTS:    SUBJECTIVE/ROS:  [ ] A ten-point review of systems was otherwise negative except as noted.  [ ] Due to altered mental status/intubation, subjective information were not able to be obtained from the patient. History was obtained, to the extent possible, from review of the chart and collateral sources of information.      NEURO  RASS:     GCS:     CAM ICU:  Exam: awake, alert, oriented  Meds: acetaminophen    Suspension .. 650 milliGRAM(s) Oral every 6 hours PRN Temp greater or equal to 38C (100.4F)  dexMEDEtomidine Infusion 0.05 MICROgram(s)/kG/Hr IV Continuous <Continuous>  levETIRAcetam  IVPB 1000 milliGRAM(s) IV Intermittent every 12 hours  valproate sodium IVPB 500 milliGRAM(s) IV Intermittent every 12 hours    [x] Adequacy of sedation and pain control has been assessed and adjusted      RESPIRATORY  RR: 20 (04-26-20 @ 08:00) (20 - 24)  SpO2: 95% (04-26-20 @ 08:06) (92% - 97%)  Wt(kg): --  Exam: unlabored, clear to auscultation bilaterally  Mechanical Ventilation: Mode: AC/ CMV (Assist Control/ Continuous Mandatory Ventilation), RR (machine): 20, RR (patient): 20, TV (machine): 320, FiO2: 40, PEEP: 5, ITime: 0.8, MAP: 8, PIP: 19  ABG - ( 26 Apr 2020 03:15 )  pH: 7.45  /  pCO2: 49    /  pO2: 93    / HCO3: 32    / Base Excess: 8.8   /  SaO2: 98.0    Lactate: 2.7              [N/A] Extubation Readiness Assessed  Meds:       CARDIOVASCULAR  HR: 56 (04-26-20 @ 08:06) (52 - 73)  BP: 94/52 (04-26-20 @ 00:00) (94/52 - 133/77)  BP(mean): 66 (04-26-20 @ 00:00) (66 - 96)  ABP: 125/59 (04-26-20 @ 08:00) (109/53 - 142/67)  ABP(mean): 82 (04-26-20 @ 08:00) (71 - 95)  Wt(kg): --  CVP(cm H2O): --      Exam: regular rate and rhythm  Cardiac Rhythm: sinus  Perfusion     [x]Adequate   [ ]Inadequate  Mentation   [x]Normal       [ ]Reduced  Extremities  [x]Warm         [ ]Cool  Volume Status [ ]Hypervolemic [x]Euvolemic [ ]Hypovolemic  Meds: midodrine 10 milliGRAM(s) Oral every 8 hours  norepinephrine Infusion 0.05 MICROgram(s)/kG/Min IV Continuous <Continuous>        GI/NUTRITION  Exam: soft, nontender, nondistended, incision C/D/I  Diet:  Meds: famotidine Injectable 20 milliGRAM(s) IV Push daily  polyethylene glycol 3350 17 Gram(s) Oral daily  senna 2 Tablet(s) Oral at bedtime      GENITOURINARY  I&O's Detail    04-25 @ 07:01  -  04-26 @ 07:00  --------------------------------------------------------  IN:    dexmedetomidine Infusion: 262.9 mL    IV PiggyBack: 250 mL    norepinephrine Infusion: 140.9 mL    ns in tub fed  [id number]: 1000 mL    Oral Fluid: 60 mL  Total IN: 1713.8 mL    OUT:    Indwelling Catheter - Urethral: 1375 mL  Total OUT: 1375 mL    Total NET: 338.8 mL      04-26 @ 07:01 - 04-26 @ 11:21  --------------------------------------------------------  IN:    dexmedetomidine Infusion: 11.4 mL    IV PiggyBack: 25 mL    norepinephrine Infusion: 1.7 mL    ns in tub fed  [id number]: 250 mL  Total IN: 288.1 mL    OUT:    Indwelling Catheter - Urethral: 100 mL  Total OUT: 100 mL    Total NET: 188.1 mL          04-26    141  |  101  |  16  ----------------------------<  156<H>  4.5   |  31  |  0.47<L>    Ca    8.8      26 Apr 2020 03:15  Phos  3.6     04-26  Mg     1.9     04-26    TPro  6.2  /  Alb  2.6<L>  /  TBili  0.3  /  DBili  x   /  AST  24  /  ALT  19  /  AlkPhos  62  04-26    [ ] Puentes catheter, indication: N/A  Meds:       HEMATOLOGIC  Meds: enoxaparin Injectable 40 milliGRAM(s) SubCutaneous daily    [x] VTE Prophylaxis                        9.3    10.74 )-----------( 197      ( 26 Apr 2020 03:15 )             28.9     PT/INR - ( 26 Apr 2020 03:15 )   PT: 13.1 SEC;   INR: 1.13          PTT - ( 26 Apr 2020 03:15 )  PTT:31.0 SEC  Transfusion     [ ] PRBC   [ ] Platelets   [ ] FFP   [ ] Cryoprecipitate      INFECTIOUS DISEASES  WBC Count: 10.74 K/uL (04-26 @ 03:15)  WBC Count: 10.74 K/uL (04-26 @ 03:15)    RECENT CULTURES:  Specimen Source: .Sputum Sputum  Date/Time: 04-25 @ 21:01  Culture Results: --  Gram Stain:   Moderate polymorphonuclear leukocytes per low power field  Few Squamous epithelial cells per low power field  No organisms seen per oil power field      ENDOCRINE  CAPILLARY BLOOD GLUCOSE      POCT Blood Glucose.: 118 mg/dL (26 Apr 2020 05:18)  POCT Blood Glucose.: 127 mg/dL (25 Apr 2020 23:31)  POCT Blood Glucose.: 117 mg/dL (25 Apr 2020 17:41)  POCT Blood Glucose.: 109 mg/dL (25 Apr 2020 12:08)    Meds: insulin lispro (HumaLOG) corrective regimen sliding scale   SubCutaneous every 6 hours  levothyroxine Injectable 62.5 MICROGram(s) IV Push at bedtime        ACCESS DEVICES:  [ ] Peripheral IV  [ ] Central Venous Line	[ ] R	[ ] L	[ ] IJ	[ ] Fem	[ ] SC	Placed:   [ ] Arterial Line		[ ] R	[ ] L	[ ] Fem	[ ] Rad	[ ] Ax	Placed:   [ ] PICC:					[ ] Mediport  [ ] Urinary Catheter, Date Placed:   [x] Necessity of urinary, arterial, and venous catheters discussed    OTHER MEDICATIONS:      CODE STATUS:      IMAGING: ICU DAILY PROGRESS NOTE    24 HOUR EVENTS:  f/u palliative regarding extubation  sputum cultures sent yesterday - no organisms     SUBJECTIVE/ROS:  [ ] A ten-point review of systems was otherwise negative except as noted.  [x] Due to altered mental status/intubation, subjective information were not able to be obtained from the patient. History was obtained, to the extent possible, from review of the chart and collateral sources of information.      NEURO  RASS:     -2  Exam: intubated, sedated  Meds: acetaminophen    Suspension .. 650 milliGRAM(s) Oral every 6 hours PRN Temp greater or equal to 38C (100.4F)  dexMEDEtomidine Infusion 0.05 MICROgram(s)/kG/Hr IV Continuous <Continuous>  levETIRAcetam  IVPB 1000 milliGRAM(s) IV Intermittent every 12 hours  valproate sodium IVPB 500 milliGRAM(s) IV Intermittent every 12 hours    [x] Adequacy of sedation and pain control has been assessed and adjusted    RESPIRATORY  RR: 20 (04-26-20 @ 08:00) (20 - 24)  SpO2: 95% (04-26-20 @ 08:06) (92% - 97%)  Exam: vent  Mechanical Ventilation: Mode: AC/ CMV (Assist Control/ Continuous Mandatory Ventilation), RR (machine): 20, RR (patient): 20, TV (machine): 320, FiO2: 40, PEEP: 5, ITime: 0.8, MAP: 8, PIP: 19  ABG - ( 26 Apr 2020 03:15 )  pH: 7.45  /  pCO2: 49    /  pO2: 93    / HCO3: 32    / Base Excess: 8.8   /  SaO2: 98.0    Lactate: 2.7        [N/A] Extubation Readiness Assessed    CARDIOVASCULAR  HR: 56 (04-26-20 @ 08:06) (52 - 73)  BP: 94/52 (04-26-20 @ 00:00) (94/52 - 133/77)  BP(mean): 66 (04-26-20 @ 00:00) (66 - 96)  ABP: 125/59 (04-26-20 @ 08:00) (109/53 - 142/67)  ABP(mean): 82 (04-26-20 @ 08:00) (71 - 95)    Exam: regular rate and rhythm  Cardiac Rhythm: sinus  Perfusion     [x]Adequate   [ ]Inadequate  Mentation   [x]Normal       [ ]Reduced  Extremities  [x]Warm         [ ]Cool  Volume Status [ ]Hypervolemic [x]Euvolemic [ ]Hypovolemic  Meds: midodrine 10 milliGRAM(s) Oral every 8 hours  norepinephrine Infusion 0.05 MICROgram(s)/kG/Min IV Continuous <Continuous>      GI/NUTRITION  Exam: soft, nontender, nondistended,   Diet: TFs  Meds: famotidine Injectable 20 milliGRAM(s) IV Push daily  polyethylene glycol 3350 17 Gram(s) Oral daily  senna 2 Tablet(s) Oral at bedtime      GENITOURINARY  I&O's Detail    04-25 @ 07:01  -  04-26 @ 07:00  --------------------------------------------------------  IN:    dexmedetomidine Infusion: 262.9 mL    IV PiggyBack: 250 mL    norepinephrine Infusion: 140.9 mL    ns in tub fed  [id number]: 1000 mL    Oral Fluid: 60 mL  Total IN: 1713.8 mL    OUT:    Indwelling Catheter - Urethral: 1375 mL  Total OUT: 1375 mL    Total NET: 338.8 mL      04-26 @ 07:01 - 04-26 @ 11:21  --------------------------------------------------------  IN:    dexmedetomidine Infusion: 11.4 mL    IV PiggyBack: 25 mL    norepinephrine Infusion: 1.7 mL    ns in tub fed  [id number]: 250 mL  Total IN: 288.1 mL    OUT:    Indwelling Catheter - Urethral: 100 mL  Total OUT: 100 mL    Total NET: 188.1 mL          04-26    141  |  101  |  16  ----------------------------<  156<H>  4.5   |  31  |  0.47<L>    Ca    8.8      26 Apr 2020 03:15  Phos  3.6     04-26  Mg     1.9     04-26    TPro  6.2  /  Alb  2.6<L>  /  TBili  0.3  /  DBili  x   /  AST  24  /  ALT  19  /  AlkPhos  62  04-26    [ ] Puentes catheter, indication: N/A  Meds:       HEMATOLOGIC  Meds: enoxaparin Injectable 40 milliGRAM(s) SubCutaneous daily    [x] VTE Prophylaxis                        9.3    10.74 )-----------( 197      ( 26 Apr 2020 03:15 )             28.9     PT/INR - ( 26 Apr 2020 03:15 )   PT: 13.1 SEC;   INR: 1.13          PTT - ( 26 Apr 2020 03:15 )  PTT:31.0 SEC  Transfusion     [ ] PRBC   [ ] Platelets   [ ] FFP   [ ] Cryoprecipitate      INFECTIOUS DISEASES  WBC Count: 10.74 K/uL (04-26 @ 03:15)  WBC Count: 10.74 K/uL (04-26 @ 03:15)    RECENT CULTURES:  Specimen Source: .Sputum Sputum  Date/Time: 04-25 @ 21:01  Culture Results: --  Gram Stain:   Moderate polymorphonuclear leukocytes per low power field  Few Squamous epithelial cells per low power field  No organisms seen per oil power field      ENDOCRINE  CAPILLARY BLOOD GLUCOSE      POCT Blood Glucose.: 118 mg/dL (26 Apr 2020 05:18)  POCT Blood Glucose.: 127 mg/dL (25 Apr 2020 23:31)  POCT Blood Glucose.: 117 mg/dL (25 Apr 2020 17:41)  POCT Blood Glucose.: 109 mg/dL (25 Apr 2020 12:08)    Meds: insulin lispro (HumaLOG) corrective regimen sliding scale   SubCutaneous every 6 hours  levothyroxine Injectable 62.5 MICROGram(s) IV Push at bedtime        ACCESS DEVICES:  [ ] Peripheral IV  [x] Central Venous Line	[x] R	[ ] L	[x] IJ	[ ] Fem	[ ] SC	Placed: placed at OSH  [x] Arterial Line		[x] R	[ ] L	[ ] Fem	[x] Rad	[ ] Ax	Placed: 4/22  [ ] PICC:					[ ] Mediport  [x] Urinary Catheter  [x] Necessity of urinary, arterial, and venous catheters discussed      IMAGING: reviewed

## 2020-04-27 NOTE — CONSULT NOTE ADULT - SUBJECTIVE AND OBJECTIVE BOX
HPI:    PERTINENT PM/SXH:   Frequent urinary tract infections  Hypothyroidism, unspecified type  Down syndrome      FAMILY HISTORY:    ITEMS NOT CHECKED ARE NOT PRESENT    SOCIAL HISTORY:   Significant other/partner:  [ ]  Children:  [ ]  Yazidi/Spirituality:  Substance hx:  [ ]   Tobacco hx:  [ ]   Alcohol hx: [ ]   Home Opioid hx:  [ ] I-Stop Reference No:  Living Situation: [ ]Home  [ ]Long term care  [ ]Rehab [ ]Other    ADVANCE DIRECTIVES:    DNR  Yes  MOLST  [ ]  Living Will  [ ]   DECISION MAKER(s):  [ ] Health Care Proxy(s)  [ ] Surrogate(s)  [ ] Guardian           Name(s): Phone Number(s):    BASELINE (I)ADL(s) (prior to admission):  [place]: [ ]Total  [ ] Moderate [ ]Dependent    Allergies    amoxicillin (Rash)  penicillin (Rash)    Intolerances    MEDICATIONS  (STANDING):  dexMEDEtomidine Infusion 0.05 MICROgram(s)/kG/Hr (0.56 mL/Hr) IV Continuous <Continuous>  enoxaparin Injectable 40 milliGRAM(s) SubCutaneous daily  famotidine Injectable 20 milliGRAM(s) IV Push daily  insulin lispro (HumaLOG) corrective regimen sliding scale   SubCutaneous every 6 hours  levETIRAcetam  IVPB 1000 milliGRAM(s) IV Intermittent every 12 hours  levothyroxine Injectable 62.5 MICROGram(s) IV Push at bedtime  midodrine 10 milliGRAM(s) Oral every 8 hours  norepinephrine Infusion 0.05 MICROgram(s)/kG/Min (2.11 mL/Hr) IV Continuous <Continuous>  polyethylene glycol 3350 17 Gram(s) Oral daily  senna 2 Tablet(s) Oral at bedtime  valproate sodium IVPB 500 milliGRAM(s) IV Intermittent every 12 hours    MEDICATIONS  (PRN):  acetaminophen    Suspension .. 650 milliGRAM(s) Oral every 6 hours PRN Temp greater or equal to 38C (100.4F)    PRESENT SYMPTOMS: [ ]Unable to obtain due to poor mentation   Source if other than patient:  [ ]Family   [ ]Team     Pain (Impact on QOL):    Location -         Minimal acceptable level (0-10 scale):                    Aggravating factors -  Quality -  Radiation -  Severity (0-10 scale) -    Timing -    PAIN AD Score:     [email or web address]/painad.pdf (press ctrl +  left click to view)    Dyspnea:                           [ ]Mild [ ]Moderate [ ]Severe  Anxiety:                             [ ]Mild [ ]Moderate [ ]Severe  Fatigue:                             [ ]Mild [ ]Moderate [ ]Severe  Nausea:                             [ ]Mild [ ]Moderate [ ]Severe  Loss of appetite:              [ ]Mild [ ]Moderate [ ]Severe  Constipation:                    [ ]Mild [ ]Moderate [ ]Severe  Grief Present                    [ ] Yes   [ ] No   Other Symptoms:  [ ]All other review of systems negative     Karnofsky Performance Score/Palliative Performance Status Version 2:         %    http://palliative.info/resource_material/PPSv2.pdf  PHYSICAL EXAM:  Vital Signs Last 24 Hrs  T(C): 36.6 (28 Apr 2020 08:00), Max: 37.3 (27 Apr 2020 16:00)  T(F): 97.8 (28 Apr 2020 08:00), Max: 99.1 (27 Apr 2020 16:00)  HR: 65 (28 Apr 2020 08:20) (48 - 105)  BP: --  BP(mean): --  RR: 29 (28 Apr 2020 08:00) (9 - 30)  SpO2: 96% (28 Apr 2020 08:20) (95% - 100%) I&O's Summary    27 Apr 2020 07:01  -  28 Apr 2020 07:00  --------------------------------------------------------  IN: 1674.5 mL / OUT: 2570 mL / NET: -895.5 mL    28 Apr 2020 07:01  -  28 Apr 2020 08:50  --------------------------------------------------------  IN: 7.4 mL / OUT: 200 mL / NET: -192.6 mL    GENERAL:  [ ]Alert  [ ]Oriented x   [ ]Lethargic  [ ]Cachexia  [ ]Unarousable  [ ]Verbal  [ ]Non-Verbal  Behavioral:   [ ] Anxiety  [ ] Delirium [ ] Agitation [ ] Other  HEENT:  [ ]Normal   [ ]Dry mouth   [ ]ET Tube/Trach  [ ]Oral lesions  PULMONARY:   [ ]Clear [ ]Tachypnea  [ ]Audible excessive secretions   [ ]Rhonchi        [ ]Right [ ]Left [ ]Bilateral  [ ]Crackles        [ ]Right [ ]Left [ ]Bilateral  [ ]Wheezing     [ ]Right [ ]Left [ ]Bilateral  CARDIOVASCULAR:    [ ]Regular [ ]Irregular [ ]Tachy  [ ]Dave [ ]Murmur [ ]Other  GASTROINTESTINAL:  [ ]Soft  [ ]Distended   [ ]+BS  [ ]Non tender [ ]Tender  [ ]PEG [ ]OGT/ NGT  Last BM:   GENITOURINARY:  [ ]Normal [ ] Incontinent   [ ]Oliguria/Anuria   [ ]Puentes  MUSCULOSKELETAL:   [ ]Normal   [ ]Weakness  [ ]Bed/Wheelchair bound [ ]Edema  NEUROLOGIC:   [ ]No focal deficits  [ ] Cognitive impairment  [ ] Dysphagia [ ]Dysarthria [ ] Paresis [ ]Other   SKIN:   [ ]Normal   [ ]Pressure ulcer(s)  [ ]Rash    CRITICAL CARE:  [ ] Shock Present  [ ]Septic [ ]Cardiogenic [ ]Neurologic [ ]Hypovolemic  [ ]  Vasopressors [ ]  Inotropes   [ ] Respiratory failure present  [ ] Acute  [ ] Chronic [ ] Hypoxic  [ ] Hypercarbic [ ] Other  [ ] Other organ failure     LABS:                        8.8    9.15  )-----------( 235      ( 28 Apr 2020 02:00 )             27.6   04-28    142  |  101  |  12  ----------------------------<  102<H>  4.3   |  34<H>  |  0.45<L>    Ca    8.4      28 Apr 2020 02:00  Phos  3.5     04-27  Mg     1.9     04-28    TPro  5.7<L>  /  Alb  2.3<L>  /  TBili  < 0.2<L>  /  DBili  x   /  AST  32  /  ALT  31  /  AlkPhos  54  04-28  PT/INR - ( 28 Apr 2020 02:00 )   PT: 11.8 SEC;   INR: 1.03          PTT - ( 28 Apr 2020 02:00 )  PTT:27.3 SEC      RADIOLOGY & ADDITIONAL STUDIES:    PROTEIN CALORIE MALNUTRITION PRESENT: [ ] Yes [ ] No  [ ] PPSV2 < or = to 30% [ ] significant weight loss  [ ] poor nutritional intake [ ] catabolic state [ ] anasarca     Albumin, Serum: 2.3 g/dL (04-28-20 @ 02:00)  Artificial Nutrition [ ]     REFERRALS:   [ ]Chaplaincy  [ ] Hospice  [ ]Child Life  [ ]Social Work  [ ]Case management [ ]Holistic Therapy   Goals of Care Discussion Document: HPI:  60 F with Down's Syndrome, hypothyroid, recurrent UTI's, originally presented to [facility], found to have COVID, requiring intubation, failed extubation, NSTEMI, acute blood loss anemia due to GI vs GYN etiology.  Patient now transferred to [facility], with intermittent difficulty with oxygenation.  Remains intubated.   MHLS involved while patient was at [facility], with approval of DNR.   PERTINENT PM/SXH:   Frequent urinary tract infections  Hypothyroidism, unspecified type  Down syndrome      FAMILY HISTORY:    ITEMS NOT CHECKED ARE NOT PRESENT    SOCIAL HISTORY:   Significant other/partner:  [ ]  Children:  [ ]  Amish/Spirituality:  Substance hx:  [ ]   Tobacco hx:  [ ]   Alcohol hx: [ ]   Home Opioid hx:  [ ] I-Stop Reference No:  Living Situation: [ ]Home  [ ]Long term care  [ ]Rehab [x ]Other    ADVANCE DIRECTIVES:    DNR  Yes  MOLST  [ ]  Living Will  [ ]   DECISION MAKER(s):  [ ] Health Care Proxy(s)  [x ] Surrogate(s)  [ ] Guardian           Name(s): Phone Number(s): Сергей Rico (brother)     BASELINE (I)ADL(s) (prior to admission):  [place]: [ ]Total  [ ] Moderate [ x]Dependent    Allergies    amoxicillin (Rash)  penicillin (Rash)    Intolerances    MEDICATIONS  (STANDING):  dexMEDEtomidine Infusion 0.05 MICROgram(s)/kG/Hr (0.56 mL/Hr) IV Continuous <Continuous>  enoxaparin Injectable 40 milliGRAM(s) SubCutaneous daily  famotidine Injectable 20 milliGRAM(s) IV Push daily  insulin lispro (HumaLOG) corrective regimen sliding scale   SubCutaneous every 6 hours  levETIRAcetam  IVPB 1000 milliGRAM(s) IV Intermittent every 12 hours  levothyroxine Injectable 62.5 MICROGram(s) IV Push at bedtime  midodrine 10 milliGRAM(s) Oral every 8 hours  norepinephrine Infusion 0.05 MICROgram(s)/kG/Min (2.11 mL/Hr) IV Continuous <Continuous>  polyethylene glycol 3350 17 Gram(s) Oral daily  senna 2 Tablet(s) Oral at bedtime  valproate sodium IVPB 500 milliGRAM(s) IV Intermittent every 12 hours    MEDICATIONS  (PRN):  acetaminophen    Suspension .. 650 milliGRAM(s) Oral every 6 hours PRN Temp greater or equal to 38C (100.4F)    PRESENT SYMPTOMS: [xUnable to obtain due to poor mentation   Source if other than patient:  [ ]Family   [ ]Team     Pain (Impact on QOL):  Unable  Location -         Minimal acceptable level (0-10 scale):                    Aggravating factors -  Quality -  Radiation -  Severity (0-10 scale) -    Timing -    PAIN AD Score:     http://geriatrictoolkit.[email or web address]/cog/painad.pdf (press ctrl +  left click to view)    Dyspnea:                           [ ]Mild [ ]Moderate [ ]Severe  Anxiety:                             [ ]Mild [ ]Moderate [ ]Severe  Fatigue:                             [ ]Mild [ ]Moderate [ ]Severe  Nausea:                             [ ]Mild [ ]Moderate [ ]Severe  Loss of appetite:              [ ]Mild [ ]Moderate [ ]Severe  Constipation:                    [ ]Mild [ ]Moderate [ ]Severe  Grief Present                    [ ] Yes   [ ] No   Other Symptoms:  [ ]All other review of systems negative     Karnofsky Performance Score/Palliative Performance Status Version 2:  20       %    http://palliative.info/resource_material/PPSv2.pdf  PHYSICAL EXAM:  Vital Signs Last 24 Hrs  T(C): 36.6 (28 Apr 2020 08:00), Max: 37.3 (27 Apr 2020 16:00)  T(F): 97.8 (28 Apr 2020 08:00), Max: 99.1 (27 Apr 2020 16:00)  HR: 65 (28 Apr 2020 08:20) (48 - 105)  BP: --  BP(mean): --  RR: 29 (28 Apr 2020 08:00) (9 - 30)  SpO2: 96% (28 Apr 2020 08:20) (95% - 100%) I&O's Summary    27 Apr 2020 07:01  -  28 Apr 2020 07:00  --------------------------------------------------------  IN: 1674.5 mL / OUT: 2570 mL / NET: -895.5 mL    28 Apr 2020 07:01  -  28 Apr 2020 08:50  --------------------------------------------------------  IN: 7.4 mL / OUT: 200 mL / NET: -192.6 mL    CRITICAL CARE:  [ ] Shock Present  [ ]Septic [ ]Cardiogenic [ ]Neurologic [ ]Hypovolemic  [ ]  Vasopressors [ ]  Inotropes   [ x] Respiratory failure present  [ x] Acute  [ ] Chronic [ x] Hypoxic  [ ] Hypercarbic [ ] Other  [ ] Other organ failure     LABS:                        8.8    9.15  )-----------( 235      ( 28 Apr 2020 02:00 )             27.6   04-28    142  |  101  |  12  ----------------------------<  102<H>  4.3   |  34<H>  |  0.45<L>    Ca    8.4      28 Apr 2020 02:00  Phos  3.5     04-27  Mg     1.9     04-28    TPro  5.7<L>  /  Alb  2.3<L>  /  TBili  < 0.2<L>  /  DBili  x   /  AST  32  /  ALT  31  /  AlkPhos  54  04-28  PT/INR - ( 28 Apr 2020 02:00 )   PT: 11.8 SEC;   INR: 1.03          PTT - ( 28 Apr 2020 02:00 )  PTT:27.3 SEC      RADIOLOGY & ADDITIONAL STUDIES:    PROTEIN CALORIE MALNUTRITION PRESENT: [ ] Yes [ ] No  [ ] PPSV2 < or = to 30% [ ] significant weight loss  [ ] poor nutritional intake [ ] catabolic state [ ] anasarca     Albumin, Serum: 2.3 g/dL (04-28-20 @ 02:00)  Artificial Nutrition [ ]     REFERRALS:   [ ]Chaplaincy  [ ] Hospice  [ ]Child Life  [ ]Social Work  [ ]Case management [ ]Holistic Therapy   Goals of Care Discussion Document:

## 2020-04-27 NOTE — PROGRESS NOTE ADULT - ASSESSMENT
60 year old female with Down Syndrome, presents with respiratory failure from COVID 19.    Neuro - wean sedation as tolerated, continue keppra and valproate (trend levels)  Pulm - CPAP trial  CVS - wean levo as tolerated  GI - tube feedings, pepcid   - maintain Puentes  Endo - insulin sliding scale, synthroid  Heme - lovenox ppx  ID - Sputum 4/25 NGTD, no antibiotics

## 2020-04-27 NOTE — PROGRESS NOTE ADULT - ASSESSMENT
60y Female with history of Down Syndrome, hypothyroidism, and recurrent UTI who presented to [facility] on 4/4 for fever, cough, and SOB. The patient was intubated on 4/7 for worsening hypercapnic respiratory failure. Extubated and reintubated on 4/16. Hospitalization further complicated by NSTEMI (type 2), vaginal bleeding requiring 2 uPRBC. The pateint was being followed by my practice at [facility]. She is s.p transfer to [facility].     Acute blood loss anemia  Likely 2/2 to vaginal bleeding; cont to monitor  No evidence of GI bleeding   cbc appears stable   cont gi ppx with pepcid   On Lovenox 40; can increase from Gi standpoint if necessary  monitor stools; if loose hold stool softeners     COVID   monitor resp status   care per primary teams appreciated  guarded prognosis       Advanced care planning was discussed with patient and family.  Advanced care planning forms were reviewed and discussed.  Risks, benefits and alternatives of gastroenterologic procedures were discussed in detail and all questions were answered.

## 2020-04-27 NOTE — PROGRESS NOTE ADULT - SUBJECTIVE AND OBJECTIVE BOX
60 year old female with Down Syndrome s/p intubation for COVID 19 x2.  CPAP trial yesterday, no acute events overnight.    P 55  /69  T 97.8  SPO2 95%    Gen - NAD, intubated  Neuro - sedated, arousable, does not follow commands  HEENT - PERRL, ET tube and OG tube in place  Pulm  - coarse BS bilateral  CVS - sinus bradycardia, no m/r/r  GI - soft, NTTP, ND   -Puentes in place  Ext - no edema

## 2020-04-27 NOTE — CONSULT NOTE ADULT - PROBLEM SELECTOR RECOMMENDATION 9
- Patient with prolonged intubation, approximately 3 weeks.   - Transferred from outside hospital with acute blood loss anemia as well as intermittent difficulty oxygenating.  - Severe COVID infection.  - Prognosis is guarded.

## 2020-04-27 NOTE — PROGRESS NOTE ADULT - SUBJECTIVE AND OBJECTIVE BOX
INTERVAL HPI/OVERNIGHT EVENTS:    small loose brown stool   some vaginal bleeding o/n  tolerating ogt feeds     MEDICATIONS  (STANDING):  dexMEDEtomidine Infusion 0.05 MICROgram(s)/kG/Hr (0.56 mL/Hr) IV Continuous <Continuous>  enoxaparin Injectable 40 milliGRAM(s) SubCutaneous daily  famotidine Injectable 20 milliGRAM(s) IV Push daily  insulin lispro (HumaLOG) corrective regimen sliding scale   SubCutaneous every 6 hours  levETIRAcetam  IVPB 1000 milliGRAM(s) IV Intermittent every 12 hours  levothyroxine Injectable 62.5 MICROGram(s) IV Push at bedtime  midodrine 10 milliGRAM(s) Oral every 8 hours  norepinephrine Infusion 0.05 MICROgram(s)/kG/Min (2.11 mL/Hr) IV Continuous <Continuous>  polyethylene glycol 3350 17 Gram(s) Oral daily  senna 2 Tablet(s) Oral at bedtime  valproate sodium IVPB 500 milliGRAM(s) IV Intermittent every 12 hours    MEDICATIONS  (PRN):  acetaminophen    Suspension .. 650 milliGRAM(s) Oral every 6 hours PRN Temp greater or equal to 38C (100.4F)      Allergies    amoxicillin (Rash)  penicillin (Rash)    Intolerances        Review of Systems: Per current hospital emergency protocol, in an effort to reduce COVID exposures and also conserve PPE for necessary encounters, all data within this chart were reviewed and recommendations are based upon information in the chart and by verbal communication with covering team and/or nurses. Please refer to the ROS and PE per covering primary team note          Vital Signs Last 24 Hrs  T(C): 36.6 (27 Apr 2020 08:00), Max: 36.9 (27 Apr 2020 04:00)  T(F): 97.8 (27 Apr 2020 08:00), Max: 98.4 (27 Apr 2020 04:00)  HR: 46 (27 Apr 2020 08:38) (44 - 75)  BP: --  BP(mean): --  RR: 21 (27 Apr 2020 08:00) (18 - 21)  SpO2: 97% (27 Apr 2020 08:38) (93% - 99%)    PHYSICAL EXAM: Per current hospital emergency protocol, in an effort to reduce COVID exposures and also conserve PPE for necessary encounters, all data within this chart were reviewed and recommendations are based upon information in the chart and by verbal communication with covering team and/or nurses. Please refer to the ROS and PE per covering primary team note          LABS:                        8.9    7.65  )-----------( 215      ( 27 Apr 2020 02:40 )             28.0     04-27    140  |  100  |  14  ----------------------------<  86  4.0   |  31  |  0.43<L>    Ca    8.5      27 Apr 2020 02:40  Phos  3.6     04-26  Mg     1.9     04-27    TPro  5.8<L>  /  Alb  2.4<L>  /  TBili  < 0.2<L>  /  DBili  x   /  AST  31  /  ALT  26  /  AlkPhos  52  04-27    PT/INR - ( 27 Apr 2020 02:40 )   PT: 11.3 SEC;   INR: 0.98          PTT - ( 27 Apr 2020 02:40 )  PTT:32.2 SEC      RADIOLOGY & ADDITIONAL TESTS:

## 2020-04-27 NOTE — CHART NOTE - NSCHARTNOTEFT_GEN_A_CORE
Source: Patient [ ]    Family [ ]     other [X] Medical Chart   Diet rx: NPO with Tube Feeding; TF Modality: Orogastric Jevity 1.2 Mitch (JEVITY1.2RTH) Total Volume for 24 Hours (mL): 1000 Bolus Total Volume of Bolus (mL): 250 Tube Feed Frequency: Every 6 hours Tube Feed Start Time: 17:00 Bolus Feed Rate (mL per Hour): 100 Bolus Feed Duration (in Hours): 1 No Carb Prosource (1pkg = 15gms Protein) Qty per Day: 2 (provides ~1200 Kcal, ~85.5 gm Protein in 24 hrs)     Pt 59 yo female with Down Syndrome, presented with respiratory failure from COVID 19; Pt intubated & sedated. Unable to conduct a face to face interview or nutrition-focused physical exam due to limited contact restrictions related to Pt's medical condition and isolation precautions. Collateral obtained from chart review. Of note Pt NPO with Tube Feeding via OGT, Bolus. Pt with loose bowel movements (4/26) per flow sheet documentation. Suggest to hold Miralax & Senna. Pt with multiple pressure ulcers per nursing documentation.      Pt's weight: 45 Kg - 4/22  Pertinent Medications: Lovenox, Insulin (Humalog), Pepcid, Miralax, Senna,   Pertinent Labs:  04-27 Na143 mmol/L Glu 112 mg/dL<H> K+ 4.0 mmol/L Cr  0.49 mg/dL<L> BUN 12 mg/dL 04-27 Phos 3.5 mg/dL 04-27 Alb 2.2 g/dL<L> 04-19 Chol --    LDL --    HDL --    Trig 122 mg/dL    Skin: Pressure injuries: Sacral spine - stage II; L Hell - stage II; L Coccyx - stage II; R Sacral - stage II per flow sheet documentation    Pt with 3+ generalized edema per flow sheet record  Estimated Needs:   [X] no change since previous assessment  [ ] recalculated:     Recommendations:   1. Continue Bolus feeds as medically appropriate and ordered; Jevity 1.2 via OGT, 250mL bolus q6hrs + No Carb Prosource 2x daily (provides 1000mL total volume, 1200Kcal, 85g protein, 807mL free water daily);  2. Monitor Tube Feeding tolerance; Add free water flushes per MD discretion;   3. Recommend: Multivitamins & minerals 1 tab daily;   4. Rec: Lactobacillus Acidophilus daily per MD discretion;   5. Monitor labs, weekly weights, hydration status;

## 2020-04-27 NOTE — CONSULT NOTE ADULT - PROBLEM SELECTOR RECOMMENDATION 5
- Patient with overall extremely guarded prognosis given respiratory failure, COVID infection, NSTEMI as well as acute blood loss anemia.  - DNR in place.   - Palliative care will follow

## 2020-04-27 NOTE — CONSULT NOTE ADULT - PROBLEM SELECTOR RECOMMENDATION 4
- Due to the patients health status and restrictions on visitation during the Public Health Emergency, the Advanced Care Planning service was performed over the phone with [facility].  - Patient was unable to participate.  - 20 total minutes spent.  - Patient had MOLST checklist completed.   - DNR is in place.  - However, if patients condition does not improve and withdrawal of ventilatory care is required or if reintubation is not felt to improve patients overall condition or survivability, further documentation will need to be submitted.

## 2020-04-27 NOTE — CONSULT NOTE ADULT - PROBLEM SELECTOR RECOMMENDATION 2
- Patient with severe COVID infection, now with prolonged intubation, with difficulty weaning.  - Supportive car.e  - Prognosis is very poor.

## 2020-04-28 NOTE — PROGRESS NOTE ADULT - SUBJECTIVE AND OBJECTIVE BOX
60 year old female with Down Syndrome s/p intubation for COVID 19 x2.  CPAP trial yesterday, no acute events overnight, tolerating cpap 5/5 this am    T(C): 36.6 (04-28-20 @ 08:00), Max: 37.3 (04-27-20 @ 16:00)  HR: 65 (04-28-20 @ 08:20) (48 - 105)  BP: --  RR: 29 (04-28-20 @ 08:00) (9 - 30)  SpO2: 96% (04-28-20 @ 08:20) (95% - 100%)  Wt(kg): --    04-27 @ 07:01  -  04-28 @ 07:00  --------------------------------------------------------  IN:    dexmedetomidine Infusion: 250.7 mL    Enteral Tube Flush: 40 mL    IV PiggyBack: 300 mL    norepinephrine Infusion: 83.8 mL    ns in tub fed  vlartu91: 1000 mL  Total IN: 1674.5 mL    OUT:    Indwelling Catheter - Urethral: 2570 mL  Total OUT: 2570 mL    Total NET: -895.5 mL      04-28 @ 07:01  -  04-28 @ 10:30  --------------------------------------------------------  IN:    dexmedetomidine Infusion: 1.4 mL    norepinephrine Infusion: 6 mL  Total IN: 7.4 mL    OUT:    Indwelling Catheter - Urethral: 200 mL  Total OUT: 200 mL    Total NET: -192.6 mL      Gen - NAD, intubated  Neuro - sedated, arousable, does not follow commands  HEENT - PERRL, ET tube and OG tube in place  Pulm  - coarse BS bilateral  CVS - sinus bradycardia, no m/r/r  GI - soft, NTTP, ND   -Puentes in place  Ext - no edema                            8.8    9.15  )-----------( 235      ( 28 Apr 2020 02:00 )             27.6     04-28    142  |  101  |  12  ----------------------------<  102<H>  4.3   |  34<H>  |  0.45<L>    Ca    8.4      28 Apr 2020 02:00  Phos  3.5     04-27  Mg     1.9     04-28    TPro  5.7<L>  /  Alb  2.3<L>  /  TBili  < 0.2<L>  /  DBili  x   /  AST  32  /  ALT  31  /  AlkPhos  54  04-28    LIVER FUNCTIONS - ( 28 Apr 2020 02:00 )  Alb: 2.3 g/dL / Pro: 5.7 g/dL / ALK PHOS: 54 u/L / ALT: 31 u/L / AST: 32 u/L / GGT: x           PT/INR - ( 28 Apr 2020 02:00 )   PT: 11.8 SEC;   INR: 1.03          PTT - ( 28 Apr 2020 02:00 )  PTT:27.3 SEC    MEDICATIONS  (STANDING):  dexMEDEtomidine Infusion 0.05 MICROgram(s)/kG/Hr (0.56 mL/Hr) IV Continuous <Continuous>  enoxaparin Injectable 40 milliGRAM(s) SubCutaneous daily  famotidine Injectable 20 milliGRAM(s) IV Push daily  insulin lispro (HumaLOG) corrective regimen sliding scale   SubCutaneous every 6 hours  levETIRAcetam  IVPB 1000 milliGRAM(s) IV Intermittent every 12 hours  levothyroxine Injectable 62.5 MICROGram(s) IV Push at bedtime  midodrine 10 milliGRAM(s) Oral every 8 hours  norepinephrine Infusion 0.05 MICROgram(s)/kG/Min (2.11 mL/Hr) IV Continuous <Continuous>  polyethylene glycol 3350 17 Gram(s) Oral daily  senna 2 Tablet(s) Oral at bedtime  valproate sodium IVPB 500 milliGRAM(s) IV Intermittent every 12 hours    MEDICATIONS  (PRN):  acetaminophen    Suspension .. 650 milliGRAM(s) Oral every 6 hours PRN Temp greater or equal to 38C (100.4F)

## 2020-04-28 NOTE — PROGRESS NOTE ADULT - ASSESSMENT
60y Female with history of Down Syndrome, hypothyroidism, and recurrent UTI who presented to [facility] on 4/4 for fever, cough, and SOB. The patient was intubated on 4/7 for worsening hypercapnic respiratory failure. Extubated and reintubated on 4/16. Hospitalization further complicated by NSTEMI (type 2), vaginal bleeding requiring 2 uPRBC. The pateint was being followed by my practice at [facility]. She is s.p transfer to [facility].     Acute blood loss anemia  favor 2/2 to vaginal bleeding; cont to monitor  No evidence of GI bleeding   cbc appears stable   cont gi ppx with pepcid   On Lovenox 40; can increase from Gi standpoint if necessary  monitor stools; if loose hold stool softeners     COVID   monitor resp status   care per primary teams appreciated  guarded prognosis       Advanced care planning was discussed with patient and family.  Advanced care planning forms were reviewed and discussed.  Risks, benefits and alternatives of gastroenterologic procedures were discussed in detail and all questions were answered.

## 2020-04-28 NOTE — PROGRESS NOTE ADULT - SUBJECTIVE AND OBJECTIVE BOX
INTERVAL HPI/OVERNIGHT EVENTS:    remains intubated  no acute gi events overnight/this morning     MEDICATIONS  (STANDING):  dexMEDEtomidine Infusion 0.05 MICROgram(s)/kG/Hr (0.56 mL/Hr) IV Continuous <Continuous>  enoxaparin Injectable 40 milliGRAM(s) SubCutaneous daily  famotidine Injectable 20 milliGRAM(s) IV Push daily  insulin lispro (HumaLOG) corrective regimen sliding scale   SubCutaneous every 6 hours  levETIRAcetam  IVPB 1000 milliGRAM(s) IV Intermittent every 12 hours  levothyroxine Injectable 62.5 MICROGram(s) IV Push at bedtime  midodrine 10 milliGRAM(s) Oral every 8 hours  norepinephrine Infusion 0.05 MICROgram(s)/kG/Min (2.11 mL/Hr) IV Continuous <Continuous>  polyethylene glycol 3350 17 Gram(s) Oral daily  senna 2 Tablet(s) Oral at bedtime  valproate sodium IVPB 500 milliGRAM(s) IV Intermittent every 12 hours    MEDICATIONS  (PRN):  acetaminophen    Suspension .. 650 milliGRAM(s) Oral every 6 hours PRN Temp greater or equal to 38C (100.4F)      Allergies    amoxicillin (Rash)  penicillin (Rash)    Intolerances        Review of Systems: Per current hospital emergency protocol, in an effort to reduce COVID exposures and also conserve PPE for necessary encounters, all data within this chart were reviewed and recommendations are based upon information in the chart and by verbal communication with covering team and/or nurses. Please refer to the ROS and PE per covering primary team note          Vital Signs Last 24 Hrs  T(C): 36.6 (28 Apr 2020 08:00), Max: 37.3 (27 Apr 2020 16:00)  T(F): 97.8 (28 Apr 2020 08:00), Max: 99.1 (27 Apr 2020 16:00)  HR: 65 (28 Apr 2020 08:20) (48 - 105)  BP: --  BP(mean): --  RR: 29 (28 Apr 2020 08:00) (9 - 30)  SpO2: 96% (28 Apr 2020 08:20) (95% - 100%)    PHYSICAL EXAM: Per current hospital emergency protocol, in an effort to reduce COVID exposures and also conserve PPE for necessary encounters, all data within this chart were reviewed and recommendations are based upon information in the chart and by verbal communication with covering team and/or nurses. Please refer to the ROS and PE per covering primary team note          LABS:                        8.8    9.15  )-----------( 235      ( 28 Apr 2020 02:00 )             27.6     04-28    142  |  101  |  12  ----------------------------<  102<H>  4.3   |  34<H>  |  0.45<L>    Ca    8.4      28 Apr 2020 02:00  Phos  3.5     04-27  Mg     1.9     04-28    TPro  5.7<L>  /  Alb  2.3<L>  /  TBili  < 0.2<L>  /  DBili  x   /  AST  32  /  ALT  31  /  AlkPhos  54  04-28    PT/INR - ( 28 Apr 2020 02:00 )   PT: 11.8 SEC;   INR: 1.03          PTT - ( 28 Apr 2020 02:00 )  PTT:27.3 SEC      RADIOLOGY & ADDITIONAL TESTS:

## 2020-04-28 NOTE — CHART NOTE - NSCHARTNOTEFT_GEN_A_CORE
The trial, [id number]: Expanded Access to Convalescent Plasma for the Treatment of Patients with COVID-19, was discussed with Patient’s brother Сергей Rico on 4/28/2020. Consent was witnessed by Helen Bhat RN.     During the consent process, the following was discussed:      •	The fact that the study involves research  •	The study schedule and procedures involved  •	The main risks of the study, and the fact that all risks may not be known at this time  •	New information that may affect the subject’s willingness to continue on the study will be presented as soon as it is available  •	Benefits of participating  •	Alternatives to participating  •	Confidentiality   •	Compensation for research-related injury  •	Contacts for questions about the study or their rights while on the study  •	The fact that the subject’s participation is voluntary – they can refuse or withdraw at any time without penalty or loss of benefits.     The Patient’s wife was given ample time to ask questions. All questions were answered to their satisfaction.     I spoke with the patient’s brother over phone and the research nurse sent the consent via email to LAR (brother) on 4/28/2020. The brother responded to confirm receipt and confirmed the consent for his sister. The signed consent was sent back. I signed as the consenting professional and a fully executed consent was sent back to the LAR via email on 4/28/2020.     Informed consent was obtained prior to any study procedures being performed.      Eligibility Criteria:   Patient has laboratory confirmed diagnosis of infection with SARS-CoV-2 on _04/06/2020 and admitted to an acute care facility for treatment of COVID-19 complications.      Patient has life threatening COVID-19 with the following symptoms:   - Respiratory failure   - Septic shock    ABO was performed on 04/26/2020 and patient blood type confirmed as O+.      I have confirmed all eligibility are met for this patient to participate.      Convalescent plasma order has been completed, pending plasma from blood bank.    Shane Figueredo MD  COVID Plasma Trial  [phone]

## 2020-04-28 NOTE — PROGRESS NOTE ADULT - ASSESSMENT
60 year old female with Down Syndrome, presents with respiratory failure from COVID 19.    Neuro - low dose precedex, continue keppra and valproate (trend levels)  Pulm - CPAP trial 5/5, plan for extubation today  CVS - wean levo as tolerated  GI - tube feedings held for possible extubation, pepcid   - maintain Puentes, adequate uop  Endo - insulin sliding scale, synthroid  Heme - lovenox ppx  ID - Sputum 4/25 NGTD, no antibiotics

## 2020-04-29 NOTE — PROGRESS NOTE ADULT - ASSESSMENT
60 year old female with Down Syndrome, presents with respiratory failure from COVID 19.    Neuro - low dose precedex, continue keppra and valproate (trend levels).  Restart memantine (home med).  Add seroquel for agitation.   Pulm - CPAP trial 5/5 all day, plan for extubation tomorrow  CVS - wean levo as tolerated  GI - continue tube feeds, pepcid   - maintain Puentes, adequate uop  Endo - insulin sliding scale, synthroid  Heme - lovenox ppx  ID - Sputum 4/25 NGTD, no antibiotics

## 2020-04-29 NOTE — PROGRESS NOTE ADULT - SUBJECTIVE AND OBJECTIVE BOX
60 year old female with Down Syndrome s/p intubation for COVID 19 x2.  CPAP trial yesterday however developed increased work of breathing after 3 hours, no acute events overnight, tolerating cpap 5/5 this am    T(C): 36.3 (04-29-20 @ 11:37), Max: 37.5 (04-29-20 @ 00:00)  HR: 49 (04-29-20 @ 10:05) (47 - 82)  BP: --  RR: 20 (04-29-20 @ 13:33) (10 - 21)  SpO2: 95% (04-29-20 @ 13:33) (95% - 100%)  Wt(kg): --    04-28 @ 07:01  -  04-29 @ 07:00  --------------------------------------------------------  IN:    dexmedetomidine Infusion: 257.4 mL    Enteral Tube Flush: 200 mL    IV PiggyBack: 150 mL    norepinephrine Infusion: 108.8 mL    ns in tub fed  [id number]: 1000 mL    Plasma: 196 mL  Total IN: 1912.2 mL    OUT:    Indwelling Catheter - Urethral: 1370 mL  Total OUT: 1370 mL    Total NET: 542.2 mL      04-29 @ 07:01  -  04-29 @ 13:56  --------------------------------------------------------  IN:    dexmedetomidine Infusion: 44.8 mL    Enteral Tube Flush: 30 mL    norepinephrine Infusion: 9.7 mL    ns in tub fed  [id number]: 250 mL  Total IN: 334.5 mL    OUT:    Indwelling Catheter - Urethral: 180 mL  Total OUT: 180 mL    Total NET: 154.5 mL          Gen - NAD, intubated  Neuro - sedated, arousable, does not follow commands at baseline  HEENT - PERRL, ET tube and OG tube in place  Pulm  - coarse BS bilateral  CVS - sinus   GI - soft, NTTP, ND   -Puentes in place  Ext - no edema                            8.5    8.64  )-----------( 257      ( 29 Apr 2020 01:15 )             27.2     04-29    141  |  99  |  15  ----------------------------<  178<H>  4.4   |  30  |  0.48<L>    Ca    8.8      29 Apr 2020 01:15  Mg     1.9     04-29    TPro  6.3  /  Alb  2.6<L>  /  TBili  0.2  /  DBili  x   /  AST  22  /  ALT  24  /  AlkPhos  64  04-29    LIVER FUNCTIONS - ( 29 Apr 2020 01:15 )  Alb: 2.6 g/dL / Pro: 6.3 g/dL / ALK PHOS: 64 u/L / ALT: 24 u/L / AST: 22 u/L / GGT: x           PT/INR - ( 29 Apr 2020 01:15 )   PT: 12.0 SEC;   INR: 1.04          PTT - ( 29 Apr 2020 01:15 )  PTT:30.6 SEC      MEDICATIONS  (STANDING):  dexMEDEtomidine Infusion 0.05 MICROgram(s)/kG/Hr (0.56 mL/Hr) IV Continuous <Continuous>  enoxaparin Injectable 40 milliGRAM(s) SubCutaneous daily  famotidine Injectable 20 milliGRAM(s) IV Push daily  insulin lispro (HumaLOG) corrective regimen sliding scale   SubCutaneous every 6 hours  levETIRAcetam  IVPB 1000 milliGRAM(s) IV Intermittent every 12 hours  levothyroxine Injectable 62.5 MICROGram(s) IV Push at bedtime  memantine 10 milliGRAM(s) Oral every 12 hours  midodrine 10 milliGRAM(s) Oral every 8 hours  norepinephrine Infusion 0.05 MICROgram(s)/kG/Min (2.11 mL/Hr) IV Continuous <Continuous>  polyethylene glycol 3350 17 Gram(s) Oral daily  QUEtiapine 25 milliGRAM(s) Oral daily  senna 2 Tablet(s) Oral at bedtime  valproate sodium IVPB 500 milliGRAM(s) IV Intermittent every 12 hours    MEDICATIONS  (PRN):  acetaminophen    Suspension .. 650 milliGRAM(s) Oral every 6 hours PRN Temp greater or equal to 38C (100.4F)

## 2020-04-30 NOTE — PROGRESS NOTE ADULT - ASSESSMENT
60 year old female with Down Syndrome, presents with respiratory failure from COVID 19.    Neuro - low dose precedex, continue keppra and valproate (trend levels).  memantine (home med), seroquel for agitation. maintain RASS 0- -2wean sedation as tolerated  Pulm - CPAP as tolerated. If continues to unable to tolerate CPAP, will start Trach discussion with family  CVS - wean levo as tolerated, c/w midodrine  GI - continue tube feeds- now continuous, pepcid ppx   - maintain Puentes, adequate uop; goal even for the day. started on Free water 60ml q4  Endo - insulin sliding scale, synthroid  Heme - lovenox ppx  ID - Sputum 4/25 NGTD, no antibiotics. Will recheck Covid status    Dispo: remain in ICU    Code status: DNR

## 2020-04-30 NOTE — CHART NOTE - NSCHARTNOTEFT_GEN_A_CORE
Nutrition Follow-Up Note   Reason for follow-up: Critical care length of stay follow- up.     Information obtained from extensive chart review. Unable to conduct face-to-face interview due to current contact/isolation precautions in setting of COVID-19. Pt remains intubated on vent. Per Critical care note (4/30): "CPAP as tolerated. If continues to unable to tolerate CPAP, will start Trach discussion with family"    Pt receiving propofol, provides an estimated 39 additional nonprotein Kcal x24 hours.    Diet Order: Diet, NPO with Tube Feed:   Tube Feeding Modality: Orogastric  Jevity 1.2 Mitch (JEVITY1.2RTH)  Total Volume for 24 Hours (mL): 960  Continuous  Starting Tube Feed Rate {mL per Hour}: 10  Increase Tube Feed Rate by (mL): 10     Every 4 hours  Until Goal Tube Feed Rate (mL per Hour): 40  Tube Feed Duration (in Hours): 24  Tube Feed Start Time: 09:00  No Carb Prosource (1pkg = 15gms Protein)     Qty per Day:  2 (04-30-20 @ 08:01)    CURRENT EN ORDER PROVIDES:  Total Volume: 960mL/day  Energy: 1152Kcal/day  Protein: 83g protein/day    Interval Nutrition Related Events: Patient previously on bolus feeds - transitioned to continuous feeds today. Previous tolerating bolus feeds.     GI: per I&O stool count x1 4/28, abdomen noted to be rounded, nontender. Patient ordered for Senna and Miralax.    Anthropometric Measurements:   Height (cm): 149.86 (04-04-20 @ 09:46)  Weight (kg): 45 (04-22-20 @ 14:11), 45.4 (04-04-20 @ 09:46)  BMI (kg/m2): 20 (04-22-20 @ 14:11), 20.2 (04-04-20 @ 09:46)  Appearance: Unable to conduct nutrition-focused physical exam at this time due to limited contact in setting of isolation precautions related to COVID-19.    Medications: MEDICATIONS  (STANDING):  dexMEDEtomidine Infusion 0.05 MICROgram(s)/kG/Hr (0.56 mL/Hr) IV Continuous <Continuous>  enoxaparin Injectable 40 milliGRAM(s) SubCutaneous daily  famotidine Injectable 20 milliGRAM(s) IV Push daily  insulin lispro (HumaLOG) corrective regimen sliding scale   SubCutaneous every 6 hours  levETIRAcetam  IVPB 1000 milliGRAM(s) IV Intermittent every 12 hours  levothyroxine Injectable 62.5 MICROGram(s) IV Push at bedtime  memantine 10 milliGRAM(s) Oral every 12 hours  midodrine 10 milliGRAM(s) Oral every 8 hours  norepinephrine Infusion 0.05 MICROgram(s)/kG/Min (2.11 mL/Hr) IV Continuous <Continuous>  polyethylene glycol 3350 17 Gram(s) Oral daily  propofol Infusion 15 MICROgram(s)/kG/Min (4.05 mL/Hr) IV Continuous <Continuous>  QUEtiapine 25 milliGRAM(s) Oral daily  senna 2 Tablet(s) Oral at bedtime  valproate sodium IVPB 500 milliGRAM(s) IV Intermittent every 12 hours    MEDICATIONS  (PRN):  acetaminophen    Suspension .. 650 milliGRAM(s) Oral every 6 hours PRN Temp greater or equal to 38C (100.4F)    Labs: 04-30 @ 02:10: Sodium 143, Potassium 4.1, Calcium 9.0, Magnesium 2.1, Phosphorus --, BUN 18, Creatinine 0.46<L>, Glucose 100<H>, Alk Phos 58, ALT/SGPT 20, AST/SGOT 26, Albumin 2.2<L>, Prealbumin --, Total Bilirubin 0.2, Hemoglobin 8.0<L>, Hematocrit 26.2<L>, Ferritin --, C-Reactive Protein --, Creatine Kinase <<27>    Triglycerides, Serum: 122 mg/dL (04-19-20 @ 11:25)  Triglycerides, Serum: 191 mg/dL (04-17-20 @ 12:43)  Triglycerides, Serum: 345 mg/dL (04-15-20 @ 12:04)  Triglycerides, Serum: 289 mg/dL (04-14-20 @ 12:21)  Triglycerides, Serum: 261 mg/dL (04-13-20 @ 13:08)  Triglycerides, Serum: 276 mg/dL (04-11-20 @ 11:34)  Triglycerides, Serum: 131 mg/dL (04-09-20 @ 11:42)    POCT Blood Glucose.: 103 mg/dL (04-29-20 @ 22:52)  POCT Blood Glucose.: 96 mg/dL (04-29-20 @ 17:19)    Skin: left buttocks stage 2, left heel suspected DTI, right heel suspected DTI, right great toe suspected DTI, right 2nd toe suspected DTI, left 1st toe suspected DTI  Edema: 1+ generalized    Estimated Nutrition Needs   [x] Recalculated: based on weight 45kg (4/22)  Estimated Energy Needs (25-30Kcal/kg): 1125-1350Kcal/day  Estimated Protein Needs (1.5-2.0g protein/kg): 67.5-90g protein/day    Nutrition Interventions/Recommendations:   **Hold feeds for 30minutes before and after Synthroid provision in setting of medication-food interaction**  1. Tube feeds as medically appropriate and tolerated:  --Recommend continuous feeds, Glucerna 1.5 via OGT @ 20mL/hr and advance 10mL q4hrs to goal rate 45mL/hr x 23 hours + No Carb Prosource 3x daily [provides 1035mL total volume, 1242Kcal, 102g protein, 835mL free water].  2. Additional free water per MD discretion.   3. Continue bowel regimen PRN.   4. Obtain daily weights as able.   5. Consider multivitamin daily for micronutrient coverage.     Monitoring and Evaluation:   Monitor nutrition provision, tolerance to diet, weights, labs, skin integrity.   RD remains available, please consult as needed. Will follow up per protocol.  Kell Demarco RD, CDN Pager #[phone]

## 2020-04-30 NOTE — PROGRESS NOTE ADULT - SUBJECTIVE AND OBJECTIVE BOX
INTERVAL HPI/OVERNIGHT EVENTS:    chart reviewed  afebrile   on vent   tolerating bolus tube feeds       MEDICATIONS  (STANDING):  dexMEDEtomidine Infusion 0.05 MICROgram(s)/kG/Hr (0.56 mL/Hr) IV Continuous <Continuous>  enoxaparin Injectable 40 milliGRAM(s) SubCutaneous daily  famotidine Injectable 20 milliGRAM(s) IV Push daily  insulin lispro (HumaLOG) corrective regimen sliding scale   SubCutaneous every 6 hours  levETIRAcetam  IVPB 1000 milliGRAM(s) IV Intermittent every 12 hours  levothyroxine Injectable 62.5 MICROGram(s) IV Push at bedtime  memantine 10 milliGRAM(s) Oral every 12 hours  midodrine 10 milliGRAM(s) Oral every 8 hours  norepinephrine Infusion 0.05 MICROgram(s)/kG/Min (2.11 mL/Hr) IV Continuous <Continuous>  polyethylene glycol 3350 17 Gram(s) Oral daily  propofol Infusion 15 MICROgram(s)/kG/Min (4.05 mL/Hr) IV Continuous <Continuous>  QUEtiapine 25 milliGRAM(s) Oral daily  senna 2 Tablet(s) Oral at bedtime  valproate sodium IVPB 500 milliGRAM(s) IV Intermittent every 12 hours    MEDICATIONS  (PRN):  acetaminophen    Suspension .. 650 milliGRAM(s) Oral every 6 hours PRN Temp greater or equal to 38C (100.4F)      Allergies    amoxicillin (Rash)  penicillin (Rash)    Intolerances        Review of Systems: Per current hospital emergency protocol, in an effort to reduce COVID exposures and also conserve PPE for necessary encounters, all data within this chart were reviewed and recommendations are based upon information in the chart and by verbal communication with covering team and/or nurses. Please refer to the ROS and PE per covering primary team note          Vital Signs Last 24 Hrs  T(C): 36.1 (30 Apr 2020 08:00), Max: 36.9 (29 Apr 2020 20:00)  T(F): 96.9 (30 Apr 2020 08:00), Max: 98.4 (29 Apr 2020 20:00)  HR: 65 (30 Apr 2020 10:33) (40 - 130)  BP: 120/60 (29 Apr 2020 18:31) (86/46 - 120/60)  BP(mean): 84 (29 Apr 2020 18:31) (56 - 84)  RR: 20 (30 Apr 2020 10:33) (10 - 33)  SpO2: 93% (30 Apr 2020 10:33) (91% - 100%)    PHYSICAL EXAM: Per current hospital emergency protocol, in an effort to reduce COVID exposures and also conserve PPE for necessary encounters, all data within this chart were reviewed and recommendations are based upon information in the chart and by verbal communication with covering team and/or nurses. Please refer to the ROS and PE per covering primary team note          LABS:                        8.0    9.31  )-----------( 271      ( 30 Apr 2020 02:10 )             26.2     04-30    143  |  103  |  18  ----------------------------<  100<H>  4.1   |  28  |  0.46<L>    Ca    9.0      30 Apr 2020 02:10  Mg     2.1     04-30    TPro  6.1  /  Alb  2.2<L>  /  TBili  0.2  /  DBili  x   /  AST  26  /  ALT  20  /  AlkPhos  58  04-30    PT/INR - ( 30 Apr 2020 02:10 )   PT: 11.5 SEC;   INR: 1.01          PTT - ( 30 Apr 2020 02:10 )  PTT:21.2 SEC      RADIOLOGY & ADDITIONAL TESTS:

## 2020-04-30 NOTE — PROGRESS NOTE ADULT - SUBJECTIVE AND OBJECTIVE BOX
Admit Date: 04-22-20  Length of Stay: 8d    60 year old female with Down Syndrome s/p intubation for COVID 19 x2.    INTERVAL HPI/OVERNIGHT EVENTS:  Continuing with CPAP trial yesterday; on ventilator overnight. no acute events overnight; CPAP this morning with not tolerating cpap 5/5; placed on CPAP 10/5, with tachypnea, and increased work of breathing. mild sedation restarted and placed back on ventilator.    MEDICATIONS  (STANDING):  dexMEDEtomidine Infusion 0.05 MICROgram(s)/kG/Hr (0.56 mL/Hr) IV Continuous <Continuous>  enoxaparin Injectable 40 milliGRAM(s) SubCutaneous daily  famotidine Injectable 20 milliGRAM(s) IV Push daily  insulin lispro (HumaLOG) corrective regimen sliding scale   SubCutaneous every 6 hours  levETIRAcetam  IVPB 1000 milliGRAM(s) IV Intermittent every 12 hours  levothyroxine Injectable 62.5 MICROGram(s) IV Push at bedtime  memantine 10 milliGRAM(s) Oral every 12 hours  midodrine 10 milliGRAM(s) Oral every 8 hours  norepinephrine Infusion 0.05 MICROgram(s)/kG/Min (2.11 mL/Hr) IV Continuous <Continuous>  polyethylene glycol 3350 17 Gram(s) Oral daily  propofol Infusion 15 MICROgram(s)/kG/Min (4.05 mL/Hr) IV Continuous <Continuous>  QUEtiapine 25 milliGRAM(s) Oral daily  senna 2 Tablet(s) Oral at bedtime  valproate sodium IVPB 500 milliGRAM(s) IV Intermittent every 12 hours    MEDICATIONS  (PRN):  acetaminophen    Suspension .. 650 milliGRAM(s) Oral every 6 hours PRN Temp greater or equal to 38C (100.4F)      Vitals:  T(C): 36.1 (04-30-20 @ 08:00), Max: 36.9 (04-29-20 @ 20:00)  HR: 105 (04-30-20 @ 10:18) (40 - 130)  BP: 120/60 (04-29-20 @ 18:31) (86/46 - 120/60)  BP(mean): 84 (04-29-20 @ 18:31) (56 - 84)  ABP: 101/49 (04-30-20 @ 10:18) (80/40 - 164/79)  ABP(mean): 62 (04-30-20 @ 10:18) (52 - 114)  RR: 20 (04-30-20 @ 10:33) (10 - 33)  SpO2: 93% (04-30-20 @ 10:33) (91% - 100%)  Wt(kg): --  CVP(mm Hg): --  CI: --  CAPILLARY BLOOD GLUCOSE      POCT Blood Glucose.: 103 mg/dL (29 Apr 2020 22:52)  POCT Blood Glucose.: 96 mg/dL (29 Apr 2020 17:19)   N/A      04-29 @ 07:01  -  04-30 @ 07:00  --------------------------------------------------------  IN:    dexmedetomidine Infusion: 121.8 mL    Enteral Tube Flush: 30 mL    IV PiggyBack: 470 mL    lactated ringers.: 500 mL    norepinephrine Infusion: 69.7 mL    ns in tub fed  [id number]: 1000 mL    propofol Infusion: 36 mL  Total IN: 2227.5 mL    OUT:    Indwelling Catheter - Urethral: 1980 mL  Total OUT: 1980 mL    Total NET: 247.5 mL      04-30 @ 07:01  -  04-30 @ 10:44  --------------------------------------------------------  IN:    dexmedetomidine Infusion: 24.9 mL    norepinephrine Infusion: 5 mL  Total IN: 29.9 mL    OUT:    Indwelling Catheter - Urethral: 350 mL  Total OUT: 350 mL    Total NET: -320.1 mL        Vitals (Invasive):  ABP: 147/71 (04-30-20 @ 09:29) (80/40 - 164/79)  CVP(mm Hg): --  CVP(cm H2O): --  CO: --  CI: --  PA: --  PA(mean): --  PCWP: --  LA: --  RA: --  SVR: --  SVRI: --  PVR: --  PVRI: --    Ventilator:   Mode: CPAP with PS  FiO2: 40  PEEP: 5  PS: 10  MAP: 12          Physical Exam:  HEENT:     + NCAT  + EOMI  - Conjunctival edema   - Icterus   +OGT  Neck:         + FROM       + Mid-line trachea   - Tracheostomy  Chest:         - Sternal click  - Sternal drainage  - Chest tubes  - SubQ emphysema  Lungs:          Intubated. bilateral symmetrical chest rest. coarse breath sounds     Cardiac:       + S1 + S2    + RRR      Abdomen:    + BS     + Soft    + Non-tender     - Distended      Extremities:   - Cyanosis U/L   - Clubbing  U/L  - LE/UE Edema   + Capillary refill    + Pulses   Neuro:        - Awake     + Sedated RASS -1  Skin:        - Rashes    - Erythema     + IV sites intact      Labs:  COVID:  COVID-19 PCR: Detected (04-06-20 @ 22:45)                          8.0    9.31  )-----------( 271      ( 30 Apr 2020 02:10 )             26.2     04-30    143  |  103  |  18  ----------------------------<  100<H>  4.1   |  28  |  0.46<L>    Ca    9.0      30 Apr 2020 02:10  Mg     2.1     04-30    TPro  6.1  /  Alb  2.2<L>  /  TBili  0.2  /  DBili  x   /  AST  26  /  ALT  20  /  AlkPhos  58  04-30    PT/INR - ( 30 Apr 2020 02:10 )   PT: 11.5 SEC;   INR: 1.01          PTT - ( 30 Apr 2020 02:10 )  PTT:21.2 SEC      Culture - Sputum (collected 04-25-20 @ 17:56)  Source: .Sputum Sputum  Gram Stain (04-25-20 @ 23:35):    Moderate polymorphonuclear leukocytes per low power field    Few Squamous epithelial cells per low power field    No organisms seen per oil power field  Final Report (04-27-20 @ 22:04):    Normal Respiratory Jackelin present      COVID related labs:      Blood Gases:  (ARTERIAL):  04-30-20 @ 02:30  pH 7.41 / pCO2 53 / pO2 80 / HCO3 31  Total CO2 --  FiO2 --  Oxygen Saturation 95.9  Total Hemoglobin, Calculated --  Hematocrit, Calculated --  Oxygen Content --  Blood Gas Arterial - Calcium, Ionized 1.24  Blood Gas Arterial - Chloride --  Blood Gas Arterial - Glucose 108  Blood Gas Arterial - Potassium 3.5  Blood Gas Arterial - Sodium 144  Blood Gas Arterial - Creatinine --  Base Excess, Arterial 7.7    (VENOUS):

## 2020-04-30 NOTE — PROGRESS NOTE ADULT - ASSESSMENT
60y Female with history of Down Syndrome, hypothyroidism, and recurrent UTI who presented to [facility] on 4/4 for fever, cough, and SOB. The patient was intubated on 4/7 for worsening hypercapnic respiratory failure. Extubated and reintubated on 4/16. Hospitalization further complicated by NSTEMI (type 2), vaginal bleeding requiring 2 uPRBC. The pateint was being followed by my practice at [facility]. She is s.p transfer to [facility].     Acute blood loss anemia  favor 2/2 to vaginal bleeding; cont to monitor  No evidence of GI bleeding   cbc appears stable   cont gi ppx with pepcid   On Lovenox 40; can increase from Gi standpoint if necessary  monitor stools; if loose hold stool softeners     COVID   monitor resp status   care per primary teams appreciated  guarded prognosis       Advanced care planning was discussed with patient and family.  Advanced care planning forms were reviewed and discussed.  Risks, benefits and alternatives of gastroenterologic procedures were discussed in detail and all questions were answered.

## 2020-05-01 NOTE — PROGRESS NOTE ADULT - ASSESSMENT
60 year old female with Down Syndrome, presents with respiratory failure from COVID 19.    Neuro - low dose precedex, continue keppra and valproate (trend levels).  memantine (home med), seroquel for agitation. maintain RASS 0- -2wean sedation as tolerated  Pulm - CPAP as tolerated. If continues to unable to tolerate CPAP, possible Trach (family aware)  CVS - wean levo as tolerated, c/w midodrine  GI - continue tube feeds,  pepcid ppx   - maintain Puentes, adequate uop; goal even for the day.  Free water 60ml q4. Monitor BMP  Endo - insulin sliding scale, synthroid  Heme - lovenox ppx  ID - Sputum 4/25 NGTD, no antibiotics. Repeat Covid negative. Will repeat COVID PCR for assessment of possibility of moving to non-covid ICU    Dispo: remain in ICU    Code status: DNR

## 2020-05-01 NOTE — PROGRESS NOTE ADULT - ASSESSMENT
60y Female with history of Down Syndrome, hypothyroidism, and recurrent UTI who presented to [facility] on 4/4 for fever, cough, and SOB. The patient was intubated on 4/7 for worsening hypercapnic respiratory failure. Extubated and reintubated on 4/16. Hospitalization further complicated by NSTEMI (type 2), vaginal bleeding requiring 2 uPRBC. The pateint was being followed by my practice at [facility]. She is s.p transfer to [facility].     Acute blood loss anemia  janie h/h and transfuse prn   favor 2/2 to vaginal bleeding as stools brown   No evidence of GI bleeding   cont gi ppx with pepcid   On Lovenox 40; can increase from Gi standpoint if necessary  monitor stools; if loose hold stool softeners     COVID   monitor resp status   care per primary teams appreciated  guarded prognosis       Advanced care planning was discussed with patient and family.  Advanced care planning forms were reviewed and discussed.  Risks, benefits and alternatives of gastroenterologic procedures were discussed in detail and all questions were answered.

## 2020-05-01 NOTE — PROGRESS NOTE ADULT - SUBJECTIVE AND OBJECTIVE BOX
INTERVAL HPI/OVERNIGHT EVENTS:    chart reviewed   on vent  tolerating tube feeds  having gi function overnight, nonbloody     MEDICATIONS  (STANDING):  dexMEDEtomidine Infusion 0.05 MICROgram(s)/kG/Hr (0.56 mL/Hr) IV Continuous <Continuous>  enoxaparin Injectable 40 milliGRAM(s) SubCutaneous daily  famotidine Injectable 20 milliGRAM(s) IV Push daily  levETIRAcetam  IVPB 1000 milliGRAM(s) IV Intermittent every 12 hours  levothyroxine Injectable 62.5 MICROGram(s) IV Push at bedtime  memantine 10 milliGRAM(s) Oral every 12 hours  midodrine 10 milliGRAM(s) Oral every 8 hours  norepinephrine Infusion 0.05 MICROgram(s)/kG/Min (2.11 mL/Hr) IV Continuous <Continuous>  polyethylene glycol 3350 17 Gram(s) Oral daily  propofol Infusion 15 MICROgram(s)/kG/Min (4.05 mL/Hr) IV Continuous <Continuous>  QUEtiapine 25 milliGRAM(s) Oral daily  senna 2 Tablet(s) Oral at bedtime  valproate sodium IVPB 500 milliGRAM(s) IV Intermittent every 12 hours    MEDICATIONS  (PRN):  acetaminophen    Suspension .. 650 milliGRAM(s) Oral every 6 hours PRN Temp greater or equal to 38C (100.4F)      Allergies    amoxicillin (Rash)  penicillin (Rash)    Intolerances        Review of Systems: Per current hospital emergency protocol, in an effort to reduce COVID exposures and also conserve PPE for necessary encounters, all data within this chart were reviewed and recommendations are based upon information in the chart and by verbal communication with covering team and/or nurses. Please refer to the ROS and PE per covering primary team note          Vital Signs Last 24 Hrs  T(C): 37.8 (01 May 2020 08:00), Max: 37.8 (01 May 2020 08:00)  T(F): 100 (01 May 2020 08:00), Max: 100 (01 May 2020 08:00)  HR: 56 (01 May 2020 10:00) (45 - 65)  BP: --  BP(mean): --  RR: 26 (01 May 2020 10:00) (20 - 26)  SpO2: 99% (01 May 2020 10:00) (93% - 100%)    PHYSICAL EXAM: Per current hospital emergency protocol, in an effort to reduce COVID exposures and also conserve PPE for necessary encounters, all data within this chart were reviewed and recommendations are based upon information in the chart and by verbal communication with covering team and/or nurses. Please refer to the ROS and PE per covering primary team note          LABS:                        7.6    9.33  )-----------( 304      ( 01 May 2020 01:30 )             24.2     05-01    141  |  102  |  13  ----------------------------<  91  3.5   |  29  |  0.49<L>    Ca    8.8      01 May 2020 01:30  Mg     1.8     05-01    TPro  6.1  /  Alb  2.4<L>  /  TBili  < 0.2<L>  /  DBili  x   /  AST  22  /  ALT  20  /  AlkPhos  60  05-01    PT/INR - ( 01 May 2020 01:30 )   PT: 12.1 SEC;   INR: 1.05          PTT - ( 01 May 2020 01:30 )  PTT:29.7 SEC      RADIOLOGY & ADDITIONAL TESTS:

## 2020-05-01 NOTE — PROGRESS NOTE ADULT - SUBJECTIVE AND OBJECTIVE BOX
Admit Date: 04-22-20  Length of Stay: 8d    60 year old female with Down Syndrome s/p intubation for COVID 19 x2.    INTERVAL HPI/OVERNIGHT EVENTS:  Continuing with CPAP trial yesterday; on ventilator overnight. no acute events overnight;  Tolerating CPAP this am 5/5. Required to be placed back on Vent for desaturation with positioning/cleaning.   Repeat COVID negative x1      MEDICATIONS  (STANDING):  dexMEDEtomidine Infusion 0.05 MICROgram(s)/kG/Hr (0.56 mL/Hr) IV Continuous <Continuous>  enoxaparin Injectable 40 milliGRAM(s) SubCutaneous daily  famotidine Injectable 20 milliGRAM(s) IV Push daily  levETIRAcetam  IVPB 1000 milliGRAM(s) IV Intermittent every 12 hours  levothyroxine Injectable 62.5 MICROGram(s) IV Push at bedtime  memantine 10 milliGRAM(s) Oral every 12 hours  midodrine 10 milliGRAM(s) Oral every 8 hours  norepinephrine Infusion 0.05 MICROgram(s)/kG/Min (2.11 mL/Hr) IV Continuous <Continuous>  polyethylene glycol 3350 17 Gram(s) Oral daily  propofol Infusion 15 MICROgram(s)/kG/Min (4.05 mL/Hr) IV Continuous <Continuous>  QUEtiapine 25 milliGRAM(s) Oral daily  senna 2 Tablet(s) Oral at bedtime  valproate sodium IVPB 500 milliGRAM(s) IV Intermittent every 12 hours    MEDICATIONS  (PRN):  acetaminophen    Suspension .. 650 milliGRAM(s) Oral every 6 hours PRN Temp greater or equal to 38C (100.4F)    Vitals:  T(C): 37.3 (05-01-20 @ 12:00), Max: 37.8 (05-01-20 @ 08:00)  HR: 55 (05-01-20 @ 12:00) (45 - 65)  BP: --  BP(mean): --  ABP: 102/50 (05-01-20 @ 12:00) (85/44 - 159/74)  ABP(mean): 68 (05-01-20 @ 12:00) (59 - 102)  RR: 20 (05-01-20 @ 12:00) (20 - 26)  SpO2: 100% (05-01-20 @ 12:00) (93% - 100%)  Wt(kg): --  CVP(mm Hg): --  CI: --  CAPILLARY BLOOD GLUCOSE      POCT Blood Glucose.: 117 mg/dL (30 Apr 2020 17:19)   N/A      04-30 @ 07:01  -  05-01 @ 07:00  --------------------------------------------------------  IN:    dexmedetomidine Infusion: 192.5 mL    Free Water: 240 mL    Glucerna 1.5: 570 mL    IV PiggyBack: 410 mL    norepinephrine Infusion: 100.8 mL    ns in tub fed  [id number]: 45 mL  Total IN: 1558.3 mL    OUT:    Indwelling Catheter - Urethral: 2045 mL  Total OUT: 2045 mL    Total NET: -486.7 mL      05-01 @ 07:01  -  05-01 @ 13:34  --------------------------------------------------------  IN:    dexmedetomidine Infusion: 23.7 mL    Free Water: 120 mL    Glucerna 1.5: 180 mL    norepinephrine Infusion: 11 mL  Total IN: 334.7 mL    OUT:    Indwelling Catheter - Urethral: 800 mL  Total OUT: 800 mL    Total NET: -465.3 mL          Vitals (Invasive):  ABP: 147/71 (04-30-20 @ 09:29) (80/40 - 164/79)  CVP(mm Hg): --  CVP(cm H2O): --  CO: --  CI: --  PA: --  PA(mean): --  PCWP: --  LA: --  RA: --  SVR: --  SVRI: --  PVR: --  PVRI: --      Mode: AC/ CMV (Assist Control/ Continuous Mandatory Ventilation)  RR (machine): 20  TV (machine): 320  FiO2: 40  PEEP: 5  ITime: 0.8  MAP: 9  PIP: 19        Physical Exam:  HEENT:     + NCAT  + EOMI  - Conjunctival edema   - Icterus   +OGT  Neck:         + FROM       + Mid-line trachea   - Tracheostomy  Chest:         - Sternal click  - Sternal drainage  - Chest tubes  - SubQ emphysema  Lungs:          Intubated. bilateral symmetrical chest rest. coarse breath sounds     Cardiac:       + S1 + S2    + RRR      Abdomen:    + BS     + Soft    + Non-tender     - Distended      Extremities:   - Cyanosis U/L   - Clubbing  U/L  - LE/UE Edema   + Capillary refill    + Pulses   Neuro:        - Awake     + Sedated RASS -1  Skin:        - Rashes    - Erythema       Labs:    CBC (05-01 @ 01:30)                          7.6<L>                   9.33    )--------------(  304        --    % Neuts, --    % Lymphs, ANC: --                              24.2<L>  CBC (04-30 @ 02:10)                          8.0<L>                   9.31    )--------------(  271        --    % Neuts, --    % Lymphs, ANC: --                              26.2<L>    BMP (05-01 @ 01:30)       141     |  102     |  13    			Ca++ --      Ca 8.8          ---------------------------------( 91    		Mg 1.8          3.5     |  29      |  0.49<L>			Ph --      BMP (04-30 @ 02:10)       143     |  103     |  18    			Ca++ --      Ca 9.0          ---------------------------------( 100<H>		Mg 2.1          4.1     |  28      |  0.46<L>			Ph --        LFTs (05-01 @ 01:30)      TPro 6.1 / Alb 2.4<L> / TBili < 0.2<L> / DBili -- / AST 22 / ALT 20 / AlkPhos 60  LFTs (04-30 @ 02:10)      TPro 6.1 / Alb 2.2<L> / TBili 0.2 / DBili -- / AST 26 / ALT 20 / AlkPhos 58    Coags (05-01 @ 01:30)  aPTT 29.7 / INR 1.05 / PT 12.1  Coags (04-30 @ 02:10)  aPTT 21.2<L> / INR 1.01 / PT 11.5        ABG (05-01 @ 10:00)     7.42 / 49<H> / 89 / 31<H> / 7.1 / 97.2%     Lactate: 1.7   ABG (05-01 @ 01:30)     7.52<H> / 38 / 68<L> / 32<H> / 7.9 / 94.2<L>%     Lactate: 1.3         -> .Sputum Sputum Culture (04-25 @ 17:56)       Moderate polymorphonuclear leukocytes per low power field  Few Squamous epithelial cells per low power field  No organisms seen per oil power field    NG    Normal Respiratory Jakcelin present    COVID-19 PCR: NotDetec (30 Apr 2020 10:57)

## 2020-05-02 NOTE — PROGRESS NOTE ADULT - ASSESSMENT
60y Female with history of Down Syndrome, hypothyroidism, and recurrent UTI who presented to [facility] on 4/4 for fever, cough, and SOB. The patient was intubated on 4/7 for worsening hypercapnic respiratory failure. Extubated and reintubated on 4/16. Hospitalization further complicated by NSTEMI (type 2), vaginal bleeding requiring 2 uPRBC. The pateint was being followed by my practice at [facility]. She is s.p transfer to [facility].     Acute blood loss anemia  janie h/h and transfuse prn   favor 2/2 to vaginal bleeding as stools brown   No evidence of GI bleeding   cont gi ppx with pepcid   On Lovenox 40; can increase from Gi standpoint if necessary  monitor stools; if loose hold stool softeners     COVID   monitor resp status   care per primary teams appreciated  guarded prognosis       Advanced care planning was discussed with patient and family.  Advanced care planning forms were reviewed and discussed.  Risks, benefits and alternatives of gastroenterologic procedures were discussed in detail and all questions were answered.

## 2020-05-02 NOTE — PROGRESS NOTE ADULT - SUBJECTIVE AND OBJECTIVE BOX
INTERVAL HPI/OVERNIGHT EVENTS:  chart reviewed    MEDICATIONS  (STANDING):  dexMEDEtomidine Infusion 0.05 MICROgram(s)/kG/Hr (0.56 mL/Hr) IV Continuous <Continuous>  enoxaparin Injectable 40 milliGRAM(s) SubCutaneous daily  famotidine Injectable 20 milliGRAM(s) IV Push daily  levETIRAcetam  IVPB 1000 milliGRAM(s) IV Intermittent every 12 hours  levothyroxine Injectable 62.5 MICROGram(s) IV Push at bedtime  memantine 10 milliGRAM(s) Oral every 12 hours  midodrine 10 milliGRAM(s) Oral every 8 hours  norepinephrine Infusion 0.05 MICROgram(s)/kG/Min (2.11 mL/Hr) IV Continuous <Continuous>  polyethylene glycol 3350 17 Gram(s) Oral daily  QUEtiapine 25 milliGRAM(s) Oral daily  senna 2 Tablet(s) Oral at bedtime  valproate sodium IVPB 500 milliGRAM(s) IV Intermittent every 12 hours    MEDICATIONS  (PRN):  acetaminophen    Suspension .. 650 milliGRAM(s) Oral every 6 hours PRN Temp greater or equal to 38C (100.4F)      Allergies    amoxicillin (Rash)  penicillin (Rash)    Intolerances        Review of Systems:    General:  No wt loss, fevers, chills, night sweats,fatigue,   Eyes:  Good vision, no reported pain  ENT:  No sore throat, pain, runny nose, dysphagia  CV:  No pain, palpitatioins, hypo/hypertension  Resp:  No dyspnea, cough, tachypnea, wheezing  GI:  No pain, No nausea, No vomiting, No diarrhea, No constipatiion, No weight loss, No fever, No pruritis, No rectal bleeding, No tarry stools, No dysphagia,  :  No pain, bleeding, incontinence, nocturia  Muscle:  No pain, weakness  Neuro:  No weakness, tingling, memory problems  Psych:  No fatigue, insomnia, mood problems, depression  Endocrine:  No polyuria, polydypsia, cold/heat intolerance  Heme:  No petechiae, ecchymosis, easy bruisability  Skin:  No rash, tattoos, scars, edema      Vital Signs Last 24 Hrs  T(C): 36.2 (02 May 2020 20:00), Max: 36.7 (02 May 2020 04:00)  T(F): 97.2 (02 May 2020 20:00), Max: 98 (02 May 2020 04:00)  HR: 47 (02 May 2020 20:00) (44 - 61)  BP: --  BP(mean): --  RR: 20 (02 May 2020 20:00) (20 - 30)  SpO2: 97% (02 May 2020 20:00) (95% - 100%)    PHYSICAL EXAM:    Constitutional: NAD, well-developed  HEENT: EOMI, throat clear  Neck: No LAD, supple  Respiratory: CTA and P  Cardiovascular: S1 and S2, RRR, no M  Gastrointestinal: BS+, soft, NT/ND, neg HSM,  Extremities: No peripheral edema, neg clubing, cyanosis  Vascular: 2+ peripheral pulses  Neurological: A/O x 3, no focal deficits  Psychiatric: Normal mood, normal affect  Skin: No rashes      LABS:                        7.6    6.90  )-----------( 343      ( 02 May 2020 03:00 )             24.8     05-02    143  |  105  |  15  ----------------------------<  116<H>  4.1   |  26  |  0.53    Ca    8.7      02 May 2020 03:00  Mg     2.2     05-02    TPro  6.5  /  Alb  2.6<L>  /  TBili  < 0.2<L>  /  DBili  x   /  AST  22  /  ALT  20  /  AlkPhos  71  05-02    PT/INR - ( 02 May 2020 03:00 )   PT: 11.5 SEC;   INR: 1.00          PTT - ( 01 May 2020 01:30 )  PTT:29.7 SEC      RADIOLOGY & ADDITIONAL TESTS:

## 2020-05-02 NOTE — PROGRESS NOTE ADULT - SUBJECTIVE AND OBJECTIVE BOX
HPI: 60 year old female with Down Syndrome s/p intubation for COVID 19 x2.      24 HOUR EVENTS: Holding TF in AM. Weaning sedation. CPAP trial 10/5 w/ plan to extubate in afternoon.    SUBJECTIVE/ROS:  [ ] A ten-point review of systems was otherwise negative except as noted.  [x ] Due to altered mental status/intubation, subjective information were not able to be obtained from the patient. History was obtained, to the extent possible, from review of the chart and collateral sources of information.      NEURO  RASS: 0 to -1  Exam: Unable to assess  Meds: acetaminophen    Suspension .. 650 milliGRAM(s) Oral every 6 hours PRN Temp greater or equal to 38C (100.4F)  dexMEDEtomidine Infusion 0.05 MICROgram(s)/kG/Hr IV Continuous <Continuous>  levETIRAcetam  IVPB 1000 milliGRAM(s) IV Intermittent every 12 hours  memantine 10 milliGRAM(s) Oral every 12 hours  QUEtiapine 25 milliGRAM(s) Oral daily  valproate sodium IVPB 500 milliGRAM(s) IV Intermittent every 12 hours    [x] Adequacy of sedation and pain control has been assessed and adjusted      RESPIRATORY  RR: 20 (05-02-20 @ 08:00) (15 - 26)  SpO2: 98% (05-02-20 @ 09:31) (50% - 100%)  Exam: unlabored, clear to auscultation bilaterally  Mechanical Ventilation: Mode: CPAP with PS, RR (patient): 13, FiO2: 40, PEEP: 5, PS: 10, MAP: 9, PIP: 16  ABG - ( 02 May 2020 03:08 )  pH: 7.45  /  pCO2: 47    /  pO2: 81    / HCO3: 31    / Base Excess: 7.6   /  SaO2: 96.9    Lactate: 2.2    [x ] Extubation Readiness Assessed        CARDIOVASCULAR  HR: 61 (05-02-20 @ 09:31) (44 - 61)  ABP: 126/68 (05-02-20 @ 08:00) (91/48 - 132/78)  ABP(mean): 90 (05-02-20 @ 08:00) (65 - 95)  Exam: Bradycardic.  Cardiac Rhythm: Regular rhythm.  Perfusion     [x ]Adequate   [ ]Inadequate  Mentation   [ ]Normal       [x ]Reduced  Extremities  [x ]Warm         [ ]Cool  Volume Status [ ]Hypervolemic [x ]Euvolemic [ ]Hypovolemic  Meds: midodrine 10 milliGRAM(s) Oral every 8 hours  norepinephrine Infusion 0.05 MICROgram(s)/kG/Min IV Continuous <Continuous>        GI/NUTRITION  Exam: Soft, NT, ND.  Diet: TF w/ Jevity 40/hr  Meds: famotidine Injectable 20 milliGRAM(s) IV Push daily  polyethylene glycol 3350 17 Gram(s) Oral daily  senna 2 Tablet(s) Oral at bedtime      GENITOURINARY  I&O's Detail    05-01 @ 07:01  -  05-02 @ 07:00  --------------------------------------------------------  IN:    dexmedetomidine Infusion: 126.3 mL    Free Water: 360 mL    Glucerna 1.5: 1080 mL    norepinephrine Infusion: 47 mL  Total IN: 1613.3 mL    OUT:    Indwelling Catheter - Urethral: 1650 mL  Total OUT: 1650 mL    Total NET: -36.7 mL      05-02 @ 07:01  -  05-02 @ 09:52  --------------------------------------------------------  IN:    dexmedetomidine Infusion: 7.9 mL    Glucerna 1.5: 45 mL    norepinephrine Infusion: 4.2 mL  Total IN: 57.1 mL    OUT:    Indwelling Catheter - Urethral: 145 mL  Total OUT: 145 mL    Total NET: -87.9 mL          05-02    143  |  105  |  15  ----------------------------<  116<H>  4.1   |  26  |  0.53    Ca    8.7      02 May 2020 03:00  Mg     2.2     05-02    TPro  6.5  /  Alb  2.6<L>  /  TBili  < 0.2<L>  /  DBili  x   /  AST  22  /  ALT  20  /  AlkPhos  71  05-02    [x] Puentes catheter, indication: N/A        HEMATOLOGIC  Meds: enoxaparin Injectable 40 milliGRAM(s) SubCutaneous daily    [x] VTE Prophylaxis                        7.6    6.90  )-----------( 343      ( 02 May 2020 03:00 )             24.8     PT/INR - ( 02 May 2020 03:00 )   PT: 11.5 SEC;   INR: 1.00          PTT - ( 01 May 2020 01:30 )  PTT:29.7 SEC  Transfusion     [ ] PRBC   [ ] Platelets   [ ] FFP   [ ] Cryoprecipitate      INFECTIOUS DISEASES  T(C): 36.6 (05-02-20 @ 08:00), Max: 37.3 (05-01-20 @ 12:00)  Wt(kg): --  WBC Count: 6.90 K/uL (05-02 @ 03:00)    Recent Cultures:  Specimen Source: .Sputum Sputum, 04-25 @ 17:56; Results   Normal Respiratory Jackelin present; Gram Stain:   Moderate polymorphonuclear leukocytes per low power field  Few Squamous epithelial cells per low power field  No organisms seen per oil power field; Organism: --        ENDOCRINE  Capillary Blood Glucose    Meds: levothyroxine Injectable 62.5 MICROGram(s) IV Push at bedtime        ACCESS DEVICES:  [ ] Peripheral IV  [x ] Central Venous Line	[ ] R	[ ] L	[ ] IJ	[ ] Fem	[ ] SC	Placed:   [x ] Arterial Line		[ ] R	[ ] L	[ ] Fem	[ ] Rad	[ ] Ax	Placed:   [ ] PICC:					[ ] Mediport  [x] Urinary Catheter, Date Placed:   [x ] Necessity of urinary, arterial, and venous catheters discussed    OTHER MEDICATIONS:      CODE STATUS: DNR

## 2020-05-02 NOTE — AIRWAY PLACEMENT NOTE ADULT - AIRWAY COMMENTS:
Called because of insufficient volumes being delivered..  balloon not holding volumes. ETT replaced  with 7.5 ETT- placed on ventilator. CXR pending

## 2020-05-02 NOTE — PROGRESS NOTE ADULT - ASSESSMENT
59 y/o female with Down Syndrome, presents with acute respiratory failure due to COVID 19 PNA.      PLAN:    NEURO:  -AAOxO  -Wean sedation/precedex  -Continue keppra and valproate (trend levels). Memantine (home med). Seroquel for agitation      RESPIRATORY:  -Mechanical Ventilation: Mode: CPAP with PS, RR (patient): 13, FiO2: 40, PEEP: 5, PS: 10, MAP: 9, PIP: 16  -CPAP trial. Plan to extubate later today.  -Daily ABG.      CARDS:  -Wean levophed as tolerated.  -C/w midodrine to maintain MAP >65      G.I./NUTRITION:  -TF w/ Jevity. Holding TF in anticipation for extubation attempt today.  -Protonix for GI PPX.      RENAL/:  -Maintain Puentes, adequate uop; goal even for the day.    -Monitor BMP      ENDO:  -ISS. Monitor FS.  -C/w synthroid.      HEME:  -H/H stable  -Lovenox for DVT ppx      ID:  -Sputum 4/25 NGTD, no antibiotics. Repeat Covid negative. Will repeat COVID PCR for assessment of possibility of moving to non-covid ICU      Code status: DNR    DISPO: ICU; Critical care monitoring

## 2020-05-03 NOTE — PROGRESS NOTE ADULT - ASSESSMENT
61 y/o female with Down Syndrome, presents with acute respiratory failure due to COVID 19 PNA.      PLAN:    NEURO:  -AAOx0  -Sedation with Precedex  -Continue keppra and valproate (trend levels). Memantine (home med). Seroquel for agitation      RESPIRATORY:  -Mechanical Ventilation: Mode: Volume A/C , RR 20, FiO2 50 PEEP 5  -CPAP trial attempted; unable to tolerate and placed back on ventilator. Will re-attempt tomorrow.  -Daily ABG.      CARDS:  -Wean levophed as tolerated.  -C/w midodrine to maintain MAP >65      G.I./NUTRITION:  -TF w/ Jevity.  -Protonix for GI PPX.      RENAL/:  -Maintain Puentes, adequate UOP; goal even for the day.    -Monitor BMP      ENDO:  -ISS. Monitor FS.  -C/w synthroid.      HEME:  -H/H stable  -Lovenox for DVT ppx      ID:  -Sputum 4/25 NGTD, no antibiotics. Repeat Covid negative. Will repeat COVID PCR for assessment of possibility of moving to non-covid ICU      Code status: DNR    DISPO: ICU; Critical care monitoring

## 2020-05-03 NOTE — PROGRESS NOTE ADULT - ASSESSMENT
60 F with acute respiratory failure, COVID infection, functional quadriplegia, encounter for palliative care.

## 2020-05-03 NOTE — PROGRESS NOTE ADULT - SUBJECTIVE AND OBJECTIVE BOX
INTERVAL HPI/OVERNIGHT EVENTS:  No new overnight event.  No N/V/D.     MEDICATIONS  (STANDING):  dexMEDEtomidine Infusion 0.05 MICROgram(s)/kG/Hr (0.56 mL/Hr) IV Continuous <Continuous>  dextrose 5%. 1000 milliLiter(s) (50 mL/Hr) IV Continuous <Continuous>  dextrose 50% Injectable 12.5 Gram(s) IV Push once  dextrose 50% Injectable 25 Gram(s) IV Push once  dextrose 50% Injectable 25 Gram(s) IV Push once  enoxaparin Injectable 40 milliGRAM(s) SubCutaneous daily  famotidine Injectable 20 milliGRAM(s) IV Push daily  insulin lispro (HumaLOG) corrective regimen sliding scale   SubCutaneous three times a day before meals  levETIRAcetam  IVPB 1000 milliGRAM(s) IV Intermittent every 12 hours  levothyroxine Injectable 62.5 MICROGram(s) IV Push at bedtime  memantine 10 milliGRAM(s) Oral every 12 hours  midodrine 10 milliGRAM(s) Oral every 8 hours  norepinephrine Infusion 0.05 MICROgram(s)/kG/Min (2.11 mL/Hr) IV Continuous <Continuous>  polyethylene glycol 3350 17 Gram(s) Oral daily  propofol Infusion 10 MICROgram(s)/kG/Min (2.7 mL/Hr) IV Continuous <Continuous>  QUEtiapine 25 milliGRAM(s) Oral daily  senna 2 Tablet(s) Oral at bedtime  valproate sodium IVPB 500 milliGRAM(s) IV Intermittent every 12 hours    MEDICATIONS  (PRN):  acetaminophen    Suspension .. 650 milliGRAM(s) Oral every 6 hours PRN Temp greater or equal to 38C (100.4F)  dextrose 40% Gel 15 Gram(s) Oral once PRN Blood Glucose LESS THAN 70 milliGRAM(s)/deciliter  glucagon  Injectable 1 milliGRAM(s) IntraMuscular once PRN Glucose LESS THAN 70 milligrams/deciliter      Allergies    amoxicillin (Rash)  penicillin (Rash)    Intolerances        Review of Systems:    General:  No wt loss, fevers, chills, night sweats,fatigue,   Eyes:  Good vision, no reported pain  ENT:  No sore throat, pain, runny nose, dysphagia  CV:  No pain, palpitatioins, hypo/hypertension  Resp:  No dyspnea, cough, tachypnea, wheezing  GI:  No pain, No nausea, No vomiting, No diarrhea, No constipatiion, No weight loss, No fever, No pruritis, No rectal bleeding, No tarry stools, No dysphagia,  :  No pain, bleeding, incontinence, nocturia  Muscle:  No pain, weakness  Neuro:  No weakness, tingling, memory problems  Psych:  No fatigue, insomnia, mood problems, depression  Endocrine:  No polyuria, polydypsia, cold/heat intolerance  Heme:  No petechiae, ecchymosis, easy bruisability  Skin:  No rash, tattoos, scars, edema      Vital Signs Last 24 Hrs  T(C): 37.9 (03 May 2020 20:00), Max: 37.9 (03 May 2020 20:00)  T(F): 100.3 (03 May 2020 20:00), Max: 100.3 (03 May 2020 20:00)  HR: 74 (03 May 2020 20:00) (43 - 74)  BP: --  BP(mean): --  RR: 20 (03 May 2020 20:00) (20 - 20)  SpO2: 98% (03 May 2020 20:00) (97% - 100%)    PHYSICAL EXAM:    Constitutional: NAD, well-developed  HEENT: EOMI, throat clear  Neck: No LAD, supple  Respiratory: CTA and P  Cardiovascular: S1 and S2, RRR, no M  Gastrointestinal: BS+, soft, NT/ND, neg HSM,  Extremities: No peripheral edema, neg clubing, cyanosis  Vascular: 2+ peripheral pulses  Neurological: A/O x 3, no focal deficits  Psychiatric: Normal mood, normal affect  Skin: No rashes      LABS:                        7.6    9.59  )-----------( 396      ( 03 May 2020 02:45 )             24.6     05-03    145  |  106  |  14  ----------------------------<  96  3.9   |  30  |  0.52    Ca    8.7      03 May 2020 02:45  Phos  4.4     05-03  Mg     1.9     05-03    TPro  6.5  /  Alb  2.6<L>  /  TBili  < 0.2<L>  /  DBili  x   /  AST  22  /  ALT  20  /  AlkPhos  71  05-02    PT/INR - ( 03 May 2020 02:45 )   PT: 12.1 SEC;   INR: 1.05          PTT - ( 03 May 2020 02:45 )  PTT:25.7 SEC      RADIOLOGY & ADDITIONAL TESTS:

## 2020-05-03 NOTE — PROGRESS NOTE ADULT - PROBLEM SELECTOR PLAN 1
- Remains intubated and on Precedex for sedation.  - Continues to CPAP with eventual goal of extubation.

## 2020-05-03 NOTE — PROGRESS NOTE ADULT - SUBJECTIVE AND OBJECTIVE BOX
SUBJECTIVE AND OBJECTIVE:  Sedated, intubated  INTERVAL HPI/OVERNIGHT EVENTS:  Remains intubated.   Acute blood loss anemia  DNR on chart: Yes    Allergies    amoxicillin (Rash)  penicillin (Rash)    Intolerances    MEDICATIONS  (STANDING):  chlorhexidine 0.12% Liquid 15 milliLiter(s) Oral Mucosa every 12 hours  dextrose 5%. 1000 milliLiter(s) (50 mL/Hr) IV Continuous <Continuous>  dextrose 50% Injectable 12.5 Gram(s) IV Push once  dextrose 50% Injectable 25 Gram(s) IV Push once  dextrose 50% Injectable 25 Gram(s) IV Push once  enoxaparin Injectable 40 milliGRAM(s) SubCutaneous daily  insulin lispro (HumaLOG) corrective regimen sliding scale   SubCutaneous every 6 hours  levETIRAcetam  IVPB 1000 milliGRAM(s) IV Intermittent every 12 hours  levothyroxine Injectable 62.5 MICROGram(s) IV Push at bedtime  midazolam Infusion 0.02 mG/kG/Hr (0.9 mL/Hr) IV Continuous <Continuous>  norepinephrine Infusion 0.05 MICROgram(s)/kG/Min (4.22 mL/Hr) IV Continuous <Continuous>  polyethylene glycol 3350 17 Gram(s) Oral daily  propofol Infusion 40 MICROgram(s)/kG/Min (10.8 mL/Hr) IV Continuous <Continuous>  QUEtiapine 25 milliGRAM(s) Oral daily  senna 2 Tablet(s) Oral at bedtime  valproate sodium IVPB 500 milliGRAM(s) IV Intermittent every 12 hours    MEDICATIONS  (PRN):  acetaminophen    Suspension .. 650 milliGRAM(s) Oral every 6 hours PRN Temp greater or equal to 38C (100.4F)  dextrose 40% Gel 15 Gram(s) Oral once PRN Blood Glucose LESS THAN 70 milliGRAM(s)/deciLiter  glucagon  Injectable 1 milliGRAM(s) IntraMuscular once PRN Glucose <70 milliGRAM(s)/deciLiter      ITEMS UNCHECKED ARE NOT PRESENT    PRESENT SYMPTOMS: [ x]Unable to obtain due to poor mentation   Source if other than patient:  [ ]Family   [ ]Team     Pain (Impact on QOL):    Location:  Minimal acceptable level (0-10 scale):                   Aggravating factors:  Quality:  Radiation:  Severity (0-10 scale):    Timing:    Dyspnea:                           [ ]Mild [ ]Moderate [ ]Severe  Anxiety:                             [ ]Mild [ ]Moderate [ ]Severe  Fatigue:                             [ ]Mild [ ]Moderate [ ]Severe  Nausea:                             [ ]Mild [ ]Moderate [ ]Severe  Loss of appetite:              [ ]Mild [ ]Moderate [ ]Severe  Constipation:                    [ ]Mild [ ]Moderate [ ]Severe  Grief Present                    [ ] Yes  [ ] No   PAIN AD Score:	  [email or web address] (Ctrl + left click to view)    Other Symptoms:  [ x]All other review of systems negative     Karnofsky Performance Score/Palliative Performance Status Version 2:    20     %    http://palliative.[email or web address]/resource_[email or web address]  PHYSICAL EXAM:  Vital Signs Last 24 Hrs  T(C): 35.4 (06 May 2020 11:30), Max: 37.6 (05 May 2020 16:00)  T(F): 95.7 (06 May 2020 11:30), Max: 99.6 (05 May 2020 16:00)  HR: 55 (06 May 2020 11:30) (48 - 131)  BP: --  BP(mean): --  RR: 12 (06 May 2020 11:30) (12 - 31)  SpO2: 100% (06 May 2020 11:30) (91% - 100%) I&O's Summary    05 May 2020 07:01  -  06 May 2020 07:00  --------------------------------------------------------  IN: 1729.7 mL / OUT: 1120 mL / NET: 609.7 mL    06 May 2020 07:01  -  06 May 2020 14:42  --------------------------------------------------------  IN: 311.4 mL / OUT: 350 mL / NET: -38.6 mL  CRITICAL CARE:  [ ] Shock Present  [ ]Septic [ ]Cardiogenic [ ]Neurologic [ ]Hypovolemic  [ ]  Vasopressors [ ]  Inotropes   [ x] Respiratory failure present  [ x] Acute  [ ] Chronic [ x] Hypoxic  [ ] Hypercarbic [ ] Other  [ ] Other organ failure     LABS:                        8.0    5.75  )-----------( 367      ( 06 May 2020 06:10 )             25.2   05-06    145  |  107  |  15  ----------------------------<  77  4.2   |  25  |  0.55    Ca    8.8      06 May 2020 06:10  Phos  3.2     05-06  Mg     2.1     05-06    TPro  6.3  /  Alb  2.6<L>  /  TBili  0.2  /  DBili  x   /  AST  23  /  ALT  11  /  AlkPhos  62  05-06  PT/INR - ( 05 May 2020 02:00 )   PT: 12.6 SEC;   INR: 1.09          PTT - ( 05 May 2020 02:00 )  PTT:30.9 SEC      RADIOLOGY & ADDITIONAL STUDIES:    Protein Calorie Malnutrition Present: [ ] yes [ ] no  [ ] PPSV2 < or = 30%  [ ] significant weight loss [ ] poor nutritional intake [ ] anasarca [ ] catabolic state Albumin, Serum: 2.6 g/dL (05-06-20 @ 00:00)  Artificial Nutrition [ ]     REFERRALS:   [ ]Chaplaincy  [ ] Hospice  [ ]Child Life  [ ]Social Work  [ ]Case management [ ]Holistic Therapy   Goals of Care Document:

## 2020-05-03 NOTE — PROGRESS NOTE ADULT - SUBJECTIVE AND OBJECTIVE BOX
HPI: 60 year old female with Down Syndrome s/p intubation for COVID 19 x2.      24 HOUR EVENTS: Overnight patient with large mucous plug. ETT changed by anesthesia and sputum cultures sent. CPAP trial attempted in AM - patient became hypoxic; placed back on ventilator and sedation with precedex.    SUBJECTIVE/ROS:  [ ] A ten-point review of systems was otherwise negative except as noted.  [x ] Due to altered mental status/intubation, subjective information were not able to be obtained from the patient. History was obtained, to the extent possible, from review of the chart and collateral sources of information.      NEURO  RASS: 0 to -1     GCS:     CAM ICU:  Exam: AAOx0.  Meds: acetaminophen    Suspension .. 650 milliGRAM(s) Oral every 6 hours PRN Temp greater or equal to 38C (100.4F)  dexMEDEtomidine Infusion 0.05 MICROgram(s)/kG/Hr IV Continuous <Continuous>  levETIRAcetam  IVPB 1000 milliGRAM(s) IV Intermittent every 12 hours  memantine 10 milliGRAM(s) Oral every 12 hours  propofol Infusion 10 MICROgram(s)/kG/Min IV Continuous <Continuous>  QUEtiapine 25 milliGRAM(s) Oral daily  valproate sodium IVPB 500 milliGRAM(s) IV Intermittent every 12 hours  [x] Adequacy of sedation and pain control has been assessed and adjusted      RESPIRATORY  RR: 20 (05-03-20 @ 08:00) (20 - 30)  SpO2: 100% (05-03-20 @ 08:45) (95% - 100%)  Exam: unlabored, clear to auscultation bilaterally  Mechanical Ventilation: Mode: AC/ CMV (Assist Control/ Continuous Mandatory Ventilation), RR (machine): 20, RR (patient): 20, TV (machine): 320, FiO2: 40, PEEP: 5, ITime: 0.8, MAP: 8, PIP: 19  ABG - ( 03 May 2020 02:45 )  pH: 7.48  /  pCO2: 42    /  pO2: 104   / HCO3: 31    / Base Excess: 7.2   /  SaO2: 98.9    Lactate: 1.2    [x] Extubation Readiness Assessed      CARDIOVASCULAR  HR: 57 (05-03-20 @ 08:45) (46 - 67)  ABP: 140/72 (05-03-20 @ 08:00) (99/45 - 158/79)  ABP(mean): 93 (05-03-20 @ 08:00) (62 - 110)  Exam: Bradycardic	  Cardiac Rhythm: Regular rhythm  Perfusion     [x ]Adequate   [ ]Inadequate  Mentation   [ ]Normal       [x ]Reduced  Extremities  [xWarm         [ ]Cool  Volume Status [ ]Hypervolemic [ ]Euvolemic [ ]Hypovolemic  Meds: midodrine 10 milliGRAM(s) Oral every 8 hours  norepinephrine Infusion 0.05 MICROgram(s)/kG/Min IV Continuous <Continuous>        GI/NUTRITION  Exam: Soft, NT, ND  Diet: TF w/ Jevity  Meds: famotidine Injectable 20 milliGRAM(s) IV Push daily  polyethylene glycol 3350 17 Gram(s) Oral daily  senna 2 Tablet(s) Oral at bedtime      GENITOURINARY  I&O's Detail    05-02 @ 07:01  -  05-03 @ 07:00  --------------------------------------------------------  IN:    dexmedetomidine Infusion: 117.4 mL    Enteral Tube Flush: 100 mL    Glucerna 1.5: 585 mL    IV PiggyBack: 400 mL    norepinephrine Infusion: 74.1 mL    propofol Infusion: 77.4 mL  Total IN: 1353.9 mL    OUT:    Indwelling Catheter - Urethral: 1640 mL  Total OUT: 1640 mL    Total NET: -286.1 mL      05-03 @ 07:01  -  05-03 @ 09:11  --------------------------------------------------------  IN:    dexmedetomidine Infusion: 9 mL    Glucerna 1.5: 90 mL    norepinephrine Infusion: 1.8 mL  Total IN: 100.8 mL    OUT:    Indwelling Catheter - Urethral: 275 mL  Total OUT: 275 mL    Total NET: -174.2 mL          05-03    145  |  106  |  14  ----------------------------<  96  3.9   |  30  |  0.52    Ca    8.7      03 May 2020 02:45  Phos  4.4     05-03  Mg     1.9     05-03    TPro  6.5  /  Alb  2.6<L>  /  TBili  < 0.2<L>  /  DBili  x   /  AST  22  /  ALT  20  /  AlkPhos  71  05-02    [x Puentes catheter, indication: N/A  Meds: dextrose 5%. 1000 milliLiter(s) IV Continuous <Continuous>        HEMATOLOGIC  Meds: enoxaparin Injectable 40 milliGRAM(s) SubCutaneous daily    [x] VTE Prophylaxis                        7.6    9.59  )-----------( 396      ( 03 May 2020 02:45 )             24.6     PT/INR - ( 03 May 2020 02:45 )   PT: 12.1 SEC;   INR: 1.05          PTT - ( 03 May 2020 02:45 )  PTT:25.7 SEC  Transfusion     [ ] PRBC   [ ] Platelets   [ ] FFP   [ ] Cryoprecipitate      INFECTIOUS DISEASES  T(C): 36.6 (05-03-20 @ 08:00), Max: 36.8 (05-03-20 @ 04:00)  Wt(kg): --  WBC Count: 9.59 K/uL (05-03 @ 02:45)    Recent Cultures:        ENDOCRINE  Capillary Blood Glucose    Meds: dextrose 40% Gel 15 Gram(s) Oral once PRN  dextrose 50% Injectable 12.5 Gram(s) IV Push once  dextrose 50% Injectable 25 Gram(s) IV Push once  dextrose 50% Injectable 25 Gram(s) IV Push once  glucagon  Injectable 1 milliGRAM(s) IntraMuscular once PRN  insulin lispro (HumaLOG) corrective regimen sliding scale   SubCutaneous three times a day before meals  levothyroxine Injectable 62.5 MICROGram(s) IV Push at bedtime        ACCESS DEVICES:  [x ] Peripheral IV  [x ] Central Venous Line	[ ] R	[ ] L	[ ] IJ	[ ] Fem	[ ] SC	Placed:   [x ] Arterial Line		[ ] R	[ ] L	[ ] Fem	[ ] Rad	[ ] Ax	Placed:   [ ] PICC:					[ ] Mediport  [x ] Urinary Catheter, Date Placed:   [x ] Necessity of urinary, arterial, and venous catheters discussed        CODE STATUS: DNR

## 2020-05-03 NOTE — PROGRESS NOTE ADULT - ASSESSMENT
60y Female with history of Down Syndrome, hypothyroidism, and recurrent UTI who presented to [facility] on 4/4 for fever, cough, and SOB. The patient was intubated on 4/7 for worsening hypercapnic respiratory failure. Extubated and reintubated on 4/16. Hospitalization further complicated by NSTEMI (type 2), vaginal bleeding requiring 2 uPRBC. The pateint was being followed by my practice at [facility]. She is s.p transfer to [facility].     Acute blood loss anemia  janie h/h and transfuse prn   favor 2/2 to vaginal bleeding as stools brown   No evidence of GI bleeding   cont gi ppx with pepcid   On Lovenox 40; can increase from Gi standpoint if necessary  monitor stools; if loose hold stool softeners     COVID   monitor resp status   care per primary teams appreciated  guarded prognosis       Advanced care planning was discussed with patient and family.  Advanced care planning forms were reviewed and discussed.  Risks, benefits and alternatives of gastroenterologic procedures were discussed in detail and all questions were answered.

## 2020-05-04 NOTE — PROGRESS NOTE ADULT - ATTENDING COMMENTS
Patient examined and care reviewed in detail on bedside rounds  Critically ill on vent with severe COVID PNA  Frequent bedside visits with therapy change today.   I have personally provided 35+ minutes of critical care time concurrently with the resident/fellow; this excludes time spent on separate procedures.      Critical Care Ultrasonography  Indication:  Lung: Bilateral B lines with some anterior A lines   Cardiac: Normal LVF, no RV dilation, no PEF, IVC indeterminate  Vascular: Bilateral compression study No leg DVT bilaterally at standard compression points  Impression: C/W COVID PNA No cardiac limitation No DVT

## 2020-05-04 NOTE — PROGRESS NOTE ADULT - SUBJECTIVE AND OBJECTIVE BOX
INTERVAL HPI/OVERNIGHT EVENTS:    O/N:    SUBJECTIVE: Patient seen and examined at bedside.     CONSTITUTIONAL: No weakness, fevers or chills  EYES/ENT: No visual changes;  No vertigo or throat pain   NECK: No pain or stiffness  RESPIRATORY: No cough, wheezing, hemoptysis; No shortness of breath  CARDIOVASCULAR: No chest pain or palpitations  GASTROINTESTINAL: No abdominal or epigastric pain. No nausea, vomiting, or hematemesis; No diarrhea or constipation. No melena or hematochezia.  GENITOURINARY: No dysuria, frequency or hematuria  NEUROLOGICAL: No numbness or weakness  SKIN: No itching, rashes    OBJECTIVE:    VITAL SIGNS:  ICU Vital Signs Last 24 Hrs  T(C): 36.4 (04 May 2020 12:00), Max: 37.9 (03 May 2020 20:00)  T(F): 97.5 (04 May 2020 12:00), Max: 100.3 (03 May 2020 20:00)  HR: 48 (04 May 2020 14:47) (44 - 74)  BP: --  BP(mean): --  ABP: 107/53 (04 May 2020 12:00) (97/52 - 136/63)  ABP(mean): 71 (04 May 2020 12:00) (68 - 84)  RR: 20 (04 May 2020 12:00) (20 - 20)  SpO2: 98% (04 May 2020 14:47) (96% - 100%)    Mode: AC/ CMV (Assist Control/ Continuous Mandatory Ventilation), RR (machine): 20, TV (machine): 320, FiO2: 40, PEEP: 5, ITime: 0.8, MAP: 10, PIP: 23    05-03 @ 07:01 - 05-04 @ 07:00  --------------------------------------------------------  IN: 2191.5 mL / OUT: 2130 mL / NET: 61.5 mL    05-04 @ 07:01  -  05-04 @ 15:43  --------------------------------------------------------  IN: 926.2 mL / OUT: 750 mL / NET: 176.2 mL      CAPILLARY BLOOD GLUCOSE      POCT Blood Glucose.: 105 mg/dL (04 May 2020 05:29)      PHYSICAL EXAM:    General: NAD  HEENT: NC/AT; PERRL, clear conjunctiva  Neck: supple  Respiratory: CTA b/l  Cardiovascular: +S1/S2; RRR  Abdomen: soft, NT/ND; +BS x4  Extremities: WWP, 2+ peripheral pulses b/l; no LE edema  Skin: normal color and turgor; no rash  Neurological:    MEDICATIONS:  MEDICATIONS  (STANDING):  dexMEDEtomidine Infusion 0.05 MICROgram(s)/kG/Hr (0.56 mL/Hr) IV Continuous <Continuous>  dextrose 5%. 1000 milliLiter(s) (50 mL/Hr) IV Continuous <Continuous>  dextrose 50% Injectable 12.5 Gram(s) IV Push once  dextrose 50% Injectable 25 Gram(s) IV Push once  dextrose 50% Injectable 25 Gram(s) IV Push once  enoxaparin Injectable 40 milliGRAM(s) SubCutaneous daily  famotidine Injectable 20 milliGRAM(s) IV Push daily  insulin lispro (HumaLOG) corrective regimen sliding scale   SubCutaneous three times a day before meals  levETIRAcetam  IVPB 1000 milliGRAM(s) IV Intermittent every 12 hours  levothyroxine Injectable 62.5 MICROGram(s) IV Push at bedtime  memantine 10 milliGRAM(s) Oral every 12 hours  methylPREDNISolone sodium succinate Injectable 20 milliGRAM(s) IV Push every 4 hours  midodrine 10 milliGRAM(s) Oral every 8 hours  norepinephrine Infusion 0.05 MICROgram(s)/kG/Min (2.11 mL/Hr) IV Continuous <Continuous>  polyethylene glycol 3350 17 Gram(s) Oral daily  propofol Infusion 10 MICROgram(s)/kG/Min (2.7 mL/Hr) IV Continuous <Continuous>  QUEtiapine 25 milliGRAM(s) Oral daily  senna 2 Tablet(s) Oral at bedtime  valproate sodium IVPB 500 milliGRAM(s) IV Intermittent every 12 hours    MEDICATIONS  (PRN):  acetaminophen    Suspension .. 650 milliGRAM(s) Oral every 6 hours PRN Temp greater or equal to 38C (100.4F)  dextrose 40% Gel 15 Gram(s) Oral once PRN Blood Glucose LESS THAN 70 milliGRAM(s)/deciliter  glucagon  Injectable 1 milliGRAM(s) IntraMuscular once PRN Glucose LESS THAN 70 milligrams/deciliter      ALLERGIES:  Allergies    amoxicillin (Rash)  penicillin (Rash)    Intolerances        LABS:                        7.1    6.32  )-----------( 387      ( 04 May 2020 01:58 )             22.6     05-04    146<H>  |  107  |  14  ----------------------------<  107<H>  4.0   |  27  |  0.55    Ca    8.6      04 May 2020 01:58  Phos  4.4     05-03  Mg     2.1     05-04    TPro  6.2  /  Alb  2.5<L>  /  TBili  < 0.2<L>  /  DBili  x   /  AST  21  /  ALT  16  /  AlkPhos  68  05-04    PT/INR - ( 04 May 2020 01:24 )   PT: 12.1 SEC;   INR: 1.06          PTT - ( 04 May 2020 01:24 )  PTT:30.5 SEC      RADIOLOGY & ADDITIONAL TESTS: Reviewed. INTERVAL HPI/OVERNIGHT EVENTS:  Pt tolerated CPAP 5/5. Does not follow commands at baseline. Plan for extubation to Bipap.       SUBJECTIVE: Patient seen and examined at bedside.     ROS unable to assess as pt non verbal.     OBJECTIVE:    VITAL SIGNS:  ICU Vital Signs Last 24 Hrs  T(C): 36.4 (04 May 2020 12:00), Max: 37.9 (03 May 2020 20:00)  T(F): 97.5 (04 May 2020 12:00), Max: 100.3 (03 May 2020 20:00)  HR: 48 (04 May 2020 14:47) (44 - 74)  BP: --  BP(mean): --  ABP: 107/53 (04 May 2020 12:00) (97/52 - 136/63)  ABP(mean): 71 (04 May 2020 12:00) (68 - 84)  RR: 20 (04 May 2020 12:00) (20 - 20)  SpO2: 98% (04 May 2020 14:47) (96% - 100%)    Mode: AC/ CMV (Assist Control/ Continuous Mandatory Ventilation), RR (machine): 20, TV (machine): 320, FiO2: 40, PEEP: 5, ITime: 0.8, MAP: 10, PIP: 23    05-03 @ 07:01 - 05-04 @ 07:00  --------------------------------------------------------  IN: 2191.5 mL / OUT: 2130 mL / NET: 61.5 mL    05-04 @ 07:01 - 05-04 @ 15:43  --------------------------------------------------------  IN: 926.2 mL / OUT: 750 mL / NET: 176.2 mL      CAPILLARY BLOOD GLUCOSE      POCT Blood Glucose.: 105 mg/dL (04 May 2020 05:29)      PHYSICAL EXAM:    General: NAD  HEENT: NC/AT; PERRL, clear conjunctiva  Neck: supple  Respiratory: CTA b/l  Cardiovascular: +S1/S2; RRR  Abdomen: soft, NT/ND; +BS x4  Extremities: WWP, 2+ peripheral pulses b/l; no LE edema  Skin: normal color and turgor; no rash  Neurological: opens eyes, responds to name    MEDICATIONS:  MEDICATIONS  (STANDING):  dexMEDEtomidine Infusion 0.05 MICROgram(s)/kG/Hr (0.56 mL/Hr) IV Continuous <Continuous>  dextrose 5%. 1000 milliLiter(s) (50 mL/Hr) IV Continuous <Continuous>  dextrose 50% Injectable 12.5 Gram(s) IV Push once  dextrose 50% Injectable 25 Gram(s) IV Push once  dextrose 50% Injectable 25 Gram(s) IV Push once  enoxaparin Injectable 40 milliGRAM(s) SubCutaneous daily  famotidine Injectable 20 milliGRAM(s) IV Push daily  insulin lispro (HumaLOG) corrective regimen sliding scale   SubCutaneous three times a day before meals  levETIRAcetam  IVPB 1000 milliGRAM(s) IV Intermittent every 12 hours  levothyroxine Injectable 62.5 MICROGram(s) IV Push at bedtime  memantine 10 milliGRAM(s) Oral every 12 hours  methylPREDNISolone sodium succinate Injectable 20 milliGRAM(s) IV Push every 4 hours  midodrine 10 milliGRAM(s) Oral every 8 hours  norepinephrine Infusion 0.05 MICROgram(s)/kG/Min (2.11 mL/Hr) IV Continuous <Continuous>  polyethylene glycol 3350 17 Gram(s) Oral daily  propofol Infusion 10 MICROgram(s)/kG/Min (2.7 mL/Hr) IV Continuous <Continuous>  QUEtiapine 25 milliGRAM(s) Oral daily  senna 2 Tablet(s) Oral at bedtime  valproate sodium IVPB 500 milliGRAM(s) IV Intermittent every 12 hours    MEDICATIONS  (PRN):  acetaminophen    Suspension .. 650 milliGRAM(s) Oral every 6 hours PRN Temp greater or equal to 38C (100.4F)  dextrose 40% Gel 15 Gram(s) Oral once PRN Blood Glucose LESS THAN 70 milliGRAM(s)/deciliter  glucagon  Injectable 1 milliGRAM(s) IntraMuscular once PRN Glucose LESS THAN 70 milligrams/deciliter      ALLERGIES:  Allergies    amoxicillin (Rash)  penicillin (Rash)    Intolerances        LABS:                        7.1    6.32  )-----------( 387      ( 04 May 2020 01:58 )             22.6     05-04    146<H>  |  107  |  14  ----------------------------<  107<H>  4.0   |  27  |  0.55    Ca    8.6      04 May 2020 01:58  Phos  4.4     05-03  Mg     2.1     05-04    TPro  6.2  /  Alb  2.5<L>  /  TBili  < 0.2<L>  /  DBili  x   /  AST  21  /  ALT  16  /  AlkPhos  68  05-04    PT/INR - ( 04 May 2020 01:24 )   PT: 12.1 SEC;   INR: 1.06          PTT - ( 04 May 2020 01:24 )  PTT:30.5 SEC      RADIOLOGY & ADDITIONAL TESTS: Reviewed.

## 2020-05-04 NOTE — PROGRESS NOTE ADULT - SUBJECTIVE AND OBJECTIVE BOX
INTERVAL HPI/OVERNIGHT EVENTS:    intubated  mohamud feeds  loose stool yesterday, brown     MEDICATIONS  (STANDING):  dexMEDEtomidine Infusion 0.05 MICROgram(s)/kG/Hr (0.56 mL/Hr) IV Continuous <Continuous>  dextrose 5%. 1000 milliLiter(s) (50 mL/Hr) IV Continuous <Continuous>  dextrose 50% Injectable 12.5 Gram(s) IV Push once  dextrose 50% Injectable 25 Gram(s) IV Push once  dextrose 50% Injectable 25 Gram(s) IV Push once  enoxaparin Injectable 40 milliGRAM(s) SubCutaneous daily  famotidine Injectable 20 milliGRAM(s) IV Push daily  insulin lispro (HumaLOG) corrective regimen sliding scale   SubCutaneous three times a day before meals  levETIRAcetam  IVPB 1000 milliGRAM(s) IV Intermittent every 12 hours  levothyroxine Injectable 62.5 MICROGram(s) IV Push at bedtime  memantine 10 milliGRAM(s) Oral every 12 hours  midodrine 10 milliGRAM(s) Oral every 8 hours  norepinephrine Infusion 0.05 MICROgram(s)/kG/Min (2.11 mL/Hr) IV Continuous <Continuous>  polyethylene glycol 3350 17 Gram(s) Oral daily  propofol Infusion 10 MICROgram(s)/kG/Min (2.7 mL/Hr) IV Continuous <Continuous>  QUEtiapine 25 milliGRAM(s) Oral daily  senna 2 Tablet(s) Oral at bedtime  valproate sodium IVPB 500 milliGRAM(s) IV Intermittent every 12 hours    MEDICATIONS  (PRN):  acetaminophen    Suspension .. 650 milliGRAM(s) Oral every 6 hours PRN Temp greater or equal to 38C (100.4F)  dextrose 40% Gel 15 Gram(s) Oral once PRN Blood Glucose LESS THAN 70 milliGRAM(s)/deciliter  glucagon  Injectable 1 milliGRAM(s) IntraMuscular once PRN Glucose LESS THAN 70 milligrams/deciliter      Allergies    amoxicillin (Rash)  penicillin (Rash)    Intolerances        Review of Systems: Per current hospital emergency protocol, in an effort to reduce COVID exposures and also conserve PPE for necessary encounters, all data within this chart were reviewed and recommendations are based upon information in the chart and by verbal communication with covering team and/or nurses. Please refer to the ROS and PE per covering primary team note          Vital Signs Last 24 Hrs  T(C): 37.2 (04 May 2020 08:00), Max: 37.9 (03 May 2020 20:00)  T(F): 99 (04 May 2020 08:00), Max: 100.3 (03 May 2020 20:00)  HR: 56 (04 May 2020 08:26) (43 - 74)  BP: --  BP(mean): --  RR: 20 (04 May 2020 08:00) (20 - 20)  SpO2: 96% (04 May 2020 08:26) (96% - 100%)    PHYSICAL EXAM: Per current hospital emergency protocol, in an effort to reduce COVID exposures and also conserve PPE for necessary encounters, all data within this chart were reviewed and recommendations are based upon information in the chart and by verbal communication with covering team and/or nurses. Please refer to the ROS and PE per covering primary team note          LABS:                        7.1    6.32  )-----------( 387      ( 04 May 2020 01:58 )             22.6     05-04    146<H>  |  107  |  14  ----------------------------<  107<H>  4.0   |  27  |  0.55    Ca    8.6      04 May 2020 01:58  Phos  4.4     05-03  Mg     2.1     05-04    TPro  6.2  /  Alb  2.5<L>  /  TBili  < 0.2<L>  /  DBili  x   /  AST  21  /  ALT  16  /  AlkPhos  68  05-04    PT/INR - ( 04 May 2020 01:24 )   PT: 12.1 SEC;   INR: 1.06          PTT - ( 04 May 2020 01:24 )  PTT:30.5 SEC      RADIOLOGY & ADDITIONAL TESTS:

## 2020-05-04 NOTE — PROGRESS NOTE ADULT - ASSESSMENT
60y Female with history of Down Syndrome, hypothyroidism, and recurrent UTI who presented to [facility] on 4/4 for fever, cough, and SOB. The patient was intubated on 4/7 for worsening hypercapnic respiratory failure. Extubated and reintubated on 4/16. Hospitalization further complicated by NSTEMI (type 2), vaginal bleeding requiring 2 uPRBC. The pateint was being followed by my practice at [facility] [place]. She is s.p transfer to [facility].     Acute blood loss anemia  multifactoria  trend h/h and transfuse prn   No evidence of GI bleeding; brown stools  cont gi ppx with pepcid   On Lovenox 40; can increase from GI standpoint if necessary  monitor stools; if loose hold stool softeners     COVID   monitor resp status   care per primary teams appreciated  guarded prognosis       Advanced care planning was discussed with patient and family.  Advanced care planning forms were reviewed and discussed.  Risks, benefits and alternatives of gastroenterologic procedures were discussed in detail and all questions were answered.

## 2020-05-04 NOTE — PROGRESS NOTE ADULT - ASSESSMENT
61 y/o female with Down Syndrome, presents with acute respiratory failure due to COVID 19 PNA.    PLAN:    NEURO:  Down Syndrome  -AO x0, does not follow commands at baseline  - off sedation     RESPIRATORY:  tolerated cpap 5/5  - extubated to bipap 12/6    CARDS:  -Wean levophed as tolerated.  -C/w midodrine 10 TID to maintain MAP >65      G.I./NUTRITION:  -TF w/ Jevity.  -Protonix for GI PPX.      RENAL/:  -Maintain Puentes, adequate UOP; goal even for the day.    -Monitor BMP      ENDO:  -ISS. Monitor FS.  -C/w synthroid.      HEME:  -H/H stable  -Lovenox for DVT ppx      ID:  -Sputum 4/25 NGTD, no antibiotics. Repeat Covid negative.    Code status: DNR    DISPO: ICU; Critical care monitoring 61 y/o female with Down Syndrome, presents with acute respiratory failure due to COVID 19 PNA.    PLAN:    NEURO:  Down Syndrome  -AO x0, does not follow commands at baseline  - off sedation     RESPIRATORY:  tolerated cpap 5/5  Given methylpred 20mg q4 x4 to prevent laryngeal edema  - extubated to bipap 12/6    CARDS:  -Wean levophed as tolerated.  -C/w midodrine 10 TID to maintain MAP >65      G.I./NUTRITION:  -TF w/ Jevity.  -Protonix for GI PPX.      RENAL/:  -Maintain Puentes, adequate UOP; goal even for the day.    -Monitor BMP      ENDO:  -ISS. Monitor FS.  -C/w synthroid.      HEME:  -H/H stable  -Lovenox for DVT ppx      ID:  -Sputum 4/25 NGTD, no antibiotics. Repeat Covid negative.    Code status: DNR    DISPO: ICU; Critical care monitoring 61 yo F with Downs syndrome, hypothyroidism, constipation, seizure disorder with acute hypoxic respiratory failure from COVID 19 pneumonia, course complicated by episode of seizure, type 2 NSTEMI, ADAM (resolved), gram variable bacteremia (repeat cx ngtd); extubated 4/16 and reintubated same day due to secretion issues. Now extubated to bipap on 5/4    PLAN:    NEURO:  Down Syndrome  -AO x0, does not follow commands at baseline  - off sedation     RESPIRATORY:  tolerated cpap 5/5  Given methylpred 20mg q4 x4 to prevent laryngeal edema  - extubated to bipap 12/6    CARDS:  -Wean levophed as tolerated.  -C/w midodrine 10 TID to maintain MAP >65      G.I./NUTRITION:  -TF w/ Jevity.  -Protonix for GI PPX.      RENAL/:  -Maintain Puentes, adequate UOP; goal even for the day.    -Monitor BMP      ENDO:  -ISS. Monitor FS.  -C/w synthroid.      HEME:  -H/H stable  -Lovenox for DVT ppx      ID:  -Sputum 4/25 NGTD, no antibiotics. Repeat Covid negative.    Code status: DNR    DISPO: ICU; Critical care monitoring

## 2020-05-04 NOTE — CHART NOTE - NSCHARTNOTEFT_GEN_A_CORE
Source: Patient [ ]    Family [ ]     other [X ] RD unable to have face to face encounter with patient to perform nutrition interview and/or nutrition-focused physical exam due to contact/isolation precautions related to COVID-19. Information obtained from extensive chart review.    Diet : Diet, NPO with Tube Feed:   Tube Feeding Modality: Orogastric  Glucerna 1.5 Mitch (GLUCERNA1.5RTH)  Total Volume for 24 Hours (mL): 920  Continuous  Starting Tube Feed Rate {mL per Hour}: 20  Increase Tube Feed Rate by (mL): 10     Every 4 hours  Until Goal Tube Feed Rate (mL per Hour): 40  Tube Feed Duration (in Hours): 23  Tube Feed Start Time: 15:00  No Carb Prosource (1pkg = 15gms Protein)     Qty per Day:  3 (05-04-20 @ 03:51)    Critical care nutrition follow-up. Patient remains intubated, did not receive propofol since 3/3 at 8am. Patient is maintained upon Glucerna1.5 at goal rate of [id number] with prosource x 3 pkts provide total volume 920mL, 1380Kcal, 121g pro and 698 free water ( ~30kcal/kg and 2.7g pro/kg of Admit weight-45kg) exceeding protein needs. Suggest lowering prosource pkt  to once a day would provide 91gpro (meeting ~2g pro/Admit weight). No intolerance to tube feed noted in flowsheet.  As per I&O, stool count x1 5/4/20 and one loose stool 5/3 in flowsheet. Patient ordered for Miralax and Senna.         Weight: 45kg 4/22, no new weight recorded. Please update new weight and daily weight as feasible.       Pertinent Medications: dexMEDEtomidine Infusion  dextrose 5%.  dextrose 50% Injectable  dextrose 50% Injectable  dextrose 50% Injectable  famotidine Injectable  insulin lispro (HumaLOG) corrective regimen sliding scale  levETIRAcetam  IVPB  levothyroxine Injectable  memantine  methylPREDNISolone sodium succinate Injectable  midodrine  norepinephrine Infusion  polyethylene glycol 3350  propofol Infusion  QUEtiapine  senna  valproate sodium IVPB    Pertinent Labs:  05-04 Na146 mmol/L<H> Glu 107 mg/dL<H> K+ 4.0 mmol/L Cr  0.55 mg/dL BUN 14 mg/dL 05-03 Phos 4.4 mg/dL 05-04 Alb 2.5 g/dL<L> 04-19 Chol --    LDL --    HDL --    Trig 122 mg/dL       Skin:  left buttocks stage 2, left heel suspected DTI, right heel suspected DTI, right great toe suspected DTI, right 2nd toe suspected DTI, left 1st toe suspected DTI per flowsheet.   No edema noted at this time.     Estimated Needs:   [X ] no change since previous assessment 4/30  Estimated Nutrition Needs based on weight 45kg (4/22)  Estimated Energy Needs (25-30Kcal/kg): 1125-1350Kcal/day  Estimated Protein Needs (1.5-2.0g protein/kg): 67.5-90g protein/day          Nutrition Interventions/Recommendations:   **Hold feeds for 30minutes before and after Synthroid provision in setting of medication-food interaction**  1. Suggest Glucerna1.5 at goal rate of [id number] and decrease No Carb Prosource (15 grams protein/ 30 mL packet.) to 1pkt/day provide total volume 920mL, 1380Kcal, 91g pro and 698 free water. Additional free water per MD discretion    2. Monitor weights, labs, BM's, skin integrity, tolerance to EN.    3. Obtain new weight and daily weight     4. Consider multivitamin daily for micronutrient coverage.    5. Further adjustments to rate/volume/duration/free water provision of enteral feeds dependant on long term monitoring of patient's tolerance, weight trends and needs.     RD remains available, please consult as needed. Will follow up per protocol.

## 2020-05-05 NOTE — PROGRESS NOTE ADULT - SUBJECTIVE AND OBJECTIVE BOX
INTERVAL HPI/OVERNIGHT EVENTS:    O/N:    SUBJECTIVE: Patient seen and examined at bedside.     CONSTITUTIONAL: No weakness, fevers or chills  EYES/ENT: No visual changes;  No vertigo or throat pain   NECK: No pain or stiffness  RESPIRATORY: No cough, wheezing, hemoptysis; No shortness of breath  CARDIOVASCULAR: No chest pain or palpitations  GASTROINTESTINAL: No abdominal or epigastric pain. No nausea, vomiting, or hematemesis; No diarrhea or constipation. No melena or hematochezia.  GENITOURINARY: No dysuria, frequency or hematuria  NEUROLOGICAL: No numbness or weakness  SKIN: No itching, rashes    OBJECTIVE:    VITAL SIGNS:  ICU Vital Signs Last 24 Hrs  T(C): 35.6 (05 May 2020 12:00), Max: 37.3 (05 May 2020 08:00)  T(F): 96.1 (05 May 2020 12:00), Max: 99.1 (05 May 2020 08:00)  HR: 84 (05 May 2020 12:00) (38 - 84)  BP: --  BP(mean): --  ABP: 123/52 (05 May 2020 12:00) (106/56 - 142/74)  ABP(mean): 88 (05 May 2020 12:00) (74 - 90)  RR: 12 (05 May 2020 12:00) (12 - 20)  SpO2: 95% (05 May 2020 12:00) (94% - 99%)        05-04 @ 07:01  -  05-05 @ 07:00  --------------------------------------------------------  IN: 731.9 mL / OUT: 1895 mL / NET: -1163.1 mL    05-05 @ 07:01  -  05-05 @ 12:06  --------------------------------------------------------  IN: 22.8 mL / OUT: 170 mL / NET: -147.2 mL      CAPILLARY BLOOD GLUCOSE      POCT Blood Glucose.: 101 mg/dL (05 May 2020 11:25)      PHYSICAL EXAM:    General: NAD  HEENT: NC/AT; PERRL, clear conjunctiva  Neck: supple  Respiratory: CTA b/l  Cardiovascular: +S1/S2; RRR  Abdomen: soft, NT/ND; +BS x4  Extremities: WWP, 2+ peripheral pulses b/l; no LE edema  Skin: normal color and turgor; no rash  Neurological:    MEDICATIONS:  MEDICATIONS  (STANDING):  dexMEDEtomidine Infusion 0.05 MICROgram(s)/kG/Hr (0.56 mL/Hr) IV Continuous <Continuous>  dextrose 5%. 1000 milliLiter(s) (50 mL/Hr) IV Continuous <Continuous>  dextrose 50% Injectable 12.5 Gram(s) IV Push once  dextrose 50% Injectable 25 Gram(s) IV Push once  dextrose 50% Injectable 25 Gram(s) IV Push once  enoxaparin Injectable 40 milliGRAM(s) SubCutaneous daily  famotidine Injectable 20 milliGRAM(s) IV Push daily  insulin lispro (HumaLOG) corrective regimen sliding scale   SubCutaneous three times a day before meals  levETIRAcetam  IVPB 1000 milliGRAM(s) IV Intermittent every 12 hours  levothyroxine Injectable 62.5 MICROGram(s) IV Push at bedtime  midodrine 10 milliGRAM(s) Oral every 8 hours  norepinephrine Infusion 0.05 MICROgram(s)/kG/Min (2.11 mL/Hr) IV Continuous <Continuous>  polyethylene glycol 3350 17 Gram(s) Oral daily  propofol Infusion 10 MICROgram(s)/kG/Min (2.7 mL/Hr) IV Continuous <Continuous>  QUEtiapine 25 milliGRAM(s) Oral daily  senna 2 Tablet(s) Oral at bedtime  valproate sodium IVPB 500 milliGRAM(s) IV Intermittent every 12 hours    MEDICATIONS  (PRN):  acetaminophen    Suspension .. 650 milliGRAM(s) Oral every 6 hours PRN Temp greater or equal to 38C (100.4F)  dextrose 40% Gel 15 Gram(s) Oral once PRN Blood Glucose LESS THAN 70 milliGRAM(s)/deciliter  glucagon  Injectable 1 milliGRAM(s) IntraMuscular once PRN Glucose LESS THAN 70 milligrams/deciliter      ALLERGIES:  Allergies    amoxicillin (Rash)  penicillin (Rash)    Intolerances        LABS:                        7.4    3.55  )-----------( 367      ( 05 May 2020 02:00 )             24.3     05-05    144  |  105  |  17  ----------------------------<  131<H>  3.9   |  27  |  0.45<L>    Ca    9.1      05 May 2020 02:00  Mg     2.0     05-05    TPro  6.9  /  Alb  2.7<L>  /  TBili  < 0.2<L>  /  DBili  x   /  AST  19  /  ALT  15  /  AlkPhos  72  05-05    PT/INR - ( 05 May 2020 02:00 )   PT: 12.6 SEC;   INR: 1.09          PTT - ( 05 May 2020 02:00 )  PTT:30.9 SEC      RADIOLOGY & ADDITIONAL TESTS: Reviewed. INTERVAL HPI/OVERNIGHT EVENTS:  On bipap overnight, tolerated well. awake in AM, responding to commands. Downgraded to 2L NC, unable to tolerate, and placed onventi mask. Required repeat intubation.     SUBJECTIVE: Patient seen and examined at bedside.     ROS unable to assess in AM    OBJECTIVE:    VITAL SIGNS:  ICU Vital Signs Last 24 Hrs  T(C): 35.6 (05 May 2020 12:00), Max: 37.3 (05 May 2020 08:00)  T(F): 96.1 (05 May 2020 12:00), Max: 99.1 (05 May 2020 08:00)  HR: 84 (05 May 2020 12:00) (38 - 84)  BP: --  BP(mean): --  ABP: 123/52 (05 May 2020 12:00) (106/56 - 142/74)  ABP(mean): 88 (05 May 2020 12:00) (74 - 90)  RR: 12 (05 May 2020 12:00) (12 - 20)  SpO2: 95% (05 May 2020 12:00) (94% - 99%)        05-04 @ 07:01  -  05-05 @ 07:00  --------------------------------------------------------  IN: 731.9 mL / OUT: 1895 mL / NET: -1163.1 mL    05-05 @ 07:01  -  05-05 @ 12:06  --------------------------------------------------------  IN: 22.8 mL / OUT: 170 mL / NET: -147.2 mL      CAPILLARY BLOOD GLUCOSE      POCT Blood Glucose.: 101 mg/dL (05 May 2020 11:25)      PHYSICAL EXAM:    General: NAD  HEENT: NC/AT; PERRL, clear conjunctiva  Neck: supple  Respiratory: CTA b/l  Cardiovascular: +S1/S2; RRR  Abdomen: soft, NT/ND; +BS x4  Extremities: WWP, 2+ peripheral pulses b/l; no LE edema  Skin: normal color and turgor; no rash  Neurological: following commands    MEDICATIONS:  MEDICATIONS  (STANDING):  dexMEDEtomidine Infusion 0.05 MICROgram(s)/kG/Hr (0.56 mL/Hr) IV Continuous <Continuous>  dextrose 5%. 1000 milliLiter(s) (50 mL/Hr) IV Continuous <Continuous>  dextrose 50% Injectable 12.5 Gram(s) IV Push once  dextrose 50% Injectable 25 Gram(s) IV Push once  dextrose 50% Injectable 25 Gram(s) IV Push once  enoxaparin Injectable 40 milliGRAM(s) SubCutaneous daily  famotidine Injectable 20 milliGRAM(s) IV Push daily  insulin lispro (HumaLOG) corrective regimen sliding scale   SubCutaneous three times a day before meals  levETIRAcetam  IVPB 1000 milliGRAM(s) IV Intermittent every 12 hours  levothyroxine Injectable 62.5 MICROGram(s) IV Push at bedtime  midodrine 10 milliGRAM(s) Oral every 8 hours  norepinephrine Infusion 0.05 MICROgram(s)/kG/Min (2.11 mL/Hr) IV Continuous <Continuous>  polyethylene glycol 3350 17 Gram(s) Oral daily  propofol Infusion 10 MICROgram(s)/kG/Min (2.7 mL/Hr) IV Continuous <Continuous>  QUEtiapine 25 milliGRAM(s) Oral daily  senna 2 Tablet(s) Oral at bedtime  valproate sodium IVPB 500 milliGRAM(s) IV Intermittent every 12 hours    MEDICATIONS  (PRN):  acetaminophen    Suspension .. 650 milliGRAM(s) Oral every 6 hours PRN Temp greater or equal to 38C (100.4F)  dextrose 40% Gel 15 Gram(s) Oral once PRN Blood Glucose LESS THAN 70 milliGRAM(s)/deciliter  glucagon  Injectable 1 milliGRAM(s) IntraMuscular once PRN Glucose LESS THAN 70 milligrams/deciliter      ALLERGIES:  Allergies    amoxicillin (Rash)  penicillin (Rash)    Intolerances        LABS:                        7.4    3.55  )-----------( 367      ( 05 May 2020 02:00 )             24.3     05-05    144  |  105  |  17  ----------------------------<  131<H>  3.9   |  27  |  0.45<L>    Ca    9.1      05 May 2020 02:00  Mg     2.0     05-05    TPro  6.9  /  Alb  2.7<L>  /  TBili  < 0.2<L>  /  DBili  x   /  AST  19  /  ALT  15  /  AlkPhos  72  05-05    PT/INR - ( 05 May 2020 02:00 )   PT: 12.6 SEC;   INR: 1.09          PTT - ( 05 May 2020 02:00 )  PTT:30.9 SEC      RADIOLOGY & ADDITIONAL TESTS: Reviewed.

## 2020-05-05 NOTE — PROCEDURE NOTE - NSPROCDETAILS_GEN_ALL_CORE
patient pre-oxygenated, tube inserted, placement confirmed/connected to ventilator
location identified, draped/prepped, sterile technique used, needle inserted/introduced/sutured in place/hemostasis with direct pressure, dressing applied/positive blood return obtained via catheter/connected to a pressurized flush line/Seldinger technique/all materials/supplies accounted for at end of procedure

## 2020-05-05 NOTE — PROGRESS NOTE ADULT - PROBLEM SELECTOR PLAN 2
- Patient with severe COVID infection.  - Supportive care.  - Prolonged mechanical ventilation.  - Failed extubation, now requiring reintubation, likely in the setting of significant lung damage.

## 2020-05-05 NOTE — PROGRESS NOTE ADULT - PROBLEM SELECTOR PLAN 1
- Attempt to extubate was made.  - Patient did not tolerate and required reintubation.  - Now, on mechanical ventilation.  - Propofol gtt, Versed gtt.  - Patient requiring Levophed gtt infusion for pressure support.

## 2020-05-05 NOTE — PROGRESS NOTE ADULT - PROBLEM SELECTOR PLAN 4
- Patient known to [facility].  - DNR approved at [facility].  - Patient did not tolerate extubation, requiring reintubation.  - Overall prognosis is extremely poor, given prolonged respiratory failure, seizure, acute blood loss anemia, hypothermia and bradycardia.   - At this point, a tracheostomy would only prolong the dying process and not improve overall survivability or quality of life.  - Will discuss amending MOLST checklist to reflect DNR/DNI, with a goal to extubate and not reintubate if patient fails again, with the goal of comfort care.

## 2020-05-05 NOTE — PROGRESS NOTE ADULT - ASSESSMENT
60y Female with history of Down Syndrome, hypothyroidism, and recurrent UTI who presented to [facility] on 4/4 for fever, cough, and SOB. The patient was intubated on 4/7 for worsening hypercapnic respiratory failure. Extubated and reintubated on 4/16. Hospitalization further complicated by NSTEMI (type 2), vaginal bleeding requiring 2 uPRBC. The pateint was being followed by my practice at [facility]. She is s.p transfer to [facility].     Acute blood loss anemia  multifactoria  trend h/h and transfuse prn   No evidence of GI bleeding; brown stools  cont gi ppx with pepcid   On Lovenox 40; can increase from GI standpoint if necessary  monitor stools; if loose hold stool softeners     COVID   monitor resp status   care per primary teams appreciated  guarded prognosis       Advanced care planning was discussed with patient and family.  Advanced care planning forms were reviewed and discussed.  Risks, benefits and alternatives of gastroenterologic procedures were discussed in detail and all questions were answered.

## 2020-05-05 NOTE — PROCEDURE NOTE - NSTRACHPOSTINTU_RESP_A_CORE
Appropriate capnography/Breath sounds bilateral/Chest X-Ray/Chest excursion noted/Positive end tidal Co2 noted

## 2020-05-05 NOTE — CONSULT NOTE ADULT - ASSESSMENT
59y/o virginal postmenopausal female, nonverbal w/ a h/o Down Syndrome, is a transfer from [facility] for further management of COVID-19 disease, currently intubated. Patient is s/p vaginal packing (4/21-24) for acute vaginal bleeding 2/2 iatrogenic injury from an attempted placement of a rectal tube, in the setting of anticoagulation. She is s/p 2 units of pRBCs. Stable hct. Currently on prophylactic AC.    -Bleeding was likely from a venous source as packing tamponaded the bleeding   -No active bleeding noted after removal of packing  -Expect staining on peripads, which does not require intervention   -Pad counts   -Continue excellent care by primary team  -Re-consult GYN prn    Patient seen and examined w/ Dr. Theo Love PGY-2
60 year old COVID-19 positive female with failed attempts at extubation.  We will plan for tracheostomy and PEG tube placement when OR time available, likely Monday.
60 F with acute respiratory failure, COVID infection, functional quadriplegia, encounter for palliative care.
ASSESSMENT:  MIGNON WALTER is a 60y Female with history of Down Syndrome, hypothyroidism, and recurrent UTI who presented to [facility] on 4/4 for fever, cough, and SOB. The patient was intubated on 4/7 for worsening hypercapnic respiratory failure. Extubated and reintubated on 4/16. Hospitalization further complicated by NSTEMI (type 2), vaginal bleeding requiring 2 uPRBC.    Patient is DNR; no compressions, but continue medical therapy    PLAN:    NEURO:  - Precedex    RESPIRATORY:   - Wean vent as able    CARDIOVASCULAR:  - Levo    GI/NUTRITION:  - Pepcid  - Senna  - Miralax   - TF Q8H    GENITOURINARY/RENAL:  - Monitor UOP    HEMATOLOGIC:  - Lovenox 40 QD    INFECTIOUS DISEASE:  - Plaquinil finished  - Clindamycin for toxic shock     ENDOCRINE:  - Steroid taper  - Synthroid     Juan C Valle, PGY2

## 2020-05-05 NOTE — PROGRESS NOTE ADULT - ASSESSMENT
61 yo F with Downs syndrome, hypothyroidism, constipation, seizure disorder with acute hypoxic respiratory failure from COVID 19 pneumonia, course complicated by episode of seizure, type 2 NSTEMI, ADAM (resolved), gram variable bacteremia (repeat cx ngtd); extubated 4/16 and reintubated same day due to secretion issues. Now extubated to bipap on 5/4. Weaned to NC, unable to tolerate. Increased work of breathing on NRB, requring repeat intubation on 5/5    PLAN:    NEURO:  Down Syndrome  -AO x0, does not follow commands at baseline  - off sedation     RESPIRATORY:  Repeat intubation today 5/5 due increased work of breathing  - f/u repeat ABG    CARDS:  -restart levophed for pressure support due to intubation    G.I./NUTRITION:  -TF w/ Jevity.  -Protonix for GI PPX.      RENAL/:  -Maintain Puentes, adequate UOP; goal even for the day.    -Monitor BMP      ENDO:  -ISS. Monitor FS.  -C/w synthroid.      HEME:  -H/H stable  -Lovenox for DVT ppx      ID:  -Sputum 4/25 NGTD, no antibiotics. Repeat Covid negative.    Code status: DNR.     DISPO: ICU; Critical care monitoring

## 2020-05-05 NOTE — PROGRESS NOTE ADULT - ATTENDING COMMENTS
Patient examined and care reviewed in detail on bedside rounds  Critically ill on vent with severe COVID PNA  Frequent bedside visits with therapy change today.   I have personally provided 35+ minutes of critical care time concurrently with the resident/fellow; this excludes time spent on separate procedures.

## 2020-05-05 NOTE — PROGRESS NOTE ADULT - SUBJECTIVE AND OBJECTIVE BOX
SUBJECTIVE AND OBJECTIVE:  Failed extubation, leading to reintubation.  INTERVAL HPI/OVERNIGHT EVENTS:  Re-intubated.   Acute blood loss anemia  Seizures  Hypothermia  Bradycardia  DNR on chart: Yes    Allergies    amoxicillin (Rash)  penicillin (Rash)    Intolerances    MEDICATIONS  (STANDING):  chlorhexidine 0.12% Liquid 15 milliLiter(s) Oral Mucosa every 12 hours  dextrose 5%. 1000 milliLiter(s) (50 mL/Hr) IV Continuous <Continuous>  dextrose 50% Injectable 12.5 Gram(s) IV Push once  dextrose 50% Injectable 25 Gram(s) IV Push once  dextrose 50% Injectable 25 Gram(s) IV Push once  enoxaparin Injectable 40 milliGRAM(s) SubCutaneous daily  insulin lispro (HumaLOG) corrective regimen sliding scale   SubCutaneous every 6 hours  levETIRAcetam  IVPB 1000 milliGRAM(s) IV Intermittent every 12 hours  levothyroxine Injectable 62.5 MICROGram(s) IV Push at bedtime  midazolam Infusion 0.02 mG/kG/Hr (0.9 mL/Hr) IV Continuous <Continuous>  norepinephrine Infusion 0.05 MICROgram(s)/kG/Min (4.22 mL/Hr) IV Continuous <Continuous>  polyethylene glycol 3350 17 Gram(s) Oral daily  propofol Infusion 40 MICROgram(s)/kG/Min (10.8 mL/Hr) IV Continuous <Continuous>  QUEtiapine 25 milliGRAM(s) Oral daily  senna 2 Tablet(s) Oral at bedtime  valproate sodium IVPB 500 milliGRAM(s) IV Intermittent every 12 hours    MEDICATIONS  (PRN):  acetaminophen    Suspension .. 650 milliGRAM(s) Oral every 6 hours PRN Temp greater or equal to 38C (100.4F)  dextrose 40% Gel 15 Gram(s) Oral once PRN Blood Glucose LESS THAN 70 milliGRAM(s)/deciLiter  glucagon  Injectable 1 milliGRAM(s) IntraMuscular once PRN Glucose <70 milliGRAM(s)/deciLiter      ITEMS UNCHECKED ARE NOT PRESENT    PRESENT SYMPTOMS: [ x]Unable to obtain due to poor mentation   Source if other than patient:  [ ]Family   [ ]Team     Pain (Impact on QOL):    Location:  Minimal acceptable level (0-10 scale):                   Aggravating factors:  Quality:  Radiation:  Severity (0-10 scale):    Timing:    Dyspnea:                           [ ]Mild [ ]Moderate [ ]Severe  Anxiety:                             [ ]Mild [ ]Moderate [ ]Severe  Fatigue:                             [ ]Mild [ ]Moderate [ ]Severe  Nausea:                             [ ]Mild [ ]Moderate [ ]Severe  Loss of appetite:              [ ]Mild [ ]Moderate [ ]Severe  Constipation:                    [ ]Mild [ ]Moderate [ ]Severe  Grief Present                    [ ] Yes  [ ] No   PAIN AD Score:	  [email or web address] (Ctrl + left click to view)    Other Symptoms:  [ x]All other review of systems negative     Karnofsky Performance Score/Palliative Performance Status Version 2:    20     %    http://palliative.info/resource_material/[email or web address]  PHYSICAL EXAM:  Vital Signs Last 24 Hrs  T(C): 35.4 (06 May 2020 11:30), Max: 37.6 (05 May 2020 16:00)  T(F): 95.7 (06 May 2020 11:30), Max: 99.6 (05 May 2020 16:00)  HR: 55 (06 May 2020 11:30) (48 - 131)  BP: --  BP(mean): --  RR: 12 (06 May 2020 11:30) (12 - 31)  SpO2: 100% (06 May 2020 11:30) (91% - 100%) I&O's Summary    05 May 2020 07:01  -  06 May 2020 07:00  --------------------------------------------------------  IN: 1729.7 mL / OUT: 1120 mL / NET: 609.7 mL    06 May 2020 07:01  -  06 May 2020 14:42  --------------------------------------------------------  IN: 311.4 mL / OUT: 350 mL / NET: -38.6 mL  CRITICAL CARE:  [ ] Shock Present  [ ]Septic [ ]Cardiogenic [ ]Neurologic [ ]Hypovolemic  [ ]  Vasopressors [ ]  Inotropes   [ x] Respiratory failure present  [ x] Acute  [ ] Chronic [ x] Hypoxic  [ ] Hypercarbic [ ] Other  [ ] Other organ failure     LABS:                        8.0    5.75  )-----------( 367      ( 06 May 2020 06:10 )             25.2   05-06    145  |  107  |  15  ----------------------------<  77  4.2   |  25  |  0.55    Ca    8.8      06 May 2020 06:10  Phos  3.2     05-06  Mg     2.1     05-06    TPro  6.3  /  Alb  2.6<L>  /  TBili  0.2  /  DBili  x   /  AST  23  /  ALT  11  /  AlkPhos  62  05-06  PT/INR - ( 05 May 2020 02:00 )   PT: 12.6 SEC;   INR: 1.09          PTT - ( 05 May 2020 02:00 )  PTT:30.9 SEC      RADIOLOGY & ADDITIONAL STUDIES:    Protein Calorie Malnutrition Present: [ ] yes [ ] no  [ ] PPSV2 < or = 30%  [ ] significant weight loss [ ] poor nutritional intake [ ] anasarca [ ] catabolic state Albumin, Serum: 2.6 g/dL (05-06-20 @ 00:00)  Artificial Nutrition [ ]     REFERRALS:   [ ]Chaplaincy  [ ] Hospice  [ ]Child Life  [ ]Social Work  [ ]Case management [ ]Holistic Therapy   Goals of Care Document:

## 2020-05-05 NOTE — CONSULT NOTE ADULT - SUBJECTIVE AND OBJECTIVE BOX
60 year old female with h/o Down syndrome who presented to the hospital with COVID 19 and has failed extubation twice in the past month.  She was re-intubated today 5/5/2020. She is DNR but not DNI due to protected status from state government.  At baseline, her  says she does not follow commands.    PMH - Down syndrome, hypothyroidism  PSH - none  SH - unable to assess, intubated  FH - unable to assess, intubated  ROS - unable to assess, intubated    Physical Exam    P - 122  BP - 118/72  T - 99.6  SPO2 - 98%    Gen - intubated, no apparent distress  Neuro - withdraws to pain, does not follow commands  HEENT - pupils equal and round, pupils reactive to light bilaterally, ET and OG tubes in place  Pulm - coarse breath sounds bilaterally  CVS - regular rate and rhythm  GI - soft, non-distended, non-tender to palpation   - Puentes in place  Ext - no clubbing, edema, or cyanosis

## 2020-05-05 NOTE — PROGRESS NOTE ADULT - SUBJECTIVE AND OBJECTIVE BOX
INTERVAL HPI/OVERNIGHT EVENTS:    extubated in icu   hypothermic   no acute gi events per team    MEDICATIONS  (STANDING):  dexMEDEtomidine Infusion 0.05 MICROgram(s)/kG/Hr (0.56 mL/Hr) IV Continuous <Continuous>  dextrose 5%. 1000 milliLiter(s) (50 mL/Hr) IV Continuous <Continuous>  dextrose 50% Injectable 12.5 Gram(s) IV Push once  dextrose 50% Injectable 25 Gram(s) IV Push once  dextrose 50% Injectable 25 Gram(s) IV Push once  enoxaparin Injectable 40 milliGRAM(s) SubCutaneous daily  famotidine Injectable 20 milliGRAM(s) IV Push daily  insulin lispro (HumaLOG) corrective regimen sliding scale   SubCutaneous three times a day before meals  levETIRAcetam  IVPB 1000 milliGRAM(s) IV Intermittent every 12 hours  levothyroxine Injectable 62.5 MICROGram(s) IV Push at bedtime  midodrine 10 milliGRAM(s) Oral every 8 hours  norepinephrine Infusion 0.05 MICROgram(s)/kG/Min (2.11 mL/Hr) IV Continuous <Continuous>  polyethylene glycol 3350 17 Gram(s) Oral daily  propofol Infusion 10 MICROgram(s)/kG/Min (2.7 mL/Hr) IV Continuous <Continuous>  QUEtiapine 25 milliGRAM(s) Oral daily  senna 2 Tablet(s) Oral at bedtime  valproate sodium IVPB 500 milliGRAM(s) IV Intermittent every 12 hours    MEDICATIONS  (PRN):  acetaminophen    Suspension .. 650 milliGRAM(s) Oral every 6 hours PRN Temp greater or equal to 38C (100.4F)  dextrose 40% Gel 15 Gram(s) Oral once PRN Blood Glucose LESS THAN 70 milliGRAM(s)/deciliter  glucagon  Injectable 1 milliGRAM(s) IntraMuscular once PRN Glucose LESS THAN 70 milligrams/deciliter      Allergies    amoxicillin (Rash)  penicillin (Rash)    Intolerances        Review of Systems: Per current hospital emergency protocol, in an effort to reduce COVID exposures and also conserve PPE for necessary encounters, all data within this chart were reviewed and recommendations are based upon information in the chart and by verbal communication with covering team and/or nurses. Please refer to the ROS and PE per covering primary team note          Vital Signs Last 24 Hrs  T(C): 35.6 (05 May 2020 12:00), Max: 37.3 (05 May 2020 08:00)  T(F): 96.1 (05 May 2020 12:00), Max: 99.1 (05 May 2020 08:00)  HR: 84 (05 May 2020 12:00) (38 - 84)  BP: --  BP(mean): --  RR: 12 (05 May 2020 12:00) (12 - 20)  SpO2: 95% (05 May 2020 12:00) (94% - 99%)    PHYSICAL EXAM: Per current hospital emergency protocol, in an effort to reduce COVID exposures and also conserve PPE for necessary encounters, all data within this chart were reviewed and recommendations are based upon information in the chart and by verbal communication with covering team and/or nurses. Please refer to the ROS and PE per covering primary team note          LABS:                        7.4    3.55  )-----------( 367      ( 05 May 2020 02:00 )             24.3     05-05    144  |  105  |  17  ----------------------------<  131<H>  3.9   |  27  |  0.45<L>    Ca    9.1      05 May 2020 02:00  Mg     2.0     05-05    TPro  6.9  /  Alb  2.7<L>  /  TBili  < 0.2<L>  /  DBili  x   /  AST  19  /  ALT  15  /  AlkPhos  72  05-05    PT/INR - ( 05 May 2020 02:00 )   PT: 12.6 SEC;   INR: 1.09          PTT - ( 05 May 2020 02:00 )  PTT:30.9 SEC      RADIOLOGY & ADDITIONAL TESTS:

## 2020-05-06 ENCOUNTER — TRANSCRIPTION ENCOUNTER (OUTPATIENT)
Age: 60
End: 2020-05-06

## 2020-05-06 NOTE — PROGRESS NOTE ADULT - SUBJECTIVE AND OBJECTIVE BOX
INTERVAL HPI/OVERNIGHT EVENTS:  Re-intubated yesterday due to heavy secretions, hypoxia,  and increased work of breathing.   SUBJECTIVE: Patient seen and examined at bedside.     CONSTITUTIONAL: No weakness, fevers or chills  EYES/ENT: No visual changes;  No vertigo or throat pain   NECK: No pain or stiffness  RESPIRATORY: No cough, wheezing, hemoptysis; No shortness of breath  CARDIOVASCULAR: No chest pain or palpitations  GASTROINTESTINAL: No abdominal or epigastric pain. No nausea, vomiting, or hematemesis; No diarrhea or constipation. No melena or hematochezia.  GENITOURINARY: No dysuria, frequency or hematuria  NEUROLOGICAL: No numbness or weakness  SKIN: No itching, rashes    OBJECTIVE:    VITAL SIGNS:  ICU Vital Signs Last 24 Hrs  T(C): 36.9 (06 May 2020 04:00), Max: 37.6 (05 May 2020 16:00)  T(F): 98.4 (06 May 2020 04:00), Max: 99.6 (05 May 2020 16:00)  HR: 60 (06 May 2020 04:00) (38 - 131)  BP: --  BP(mean): --  ABP: 110/61 (06 May 2020 04:00) (90/55 - 176/60)  ABP(mean): 73 (06 May 2020 04:00) (65 - 90)  RR: 12 (06 May 2020 04:00) (12 - 31)  SpO2: 100% (06 May 2020 04:00) (91% - 100%)    Mode: AC/ CMV (Assist Control/ Continuous Mandatory Ventilation), RR (machine): 12, TV (machine): 280, FiO2: 40, PEEP: 5, MAP: 6, PIP: 16    05-05 @ 07:01  -  05-06 @ 07:00  --------------------------------------------------------  IN: 1729.7 mL / OUT: 1120 mL / NET: 609.7 mL      CAPILLARY BLOOD GLUCOSE      POCT Blood Glucose.: 101 mg/dL (05 May 2020 11:25)      PHYSICAL EXAM:    General: NAD  HEENT: NC/AT; PERRL, clear conjunctiva  Neck: supple  Respiratory: CTA b/l  Cardiovascular: +S1/S2; RRR  Abdomen: soft, NT/ND; +BS x4  Extremities: WWP, 2+ peripheral pulses b/l; no LE edema  Skin: normal color and turgor; no rash  Neurological:    MEDICATIONS:  MEDICATIONS  (STANDING):  chlorhexidine 0.12% Liquid 15 milliLiter(s) Oral Mucosa every 12 hours  dextrose 5%. 1000 milliLiter(s) (50 mL/Hr) IV Continuous <Continuous>  dextrose 50% Injectable 12.5 Gram(s) IV Push once  dextrose 50% Injectable 25 Gram(s) IV Push once  dextrose 50% Injectable 25 Gram(s) IV Push once  enoxaparin Injectable 40 milliGRAM(s) SubCutaneous daily  insulin lispro (HumaLOG) corrective regimen sliding scale   SubCutaneous three times a day before meals  lactated ringers. 1000 milliLiter(s) (50 mL/Hr) IV Continuous <Continuous>  lactated ringers. 500 milliLiter(s) (500 mL/Hr) IV Continuous <Continuous>  levETIRAcetam  IVPB 1000 milliGRAM(s) IV Intermittent every 12 hours  levothyroxine Injectable 62.5 MICROGram(s) IV Push at bedtime  midazolam Infusion 0.02 mG/kG/Hr (0.9 mL/Hr) IV Continuous <Continuous>  midodrine 10 milliGRAM(s) Oral every 8 hours  norepinephrine Infusion 0.05 MICROgram(s)/kG/Min (4.22 mL/Hr) IV Continuous <Continuous>  polyethylene glycol 3350 17 Gram(s) Oral daily  potassium chloride   Powder 40 milliEquivalent(s) Oral once  propofol Infusion 40 MICROgram(s)/kG/Min (10.8 mL/Hr) IV Continuous <Continuous>  QUEtiapine 25 milliGRAM(s) Oral daily  senna 2 Tablet(s) Oral at bedtime  valproate sodium IVPB 500 milliGRAM(s) IV Intermittent every 12 hours    MEDICATIONS  (PRN):  acetaminophen    Suspension .. 650 milliGRAM(s) Oral every 6 hours PRN Temp greater or equal to 38C (100.4F)  dextrose 40% Gel 15 Gram(s) Oral once PRN Blood Glucose LESS THAN 70 milliGRAM(s)/deciliter  glucagon  Injectable 1 milliGRAM(s) IntraMuscular once PRN Glucose LESS THAN 70 milligrams/deciliter      ALLERGIES:  Allergies    amoxicillin (Rash)  penicillin (Rash)    Intolerances        LABS:                        8.0    5.75  )-----------( 367      ( 06 May 2020 06:10 )             25.2     05-06    145  |  107  |  15  ----------------------------<  77  4.2   |  25  |  0.55    Ca    8.8      06 May 2020 06:10  Phos  3.2     05-06  Mg     2.1     05-06    TPro  6.3  /  Alb  2.6<L>  /  TBili  0.2  /  DBili  x   /  AST  23  /  ALT  11  /  AlkPhos  62  05-06    PT/INR - ( 05 May 2020 02:00 )   PT: 12.6 SEC;   INR: 1.09          PTT - ( 05 May 2020 02:00 )  PTT:30.9 SEC      RADIOLOGY & ADDITIONAL TESTS: Reviewed.

## 2020-05-06 NOTE — PROGRESS NOTE ADULT - SUBJECTIVE AND OBJECTIVE BOX
INTERVAL HPI/OVERNIGHT EVENTS:    events reviewed  reintubated   s/p PRBC with good resp no gi bleeding to report      MEDICATIONS  (STANDING):  chlorhexidine 0.12% Liquid 15 milliLiter(s) Oral Mucosa every 12 hours  dextrose 5%. 1000 milliLiter(s) (50 mL/Hr) IV Continuous <Continuous>  dextrose 50% Injectable 12.5 Gram(s) IV Push once  dextrose 50% Injectable 25 Gram(s) IV Push once  dextrose 50% Injectable 25 Gram(s) IV Push once  enoxaparin Injectable 40 milliGRAM(s) SubCutaneous daily  insulin lispro (HumaLOG) corrective regimen sliding scale   SubCutaneous every 6 hours  levETIRAcetam  IVPB 1000 milliGRAM(s) IV Intermittent every 12 hours  levothyroxine Injectable 62.5 MICROGram(s) IV Push at bedtime  midazolam Infusion 0.02 mG/kG/Hr (0.9 mL/Hr) IV Continuous <Continuous>  norepinephrine Infusion 0.05 MICROgram(s)/kG/Min (4.22 mL/Hr) IV Continuous <Continuous>  polyethylene glycol 3350 17 Gram(s) Oral daily  propofol Infusion 40 MICROgram(s)/kG/Min (10.8 mL/Hr) IV Continuous <Continuous>  QUEtiapine 25 milliGRAM(s) Oral daily  senna 2 Tablet(s) Oral at bedtime  valproate sodium IVPB 500 milliGRAM(s) IV Intermittent every 12 hours    MEDICATIONS  (PRN):  acetaminophen    Suspension .. 650 milliGRAM(s) Oral every 6 hours PRN Temp greater or equal to 38C (100.4F)  dextrose 40% Gel 15 Gram(s) Oral once PRN Blood Glucose LESS THAN 70 milliGRAM(s)/deciLiter  glucagon  Injectable 1 milliGRAM(s) IntraMuscular once PRN Glucose <70 milliGRAM(s)/deciLiter      Allergies    amoxicillin (Rash)  penicillin (Rash)    Intolerances        Review of Systems: Per current hospital emergency protocol, in an effort to reduce COVID exposures and also conserve PPE for necessary encounters, all data within this chart were reviewed and recommendations are based upon information in the chart and by verbal communication with covering team and/or nurses. Please refer to the ROS and PE per covering primary team note          Vital Signs Last 24 Hrs  T(C): 35.3 (06 May 2020 11:30), Max: 37.6 (05 May 2020 16:00)  T(F): 95.5 (06 May 2020 11:30), Max: 99.6 (05 May 2020 16:00)  HR: 55 (06 May 2020 11:30) (48 - 131)  BP: --  BP(mean): --  RR: 12 (06 May 2020 11:30) (12 - 31)  SpO2: 100% (06 May 2020 11:30) (91% - 100%)    PHYSICAL EXAM: Per current hospital emergency protocol, in an effort to reduce COVID exposures and also conserve PPE for necessary encounters, all data within this chart were reviewed and recommendations are based upon information in the chart and by verbal communication with covering team and/or nurses. Please refer to the ROS and PE per covering primary team note          LABS:                        8.0    5.75  )-----------( 367      ( 06 May 2020 06:10 )             25.2     05-06    145  |  107  |  15  ----------------------------<  77  4.2   |  25  |  0.55    Ca    8.8      06 May 2020 06:10  Phos  3.2     05-06  Mg     2.1     05-06    TPro  6.3  /  Alb  2.6<L>  /  TBili  0.2  /  DBili  x   /  AST  23  /  ALT  11  /  AlkPhos  62  05-06    PT/INR - ( 05 May 2020 02:00 )   PT: 12.6 SEC;   INR: 1.09          PTT - ( 05 May 2020 02:00 )  PTT:30.9 SEC      RADIOLOGY & ADDITIONAL TESTS:

## 2020-05-06 NOTE — PROGRESS NOTE ADULT - ASSESSMENT
59 yo F with Downs syndrome, hypothyroidism, constipation, seizure disorder with acute hypoxic respiratory failure from COVID 19 pneumonia, course complicated by episode of seizure, type 2 NSTEMI, ADAM (resolved), gram variable bacteremia (repeat cx ngtd); extubated 4/16 and reintubated same day due to secretion issues. Now extubated to bipap on 5/4. Weaned to NC, unable to tolerate. Increased work of breathing on NRB, requring repeat intubation on 5/5    PLAN:    NEURO:  Down Syndrome  - s/p repeat intubation on 5/5  - currently on versed and prop. Wean as tolerated    RESPIRATORY:  Acute respiratory distress syndrome 2/2 COVID PNA  s/p extubation on 5/16 and reintubation and transferred [facility]  -exubated 5/4 and re-intubated 5/5 for hypoxia, and increased work of breathing with inability to remove her secretions   - plan for trach       CARDS:  hemodynamically stable   -on levophed while on sedation    G.I./NUTRITION:  -TF w/ Jevity.  -Protonix for GI PPX.      RENAL/:  -Maintain Puentes, adequate UOP; goal even for the day.    -Monitor BMP      ENDO:  -ISS. Monitor FS.  -C/w synthroid.  - repeat TSH nml     HEME:  -H/H stable  -Lovenox for DVT ppx    ID:  -Sputum 4/25 NGTD, no antibiotics. Repeat Covid negative.    Code status: DNR.     DISPO: ICU; Critical care monitoring 60 year old female with Downs syndrome, hypothyroidism, constipation, seizure disorder with acute hypoxic respiratory failure from COVID 19 pneumonia, course complicated by episode of seizure, type 2 NSTEMI, ADAM (resolved), gram variable bacteremia (repeat cx ngtd); extubated 4/16 and reintubated same day due to secretion issues. Now extubated to bipap on 5/4. Weaned to NC, unable to tolerate. Increased work of breathing on NRB, requring repeat intubation on 5/5    PLAN:    NEURO:  Down Syndrome  - s/p repeat intubation on 5/5  - currently on versed and prop. Wean as tolerated    RESPIRATORY:  Acute respiratory distress syndrome 2/2 COVID PNA  s/p extubation on 5/16 and reintubation and transferred [facility]  -exubated 5/4 and re-intubated 5/5 for hypoxia, and increased work of breathing with inability to remove her secretions   -will review with OPWDD and family: tracheostomy vs. extubation w/ DNI      CARDS:  hemodynamically stable   -on levophed while on sedation    G.I./NUTRITION:  -TF w/ Jevity.  -Protonix for GI PPX.      RENAL/:  -Maintain Puentes, adequate UOP; goal even for the day.    -Monitor BMP      ENDO:  -ISS. Monitor FS.  -C/w synthroid.  - repeat TSH nml     HEME:  -H/H stable  -Lovenox for DVT ppx    ID:  -Sputum 4/25 NGTD, no antibiotics. Repeat Covid negative.    Code status: DNR.     DISPO: ICU; Critical care monitoring

## 2020-05-06 NOTE — PROGRESS NOTE ADULT - ASSESSMENT
60y Female with history of Down Syndrome, hypothyroidism, and recurrent UTI who presented to [facility] on 4/4 for fever, cough, and SOB. The patient was intubated on 4/7 for worsening hypercapnic respiratory failure. Extubated and reintubated on 4/16. Hospitalization further complicated by NSTEMI (type 2), vaginal bleeding requiring 2 uPRBC. The pateint was being followed by my practice at [facility]. She is s.p transfer to [facility].     Acute blood loss anemia  multifactorial  trend h/h and transfuse prn   No evidence of GI bleeding; brown stools  cont gi ppx with pepcid while on Lovenox   monitor stools; if loose hold stool softeners   no role for endoscopic eval at this time in light of risk in covid pts    COVID   monitor resp status   care per primary teams appreciated  poss trach/peg per ctsx  guarded prognosis       Advanced care planning was discussed with patient and family.  Advanced care planning forms were reviewed and discussed.  Risks, benefits and alternatives of gastroenterologic procedures were discussed in detail and all questions were answered.

## 2020-05-06 NOTE — PROGRESS NOTE ADULT - ATTENDING COMMENTS
Remains supported for acute hypoxemic respiratory failure to COVID-19; unable to handle upper airway tone / secretions now s/p failed 2nd extubation.     Exam: sedated; coarse breath sounds bilaterally; cardiac exam unremarkable; no abdominal organomegaly; extremities without significant edema    Plan as noted above

## 2020-05-06 NOTE — PROGRESS NOTE ADULT - SUBJECTIVE AND OBJECTIVE BOX
Subjective: sedated with propofol and versed drips    Vital Signs:  Vital Signs Last 24 Hrs  T(C): 35.4 (05-06-20 @ 11:30), Max: 37.6 (05-05-20 @ 16:00)  T(F): 95.7 (05-06-20 @ 11:30), Max: 99.6 (05-05-20 @ 16:00)  HR: 52 (05-06-20 @ 15:15) (48 - 122)  BP: 93/53  RR: 10 (05-06-20 @ 14:30) (10 - 31)  SpO2: 98% (05-06-20 @ 15:15) (91% - 100%) on (O2)      General: sedated  Neck: Neck supple, trachea midline, No JVD  Respiratory: coarse breath sounds B/L  CV: RRR, S1S2, no murmurs, rubs or gallops  Abdominal: Soft, NT, ND  Extremities: No edema, + peripheral pulses  Skin: No Rashes, Hematoma, Ecchymosis                          8.0    5.75  )-----------( 367               25.2       145  |  107  |  15  ----------------------------<  77  4.2   |  25  |  0.55    Ca    8.8       Phos  3.2       Mg     2.1      TPro  6.3  /  Alb  2.6<L>  /  TBili  0.2  /  DBili  x   /  AST  23  /  ALT  11  /  AlkPhos  62    PT: 12.6 SEC;   INR: 1.09       PTT:30.9 SEC      MEDICATIONS  (STANDING):  chlorhexidine 0.12% Liquid 15 milliLiter(s) Oral Mucosa every 12 hours  dextrose 5%. 1000 milliLiter(s) (50 mL/Hr) IV Continuous <Continuous>  dextrose 50% Injectable 12.5 Gram(s) IV Push once  dextrose 50% Injectable 25 Gram(s) IV Push once  dextrose 50% Injectable 25 Gram(s) IV Push once  insulin lispro (HumaLOG) corrective regimen sliding scale   SubCutaneous every 6 hours  levETIRAcetam  IVPB 1000 milliGRAM(s) IV Intermittent every 12 hours  levothyroxine Injectable 62.5 MICROGram(s) IV Push at bedtime  midazolam Infusion 0.02 mG/kG/Hr (0.9 mL/Hr) IV Continuous <Continuous>  norepinephrine Infusion 0.05 MICROgram(s)/kG/Min (4.22 mL/Hr) IV Continuous <Continuous>  polyethylene glycol 3350 17 Gram(s) Oral daily  propofol Infusion 40 MICROgram(s)/kG/Min (10.8 mL/Hr) IV Continuous <Continuous>  QUEtiapine 25 milliGRAM(s) Oral daily  senna 2 Tablet(s) Oral at bedtime  valproate sodium IVPB 500 milliGRAM(s) IV Intermittent every 12 hours    MEDICATIONS  (PRN):  acetaminophen    Suspension .. 650 milliGRAM(s) Oral every 6 hours PRN Temp greater or equal to 38C (100.4F)  dextrose 40% Gel 15 Gram(s) Oral once PRN Blood Glucose LESS THAN 70 milliGRAM(s)/deciLiter  glucagon  Injectable 1 milliGRAM(s) IntraMuscular once PRN Glucose <70 milliGRAM(s)/deciLiter      Assessment  60y Female  w/ PAST MEDICAL & SURGICAL HISTORY:  Frequent urinary tract infections  Hypothyroidism, unspecified type  Down syndrome    Admitted 4/4/20 with COVID+, intubated on 4/7, and Thoracic Surgery consulted for Tracheostomy and PEG    PLAN  Plan is for Tracheostomy and PEG tomorrow in OR with Dr. Harper  HOLD enteral feeds after midnight tonight  Type and Screen tomorrow morning  Repeat COVID PCR sent for OR requirement (last 2 tests negative, 5/1 and 4/30)

## 2020-05-07 NOTE — BRIEF OPERATIVE NOTE - NSICDXBRIEFPOSTOP_GEN_ALL_CORE_FT
POST-OP DIAGNOSIS:  Acute respiratory disease due to COVID-19 virus 07-May-2020 14:21:34  Rafia Pedraza

## 2020-05-07 NOTE — PROGRESS NOTE ADULT - ASSESSMENT
60y Female with history of Down Syndrome, hypothyroidism, and recurrent UTI who presented to [facility] on 4/4 for fever, cough, and SOB. The patient was intubated on 4/7 for worsening hypercapnic respiratory failure. Extubated and reintubated on 4/16. Hospitalization further complicated by NSTEMI (type 2), vaginal bleeding requiring 2 uPRBC. The pateint was being followed by my practice at [facility]. She is s.p transfer to [facility].     Acute blood loss anemia  multifactorial  trend h/h and transfuse prn   No evidence of GI bleeding; brown stools  cont gi ppx with pepcid while on Lovenox   monitor stools; if loose hold stool softeners   no role for endoscopic eval at this time in light of risk in covid pts    COVID   monitor resp status   care per primary teams appreciated  trach/peg w/ctsx  icu care greatly appreciated       Advanced care planning was discussed with patient and family.  Advanced care planning forms were reviewed and discussed.  Risks, benefits and alternatives of gastroenterologic procedures were discussed in detail and all questions were answered.

## 2020-05-07 NOTE — PROGRESS NOTE ADULT - SUBJECTIVE AND OBJECTIVE BOX
INTERVAL HPI/OVERNIGHT EVENTS:    intubated/sedated  tube feeds held for poss trach/peg today     MEDICATIONS  (STANDING):  chlorhexidine 0.12% Liquid 15 milliLiter(s) Oral Mucosa every 12 hours  levETIRAcetam  IVPB 1000 milliGRAM(s) IV Intermittent every 12 hours  levothyroxine Injectable 62.5 MICROGram(s) IV Push at bedtime  midazolam Infusion 0.02 mG/kG/Hr (0.9 mL/Hr) IV Continuous <Continuous>  norepinephrine Infusion 0.05 MICROgram(s)/kG/Min (4.22 mL/Hr) IV Continuous <Continuous>  polyethylene glycol 3350 17 Gram(s) Oral daily  propofol Infusion 40 MICROgram(s)/kG/Min (10.8 mL/Hr) IV Continuous <Continuous>  QUEtiapine 25 milliGRAM(s) Oral daily  senna 2 Tablet(s) Oral at bedtime  valproate sodium IVPB 500 milliGRAM(s) IV Intermittent every 12 hours    MEDICATIONS  (PRN):  acetaminophen    Suspension .. 650 milliGRAM(s) Oral every 6 hours PRN Temp greater or equal to 38C (100.4F)      Allergies    amoxicillin (Rash)  penicillin (Rash)    Intolerances        Review of Systems: *intubated/sedated unable to participate  Per current hospital emergency protocol, in an effort to reduce COVID exposures and also conserve PPE for necessary encounters, all data within chart reviewed and recommendations are based upon information in chart and by verbal communication with covering team. Please refer to ROS and PE per covering primary teams note           Vital Signs Last 24 Hrs  T(C): 37.2 (07 May 2020 08:00), Max: 37.2 (07 May 2020 08:00)  T(F): 98.9 (07 May 2020 08:00), Max: 98.9 (07 May 2020 08:00)  HR: 60 (07 May 2020 08:05) (48 - 79)  BP: --  BP(mean): --  RR: 12 (07 May 2020 08:00) (10 - 12)  SpO2: 99% (07 May 2020 08:05) (98% - 100%)    PHYSICAL EXAM: Per current hospital emergency protocol, in an effort to reduce COVID exposures and also conserve PPE for necessary encounters, all data within chart reviewed and recommendations are based upon information in chart and by verbal communication with covering team. Please refer to ROS and PE per covering primary teams note           LABS:                        8.1    6.20  )-----------( 350      ( 07 May 2020 02:00 )             26.5     05-07    145  |  109<H>  |  16  ----------------------------<  79  4.3   |  26  |  0.52    Ca    8.6      07 May 2020 02:00  Phos  4.5     05-07  Mg     2.0     05-07    TPro  5.7<L>  /  Alb  2.2<L>  /  TBili  < 0.2<L>  /  DBili  x   /  AST  20  /  ALT  13  /  AlkPhos  61  05-07    PT/INR - ( 07 May 2020 02:00 )   PT: 12.0 SEC;   INR: 1.04          PTT - ( 07 May 2020 02:00 )  PTT:28.3 SEC      RADIOLOGY & ADDITIONAL TESTS:

## 2020-05-07 NOTE — CHART NOTE - NSCHARTNOTEFT_GEN_A_CORE
Patient examined and care reviewed in detail on bedside rounds  Critically ill on vent with COVID PNA, failed extubation, and scheduled for trach  Frequent bedside visits with therapy change today.   I have personally provided 35+ minutes of critical care time concurrently with the resident/fellow; this excludes time spent on separate procedures.

## 2020-05-07 NOTE — BRIEF OPERATIVE NOTE - NSICDXBRIEFPROCEDURE_GEN_ALL_CORE_FT
PROCEDURES:  Bronchoscopy, flexible, adult 07-May-2020 14:21:08  Rafia Pedraza  EGD, with PEG 07-May-2020 14:21:03  Rafia Pedraza  Open tracheostomy 07-May-2020 14:20:56  Rafia Pedraza

## 2020-05-07 NOTE — PROGRESS NOTE ADULT - ASSESSMENT
INTERVAL HPI/OVERNIGHT EVENTS:    O/N:    SUBJECTIVE: Patient seen and examined at bedside.     CONSTITUTIONAL: No weakness, fevers or chills  EYES/ENT: No visual changes;  No vertigo or throat pain   NECK: No pain or stiffness  RESPIRATORY: No cough, wheezing, hemoptysis; No shortness of breath  CARDIOVASCULAR: No chest pain or palpitations  GASTROINTESTINAL: No abdominal or epigastric pain. No nausea, vomiting, or hematemesis; No diarrhea or constipation. No melena or hematochezia.  GENITOURINARY: No dysuria, frequency or hematuria  NEUROLOGICAL: No numbness or weakness  SKIN: No itching, rashes    OBJECTIVE:    VITAL SIGNS:  ICU Vital Signs Last 24 Hrs  T(C): 37.2 (07 May 2020 08:00), Max: 37.2 (07 May 2020 08:00)  T(F): 98.9 (07 May 2020 08:00), Max: 98.9 (07 May 2020 08:00)  HR: 60 (07 May 2020 08:05) (48 - 79)  BP: --  BP(mean): --  ABP: 153/80 (07 May 2020 08:00) (93/53 - 153/80)  ABP(mean): 117 (07 May 2020 08:00) (68 - 117)  RR: 12 (07 May 2020 08:00) (10 - 12)  SpO2: 99% (07 May 2020 08:05) (98% - 100%)    Mode: AC/ CMV (Assist Control/ Continuous Mandatory Ventilation), RR (machine): 10, TV (machine): 270, FiO2: 30, PEEP: 5, MAP: 6, PIP: 19    05-06 @ 07:01 - 05-07 @ 07:00  --------------------------------------------------------  IN: 986.4 mL / OUT: 1350 mL / NET: -363.6 mL    05-07 @ 07:01  -  05-07 @ 11:34  --------------------------------------------------------  IN: 70 mL / OUT: 550 mL / NET: -480 mL      CAPILLARY BLOOD GLUCOSE          PHYSICAL EXAM:    General: NAD  HEENT: NC/AT; PERRL, clear conjunctiva  Neck: supple  Respiratory: CTA b/l  Cardiovascular: +S1/S2; RRR  Abdomen: soft, NT/ND; +BS x4  Extremities: WWP, 2+ peripheral pulses b/l; no LE edema  Skin: normal color and turgor; no rash  Neurological:    MEDICATIONS:  MEDICATIONS  (STANDING):  chlorhexidine 0.12% Liquid 15 milliLiter(s) Oral Mucosa every 12 hours  levETIRAcetam  IVPB 1000 milliGRAM(s) IV Intermittent every 12 hours  levothyroxine Injectable 62.5 MICROGram(s) IV Push at bedtime  midazolam Infusion 0.02 mG/kG/Hr (0.9 mL/Hr) IV Continuous <Continuous>  norepinephrine Infusion 0.05 MICROgram(s)/kG/Min (4.22 mL/Hr) IV Continuous <Continuous>  polyethylene glycol 3350 17 Gram(s) Oral daily  propofol Infusion 40 MICROgram(s)/kG/Min (10.8 mL/Hr) IV Continuous <Continuous>  QUEtiapine 25 milliGRAM(s) Oral daily  senna 2 Tablet(s) Oral at bedtime  valproate sodium IVPB 500 milliGRAM(s) IV Intermittent every 12 hours    MEDICATIONS  (PRN):  acetaminophen    Suspension .. 650 milliGRAM(s) Oral every 6 hours PRN Temp greater or equal to 38C (100.4F)      ALLERGIES:  Allergies    amoxicillin (Rash)  penicillin (Rash)    Intolerances        LABS:                        8.1    6.20  )-----------( 350      ( 07 May 2020 02:00 )             26.5     05-07    145  |  109<H>  |  16  ----------------------------<  79  4.3   |  26  |  0.52    Ca    8.6      07 May 2020 02:00  Phos  4.5     05-07  Mg     2.0     05-07    TPro  5.7<L>  /  Alb  2.2<L>  /  TBili  < 0.2<L>  /  DBili  x   /  AST  20  /  ALT  13  /  AlkPhos  61  05-07    PT/INR - ( 07 May 2020 02:00 )   PT: 12.0 SEC;   INR: 1.04          PTT - ( 07 May 2020 02:00 )  PTT:28.3 SEC      RADIOLOGY & ADDITIONAL TESTS: Reviewed. 60 year old female with Downs syndrome, hypothyroidism, constipation, seizure disorder with acute hypoxic respiratory failure from COVID 19 pneumonia, course complicated by episode of seizure, type 2 NSTEMI, ADAM (resolved), gram variable bacteremia (repeat cx ngtd); extubated 4/16 and reintubated same day due to secretion issues. Now extubated to bipap on 5/4. Weaned to NC, unable to tolerate. Increased work of breathing on NRB, requring repeat intubation on 5/5 due to hypoxia and increased work of breathing    PLAN:    NEURO:  Down Syndrome  - s/p repeat intubation on 5/5  - currently on versed and prop. Wean as tolerated  - plan for trach and peg today by CT Sx    RESPIRATORY:  Acute respiratory distress syndrome 2/2 COVID PNA s/p extubation and reintubated due to secretions on 5/16 and transferred [facility]  -exubated 5/4 and re-intubated 5/5 for hypoxia, and increased work of breathing with inability to remove her secretions   - plan for trach and PEG    CARDS:  hemodynamically stable   -on levophed while on sedation    G.I./NUTRITION:  -TF w/ Jevity.  -Protonix for GI PPX.      RENAL/:  -Maintain Puentes, adequate UOP; goal even for the day.    -Monitor BMP      ENDO:  -ISS. Monitor FS.  -C/w synthroid.  - repeat TSH nml     HEME:  -H/H stable  -Lovenox for DVT ppx    ID:  COVID PNA s/p intubation as above  -Sputum 4/25 NGTD, no antibiotics. Repeat Covid negative.    Code status: DNR.     DISPO: ICU; Critical care monitoring 60 year old female with Downs syndrome, hypothyroidism, constipation, seizure disorder with acute hypoxic respiratory failure from COVID 19 pneumonia, course complicated by episode of seizure, type 2 NSTEMI, ADAM (resolved), gram variable bacteremia (repeat cx ngtd); extubated 4/16 and reintubated same day due to secretion issues. Now extubated to bipap on 5/4. Weaned to NC, unable to tolerate. Increased work of breathing on NRB, requring repeat intubation on 5/5 due to hypoxia and increased work of breathing    PLAN:    NEURO:  Down Syndrome  - s/p repeat intubation on 5/5  - currently on versed and prop. Wean as tolerated  - plan for trach and peg today by CT Sx    RESPIRATORY:  Acute respiratory distress syndrome 2/2 COVID PNA s/p extubation and reintubated due to secretions on 5/16 and transferred [facility]  -exubated 5/4 and re-intubated 5/5 for hypoxia, and increased work of breathing with inability to remove her secretions   - plan for trach and PEG    CARDS:  hemodynamically stable   -on levophed while on sedation    G.I./NUTRITION:  -TF w/ Jevity.        RENAL/:  -Maintain Puentes, adequate UOP; goal even for the day.    -Monitor BMP      ENDO:  -ISS. Monitor FS.  -C/w synthroid.  - repeat TSH nml     HEME:  -H/H stable. Hx of acute blood loss anemia due to vaginal bleed during this hospitalization  -Lovenox for DVT ppx    ID:  COVID PNA s/p intubation as above  -Sputum 4/25 NGTD, no antibiotics. Repeat Covid negative.    Code status: DNR.     DISPO: ICU; Critical care monitoring

## 2020-05-07 NOTE — PROGRESS NOTE ADULT - SUBJECTIVE AND OBJECTIVE BOX
Pt is s/p Trach and PEG  The trach is in situ and the site is clean and dry; The pt is maintaining TV   The PEG is in situ and the site is clean and dry; The PEG is to gravity with minimal bilious drainage.    Plan for the PEG to stay to gravity for a total of 24 h after placement and then it can be used for tube feeds after that (starting tomorrow)  Thoracic surgery will come to loosen the PEG 48 h after placement (two days from now)  Thoracic surgery will also come to cut the trach sutures on POD 14.

## 2020-05-07 NOTE — PROGRESS NOTE ADULT - SUBJECTIVE AND OBJECTIVE BOX
INTERVAL HPI/OVERNIGHT EVENTS:  No events overnight. Remained on propofol and levophed. CTSx planning for Trach and PEG    SUBJECTIVE: Patient seen and examined at bedside.     CONSTITUTIONAL: No weakness, fevers or chills  EYES/ENT: No visual changes;  No vertigo or throat pain   NECK: No pain or stiffness  RESPIRATORY: No cough, wheezing, hemoptysis; No shortness of breath  CARDIOVASCULAR: No chest pain or palpitations  GASTROINTESTINAL: No abdominal or epigastric pain. No nausea, vomiting, or hematemesis; No diarrhea or constipation. No melena or hematochezia.  GENITOURINARY: No dysuria, frequency or hematuria  NEUROLOGICAL: No numbness or weakness  SKIN: No itching, rashes    OBJECTIVE:    VITAL SIGNS:  ICU Vital Signs Last 24 Hrs  T(C): 37.2 (07 May 2020 08:00), Max: 37.2 (07 May 2020 08:00)  T(F): 98.9 (07 May 2020 08:00), Max: 98.9 (07 May 2020 08:00)  HR: 60 (07 May 2020 08:05) (48 - 79)  BP: --  BP(mean): --  ABP: 153/80 (07 May 2020 08:00) (93/53 - 153/80)  ABP(mean): 117 (07 May 2020 08:00) (68 - 117)  RR: 12 (07 May 2020 08:00) (10 - 12)  SpO2: 99% (07 May 2020 08:05) (98% - 100%)    Mode: AC/ CMV (Assist Control/ Continuous Mandatory Ventilation), RR (machine): 10, TV (machine): 270, FiO2: 30, PEEP: 5, MAP: 6, PIP: 19    05-06 @ 07:01  -  05-07 @ 07:00  --------------------------------------------------------  IN: 986.4 mL / OUT: 1350 mL / NET: -363.6 mL    05-07 @ 07:01  -  05-07 @ 11:34  --------------------------------------------------------  IN: 70 mL / OUT: 550 mL / NET: -480 mL      CAPILLARY BLOOD GLUCOSE          PHYSICAL EXAM:    General: NAD  HEENT: NC/AT; PERRL, clear conjunctiva  Neck: supple  Respiratory: CTA b/l  Cardiovascular: +S1/S2; RRR  Abdomen: soft, NT/ND; +BS x4  Extremities: WWP, 2+ peripheral pulses b/l; no LE edema  Skin: normal color and turgor; no rash  Neurological:    MEDICATIONS:  MEDICATIONS  (STANDING):  chlorhexidine 0.12% Liquid 15 milliLiter(s) Oral Mucosa every 12 hours  levETIRAcetam  IVPB 1000 milliGRAM(s) IV Intermittent every 12 hours  levothyroxine Injectable 62.5 MICROGram(s) IV Push at bedtime  midazolam Infusion 0.02 mG/kG/Hr (0.9 mL/Hr) IV Continuous <Continuous>  norepinephrine Infusion 0.05 MICROgram(s)/kG/Min (4.22 mL/Hr) IV Continuous <Continuous>  polyethylene glycol 3350 17 Gram(s) Oral daily  propofol Infusion 40 MICROgram(s)/kG/Min (10.8 mL/Hr) IV Continuous <Continuous>  QUEtiapine 25 milliGRAM(s) Oral daily  senna 2 Tablet(s) Oral at bedtime  valproate sodium IVPB 500 milliGRAM(s) IV Intermittent every 12 hours    MEDICATIONS  (PRN):  acetaminophen    Suspension .. 650 milliGRAM(s) Oral every 6 hours PRN Temp greater or equal to 38C (100.4F)      ALLERGIES:  Allergies    amoxicillin (Rash)  penicillin (Rash)    Intolerances        LABS:                        8.1    6.20  )-----------( 350      ( 07 May 2020 02:00 )             26.5     05-07    145  |  109<H>  |  16  ----------------------------<  79  4.3   |  26  |  0.52    Ca    8.6      07 May 2020 02:00  Phos  4.5     05-07  Mg     2.0     05-07    TPro  5.7<L>  /  Alb  2.2<L>  /  TBili  < 0.2<L>  /  DBili  x   /  AST  20  /  ALT  13  /  AlkPhos  61  05-07    PT/INR - ( 07 May 2020 02:00 )   PT: 12.0 SEC;   INR: 1.04          PTT - ( 07 May 2020 02:00 )  PTT:28.3 SEC      RADIOLOGY & ADDITIONAL TESTS: Reviewed. INTERVAL HPI/OVERNIGHT EVENTS:  No events overnight. Remained on propofol and levophed. CTSx planning for Trach and PEG    SUBJECTIVE: Patient seen and examined at bedside.     ROS unable to assess due to sedation and nonverbal.    OBJECTIVE:    VITAL SIGNS:  ICU Vital Signs Last 24 Hrs  T(C): 37.2 (07 May 2020 08:00), Max: 37.2 (07 May 2020 08:00)  T(F): 98.9 (07 May 2020 08:00), Max: 98.9 (07 May 2020 08:00)  HR: 60 (07 May 2020 08:05) (48 - 79)  BP: --  BP(mean): --  ABP: 153/80 (07 May 2020 08:00) (93/53 - 153/80)  ABP(mean): 117 (07 May 2020 08:00) (68 - 117)  RR: 12 (07 May 2020 08:00) (10 - 12)  SpO2: 99% (07 May 2020 08:05) (98% - 100%)    Mode: AC/ CMV (Assist Control/ Continuous Mandatory Ventilation), RR (machine): 10, TV (machine): 270, FiO2: 30, PEEP: 5, MAP: 6, PIP: 19    05-06 @ 07:01  -  05-07 @ 07:00  --------------------------------------------------------  IN: 986.4 mL / OUT: 1350 mL / NET: -363.6 mL    05-07 @ 07:01 - 05-07 @ 11:34  --------------------------------------------------------  IN: 70 mL / OUT: 550 mL / NET: -480 mL      CAPILLARY BLOOD GLUCOSE          PHYSICAL EXAM:    General: NAD  HEENT: NC/AT, clear conjunctiva, sedated  Neck: supple  Respiratory: right lower crackles, intubated  Cardiovascular: +S1/S2; RRR  Abdomen: soft, NT/ND; +BS x4  Extremities: WWP, 2+ peripheral pulses b/l; no LE edema  Skin: normal color and turgor; no rash  Neurological:sedated    MEDICATIONS:  MEDICATIONS  (STANDING):  chlorhexidine 0.12% Liquid 15 milliLiter(s) Oral Mucosa every 12 hours  levETIRAcetam  IVPB 1000 milliGRAM(s) IV Intermittent every 12 hours  levothyroxine Injectable 62.5 MICROGram(s) IV Push at bedtime  midazolam Infusion 0.02 mG/kG/Hr (0.9 mL/Hr) IV Continuous <Continuous>  norepinephrine Infusion 0.05 MICROgram(s)/kG/Min (4.22 mL/Hr) IV Continuous <Continuous>  polyethylene glycol 3350 17 Gram(s) Oral daily  propofol Infusion 40 MICROgram(s)/kG/Min (10.8 mL/Hr) IV Continuous <Continuous>  QUEtiapine 25 milliGRAM(s) Oral daily  senna 2 Tablet(s) Oral at bedtime  valproate sodium IVPB 500 milliGRAM(s) IV Intermittent every 12 hours    MEDICATIONS  (PRN):  acetaminophen    Suspension .. 650 milliGRAM(s) Oral every 6 hours PRN Temp greater or equal to 38C (100.4F)      ALLERGIES:  Allergies    amoxicillin (Rash)  penicillin (Rash)    Intolerances        LABS:                        8.1    6.20  )-----------( 350      ( 07 May 2020 02:00 )             26.5     05-07    145  |  109<H>  |  16  ----------------------------<  79  4.3   |  26  |  0.52    Ca    8.6      07 May 2020 02:00  Phos  4.5     05-07  Mg     2.0     05-07    TPro  5.7<L>  /  Alb  2.2<L>  /  TBili  < 0.2<L>  /  DBili  x   /  AST  20  /  ALT  13  /  AlkPhos  61  05-07    PT/INR - ( 07 May 2020 02:00 )   PT: 12.0 SEC;   INR: 1.04          PTT - ( 07 May 2020 02:00 )  PTT:28.3 SEC      RADIOLOGY & ADDITIONAL TESTS: Reviewed.

## 2020-05-07 NOTE — PROGRESS NOTE ADULT - ATTENDING COMMENTS
Patient examined and care reviewed in detail on bedside rounds  Critically ill on vent with COVID PNA   Frequent bedside visits with therapy change today.   I have personally provided 35+ minutes of critical care time concurrently with the resident/fellow; this excludes time spent on separate procedures.

## 2020-05-08 NOTE — PROGRESS NOTE ADULT - SUBJECTIVE AND OBJECTIVE BOX
INTERVAL HPI/OVERNIGHT EVENTS:    chart reviewed  s/p trach and peg yesterday       MEDICATIONS  (STANDING):  chlorhexidine 0.12% Liquid 15 milliLiter(s) Oral Mucosa every 12 hours  enoxaparin Injectable 40 milliGRAM(s) SubCutaneous daily  levETIRAcetam  IVPB 1000 milliGRAM(s) IV Intermittent every 12 hours  levothyroxine Injectable 62.5 MICROGram(s) IV Push at bedtime  polyethylene glycol 3350 17 Gram(s) Oral daily  QUEtiapine 25 milliGRAM(s) Oral daily  senna 2 Tablet(s) Oral at bedtime  valproate sodium IVPB 500 milliGRAM(s) IV Intermittent every 12 hours    MEDICATIONS  (PRN):  acetaminophen    Suspension .. 650 milliGRAM(s) Oral every 6 hours PRN Temp greater or equal to 38C (100.4F)      Allergies    amoxicillin (Rash)  penicillin (Rash)    Intolerances        Review of Systems: Per current hospital emergency protocol, in an effort to reduce COVID exposures and also conserve PPE for necessary encounters, all data within this chart were reviewed and recommendations are based upon information in the chart and by verbal communication with covering team and/or nurses. Please refer to the ROS and PE per covering primary team note          Vital Signs Last 24 Hrs  T(C): 37.2 (08 May 2020 08:00), Max: 37.7 (08 May 2020 00:00)  T(F): 98.9 (08 May 2020 08:00), Max: 99.9 (08 May 2020 00:00)  HR: 86 (08 May 2020 08:00) (55 - 100)  BP: 147/85 (08 May 2020 08:00) (100/64 - 147/85)  BP(mean): 104 (08 May 2020 08:00) (79 - 126)  RR: 12 (08 May 2020 08:00) (10 - 20)  SpO2: 98% (08 May 2020 08:00) (95% - 100%)    PHYSICAL EXAM: Per current hospital emergency protocol, in an effort to reduce COVID exposures and also conserve PPE for necessary encounters, all data within this chart were reviewed and recommendations are based upon information in the chart and by verbal communication with covering team and/or nurses. Please refer to the ROS and PE per covering primary team note          LABS:                        10.5   7.59  )-----------( 383      ( 08 May 2020 03:15 )             32.7     05-08    148<H>  |  104  |  11  ----------------------------<  87  3.4<L>   |  31  |  0.54    Ca    9.0      08 May 2020 03:15  Phos  3.9     05-08  Mg     2.0     05-08    TPro  7.2  /  Alb  2.8<L>  /  TBili  0.3  /  DBili  x   /  AST  23  /  ALT  14  /  AlkPhos  79  05-08    PT/INR - ( 07 May 2020 02:00 )   PT: 12.0 SEC;   INR: 1.04          PTT - ( 07 May 2020 02:00 )  PTT:28.3 SEC      RADIOLOGY & ADDITIONAL TESTS:

## 2020-05-08 NOTE — CHART NOTE - NSCHARTNOTEFT_GEN_A_CORE
MICU Transfer Note    Transfer from: MICU  Transfer to:  (X) Medicine    (  ) Telemetry    (  ) RCU    (  ) Palliative    (  ) Stroke Unit    (  ) _______________  Accepting physician:    HPI:  MIGNON WALTER is a 60y Female with history of Down Syndrome, hypothyroidism, and recurrent UTI who presented to [facility] on 4/4 for fever, cough, and SOB. Found to be COVID-19+ on 4/6. The patient was intubated on 4/7 for worsening hypercapnic respiratory failure. Extubated and reintubated on 4/16. During hospitalization, required proning to maintain oxygenation. Hospital course was further complicated by NSTEMI (type 2). Patient was seen and evaluated by cardiology, Zeyad Millard, on 4/9. No invasive procedures were performed at that time. Also complicated by seizure, treated with Depakote 500 BID. Patient also followed by nephrology for ATN. Vaginal bleeding noted on 4/21 and was evaluated  by OB/GYN. Found to have iatrogenic trauma from a rectal tube, causing the bleeding. Required 2u pRBC and vaginal packing. Patient seen multiple times by Palliative Care prior to transfer; signed MOLST form from 4/16 states that the patient is DNR; no compressions to be performed. Pt transferred to [facility] MICU for further management.    MICU COURSE:  In the MICU, pt was initially extubated to BiPAP on 5/4, but was reintubated for secretions and hypoxia on 5/5. CXR had shown tube in L bronchus with collapsed R lung, tube pulled back with subsequent improvement. Pt also had seizure activity and was given ativan. Pt received 1u pRBC for low H/H. Palliative was consulted. As per C discussions with family, pt s/p trach and PEG on 5/7. On 5/8, tube feeds started through PEG tube and pt experienced a brief episode of hypoxia likely 2/2 mucous plugging. PEEP was briefly increased to 10 and secretions cleared with subsequent improvement in hypoxemia and pt was able to de-escalated back to previous vent settings. Of note, repeat COVID-19 PCR tests were negative x3 (4/30, 5/1, 5/6). Pt is currently stable and ready for transfer to Medicine floors.      ASSESSMENT & PLAN:   60 year old female with Downs syndrome, hypothyroidism, constipation, seizure disorder with acute hypoxic respiratory failure from COVID 19 pneumonia, course complicated by episode of seizure, type 2 NSTEMI, ADAM (resolved), gram variable bacteremia (repeat cx ngtd); extubated 4/16 and reintubated same day due to secretion issues. Was extubated to bipap on 5/4. Weaned to NC, unable to tolerate. Increased work of breathing on NRB, requiring repeat intubation on 5/5 due to hypoxia and increased work of breathing, s/p trach and PEG on 5/8.    PLAN:    #NEURO  Down Syndrome  - s/p repeat intubation on 5/5  - currently on versed and prop. Wean as tolerated  - s/p trach and peg 5/8    #RESPIRATORY  Acute respiratory distress syndrome 2/2 COVID PNA s/p extubation and reintubated due to secretions on 5/16 and transferred [facility]  - extubated 5/4 and re-intubated 5/5 for hypoxia, and increased work of breathing with inability to remove her secretions   - s/p trach and PEG    #CARDS  - hemodynamically stable     #G.I./NUTRITION  - s/p PEG 5/7  - started tube feeds w/ Jevity on 5/8    #RENAL/  -Maintain Puentes, adequate UOP; goal even for the day.  -Monitor BMP    #ENDO  -ISS. Monitor FS.  -C/w synthroid.  - repeat TSH nml     #HEME  -H/H stable. Hx of acute blood loss anemia due to vaginal bleed during this hospitalization  -Lovenox 30qd  for DVT ppx    #ID  COVID PNA s/p intubation as above  -Sputum 4/25 NGTD, no antibiotics. Repeat Covid negative.    #[facility]  Brother Сергей is HCP.   Code status: DNR.   Residence is [facility]: [phone] ext [phone]      For Follow-Up:          Vital Signs Last 24 Hrs  T(C): 37.4 (08 May 2020 20:00), Max: 37.7 (08 May 2020 00:00)  T(F): 99.3 (08 May 2020 20:00), Max: 99.9 (08 May 2020 00:00)  HR: 89 (08 May 2020 20:47) (77 - 99)  BP: 105/78 (08 May 2020 20:00) (100/64 - 147/85)  BP(mean): 83 (08 May 2020 20:00) (78 - 104)  RR: 11 (08 May 2020 20:00) (11 - 17)  SpO2: 98% (08 May 2020 20:47) (95% - 100%)  I&O's Summary    07 May 2020 07:01  -  08 May 2020 07:00  --------------------------------------------------------  IN: 795.6 mL / OUT: 3745 mL / NET: -2949.4 mL    08 May 2020 07:01  -  08 May 2020 21:21  --------------------------------------------------------  IN: 1116 mL / OUT: 665 mL / NET: 451 mL          MEDICATIONS  (STANDING):  chlorhexidine 0.12% Liquid 15 milliLiter(s) Oral Mucosa every 12 hours  dornase delmar Solution 2.5 milliGRAM(s) Inhalation every 12 hours  enoxaparin Injectable 30 milliGRAM(s) SubCutaneous daily  levETIRAcetam  IVPB 1000 milliGRAM(s) IV Intermittent every 12 hours  levothyroxine Injectable 62.5 MICROGram(s) IV Push at bedtime  polyethylene glycol 3350 17 Gram(s) Oral daily  QUEtiapine 25 milliGRAM(s) Oral daily  senna 2 Tablet(s) Oral at bedtime  valproate sodium IVPB 500 milliGRAM(s) IV Intermittent every 12 hours    MEDICATIONS  (PRN):  acetaminophen    Suspension .. 650 milliGRAM(s) Oral every 6 hours PRN Temp greater or equal to 38C (100.4F)        LABS                                            10.5                  Neurophils% (auto):   69.9   (05-08 @ 03:15):    7.59 )-----------(383          Lymphocytes% (auto):  20.0                                          32.7                   Eosinphils% (auto):   0.1      Manual%: Neutrophils x    ; Lymphocytes x    ; Eosinophils x    ; Bands%: x    ; Blasts x                                    148    |  104    |  11                  Calcium: 9.0   / iCa: x      (05-08 @ 03:15)    ----------------------------<  87        Magnesium: 2.0                              3.4     |  31     |  0.54             Phosphorous: 3.9      TPro  7.2    /  Alb  2.8    /  TBili  0.3    /  DBili  x      /  AST  23     /  ALT  14     /  AlkPhos  79     08 May 2020 03:15 MICU Transfer Note    Transfer from: MICU  Transfer to:  (X) Medicine    (  ) Telemetry    (  ) RCU    (  ) Palliative    (  ) Stroke Unit    (  ) _______________  Accepting physician:    HPI:  MIGNON WALTER is a 60y Female with history of Down Syndrome, hypothyroidism, and recurrent UTI who presented to [facility] on 4/4 for fever, cough, and SOB. Found to be COVID-19+ on 4/6. The patient was intubated on 4/7 for worsening hypercapnic respiratory failure. Extubated and reintubated on 4/16. During hospitalization, required proning to maintain oxygenation. Hospital course was further complicated by NSTEMI (type 2). Patient was seen and evaluated by cardiology, Zeyad Millard, on 4/9. No invasive procedures were performed at that time. Also complicated by seizure, treated with Depakote 500 BID. Patient also followed by nephrology for ATN. Vaginal bleeding noted on 4/21 and was evaluated  by OB/GYN. Found to have iatrogenic trauma from a rectal tube, causing the bleeding. Required 2u pRBC and vaginal packing. Patient seen multiple times by Palliative Care prior to transfer; signed MOLST form from 4/16 states that the patient is DNR; no compressions to be performed. Pt transferred to [facility] MICU for further management.    MICU COURSE:  In the MICU, pt was initially extubated to BiPAP on 5/4, but was reintubated for secretions and hypoxia on 5/5. CXR had shown tube in L bronchus with collapsed R lung, tube pulled back with subsequent improvement. Pt also had seizure activity and was given ativan. Pt received 1u pRBC for low H/H. Palliative was consulted. As per C discussions with family, pt s/p trach and PEG on 5/7. On 5/8, tube feeds started through PEG tube and pt experienced a brief episode of hypoxia likely 2/2 mucous plugging. PEEP was briefly increased to 10 and secretions cleared with subsequent improvement in hypoxemia and pt was able to de-escalated back to previous vent settings. Of note, repeat COVID-19 PCR tests were negative x3 (4/30, 5/1, 5/6). Pt is currently stable and ready for transfer to Medicine floors.      ASSESSMENT & PLAN:   60 year old female with Downs syndrome, hypothyroidism, constipation, seizure disorder with acute hypoxic respiratory failure 2/2 COVID-19 pneumonia, course complicated by episode of seizure, type 2 NSTEMI, ADAM (resolved), gram variable bacteremia (repeat cx ngtd); extubated 4/16 and reintubated same day due to secretion issues. Was extubated to bipap on 5/4. Weaned to NC, unable to tolerate. Increased work of breathing on NRB, requiring repeat intubation on 5/5 due to hypoxia and increased work of breathing, s/p trach and PEG on 5/8.    PLAN:    #NEURO  Down Syndrome  - s/p repeat intubation on 5/5  - currently on versed and prop. Wean as tolerated  - s/p trach and peg 5/8    #RESPIRATORY  Acute respiratory distress syndrome 2/2 COVID PNA s/p extubation and reintubated due to secretions on 5/16 and transferred [facility]  - extubated 5/4 and re-intubated 5/5 for hypoxia, and increased work of breathing with inability to remove her secretions   - s/p trach and PEG    #CARDS  - hemodynamically stable     #G.I./NUTRITION  - s/p PEG 5/7  - started tube feeds w/ Jevity on 5/8    #RENAL/  -Maintain Puentes, adequate UOP; goal even for the day.  -Monitor BMP    #ENDO  -ISS. Monitor FS.  -C/w synthroid.  - repeat TSH nml     #HEME  -H/H stable. Hx of acute blood loss anemia due to vaginal bleed during this hospitalization  -Lovenox 30qd  for DVT ppx    #ID  COVID PNA s/p intubation as above  -Sputum 4/25 NGTD, no antibiotics. Repeat Covid negative.    #[facility]  Brother Сергей is HCP.   Code status: DNR.   Residence is [facility]: [phone] ext [phone]      For Follow-Up:          Vital Signs Last 24 Hrs  T(C): 37.4 (08 May 2020 20:00), Max: 37.7 (08 May 2020 00:00)  T(F): 99.3 (08 May 2020 20:00), Max: 99.9 (08 May 2020 00:00)  HR: 89 (08 May 2020 20:47) (77 - 99)  BP: 105/78 (08 May 2020 20:00) (100/64 - 147/85)  BP(mean): 83 (08 May 2020 20:00) (78 - 104)  RR: 11 (08 May 2020 20:00) (11 - 17)  SpO2: 98% (08 May 2020 20:47) (95% - 100%)  I&O's Summary    07 May 2020 07:01  -  08 May 2020 07:00  --------------------------------------------------------  IN: 795.6 mL / OUT: 3745 mL / NET: -2949.4 mL    08 May 2020 07:01  -  08 May 2020 21:21  --------------------------------------------------------  IN: 1116 mL / OUT: 665 mL / NET: 451 mL          MEDICATIONS  (STANDING):  chlorhexidine 0.12% Liquid 15 milliLiter(s) Oral Mucosa every 12 hours  dornase delmar Solution 2.5 milliGRAM(s) Inhalation every 12 hours  enoxaparin Injectable 30 milliGRAM(s) SubCutaneous daily  levETIRAcetam  IVPB 1000 milliGRAM(s) IV Intermittent every 12 hours  levothyroxine Injectable 62.5 MICROGram(s) IV Push at bedtime  polyethylene glycol 3350 17 Gram(s) Oral daily  QUEtiapine 25 milliGRAM(s) Oral daily  senna 2 Tablet(s) Oral at bedtime  valproate sodium IVPB 500 milliGRAM(s) IV Intermittent every 12 hours    MEDICATIONS  (PRN):  acetaminophen    Suspension .. 650 milliGRAM(s) Oral every 6 hours PRN Temp greater or equal to 38C (100.4F)        LABS                                            10.5                  Neurophils% (auto):   69.9   (05-08 @ 03:15):    7.59 )-----------(383          Lymphocytes% (auto):  20.0                                          32.7                   Eosinphils% (auto):   0.1      Manual%: Neutrophils x    ; Lymphocytes x    ; Eosinophils x    ; Bands%: x    ; Blasts x                                    148    |  104    |  11                  Calcium: 9.0   / iCa: x      (05-08 @ 03:15)    ----------------------------<  87        Magnesium: 2.0                              3.4     |  31     |  0.54             Phosphorous: 3.9      TPro  7.2    /  Alb  2.8    /  TBili  0.3    /  DBili  x      /  AST  23     /  ALT  14     /  AlkPhos  79     08 May 2020 03:15 MICU Transfer Note    Transfer from: MICU  Transfer to:  (X) Medicine    (  ) Telemetry    (  ) RCU    (  ) Palliative    (  ) Stroke Unit    (  ) _______________  Accepting physician:    HPI:  MIGNON WALTER is a 60y Female with history of Down Syndrome, hypothyroidism, and recurrent UTI who presented to [facility] on 4/4 for fever, cough, and SOB. Found to be COVID-19+ on 4/6. The patient was intubated on 4/7 for worsening hypercapnic respiratory failure. Extubated and reintubated on 4/16. During hospitalization, required proning to maintain oxygenation. Hospital course was further complicated by NSTEMI (type 2). Patient was seen and evaluated by cardiology, Zeyad Millard, on 4/9. No invasive procedures were performed at that time. Also complicated by seizure, treated with Depakote 500 BID. Patient also followed by nephrology for ATN. Vaginal bleeding noted on 4/21 and was evaluated  by OB/GYN. Found to have iatrogenic trauma from a rectal tube, causing the bleeding. Required 2u pRBC and vaginal packing. Patient seen multiple times by Palliative Care prior to transfer; signed MOLST form from 4/16 states that the patient is DNR; no compressions to be performed. Pt transferred to [facility] MICU for further management.    MICU COURSE:  In the MICU, pt was initially extubated to BiPAP on 5/4, but was reintubated for secretions and hypoxia on 5/5. CXR had shown tube in L bronchus with collapsed R lung, tube pulled back with subsequent improvement. Pt also had seizure activity and was given ativan. Pt received 1u pRBC for low H/H. Pt required pressors for hypotension, now weaned off. Palliative was consulted. As per [facility] discussions with family, pt s/p trach and PEG on 5/7. On 5/8, tube feeds started through PEG tube and pt experienced a brief episode of hypoxia likely 2/2 mucous plugging. PEEP was briefly increased to 10 and secretions cleared with subsequent improvement in hypoxemia and pt was able to de-escalated back to previous vent settings. Of note, repeat COVID-19 PCR tests were negative x3 (4/30, 5/1, 5/6). Pt is currently stable and ready for transfer to Medicine floors.      ASSESSMENT & PLAN:   60 year old female with Downs syndrome, hypothyroidism, constipation, seizure disorder with acute hypoxic respiratory failure 2/2 COVID-19 pneumonia. Course complicated by episode of seizure, type 2 NSTEMI, ADAM (resolved), gram variable bacteremia (repeat cx ngtd); extubated 4/16 and reintubated same day due to secretion issues. Was extubated to bipap on 5/4, but required repeat intubation on 5/5 due to hypoxia and increased work of breathing, now s/p trach and PEG on 5/7.    #NEURO  Hx of Down Syndrome and seizure disorder  - s/p repeat intubation on 5/5, now s/p trach and PEG on 5/7  - off sedation  - c/w Seroquel 25mg PO daily  - c/w Keppra 1000mg BID for seizures      #RESPIRATORY  Acute respiratory distress syndrome 2/2 COVID PNA s/p extubation and reintubated due to secretions on 4/16 and transferred [facility] on 4/22  - extubated 5/4 and re-intubated 5/5 for hypoxia, and increased work of breathing with inability to remove her secretions   - s/p trach and PEG on 5/7  - c/b mucous plugging  - c/w dornase delmar 2.5mg BID and frequent suctioning for secretions and adjust vent settings PRN    #CARDS  - hemodynamically stable   - off pressors, continue to monitor BP    #G.I./NUTRITION  - s/p PEG 5/7  - started tube feeds w/ Jevity on 5/8  - c/w Miralax 17g PO daily for constipation    #RENAL/  - Maintain Puentes, adequate UOP; goal even for the day.  - SCr stable and at baseline  - Monitor BMP    #ENDO  Hx of Hypothyroidism  - c/w synthroid  - repeat TSH nml     #HEME  - H/H stable. Hx of acute blood loss anemia 2/2 iatrogenic vaginal bleed during this hospitalization  - Lovenox 30qd  for DVT ppx    #ID  COVID PNA s/p intubation as above  - Sputum 4/25 NGTD, monitor off abx  - currently afebrile and no leukocytosis, continue to monitor  - Tylenol PRN for fever/pain  - Repeat COVID-19 PCR negative x3 (on 4/30, 5/1, 5/6)    #[facility]  Brother Сергей is HCP.   Code status: DNR.   Residence is [facility]: [phone] ext [phone]      For Follow-Up:  [ ] c/w dornase delmar 2.5mg BID and frequent suctioning PRN for secretions, can also consider chest PT  [ ] continue to monitor respiratory status, wean trach as tolerated  [ ] c/w tube feeds through PEG tube  [ ] c/w Keppra 1000mg BID for seizures    --------------------------------------------------  Vital Signs Last 24 Hrs  T(C): 37.4 (08 May 2020 20:00), Max: 37.7 (08 May 2020 00:00)  T(F): 99.3 (08 May 2020 20:00), Max: 99.9 (08 May 2020 00:00)  HR: 89 (08 May 2020 20:47) (77 - 99)  BP: 105/78 (08 May 2020 20:00) (100/64 - 147/85)  BP(mean): 83 (08 May 2020 20:00) (78 - 104)  RR: 11 (08 May 2020 20:00) (11 - 17)  SpO2: 98% (08 May 2020 20:47) (95% - 100%)  I&O's Summary    07 May 2020 07:01  -  08 May 2020 07:00  --------------------------------------------------------  IN: 795.6 mL / OUT: 3745 mL / NET: -2949.4 mL    08 May 2020 07:01  -  08 May 2020 21:21  --------------------------------------------------------  IN: 1116 mL / OUT: 665 mL / NET: 451 mL          MEDICATIONS  (STANDING):  chlorhexidine 0.12% Liquid 15 milliLiter(s) Oral Mucosa every 12 hours  dornase delmar Solution 2.5 milliGRAM(s) Inhalation every 12 hours  enoxaparin Injectable 30 milliGRAM(s) SubCutaneous daily  levETIRAcetam  IVPB 1000 milliGRAM(s) IV Intermittent every 12 hours  levothyroxine Injectable 62.5 MICROGram(s) IV Push at bedtime  polyethylene glycol 3350 17 Gram(s) Oral daily  QUEtiapine 25 milliGRAM(s) Oral daily  senna 2 Tablet(s) Oral at bedtime  valproate sodium IVPB 500 milliGRAM(s) IV Intermittent every 12 hours    MEDICATIONS  (PRN):  acetaminophen    Suspension .. 650 milliGRAM(s) Oral every 6 hours PRN Temp greater or equal to 38C (100.4F)        LABS                                            10.5                  Neurophils% (auto):   69.9   (05-08 @ 03:15):    7.59 )-----------(383          Lymphocytes% (auto):  20.0                                          32.7                   Eosinphils% (auto):   0.1      Manual%: Neutrophils x    ; Lymphocytes x    ; Eosinophils x    ; Bands%: x    ; Blasts x                                    148    |  104    |  11                  Calcium: 9.0   / iCa: x      (05-08 @ 03:15)    ----------------------------<  87        Magnesium: 2.0                              3.4     |  31     |  0.54             Phosphorous: 3.9      TPro  7.2    /  Alb  2.8    /  TBili  0.3    /  DBili  x      /  AST  23     /  ALT  14     /  AlkPhos  79     08 May 2020 03:15 MICU Transfer Note    Transfer from: MICU  Transfer to:  (X) Medicine    (  ) Telemetry    (  ) RCU    (  ) Palliative    (  ) Stroke Unit    (  ) _______________  Accepting physician:    HPI:  MIGNON WALTER is a 60y Female with history of Down Syndrome, hypothyroidism, and recurrent UTI who presented to [facility] on 4/4 for fever, cough, and SOB. Found to be COVID-19+ on 4/6. The patient was intubated on 4/7 for worsening hypercapnic respiratory failure. Extubated and reintubated on 4/16. During hospitalization, required proning to maintain oxygenation. Hospital course was further complicated by NSTEMI (type 2). Patient was seen and evaluated by cardiology, Zeyad Millard, on 4/9. No invasive procedures were performed at that time. Also complicated by seizure, treated with Depakote 500 BID. Patient also followed by nephrology for ATN. Vaginal bleeding noted on 4/21 and was evaluated  by OB/GYN. Found to have iatrogenic trauma from a rectal tube, causing the bleeding. Required 2u pRBC and vaginal packing. Patient seen multiple times by Palliative Care prior to transfer; signed MOLST form from 4/16 states that the patient is DNR; no compressions to be performed. Pt transferred to [facility] MICU for further management.    MICU COURSE:  In the MICU, pt was initially extubated to BiPAP on 5/4, but was reintubated for secretions and hypoxia on 5/5. CXR had shown tube in L bronchus with collapsed R lung, tube pulled back with subsequent improvement. Pt also had seizure activity and was given ativan. Pt received 1u pRBC for low H/H. Pt required pressors for hypotension, now weaned off. Palliative was consulted. As per [facility] discussions with family, pt s/p trach and PEG on 5/7. On 5/8, tube feeds started through PEG tube and pt experienced a brief episode of hypoxia likely 2/2 mucous plugging. PEEP was briefly increased to 10 and secretions cleared with subsequent improvement in hypoxemia and pt was able to de-escalated back to previous vent settings. Of note, repeat COVID-19 PCR tests were negative x3 (4/30, 5/1, 5/6). Pt is currently stable and ready for transfer to Medicine floors.      ASSESSMENT & PLAN:   60 year old female with Downs syndrome, hypothyroidism, constipation, seizure disorder with acute hypoxic respiratory failure 2/2 COVID-19 pneumonia. Course complicated by episode of seizure, type 2 NSTEMI, ADAM (resolved), gram variable bacteremia (repeat cx ngtd); extubated 4/16 and reintubated same day due to secretion issues. Was extubated to bipap on 5/4, but required repeat intubation on 5/5 due to hypoxia and increased work of breathing, now s/p trach and PEG on 5/7.    #NEURO  Hx of Down Syndrome and seizure disorder  - s/p repeat intubation on 5/5, now s/p trach and PEG on 5/7  - off sedation  - c/w Seroquel 25mg PO daily  - c/w Keppra 1000mg BID and Valproate 500mg BID for seizures      #RESPIRATORY  Acute respiratory distress syndrome 2/2 COVID PNA s/p extubation and reintubated due to secretions on 4/16 and transferred [facility] on 4/22  - extubated 5/4 and re-intubated 5/5 for hypoxia, and increased work of breathing with inability to remove her secretions   - s/p trach and PEG on 5/7  - c/b mucous plugging  - c/w dornase delmar 2.5mg BID and frequent suctioning for secretions and adjust vent settings PRN    #CARDS  - hemodynamically stable   - off pressors, continue to monitor BP    #G.I./NUTRITION  - s/p PEG 5/7  - started tube feeds w/ Jevity on 5/8  - c/w Senna 2 tabs PO qhs and Miralax 17g PO daily for constipation    #RENAL/  - Maintain Puentes, adequate UOP; goal even for the day.  - SCr stable and at baseline  - Monitor BMP    #ENDO  Hx of Hypothyroidism  - c/w synthroid  - repeat TSH nml     #HEME  - H/H stable. Hx of acute blood loss anemia 2/2 iatrogenic vaginal bleed during this hospitalization  - Lovenox 30qd  for DVT ppx    #ID  COVID PNA s/p intubation as above  - Sputum 4/25 NGTD, monitor off abx  - currently afebrile and no leukocytosis, continue to monitor  - Tylenol PRN for fever/pain  - Repeat COVID-19 PCR negative x3 (on 4/30, 5/1, 5/6)    #[facility]  Brother Сергей is HCP.   Code status: DNR.   Residence is [facility]: [phone] ext [phone]      For Follow-Up:  [ ] c/w dornase delmar 2.5mg BID and frequent suctioning PRN for secretions, can also consider chest PT  [ ] continue to monitor respiratory status, wean trach as tolerated  [ ] c/w tube feeds through PEG tube  [ ] c/w Keppra 1000mg BID and Valproate 500mg BID for seizures    --------------------------------------------------  Vital Signs Last 24 Hrs  T(C): 37.4 (08 May 2020 20:00), Max: 37.7 (08 May 2020 00:00)  T(F): 99.3 (08 May 2020 20:00), Max: 99.9 (08 May 2020 00:00)  HR: 89 (08 May 2020 20:47) (77 - 99)  BP: 105/78 (08 May 2020 20:00) (100/64 - 147/85)  BP(mean): 83 (08 May 2020 20:00) (78 - 104)  RR: 11 (08 May 2020 20:00) (11 - 17)  SpO2: 98% (08 May 2020 20:47) (95% - 100%)  I&O's Summary    07 May 2020 07:01  -  08 May 2020 07:00  --------------------------------------------------------  IN: 795.6 mL / OUT: 3745 mL / NET: -2949.4 mL    08 May 2020 07:01  -  08 May 2020 21:21  --------------------------------------------------------  IN: 1116 mL / OUT: 665 mL / NET: 451 mL          MEDICATIONS  (STANDING):  chlorhexidine 0.12% Liquid 15 milliLiter(s) Oral Mucosa every 12 hours  dornase delmar Solution 2.5 milliGRAM(s) Inhalation every 12 hours  enoxaparin Injectable 30 milliGRAM(s) SubCutaneous daily  levETIRAcetam  IVPB 1000 milliGRAM(s) IV Intermittent every 12 hours  levothyroxine Injectable 62.5 MICROGram(s) IV Push at bedtime  polyethylene glycol 3350 17 Gram(s) Oral daily  QUEtiapine 25 milliGRAM(s) Oral daily  senna 2 Tablet(s) Oral at bedtime  valproate sodium IVPB 500 milliGRAM(s) IV Intermittent every 12 hours    MEDICATIONS  (PRN):  acetaminophen    Suspension .. 650 milliGRAM(s) Oral every 6 hours PRN Temp greater or equal to 38C (100.4F)        LABS                                            10.5                  Neurophils% (auto):   69.9   (05-08 @ 03:15):    7.59 )-----------(383          Lymphocytes% (auto):  20.0                                          32.7                   Eosinphils% (auto):   0.1      Manual%: Neutrophils x    ; Lymphocytes x    ; Eosinophils x    ; Bands%: x    ; Blasts x                                    148    |  104    |  11                  Calcium: 9.0   / iCa: x      (05-08 @ 03:15)    ----------------------------<  87        Magnesium: 2.0                              3.4     |  31     |  0.54             Phosphorous: 3.9      TPro  7.2    /  Alb  2.8    /  TBili  0.3    /  DBili  x      /  AST  23     /  ALT  14     /  AlkPhos  79     08 May 2020 03:15 MICU Transfer Note    Transfer from: MICU  Transfer to:  (X) Medicine    (  ) Telemetry    (  ) RCU    (  ) Palliative    (  ) Stroke Unit    (  ) _______________  Accepting physician:     HPI:  MIGNON WALTER is a 60y Female with history of Down Syndrome, hypothyroidism, and recurrent UTI who presented to [facility] on 4/4 for fever, cough, and SOB. Found to be COVID-19+ on 4/6. The patient was intubated on 4/7 for worsening hypercapnic respiratory failure. Extubated and reintubated on 4/16. During hospitalization, required proning to maintain oxygenation. Hospital course was further complicated by NSTEMI (type 2). Patient was seen and evaluated by cardiology, Zeyad Millard, on 4/9. No invasive procedures were performed at that time. Also complicated by seizure, treated with Depakote 500 BID. Patient also followed by nephrology for ATN. Vaginal bleeding noted on 4/21 and was evaluated  by OB/GYN. Found to have iatrogenic trauma from a rectal tube, causing the bleeding. Required 2u pRBC and vaginal packing. Patient seen multiple times by Palliative Care prior to transfer; signed MOLST form from 4/16 states that the patient is DNR; no compressions to be performed. Pt transferred to [facility] MICU for further management.    MICU COURSE:  In the MICU, pt was initially extubated to BiPAP on 5/4, but was reintubated for secretions and hypoxia on 5/5. CXR had shown tube in L bronchus with collapsed R lung, tube pulled back with subsequent improvement. Pt also had seizure activity and was given ativan. Pt received 1u pRBC for low H/H. Pt required pressors for hypotension, now weaned off. Palliative was consulted. As per [facility] discussions with family, pt s/p trach and PEG on 5/7. On 5/8, tube feeds started through PEG tube and pt experienced a brief episode of hypoxia likely 2/2 mucous plugging. PEEP was briefly increased to 10 and secretions cleared with subsequent improvement in hypoxemia and pt was able to de-escalated back to previous vent settings. Of note, repeat COVID-19 PCR tests were negative x3 (4/30, 5/1, 5/6). Pt is currently stable and ready for transfer to Medicine floors.      ASSESSMENT & PLAN:   60 year old female with Downs syndrome, hypothyroidism, constipation, seizure disorder with acute hypoxic respiratory failure 2/2 COVID-19 pneumonia. Course complicated by episode of seizure, type 2 NSTEMI, ADAM (resolved), gram variable bacteremia (repeat cx ngtd); extubated 4/16 and reintubated same day due to secretion issues. Was extubated to bipap on 5/4, but required repeat intubation on 5/5 due to hypoxia and increased work of breathing, now s/p trach and PEG on 5/7.    #NEURO  Hx of Down Syndrome and seizure disorder  - s/p repeat intubation on 5/5, now s/p trach and PEG on 5/7  - off sedation  - c/w Seroquel 25mg PO daily  - c/w Keppra 1000mg BID and Valproate 500mg BID for seizures      #RESPIRATORY  Acute respiratory distress syndrome 2/2 COVID PNA s/p extubation and reintubated due to secretions on 4/16 and transferred [facility] on 4/22  - extubated 5/4 and re-intubated 5/5 for hypoxia, and increased work of breathing with inability to remove her secretions   - s/p trach and PEG on 5/7  - c/b mucous plugging  - c/w dornase delmar 2.5mg BID and frequent suctioning for secretions and adjust vent settings PRN    #CARDS  - hemodynamically stable   - off pressors, continue to monitor BP    #G.I./NUTRITION  - s/p PEG 5/7  - started tube feeds w/ Jevity on 5/8  - c/w Senna 2 tabs PO qhs and Miralax 17g PO daily for constipation    #RENAL/  - Maintain Puentes, adequate UOP; goal even for the day.  - SCr stable and at baseline  - Monitor BMP    #ENDO  Hx of Hypothyroidism  - c/w synthroid  - repeat TSH nml     #HEME  - H/H stable. Hx of acute blood loss anemia 2/2 iatrogenic vaginal bleed during this hospitalization  - Lovenox 30qd  for DVT ppx    #ID  COVID PNA s/p intubation as above  - Sputum 4/25 NGTD, monitor off abx  - currently afebrile and no leukocytosis, continue to monitor  - Tylenol PRN for fever/pain  - Repeat COVID-19 PCR negative x3 (on 4/30, 5/1, 5/6)    #[facility]  Brother Сергей is HCP.   Code status: DNR.   Residence is [facility]: [phone] ext [phone]      For Follow-Up:  [ ] c/w dornase delmar 2.5mg BID and frequent suctioning PRN for secretions, can also consider chest PT  [ ] continue to monitor respiratory status, wean trach as tolerated  [ ] c/w tube feeds through PEG tube  [ ] c/w Keppra 1000mg BID and Valproate 500mg BID for seizures    --------------------------------------------------  Vital Signs Last 24 Hrs  T(C): 37.4 (08 May 2020 20:00), Max: 37.7 (08 May 2020 00:00)  T(F): 99.3 (08 May 2020 20:00), Max: 99.9 (08 May 2020 00:00)  HR: 89 (08 May 2020 20:47) (77 - 99)  BP: 105/78 (08 May 2020 20:00) (100/64 - 147/85)  BP(mean): 83 (08 May 2020 20:00) (78 - 104)  RR: 11 (08 May 2020 20:00) (11 - 17)  SpO2: 98% (08 May 2020 20:47) (95% - 100%)  I&O's Summary    07 May 2020 07:01  -  08 May 2020 07:00  --------------------------------------------------------  IN: 795.6 mL / OUT: 3745 mL / NET: -2949.4 mL    08 May 2020 07:01  -  08 May 2020 21:21  --------------------------------------------------------  IN: 1116 mL / OUT: 665 mL / NET: 451 mL          MEDICATIONS  (STANDING):  chlorhexidine 0.12% Liquid 15 milliLiter(s) Oral Mucosa every 12 hours  dornase delmar Solution 2.5 milliGRAM(s) Inhalation every 12 hours  enoxaparin Injectable 30 milliGRAM(s) SubCutaneous daily  levETIRAcetam  IVPB 1000 milliGRAM(s) IV Intermittent every 12 hours  levothyroxine Injectable 62.5 MICROGram(s) IV Push at bedtime  polyethylene glycol 3350 17 Gram(s) Oral daily  QUEtiapine 25 milliGRAM(s) Oral daily  senna 2 Tablet(s) Oral at bedtime  valproate sodium IVPB 500 milliGRAM(s) IV Intermittent every 12 hours    MEDICATIONS  (PRN):  acetaminophen    Suspension .. 650 milliGRAM(s) Oral every 6 hours PRN Temp greater or equal to 38C (100.4F)        LABS                                            10.5                  Neurophils% (auto):   69.9   (05-08 @ 03:15):    7.59 )-----------(383          Lymphocytes% (auto):  20.0                                          32.7                   Eosinphils% (auto):   0.1      Manual%: Neutrophils x    ; Lymphocytes x    ; Eosinophils x    ; Bands%: x    ; Blasts x                                    148    |  104    |  11                  Calcium: 9.0   / iCa: x      (05-08 @ 03:15)    ----------------------------<  87        Magnesium: 2.0                              3.4     |  31     |  0.54             Phosphorous: 3.9      TPro  7.2    /  Alb  2.8    /  TBili  0.3    /  DBili  x      /  AST  23     /  ALT  14     /  AlkPhos  79     08 May 2020 03:15 MICU Transfer Note    Transfer from: MICU  Transfer to:  (X) Medicine    (  ) Telemetry    (  ) RCU    (  ) Palliative    (  ) Stroke Unit    (  ) _______________  Accepting physician: Dr. Lisker    HPI:  MIGNON WALTER is a 60y Female with history of Down Syndrome, hypothyroidism, and recurrent UTI who presented to [facility] on 4/4 for fever, cough, and SOB. Found to be COVID-19+ on 4/6. The patient was intubated on 4/7 for worsening hypercapnic respiratory failure. Extubated and reintubated on 4/16. During hospitalization, required proning to maintain oxygenation. Hospital course was further complicated by NSTEMI (type 2). Patient was seen and evaluated by cardiology, Zeyad Millard, on 4/9. No invasive procedures were performed at that time. Also complicated by seizure, treated with Depakote 500 BID. Patient also followed by nephrology for ATN. Vaginal bleeding noted on 4/21 and was evaluated  by OB/GYN. Found to have iatrogenic trauma from a rectal tube, causing the bleeding. Required 2u pRBC and vaginal packing. Patient seen multiple times by Palliative Care prior to transfer; signed MOLST form from 4/16 states that the patient is DNR; no compressions to be performed. Pt transferred to [facility] MICU for further management.    MICU COURSE:  In the MICU, pt was initially extubated to BiPAP on 5/4, but was reintubated for secretions and hypoxia on 5/5. CXR had shown tube in L bronchus with collapsed R lung, tube pulled back with subsequent improvement. Pt also had seizure activity and was given ativan. Pt received 1u pRBC for low H/H. Pt required pressors for hypotension, now weaned off. Palliative was consulted. As per [facility] discussions with family, pt s/p trach and PEG on 5/7. On 5/8, tube feeds started through PEG tube and pt experienced a brief episode of hypoxia likely 2/2 mucous plugging. PEEP was briefly increased to 10 and secretions cleared with subsequent improvement in hypoxemia and pt was able to de-escalated back to previous vent settings. Of note, repeat COVID-19 PCR tests were negative x3 (4/30, 5/1, 5/6). Pt is currently stable and ready for transfer to Medicine floors.      ASSESSMENT & PLAN:   60 year old female with Downs syndrome, hypothyroidism, constipation, seizure disorder with acute hypoxic respiratory failure 2/2 COVID-19 pneumonia. Course complicated by episode of seizure, type 2 NSTEMI, ADAM (resolved), gram variable bacteremia (repeat cx ngtd); extubated 4/16 and reintubated same day due to secretion issues. Was extubated to bipap on 5/4, but required repeat intubation on 5/5 due to hypoxia and increased work of breathing, now s/p trach and PEG on 5/7.    #NEURO  Hx of Down Syndrome and seizure disorder  - s/p repeat intubation on 5/5, now s/p trach and PEG on 5/7  - off sedation  - c/w Seroquel 25mg PO daily  - c/w Keppra 1000mg BID and Valproate 500mg BID for seizures      #RESPIRATORY  Acute respiratory distress syndrome 2/2 COVID PNA s/p extubation and reintubated due to secretions on 4/16 and transferred [facility] on 4/22  - extubated 5/4 and re-intubated 5/5 for hypoxia, and increased work of breathing with inability to remove her secretions   - s/p trach and PEG on 5/7  - c/b mucous plugging  - c/w dornase delmar 2.5mg BID and frequent suctioning for secretions and adjust vent settings PRN    #CARDS  - hemodynamically stable   - off pressors, continue to monitor BP    #G.I./NUTRITION  - s/p PEG 5/7  - started tube feeds w/ Jevity on 5/8  - c/w Senna 2 tabs PO qhs and Miralax 17g PO daily for constipation    #RENAL/  - Maintain Puentes, adequate UOP; goal even for the day.  - SCr stable and at baseline  - Monitor BMP    #ENDO  Hx of Hypothyroidism  - c/w synthroid  - repeat TSH nml     #HEME  - H/H stable. Hx of acute blood loss anemia 2/2 iatrogenic vaginal bleed during this hospitalization  - Lovenox 30qd  for DVT ppx    #ID  COVID PNA s/p intubation as above  - Sputum 4/25 NGTD, monitor off abx  - currently afebrile and no leukocytosis, continue to monitor  - Tylenol PRN for fever/pain  - Repeat COVID-19 PCR negative x3 (on 4/30, 5/1, 5/6)    #[facility]  Brother Сергей is HCP.   Code status: DNR.   Residence is [facility]: [phone] ext [phone]      For Follow-Up:  [ ] c/w dornase delmar 2.5mg BID and frequent suctioning PRN for secretions, can also consider chest PT  [ ] continue to monitor respiratory status, wean trach as tolerated  [ ] c/w tube feeds through PEG tube  [ ] c/w Keppra 1000mg BID and Valproate 500mg BID for seizures    [X] HIC and MAR notified    --------------------------------------------------  Vital Signs Last 24 Hrs  T(C): 37.4 (08 May 2020 20:00), Max: 37.7 (08 May 2020 00:00)  T(F): 99.3 (08 May 2020 20:00), Max: 99.9 (08 May 2020 00:00)  HR: 89 (08 May 2020 20:47) (77 - 99)  BP: 105/78 (08 May 2020 20:00) (100/64 - 147/85)  BP(mean): 83 (08 May 2020 20:00) (78 - 104)  RR: 11 (08 May 2020 20:00) (11 - 17)  SpO2: 98% (08 May 2020 20:47) (95% - 100%)  I&O's Summary    07 May 2020 07:01  -  08 May 2020 07:00  --------------------------------------------------------  IN: 795.6 mL / OUT: 3745 mL / NET: -2949.4 mL    08 May 2020 07:01  -  08 May 2020 21:21  --------------------------------------------------------  IN: 1116 mL / OUT: 665 mL / NET: 451 mL          MEDICATIONS  (STANDING):  chlorhexidine 0.12% Liquid 15 milliLiter(s) Oral Mucosa every 12 hours  dornase delmar Solution 2.5 milliGRAM(s) Inhalation every 12 hours  enoxaparin Injectable 30 milliGRAM(s) SubCutaneous daily  levETIRAcetam  IVPB 1000 milliGRAM(s) IV Intermittent every 12 hours  levothyroxine Injectable 62.5 MICROGram(s) IV Push at bedtime  polyethylene glycol 3350 17 Gram(s) Oral daily  QUEtiapine 25 milliGRAM(s) Oral daily  senna 2 Tablet(s) Oral at bedtime  valproate sodium IVPB 500 milliGRAM(s) IV Intermittent every 12 hours    MEDICATIONS  (PRN):  acetaminophen    Suspension .. 650 milliGRAM(s) Oral every 6 hours PRN Temp greater or equal to 38C (100.4F)        LABS                                            10.5                  Neurophils% (auto):   69.9   (05-08 @ 03:15):    7.59 )-----------(383          Lymphocytes% (auto):  20.0                                          32.7                   Eosinphils% (auto):   0.1      Manual%: Neutrophils x    ; Lymphocytes x    ; Eosinophils x    ; Bands%: x    ; Blasts x                                    148    |  104    |  11                  Calcium: 9.0   / iCa: x      (05-08 @ 03:15)    ----------------------------<  87        Magnesium: 2.0                              3.4     |  31     |  0.54             Phosphorous: 3.9      TPro  7.2    /  Alb  2.8    /  TBili  0.3    /  DBili  x      /  AST  23     /  ALT  14     /  AlkPhos  79     08 May 2020 03:15 MICU Transfer Note    Transfer from: MICU  Transfer to:  ( ) Medicine    (  ) Telemetry    (X) RCU    (  ) Palliative    (  ) Stroke Unit    (  ) _______________  Accepting physician: Dr. Lisker    HPI:  MIGNON WALTER is a 60y Female with history of Down Syndrome, hypothyroidism, and recurrent UTI who presented to [facility] on 4/4 for fever, cough, and SOB. Found to be COVID-19+ on 4/6. The patient was intubated on 4/7 for worsening hypercapnic respiratory failure. Extubated and reintubated on 4/16. During hospitalization, required proning to maintain oxygenation. Hospital course was further complicated by NSTEMI (type 2). Patient was seen and evaluated by cardiology, Zeyad Millard, on 4/9. No invasive procedures were performed at that time. Also complicated by seizure, treated with Depakote 500 BID. Patient also followed by nephrology for ATN. Vaginal bleeding noted on 4/21 and was evaluated  by OB/GYN. Found to have iatrogenic trauma from a rectal tube, causing the bleeding. Required 2u pRBC and vaginal packing. Patient seen multiple times by Palliative Care prior to transfer; signed MOLST form from 4/16 states that the patient is DNR; no compressions to be performed. Pt transferred to [facility] MICU for further management.    MICU COURSE:  In the MICU, pt was initially extubated to BiPAP on 5/4, but was reintubated for secretions and hypoxia on 5/5. CXR had shown tube in L bronchus with collapsed R lung, tube pulled back with subsequent improvement. Pt also had seizure activity and was given ativan. Pt received 1u pRBC for low H/H. Pt required pressors for hypotension, now weaned off. Palliative was consulted. As per [facility] discussions with family, pt s/p trach and PEG on 5/7. On 5/8, tube feeds started through PEG tube and pt experienced a brief episode of hypoxia likely 2/2 mucous plugging. PEEP was briefly increased to 10 and secretions cleared with subsequent improvement in hypoxemia and pt was able to de-escalated back to previous vent settings. Of note, repeat COVID-19 PCR tests were negative x3 (4/30, 5/1, 5/6). Pt is currently stable and ready for transfer out of ICU.      ASSESSMENT & PLAN:   60 year old female with Downs syndrome, hypothyroidism, constipation, seizure disorder with acute hypoxic respiratory failure 2/2 COVID-19 pneumonia. Course complicated by episode of seizure, type 2 NSTEMI, ADAM (resolved), gram variable bacteremia (repeat cx ngtd); extubated 4/16 and reintubated same day due to secretion issues. Was extubated to bipap on 5/4, but required repeat intubation on 5/5 due to hypoxia and increased work of breathing, now s/p trach and PEG on 5/7.    #NEURO  Hx of Down Syndrome and seizure disorder  - s/p repeat intubation on 5/5, now s/p trach and PEG on 5/7  - off sedation  - c/w Seroquel 25mg PO daily  - c/w Keppra 1000mg BID and Valproate 500mg BID for seizures      #RESPIRATORY  Acute respiratory distress syndrome 2/2 COVID PNA s/p extubation and reintubated due to secretions on 4/16 and transferred [facility] on 4/22  - extubated 5/4 and re-intubated 5/5 for hypoxia, and increased work of breathing with inability to remove her secretions   - s/p trach and PEG on 5/7  - c/b mucous plugging  - c/w dornase delmar 2.5mg BID and frequent suctioning for secretions and adjust vent settings PRN    #CARDS  - hemodynamically stable   - off pressors, continue to monitor BP    #G.I./NUTRITION  - s/p PEG 5/7  - started tube feeds w/ Jevity on 5/8  - c/w Senna 2 tabs PO qhs and Miralax 17g PO daily for constipation    #RENAL/  - Maintain [place], adequate UOP; goal even for the day.  - SCr stable and at baseline  - Monitor BMP    #ENDO  Hx of Hypothyroidism  - c/w synthroid  - repeat TSH nml     #HEME  - H/H stable. Hx of acute blood loss anemia 2/2 iatrogenic vaginal bleed during this hospitalization  - Lovenox 30qd  for DVT ppx    #ID  COVID PNA s/p intubation as above  - Sputum 4/25 NGTD, monitor off abx  - currently afebrile and no leukocytosis, continue to monitor  - Tylenol PRN for fever/pain  - Repeat COVID-19 PCR negative x3 (on 4/30, 5/1, 5/6)    #[facility]  Brother Сергей is HCP.   Code status: DNR.   Residence is [facility]: [phone]      For Follow-Up:  [ ] c/w dornase delmar 2.5mg BID and frequent suctioning PRN for secretions, can also consider chest PT  [ ] continue to monitor respiratory status, wean trach as tolerated  [ ] c/w tube feeds through PEG tube  [ ] c/w Keppra 1000mg BID and Valproate 500mg BID for seizures    [X] HIC and MAR notified    --------------------------------------------------  Vital Signs Last 24 Hrs  T(C): 37.4 (08 May 2020 20:00), Max: 37.7 (08 May 2020 00:00)  T(F): 99.3 (08 May 2020 20:00), Max: 99.9 (08 May 2020 00:00)  HR: 89 (08 May 2020 20:47) (77 - 99)  BP: 105/78 (08 May 2020 20:00) (100/64 - 147/85)  BP(mean): 83 (08 May 2020 20:00) (78 - 104)  RR: 11 (08 May 2020 20:00) (11 - 17)  SpO2: 98% (08 May 2020 20:47) (95% - 100%)  I&O's Summary    07 May 2020 07:01  -  08 May 2020 07:00  --------------------------------------------------------  IN: 795.6 mL / OUT: 3745 mL / NET: -2949.4 mL    08 May 2020 07:01  -  08 May 2020 21:21  --------------------------------------------------------  IN: 1116 mL / OUT: 665 mL / NET: 451 mL          MEDICATIONS  (STANDING):  chlorhexidine 0.12% Liquid 15 milliLiter(s) Oral Mucosa every 12 hours  dornase delmar Solution 2.5 milliGRAM(s) Inhalation every 12 hours  enoxaparin Injectable 30 milliGRAM(s) SubCutaneous daily  levETIRAcetam  IVPB 1000 milliGRAM(s) IV Intermittent every 12 hours  levothyroxine Injectable 62.5 MICROGram(s) IV Push at bedtime  polyethylene glycol 3350 17 Gram(s) Oral daily  QUEtiapine 25 milliGRAM(s) Oral daily  senna 2 Tablet(s) Oral at bedtime  valproate sodium IVPB 500 milliGRAM(s) IV Intermittent every 12 hours    MEDICATIONS  (PRN):  acetaminophen    Suspension .. 650 milliGRAM(s) Oral every 6 hours PRN Temp greater or equal to 38C (100.4F)        LABS                                            10.5                  Neurophils% (auto):   69.9   (05-08 @ 03:15):    7.59 )-----------(383          Lymphocytes% (auto):  20.0                                          32.7                   Eosinphils% (auto):   0.1      Manual%: Neutrophils x    ; Lymphocytes x    ; Eosinophils x    ; Bands%: x    ; Blasts x                                    148    |  104    |  11                  Calcium: 9.0   / iCa: x      (05-08 @ 03:15)    ----------------------------<  87        Magnesium: 2.0                              3.4     |  31     |  0.54             Phosphorous: 3.9      TPro  7.2    /  Alb  2.8    /  TBili  0.3    /  DBili  x      /  AST  23     /  ALT  14     /  AlkPhos  79     08 May 2020 03:15

## 2020-05-08 NOTE — PROGRESS NOTE ADULT - SUBJECTIVE AND OBJECTIVE BOX
INTERVAL HPI/OVERNIGHT EVENTS:    O/N:    SUBJECTIVE: Patient seen and examined at bedside.     CONSTITUTIONAL: No weakness, fevers or chills  EYES/ENT: No visual changes;  No vertigo or throat pain   NECK: No pain or stiffness  RESPIRATORY: No cough, wheezing, hemoptysis; No shortness of breath  CARDIOVASCULAR: No chest pain or palpitations  GASTROINTESTINAL: No abdominal or epigastric pain. No nausea, vomiting, or hematemesis; No diarrhea or constipation. No melena or hematochezia.  GENITOURINARY: No dysuria, frequency or hematuria  NEUROLOGICAL: No numbness or weakness  SKIN: No itching, rashes    OBJECTIVE:    VITAL SIGNS:  ICU Vital Signs Last 24 Hrs  T(C): 37.6 (08 May 2020 12:00), Max: 37.7 (08 May 2020 00:00)  T(F): 99.7 (08 May 2020 12:00), Max: 99.9 (08 May 2020 00:00)  HR: 83 (08 May 2020 12:20) (55 - 100)  BP: 104/65 (08 May 2020 12:00) (100/64 - 147/85)  BP(mean): 78 (08 May 2020 12:00) (78 - 126)  ABP: --  ABP(mean): --  RR: 17 (08 May 2020 12:00) (10 - 20)  SpO2: 97% (08 May 2020 12:00) (95% - 100%)    Mode: AC/ CMV (Assist Control/ Continuous Mandatory Ventilation), RR (machine): 15, TV (machine): 300, FiO2: 100, PEEP: 10, MAP: 14, PIP: 36    05-07 @ 07:01 - 05-08 @ 07:00  --------------------------------------------------------  IN: 795.6 mL / OUT: 3745 mL / NET: -2949.4 mL    05-08 @ 07:01  -  05-08 @ 12:43  --------------------------------------------------------  IN: 221 mL / OUT: 460 mL / NET: -239 mL      CAPILLARY BLOOD GLUCOSE      POCT Blood Glucose.: 95 mg/dL (08 May 2020 12:14)      PHYSICAL EXAM:    General: NAD  HEENT: NC/AT; PERRL, clear conjunctiva  Neck: supple  Respiratory: CTA b/l  Cardiovascular: +S1/S2; RRR  Abdomen: soft, NT/ND; +BS x4  Extremities: WWP, 2+ peripheral pulses b/l; no LE edema  Skin: normal color and turgor; no rash  Neurological:    MEDICATIONS:  MEDICATIONS  (STANDING):  chlorhexidine 0.12% Liquid 15 milliLiter(s) Oral Mucosa every 12 hours  enoxaparin Injectable 40 milliGRAM(s) SubCutaneous daily  levETIRAcetam  IVPB 1000 milliGRAM(s) IV Intermittent every 12 hours  levothyroxine Injectable 62.5 MICROGram(s) IV Push at bedtime  polyethylene glycol 3350 17 Gram(s) Oral daily  QUEtiapine 25 milliGRAM(s) Oral daily  senna 2 Tablet(s) Oral at bedtime  valproate sodium IVPB 500 milliGRAM(s) IV Intermittent every 12 hours    MEDICATIONS  (PRN):  acetaminophen    Suspension .. 650 milliGRAM(s) Oral every 6 hours PRN Temp greater or equal to 38C (100.4F)      ALLERGIES:  Allergies    amoxicillin (Rash)  penicillin (Rash)    Intolerances        LABS:                        10.5   7.59  )-----------( 383      ( 08 May 2020 03:15 )             32.7     05-08    148<H>  |  104  |  11  ----------------------------<  87  3.4<L>   |  31  |  0.54    Ca    9.0      08 May 2020 03:15  Phos  3.9     05-08  Mg     2.0     05-08    TPro  7.2  /  Alb  2.8<L>  /  TBili  0.3  /  DBili  x   /  AST  23  /  ALT  14  /  AlkPhos  79  05-08    PT/INR - ( 07 May 2020 02:00 )   PT: 12.0 SEC;   INR: 1.04          PTT - ( 07 May 2020 02:00 )  PTT:28.3 SEC      RADIOLOGY & ADDITIONAL TESTS: Reviewed. INTERVAL HPI/OVERNIGHT EVENTS:  No events overnight. s/p trach and peg yeterday. PLan to start cpap today.    SUBJECTIVE: Patient seen and examined at bedside.     ROS unable to assess.     OBJECTIVE:    VITAL SIGNS:  ICU Vital Signs Last 24 Hrs  T(C): 37.6 (08 May 2020 12:00), Max: 37.7 (08 May 2020 00:00)  T(F): 99.7 (08 May 2020 12:00), Max: 99.9 (08 May 2020 00:00)  HR: 83 (08 May 2020 12:20) (55 - 100)  BP: 104/65 (08 May 2020 12:00) (100/64 - 147/85)  BP(mean): 78 (08 May 2020 12:00) (78 - 126)  ABP: --  ABP(mean): --  RR: 17 (08 May 2020 12:00) (10 - 20)  SpO2: 97% (08 May 2020 12:00) (95% - 100%)    Mode: AC/ CMV (Assist Control/ Continuous Mandatory Ventilation), RR (machine): 15, TV (machine): 300, FiO2: 100, PEEP: 10, MAP: 14, PIP: 36    05-07 @ 07:01  -  05-08 @ 07:00  --------------------------------------------------------  IN: 795.6 mL / OUT: 3745 mL / NET: -2949.4 mL    05-08 @ 07:01  -  05-08 @ 12:43  --------------------------------------------------------  IN: 221 mL / OUT: 460 mL / NET: -239 mL      CAPILLARY BLOOD GLUCOSE      POCT Blood Glucose.: 95 mg/dL (08 May 2020 12:14)      PHYSICAL EXAM:    General: NAD  HEENT: NC/AT; PERRL, clear conjunctiva  Neck: supple  Respiratory: CTA b/l  Cardiovascular: +S1/S2; RRR  Abdomen: soft, NT/ND; +BS x4  Extremities: WWP, 2+ peripheral pulses b/l; no LE edema  Skin: normal color and turgor; no rash  Neurological:    MEDICATIONS:  MEDICATIONS  (STANDING):  chlorhexidine 0.12% Liquid 15 milliLiter(s) Oral Mucosa every 12 hours  enoxaparin Injectable 40 milliGRAM(s) SubCutaneous daily  levETIRAcetam  IVPB 1000 milliGRAM(s) IV Intermittent every 12 hours  levothyroxine Injectable 62.5 MICROGram(s) IV Push at bedtime  polyethylene glycol 3350 17 Gram(s) Oral daily  QUEtiapine 25 milliGRAM(s) Oral daily  senna 2 Tablet(s) Oral at bedtime  valproate sodium IVPB 500 milliGRAM(s) IV Intermittent every 12 hours    MEDICATIONS  (PRN):  acetaminophen    Suspension .. 650 milliGRAM(s) Oral every 6 hours PRN Temp greater or equal to 38C (100.4F)      ALLERGIES:  Allergies    amoxicillin (Rash)  penicillin (Rash)    Intolerances        LABS:                        10.5   7.59  )-----------( 383      ( 08 May 2020 03:15 )             32.7     05-08    148<H>  |  104  |  11  ----------------------------<  87  3.4<L>   |  31  |  0.54    Ca    9.0      08 May 2020 03:15  Phos  3.9     05-08  Mg     2.0     05-08    TPro  7.2  /  Alb  2.8<L>  /  TBili  0.3  /  DBili  x   /  AST  23  /  ALT  14  /  AlkPhos  79  05-08    PT/INR - ( 07 May 2020 02:00 )   PT: 12.0 SEC;   INR: 1.04          PTT - ( 07 May 2020 02:00 )  PTT:28.3 SEC      RADIOLOGY & ADDITIONAL TESTS: Reviewed. INTERVAL HPI/OVERNIGHT EVENTS:  No events overnight. s/p trach and peg yeterday. PLan to start cpap today.    SUBJECTIVE: Patient seen and examined at bedside.     ROS unable to assess.     OBJECTIVE:    VITAL SIGNS:  ICU Vital Signs Last 24 Hrs  T(C): 37.6 (08 May 2020 12:00), Max: 37.7 (08 May 2020 00:00)  T(F): 99.7 (08 May 2020 12:00), Max: 99.9 (08 May 2020 00:00)  HR: 83 (08 May 2020 12:20) (55 - 100)  BP: 104/65 (08 May 2020 12:00) (100/64 - 147/85)  BP(mean): 78 (08 May 2020 12:00) (78 - 126)  ABP: --  ABP(mean): --  RR: 17 (08 May 2020 12:00) (10 - 20)  SpO2: 97% (08 May 2020 12:00) (95% - 100%)    Mode: AC/ CMV (Assist Control/ Continuous Mandatory Ventilation), RR (machine): 15, TV (machine): 300, FiO2: 100, PEEP: 10, MAP: 14, PIP: 36    05-07 @ 07:01  -  05-08 @ 07:00  --------------------------------------------------------  IN: 795.6 mL / OUT: 3745 mL / NET: -2949.4 mL    05-08 @ 07:01  -  05-08 @ 12:43  --------------------------------------------------------  IN: 221 mL / OUT: 460 mL / NET: -239 mL      CAPILLARY BLOOD GLUCOSE      POCT Blood Glucose.: 95 mg/dL (08 May 2020 12:14)      PHYSICAL EXAM:    General: NAD  HEENT: NC/AT; PERRL, clear conjunctiva  Neck: supple, trach in place  Respiratory: CTA b/l  Cardiovascular: +S1/S2; RRR  Abdomen: soft, NT/ND; +BS x4; peg in place  Extremities: WWP, 2+ peripheral pulses b/l; no LE edema  Skin: normal color and turgor; no rash  Neurological: awake, alert    MEDICATIONS:  MEDICATIONS  (STANDING):  chlorhexidine 0.12% Liquid 15 milliLiter(s) Oral Mucosa every 12 hours  enoxaparin Injectable 40 milliGRAM(s) SubCutaneous daily  levETIRAcetam  IVPB 1000 milliGRAM(s) IV Intermittent every 12 hours  levothyroxine Injectable 62.5 MICROGram(s) IV Push at bedtime  polyethylene glycol 3350 17 Gram(s) Oral daily  QUEtiapine 25 milliGRAM(s) Oral daily  senna 2 Tablet(s) Oral at bedtime  valproate sodium IVPB 500 milliGRAM(s) IV Intermittent every 12 hours    MEDICATIONS  (PRN):  acetaminophen    Suspension .. 650 milliGRAM(s) Oral every 6 hours PRN Temp greater or equal to 38C (100.4F)      ALLERGIES:  Allergies    amoxicillin (Rash)  penicillin (Rash)    Intolerances        LABS:                        10.5   7.59  )-----------( 383      ( 08 May 2020 03:15 )             32.7     05-08    148<H>  |  104  |  11  ----------------------------<  87  3.4<L>   |  31  |  0.54    Ca    9.0      08 May 2020 03:15  Phos  3.9     05-08  Mg     2.0     05-08    TPro  7.2  /  Alb  2.8<L>  /  TBili  0.3  /  DBili  x   /  AST  23  /  ALT  14  /  AlkPhos  79  05-08    PT/INR - ( 07 May 2020 02:00 )   PT: 12.0 SEC;   INR: 1.04          PTT - ( 07 May 2020 02:00 )  PTT:28.3 SEC      RADIOLOGY & ADDITIONAL TESTS: Reviewed.

## 2020-05-08 NOTE — PROGRESS NOTE ADULT - ASSESSMENT
60 year old female with Downs syndrome, hypothyroidism, constipation, seizure disorder with acute hypoxic respiratory failure from COVID 19 pneumonia, course complicated by episode of seizure, type 2 NSTEMI, ADAM (resolved), gram variable bacteremia (repeat cx ngtd); extubated 4/16 and reintubated same day due to secretion issues. Now extubated to bipap on 5/4. Weaned to NC, unable to tolerate. Increased work of breathing on NRB, requring repeat intubation on 5/5 due to hypoxia and increased work of breathing, s/p trach and PEG on 5/8.    PLAN:    NEURO:  Down Syndrome  - s/p repeat intubation on 5/5  - currently on versed and prop. Wean as tolerated  - s/p trach and peg 5/8    RESPIRATORY:  Acute respiratory distress syndrome 2/2 COVID PNA s/p extubation and reintubated due to secretions on 5/16 and transferred [facility]  -exubated 5/4 and re-intubated 5/5 for hypoxia, and increased work of breathing with inability to remove her secretions   - s/p trach and PEG    CARDS:  hemodynamically stable       G.I./NUTRITION:  - s/p PEG 5/7. jeremiah start feeds again today  -TF w/ Jevity.      RENAL/:  -Maintain Puentes, adequate UOP; goal even for the day.  -Monitor BMP      ENDO:  -ISS. Monitor FS.  -C/w synthroid.  - repeat TSH nml     HEME:  -H/H stable. Hx of acute blood loss anemia due to vaginal bleed during this hospitalization  -Lovenox 30qd  for DVT ppx    ID:  COVID PNA s/p intubation as above  -Sputum 4/25 NGTD, no antibiotics. Repeat Covid negative.    GOC: Brother Сергей is HCP.   Code status: DNR.   Residence is [facility]: [phone] ext [phone]

## 2020-05-08 NOTE — PROGRESS NOTE ADULT - ASSESSMENT
60y Female with history of Down Syndrome, hypothyroidism, and recurrent UTI who presented to [facility] on 4/4 for fever, cough, and SOB. The patient was intubated on 4/7 for worsening hypercapnic respiratory failure. Extubated and reintubated on 4/16. Hospitalization further complicated by NSTEMI (type 2), vaginal bleeding requiring 2 uPRBC. The pateint was being followed by my practice at [facility]. She is s.p transfer to [facility].     Acute blood loss anemia  multifactorial/resolved  trend h/h and transfuse prn   No evidence of GI bleeding; brown stools  cont gi ppx with pepcid while on Lovenox   monitor stools; if loose hold stool softeners   no role for endoscopic eval at this time in light of risk in covid pts  s/p PEG; feeds per icu     COVID   monitor resp status   care per primary teams appreciated  s/p trach/peg w/ctsx 5/7  icu care greatly appreciated       Advanced care planning was discussed with patient and family.  Advanced care planning forms were reviewed and discussed.  Risks, benefits and alternatives of gastroenterologic procedures were discussed in detail and all questions were answered.

## 2020-05-08 NOTE — CHART NOTE - NSCHARTNOTEFT_GEN_A_CORE
MAR Accept Note  Transfer to: Medicine  Accepting Attending Physician: Angelita  Assigned Room: 8T 812B    Patient seen and examined.   Labs and data reviewed.   No findings precluding transfer of service.       HPI/MICU COURSE:   59 yo F with Downs syndrome, hypothyroidism, constipation, seizure disorder transferred from [facility] for acute hypoxic respiratory failure 2/2 COVID-19 pneumonia. Course complicated by episode of seizure, type 2 NSTEMI, ADAM (resolved), gram variable bacteremia (repeat cx ngtd). Pt extubated 4/16 and reintubated same day due to secretion issues. Was extubated to bipap on 5/4, but required repeat intubation on 5/5 due to hypoxia and increased work of breathing, now s/p trach and PEG on 5/7. Pt with hypoxic episode on vent after trach resolved with suctioning. Pt DNR.    FOR FOLLOW-UP:  - C/w dornase delmar 2.5mg BID and frequent suctioning PRN for secretions, can also consider chest PT  - Wean vent as tolerated  - C/w tube feeds through PEG tube  - C/w Keppra 1000mg BID and Valproate 500mg BID for seizure ppx    Roxanne Gutiérrez MD  MAR, [id number] MAR Accept Note  Transfer to: RCU  Accepting Attending Physician: Lisker  Assigned Room: 8T 812B    Patient seen and examined.   Labs and data reviewed.   No findings precluding transfer of service.       HPI/MICU COURSE:   61 yo F with Downs syndrome, hypothyroidism, constipation, seizure disorder transferred from [facility] for acute hypoxic respiratory failure 2/2 COVID-19 pneumonia. Course complicated by episode of seizure, type 2 NSTEMI, ADAM (resolved), gram variable bacteremia (repeat cx ngtd). Pt extubated 4/16 and reintubated same day due to secretion issues. Was extubated to bipap on 5/4, but required repeat intubation on 5/5 due to hypoxia and increased work of breathing, now s/p trach and PEG on 5/7. Pt with hypoxic episode on vent after trach resolved with suctioning. Pt DNR.    FOR FOLLOW-UP:  - C/w dornase delmar 2.5mg BID and frequent suctioning PRN for secretions, can also consider chest PT  - Wean vent as tolerated  - C/w tube feeds through PEG tube  - C/w Keppra 1000mg BID and Valproate 500mg BID for seizure ppx    Roxanne Gutiérrez MD  MAR, [id number]

## 2020-05-09 NOTE — PROGRESS NOTE ADULT - PROBLEM SELECTOR PLAN 3
- pt was tx from outside hospital for GYN eval as there was vaginal bleeding  - noted to have acute vaginal bleeding 2/2 iatrogenic injury from an attempted placement of a rectal tube, in the setting of anticoagulation s/p vaginal packing, now removed  - pt received PRBCs for acute blood loss, H/H is now stable  - no further bleeding noted

## 2020-05-09 NOTE — PROGRESS NOTE ADULT - PROBLEM SELECTOR PLAN 2
- s/p trach on 5/7, tolerating AC 15/300/5/40%  - cont PS trials daily as tolerated  - chest PT, suction PRN  - trach care daily

## 2020-05-09 NOTE — PROGRESS NOTE ADULT - ASSESSMENT
60y Female with history of Down Syndrome, hypothyroidism, and recurrent UTI who presented to [facility] on 4/4 for fever, cough, and SOB. The patient was intubated on 4/7 for worsening hypercapnic respiratory failure. Extubated and reintubated on 4/16. Hospitalization further complicated by NSTEMI (type 2), vaginal bleeding requiring 2 uPRBC. The pateint was being followed by my practice at [facility]. She is s.p transfer to [facility].     Acute blood loss anemia  multifactorial/resolved  trend h/h and transfuse prn   No evidence of GI bleeding; brown stools  cont gi ppx with pepcid while on Lovenox   monitor stools; if loose hold stool softeners   no role for endoscopic eval at this time in light of risk in covid pts  s/p PEG; feeds per icu     COVID   monitor resp status   care per primary teams appreciated  s/p trach/peg w/ctsx 5/7  icu care greatly appreciated       Advanced care planning was discussed with patient and family.  Advanced care planning forms were reviewed and discussed.  Risks, benefits and alternatives of gastroenterologic procedures were discussed in detail and all questions were answered.

## 2020-05-09 NOTE — CHART NOTE - NSCHARTNOTEFT_GEN_A_CORE
CHANG brother Сергей.  He is apprised of pt current status. All questions and concerns addressed and answered to apparent satisfaction.

## 2020-05-09 NOTE — PROGRESS NOTE ADULT - ATTENDING COMMENTS
seen and examined with sophia, agree with above  tx from ICU. h/o Down Syndrome, sz  now s/p trach and peg  remains hemodyn stable. afebrile. on sz meds

## 2020-05-09 NOTE — PROGRESS NOTE ADULT - SUBJECTIVE AND OBJECTIVE BOX
INTERVAL HPI/OVERNIGHT EVENTS:    chart reviewed  s/p trach and peg thursday       MEDICATIONS  (STANDING):  chlorhexidine 0.12% Liquid 15 milliLiter(s) Oral Mucosa every 12 hours  enoxaparin Injectable 40 milliGRAM(s) SubCutaneous daily  levETIRAcetam  IVPB 1000 milliGRAM(s) IV Intermittent every 12 hours  levothyroxine Injectable 62.5 MICROGram(s) IV Push at bedtime  polyethylene glycol 3350 17 Gram(s) Oral daily  QUEtiapine 25 milliGRAM(s) Oral daily  senna 2 Tablet(s) Oral at bedtime  valproate sodium IVPB 500 milliGRAM(s) IV Intermittent every 12 hours    MEDICATIONS  (PRN):  acetaminophen    Suspension .. 650 milliGRAM(s) Oral every 6 hours PRN Temp greater or equal to 38C (100.4F)      Allergies    amoxicillin (Rash)  penicillin (Rash)    Intolerances        Review of Systems: Per current hospital emergency protocol, in an effort to reduce COVID exposures and also conserve PPE for necessary encounters, all data within this chart were reviewed and recommendations are based upon information in the chart and by verbal communication with covering team and/or nurses. Please refer to the ROS and PE per covering primary team note          Vital Signs Last 24 Hrs  T(C): 37.2 (08 May 2020 08:00), Max: 37.7 (08 May 2020 00:00)  T(F): 98.9 (08 May 2020 08:00), Max: 99.9 (08 May 2020 00:00)  HR: 86 (08 May 2020 08:00) (55 - 100)  BP: 147/85 (08 May 2020 08:00) (100/64 - 147/85)  BP(mean): 104 (08 May 2020 08:00) (79 - 126)  RR: 12 (08 May 2020 08:00) (10 - 20)  SpO2: 98% (08 May 2020 08:00) (95% - 100%)    PHYSICAL EXAM: Per current hospital emergency protocol, in an effort to reduce COVID exposures and also conserve PPE for necessary encounters, all data within this chart were reviewed and recommendations are based upon information in the chart and by verbal communication with covering team and/or nurses. Please refer to the ROS and PE per covering primary team note          LABS:                        10.5   7.59  )-----------( 383      ( 08 May 2020 03:15 )             32.7     05-08    148<H>  |  104  |  11  ----------------------------<  87  3.4<L>   |  31  |  0.54    Ca    9.0      08 May 2020 03:15  Phos  3.9     05-08  Mg     2.0     05-08    TPro  7.2  /  Alb  2.8<L>  /  TBili  0.3  /  DBili  x   /  AST  23  /  ALT  14  /  AlkPhos  79  05-08    PT/INR - ( 07 May 2020 02:00 )   PT: 12.0 SEC;   INR: 1.04          PTT - ( 07 May 2020 02:00 )  PTT:28.3 SEC      RADIOLOGY & ADDITIONAL TESTS:

## 2020-05-09 NOTE — PROGRESS NOTE ADULT - PROBLEM SELECTOR PLAN 1
- initially admitted from OSH for COVID requiring intubation since 4/7, now s/p trach and PEG on 5/7  - s/p plaquenil, azithromycin, steroids  - s/p plasma on 4/28  - cont supportive measures

## 2020-05-09 NOTE — CHART NOTE - NSCHARTNOTEFT_GEN_A_CORE
Pt remains on Vent  Trach site c/d/i  PEg site c/d/i.  tolerating Tube feedings  PEG loosened per 48hr protocol.  Will d/c trach sutures on POD 14  Care per primary team

## 2020-05-09 NOTE — PROGRESS NOTE ADULT - PROBLEM SELECTOR PLAN 5
- noted NSTEMI PTA with trops peaked at 2.7, likely 2/2 demand ischemia, type 2  - ECG with no evidence of ischemia or infarction  - TTE technically difficult; moderately reduced LV systolic function  - stable at this time, cont to monitor

## 2020-05-09 NOTE — PROGRESS NOTE ADULT - SUBJECTIVE AND OBJECTIVE BOX
CHIEF COMPLAINT: Patient is a 60y old  Female who presents with a chief complaint of acute respiratory failure (08 May 2020 12:19)    Interval Events:      REVIEW OF SYSTEMS:  Constitutional:   Eyes:  ENT:  CV:  Resp:  GI:  :  MSK:  Integumentary:  Neurological:  Psychiatric:  Endocrine:  Hematologic/Lymphatic:  Allergic/Immunologic:  [ ] All other systems negative  [ ] Unable to assess ROS because ________      OBJECTIVE:  ICU Vital Signs Last 24 Hrs  T(C): 37.9 (09 May 2020 05:36), Max: 37.9 (09 May 2020 05:36)  T(F): 100.2 (09 May 2020 05:36), Max: 100.2 (09 May 2020 05:36)  HR: 87 (09 May 2020 07:11) (77 - 99)  BP: 111/54 (09 May 2020 05:36) (81/43 - 147/85)  BP(mean): 53 (09 May 2020 00:00) (53 - 104)  ABP: --  ABP(mean): --  RR: 15 (09 May 2020 05:36) (11 - 17)  SpO2: 95% (09 May 2020 07:11) (94% - 100%)    Mode: AC/ CMV (Assist Control/ Continuous Mandatory Ventilation), RR (machine): 15, TV (machine): 300, FiO2: 30, PEEP: 5, MAP: 6, PIP: 17    05-08 @ 07:01  -  05-09 @ 07:00  --------------------------------------------------------  IN: 1116 mL / OUT: 665 mL / NET: 451 mL    POCT Blood Glucose.: 104 mg/dL (08 May 2020 23:05)    HOSPITAL MEDICATIONS:  MEDICATIONS  (STANDING):  chlorhexidine 0.12% Liquid 15 milliLiter(s) Oral Mucosa every 12 hours  chlorhexidine 4% Liquid 1 Application(s) Topical daily  dornase delmar Solution 2.5 milliGRAM(s) Inhalation every 12 hours  enoxaparin Injectable 30 milliGRAM(s) SubCutaneous daily  levETIRAcetam  Solution 1000 milliGRAM(s) Oral two times a day  levothyroxine 125 MICROGram(s) Oral daily  polyethylene glycol 3350 17 Gram(s) Oral daily  QUEtiapine 25 milliGRAM(s) Oral daily  senna 2 Tablet(s) Oral at bedtime  valproic  acid Syrup 500 milliGRAM(s) Oral two times a day    MEDICATIONS  (PRN):  acetaminophen    Suspension .. 650 milliGRAM(s) Oral every 6 hours PRN Temp greater or equal to 38C (100.4F)      LABS:                        10.5   7.59  )-----------( 383      ( 08 May 2020 03:15 )             32.7     05-08    148<H>  |  104  |  11  ----------------------------<  87  3.4<L>   |  31  |  0.54    Ca    9.0      08 May 2020 03:15  Phos  3.9     05-08  Mg     2.0     05-08    TPro  7.2  /  Alb  2.8<L>  /  TBili  0.3  /  DBili  x   /  AST  23  /  ALT  14  /  AlkPhos  79  05-08          Venous Blood Gas:  05-08 @ 03:15  7.46/49/47/33/81.7  VBG Lactate: --      MICROBIOLOGY:     RADIOLOGY:  [ ] Reviewed and interpreted by me    PULMONARY FUNCTION TESTS:    EKG: CHIEF COMPLAINT: Patient is a 60y old  Female who presents with a chief complaint of acute respiratory failure (08 May 2020 12:19)    Interval Events: tx from ICU overnight       REVIEW OF SYSTEMS:  [ ] All other systems negative  [x] Unable to assess ROS because: hx of down syndrome, currently does not follow commands       OBJECTIVE:  ICU Vital Signs Last 24 Hrs  T(C): 37.9 (09 May 2020 05:36), Max: 37.9 (09 May 2020 05:36)  T(F): 100.2 (09 May 2020 05:36), Max: 100.2 (09 May 2020 05:36)  HR: 87 (09 May 2020 07:11) (77 - 99)  BP: 111/54 (09 May 2020 05:36) (81/43 - 147/85)  BP(mean): 53 (09 May 2020 00:00) (53 - 104)  ABP: --  ABP(mean): --  RR: 15 (09 May 2020 05:36) (11 - 17)  SpO2: 95% (09 May 2020 07:11) (94% - 100%)    Mode: AC/ CMV (Assist Control/ Continuous Mandatory Ventilation), RR (machine): 15, TV (machine): 300, FiO2: 30, PEEP: 5, MAP: 6, PIP: 17    05-08 @ 07:01  -  05-09 @ 07:00  --------------------------------------------------------  IN: 1116 mL / OUT: 665 mL / NET: 451 mL    POCT Blood Glucose.: 104 mg/dL (08 May 2020 23:05)    HOSPITAL MEDICATIONS:  MEDICATIONS  (STANDING):  chlorhexidine 0.12% Liquid 15 milliLiter(s) Oral Mucosa every 12 hours  chlorhexidine 4% Liquid 1 Application(s) Topical daily  dornase delmar Solution 2.5 milliGRAM(s) Inhalation every 12 hours  enoxaparin Injectable 30 milliGRAM(s) SubCutaneous daily  levETIRAcetam  Solution 1000 milliGRAM(s) Oral two times a day  levothyroxine 125 MICROGram(s) Oral daily  polyethylene glycol 3350 17 Gram(s) Oral daily  QUEtiapine 25 milliGRAM(s) Oral daily  senna 2 Tablet(s) Oral at bedtime  valproic  acid Syrup 500 milliGRAM(s) Oral two times a day    MEDICATIONS  (PRN):  acetaminophen    Suspension .. 650 milliGRAM(s) Oral every 6 hours PRN Temp greater or equal to 38C (100.4F)      LABS:             Complete Blood Count STAT (05.09.20 @ 09:55)    WBC Count: 9.39 K/uL    RBC Count: 2.69 M/uL    Hemoglobin: 8.5 g/dL    Hematocrit: 27.4 %    Mean Cell Volume: 101.9 fL    Mean Cell Hemoglobin: 31.6 pg    Mean Cell Hemoglobin Conc: 31.0 %    Red Cell Distrib Width: 19.8 %    Platelet Count - Automated: 339 K/uL    MPV: 10.6 fl    Nucleated RBC #: 0 K/uL    Basic Metabolic Panel - STAT (05.09.20 @ 09:55)    Sodium, Serum: 146 mmol/L    Potassium, Serum: 3.4 mmol/L    Chloride, Serum: 106 mmol/L    Carbon Dioxide, Serum: 27 mmol/L    Anion Gap, Serum: 13 mmo/L    Blood Urea Nitrogen, Serum: 14 mg/dL    Creatinine, Serum: 0.60 mg/dL    Glucose, Serum: 122 mg/dL    Calcium, Total Serum: 8.6 mg/dL    eGFR if Non : 98: The units for eGFR are ml/min/1.73m2 (normalized body  surface area). The eGFR is calculated from a serum  creatinine using the CKD-EPI equation. Other variables  required for calculation are race, age and sex. Among  patients with chronic kidney disease (CKD), the eGFR is  useful in determining the stage of disease according to  KDOQI CKD classification. All eGFR results are reported  numerically with the following interpretation.    GFR  (ml/min/1.73 m2)          W/KIDNEY DAMAGE    W/O KIDNEY DMG  ==========================================================  >= 90.......................Stage 1..............Normal  60-89.......................Stage 2...........Decreased GFR  30-59.......................Stage 3..............Stage 3  15-29.......................Stage 4..............Stage 4  < 15........................Stage 5..............Stage 5    Each stage of CKD assumes that the associated GFR level  has been in effect for at least 3 months. Determination of  stages one and two (with eGFR > 59ml/min/m2) requires  estimation of kidney damage for at least 3 months as  defined by structural or functional abnormalities.    Limitations: All estimates of GFR will be less accurate  for patients at extremes of muscle mass (including but  not limited to frail elderly, critically ill, or cancer  patients), those with unusual diets, and those with  conditions associated with reduced secretion or  extrarenal elimination of creatinine. The eGFR equation  is not recommended for use in patients with unstable  creatinine levels. mL/min    eGFR if : 113 mL/min

## 2020-05-09 NOTE — PROGRESS NOTE ADULT - ASSESSMENT
60 year old female with Downs syndrome, hypothyroidism, constipation, seizure disorder with acute hypoxic respiratory failure from COVID 19 pneumonia, course complicated by episode of seizure, type 2 NSTEMI, ADAM (resolved), gram variable bacteremia (repeat cx ngtd); extubated 4/16 and reintubated same day due to secretion issues, s/p trach and PEG on 5/7.

## 2020-05-10 NOTE — PROGRESS NOTE ADULT - ATTENDING COMMENTS
seen and examined with lina, agree with above  wean trial  otherwise stable  tov  taper seroquel  d/c plan to vent snf

## 2020-05-10 NOTE — PROGRESS NOTE ADULT - NSREFPHYEXREFTO_GEN_ALL_CORE
Inpatient Physical Exam
ED Physical Exam
ED Physical Exam
Inpatient Physical Exam

## 2020-05-10 NOTE — PROGRESS NOTE ADULT - ASSESSMENT
60y Female with history of Down Syndrome, hypothyroidism, and recurrent UTI who presented to [facility] on 4/4 for fever, cough, and SOB. The patient was intubated on 4/7 for worsening hypercapnic respiratory failure. Extubated and reintubated on 4/16. Hospitalization further complicated by NSTEMI (type 2), vaginal bleeding requiring 2 uPRBC. The pateint was being followed by my practice at [facility]. She is s.p transfer to [facility].     Acute blood loss anemia  multifactorial/resolved  trend h/h and transfuse prn   No evidence of GI bleeding; brown stools  cont gi ppx with pepcid while on Lovenox   monitor stools; if loose hold stool softeners   no role for endoscopic eval at this time in light of risk in covid pts  s/p PEG; feeds per icu     COVID   monitor resp status   care per primary teams appreciated  s/p trach/peg w/ctsx 5/7  icu care greatly appreciated       Advanced care planning was discussed with patient and family.  Advanced care planning forms were reviewed and discussed.  Risks, benefits and alternatives of gastroenterologic procedures were discussed in detail and all questions were answered.

## 2020-05-10 NOTE — PROGRESS NOTE ADULT - PROBLEM SELECTOR PLAN 3
- pt was tx from outside hospital for GYN eval as there was vaginal bleeding  - noted to have acute vaginal bleeding 2/2 iatrogenic injury from an attempted placement of a rectal tube, in the setting of anticoagulation s/p vaginal packing, now removed  - pt received PRBCs for acute blood loss, H/H is now stable  - no further bleeding noted - Patient noted with AMS, lethargy. Opens eyes and responds to pain. Will dc Seroquel 12.5mg PO QD and monitor for improvement in mentation.   - No focal neurological deficits noted, however IF AMS persists will get CTH and Neurology re-evaluation for anti-seizure medication adjustment. - Patient noted with AMS, pseudoAMS??   - Opens eyes to pain and name, but then "falls back to sleep." Facetime with brother/ PCA and patient noted with eyes open and attentive to screen.   - Will dc Seroquel 12.5mg PO QD and continue monitor.

## 2020-05-10 NOTE — PROGRESS NOTE ADULT - PROBLEM SELECTOR PLAN 6
- ADAM likely 2/2 ATN and sepsis, now resolved  - cont to monitor - Noted NSTEMI PTA with troponins peaked at 2.7, likely 2/2 demand ischemia  - ECG with no evidence of ischemia or infarction  - TTE technically difficult; moderately reduced LV systolic function  - Stable at this time, continue to monitor

## 2020-05-10 NOTE — PROGRESS NOTE ADULT - PROBLEM SELECTOR PLAN 7
- tolerating feeds  - aspiration precautions - ADAM likely 2/2 ATN and Sepsis, now resolved  - Continue to monitor renal function and avoid nephrotoxins. - ADAM likely 2/2 ATN and Sepsis, now resolved  - Passed TOV 5/9  - Continue to monitor renal function and avoid nephrotoxins.

## 2020-05-10 NOTE — PROGRESS NOTE ADULT - PROBLEM SELECTOR PLAN 2
- s/p trach on 5/7, tolerating AC 15/300/5/40%  - cont PS trials daily as tolerated  - chest PT, suction PRN  - trach care daily - Failed extubation x 2, s/p trach on 5/7, tolerating AC 15/300/5/30.  - Failed PS 10/5 today. Continue PS QD as tolerated.   - Chest PT and Suction PRN  - Trach care daily

## 2020-05-10 NOTE — PROGRESS NOTE ADULT - SUBJECTIVE AND OBJECTIVE BOX
INTERVAL HPI/OVERNIGHT EVENTS:    chart reviewed        MEDICATIONS  (STANDING):  chlorhexidine 0.12% Liquid 15 milliLiter(s) Oral Mucosa every 12 hours  enoxaparin Injectable 40 milliGRAM(s) SubCutaneous daily  levETIRAcetam  IVPB 1000 milliGRAM(s) IV Intermittent every 12 hours  levothyroxine Injectable 62.5 MICROGram(s) IV Push at bedtime  polyethylene glycol 3350 17 Gram(s) Oral daily  QUEtiapine 25 milliGRAM(s) Oral daily  senna 2 Tablet(s) Oral at bedtime  valproate sodium IVPB 500 milliGRAM(s) IV Intermittent every 12 hours    MEDICATIONS  (PRN):  acetaminophen    Suspension .. 650 milliGRAM(s) Oral every 6 hours PRN Temp greater or equal to 38C (100.4F)      Allergies    amoxicillin (Rash)  penicillin (Rash)    Intolerances        Review of Systems: Per current hospital emergency protocol, in an effort to reduce COVID exposures and also conserve PPE for necessary encounters, all data within this chart were reviewed and recommendations are based upon information in the chart and by verbal communication with covering team and/or nurses. Please refer to the ROS and PE per covering primary team note          Vital Signs Last 24 Hrs  T(C): 37.2 (08 May 2020 08:00), Max: 37.7 (08 May 2020 00:00)  T(F): 98.9 (08 May 2020 08:00), Max: 99.9 (08 May 2020 00:00)  HR: 86 (08 May 2020 08:00) (55 - 100)  BP: 147/85 (08 May 2020 08:00) (100/64 - 147/85)  BP(mean): 104 (08 May 2020 08:00) (79 - 126)  RR: 12 (08 May 2020 08:00) (10 - 20)  SpO2: 98% (08 May 2020 08:00) (95% - 100%)    PHYSICAL EXAM: Per current hospital emergency protocol, in an effort to reduce COVID exposures and also conserve PPE for necessary encounters, all data within this chart were reviewed and recommendations are based upon information in the chart and by verbal communication with covering team and/or nurses. Please refer to the ROS and PE per covering primary team note          LABS:                        10.5   7.59  )-----------( 383      ( 08 May 2020 03:15 )             32.7     05-08    148<H>  |  104  |  11  ----------------------------<  87  3.4<L>   |  31  |  0.54    Ca    9.0      08 May 2020 03:15  Phos  3.9     05-08  Mg     2.0     05-08    TPro  7.2  /  Alb  2.8<L>  /  TBili  0.3  /  DBili  x   /  AST  23  /  ALT  14  /  AlkPhos  79  05-08    PT/INR - ( 07 May 2020 02:00 )   PT: 12.0 SEC;   INR: 1.04          PTT - ( 07 May 2020 02:00 )  PTT:28.3 SEC      RADIOLOGY & ADDITIONAL TESTS:

## 2020-05-10 NOTE — PROGRESS NOTE ADULT - ASSESSMENT
59 YO F with Downs Syndrome, Hypothyroidsim, Constipation and Seizures, transferred from [facility] for acute hypoxic respiratory failure second to COVID 19 PNA. Course c/b seizures episodes, type 2 NSTEMI, ADAM and gram variable bacteremia. Extubated 4/16 and reintubated same day. Extubated again on 5/4 to BIPAP and then reintubated again 5/5. Now s/p Trach and PEG on 5/7 by Thoracic Sx. Transferred to [facility].

## 2020-05-10 NOTE — PROGRESS NOTE ADULT - PROBLEM SELECTOR PLAN 4
- course c/b intermittent seizures, now controlled on keppra and depakote  - seizure precautions - Patient was transferred from OS for GYN evaluation second to vaginal bleeding from iatrogenic injury second to attempted placement of a rectal tube, in the setting of anticoagulation, s/p vaginal packing, now removed  - Patient received PRBCs for acute blood loss, H/H is now stable  - No further bleeding noted

## 2020-05-10 NOTE — PROGRESS NOTE ADULT - NSREFPHYEXINPTDOCREFER_GEN_ALL_CORE
primary team PE 4/28
primary team PE 4/30
primary team PE 5/1
primary team PE 5/4
primary team PE 5/5
primary team PE 5/6
primary team PE 5/8
please refer to progress note from primary team from same date.
please refer to progress note from primary team from same date.

## 2020-05-10 NOTE — PROGRESS NOTE ADULT - PROBLEM SELECTOR PLAN 1
- initially admitted from OSH for COVID requiring intubation since 4/7, now s/p trach and PEG on 5/7  - s/p plaquenil, azithromycin, steroids  - s/p plasma on 4/28  - cont supportive measures - Patient initially admitted from OSH for COVID requiring intubation since 4/7, failed extubation x 2, now s/p trach and PEG on 5/7  - s/p Plaquenil, Azithromycin, Steroids  - s/p Plasma on 4/28  - Continue supportive measures.

## 2020-05-10 NOTE — PROGRESS NOTE ADULT - PROBLEM SELECTOR PLAN 5
- noted NSTEMI PTA with trops peaked at 2.7, likely 2/2 demand ischemia, type 2  - ECG with no evidence of ischemia or infarction  - TTE technically difficult; moderately reduced LV systolic function  - stable at this time, cont to monitor - Course c/b intermittent seizures and now controlled on Keppra and Depakote  - Seizure precautions

## 2020-05-11 NOTE — PROGRESS NOTE ADULT - SUBJECTIVE AND OBJECTIVE BOX
INTERVAL HPI/OVERNIGHT EVENTS:    chart reviewed  tolerating peg feeds    MEDICATIONS  (STANDING):  chlorhexidine 0.12% Liquid 15 milliLiter(s) Oral Mucosa every 12 hours  chlorhexidine 4% Liquid 1 Application(s) Topical daily  dornase delmar Solution 2.5 milliGRAM(s) Inhalation every 12 hours  enoxaparin Injectable 30 milliGRAM(s) SubCutaneous daily  levETIRAcetam  Solution 1000 milliGRAM(s) Oral two times a day  levothyroxine 125 MICROGram(s) Oral daily  polyethylene glycol 3350 17 Gram(s) Oral daily  senna 2 Tablet(s) Oral at bedtime  valproic  acid Syrup 500 milliGRAM(s) Oral two times a day    MEDICATIONS  (PRN):  acetaminophen    Suspension .. 650 milliGRAM(s) Oral every 6 hours PRN Temp greater or equal to 38C (100.4F)      Allergies    amoxicillin (Rash)  penicillin (Rash)    Intolerances        Review of Systems: Per current hospital emergency protocol, in an effort to reduce COVID exposures and also conserve PPE for necessary encounters, all data within this chart were reviewed and recommendations are based upon information in the chart and by verbal communication with covering team and/or nurses. Please refer to the ROS and PE per covering primary team note          Vital Signs Last 24 Hrs  T(C): 37 (11 May 2020 04:41), Max: 37.8 (11 May 2020 01:57)  T(F): 98.6 (11 May 2020 04:41), Max: 100 (11 May 2020 01:57)  HR: 74 (11 May 2020 10:49) (74 - 96)  BP: 102/62 (11 May 2020 04:41) (102/62 - 128/70)  BP(mean): --  RR: 16 (11 May 2020 04:41) (16 - 16)  SpO2: 97% (11 May 2020 10:49) (96% - 100%)    PHYSICAL EXAM: Per current hospital emergency protocol, in an effort to reduce COVID exposures and also conserve PPE for necessary encounters, all data within this chart were reviewed and recommendations are based upon information in the chart and by verbal communication with covering team and/or nurses. Please refer to the ROS and PE per covering primary team note          LABS:                        8.7    9.18  )-----------( 321      ( 10 May 2020 05:41 )             28.4     05-10    143  |  105  |  14  ----------------------------<  97  3.7   |  27  |  0.55    Ca    8.7      10 May 2020 05:41  Phos  3.7     05-10  Mg     2.0     05-10            RADIOLOGY & ADDITIONAL TESTS:

## 2020-05-11 NOTE — PHYSICAL THERAPY INITIAL EVALUATION ADULT - GENERAL OBSERVATIONS, REHAB EVAL
Pt encountered in semisupine position, no distress, AxOx0, with +IV, +tele, and +trach to mechanical vent.

## 2020-05-11 NOTE — PHYSICAL THERAPY INITIAL EVALUATION ADULT - PATIENT PROFILE REVIEW, REHAB EVAL
yes/No Formal Activity Order in the Computer; spoke with RN Sonia Dacosta prior to PT evaluation--> Pt OK for PT consult

## 2020-05-11 NOTE — PHYSICAL THERAPY INITIAL EVALUATION ADULT - PRECAUTIONS/LIMITATIONS, REHAB EVAL
seizure precautions/[facility]/CONTACT- COVID-19/cardiac precautions/isolation precautions/fall precautions

## 2020-05-11 NOTE — PROGRESS NOTE ADULT - ASSESSMENT
60y Female with history of Down Syndrome, hypothyroidism, and recurrent UTI who presented to [facility] on 4/4 for fever, cough, and SOB. The patient was intubated on 4/7 for worsening hypercapnic respiratory failure. Extubated and reintubated on 4/16. Hospitalization further complicated by NSTEMI (type 2), vaginal bleeding requiring 2 uPRBC. The pateint was being followed by my practice at [facility]. She is s.p transfer to [facility].     Acute blood loss anemia  multifactorial/resolved; trend h/h and transfuse prn   No evidence of GI bleeding  cont gi ppx with pepcid while on Lovenox   monitor stools; if loose hold stool softeners   no role for endoscopic eval at this time in light of risk in covid pts  s/p PEG; aspiration precautions with feeds     COVID   monitor resp status   s/p trach/peg w/ctsx 5/7  care per primary teams appreciated      Advanced care planning was discussed with patient and family.  Advanced care planning forms were reviewed and discussed.  Risks, benefits and alternatives of gastroenterologic procedures were discussed in detail and all questions were answered.

## 2020-05-11 NOTE — PROGRESS NOTE ADULT - SUBJECTIVE AND OBJECTIVE BOX
CHIEF COMPLAINT:    Interval Events:    REVIEW OF SYSTEMS:  Constitutional:   Eyes:  ENT:  CV:  Resp:  GI:  :  MSK:  Integumentary:  Neurological:  Psychiatric:  Endocrine:  Hematologic/Lymphatic:  Allergic/Immunologic:  [ ] All other systems negative  [ ] Unable to assess ROS because ________    OBJECTIVE:  ICU Vital Signs Last 24 Hrs  T(C): 37 (11 May 2020 04:41), Max: 37.8 (11 May 2020 01:57)  T(F): 98.6 (11 May 2020 04:41), Max: 100 (11 May 2020 01:57)  HR: 82 (11 May 2020 04:41) (76 - 96)  BP: 102/62 (11 May 2020 04:41) (102/62 - 128/70)  BP(mean): --  ABP: --  ABP(mean): --  RR: 16 (11 May 2020 04:41) (16 - 16)  SpO2: 100% (11 May 2020 04:41) (96% - 100%)    Mode: AC/ CMV (Assist Control/ Continuous Mandatory Ventilation), RR (machine): 15, TV (machine): 300, FiO2: 30, PEEP: 5, MAP: 10, PIP: 24    05-10 @ 07:01  -  05-11 @ 07:00  --------------------------------------------------------  IN: 880 mL / OUT: 0 mL / NET: 880 mL      CAPILLARY BLOOD GLUCOSE          PHYSICAL EXAM:  General:   HEENT:   Lymph Nodes:  Neck:   Respiratory:   Cardiovascular:   Abdomen:   Extremities:   Skin:   Neurological:  Psychiatry:    HOSPITAL MEDICATIONS:  MEDICATIONS  (STANDING):  chlorhexidine 0.12% Liquid 15 milliLiter(s) Oral Mucosa every 12 hours  chlorhexidine 4% Liquid 1 Application(s) Topical daily  dornase delmar Solution 2.5 milliGRAM(s) Inhalation every 12 hours  enoxaparin Injectable 30 milliGRAM(s) SubCutaneous daily  levETIRAcetam  Solution 1000 milliGRAM(s) Oral two times a day  levothyroxine 125 MICROGram(s) Oral daily  polyethylene glycol 3350 17 Gram(s) Oral daily  senna 2 Tablet(s) Oral at bedtime  valproic  acid Syrup 500 milliGRAM(s) Oral two times a day    MEDICATIONS  (PRN):  acetaminophen    Suspension .. 650 milliGRAM(s) Oral every 6 hours PRN Temp greater or equal to 38C (100.4F)      LABS:                        8.7    9.18  )-----------( 321      ( 10 May 2020 05:41 )             28.4     05-10    143  |  105  |  14  ----------------------------<  97  3.7   |  27  |  0.55    Ca    8.7      10 May 2020 05:41  Phos  3.7     05-10  Mg     2.0     05-10                MICROBIOLOGY:     RADIOLOGY:  [ ] Reviewed and interpreted by me    PULMONARY FUNCTION TESTS:    EKG: CHIEF COMPLAINT:Patient is a 60y old  Female who presents with a chief complaint of acute respiratory failure (08 May 2020 12:19)      Interval Events: none overnight     REVIEW OF SYSTEMS:  [x ] Unable to assess ROS because trach/lethergy     OBJECTIVE:  ICU Vital Signs Last 24 Hrs  T(C): 37 (11 May 2020 04:41), Max: 37.8 (11 May 2020 01:57)  T(F): 98.6 (11 May 2020 04:41), Max: 100 (11 May 2020 01:57)  HR: 82 (11 May 2020 04:41) (76 - 96)  BP: 102/62 (11 May 2020 04:41) (102/62 - 128/70)  BP(mean): --  ABP: --  ABP(mean): --  RR: 16 (11 May 2020 04:41) (16 - 16)  SpO2: 100% (11 May 2020 04:41) (96% - 100%)    Mode: AC/ CMV (Assist Control/ Continuous Mandatory Ventilation), RR (machine): 15, TV (machine): 300, FiO2: 30, PEEP: 5, MAP: 10, PIP: 24    05-10 @ 07:01  -  05-11 @ 07:00  --------------------------------------------------------  IN: 880 mL / OUT: 0 mL / NET: 880 mL      CAPILLARY BLOOD GLUCOSE      HOSPITAL MEDICATIONS:  MEDICATIONS  (STANDING):  chlorhexidine 0.12% Liquid 15 milliLiter(s) Oral Mucosa every 12 hours  chlorhexidine 4% Liquid 1 Application(s) Topical daily  dornase delmar Solution 2.5 milliGRAM(s) Inhalation every 12 hours  enoxaparin Injectable 30 milliGRAM(s) SubCutaneous daily  levETIRAcetam  Solution 1000 milliGRAM(s) Oral two times a day  levothyroxine 125 MICROGram(s) Oral daily  polyethylene glycol 3350 17 Gram(s) Oral daily  senna 2 Tablet(s) Oral at bedtime  valproic  acid Syrup 500 milliGRAM(s) Oral two times a day    MEDICATIONS  (PRN):  acetaminophen    Suspension .. 650 milliGRAM(s) Oral every 6 hours PRN Temp greater or equal to 38C (100.4F)      LABS:                        8.7    9.18  )-----------( 321      ( 10 May 2020 05:41 )             28.4     05-10    143  |  105  |  14  ----------------------------<  97  3.7   |  27  |  0.55    Ca    8.7      10 May 2020 05:41  Phos  3.7     05-10  Mg     2.0     05-10                MICROBIOLOGY:     RADIOLOGY:  [ ] Reviewed and interpreted by me    PULMONARY FUNCTION TESTS:    EKG:

## 2020-05-11 NOTE — PROGRESS NOTE ADULT - PROBLEM SELECTOR PLAN 2
- Failed extubation x 2, s/p trach on 5/7, tolerating AC 15/300/5/30.  - Failed PS 10/5 today. Continue PS QD as tolerated.   - Chest PT and Suction PRN  - Trach care daily - Failed extubation x 2, s/p trach on 5/7, tolerating AC 15/300/5/30.  - Failed PS 10/5 again with noted apnea. Continue PS QD as tolerated.   - Chest PT and Suction PRN  - Trach care daily

## 2020-05-11 NOTE — PROGRESS NOTE ADULT - PROBLEM SELECTOR PROBLEM 6
Pt called back and I verified 2 patient identifiers: name & . I discussed results and recommendations at length with patient. All questions answered. NSTEMI (non-ST elevated myocardial infarction)

## 2020-05-11 NOTE — PROGRESS NOTE ADULT - PROBLEM SELECTOR PLAN 6
- Noted NSTEMI PTA with troponins peaked at 2.7, likely 2/2 demand ischemia  - ECG with no evidence of ischemia or infarction  - TTE technically difficult; moderately reduced LV systolic function  - Stable at this time, continue to monitor

## 2020-05-11 NOTE — PROGRESS NOTE ADULT - PROBLEM SELECTOR PLAN 1
- Patient initially admitted from OSH for COVID requiring intubation since 4/7, failed extubation x 2, now s/p trach and PEG on 5/7  - s/p Plaquenil, Azithromycin, Steroids  - s/p Plasma on 4/28  - Continue supportive measures. - Patient initially admitted from OSH for COVID requiring intubation since 4/7, failed extubation x 2, now s/p trach and PEG on 5/7  - s/p Plaquenil, Azithromycin, Steroids  - s/p Plasma on 4/28  - PT for trial   - Continue supportive measures.

## 2020-05-11 NOTE — PHYSICAL THERAPY INITIAL EVALUATION ADULT - ADDITIONAL COMMENTS
Pt is a poor historian 2/2 cognitive impairment. As per Care Coordinator note; pt is from a group home with she was wheelchair bound and completely dependent with ADL's.    Pt left comfortable in bed, NAD, all lines intact, all precautions maintained, with call bell in reach, and RN aware of PT evaluation. Pt is a poor historian 2/2 cognitive impairment. As per Care Coordinator note; pt is from a group home where she was wheelchair bound and completely dependent with ADL's.    Pt left comfortable in bed, NAD, all lines intact, all precautions maintained, with call bell in reach, and RN aware of PT evaluation.

## 2020-05-11 NOTE — PROGRESS NOTE ADULT - PROBLEM SELECTOR PLAN 7
- ADAM likely 2/2 ATN and Sepsis, now resolved  - Passed TOV 5/9  - Continue to monitor renal function and avoid nephrotoxins.

## 2020-05-11 NOTE — PROGRESS NOTE ADULT - PROBLEM SELECTOR PLAN 4
- Patient was transferred from OS for GYN evaluation second to vaginal bleeding from iatrogenic injury second to attempted placement of a rectal tube, in the setting of anticoagulation, s/p vaginal packing, now removed  - Patient received PRBCs for acute blood loss, H/H is now stable  - No further bleeding noted

## 2020-05-11 NOTE — CHART NOTE - NSCHARTNOTEFT_GEN_A_CORE
NUTRITION FOLLOW-UP: Unable to conduct a face to face interview or nutrition-focused physical exam due to limited contact restrictions related to Pt's medical condition and isolation precautions.  Chart reviewed. Pt is s/p trach and PEG placement. Consult requested for tube feeding. Pt has multiple pressure injuries. She has gained 6 kgs since admission. Pt has generalized edema. The current diet order of Jevity 1.2 and No Carb Pro Source will provide Pt with 1224 kcal and 81 gm protein.     Weight: 51 kg    Labs:    Current Diet: Jevity 1.2 total volume for 24 hours 920 ml continuous feed at 40 ml/hour.     Enteral Recommendations: continue current order    RD to Remain Available: Amie Goldberg MS, RDN, CDN pager [phone]     Additional Recommendations:   1) Monitor weight, labs, po intake and skin integrity.

## 2020-05-11 NOTE — PROGRESS NOTE ADULT - ATTENDING COMMENTS
Hemodynamically stable  Does not tolerate weaning -- apneic , minimal resp effort at best  Afebrile.  COVID PCR negative  d/c planning to SNF with vent

## 2020-05-11 NOTE — PROGRESS NOTE ADULT - ASSESSMENT
61 YO F with Downs Syndrome, Hypothyroidsim, Constipation and Seizures, transferred from [facility] for acute hypoxic respiratory failure second to COVID 19 PNA. Course c/b seizures episodes, type 2 NSTEMI, ADAM and gram variable bacteremia. Extubated 4/16 and reintubated same day. Extubated again on 5/4 to BIPAP and then reintubated again 5/5. Now s/p Trach and PEG on 5/7 by Thoracic Sx. Transferred to RCU.

## 2020-05-11 NOTE — PROGRESS NOTE ADULT - PROBLEM SELECTOR PLAN 3
- Patient noted with AMS, pseudoAMS??   - Opens eyes to pain and name, but then "falls back to sleep." Facetime with brother/ PCA and patient noted with eyes open and attentive to screen.   - Will dc Seroquel 12.5mg PO QD and continue monitor.

## 2020-05-12 ENCOUNTER — TRANSCRIPTION ENCOUNTER (OUTPATIENT)
Age: 60
End: 2020-05-12

## 2020-05-12 NOTE — DISCHARGE NOTE PROVIDER - HOSPITAL COURSE
60y Female with history of Down Syndrome, hypothyroidism, and recurrent UTI who presented to [facility] on 4/4 for fever, cough, and SOB. Found to be COVID-19+ on 4/6. The patient was intubated on 4/7 for worsening hypercapnic respiratory failure. Extubated and reintubated on 4/16. During hospitalization, required proning to maintain oxygenation. Hospital course was further complicated by NSTEMI (type 2). Patient was seen and evaluated by cardiology, Zeyad Millard, on 4/9. No invasive procedures were performed at that time. Also complicated by seizure, treated with Depakote 500 BID. Patient also followed by nephrology for ATN. Vaginal bleeding noted on 4/21 and was evaluated  by OB/GYN. Found to have iatrogenic trauma from a rectal tube, causing the bleeding. Required 2u pRBC and vaginal packing. Patient seen multiple times by Palliative Care prior to transfer; signed MOLST form from 4/16 states that the patient is DNR; no compressions to be performed. Pt transferred to [facility] MICU for further management.        MICU COURSE:    In the MICU, pt was initially extubated to BiPAP on 5/4, but was reintubated for secretions and hypoxia on 5/5. . Pt also had seizure activity and was given ativan. Pt received 1u pRBC for low H/H. Pt required pressors for hypotension, now weaned off. Palliative was consulted. As per [facility] discussions with family, pt s/p trach and PEG on 5/7. On 5/8, tube feeds started through PEG tube. Of note, repeat COVID-19 PCR tests were negative x3 (4/30, 5/1, 5/6). Pt is currently stable and ready for transfer out of ICU.        Pt transferred to RCU 5/9/2020 with weaning trials attempted. Pt not tolerating secondary to periods of apnea. Seroquel was d/c 5/10 due to ? pseudo AMS and pt is responsive to tactile/verbal stimuli but more awake when family is on phone. Pt with episodes of diarrhea improved with change in TF with nutritional consult done         Pt seen and evaluated by   ------ on May  , 2020 and cleared for dc        Dispo: SNF 60y Female with history of Down Syndrome, hypothyroidism, and recurrent UTI who presented to [facility] on 4/4 for fever, cough, and SOB. Found to be COVID-19+ on 4/6. The patient was intubated on 4/7 for worsening hypercapnic respiratory failure. Extubated and reintubated on 4/16. During hospitalization, required proning to maintain oxygenation. Hospital course was further complicated by NSTEMI (type 2). Patient was seen and evaluated by cardiology, Zeyad Millard, on 4/9. No invasive procedures were performed at that time. Also complicated by seizure, treated with Depakote 500 BID. Patient also followed by nephrology for ATN. Vaginal bleeding noted on 4/21 and was evaluated  by OB/GYN. Found to have iatrogenic trauma from a rectal tube, causing the bleeding. Required 2u pRBC and vaginal packing. Patient seen multiple times by Palliative Care prior to transfer; signed MOLST form from 4/16 states that the patient is DNR; no compressions to be performed. Pt transferred to [facility] MICU for further management.        MICU COURSE:    In the MICU, pt was initially extubated to BiPAP on 5/4, but was reintubated for secretions and hypoxia on 5/5. . Pt also had seizure activity and was given ativan. Pt received 1u pRBC for low H/H. Pt required pressors for hypotension, now weaned off. Palliative was consulted. As per [facility] discussions with family, pt s/p trach and PEG on 5/7. On 5/8, tube feeds started through PEG tube. Of note, repeat COVID-19 PCR tests were negative x4 (4/30, 5/1, 5/6, 5/14). Pt is currently stable and ready for transfer out of ICU.        Pt transferred to RCU 5/9/2020 with weaning trials attempted. Pt not tolerating secondary to periods of apnea. Seroquel was d/c 5/10 due to ? pseudo AMS and pt is responsive to tactile/verbal stimuli but more awake when family is on phone. Pt with episodes of diarrhea improved with change in TF with nutritional consult done.        Pt seen and evaluated by Dr. shannon on May 20, 2020 and cleared for dc        Dispo: SNF

## 2020-05-12 NOTE — PROGRESS NOTE ADULT - PROBLEM SELECTOR PLAN 1
- Patient initially admitted from OSH for COVID requiring intubation since 4/7, failed extubation x 2, now s/p trach and PEG on 5/7  - s/p Plaquenil, Azithromycin, Steroids  - s/p Plasma on 4/28  - PT for trial   - Continue supportive measures.

## 2020-05-12 NOTE — DISCHARGE NOTE PROVIDER - NSDCMRMEDTOKEN_GEN_ALL_CORE_FT
acetaminophen 325 mg oral tablet: 2 tab(s) orally every 6 hours, As needed, Temp greater or equal to 38C (100.4F), Mild Pain (1 - 3)  acetaminophen 650 mg rectal suppository: 1 suppository(ies) rectal every 4 hours, As needed, Temp greater or equal to 38C (100.4F)  clindamycin: toxic shock ppx  dexmedetomidine:   enoxaparin: 40 milligram(s) subcutaneous once a day  fentaNYL 5 mcg/mL-NaCl 0.9% intravenous solution: 5 milliliter(s) intravenous every hour, As needed, discomfort  hydrocortisone: 25 milligram(s) injectable 2 times a day and taper  levETIRAcetam 100 mg/mL intravenous solution: 10 milliliter(s) intravenous every 12 hours  levothyroxine: 62.5 microgram(s) injectable once a day  metoclopramide 5 mg oral tablet: 1 tab(s) orally 3 times a day (before meals)  midazolam:   midodrine 10 mg oral tablet: 1 tab(s) orally every 8 hours  norepinephrine:   pantoprazole 40 mg intravenous injection: 40 milligram(s) intravenous once a day  valproic acid (as valproate sodium) 100 mg/mL intravenous solution: 5 milliliter(s) intravenous every 12 hours albuterol 90 mcg/inh inhalation aerosol: 2 puff(s) inhaled every 6 hours  enoxaparin: 40 milligram(s) subcutaneous once a day x 30 days post-discharge from hospital   levETIRAcetam 100 mg/mL oral solution: 10 milliliter(s) orally 2 times a day  levothyroxine 125 mcg (0.125 mg) oral tablet: 1 tab(s) orally once a day  polyethylene glycol 3350 oral powder for reconstitution: 17 gram(s) orally once a day  Protonix 40 mg oral granule, delayed release: 1 each orally once a day  senna oral tablet: 2 tab(s) orally once a day (at bedtime)  valproic acid (as valproate sodium) 100 mg/mL intravenous solution: 5 milliliter(s) intravenous every 12 hours

## 2020-05-12 NOTE — PROGRESS NOTE ADULT - PROBLEM SELECTOR PLAN 2
- Failed extubation x 2, s/p trach on 5/7, tolerating AC 15/300/5/30.  - Failed PS 10/5 again with noted apnea. Continue PS QD as tolerated.   - Chest PT and Suction PRN  - Trach care daily

## 2020-05-12 NOTE — PROGRESS NOTE ADULT - ATTENDING COMMENTS
tolerated 45 minutes only on pressure support, then again apnea  covid repeat neg x 2  d/c plan to snf with vent

## 2020-05-12 NOTE — PROGRESS NOTE ADULT - ASSESSMENT
61 YO F with Downs Syndrome, Hypothyroidsim, Constipation and Seizures, transferred from [facility] for acute hypoxic respiratory failure second to COVID 19 PNA. Course c/b seizures episodes, type 2 NSTEMI, ADAM and gram variable bacteremia. Extubated 4/16 and reintubated same day. Extubated again on 5/4 to BIPAP and then reintubated again 5/5. Now s/p Trach and PEG on 5/7 by Thoracic Sx. Transferred to RCU.

## 2020-05-12 NOTE — PROGRESS NOTE ADULT - SUBJECTIVE AND OBJECTIVE BOX
INTERVAL HPI/OVERNIGHT EVENTS:    chart reviewed   mohamud peg feeds  no rectal bleeding    MEDICATIONS  (STANDING):  chlorhexidine 0.12% Liquid 15 milliLiter(s) Oral Mucosa every 12 hours  chlorhexidine 4% Liquid 1 Application(s) Topical daily  dornase delmar Solution 2.5 milliGRAM(s) Inhalation every 12 hours  enoxaparin Injectable 30 milliGRAM(s) SubCutaneous daily  levETIRAcetam  Solution 1000 milliGRAM(s) Oral two times a day  levothyroxine 125 MICROGram(s) Oral daily  polyethylene glycol 3350 17 Gram(s) Oral daily  senna 2 Tablet(s) Oral at bedtime  valproic  acid Syrup 500 milliGRAM(s) Oral two times a day    MEDICATIONS  (PRN):  acetaminophen    Suspension .. 650 milliGRAM(s) Oral every 6 hours PRN Temp greater or equal to 38C (100.4F)      Allergies    amoxicillin (Rash)  penicillin (Rash)    Intolerances        Review of Systems: Per current hospital emergency protocol, in an effort to reduce COVID exposures and also conserve PPE for necessary encounters, all data within this chart were reviewed and recommendations are based upon information in the chart and by verbal communication with covering team and/or nurses. Please refer to the ROS and PE per covering primary team note          Vital Signs Last 24 Hrs  T(C): 37 (12 May 2020 05:04), Max: 37.9 (11 May 2020 13:10)  T(F): 98.6 (12 May 2020 05:04), Max: 100.3 (11 May 2020 13:10)  HR: 86 (12 May 2020 08:40) (73 - 89)  BP: 107/65 (12 May 2020 05:04) (98/54 - 119/67)  BP(mean): --  RR: 18 (12 May 2020 05:04) (17 - 19)  SpO2: 97% (12 May 2020 08:40) (97% - 100%)    PHYSICAL EXAM: Per current hospital emergency protocol, in an effort to reduce COVID exposures and also conserve PPE for necessary encounters, all data within this chart were reviewed and recommendations are based upon information in the chart and by verbal communication with covering team and/or nurses. Please refer to the ROS and PE per covering primary team note          LABS:                        8.8    6.36  )-----------( 333      ( 12 May 2020 06:37 )             28.2     05-12    143  |  104  |  14  ----------------------------<  106<H>  3.8   |  28  |  0.50    Ca    8.5      12 May 2020 06:37  Phos  4.1     05-12  Mg     2.0     05-12            RADIOLOGY & ADDITIONAL TESTS:

## 2020-05-12 NOTE — DISCHARGE NOTE PROVIDER - NSDCFUADDINST_GEN_ALL_CORE_FT
Tracheostomy- Cleanse around trach site with NS. Pat dry. Apply Silicone foam dressing without border beneath trach collar, cut mid dressing to "Y" shape. Change foam dressing every shift and prn. Change trach ties every 3 days.     LUQ PEG- Cleanse q shift with NS, apply liquid barrier film beneath lisa disc.  If redness noted under lisa disc bumper apply thin foam  dressing without border (Mepilex Lite)- cut to mid dressing with a 'Y' shape.   Secondary securement to abdomen taping in 'H' fashion with Steri-strips.     Sacral/Gluteal fold extending to bilateral buttocks- Cleanse with skin cleanser, pat dry. Apply TRIAD paste twice a day and PRN with incontinent episodes.     Right heel, Right great distal toe, Left heel- Apply liquid barrier film daily, cover with gauze, wrap with kerlix. Change every other day. Monitor for tissue type changes.     Continue low air loss bed therapy,  heel elevation with offloading boots, Turning and positioning q2h with Z-flow fluidized device, continue moisture management with barrier creams & single breathable pad, continue measures to decrease friction/shear.

## 2020-05-12 NOTE — PROGRESS NOTE ADULT - ASSESSMENT
60y Female with history of Down Syndrome, hypothyroidism, and recurrent UTI who presented to [facility] on 4/4 for fever, cough, and SOB. The patient was intubated on 4/7 for worsening hypercapnic respiratory failure. Extubated and reintubated on 4/16. Hospitalization further complicated by NSTEMI (type 2), vaginal bleeding requiring 2 uPRBC. The pateint was being followed by my practice at [facility]. She is s.p transfer to [facility].     Acute blood loss anemia  multifactorial/resolved; trend h/h and transfuse prn   No evidence of GI bleeding  cont gi ppx with pepcid while on Lovenox   monitor stools; if loose hold stool softeners   no role for endoscopic eval at this time in light of risk in covid pts  s/p PEG; aspiration precautions with feeds     COVID   monitor resp status   s/p trach/peg w/ctsx 5/7  care per primary teams appreciated      Advanced care planning was discussed with patient and family.  Advanced care planning forms were reviewed and discussed.  Risks, benefits and alternatives of gastroenterologic procedures were discussed in detail and all questions were answered.

## 2020-05-12 NOTE — DISCHARGE NOTE PROVIDER - NSFOLLOWUPCLINICS_GEN_ALL_ED_FT
[facility] Gynecology and Obstetrics  Gynceology/OB  [address] [place] [address]  Phone: [phone]  Fax:   Follow Up Time:     [facility] Pulmonolgy and Sleep Medicine  Pulmonology  [address]  [place] [address]  Phone: [phone]  Fax:   Follow Up Time:     [facility]ty [facility]  Neurology  [address] [place] [id number]  Phone: [phone]  Fax:   Follow Up Time:

## 2020-05-12 NOTE — PROGRESS NOTE ADULT - PROBLEM SELECTOR PLAN 10
- DVT PPX with Lovenox   - DISPO - PT pending. - DVT PPX with Lovenox   - DISPO - PT trial in progress/ SNF

## 2020-05-12 NOTE — DISCHARGE NOTE PROVIDER - NSDCCPCAREPLAN_GEN_ALL_CORE_FT
PRINCIPAL DISCHARGE DIAGNOSIS  Diagnosis: COVID-19  Assessment and Plan of Treatment: You have been diagnosed with the COVID-19 virus during your hospital stay. Monitor for fevers, shortness of breath and cough primarily.  Monitor your temperature daily to not any changes and increases.          SECONDARY DISCHARGE DIAGNOSES  Diagnosis: Hypothyroidism  Assessment and Plan of Treatment: Continue with levothyroxine as ordered  Follow TSH per routine    Diagnosis: Acute respiratory failure with hypoxia  Assessment and Plan of Treatment: Acute respiratory failure with hypoxia  S/P trach on 5/7 by CTS   #6 XLT trach    RR 15 Fi02 30% PEEP 5       Diagnosis: Down syndrome  Assessment and Plan of Treatment: Down syndrome  Supportive care    Diagnosis: Seizure disorder  Assessment and Plan of Treatment: Seizure disorder  Continue medications  Safety precautions    Diagnosis: PEG (percutaneous endoscopic gastrostomy) status  Assessment and Plan of Treatment: PEG (percutaneous endoscopic gastrostomy) status  S/P Peg on 5/7   TF as ordered   Site care PRINCIPAL DISCHARGE DIAGNOSIS  Diagnosis: COVID-19  Assessment and Plan of Treatment: You have been diagnosed with the COVID-19 virus during your hospital stay.   Now tested negative.  Monitor for any further fevers, shortness of breath and cough.        SECONDARY DISCHARGE DIAGNOSES  Diagnosis: Acute respiratory failure with hypoxia  Assessment and Plan of Treatment: Acute respiratory failure with hypoxia  S/P trach on 5/7 by CTS   #6 XLT trach    RR 15 Fi02 30% PEEP 5       Diagnosis: NSTEMI (non-ST elevated myocardial infarction)  Assessment and Plan of Treatment: NSTEMI during course with troponins peaked at 2.7, likely due to demand ischemia, type 2. ECG with no evidence of ischemia or infarction. TTE without acute issues.  Continue to monitor.    Diagnosis: Hypothyroidism  Assessment and Plan of Treatment: Continue synthroid.    Diagnosis: Vaginal bleeding  Assessment and Plan of Treatment: Noted to have acute vaginal bleeding due to iatrogenic injury from an attempted placement of a rectal tube, in the setting of anticoagulation. Had vaginal packing, now removed.  No further bleeding noted.  Routine GYN follow up.    Diagnosis: Seizure disorder  Assessment and Plan of Treatment: Intermitent seizures likely due to infection and sepsis, now resolved. Has not has seizure in ~2 weeks.  Continue medications at current doses.

## 2020-05-12 NOTE — PROGRESS NOTE ADULT - SUBJECTIVE AND OBJECTIVE BOX
CHIEF COMPLAINT: Patient is a 60y old  Female who presents with a chief complaint of Acute hypoxic respiratory failure (12 May 2020 06:42)      Interval Events: None overnight    REVIEW OF SYSTEMS:  Constitutional:   Eyes:  ENT:  CV:  Resp:  GI:  :  MSK:  Integumentary:  Neurological:  Psychiatric:  Endocrine:  Hematologic/Lymphatic:  Allergic/Immunologic:  [ ] All other systems negative  [ ] Unable to assess ROS because ________    OBJECTIVE:  ICU Vital Signs Last 24 Hrs  T(C): 37 (12 May 2020 05:04), Max: 37.9 (11 May 2020 13:10)  T(F): 98.6 (12 May 2020 05:04), Max: 100.3 (11 May 2020 13:10)  HR: 86 (12 May 2020 05:04) (73 - 89)  BP: 107/65 (12 May 2020 05:04) (98/54 - 119/67)  BP(mean): --  ABP: --  ABP(mean): --  RR: 18 (12 May 2020 05:04) (17 - 19)  SpO2: 100% (12 May 2020 05:04) (97% - 100%)    Mode: AC/ CMV (Assist Control/ Continuous Mandatory Ventilation), RR (machine): 15, TV (machine): 300, FiO2: 30, PEEP: 5, ITime: 0.8, MAP: 10, PIP: 18    05-11 @ 07:01  -  05-12 @ 07:00  --------------------------------------------------------  IN: 880 mL / OUT: 900 mL / NET: -20 mL      CAPILLARY BLOOD GLUCOSE      HOSPITAL MEDICATIONS:  MEDICATIONS  (STANDING):  chlorhexidine 0.12% Liquid 15 milliLiter(s) Oral Mucosa every 12 hours  chlorhexidine 4% Liquid 1 Application(s) Topical daily  dornase delmar Solution 2.5 milliGRAM(s) Inhalation every 12 hours  enoxaparin Injectable 30 milliGRAM(s) SubCutaneous daily  levETIRAcetam  Solution 1000 milliGRAM(s) Oral two times a day  levothyroxine 125 MICROGram(s) Oral daily  polyethylene glycol 3350 17 Gram(s) Oral daily  senna 2 Tablet(s) Oral at bedtime  valproic  acid Syrup 500 milliGRAM(s) Oral two times a day    MEDICATIONS  (PRN):  acetaminophen    Suspension .. 650 milliGRAM(s) Oral every 6 hours PRN Temp greater or equal to 38C (100.4F)      LABS:                    MICROBIOLOGY:     RADIOLOGY:  [ ] Reviewed and interpreted by me    PULMONARY FUNCTION TESTS:    EKG: CHIEF COMPLAINT: Patient is a 60y old  Female who presents with a chief complaint of Acute hypoxic respiratory failure (12 May 2020 06:42)      Interval Events: None overnight    REVIEW OF SYSTEMS:  [x ] Unable to assess ROS because trach /AMS  OBJECTIVE:  ICU Vital Signs Last 24 Hrs  T(C): 37 (12 May 2020 05:04), Max: 37.9 (11 May 2020 13:10)  T(F): 98.6 (12 May 2020 05:04), Max: 100.3 (11 May 2020 13:10)  HR: 86 (12 May 2020 05:04) (73 - 89)  BP: 107/65 (12 May 2020 05:04) (98/54 - 119/67)  BP(mean): --  ABP: --  ABP(mean): --  RR: 18 (12 May 2020 05:04) (17 - 19)  SpO2: 100% (12 May 2020 05:04) (97% - 100%)    Mode: AC/ CMV (Assist Control/ Continuous Mandatory Ventilation), RR (machine): 15, TV (machine): 300, FiO2: 30, PEEP: 5, ITime: 0.8, MAP: 10, PIP: 18    05-11 @ 07:01  -  05-12 @ 07:00  --------------------------------------------------------  IN: 880 mL / OUT: 900 mL / NET: -20 mL      CAPILLARY BLOOD GLUCOSE      HOSPITAL MEDICATIONS:  MEDICATIONS  (STANDING):  chlorhexidine 0.12% Liquid 15 milliLiter(s) Oral Mucosa every 12 hours  chlorhexidine 4% Liquid 1 Application(s) Topical daily  dornase delmar Solution 2.5 milliGRAM(s) Inhalation every 12 hours  enoxaparin Injectable 30 milliGRAM(s) SubCutaneous daily  levETIRAcetam  Solution 1000 milliGRAM(s) Oral two times a day  levothyroxine 125 MICROGram(s) Oral daily  polyethylene glycol 3350 17 Gram(s) Oral daily  senna 2 Tablet(s) Oral at bedtime  valproic  acid Syrup 500 milliGRAM(s) Oral two times a day    MEDICATIONS  (PRN):  acetaminophen    Suspension .. 650 milliGRAM(s) Oral every 6 hours PRN Temp greater or equal to 38C (100.4F)      LABS:                    MICROBIOLOGY:     RADIOLOGY:  [ ] Reviewed and interpreted by me    PULMONARY FUNCTION TESTS:    EKG:

## 2020-05-13 NOTE — PROGRESS NOTE ADULT - ATTENDING COMMENTS
Did not tolerate weaning attempt today.  She was not apneic, but rapid shallow breathing even with pressure support level as high as 18  Otherwise she is stable, no acute hospital needs    I had a long phone conversation with pt's mother and brother yesterday.  I explained that Amira is stable for d/c to a vent facility, where they will continue to wean her.  They were initially upset, as they were under the impression she would stay in the hospital for a "while" to see if she could come off the vent.  I explained that an acute hospital bed is not a weaning facility , and she will be well cared for at the [facility]  They will talk further with SW, review facilities Did not tolerate weaning attempt today.  She was not apneic, but rapid shallow breathing even with pressure support level as high as 18  Otherwise she is stable, no acute hospital needs    I had a long phone conversation with pt's  and brother and ALEXANDRA yesterday.  I explained that Amira is stable for d/c to a vent facility, where they will continue to wean her.  They were initially upset, as they were under the impression she would stay in the hospital for a "while" to see if she could come off the vent.  I explained that an acute hospital bed is not a weaning facility , and she will be well cared for at the [facility]  They will talk further with SW, review facilities

## 2020-05-13 NOTE — PROGRESS NOTE ADULT - PROBLEM SELECTOR PLAN 2
- Failed extubation x 2, s/p trach on 5/7, tolerating AC 15/300/5/30.  - Failed PS 15/5 again with noted rapid shallow breathing. Continue PS QD as tolerated.   - Chest PT and Suction PRN  -Trach sutures to be removed by thoracic on 5/22  - Trach care daily

## 2020-05-13 NOTE — PROGRESS NOTE ADULT - ASSESSMENT
59 YO F with Downs Syndrome, Hypothyroidsim, Constipation and Seizures, transferred from [facility] for acute hypoxic respiratory failure second to COVID 19 PNA. Course c/b seizures episodes, type 2 NSTEMI, ADAM and gram variable bacteremia. Extubated 4/16 and reintubated same day. Extubated again on 5/4 to BIPAP and then reintubated again 5/5. Now s/p Trach and PEG on 5/7 by Thoracic Sx. Transferred to [facility].

## 2020-05-13 NOTE — PROGRESS NOTE ADULT - PROBLEM SELECTOR PLAN 3
- Patient noted with AMS, pseudo AMS??   - Opens eyes to pain and name, but then "falls back to sleep." Facetime with brother/ PCA on 5/12 and patient noted with eyes open and attentive to screen.   -  Seroquel d/michael'ed 5/12

## 2020-05-13 NOTE — CHART NOTE - NSCHARTNOTEFT_GEN_A_CORE
Per wound care RN, wound care team to see patient today or tomorrow 5/14/20 due to large consult volume. Will follow up.    Cristóbal Norman NP-BC  Medicine/R[facility]

## 2020-05-13 NOTE — PROGRESS NOTE ADULT - PROBLEM SELECTOR PLAN 10
- DVT PPX with Lovenox   - DISPO - PT trial in progress/ SNF. Discussion with family as per attending attestation below.

## 2020-05-13 NOTE — PROGRESS NOTE ADULT - ASSESSMENT
60y Female with history of Down Syndrome, hypothyroidism, and recurrent UTI who presented to [facility] on 4/4 for fever, cough, and SOB. The patient was intubated on 4/7 for worsening hypercapnic respiratory failure. Extubated and reintubated on 4/16. Hospitalization further complicated by NSTEMI (type 2), vaginal bleeding requiring 2 uPRBC. The pateint was being followed by my practice at [facility]. She is s.p transfer to [facility].     Acute blood loss anemia  multifactorial/resolved; trend h/h and transfuse prn   No evidence of GI bleeding  cont gi ppx with pepcid while on Lovenox   monitor stools; if loose hold stool softeners   no role for endoscopic eval at this time in light of risk in covid pts  s/p PEG; aspiration precautions with feeds   dc plans to [facility] in progress    COVID   monitor resp status   s/p trach/peg w/ctsx 5/7  dc plans to [facility] in progress  care per primary teams appreciated      Advanced care planning was discussed with patient and family.  Advanced care planning forms were reviewed and discussed.  Risks, benefits and alternatives of gastroenterologic procedures were discussed in detail and all questions were answered.

## 2020-05-13 NOTE — PROGRESS NOTE ADULT - PROBLEM SELECTOR PLAN 4
- Patient was transferred from OSH for GYN evaluation second to vaginal bleeding from iatrogenic injury second to attempted placement of a rectal tube, in the setting of anticoagulation, s/p vaginal packing, now removed  - Patient received PRBCs for acute blood loss, H/H is now stable  - No further bleeding noted  -GI note appreciated 5/13.

## 2020-05-13 NOTE — PROGRESS NOTE ADULT - SUBJECTIVE AND OBJECTIVE BOX
INTERVAL HPI/OVERNIGHT EVENTS:    tolerating PEG feeds  no gi bleeding    MEDICATIONS  (STANDING):  chlorhexidine 0.12% Liquid 15 milliLiter(s) Oral Mucosa every 12 hours  chlorhexidine 4% Liquid 1 Application(s) Topical daily  dornase delmar Solution 2.5 milliGRAM(s) Inhalation every 12 hours  enoxaparin Injectable 30 milliGRAM(s) SubCutaneous daily  levETIRAcetam  Solution 1000 milliGRAM(s) Oral two times a day  levothyroxine 125 MICROGram(s) Oral daily  polyethylene glycol 3350 17 Gram(s) Oral daily  senna 2 Tablet(s) Oral at bedtime  valproic  acid Syrup 500 milliGRAM(s) Oral two times a day    MEDICATIONS  (PRN):  acetaminophen    Suspension .. 650 milliGRAM(s) Oral every 6 hours PRN Temp greater or equal to 38C (100.4F)      Allergies    amoxicillin (Rash)  penicillin (Rash)    Intolerances        Review of Systems: *pt minimally verbal to nonverbal, unable to obtain ROS       Vital Signs Last 24 Hrs  T(C): 36.7 (13 May 2020 06:37), Max: 37.3 (12 May 2020 13:28)  T(F): 98 (13 May 2020 06:37), Max: 99.2 (12 May 2020 13:28)  HR: 85 (13 May 2020 08:31) (80 - 95)  BP: 98/58 (13 May 2020 06:37) (95/78 - 125/73)  BP(mean): --  RR: 15 (13 May 2020 06:37) (15 - 24)  SpO2: 100% (13 May 2020 08:31) (95% - 100%)    PHYSICAL EXAM:    Constitutional: NAD  HEENT: EOMI, throat clear  Neck: No LAD, supple  Respiratory: CTA and P  Cardiovascular: S1 and S2, RRR, no M  Gastrointestinal: BS+, soft, NT/ND, neg HSM, peg  Extremities: No peripheral edema, neg clubbing, cyanosis  Vascular: 2+ peripheral pulses  Neurological: A/O x 0  Psychiatric: lethargic    LABS:                        8.8    6.36  )-----------( 333      ( 12 May 2020 06:37 )             28.2     05-12    143  |  104  |  14  ----------------------------<  106<H>  3.8   |  28  |  0.50    Ca    8.5      12 May 2020 06:37  Phos  4.1     05-12  Mg     2.0     05-12            RADIOLOGY & ADDITIONAL TESTS:

## 2020-05-14 NOTE — PROGRESS NOTE ADULT - ASSESSMENT
59 YO F with Downs Syndrome, Hypothyroidsim, Constipation and Seizures, transferred from [facility] for acute hypoxic respiratory failure second to COVID 19 PNA. Course c/b seizures episodes, type 2 NSTEMI, ADAM and gram variable bacteremia. Extubated 4/16 and reintubated same day. Extubated again on 5/4 to BIPAP and then reintubated again 5/5. Now s/p Trach and PEG on 5/7 by Thoracic Sx. Transferred to [facility].

## 2020-05-14 NOTE — PROGRESS NOTE ADULT - ASSESSMENT
60y Female with history of Down Syndrome, hypothyroidism, and recurrent UTI who presented to [facility] on 4/4 for fever, cough, and SOB. The patient was intubated on 4/7 for worsening hypercapnic respiratory failure. Extubated and reintubated on 4/16. Hospitalization further complicated by NSTEMI (type 2), vaginal bleeding requiring 2 uPRBC. The pateint was being followed by my practice at [facility]. She is s.p transfer to [facility].     Acute blood loss anemia  multifactorial/resolved; trend h/h and transfuse prn   No evidence of GI bleeding  cont gi ppx with pepcid while on Lovenox   monitor stools; if loose hold stool softeners   no role for endoscopic eval at this time in light of risk in covid pts  s/p PEG; aspiration precautions with feeds   dc plans to [facility] in progress    COVID   monitor resp status   s/p trach/peg w/ctsx 5/7  care per primary teams appreciated  dc plans to [facility] in progress      Advanced care planning was discussed with patient and family.  Advanced care planning forms were reviewed and discussed.  Risks, benefits and alternatives of gastroenterologic procedures were discussed in detail and all questions were answered.

## 2020-05-14 NOTE — PROGRESS NOTE ADULT - ATTENDING COMMENTS
hemodynamically stable, afebrile  opens eyes but not interacting.  apneic again today on weaning trial    Face time with pt's brother, his wife, in the pt's room  Explained once again that she is stable for tx to a vent facility.  At this time, time will tell if she awakens more

## 2020-05-14 NOTE — PROGRESS NOTE ADULT - SUBJECTIVE AND OBJECTIVE BOX
CHIEF COMPLAINT:    Interval Events:    REVIEW OF SYSTEMS:  Constitutional:   Eyes:  ENT:  CV:  Resp:  GI:  :  MSK:  Integumentary:  Neurological:  Psychiatric:  Endocrine:  Hematologic/Lymphatic:  Allergic/Immunologic:  [ ] All other systems negative  [ ] Unable to assess ROS because ________    OBJECTIVE:  ICU Vital Signs Last 24 Hrs  T(C): 37.1 (14 May 2020 05:15), Max: 37.1 (14 May 2020 05:15)  T(F): 98.7 (14 May 2020 05:15), Max: 98.7 (14 May 2020 05:15)  HR: 81 (14 May 2020 05:15) (80 - 92)  BP: 100/69 (14 May 2020 05:15) (99/58 - 101/53)  BP(mean): --  ABP: --  ABP(mean): --  RR: 18 (14 May 2020 05:15) (18 - 19)  SpO2: 100% (14 May 2020 05:15) (100% - 100%)    Mode: AC/ CMV (Assist Control/ Continuous Mandatory Ventilation), RR (machine): 15, TV (machine): 300, FiO2: 30, PEEP: 5, MAP: 7, PIP: 14    05-13 @ 07:01  -  05-14 @ 07:00  --------------------------------------------------------  IN: 880 mL / OUT: 0 mL / NET: 880 mL      CAPILLARY BLOOD GLUCOSE          PHYSICAL EXAM:  General:   HEENT:   Lymph Nodes:  Neck:   Respiratory:   Cardiovascular:   Abdomen:   Extremities:   Skin:   Neurological:  Psychiatry:    HOSPITAL MEDICATIONS:  MEDICATIONS  (STANDING):  chlorhexidine 0.12% Liquid 15 milliLiter(s) Oral Mucosa every 12 hours  chlorhexidine 4% Liquid 1 Application(s) Topical daily  dornase delmar Solution 2.5 milliGRAM(s) Inhalation every 12 hours  enoxaparin Injectable 30 milliGRAM(s) SubCutaneous daily  levETIRAcetam  Solution 1000 milliGRAM(s) Oral two times a day  levothyroxine 125 MICROGram(s) Oral daily  polyethylene glycol 3350 17 Gram(s) Oral daily  senna 2 Tablet(s) Oral at bedtime  valproic  acid Syrup 500 milliGRAM(s) Oral two times a day    MEDICATIONS  (PRN):  acetaminophen    Suspension .. 650 milliGRAM(s) Oral every 6 hours PRN Temp greater or equal to 38C (100.4F)      LABS:                        8.3    6.64  )-----------( 331      ( 14 May 2020 05:14 )             26.5                     MICROBIOLOGY:     RADIOLOGY:  [ ] Reviewed and interpreted by me    PULMONARY FUNCTION TESTS:    EKG: CHIEF COMPLAINT: Patient is a 60y old  Female who presents with a chief complaint of Acute hypoxic respiratory failure secondary to COVID-19.       Interval Events: No overnight events    REVIEW OF SYSTEMS:  Unable to assess. Pt non verbal with down syndrome.    OBJECTIVE:  ICU Vital Signs Last 24 Hrs  T(C): 37.1 (14 May 2020 05:15), Max: 37.1 (14 May 2020 05:15)  T(F): 98.7 (14 May 2020 05:15), Max: 98.7 (14 May 2020 05:15)  HR: 81 (14 May 2020 05:15) (80 - 92)  BP: 100/69 (14 May 2020 05:15) (99/58 - 101/53)  RR: 18 (14 May 2020 05:15) (18 - 19)  SpO2: 100% (14 May 2020 05:15) (100% - 100%)    Mode: AC/ CMV (Assist Control/ Continuous Mandatory Ventilation), RR (machine): 15, TV (machine): 300, FiO2: 30, PEEP: 5, MAP: 7, PIP: 14    05-13 @ 07:01  -  05-14 @ 07:00  --------------------------------------------------------  IN: 880 mL / OUT: 0 mL / NET: 880 mL      HOSPITAL MEDICATIONS:  MEDICATIONS  (STANDING):  chlorhexidine 0.12% Liquid 15 milliLiter(s) Oral Mucosa every 12 hours  chlorhexidine 4% Liquid 1 Application(s) Topical daily  dornase delmar Solution 2.5 milliGRAM(s) Inhalation every 12 hours  enoxaparin Injectable 30 milliGRAM(s) SubCutaneous daily  levETIRAcetam  Solution 1000 milliGRAM(s) Oral two times a day  levothyroxine 125 MICROGram(s) Oral daily  polyethylene glycol 3350 17 Gram(s) Oral daily  senna 2 Tablet(s) Oral at bedtime  valproic  acid Syrup 500 milliGRAM(s) Oral two times a day    MEDICATIONS  (PRN):  acetaminophen    Suspension .. 650 milliGRAM(s) Oral every 6 hours PRN Temp greater or equal to 38C (100.4F)      LABS:                       8.3    6.64  )-----------( 331      ( 14 May 2020 05:14 )             26.5     05-14    142  |  103  |  14  ----------------------------<  108<H>  4.1   |  28  |  0.50    Ca    8.9      14 May 2020 05:14  Phos  3.8     05-14  Mg     2.1     05-14

## 2020-05-14 NOTE — PROGRESS NOTE ADULT - PROBLEM SELECTOR PLAN 1
- Patient initially admitted from OSH for COVID requiring intubation since 4/7, failed extubation x 2, now s/p trach and PEG on 5/7  - s/p Plaquenil, Azithromycin, Steroids  - s/p Plasma on 4/28  - Continue supportive measures.  -Pt apneic again today on weaning trial. Will attempt daily.

## 2020-05-14 NOTE — PROGRESS NOTE ADULT - SUBJECTIVE AND OBJECTIVE BOX
INTERVAL HPI/OVERNIGHT EVENTS:    tolerating peg feeds   no residuals       MEDICATIONS  (STANDING):  chlorhexidine 0.12% Liquid 15 milliLiter(s) Oral Mucosa every 12 hours  chlorhexidine 4% Liquid 1 Application(s) Topical daily  dornase delmar Solution 2.5 milliGRAM(s) Inhalation every 12 hours  enoxaparin Injectable 30 milliGRAM(s) SubCutaneous daily  levETIRAcetam  Solution 1000 milliGRAM(s) Oral two times a day  levothyroxine 125 MICROGram(s) Oral daily  polyethylene glycol 3350 17 Gram(s) Oral daily  senna 2 Tablet(s) Oral at bedtime  valproic  acid Syrup 500 milliGRAM(s) Oral two times a day    MEDICATIONS  (PRN):  acetaminophen    Suspension .. 650 milliGRAM(s) Oral every 6 hours PRN Temp greater or equal to 38C (100.4F)      Allergies    amoxicillin (Rash)  penicillin (Rash)    Intolerances        Review of Systems: Per current hospital emergency protocol, in an effort to reduce COVID exposures and also conserve PPE for necessary encounters, all data within this chart were reviewed and recommendations are based upon information in the chart and by verbal communication with covering team and/or nurses. Please refer to the ROS and PE per covering primary team note          Vital Signs Last 24 Hrs  T(C): 37.1 (14 May 2020 05:15), Max: 37.1 (14 May 2020 05:15)  T(F): 98.7 (14 May 2020 05:15), Max: 98.7 (14 May 2020 05:15)  HR: 80 (14 May 2020 09:49) (80 - 92)  BP: 100/69 (14 May 2020 05:15) (99/58 - 101/53)  BP(mean): --  RR: 18 (14 May 2020 05:15) (18 - 19)  SpO2: 100% (14 May 2020 09:49) (94% - 100%)    PHYSICAL EXAM: Per current hospital emergency protocol, in an effort to reduce COVID exposures and also conserve PPE for necessary encounters, all data within this chart were reviewed and recommendations are based upon information in the chart and by verbal communication with covering team and/or nurses. Please refer to the ROS and PE per covering primary team note          LABS:                        8.3    6.64  )-----------( 331      ( 14 May 2020 05:14 )             26.5     05-14    142  |  103  |  14  ----------------------------<  108<H>  4.1   |  28  |  0.50    Ca    8.9      14 May 2020 05:14  Phos  3.8     05-14  Mg     2.1     05-14            RADIOLOGY & ADDITIONAL TESTS:

## 2020-05-15 NOTE — CHART NOTE - NSCHARTNOTEFT_GEN_A_CORE
Patient previously seen for nutrition initial evaluation (4/23) and consulted for TF recommendations (5/11), now for follow up. Unable to conduct a face to face interview or nutrition-focused physical exam due to limited contact restrictions related to patient's medical condition and isolation precautions. Obtained subjective information from extensive chart review.     Nutrition Interval Events: Patient s/p trach and PEG placement (5/7). Currently on Jevity 1.2 Mitch continuous @ 40 mL/hour + Prosource 2x daily, providing 1224 calories and 81 g protein.     (5/4) Critical care nutrition follow-up. Patient remains intubated, did not receive propofol since 3/3 at 8am. Patient is maintained upon Glucerna1.5 at goal rate of [id number] with prosource x 3 pkts provide total volume 920mL, 1380Kcal, 121g pro and 698 free water ( ~30kcal/kg and 2.7g pro/kg of Admit weight-45kg) exceeding protein needs. Suggest lowering prosource pkt  to once a day would provide 91gpro (meeting ~2g pro/Admit weight). No intolerance to tube feed noted in flowsheet.  As per I&O, stool count x1 5/4/20 and one loose stool 5/3 in flowsheet. Patient ordered for Miralax and Senna.     Estimated Needs:   [X ] no change since previous assessment 4/30  Estimated Nutrition Needs based on weight 45kg (4/22)  Estimated Energy Needs (25-30Kcal/kg): 1125-1350Kcal/day  Estimated Protein Needs (1.5-2.0g protein/kg): 67.5-90g protein/day     Nutrition Interventions/Recommendations:   **Hold feeds for 30minutes before and after Synthroid provision in setting of medication-food interaction**  1. Suggest Glucerna1.5 at goal rate of [id number] and decrease No Carb Prosource (15 grams protein/ 30 mL packet.) to 1pkt/day provide total volume 920mL, 1380Kcal, 91g pro and 698 free water. Additional free water per MD discretion  2. Monitor weights, labs, BM's, skin integrity, tolerance to EN.  3. Obtain new weight and daily weight   4. Consider multivitamin daily for micronutrient coverage.  5. Further adjustments to rate/volume/duration/free water provision of enteral feeds dependant on long term monitoring of patient's tolerance, weight trends and needs.     Diet Order: Diet, NPO with Tube Feed:   Tube Feeding Modality: Orogastric  Jevity 1.2 Mitch (JEVITY1.2RTH)  Total Volume for 24 Hours (mL): 920  Continuous  Starting Tube Feed Rate {mL per Hour}: 40  Until Goal Tube Feed Rate (mL per Hour): 40  Tube Feed Duration (in Hours): 23  Tube Feed Start Time: 17:00  No Carb Prosource (1pkg = 15gms Protein)     Qty per Day:  2 (05-10-20 @ 17:43)    Current Weight: 51.5 kg (5/13)  Previous Weight(s): 51 kg (5/9) -- 45 kg (4/22)  6.5 kg weight gain < 1 month    MEDICATIONS  (STANDING):  chlorhexidine 0.12% Liquid 15 milliLiter(s) Oral Mucosa every 12 hours  chlorhexidine 4% Liquid 1 Application(s) Topical daily  dornase delmar Solution 2.5 milliGRAM(s) Inhalation every 12 hours  enoxaparin Injectable 30 milliGRAM(s) SubCutaneous daily  levETIRAcetam  Solution 1000 milliGRAM(s) Oral two times a day  levothyroxine 125 MICROGram(s) Oral daily  polyethylene glycol 3350 17 Gram(s) Oral daily  senna 2 Tablet(s) Oral at bedtime  valproic  acid Syrup 500 milliGRAM(s) Oral two times a day    MEDICATIONS  (PRN):  acetaminophen    Suspension .. 650 milliGRAM(s) Oral every 6 hours PRN Temp greater or equal to 38C (100.4F)    Pertinent Labs: 05-14 Na 142 mmol/L Glu 108 mg/dL<H> K+ 4.1 mmol/L Cr 0.50 mg/dL BUN 14 mg/dL Phos 3.8 mg/dL      Skin: Unstageable R heel and R buttocks pressure injuries; suspected deep tissue R big toe pressure injury; generalized edema    Estimated Needs:   [x] no change since previous assessment  [] recalculated    Nutrition Diagnosis:   [] new [] ongoing [] resolved     Goal(s):  1.     Interventions:   1.     Monitoring and Evaluation:   1. Monitor weights, labs, BMs, skin integrity, and TF tolerance  2. RD services to remain available     Jo Ann Loza RDN   Pager #[phone] Patient previously seen for nutrition initial evaluation (4/23) and consulted for TF recommendations (5/11), now for follow up. Unable to conduct a face to face interview or nutrition-focused physical exam due to limited contact restrictions related to patient's medical condition and isolation precautions. Obtained subjective information from extensive chart review.     Nutrition Interval Events: Patient s/p trach and PEG placement (5/7). Currently on Jevity 1.2 Mitch continuous @ 40 mL/hour + Prosource 2x daily, providing 1224 calories and 81 g protein. Current regimen meets 100% estimated energy needs. Patient seen by Wound Care RN for assessment of skin impairment in the setting of multiple pressure injuries (5/15). Gastroenterology noted tolerance of feeds with no GI events overnight (5/15). As per flowsheets, patient received 400 mL free water (5/14) and last received propofol on 5/7.     Diet Order: Diet, NPO with Tube Feed:   Tube Feeding Modality: Orogastric  Jevity 1.2 Mitch (JEVITY1.2RTH)  Total Volume for 24 Hours (mL): 920  Continuous  Starting Tube Feed Rate {mL per Hour}: 40  Until Goal Tube Feed Rate (mL per Hour): 40  Tube Feed Duration (in Hours): 23  Tube Feed Start Time: 17:00  No Carb Prosource (1pkg = 15gms Protein)     Qty per Day:  2 (05-10-20 @ 17:43)    Current Weight: 51.5 kg (5/13)  Previous Weight(s): 51 kg (5/9) -- 45 kg (4/22)  6.5 kg weight gain < 1 month    MEDICATIONS  (STANDING):  chlorhexidine 0.12% Liquid 15 milliLiter(s) Oral Mucosa every 12 hours  chlorhexidine 4% Liquid 1 Application(s) Topical daily  dornase delmar Solution 2.5 milliGRAM(s) Inhalation every 12 hours  enoxaparin Injectable 30 milliGRAM(s) SubCutaneous daily  levETIRAcetam  Solution 1000 milliGRAM(s) Oral two times a day  levothyroxine 125 MICROGram(s) Oral daily  polyethylene glycol 3350 17 Gram(s) Oral daily  senna 2 Tablet(s) Oral at bedtime  valproic  acid Syrup 500 milliGRAM(s) Oral two times a day    MEDICATIONS  (PRN):  acetaminophen    Suspension .. 650 milliGRAM(s) Oral every 6 hours PRN Temp greater or equal to 38C (100.4F)    Pertinent Labs: 05-14 Na 142 mmol/L Glu 108 mg/dL<H> K+ 4.1 mmol/L Cr 0.50 mg/dL BUN 14 mg/dL Phos 3.8 mg/dL      Skin: Unstageable R heel and R buttocks pressure injuries; suspected deep tissue R big toe pressure injury; generalized edema    Estimated Needs:   [x] no change since previous assessment  [] recalculated    Interventions:   1. Hold feeds for 30 minutes before and after Synthroid provision in setting of medication-food interaction  2. Further adjustments to rate/volume/duration/free water provision of enteral feeds dependant on long term monitoring of patient's tolerance, weight trends and needs    Monitoring and Evaluation:   1. Monitor weights, labs, BMs, skin integrity, and TF tolerance  2. RD services to remain available     Jo Ann Loza RDN   Pager #[phone]

## 2020-05-15 NOTE — PROGRESS NOTE ADULT - ASSESSMENT
60y Female with history of Down Syndrome, hypothyroidism, and recurrent UTI who presented to [facility] on 4/4 for fever, cough, and SOB. The patient was intubated on 4/7 for worsening hypercapnic respiratory failure. Extubated and reintubated on 4/16. Hospitalization further complicated by NSTEMI (type 2), vaginal bleeding requiring 2 uPRBC. The pateint was being followed by my practice at [facility]. She is s.p transfer to [facility].     Acute blood loss anemia  multifactorial/resolved; trend h/h and transfuse prn   No evidence of GI bleeding  cont gi ppx with pepcid while on Lovenox   monitor stools; if loose hold stool softeners   no role for endoscopic eval at this time in light of risk in covid pts  s/p PEG; aspiration precautions with feeds   dc plans to [facility] in progress    COVID   monitor resp status   s/p trach/peg w/ctsx 5/7  care per primary teams appreciated  dc plans to [facility] in progress      Advanced care planning was discussed with patient and family.  Advanced care planning forms were reviewed and discussed.  Risks, benefits and alternatives of gastroenterologic procedures were discussed in detail and all questions were answered.

## 2020-05-15 NOTE — PROGRESS NOTE ADULT - PROBLEM SELECTOR PLAN 4
- Patient was transferred from OSH for GYN evaluation second to vaginal bleeding from iatrogenic injury second to attempted placement of a rectal tube, in the setting of anticoagulation, s/p vaginal packing, now removed  - Patient received PRBCs for acute blood loss, H/H is now stable  - No further bleeding noted  -GI note appreciated 5/13. - Patient was transferred from OSH for GYN evaluation second to vaginal bleeding from iatrogenic injury second to attempted placement of a rectal tube, in the setting of anticoagulation, s/p vaginal packing, now removed  - Patient received PRBCs for acute blood loss, H/H is now stable  - No further bleeding noted  - GI re-eval noted and no need for EGD at this time.

## 2020-05-15 NOTE — ADVANCED PRACTICE NURSE CONSULT - ASSESSMENT
General: Patient alert, Hx of down syndrome, bedbound, female external urinary  in place for urine, incontinent of stool. Patient on a ventilator through a tracheostomy. LUQ PEG with tube feedings. Skin warm, dry with increased moisture in intertriginous folds, adequate skin turgor.     Tracheostomy with moisture associated dermatitis noted under trach, neck folds and lower chin presenting with blanching erythema and increased moisture consistent with MAD. Suture noted at 9oclock and 3oclock.     LUQ PEG- skin intact at this time.     Sacral/gluteal fold extending to bilateral buttocks with Incontinence associated dermatitis presenting with blanching erythema and an area of denuded epidermis exposing pink moist dermis to left buttock. Area of hypopigmentation noted to sacrum presents as previous site of skin injury.     Vascular: Bilateral lower extremities with scattered areas of hyper and hypopigmentation. Rigid lower extremities. Thickened-yellow toenails. No temperature changes noted. No Edema. Capillary refill <3 seconds. (+)2 DP/PT pulses, with biphasic doppler sounds. Blanching erythema to left heel.     Right heel with suspected Deep tissue pressure injury measuring [id number] presenting as 100% reabsorbed blood filled blister. No drainage, Periwound skin intact. No induration, no erythema, no increased warmth. Goals of care: Monitor for tissue type changes, continue to offload.     Right distal great toe- suspected deep tissue injury measuring [id number] presenting as 100% reabsorbed blood filled blister. No drainage. Periwound skin intact. No induration, no erythema, no increased warmth. Goals of care: Monitor for tissue type changes, continue to offload.

## 2020-05-15 NOTE — PROGRESS NOTE ADULT - SUBJECTIVE AND OBJECTIVE BOX
CHIEF COMPLAINT:    SUBJECTIVE:     [ ] All other systems negative  [ ] Unable to assess ROS because ________    OBJECTIVE:  ICU Vital Signs Last 24 Hrs  T(C): 36.7 (15 May 2020 04:13), Max: 37.4 (14 May 2020 21:13)  T(F): 98 (15 May 2020 04:13), Max: 99.3 (14 May 2020 21:13)  HR: 82 (15 May 2020 04:13) (71 - 98)  BP: 99/53 (15 May 2020 04:13) (94/61 - 103/65)  BP(mean): 62 (14 May 2020 16:00) (62 - 69)  ABP: --  ABP(mean): --  RR: 16 (15 May 2020 04:13) (15 - 18)  SpO2: 99% (15 May 2020 04:13) (94% - 100%)    Mode: AC/ CMV (Assist Control/ Continuous Mandatory Ventilation), RR (machine): 15, TV (machine): 300, FiO2: 30, PEEP: 5, MAP: 8, PIP: 14    05-14 @ 07:01  -  05-15 @ 07:00  --------------------------------------------------------  IN: 480 mL / OUT: 900 mL / NET: -420 mL      CAPILLARY BLOOD GLUCOSE          PHYSICAL EXAM:  General:   HEENT:   Lymph Nodes:  Neck:   Respiratory:   Cardiovascular:   Abdomen:   Extremities:   Skin:   Neurological:  Psychiatry:    HOSPITAL MEDICATIONS:  MEDICATIONS  (STANDING):  chlorhexidine 0.12% Liquid 15 milliLiter(s) Oral Mucosa every 12 hours  chlorhexidine 4% Liquid 1 Application(s) Topical daily  dornase delmar Solution 2.5 milliGRAM(s) Inhalation every 12 hours  enoxaparin Injectable 30 milliGRAM(s) SubCutaneous daily  levETIRAcetam  Solution 1000 milliGRAM(s) Oral two times a day  levothyroxine 125 MICROGram(s) Oral daily  polyethylene glycol 3350 17 Gram(s) Oral daily  senna 2 Tablet(s) Oral at bedtime  valproic  acid Syrup 500 milliGRAM(s) Oral two times a day    MEDICATIONS  (PRN):  acetaminophen    Suspension .. 650 milliGRAM(s) Oral every 6 hours PRN Temp greater or equal to 38C (100.4F)      LABS:                        8.3    6.64  )-----------( 331      ( 14 May 2020 05:14 )             26.5     05-14    142  |  103  |  14  ----------------------------<  108<H>  4.1   |  28  |  0.50    Ca    8.9      14 May 2020 05:14  Phos  3.8     05-14  Mg     2.1     05-14                MICROBIOLOGY:     RADIOLOGY:  [ ] Reviewed and interpreted by me    PULMONARY FUNCTION TESTS:    EKG: CHIEF COMPLAINT: Patient is a 60y old  Female who presents with a chief complaint of Acute hypoxic respiratory failure secondary to COVID-19. (14 May 2020 07:14)\    SUBJECTIVE:   No interval events overnight.   [ x] Unable to assess ROS because nonverbal with Trach.     OBJECTIVE:  ICU Vital Signs Last 24 Hrs  T(C): 36.7 (15 May 2020 04:13), Max: 37.4 (14 May 2020 21:13)  T(F): 98 (15 May 2020 04:13), Max: 99.3 (14 May 2020 21:13)  HR: 82 (15 May 2020 04:13) (71 - 98)  BP: 99/53 (15 May 2020 04:13) (94/61 - 103/65)  BP(mean): 62 (14 May 2020 16:00) (62 - 69)  ABP: --  ABP(mean): --  RR: 16 (15 May 2020 04:13) (15 - 18)  SpO2: 99% (15 May 2020 04:13) (94% - 100%)    Mode: AC/ CMV (Assist Control/ Continuous Mandatory Ventilation), RR (machine): 15, TV (machine): 300, FiO2: 30, PEEP: 5, MAP: 8, PIP: 14    05-14 @ 07:01  -  05-15 @ 07:00  --------------------------------------------------------  IN: 480 mL / OUT: 900 mL / NET: -420 mL    CAPILLARY BLOOD GLUCOSE    PHYSICAL EXAM:  General: NAD   Neck: Trach clean dry and intact.   Cards: S1/S2, no murmurs   Pulm: CTA bilaterally. No wheezes.   Abdomen: Soft, NTND. BS (+) PEG (+)   Extremities: No pedal edema.   Neurology: Awakens to painful and verbal stimuli.     HOSPITAL MEDICATIONS:  MEDICATIONS  (STANDING):  chlorhexidine 0.12% Liquid 15 milliLiter(s) Oral Mucosa every 12 hours  chlorhexidine 4% Liquid 1 Application(s) Topical daily  dornase delmar Solution 2.5 milliGRAM(s) Inhalation every 12 hours  enoxaparin Injectable 30 milliGRAM(s) SubCutaneous daily  levETIRAcetam  Solution 1000 milliGRAM(s) Oral two times a day  levothyroxine 125 MICROGram(s) Oral daily  polyethylene glycol 3350 17 Gram(s) Oral daily  senna 2 Tablet(s) Oral at bedtime  valproic  acid Syrup 500 milliGRAM(s) Oral two times a day    MEDICATIONS  (PRN):  acetaminophen    Suspension .. 650 milliGRAM(s) Oral every 6 hours PRN Temp greater or equal to 38C (100.4F)    LABS:                        8.3    6.64  )-----------( 331      ( 14 May 2020 05:14 )             26.5     05-14    142  |  103  |  14  ----------------------------<  108<H>  4.1   |  28  |  0.50    Ca    8.9      14 May 2020 05:14  Phos  3.8     05-14  Mg     2.1     05-14    MICROBIOLOGY:     RADIOLOGY:  [ ] Reviewed and interpreted by me    PULMONARY FUNCTION TESTS:    EKG:

## 2020-05-15 NOTE — PROGRESS NOTE ADULT - PROBLEM SELECTOR PLAN 2
- Failed extubation x 2, s/p trach on 5/7, tolerating AC 15/300/5/30.  - Failed PS 15/5 again with noted rapid shallow breathing. Continue PS QD as tolerated.   - Chest PT and Suction PRN  -Trach sutures to be removed by thoracic on 5/22  - Trach care daily - Failed extubation x 2, s/p trach on 5/7, tolerating AC 15/300/5/30.  - Failed PS 15/5 again with noted rapid shallow breathing/ apnea.   - Chest PT and Suction PRN  - Trach sutures to be removed by thoracic on POD 14 (5/20)   - Trach care daily

## 2020-05-15 NOTE — ADVANCED PRACTICE NURSE CONSULT - REASON FOR CONSULT
Patient seen on skin care rounds after wound care referral received for assessment of skin impairment and recommendations of topical management. Chart reviewed: BMI 33.4, Irving 13, WBC 6.64, Hemoglobin/Hematocrit 8.3/26.5, Platelets 331, INR 1.04. Patient H/O of Downs Syndrome, Hypothyroidism, Constipation and Seizures, transferred from [facility] for acute hypoxic respiratory failure second to COVID 19 PNA. Course c/b seizures episodes, type 2 NSTEMI, ADAM and gram variable bacteremia. Extubated 4/16 and reintubated same day. Extubated again on 5/4 to BIPAP and then reintubated again 5/5. Now s/p Trach and PEG on 5/7 by Thoracic Sx. Transferred to RCU. Patient seen by palliative care for downs syndrome, acute respiratory failure, and functional quadriplegia. Patient also seen by GYN for vaginal bleeding 2/2 iatrogenic injury.

## 2020-05-15 NOTE — PROGRESS NOTE ADULT - SUBJECTIVE AND OBJECTIVE BOX
INTERVAL HPI/OVERNIGHT EVENTS:    tolerating feeds  no gi events overnight or this morning     MEDICATIONS  (STANDING):  chlorhexidine 0.12% Liquid 15 milliLiter(s) Oral Mucosa every 12 hours  chlorhexidine 4% Liquid 1 Application(s) Topical daily  dornase delmar Solution 2.5 milliGRAM(s) Inhalation every 12 hours  enoxaparin Injectable 30 milliGRAM(s) SubCutaneous daily  levETIRAcetam  Solution 1000 milliGRAM(s) Oral two times a day  levothyroxine 125 MICROGram(s) Oral daily  polyethylene glycol 3350 17 Gram(s) Oral daily  senna 2 Tablet(s) Oral at bedtime  valproic  acid Syrup 500 milliGRAM(s) Oral two times a day    MEDICATIONS  (PRN):  acetaminophen    Suspension .. 650 milliGRAM(s) Oral every 6 hours PRN Temp greater or equal to 38C (100.4F)      Allergies    amoxicillin (Rash)  penicillin (Rash)    Intolerances        Review of Systems: *pt minimally verbal to nonverbal, unable to obtain ROS           Vital Signs Last 24 Hrs  T(C): 36.9 (15 May 2020 11:57), Max: 37.4 (14 May 2020 21:13)  T(F): 98.5 (15 May 2020 11:57), Max: 99.3 (14 May 2020 21:13)  HR: 73 (15 May 2020 11:57) (71 - 86)  BP: 93/56 (15 May 2020 11:57) (93/56 - 103/65)  BP(mean): 64 (15 May 2020 11:57) (62 - 64)  RR: 18 (15 May 2020 11:57) (15 - 18)  SpO2: 99% (15 May 2020 11:57) (99% - 100%)    PHYSICAL EXAM:    Constitutional: NAD  HEENT: EOMI, throat clear  Neck: No LAD, supple +trach  Respiratory: CTA and P  Cardiovascular: S1 and S2, RRR, no M  Gastrointestinal: BS+, soft, NT/ND, neg HSM, +peg  Extremities: No peripheral edema, neg clubbing, cyanosis  Vascular: 2+ peripheral pulses  Neurological: A/O x 0  Psychiatric: weakened   Skin: No rashes      LABS:                        8.3    6.64  )-----------( 331      ( 14 May 2020 05:14 )             26.5     05-14    142  |  103  |  14  ----------------------------<  108<H>  4.1   |  28  |  0.50    Ca    8.9      14 May 2020 05:14  Phos  3.8     05-14  Mg     2.1     05-14            RADIOLOGY & ADDITIONAL TESTS:

## 2020-05-15 NOTE — ADVANCED PRACTICE NURSE CONSULT - RECOMMEDATIONS
Topical Recommendations:     Tracheostomy- Cleanse around trach site with NS. Pat dry. Apply Silicone foam dressing without border beneath trach collar, cut mid dressing to "Y" shape. Change foam dressing every shift and prn. Change trach ties every 3 days.     LUQ PEG- Cleanse q shift with NS, apply liquid barrier film beneath lisa disc.  If redness noted under lisa disc bumper apply thin foam  dressing without border (Mepilex Lite)- cut to mid dressing with a 'Y' shape.   Secondary securement to abdomen taping in 'H' fashion with Steri-strips.     Sacral/Gluteal fold extending to bilateral buttocks- Cleanse with skin cleanser, pat dry. Apply TRIAD paste twice a day and PRN with incontinent episodes.     Right heel, Right great distal toe, Left heel- Apply liquid barrier film daily, cover with gauze, wrap with kerlix. Change every other day. Monitor for tissue type changes.     Continue low air loss bed therapy,  heel elevation with offloading boots, Turning and positioning q2h with Z-flow fluidized device, continue moisture management with barrier creams & single breathable pad, continue measures to decrease friction/shear.     Please contact Wound Care Service Line if we can be of further assistance (ext [phone]).

## 2020-05-15 NOTE — PROGRESS NOTE ADULT - PROBLEM SELECTOR PLAN 5
- Course c/b intermittent seizures and now controlled on Keppra and Depakote  - Seizure precautions - Course c/b intermittent seizures and now controlled on Keppra and Depakote  - Seizure precautions  - Levels WNL in April. Recheck levels.

## 2020-05-15 NOTE — PROGRESS NOTE ADULT - ASSESSMENT
59 YO F with Downs Syndrome, Hypothyroidsim, Constipation and Seizures, transferred from [facility] for acute hypoxic respiratory failure second to COVID 19 PNA. Course c/b seizures episodes, type 2 NSTEMI, ADAM and gram variable bacteremia. Extubated 4/16 and reintubated same day. Extubated again on 5/4 to BIPAP and then reintubated again 5/5. Now s/p Trach and PEG on 5/7 by Thoracic Sx. Transferred to [facility].

## 2020-05-15 NOTE — PROGRESS NOTE ADULT - PROBLEM SELECTOR PLAN 1
- Patient initially admitted from OSH for COVID requiring intubation since 4/7, failed extubation x 2, now s/p trach and PEG on 5/7  - s/p Plaquenil, Azithromycin, Steroids  - s/p Plasma on 4/28  - Continue supportive measures.  -Pt apneic again today on weaning trial. Will attempt daily. - Patient initially admitted from OSH for COVID requiring intubation since 4/7, failed extubation x 2, now s/p trach and PEG on 5/7  - s/p Plaquenil, Azithromycin, Steroids  - s/p Plasma on 4/28  - Continue supportive measures.

## 2020-05-16 NOTE — PROGRESS NOTE ADULT - SUBJECTIVE AND OBJECTIVE BOX
Medicine Progress Note    Patient is a 60y old  Female who presents with a chief complaint of Acute hypoxic respiratory failure secondary to COVID-19.       SUBJECTIVE / OVERNIGHT EVENTS:    ADDITIONAL REVIEW OF SYSTEMS:    MEDICATIONS  (STANDING):  chlorhexidine 0.12% Liquid 15 milliLiter(s) Oral Mucosa every 12 hours  chlorhexidine 4% Liquid 1 Application(s) Topical daily  dornase delmar Solution 2.5 milliGRAM(s) Inhalation every 12 hours  enoxaparin Injectable 30 milliGRAM(s) SubCutaneous daily  levETIRAcetam  Solution 1000 milliGRAM(s) Oral two times a day  levothyroxine 125 MICROGram(s) Oral daily  polyethylene glycol 3350 17 Gram(s) Oral daily  senna 2 Tablet(s) Oral at bedtime  valproic  acid Syrup 500 milliGRAM(s) Oral two times a day    MEDICATIONS  (PRN):  acetaminophen    Suspension .. 650 milliGRAM(s) Oral every 6 hours PRN Temp greater or equal to 38C (100.4F)    CAPILLARY BLOOD GLUCOSE        I&O's Summary    15 May 2020 07:01  -  16 May 2020 07:00  --------------------------------------------------------  IN: 680 mL / OUT: 500 mL / NET: 180 mL    16 May 2020 07:01  -  16 May 2020 15:55  --------------------------------------------------------  IN: 360 mL / OUT: 0 mL / NET: 360 mL        PHYSICAL EXAM:  Vital Signs Last 24 Hrs  T(C): 36.2 (16 May 2020 13:25), Max: 37.1 (15 May 2020 21:43)  T(F): 97.1 (16 May 2020 13:25), Max: 98.8 (16 May 2020 01:58)  HR: 74 (16 May 2020 15:41) (71 - 86)  BP: 86/53 (16 May 2020 13:25) (84/53 - 117/64)  BP(mean): 61 (16 May 2020 13:25) (60 - 61)  RR: 15 (16 May 2020 13:25) (15 - 16)  SpO2: 100% (16 May 2020 15:41) (100% - 100%)  CONSTITUTIONAL: NAD, well-developed, well-groomed  ENMT: Moist oral mucosa, no pharyngeal injection or exudates; normal dentition  RESPIRATORY: Normal respiratory effort; lungs are clear to auscultation bilaterally  CARDIOVASCULAR: Regular rate and rhythm, normal S1 and S2, no murmur/rub/gallop; No lower extremity edema; Peripheral pulses are 2+ bilaterally  ABDOMEN: Nontender to palpation, normoactive bowel sounds, no rebound/guarding; No hepatosplenomegaly  PSYCH: A+O to person, place, and time; affect appropriate  NEUROLOGY: CN 2-12 are intact and symmetric; no gross sensory deficits   SKIN: No rashes; no palpable lesions    LABS:                        8.6    5.76  )-----------( 380      ( 16 May 2020 06:45 )             28.0     05-16    139  |  101  |  14  ----------------------------<  112<H>  4.0   |  27  |  0.52    Ca    9.0      16 May 2020 06:45  Phos  4.0     05-16  Mg     2.0     05-16                COVID-19 PCR: NotDetec (14 May 2020 12:37)      RADIOLOGY & ADDITIONAL TESTS:  Imaging from Last 24 Hours:    Electrocardiogram/QTc Interval:    COORDINATION OF CARE:  Care Discussed with Consultants/Other Providers:

## 2020-05-16 NOTE — PROGRESS NOTE ADULT - ASSESSMENT
59 YO F with Downs Syndrome, Hypothyroidsim, Constipation and Seizures, transferred from [facility] for acute hypoxic respiratory failure second to COVID 19 PNA. Course c/b seizures episodes, type 2 NSTEMI, ADAM and gram variable bacteremia. Extubated 4/16 and reintubated same day. Extubated again on 5/4 to BIPAP and then reintubated again 5/5. Now s/p Trach and PEG on 5/7 by Thoracic Sx. Transferred to R[facility].

## 2020-05-16 NOTE — PROGRESS NOTE ADULT - ATTENDING COMMENTS
remains stable  d/c planning to vent facility as abo e-  resp failure due to covid19 pneumonitis, CAD--trached---unable to wean due to apnea--will continue to try  CV stable  DVT prophylaxis     GI s/p peg--TF  d/c planning to vent facility  Brijesh Ruano MD-Pulmonary   [phone]

## 2020-05-16 NOTE — PROGRESS NOTE ADULT - PROBLEM SELECTOR PLAN 4
- Patient was transferred from OSH for GYN evaluation second to vaginal bleeding from iatrogenic injury second to attempted placement of a rectal tube, in the setting of anticoagulation, s/p vaginal packing, now removed  - Patient received PRBCs for acute blood loss, H/H is now stable  - No further bleeding noted  - GI re-eval noted and no need for EGD at this time.

## 2020-05-16 NOTE — PROGRESS NOTE ADULT - PROBLEM SELECTOR PLAN 2
- Failed extubation x 2, s/p trach on 5/7, tolerating AC 15/300/5/30.  - Failed PS 15/5 again with noted rapid shallow breathing/ apnea.   - Chest PT and Suction PRN  - Trach sutures to be removed by thoracic on POD 14 (5/20)   - Trach care daily

## 2020-05-16 NOTE — PROGRESS NOTE ADULT - ASSESSMENT
60y Female with history of Down Syndrome, hypothyroidism, and recurrent UTI who presented to [facility] on 4/4 for fever, cough, and SOB. The patient was intubated on 4/7 for worsening hypercapnic respiratory failure. Extubated and reintubated on 4/16. Hospitalization further complicated by NSTEMI (type 2), vaginal bleeding requiring 2 uPRBC. The pateint was being followed by my practice at [facility]. She is s.p transfer to [facility].     Acute blood loss anemia  multifactorial/resolved; trend h/h and transfuse prn   No evidence of GI bleeding  cont gi ppx with pepcid while on Lovenox   monitor stools; if loose hold stool softeners   no role for endoscopic eval at this time in light of risk in covid pts  s/p PEG; aspiration precautions with feeds   dc plans to [facility] in progress    COVID   monitor resp status   s/p trach/peg w/ctsx 5/7  care per primary teams appreciated  dc plans to [facility] in progress      Advanced care planning was discussed with patient and family.  Advanced care planning forms were reviewed and discussed.  Risks, benefits and alternatives of gastroenterologic procedures were discussed in detail and all questions were answered.

## 2020-05-16 NOTE — PROGRESS NOTE ADULT - PROBLEM SELECTOR PLAN 5
- Course c/b intermittent seizures and now controlled on Keppra and Depakote  - Seizure precautions  - Levels WNL in April. Recheck levels.

## 2020-05-16 NOTE — PROGRESS NOTE ADULT - SUBJECTIVE AND OBJECTIVE BOX
CHIEF COMPLAINT: Patient is a 60y old  Female who presents with a chief complaint of Acute hypoxic respiratory failure secondary to COVID-19. (14 May 2020 07:14)    SUBJECTIVE:   No interval events overnight.   [ ] All other systems negative  [ ] Unable to assess ROS because ________    OBJECTIVE:  ICU Vital Signs Last 24 Hrs  T(C): 37 (16 May 2020 05:13), Max: 37.1 (15 May 2020 21:43)  T(F): 98.6 (16 May 2020 05:13), Max: 98.8 (16 May 2020 01:58)  HR: 80 (16 May 2020 05:13) (73 - 86)  BP: 110/77 (16 May 2020 05:13) (93/56 - 117/64)  BP(mean): 64 (15 May 2020 11:57) (64 - 64)  ABP: --  ABP(mean): --  RR: 16 (16 May 2020 05:13) (15 - 18)  SpO2: 100% (16 May 2020 05:13) (99% - 100%)    Mode: AC/ CMV (Assist Control/ Continuous Mandatory Ventilation), RR (machine): 15, TV (machine): 300, FiO2: 30, PEEP: 2, MAP: 8, PIP: 14    05-15 @ 07:01  -  05-16 @ 07:00  --------------------------------------------------------  IN: 680 mL / OUT: 500 mL / NET: 180 mL    CAPILLARY BLOOD GLUCOSE    PHYSICAL EXAM:  General:   HEENT:   Lymph Nodes:  Neck:   Respiratory:   Cardiovascular:   Abdomen:   Extremities:   Skin:   Neurological:  Psychiatry:    HOSPITAL MEDICATIONS:  MEDICATIONS  (STANDING):  chlorhexidine 0.12% Liquid 15 milliLiter(s) Oral Mucosa every 12 hours  chlorhexidine 4% Liquid 1 Application(s) Topical daily  dornase delmar Solution 2.5 milliGRAM(s) Inhalation every 12 hours  enoxaparin Injectable 30 milliGRAM(s) SubCutaneous daily  levETIRAcetam  Solution 1000 milliGRAM(s) Oral two times a day  levothyroxine 125 MICROGram(s) Oral daily  polyethylene glycol 3350 17 Gram(s) Oral daily  senna 2 Tablet(s) Oral at bedtime  valproic  acid Syrup 500 milliGRAM(s) Oral two times a day    MEDICATIONS  (PRN):  acetaminophen    Suspension .. 650 milliGRAM(s) Oral every 6 hours PRN Temp greater or equal to 38C (100.4F)    LABS:    MICROBIOLOGY:     RADIOLOGY:  [ ] Reviewed and interpreted by me    PULMONARY FUNCTION TESTS:    EKG: CHIEF COMPLAINT: Patient is a 60y old  Female who presents with a chief complaint of Acute hypoxic respiratory failure secondary to COVID-19. (14 May 2020 07:14)    SUBJECTIVE:   No interval events overnight. Seen by bedside during AM rounds.   [ x] Unable to assess ROS because nonverbal with tracheostomy.     OBJECTIVE:  ICU Vital Signs Last 24 Hrs  T(C): 37 (16 May 2020 05:13), Max: 37.1 (15 May 2020 21:43)  T(F): 98.6 (16 May 2020 05:13), Max: 98.8 (16 May 2020 01:58)  HR: 80 (16 May 2020 05:13) (73 - 86)  BP: 110/77 (16 May 2020 05:13) (93/56 - 117/64)  BP(mean): 64 (15 May 2020 11:57) (64 - 64)  ABP: --  ABP(mean): --  RR: 16 (16 May 2020 05:13) (15 - 18)  SpO2: 100% (16 May 2020 05:13) (99% - 100%)    Mode: AC/ CMV (Assist Control/ Continuous Mandatory Ventilation), RR (machine): 15, TV (machine): 300, FiO2: 30, PEEP: 2, MAP: 8, PIP: 14    05-15 @ 07:01  -  05-16 @ 07:00  --------------------------------------------------------  IN: 680 mL / OUT: 500 mL / NET: 180 mL    CAPILLARY BLOOD GLUCOSE    PHYSICAL EXAM:  General: NAD, well nourished, well developed.   Neck: Trach clean dry and intact.   Cardio: RRR, S1/S2, no murmurs or rubs.   Pulm: Coarse vent sounds noted throughout, equal bilaterally.   GI: Soft, NDNT, BS (+). PEG (+) clean dry and intact.   MS: No pedal edema    Neuro: No focal neurological deficits noted.   Skin: Warm and dry. No jaundice or cyanosis     HOSPITAL MEDICATIONS:  MEDICATIONS  (STANDING):  chlorhexidine 0.12% Liquid 15 milliLiter(s) Oral Mucosa every 12 hours  chlorhexidine 4% Liquid 1 Application(s) Topical daily  dornase delmar Solution 2.5 milliGRAM(s) Inhalation every 12 hours  enoxaparin Injectable 30 milliGRAM(s) SubCutaneous daily  levETIRAcetam  Solution 1000 milliGRAM(s) Oral two times a day  levothyroxine 125 MICROGram(s) Oral daily  polyethylene glycol 3350 17 Gram(s) Oral daily  senna 2 Tablet(s) Oral at bedtime  valproic  acid Syrup 500 milliGRAM(s) Oral two times a day    MEDICATIONS  (PRN):  acetaminophen    Suspension .. 650 milliGRAM(s) Oral every 6 hours PRN Temp greater or equal to 38C (100.4F)    LABS:    MICROBIOLOGY:     RADIOLOGY:  [ ] Reviewed and interpreted by me    PULMONARY FUNCTION TESTS:    EKG:

## 2020-05-16 NOTE — PROGRESS NOTE ADULT - PROBLEM SELECTOR PLAN 1
- Patient initially admitted from OSH for COVID requiring intubation since 4/7, failed extubation x 2, now s/p trach and PEG on 5/7  - s/p Plaquenil, Azithromycin, Steroids  - s/p Plasma on 4/28  - Continue supportive measures.

## 2020-05-16 NOTE — PROGRESS NOTE ADULT - PROBLEM SELECTOR PLAN 10
- DVT PPX with Lovenox   - DISPO - PT trial in progress/ SNF. Discussion with family as per attending attestation below. - DVT PPX with Lovenox   - DISPO - PT recommending restorative Rehab, however given former residence at , now unable to wean from vent and bedbound status, LTC/ SNF likely reasonable. Case to be discussed with CM/ SW and family.

## 2020-05-17 NOTE — PROGRESS NOTE ADULT - ASSESSMENT
59 YO F with Downs Syndrome, Hypothyroidsim, Constipation and Seizures, transferred from [facility] for acute hypoxic respiratory failure second to COVID 19 PNA. Course c/b seizures episodes, type 2 NSTEMI, ADAM and gram variable bacteremia. Extubated 4/16 and reintubated same day. Extubated again on 5/4 to BIPAP and then reintubated again 5/5. Now s/p Trach and PEG on 5/7 by Thoracic Sx. Transferred to [facility]. 61 YO F with Downs Syndrome, Hypothyroidsim, Constipation and Seizures, transferred from [facility] for acute hypoxic respiratory failure second to COVID 19 PNA. Course c/b seizures episodes, type 2 NSTEMI, ADAM and gram variable bacteremia. Extubated 4/16 and reintubated same day. Extubated again on 5/4 to BIPAP and then reintubated again 5/5. Now s/p Trach and PEG on 5/7 by Thoracic Sx. Transferred to [facility].  ****************  5/17-no changes

## 2020-05-17 NOTE — PROGRESS NOTE ADULT - PROBLEM SELECTOR PLAN 10
- DVT PPX with Lovenox   - DISPO - PT recommending restorative Rehab, however given former residence at , now unable to wean from vent and bedbound status, LTC/ SNF likely reasonable. Case to be discussed with CM/ SW and family.

## 2020-05-17 NOTE — PROGRESS NOTE ADULT - ASSESSMENT
60y Female with history of Down Syndrome, hypothyroidism, and recurrent UTI who presented to [facility] on 4/4 for fever, cough, and SOB. The patient was intubated on 4/7 for worsening hypercapnic respiratory failure. Extubated and reintubated on 4/16. Hospitalization further complicated by NSTEMI (type 2), vaginal bleeding requiring 2 uPRBC. The pateint was being followed by my practice at [facility]. She is s.p transfer to [facility].     Acute blood loss anemia  multifactorial/resolved; trend h/h and transfuse prn   No evidence of GI bleeding  cont gi ppx with pepcid while on Lovenox   monitor stools; if loose hold stool softeners   no role for endoscopic eval at this time in light of risk in covid pts  s/p PEG; aspiration precautions with feeds   dc plans to [facility] in progress    COVID   monitor resp status   s/p trach/peg w/ctsx 5/7  care per primary teams appreciated  dc plans to [facility] in progress      Advanced care planning was discussed with patient and family.  Advanced care planning forms were reviewed and discussed.  Risks, benefits and alternatives of gastroenterologic procedures were discussed in detail and all questions were answered.

## 2020-05-17 NOTE — PROGRESS NOTE ADULT - SUBJECTIVE AND OBJECTIVE BOX
CHIEF COMPLAINT: Patient is a 60y old  Female who presents with a chief complaint of Acute hypoxic respiratory failure secondary to COVID-19. (14 May 2020 07:14)    SUBJECTIVE:   No interval events overnight. Seen by bedside and   [ x] Unable to assess ROS because nonverbal    OBJECTIVE:  ICU Vital Signs Last 24 Hrs  T(C): 36.8 (17 May 2020 05:37), Max: 37.1 (16 May 2020 21:19)  T(F): 98.2 (17 May 2020 05:37), Max: 98.7 (16 May 2020 21:19)  HR: 84 (17 May 2020 07:16) (71 - 87)  BP: 114/56 (17 May 2020 05:37) (84/53 - 117/69)  BP(mean): 62 (16 May 2020 17:07) (60 - 62)  ABP: --  ABP(mean): --  RR: 15 (17 May 2020 05:37) (15 - 21)  SpO2: 100% (17 May 2020 07:16) (100% - 100%)    Mode: AC/ CMV (Assist Control/ Continuous Mandatory Ventilation), RR (machine): 15, TV (machine): 300, FiO2: 30, PEEP: 5, MAP: 8, PIP: 14    05-16 @ 07:01  -  05-17 @ 07:00  --------------------------------------------------------  IN: 1350 mL / OUT: 900 mL / NET: 450 mL    CAPILLARY BLOOD GLUCOSE    PHYSICAL EXAM:  General:   HEENT:   Lymph Nodes:  Neck:   Respiratory:   Cardiovascular:   Abdomen:   Extremities:   Skin:   Neurological:  Psychiatry:    HOSPITAL MEDICATIONS:  MEDICATIONS  (STANDING):  chlorhexidine 0.12% Liquid 15 milliLiter(s) Oral Mucosa every 12 hours  chlorhexidine 4% Liquid 1 Application(s) Topical daily  dornase delmar Solution 2.5 milliGRAM(s) Inhalation every 12 hours  enoxaparin Injectable 30 milliGRAM(s) SubCutaneous daily  levETIRAcetam  Solution 1000 milliGRAM(s) Oral two times a day  levothyroxine 125 MICROGram(s) Oral daily  polyethylene glycol 3350 17 Gram(s) Oral daily  senna 2 Tablet(s) Oral at bedtime  valproic  acid Syrup 500 milliGRAM(s) Oral two times a day    MEDICATIONS  (PRN):  acetaminophen    Suspension .. 650 milliGRAM(s) Oral every 6 hours PRN Temp greater or equal to 38C (100.4F)    LABS:                        8.6    5.76  )-----------( 380      ( 16 May 2020 06:45 )             28.0     05-16    139  |  101  |  14  ----------------------------<  112<H>  4.0   |  27  |  0.52    Ca    9.0      16 May 2020 06:45  Phos  4.0     05-16  Mg     2.0     05-16    MICROBIOLOGY:     RADIOLOGY:  [ ] Reviewed and interpreted by me    PULMONARY FUNCTION TESTS:    EKG: CHIEF COMPLAINT: Patient is a 60y old  Female who presents with a chief complaint of Acute hypoxic respiratory failure secondary to COVID-19. (14 May 2020 07:14)    SUBJECTIVE:   No interval events overnight. Seen by bedside and PS trial attempted again and apnea noted.   [ x] Unable to assess ROS because nonverbal    OBJECTIVE:  ICU Vital Signs Last 24 Hrs  T(C): 36.8 (17 May 2020 05:37), Max: 37.1 (16 May 2020 21:19)  T(F): 98.2 (17 May 2020 05:37), Max: 98.7 (16 May 2020 21:19)  HR: 84 (17 May 2020 07:16) (71 - 87)  BP: 114/56 (17 May 2020 05:37) (84/53 - 117/69)  BP(mean): 62 (16 May 2020 17:07) (60 - 62)  ABP: --  ABP(mean): --  RR: 15 (17 May 2020 05:37) (15 - 21)  SpO2: 100% (17 May 2020 07:16) (100% - 100%)    Mode: AC/ CMV (Assist Control/ Continuous Mandatory Ventilation), RR (machine): 15, TV (machine): 300, FiO2: 30, PEEP: 5, MAP: 8, PIP: 14    05-16 @ 07:01  -  05-17 @ 07:00  --------------------------------------------------------  IN: 1350 mL / OUT: 900 mL / NET: 450 mL    CAPILLARY BLOOD GLUCOSE    PHYSICAL EXAM:  General: NAD, well nourished, well developed.   Neck: Trach clean dry and intact.   Cardio: RRR, S1/S2, no murmurs or rubs.   Pulm: Coarse vent sounds noted throughout, equal bilaterally.   GI: Soft, NDNT, BS (+). PEG (+)  MS: No pedal edema    Neuro: Awakens to painful stimuli only. No focal neurological deficits noted.   Skin: Warm and dry. No jaundice or cyanosis     HOSPITAL MEDICATIONS:  MEDICATIONS  (STANDING):  chlorhexidine 0.12% Liquid 15 milliLiter(s) Oral Mucosa every 12 hours  chlorhexidine 4% Liquid 1 Application(s) Topical daily  dornase delmar Solution 2.5 milliGRAM(s) Inhalation every 12 hours  enoxaparin Injectable 30 milliGRAM(s) SubCutaneous daily  levETIRAcetam  Solution 1000 milliGRAM(s) Oral two times a day  levothyroxine 125 MICROGram(s) Oral daily  polyethylene glycol 3350 17 Gram(s) Oral daily  senna 2 Tablet(s) Oral at bedtime  valproic  acid Syrup 500 milliGRAM(s) Oral two times a day    MEDICATIONS  (PRN):  acetaminophen    Suspension .. 650 milliGRAM(s) Oral every 6 hours PRN Temp greater or equal to 38C (100.4F)    LABS:                        8.6    5.76  )-----------( 380      ( 16 May 2020 06:45 )             28.0     05-16    139  |  101  |  14  ----------------------------<  112<H>  4.0   |  27  |  0.52    Ca    9.0      16 May 2020 06:45  Phos  4.0     05-16  Mg     2.0     05-16    MICROBIOLOGY:     RADIOLOGY:  [ ] Reviewed and interpreted by me    PULMONARY FUNCTION TESTS:    EKG:

## 2020-05-17 NOTE — PROGRESS NOTE ADULT - ATTENDING COMMENTS
as above-  resp failure due to covid19 pneumonitis, CAD--trached---unable to wean due to apnea--will continue to try  CV stable  DVT prophylaxis     GI s/p peg--TF  d/c planning to vent facility  Brijesh Ruano MD-Pulmonary   [phone] as above-no changes  resp failure due to covid19 pneumonitis, CAD--trached---unable to wean due to apnea--will continue to try  CV stable  DVT prophylaxis     GI s/p peg--TF  d/c planning to vent facility  Brijesh Ruano MD-Pulmonary   [phone]

## 2020-05-17 NOTE — PROGRESS NOTE ADULT - SUBJECTIVE AND OBJECTIVE BOX
INTERVAL HPI/OVERNIGHT EVENTS:  No new overnight event.  No N/V/D.  Tolerating diet.  via PEG  Allergies    amoxicillin (Rash)  penicillin (Rash)    Intolerances          General:  No wt loss, fevers, chills, night sweats, fatigue,   Eyes:  Good vision, no reported pain  ENT:  No sore throat, pain, runny nose, dysphagia  CV:  No pain, palpitations, hypo/hypertension  Resp:  No dyspnea, cough, tachypnea, wheezing  GI:  No pain, No nausea, No vomiting, No diarrhea, No constipation, No weight loss, No fever, No pruritis, No rectal bleeding, No tarry stools, No dysphagia,  :  No pain, bleeding, incontinence, nocturia  Muscle:  No pain, weakness  Neuro:  No weakness, tingling, memory problems  Psych:  No fatigue, insomnia, mood problems, depression  Endocrine:  No polyuria, polydipsia, cold/heat intolerance  Heme:  No petechiae, ecchymosis, easy bruisability  Skin:  No rash, tattoos, scars, edema      PHYSICAL EXAM:   Vital Signs:  Vital Signs Last 24 Hrs  T(C): 36.8 (17 May 2020 11:49), Max: 37.1 (16 May 2020 21:19)  T(F): 98.2 (17 May 2020 11:49), Max: 98.7 (16 May 2020 21:19)  HR: 76 (17 May 2020 11:49) (73 - 87)  BP: 100/57 (17 May 2020 11:49) (97/52 - 117/69)  BP(mean): 62 (16 May 2020 17:07) (62 - 62)  RR: 15 (17 May 2020 11:49) (15 - 21)  SpO2: 100% (17 May 2020 11:49) (100% - 100%)  Daily     Daily I&O's Summary    16 May 2020 07:01  -  17 May 2020 07:00  --------------------------------------------------------  IN: 1350 mL / OUT: 900 mL / NET: 450 mL        GENERAL:  Appears stated age, well-groomed, well-nourished, no distress  HEENT:  NC/AT,  conjunctivae clear and pink, no thyromegaly, nodules, adenopathy, no JVD, sclera -anicteric  CHEST:  Full & symmetric excursion, no increased effort, breath sounds clear  HEART:  Regular rhythm, S1, S2, no murmur/rub/S3/S4, no abdominal bruit, no edema  ABDOMEN:  Soft, non-tender, non-distended, normoactive bowel sounds,  no masses ,no hepato-splenomegaly, no signs of chronic liver disease  EXTEREMITIES:  no cyanosis,clubbing or edema  SKIN:  No rash/erythema/ecchymoses/petechiae/wounds/abscess/warm/dry  NEURO:  Alert, oriented, no asterixis, no tremor, no encephalopathy      LABS:                        8.6    5.76  )-----------( 380      ( 16 May 2020 06:45 )             28.0     05-16    139  |  101  |  14  ----------------------------<  112<H>  4.0   |  27  |  0.52    Ca    9.0      16 May 2020 06:45  Phos  4.0     05-16  Mg     2.0     05-16          amylase   lipase  RADIOLOGY & ADDITIONAL TESTS:

## 2020-05-18 NOTE — PROGRESS NOTE ADULT - ASSESSMENT
60y Female with history of Down Syndrome, hypothyroidism, and recurrent UTI who presented to [facility] on 4/4 for fever, cough, and SOB. The patient was intubated on 4/7 for worsening hypercapnic respiratory failure. Extubated and reintubated on 4/16. Hospitalization further complicated by NSTEMI (type 2), vaginal bleeding requiring 2 uPRBC. The pateint was being followed by my practice at [facility]. She is s.p transfer to [facility].     Acute blood loss anemia  multifactorial/resolved; trend h/h and transfuse prn   No evidence of GI bleeding  cont gi ppx with pepcid while on Lovenox   monitor stools; if loose hold stool softeners   no role for endoscopic eval at this time in light of risk in covid pts  s/p PEG; aspiration precautions with feeds   dc plans in progress    COVID   monitor resp status   s/p trach/peg w/ctsx 5/7  care per primary teams appreciated  dc plans in progress      Advanced care planning was discussed with patient and family.  Advanced care planning forms were reviewed and discussed.  Risks, benefits and alternatives of gastroenterologic procedures were discussed in detail and all questions were answered.

## 2020-05-18 NOTE — PROGRESS NOTE ADULT - ASSESSMENT
61 YO F with Downs Syndrome, Hypothyroidsim, Constipation and Seizures, transferred from [facility] for acute hypoxic respiratory failure second to COVID 19 PNA. Course c/b seizures episodes, type 2 NSTEMI, ADAM and gram variable bacteremia. Extubated 4/16 and reintubated same day. Extubated again on 5/4 to BIPAP and then reintubated again 5/5. Now s/p Trach and PEG on 5/7 by Thoracic Sx. Transferred to [facility].

## 2020-05-18 NOTE — PROGRESS NOTE ADULT - REASON FOR ADMISSION
COVID
COVID 19
COVID PNA
acute respiratory failure
COVID 19
Respiratory Failure
COVID   Respiratory failure
COVID  Respiratory failure
Acute hypoxic respiratory failure secondary to COVID-19.
Acute hypoxic respiratory failure secondary to COVID-19
Acute hypoxic respiratory failure secondary to COVID-19.
Acute hypoxic respiratory failure

## 2020-05-18 NOTE — PROGRESS NOTE ADULT - PROBLEM SELECTOR PLAN 2
- Failed extubation x 2, s/p trach on 5/7, tolerating AC 15/300/5/30.  - Failed PS 15/5 again with noted rapid shallow breathing/ apnea.  -Pt again noted today 5/18 to be apenic on PS trial.   - Chest PT and Suction PRN  - Trach sutures to be removed by thoracic on POD 14 (5/20)   - Trach care daily

## 2020-05-18 NOTE — PROGRESS NOTE ADULT - SUBJECTIVE AND OBJECTIVE BOX
INTERVAL HPI/OVERNIGHT EVENTS:    chart reviewed   without gi events   tolerating feeds    MEDICATIONS  (STANDING):  chlorhexidine 0.12% Liquid 15 milliLiter(s) Oral Mucosa every 12 hours  chlorhexidine 4% Liquid 1 Application(s) Topical daily  dornase delmar Solution 2.5 milliGRAM(s) Inhalation every 12 hours  enoxaparin Injectable 30 milliGRAM(s) SubCutaneous daily  levETIRAcetam  Solution 1000 milliGRAM(s) Oral two times a day  levothyroxine 125 MICROGram(s) Oral daily  polyethylene glycol 3350 17 Gram(s) Oral daily  senna 2 Tablet(s) Oral at bedtime  valproic  acid Syrup 500 milliGRAM(s) Oral two times a day    MEDICATIONS  (PRN):  acetaminophen    Suspension .. 650 milliGRAM(s) Oral every 6 hours PRN Temp greater or equal to 38C (100.4F)      Allergies    amoxicillin (Rash)  penicillin (Rash)    Intolerances        Review of Systems: Per current hospital emergency protocol, in an effort to reduce COVID exposures and also conserve PPE for necessary encounters, all data within this chart were reviewed and recommendations are based upon information in the chart and by verbal communication with covering team and/or nurses. Please refer to the ROS and PE per covering primary team note          Vital Signs Last 24 Hrs  T(C): 36.4 (18 May 2020 05:06), Max: 36.8 (17 May 2020 11:49)  T(F): 97.6 (18 May 2020 05:06), Max: 98.2 (17 May 2020 11:49)  HR: 76 (18 May 2020 08:00) (69 - 85)  BP: 99/49 (18 May 2020 05:06) (99/49 - 115/46)  BP(mean): --  RR: 15 (18 May 2020 05:06) (15 - 15)  SpO2: 100% (18 May 2020 08:00) (99% - 100%)    PHYSICAL EXAM: Per current hospital emergency protocol, in an effort to reduce COVID exposures and also conserve PPE for necessary encounters, all data within this chart were reviewed and recommendations are based upon information in the chart and by verbal communication with covering team and/or nurses. Please refer to the ROS and PE per covering primary team note          LABS:                RADIOLOGY & ADDITIONAL TESTS:

## 2020-05-18 NOTE — PROGRESS NOTE ADULT - SUBJECTIVE AND OBJECTIVE BOX
CHIEF COMPLAINT:    Interval Events:    REVIEW OF SYSTEMS:  Constitutional:   Eyes:  ENT:  CV:  Resp:  GI:  :  MSK:  Integumentary:  Neurological:  Psychiatric:  Endocrine:  Hematologic/Lymphatic:  Allergic/Immunologic:  [ ] All other systems negative  [ ] Unable to assess ROS because ________    OBJECTIVE:  ICU Vital Signs Last 24 Hrs  T(C): 36.4 (18 May 2020 05:06), Max: 36.8 (17 May 2020 11:49)  T(F): 97.6 (18 May 2020 05:06), Max: 98.2 (17 May 2020 11:49)  HR: 76 (18 May 2020 05:06) (69 - 86)  BP: 99/49 (18 May 2020 05:06) (99/49 - 115/46)  BP(mean): --  ABP: --  ABP(mean): --  RR: 15 (18 May 2020 05:06) (15 - 15)  SpO2: 100% (18 May 2020 05:06) (99% - 100%)    Mode: AC/ CMV (Assist Control/ Continuous Mandatory Ventilation), RR (machine): 15, TV (machine): 300, FiO2: 30, PEEP: 5, MAP: 8, PIP: 15    05-16 @ 07:01 - 05-17 @ 07:00  --------------------------------------------------------  IN: 1350 mL / OUT: 900 mL / NET: 450 mL    05-17 @ 07:01 - 05-18 @ 06:50  --------------------------------------------------------  IN: 1360 mL / OUT: 900 mL / NET: 460 mL      CAPILLARY BLOOD GLUCOSE          PHYSICAL EXAM:  General:   HEENT:   Lymph Nodes:  Neck:   Respiratory:   Cardiovascular:   Abdomen:   Extremities:   Skin:   Neurological:  Psychiatry:    HOSPITAL MEDICATIONS:  MEDICATIONS  (STANDING):  chlorhexidine 0.12% Liquid 15 milliLiter(s) Oral Mucosa every 12 hours  chlorhexidine 4% Liquid 1 Application(s) Topical daily  dornase delmar Solution 2.5 milliGRAM(s) Inhalation every 12 hours  enoxaparin Injectable 30 milliGRAM(s) SubCutaneous daily  levETIRAcetam  Solution 1000 milliGRAM(s) Oral two times a day  levothyroxine 125 MICROGram(s) Oral daily  polyethylene glycol 3350 17 Gram(s) Oral daily  senna 2 Tablet(s) Oral at bedtime  valproic  acid Syrup 500 milliGRAM(s) Oral two times a day    MEDICATIONS  (PRN):  acetaminophen    Suspension .. 650 milliGRAM(s) Oral every 6 hours PRN Temp greater or equal to 38C (100.4F)      LABS:                    MICROBIOLOGY:     RADIOLOGY:  [ ] Reviewed and interpreted by me    PULMONARY FUNCTION TESTS:    EKG: CHIEF COMPLAINT: Patient is a 60y old  Female who presents with a chief complaint of Acute hypoxic respiratory failure secondary to COVID-19.     Interval Events: No overnight events    REVIEW OF SYSTEMS:  [ X Unable to assess ROS because pt is non verbal.    OBJECTIVE:  ICU Vital Signs Last 24 Hrs  T(C): 36.4 (18 May 2020 05:06), Max: 36.8 (17 May 2020 11:49)  T(F): 97.6 (18 May 2020 05:06), Max: 98.2 (17 May 2020 11:49)  HR: 76 (18 May 2020 05:06) (69 - 86)  BP: 99/49 (18 May 2020 05:06) (99/49 - 115/46)  RR: 15 (18 May 2020 05:06) (15 - 15)  SpO2: 100% (18 May 2020 05:06) (99% - 100%)    Mode: AC/ CMV (Assist Control/ Continuous Mandatory Ventilation), RR (machine): 15, TV (machine): 300, FiO2: 30, PEEP: 5, MAP: 8, PIP: 15    05-16 @ 07:01  -  05-17 @ 07:00  --------------------------------------------------------  IN: 1350 mL / OUT: 900 mL / NET: 450 mL    05-17 @ 07:01  -  05-18 @ 06:50  --------------------------------------------------------  IN: 1360 mL / OUT: 900 mL / NET: 460 mL        HOSPITAL MEDICATIONS:  MEDICATIONS  (STANDING):  chlorhexidine 0.12% Liquid 15 milliLiter(s) Oral Mucosa every 12 hours  chlorhexidine 4% Liquid 1 Application(s) Topical daily  dornase delmar Solution 2.5 milliGRAM(s) Inhalation every 12 hours  enoxaparin Injectable 30 milliGRAM(s) SubCutaneous daily  levETIRAcetam  Solution 1000 milliGRAM(s) Oral two times a day  levothyroxine 125 MICROGram(s) Oral daily  polyethylene glycol 3350 17 Gram(s) Oral daily  senna 2 Tablet(s) Oral at bedtime  valproic  acid Syrup 500 milliGRAM(s) Oral two times a day    MEDICATIONS  (PRN):  acetaminophen    Suspension .. 650 milliGRAM(s) Oral every 6 hours PRN Temp greater or equal to 38C (100.4F)

## 2020-05-18 NOTE — PROGRESS NOTE ADULT - ATTENDING COMMENTS
Down syndrome  Resp failure due to covid19 pneumonitis, failed extubated x 2, s/p trach/PEG 5/7  Unable to wean, not tolerating PS tolerated  Vent facility recommended -- discussions with family underway

## 2020-05-19 NOTE — PROGRESS NOTE ADULT - SUBJECTIVE AND OBJECTIVE BOX
INTERVAL HPI/OVERNIGHT EVENTS:    tolerating peg feeds    MEDICATIONS  (STANDING):  chlorhexidine 0.12% Liquid 15 milliLiter(s) Oral Mucosa every 12 hours  chlorhexidine 4% Liquid 1 Application(s) Topical daily  dornase delmar Solution 2.5 milliGRAM(s) Inhalation every 12 hours  enoxaparin Injectable 30 milliGRAM(s) SubCutaneous daily  levETIRAcetam  Solution 1000 milliGRAM(s) Oral two times a day  levothyroxine 125 MICROGram(s) Oral daily  polyethylene glycol 3350 17 Gram(s) Oral daily  senna 2 Tablet(s) Oral at bedtime  valproic  acid Syrup 500 milliGRAM(s) Oral two times a day    MEDICATIONS  (PRN):  acetaminophen    Suspension .. 650 milliGRAM(s) Oral every 6 hours PRN Temp greater or equal to 38C (100.4F)      Allergies    amoxicillin (Rash)  penicillin (Rash)    Intolerances        Review of Systems: *pt minimally verbal to nonverbal, unable to obtain ROS         Vital Signs Last 24 Hrs  T(C): 37.1 (19 May 2020 13:15), Max: 37.1 (19 May 2020 13:15)  T(F): 98.7 (19 May 2020 13:15), Max: 98.7 (19 May 2020 13:15)  HR: 79 (19 May 2020 13:15) (72 - 81)  BP: 99/76 (19 May 2020 13:15) (92/57 - 133/100)  BP(mean): --  RR: 12 (19 May 2020 13:15) (12 - 15)  SpO2: 100% (19 May 2020 13:15) (100% - 100%)    PHYSICAL EXAM:    Constitutional: NAD  HEENT: EOMI, throat clear 9+trach  Neck: No LAD, supple  Respiratory: CTA and P  Cardiovascular: S1 and S2, RRR, no M  Gastrointestinal: BS+, soft, NT/ND, neg HSM, +peg  Extremities: No peripheral edema, neg clubbing, cyanosis  Vascular: 2+ peripheral pulses  Neurological: A/O x 0  Psychiatric: Normal mood, normal affect  Skin: No rashes      LABS:                        8.6    4.65  )-----------( 466      ( 19 May 2020 04:40 )             26.9     05-19    136  |  99  |  19  ----------------------------<  102<H>  4.3   |  26  |  0.49<L>    Ca    8.8      19 May 2020 04:40  Phos  4.2     05-19  Mg     2.0     05-19    TPro  7.4  /  Alb  2.8<L>  /  TBili  < 0.2<L>  /  DBili  x   /  AST  24  /  ALT  10  /  AlkPhos  96  05-19          RADIOLOGY & ADDITIONAL TESTS:

## 2020-05-19 NOTE — PROGRESS NOTE ADULT - PROBLEM SELECTOR PLAN 2
- Failed extubation x 2, s/p trach on 5/7, tolerating AC 15/300/5/30.  - PS trial of 15/5 attempted today 5/19. Pt was able to tolerate for 15 minutes. Pt then became apenic and placed back to AC. Will continue to attempt PS trials.  - Chest PT and Suction PRN  - Trach sutures to be removed by thoracic on POD 14 (5/20)   - Trach care daily

## 2020-05-19 NOTE — PROGRESS NOTE ADULT - SUBJECTIVE AND OBJECTIVE BOX
CHIEF COMPLAINT:    Interval Events:    REVIEW OF SYSTEMS:  Constitutional:   Eyes:  ENT:  CV:  Resp:  GI:  :  MSK:  Integumentary:  Neurological:  Psychiatric:  Endocrine:  Hematologic/Lymphatic:  Allergic/Immunologic:  [ ] All other systems negative  [ ] Unable to assess ROS because ________    OBJECTIVE:  ICU Vital Signs Last 24 Hrs  T(C): 37 (19 May 2020 05:24), Max: 37 (19 May 2020 05:24)  T(F): 98.6 (19 May 2020 05:24), Max: 98.6 (19 May 2020 05:24)  HR: 73 (19 May 2020 05:24) (67 - 81)  BP: 133/100 (19 May 2020 05:24) (101/62 - 133/100)  BP(mean): --  ABP: --  ABP(mean): --  RR: 14 (19 May 2020 05:24) (12 - 15)  SpO2: 100% (19 May 2020 05:24) (100% - 100%)    Mode: AC/ CMV (Assist Control/ Continuous Mandatory Ventilation), RR (machine): 12, TV (machine): 300, FiO2: 30, PEEP: 5, MAP: 7, PIP: 16    05-17 @ 07:01 - 05-18 @ 07:00  --------------------------------------------------------  IN: 1360 mL / OUT: 900 mL / NET: 460 mL    05-18 @ 07:01 - 05-19 @ 06:54  --------------------------------------------------------  IN: 1340 mL / OUT: 1500 mL / NET: -160 mL      CAPILLARY BLOOD GLUCOSE          PHYSICAL EXAM:  General:   HEENT:   Lymph Nodes:  Neck:   Respiratory:   Cardiovascular:   Abdomen:   Extremities:   Skin:   Neurological:  Psychiatry:    HOSPITAL MEDICATIONS:  MEDICATIONS  (STANDING):  chlorhexidine 0.12% Liquid 15 milliLiter(s) Oral Mucosa every 12 hours  chlorhexidine 4% Liquid 1 Application(s) Topical daily  dornase delmar Solution 2.5 milliGRAM(s) Inhalation every 12 hours  enoxaparin Injectable 30 milliGRAM(s) SubCutaneous daily  levETIRAcetam  Solution 1000 milliGRAM(s) Oral two times a day  levothyroxine 125 MICROGram(s) Oral daily  polyethylene glycol 3350 17 Gram(s) Oral daily  senna 2 Tablet(s) Oral at bedtime  valproic  acid Syrup 500 milliGRAM(s) Oral two times a day    MEDICATIONS  (PRN):  acetaminophen    Suspension .. 650 milliGRAM(s) Oral every 6 hours PRN Temp greater or equal to 38C (100.4F)      LABS:                        8.6    4.65  )-----------( 466      ( 19 May 2020 04:40 )             26.9     05-19    136  |  99  |  19  ----------------------------<  102<H>  4.3   |  26  |  0.49<L>    Ca    8.8      19 May 2020 04:40  Phos  4.2     05-19  Mg     2.0     05-19    TPro  7.4  /  Alb  2.8<L>  /  TBili  < 0.2<L>  /  DBili  x   /  AST  24  /  ALT  10  /  AlkPhos  96  05-19        Arterial Blood Gas:  05-19 @ 04:40  7.47/41/114/29/98.7/5.2  ABG lactate: --        MICROBIOLOGY:     RADIOLOGY:  [ ] Reviewed and interpreted by me    PULMONARY FUNCTION TESTS:    EKG: CHIEF COMPLAINT: Patient is a 60 year old Female who presents with a chief complaint of Acute hypoxic respiratory failure secondary to COVID-19.      Interval Events: No overnight events    REVIEW OF SYSTEMS:  [X ] Unable to assess ROS because pt non verbal.    OBJECTIVE:  ICU Vital Signs Last 24 Hrs  T(C): 37 (19 May 2020 05:24), Max: 37 (19 May 2020 05:24)  T(F): 98.6 (19 May 2020 05:24), Max: 98.6 (19 May 2020 05:24)  HR: 73 (19 May 2020 05:24) (67 - 81)  BP: 133/100 (19 May 2020 05:24) (101/62 - 133/100)  RR: 14 (19 May 2020 05:24) (12 - 15)  SpO2: 100% (19 May 2020 05:24) (100% - 100%)    Mode: AC/ CMV (Assist Control/ Continuous Mandatory Ventilation), RR (machine): 12, TV (machine): 300, FiO2: 30, PEEP: 5, MAP: 7, PIP: 16    05-17 @ 07:01  -  05-18 @ 07:00  --------------------------------------------------------  IN: 1360 mL / OUT: 900 mL / NET: 460 mL    05-18 @ 07:01  -  05-19 @ 06:54  --------------------------------------------------------  IN: 1340 mL / OUT: 1500 mL / NET: -160 mL      HOSPITAL MEDICATIONS:  MEDICATIONS  (STANDING):  chlorhexidine 0.12% Liquid 15 milliLiter(s) Oral Mucosa every 12 hours  chlorhexidine 4% Liquid 1 Application(s) Topical daily  dornase delmar Solution 2.5 milliGRAM(s) Inhalation every 12 hours  enoxaparin Injectable 30 milliGRAM(s) SubCutaneous daily  levETIRAcetam  Solution 1000 milliGRAM(s) Oral two times a day  levothyroxine 125 MICROGram(s) Oral daily  polyethylene glycol 3350 17 Gram(s) Oral daily  senna 2 Tablet(s) Oral at bedtime  valproic  acid Syrup 500 milliGRAM(s) Oral two times a day    MEDICATIONS  (PRN):  acetaminophen    Suspension .. 650 milliGRAM(s) Oral every 6 hours PRN Temp greater or equal to 38C (100.4F)      LABS:                        8.6    4.65  )-----------( 466      ( 19 May 2020 04:40 )             26.9     05-19    136  |  99  |  19  ----------------------------<  102<H>  4.3   |  26  |  0.49<L>    Ca    8.8      19 May 2020 04:40  Phos  4.2     05-19  Mg     2.0     05-19    TPro  7.4  /  Alb  2.8<L>  /  TBili  < 0.2<L>  /  DBili  x   /  AST  24  /  ALT  10  /  AlkPhos  96  05-19        Arterial Blood Gas:  05-19 @ 04:40  7.47/41/114/29/98.7/5.2

## 2020-05-19 NOTE — PROGRESS NOTE ADULT - ATTENDING COMMENTS
Down syndrome  Resp failure due to covid19 pneumonitis, failed extubated x 2, s/p trach/PEG 5/7  Unable to wean, not tolerating PS   Vent facility has accepted her, dispo underway. Will re-swab to confirm negative for COVD19

## 2020-05-19 NOTE — PROGRESS NOTE ADULT - ASSESSMENT
61 YO F with Downs Syndrome, Hypothyroidsim, Constipation and Seizures, transferred from [facility] for acute hypoxic respiratory failure second to COVID 19 PNA. Course c/b seizures episodes, type 2 NSTEMI, ADAM and gram variable bacteremia. Extubated 4/16 and reintubated same day. Extubated again on 5/4 to BIPAP and then reintubated again 5/5. Now s/p Trach and PEG on 5/7 by Thoracic Sx. Transferred to [facility].

## 2020-05-19 NOTE — PROGRESS NOTE ADULT - PROBLEM SELECTOR PLAN 10
- DVT PPX with Lovenox   - DISPO - PT recommending restorative Rehab, however given former residence at , now unable to wean from vent and bedbound status, LTC/ SNF likely reasonable. Case discussed with CM/ SW and family.

## 2020-05-19 NOTE — PROGRESS NOTE ADULT - ASSESSMENT
60y Female with history of Down Syndrome, hypothyroidism, and recurrent UTI who presented to [facility] on 4/4 for fever, cough, and SOB. The patient was intubated on 4/7 for worsening hypercapnic respiratory failure. Extubated and reintubated on 4/16. Hospitalization further complicated by NSTEMI (type 2), vaginal bleeding requiring 2 uPRBC. The pateint was being followed by my practice at [facility]. She is s.p transfer to [facility].     Acute blood loss anemia  multifactorial/resolved; trend h/h and transfuse prn   No evidence of GI bleeding  cont gi ppx with pepcid while on Lovenox   monitor stools; if loose hold stool softeners   no role for endoscopic eval at this time in light of risk in covid pts  s/p PEG; aspiration precautions with feeds   dc plans in progress    COVID   monitor resp status   s/p trach/peg w/ctsx 5/7  care per primary teams appreciated  dc plans in progress      Advanced care planning was discussed with patient and family.  Advanced care planning forms were reviewed and discussed.  Risks, benefits and alternatives of gastroenterologic procedures were discussed in detail and all questions were answered.

## 2020-05-20 ENCOUNTER — TRANSCRIPTION ENCOUNTER (OUTPATIENT)
Age: 60
End: 2020-05-20

## 2020-05-20 NOTE — PROGRESS NOTE ADULT - MENTAL STATUS
alert, opens eyes   does not follow commands
nonverbal, does not follow commands  opens eyes intermittently, does not track
Eyes open intermittently to verbal stimuli , + tracking
eyes open to loud verbal stimuli /Does not follow commands

## 2020-05-20 NOTE — PROGRESS NOTE ADULT - SUBJECTIVE AND OBJECTIVE BOX
CHIEF COMPLAINT: Patient is a 60y old  Female who presents with a chief complaint of Acute hypoxic respiratory failure secondary to COVID-19 (18 May 2020 06:50)    Interval Events:      REVIEW OF SYSTEMS:  Constitutional:   Eyes:  ENT:  CV:  Resp:  GI:  :  MSK:  Integumentary:  Neurological:  Psychiatric:  Endocrine:  Hematologic/Lymphatic:  Allergic/Immunologic:  [ ] All other systems negative  [ ] Unable to assess ROS because ________      OBJECTIVE:  ICU Vital Signs Last 24 Hrs  T(C): 36.8 (20 May 2020 06:34), Max: 37.1 (19 May 2020 13:15)  T(F): 98.2 (20 May 2020 06:34), Max: 98.7 (19 May 2020 13:15)  HR: 77 (20 May 2020 07:20) (61 - 80)  BP: 98/60 (20 May 2020 06:34) (92/57 - 99/76)  BP(mean): --  ABP: --  ABP(mean): --  RR: 19 (20 May 2020 06:34) (12 - 19)  SpO2: 100% (20 May 2020 07:20) (100% - 100%)    Mode: AC/ CMV (Assist Control/ Continuous Mandatory Ventilation), RR (machine): 12, TV (machine): 300, FiO2: 30, PEEP: 5, MAP: 7, PIP: 15    05-19 @ 07:01  -  05-20 @ 07:00  --------------------------------------------------------  IN: 1870 mL / OUT: 600 mL / NET: 1270 mL    HOSPITAL MEDICATIONS:  MEDICATIONS  (STANDING):  chlorhexidine 0.12% Liquid 15 milliLiter(s) Oral Mucosa every 12 hours  chlorhexidine 4% Liquid 1 Application(s) Topical daily  dornase delmar Solution 2.5 milliGRAM(s) Inhalation every 12 hours  enoxaparin Injectable 30 milliGRAM(s) SubCutaneous daily  levETIRAcetam  Solution 1000 milliGRAM(s) Oral two times a day  levothyroxine 125 MICROGram(s) Oral daily  polyethylene glycol 3350 17 Gram(s) Oral daily  senna 2 Tablet(s) Oral at bedtime  valproic  acid Syrup 500 milliGRAM(s) Oral two times a day    MEDICATIONS  (PRN):  acetaminophen    Suspension .. 650 milliGRAM(s) Oral every 6 hours PRN Temp greater or equal to 38C (100.4F)      LABS:                        8.6    4.65  )-----------( 466      ( 19 May 2020 04:40 )             26.9     05-19    136  |  99  |  19  ----------------------------<  102<H>  4.3   |  26  |  0.49<L>    Ca    8.8      19 May 2020 04:40  Phos  4.2     05-19  Mg     2.0     05-19    TPro  7.4  /  Alb  2.8<L>  /  TBili  < 0.2<L>  /  DBili  x   /  AST  24  /  ALT  10  /  AlkPhos  96  05-19        Arterial Blood Gas:  05-19 @ 04:40  7.47/41/114/29/98.7/5.2  ABG lactate: --        MICROBIOLOGY:     RADIOLOGY:  [ ] Reviewed and interpreted by me    PULMONARY FUNCTION TESTS:    EKG: CHIEF COMPLAINT: Patient is a 60y old  Female who presents with a chief complaint of Acute hypoxic respiratory failure secondary to COVID-19 (18 May 2020 06:50)    Interval Events: none overnight       REVIEW OF SYSTEMS:  [ ] All other systems negative  [x] Unable to assess ROS because: nonverbal, does not follow commands       OBJECTIVE:  ICU Vital Signs Last 24 Hrs  T(C): 36.8 (20 May 2020 06:34), Max: 37.1 (19 May 2020 13:15)  T(F): 98.2 (20 May 2020 06:34), Max: 98.7 (19 May 2020 13:15)  HR: 77 (20 May 2020 07:20) (61 - 80)  BP: 98/60 (20 May 2020 06:34) (92/57 - 99/76)  BP(mean): --  ABP: --  ABP(mean): --  RR: 19 (20 May 2020 06:34) (12 - 19)  SpO2: 100% (20 May 2020 07:20) (100% - 100%)    Mode: AC/ CMV (Assist Control/ Continuous Mandatory Ventilation), RR (machine): 12, TV (machine): 300, FiO2: 30, PEEP: 5, MAP: 7, PIP: 15    05-19 @ 07:01  -  05-20 @ 07:00  --------------------------------------------------------  IN: 1870 mL / OUT: 600 mL / NET: 1270 mL    HOSPITAL MEDICATIONS:  MEDICATIONS  (STANDING):  chlorhexidine 0.12% Liquid 15 milliLiter(s) Oral Mucosa every 12 hours  chlorhexidine 4% Liquid 1 Application(s) Topical daily  dornase delmar Solution 2.5 milliGRAM(s) Inhalation every 12 hours  enoxaparin Injectable 30 milliGRAM(s) SubCutaneous daily  levETIRAcetam  Solution 1000 milliGRAM(s) Oral two times a day  levothyroxine 125 MICROGram(s) Oral daily  polyethylene glycol 3350 17 Gram(s) Oral daily  senna 2 Tablet(s) Oral at bedtime  valproic  acid Syrup 500 milliGRAM(s) Oral two times a day    MEDICATIONS  (PRN):  acetaminophen    Suspension .. 650 milliGRAM(s) Oral every 6 hours PRN Temp greater or equal to 38C (100.4F)

## 2020-05-20 NOTE — PROGRESS NOTE ADULT - NEUROLOGICAL DETAILS
responds to pain/strength decreased
responds to pain/non verbal
responds to pain
strength decreased/disoriented
Pt non verbal/responds to pain/strength decreased
disoriented/strength decreased

## 2020-05-20 NOTE — PROGRESS NOTE ADULT - CVS HE PE MLT D E PC
no rub/no murmur/regular rate and rhythm
regular rate and rhythm/no rub/no murmur
regular rate and rhythm/no rub/no murmur
regular rate and rhythm/no rub
no rub/no murmur/regular rate and rhythm
regular rate and rhythm/no rub/no murmur
regular rate and rhythm
regular rate and rhythm

## 2020-05-20 NOTE — PROGRESS NOTE ADULT - PROBLEM SELECTOR PROBLEM 1
2019 novel coronavirus disease (COVID-19)
Acute respiratory failure with hypoxia
Acute respiratory failure with hypoxia
2019 novel coronavirus disease (COVID-19)

## 2020-05-20 NOTE — PROGRESS NOTE ADULT - ASSESSMENT
60y Female with history of Down Syndrome, hypothyroidism, and recurrent UTI who presented to [facility] on 4/4 for fever, cough, and SOB. The patient was intubated on 4/7 for worsening hypercapnic respiratory failure. Extubated and reintubated on 4/16. Hospitalization further complicated by NSTEMI (type 2), vaginal bleeding requiring 2 uPRBC. The pateint was being followed by my practice at [facility]. She is s.p transfer to [facility].     Acute blood loss anemia  multifactorial/resolved; trend h/h and transfuse prn   No evidence of GI bleeding  cont gi ppx with pepcid while on Lovenox   monitor stools; if loose hold stool softeners   no role for endoscopic eval at this time in light of risk in covid pts  s/p PEG; aspiration precautions with feeds   dc plans in progress    COVID   monitor resp status   s/p trach/peg w/ctsx 5/7  care per primary teams appreciated  dc plans in progress      Advanced care planning was discussed with patient and family.  Advanced care planning forms were reviewed and discussed.  Risks, benefits and alternatives of gastroenterologic procedures were discussed in detail and all questions were answered.

## 2020-05-20 NOTE — PROGRESS NOTE ADULT - SUBJECTIVE AND OBJECTIVE BOX
INTERVAL HPI/OVERNIGHT EVENTS:    tolerating feeds    MEDICATIONS  (STANDING):  ALBUTerol    90 MICROgram(s) HFA Inhaler 2 Puff(s) Inhalation every 6 hours  chlorhexidine 0.12% Liquid 15 milliLiter(s) Oral Mucosa every 12 hours  chlorhexidine 4% Liquid 1 Application(s) Topical daily  enoxaparin Injectable 30 milliGRAM(s) SubCutaneous daily  levETIRAcetam  Solution 1000 milliGRAM(s) Oral two times a day  levothyroxine 125 MICROGram(s) Oral daily  polyethylene glycol 3350 17 Gram(s) Oral daily  senna 2 Tablet(s) Oral at bedtime  valproic  acid Syrup 500 milliGRAM(s) Oral two times a day    MEDICATIONS  (PRN):  acetaminophen    Suspension .. 650 milliGRAM(s) Oral every 6 hours PRN Temp greater or equal to 38C (100.4F)      Allergies    amoxicillin (Rash)  penicillin (Rash)    Intolerances        Review of Systems: *pt minimally verbal to nonverbal, unable to obtain ROS           Vital Signs Last 24 Hrs  T(C): 36.7 (20 May 2020 13:53), Max: 36.9 (19 May 2020 21:43)  T(F): 98 (20 May 2020 13:53), Max: 98.4 (19 May 2020 21:43)  HR: 78 (20 May 2020 14:29) (61 - 80)  BP: 114/77 (20 May 2020 13:53) (98/60 - 114/77)  BP(mean): --  RR: 18 (20 May 2020 13:53) (13 - 19)  SpO2: 100% (20 May 2020 14:29) (100% - 100%)    PHYSICAL EXAM:    Constitutional: NAD  HEENT: EOMI, throat clear  Neck: No LAD, supple +trach  Respiratory: CTA and P  Cardiovascular: S1 and S2, RRR, no M  Gastrointestinal: BS+, soft, NT/ND, neg HSM, +peg  Extremities: No peripheral edema, neg clubbing, cyanosis  Vascular: 2+ peripheral pulses  Neurological: A/O x 0, no focal deficits  Psychiatric: Normal mood, normal affect  Skin: No rashes      LABS:                        8.6    4.65  )-----------( 466      ( 19 May 2020 04:40 )             26.9     05-19    136  |  99  |  19  ----------------------------<  102<H>  4.3   |  26  |  0.49<L>    Ca    8.8      19 May 2020 04:40  Phos  4.2     05-19  Mg     2.0     05-19    TPro  7.4  /  Alb  2.8<L>  /  TBili  < 0.2<L>  /  DBili  x   /  AST  24  /  ALT  10  /  AlkPhos  96  05-19          RADIOLOGY & ADDITIONAL TESTS:

## 2020-05-20 NOTE — PROGRESS NOTE ADULT - RS GEN PE MLT RESP DETAILS PC
diminished breath sounds, L/airway patent/diminished breath sounds, R
good air movement/respirations non-labored/breath sounds equal
breath sounds equal/respirations non-labored/good air movement
breath sounds equal/Vented/good air movement
breath sounds equal/respirations non-labored
rhonchi/airway patent/diminished breath sounds, R/diminished breath sounds, L
+coarse bs bilat/airway patent/breath sounds equal
airway patent/bilat coarse bs/breath sounds equal

## 2020-05-20 NOTE — PROGRESS NOTE ADULT - NECK DETAILS
Trached
tracheostomy present
Trached
tracheostomy present
+ Trach to vent
+ trach to vent

## 2020-05-20 NOTE — PROGRESS NOTE ADULT - PROBLEM SELECTOR PLAN 2
- s/p trach on 5/7, tolerating AC 15/300/5/40%  - unable to wean, will cont PS trials as tolerated   - chest PT, suction PRN  - trach care daily

## 2020-05-20 NOTE — PROGRESS NOTE ADULT - GASTROINTESTINAL DETAILS
no distention/soft/nontender
soft/bowel sounds normal/nontender
soft/bowel sounds normal/nontender
soft/nontender
no distention/soft/nontender
soft/nontender
soft/+ PEG/no distention
nontender/soft/+ PEG

## 2020-05-20 NOTE — DISCHARGE NOTE NURSING/CASE MANAGEMENT/SOCIAL WORK - PATIENT PORTAL LINK FT
You can access the FollowMyHealth Patient Portal offered by [facility] by registering at the following website: [email or web address]. By joining [facility]’s FollowMyHealth portal, you will also be able to view your health information using other applications (apps) compatible with our system.

## 2020-05-20 NOTE — PROGRESS NOTE ADULT - PROBLEM SELECTOR PROBLEM 2
2019 novel coronavirus disease (COVID-19)
2019 novel coronavirus disease (COVID-19)
Tracheostomy status

## 2020-12-18 NOTE — INITIAL ORGAN DONATION REFERRAL - NSORGANDONATIONCLINICALTRIGGER_GEN_ALL_CORE
Absence of TWO or more brain stem reflexes/[place] Coma Scale is less than or equal to 5/Family discussion withdrawal of life-sustaining therapies is anticipated

## 2020-12-18 NOTE — ED ADULT NURSE REASSESSMENT NOTE - NS ED NURSE REASSESS COMMENT FT1
only able to obtain one set of blood cultures on patient. dr. pierce made aware. ok to start antibiotics as per dr. pierce.

## 2020-12-18 NOTE — ED ADULT NURSE REASSESSMENT NOTE - NS ED NURSE REASSESS COMMENT FT1
dr. brown from ICU at bedside. dr. brown to put in ICU orders. ok as per dr. brown for patient to wait to get CT immediately before transport upstairs. dr. pierce aware and approved of CT plan as well. patient's vital signs stable, tolerating levophed at this time.

## 2020-12-18 NOTE — CONSULT NOTE ADULT - ASSESSMENT
60 year old female s/p devastating intracerebral hemorrhage with brain stem compression and massive bilateral infarctions underlying. Severe brain edema. Pmhx of covid, down's syndrome, hypothyroid. s/p trach/peg.     discussed with ICU team and Dr. Jocye  physical exam and images are not compatible with a functional recovery. ICH score of 5.   Family spoken to by ICU team and want comfort care only at this time.   No neurosurgical intervention at this time as any intervention would be futile for brain recovery.

## 2020-12-18 NOTE — H&P ADULT - NSHPLABSRESULTS_GEN_ALL_CORE
< from: Xray Chest 1 View-PORTABLE IMMEDIATE (12.18.20 @ 16:49) >    XAM:  XR CHEST PORTABLE IMMED 1V                            PROCEDURE DATE:  12/18/2020          INTERPRETATION:  History: Sepsis    Chest:  one view.    Comparison: 05/02/2020    AP radiograph of the chest demonstrates interval development of moderate RIGHT pleural effusion with underlying atelectasis and/or infiltrates. ET tube removed and tracheostomy tube placed. RIGHT central venous catheter removed. The cardiac silhouette is normal in size. Osseous structures are intact.    Impression:interval development of moderate RIGHT pleural effusion with underlying atelectasis and/or infiltrates. ET tube removed and tracheostomy tube placed. RIGHT central venous catheter removed

## 2020-12-18 NOTE — SEPSIS NOTE - ASSESSMENT
septic shock likely from UTI  Aztreonam and vanco  Cultures PND  Repeat Lactate  S/P 30 cc/kg  Repeat Lactate PND  patient started on pressors in ED

## 2020-12-18 NOTE — H&P ADULT - NSICDXPASTSURGICALHX_GEN_ALL_CORE_FT
PAST SURGICAL HISTORY:  PEG (percutaneous endoscopic gastrostomy) status     Tracheostomy present

## 2020-12-18 NOTE — ED ADULT NURSE REASSESSMENT NOTE - NS ED NURSE REASSESS COMMENT FT1
report given to ICU RN at [id number]. levophed infusing at 1 mg/kg/min. patient to be transported to CT scan and transported upstairs as per dr. brown.

## 2020-12-18 NOTE — CONSULT NOTE ADULT - SUBJECTIVE AND OBJECTIVE BOX
Patient is a 60y old  Female who presents to the ER with acutely less mobility as per NH staff. Patient has Down's syndrome and s/p trach and peg 2/2 Covid earlier this year. Pt was found to be hypotensive and hypothermic. On levophed. No sedation given. CT head shows devastating cerebral hemorrhage with underlying ischemia. Pt appears radiographically to have herniated her brainstem and on exam has no brainstem reflexes.       HPI:  Patient is a 61 y/o female with a medical history of Downs syndrome, Hypothyroidism, Recurrent UTI's, Seizures, chronic trach and PEG from Spring 2020 after hospitalization for COVID19 PNA presenting to the hospital today from [facility] for acute onset altered mental status- patient is non verbal at baseline however more mobile. NH reporting that patient had been vomiting since yesterday, this AM lethargic, not moving extremities. En route to hospital patient found to be hypotensive, IVFs started, remaining hypotensive on arrival to the ED, also hypothermic. Patient was started on levophed in the ED. S/p 1950 cc Bolus, 1 x dose of Vanc, azithro, cefepime.  (18 Dec 2020 16:58)      PAST MEDICAL & SURGICAL HISTORY:  Frequent urinary tract infections    Hypothyroidism, unspecified type    Down syndrome    PEG (percutaneous endoscopic gastrostomy) status    Tracheostomy present        FAMILY HISTORY: Brother is proxy         Social Hx:  Nonsmoker, no drug or alcohol use    MEDICATIONS  (STANDING):  ALBUTerol  90 MICROgram(s) HFA Inhaler - Peds 4 Puff(s) Inhalation every 4 hours  aztreonam  IVPB 1000 milliGRAM(s) IV Intermittent every 8 hours  chlorhexidine 0.12% Liquid 15 milliLiter(s) Oral Mucosa every 12 hours  chlorhexidine 4% Liquid 1 Application(s) Topical <User Schedule>  dextrose 40% Gel 15 Gram(s) Oral once  dextrose 5%. 1000 milliLiter(s) (50 mL/Hr) IV Continuous <Continuous>  dextrose 5%. 1000 milliLiter(s) (100 mL/Hr) IV Continuous <Continuous>  dextrose 50% Injectable 25 Gram(s) IV Push once  dextrose 50% Injectable 12.5 Gram(s) IV Push once  dextrose 50% Injectable 25 Gram(s) IV Push once  glucagon  Injectable 1 milliGRAM(s) IntraMuscular once  insulin lispro (ADMELOG) corrective regimen sliding scale   SubCutaneous every 6 hours  levETIRAcetam  Solution 1000 milliGRAM(s) Oral two times a day  levothyroxine 125 MICROGram(s) Oral daily  midodrine 10 milliGRAM(s) Oral every 8 hours  norepinephrine Infusion 0.05 MICROgram(s)/kG/Min (5.95 mL/Hr) IV Continuous <Continuous>  pantoprazole   Suspension 40 milliGRAM(s) Oral daily  polyethylene glycol 3350 17 Gram(s) Oral daily  senna 2 Tablet(s) Oral at bedtime       Allergies    amoxicillin (Rash)  penicillin (Rash)      Vital Signs Last 24 Hrs  T(C): 35 (18 Dec 2020 19:49), Max: 35.3 (18 Dec 2020 16:07)  T(F): 95 (18 Dec 2020 19:49), Max: 95.6 (18 Dec 2020 16:07)  HR: 105 (18 Dec 2020 19:49) (67 - 108)  BP: 84/54 (18 Dec 2020 19:49) (52/41 - 112/63)  BP(mean): 62 (18 Dec 2020 19:49) (46 - 81)  RR: 20 (18 Dec 2020 19:49) (12 - 26)  SpO2: 86% (18 Dec 2020 19:49) (80% - 100%)      Neurological exam:  GCS [id number]= 3t  Comatose, Right pupil 5mm no reaction to light. Left pupil 4 mm no reaction to light. No corneal reflexes, No gag. No withdrawal or motion with noxious stimuli.   toes mute.   ICH score: 5    Labs:                        8.1    10.24 )-----------( 207      ( 18 Dec 2020 14:27 )             24.3     12-18    144  |  117<H>  |  21  ----------------------------<  183<H>  3.9   |  20<L>  |  1.05    Ca    7.6<L>      18 Dec 2020 15:59    TPro  5.2<L>  /  Alb  1.7<L>  /  TBili  0.5  /  DBili  x   /  AST  39<H>  /  ALT  9<L>  /  AlkPhos  73  12-18      PT/INR - ( 18 Dec 2020 15:59 )   PT: 14.1 sec;   INR: 1.22 ratio         PTT - ( 18 Dec 2020 15:59 )  PTT:27.7 sec    RADIOLOGY:  < from: CT Head No Cont (12.18.20 @ 19:30) >  Massive acute intraparenchymal hemorrhage involving the left frontal parietal and temporal lobes and left basal ganglia and crossing the midline through the corpus callosum measuring approximately 9 cm AP x 6.5 cm TR x 7 cm cc. There is extensive intraventricular extension of hemorrhage with complete effacement of the ventricular system. There is diffuse hypoattenuation of all of the visualized parenchyma of the frontal parietal occipital lobes, and anterior temporal lobes, most compatible with massive infarctions bilaterally. There is significant left to right midline shift, difficult to accurately measure given the effacement of the ventricular system. There is complete effacement of the basilar cisterns. There is complete effacement of the sulci throughout the brain parenchyma, compatible with diffuse severe brain edema. There is subdural hemorrhage along the interhemispheric fissure and bilateral tentorium. There is scattered subarachnoid hemorrhage surrounding the intraparenchymal hemorrhage and within the foramen magnum.      Critical value:  I discussed the finding of this report with ICU ZOE Kenney at 7:35 PM on 12/18/2020.  Critical value policy of the hospital was followed.  Read back and confirmation of receipt of this communication was performed.  This verbal communication supplements the text report of this document.          PINEDA LIMON MD; Attending Radiologist  This document has been electronically signed. Dec 18 2020  7:48PM    < end of copied text >

## 2020-12-18 NOTE — ED ADULT NURSE NOTE - NSIMPLEMENTINTERV_GEN_ALL_ED
Implemented All Fall with Harm Risk Interventions:  [place] to call system. Call bell, personal items and telephone within reach. Instruct patient to call for assistance. Room bathroom lighting operational. Non-slip footwear when patient is off stretcher. Physically safe environment: no spills, clutter or unnecessary equipment. Stretcher in lowest position, wheels locked, appropriate side rails in place. Provide visual cue, wrist band, yellow gown, etc. Monitor gait and stability. Monitor for mental status changes and reorient to person, place, and time. Review medications for side effects contributing to fall risk. Reinforce activity limits and safety measures with patient and family. Provide visual clues: red socks.

## 2020-12-18 NOTE — ED PROVIDER NOTE - UNABLE TO OBTAIN
ROS limited: Pt unable to provide further history given baseline mental status. Severe Illness/Injury HPI limited: Pt unable to provide further history given baseline mental status.

## 2020-12-18 NOTE — ED PROVIDER NOTE - PROGRESS NOTE DETAILS
Later notified of CTH results upon transition to ICU.  Pt with large intracranial hemorrhage.  Not reportedly on a/c.  Unclear etiology.  NSG aware and to eval in ICU.  ICU aware.  Further care per inpatient treatment team. Attending Sukhdeep, I have re-evaluated the patient's fluid status and reviewed vital signs. Clinical evaluation demonstrates no improvement with bolus fluids thus patient started on pressors for BP support.  HR stable.

## 2020-12-18 NOTE — H&P ADULT - NSICDXPASTMEDICALHX_GEN_ALL_CORE_FT
PAST MEDICAL HISTORY:  Down syndrome     Frequent urinary tract infections     Hypothyroidism, unspecified type

## 2020-12-18 NOTE — ED PROVIDER NOTE - EYES, MLM
Clear bilaterally, pupils equal, round and reactive to light. Xelnataliez Pregnancy And Lactation Text: This medication is Pregnancy Category D and is not considered safe during pregnancy.  The risk during breast feeding is also uncertain.

## 2020-12-18 NOTE — PROGRESS NOTE ADULT - SUBJECTIVE AND OBJECTIVE BOX
CC:  Patient is a 60y old  Female who presents with a chief complaint of Altered Mental Status (18 Dec 2020 16:58)      HPI/BRIEF HOSPITAL COURSE:   61 y/o F PMH Downs syndrome, Hypothyroidism, Recurrent UTI's, Seizures, chronic trach and PEG from Spring 2020 after hospitalization for COVID19 PNA presenting to the hospital today from [facility] for acute onset altered mental status- patient is non verbal at baseline however more mobile. NH reports that patient had been vomiting since yesterday, this AM lethargic, not moving extremities. En route to hospital patient found to be hypotensive, IVFs started, remaining hypotensive on arrival to the ED, also hypothermic. Patient was started on levophed in the ED. S/p 1950 cc Bolus, 1 x dose of Vanc, azithro, cefepime for treatment of what was presumed to be hypotension and AMS from a septic picture initally - CT abdomen/chest and head pending.     Pt in route to ICU was pan scanned and was found to have a < from: CT Head No Cont (20 @ 19:30) > Massive acute intraparenchymal hemorrhage involving the left frontal parietal and temporal lobes and left basal ganglia and crossing the midline through the corpus callosum measuring approximately 9 cm AP x 6.5 cm TR x 7 cm cc. There is extensive intraventricular extension of hemorrhage with complete effacement of the ventricular system,"     Pt was examined and assessed at the bedside and pt not non-responsive to tactile stimulus, pupils 5mm and fixed - non-reactive, absent corneal reflex, RR not breathing over the vent. Neurosurgery team called to see pt - however likely non-operable. Pt         Events last 24 hours: ***    PAST MEDICAL & SURGICAL HISTORY:  Frequent urinary tract infections    Hypothyroidism, unspecified type    Down syndrome    PEG (percutaneous endoscopic gastrostomy) status    Tracheostomy present      Allergies    amoxicillin (Rash)  penicillin (Rash)    Intolerances      FAMILY HISTORY:      Review of Systems:  CONSTITUTIONAL: No fever, chills, or fatigue  EYES: No eye pain, visual disturbances, or discharge  ENMT:  No difficulty hearing, tinnitus, vertigo; No sinus or throat pain  NECK: No pain or stiffness  RESPIRATORY: No cough, wheezing, chills or hemoptysis; No shortness of breath  CARDIOVASCULAR: No chest pain, palpitations, dizziness, or leg swelling  GASTROINTESTINAL: No abdominal or epigastric pain. No nausea, vomiting, or hematemesis; No diarrhea or constipation. No melena or hematochezia.  GENITOURINARY: No dysuria, frequency, hematuria, or incontinence  NEUROLOGICAL: No headaches, memory loss, loss of strength, numbness, or tremors  SKIN: No itching, burning, rashes, or lesions   MUSCULOSKELETAL: No joint pain or swelling; No muscle, back, or extremity pain  PSYCHIATRIC: No depression, anxiety, mood swings, or difficulty sleeping      Medications:  aztreonam  IVPB 1000 milliGRAM(s) IV Intermittent every 8 hours    midodrine 10 milliGRAM(s) Oral every 8 hours  norepinephrine Infusion 0.05 MICROgram(s)/kG/Min IV Continuous <Continuous>    ALBUTerol  90 MICROgram(s) HFA Inhaler - Peds 4 Puff(s) Inhalation every 4 hours    levETIRAcetam  Solution 1000 milliGRAM(s) Oral two times a day        pantoprazole   Suspension 40 milliGRAM(s) Oral daily  polyethylene glycol 3350 17 Gram(s) Oral daily  senna 2 Tablet(s) Oral at bedtime      dextrose 40% Gel 15 Gram(s) Oral once  dextrose 50% Injectable 25 Gram(s) IV Push once  dextrose 50% Injectable 12.5 Gram(s) IV Push once  dextrose 50% Injectable 25 Gram(s) IV Push once  glucagon  Injectable 1 milliGRAM(s) IntraMuscular once  insulin lispro (ADMELOG) corrective regimen sliding scale   SubCutaneous every 6 hours  levothyroxine 125 MICROGram(s) Oral daily    dextrose 5%. 1000 milliLiter(s) IV Continuous <Continuous>  dextrose 5%. 1000 milliLiter(s) IV Continuous <Continuous>      chlorhexidine 0.12% Liquid 15 milliLiter(s) Oral Mucosa every 12 hours  chlorhexidine 4% Liquid 1 Application(s) Topical <User Schedule>        Mode: AC/ CMV (Assist Control/ Continuous Mandatory Ventilation)  RR (machine): 400  TV (machine): 12  FiO2: 60  PEEP: 5  ITime: 1  PIP: 27      ICU Vital Signs Last 24 Hrs  T(C): 34.9 (18 Dec 2020 17:22), Max: 34.9 (18 Dec 2020 14:47)  T(F): 94.8 (18 Dec 2020 17:22), Max: 94.8 (18 Dec 2020 14:47)  HR: 67 (18 Dec 2020 17:22) (67 - 88)  BP: 97/66 (18 Dec 2020 17:22) (/41 - 112/63)  BP(mean): 76 (18 Dec 2020 17:22) (46 - 78)  ABP: --  ABP(mean): --  RR: 21 (18 Dec 2020 17:22) (12 - 26)  SpO2: 93% (18 Dec 2020 17:22) (92% - 100%)    Vital Signs Last 24 Hrs  T(C): 34.9 (18 Dec 2020 17:22), Max: 34.9 (18 Dec 2020 14:47)  T(F): 94.8 (18 Dec 2020 17:22), Max: 94.8 (18 Dec 2020 14:47)  HR: 67 (18 Dec 2020 17:22) (67 - 88)  BP: 97/66 (18 Dec 2020 17:22) (52/41 - 112/63)  BP(mean): 76 (18 Dec 2020 17:22) (46 - 78)  RR: 21 (18 Dec 2020 17:22) (12 - 26)  SpO2: 93% (18 Dec 2020 17:22) (92% - 100%)        I&O's Detail        LABS:                        8.1    10.24 )-----------( 207      ( 18 Dec 2020 14:27 )             24.3     12-18    144  |  117<H>  |  21  ----------------------------<  183<H>  3.9   |  20<L>  |  1.05    Ca    7.6<L>      18 Dec 2020 15:59    TPro  5.2<L>  /  Alb  1.7<L>  /  TBili  0.5  /  DBili  x   /  AST  39<H>  /  ALT  9<L>  /  AlkPhos  73  12-18      CARDIAC MARKERS ( 18 Dec 2020 15:59 )  0.068 ng/mL / x     / x     / x     / x          CAPILLARY BLOOD GLUCOSE        PT/INR - ( 18 Dec 2020 15:59 )   PT: 14.1 sec;   INR: 1.22 ratio         PTT - ( 18 Dec 2020 15:59 )  PTT:27.7 sec  Urinalysis Basic - ( 18 Dec 2020 15:00 )    Color: Yellow / Appearance: Slightly Turbid / S.010 / pH: x  Gluc: x / Ketone: Negative  / Bili: Negative / Urobili: Negative mg/dL   Blood: x / Protein: Negative mg/dL / Nitrite: Negative   Leuk Esterase: Moderate / RBC: 0-2 /HPF / WBC 11-25   Sq Epi: x / Non Sq Epi: Few / Bacteria: Many      CULTURES:    aztreonam  IVPB 1000 milliGRAM(s) IV Intermittent every 8 hours      Physical Examination:  General: No acute distress.  Alert, oriented, interactive, nonfocal  NEURO: A&O X3, motor function 5/5 BL UE/LE  HEENT: Pupils equal, reactive to light.  Symmetric.  PULM: CTA BL, no significant sputum production, no wheezes, rales, rhonchi  CVS: Regular rate and rhythm, no murmurs, rubs, or gallops  ABD: Soft, nondistended, nontender, normoactive bowel sounds, no masses  EXT: No edema, nontender  SKIN: Warm and well perfused, no rashes noted      EKG: ***    RADIOLOGY: ***      CENTRAL LINE: N          DATE INSERTED:                  HOWARD: N                        DATE INSERTED:                  A-LINE: N                       DATE INSERTED:                  GLOBAL ISSUE/BEST PRACTICE:  Analgesia: N/A  Sedation: N/A  HOB elevation: yes  Stress ulcer prophylaxis: protonix   VTE prophylaxis: SCD/Heparin SQ/Lovenox SQ  Glycemic control: N/A  Nutrition: NPO/Diet/TF       CC:  Patient is a 60y old  Female who presents with a chief complaint of Altered Mental Status (18 Dec 2020 16:58)      HPI/BRIEF HOSPITAL COURSE:   59 y/o F PMH Downs syndrome, Hypothyroidism, Recurrent UTI's, Seizures, chronic trach and PEG from Spring 2020 after hospitalization for COVID19 PNA presenting to the hospital today from [facility] for acute onset altered mental status- patient is non verbal at baseline however more mobile. NH reports that patient had been vomiting since yesterday, this AM lethargic, not moving extremities. En route to hospital patient found to be hypotensive, IVFs started, remaining hypotensive on arrival to the ED, also hypothermic. Patient was started on levophed in the ED. S/p 1950 cc Bolus, 1 x dose of Vanc, azithro, cefepime for treatment of what was presumed to be hypotension and AMS from a septic picture initally - CT abdomen/chest and head pending.     Pt in route to ICU was pan scanned and was found to have a < from: CT Head No Cont (20 @ 19:30) > Massive acute intraparenchymal hemorrhage involving the left frontal parietal and temporal lobes and left basal ganglia and crossing the midline through the corpus callosum measuring approximately 9 cm AP x 6.5 cm TR x 7 cm cc. There is extensive intraventricular extension of hemorrhage with complete effacement of the ventricular system,"     Pt was examined and assessed at the bedside and pt not non-responsive to tactile stimulus, pupils 5mm and fixed - non-reactive, absent corneal reflex, RR not breathing over the vent. Neurosurgery team called to see pt-  non-operable case. Updated brother who is the health proxy about pt's condition and prognosis and he is requesting to make her comfortable at this time.     Pt will be placed on comfort measures       PAST MEDICAL & SURGICAL HISTORY:  Frequent urinary tract infections    Hypothyroidism, unspecified type    Down syndrome    PEG (percutaneous endoscopic gastrostomy) status    Tracheostomy present      Allergies    amoxicillin (Rash)  penicillin (Rash)    Intolerances      FAMILY HISTORY:      Review of Systems:  ROS unable to be obtained non-responsive trach to vent       Medications:  aztreonam  IVPB 1000 milliGRAM(s) IV Intermittent every 8 hours    midodrine 10 milliGRAM(s) Oral every 8 hours  norepinephrine Infusion 0.05 MICROgram(s)/kG/Min IV Continuous <Continuous>    ALBUTerol  90 MICROgram(s) HFA Inhaler - Peds 4 Puff(s) Inhalation every 4 hours    levETIRAcetam  Solution 1000 milliGRAM(s) Oral two times a day        pantoprazole   Suspension 40 milliGRAM(s) Oral daily  polyethylene glycol 3350 17 Gram(s) Oral daily  senna 2 Tablet(s) Oral at bedtime      dextrose 40% Gel 15 Gram(s) Oral once  dextrose 50% Injectable 25 Gram(s) IV Push once  dextrose 50% Injectable 12.5 Gram(s) IV Push once  dextrose 50% Injectable 25 Gram(s) IV Push once  glucagon  Injectable 1 milliGRAM(s) IntraMuscular once  insulin lispro (ADMELOG) corrective regimen sliding scale   SubCutaneous every 6 hours  levothyroxine 125 MICROGram(s) Oral daily    dextrose 5%. 1000 milliLiter(s) IV Continuous <Continuous>  dextrose 5%. 1000 milliLiter(s) IV Continuous <Continuous>      chlorhexidine 0.12% Liquid 15 milliLiter(s) Oral Mucosa every 12 hours  chlorhexidine 4% Liquid 1 Application(s) Topical <User Schedule>        Mode: AC/ CMV (Assist Control/ Continuous Mandatory Ventilation)  RR (machine): 400  TV (machine): 12  FiO2: 60  PEEP: 5  ITime: 1  PIP: 27      ICU Vital Signs Last 24 Hrs  T(C): 34.9 (18 Dec 2020 17:22), Max: 34.9 (18 Dec 2020 14:47)  T(F): 94.8 (18 Dec 2020 17:22), Max: 94.8 (18 Dec 2020 14:47)  HR: 67 (18 Dec 2020 17:22) (67 - 88)  BP: 97/66 (18 Dec 2020 17:22) (52/41 - 112/63)  BP(mean): 76 (18 Dec 2020 17:22) (46 - 78)  ABP: --  ABP(mean): --  RR: 21 (18 Dec 2020 17:22) (12 - 26)  SpO2: 93% (18 Dec 2020 17:22) (92% - 100%)    Vital Signs Last 24 Hrs  T(C): 34.9 (18 Dec 2020 17:22), Max: 34.9 (18 Dec 2020 14:47)  T(F): 94.8 (18 Dec 2020 17:22), Max: 94.8 (18 Dec 2020 14:47)  HR: 67 (18 Dec 2020 17:22) (67 - 88)  BP: 97/66 (18 Dec 2020 17:22) (52/41 - 112/63)  BP(mean): 76 (18 Dec 2020 17:22) (46 - 78)  RR: 21 (18 Dec 2020 17:22) (12 - 26)  SpO2: 93% (18 Dec 2020 17:22) (92% - 100%)        I&O's Detail        LABS:                        8.1    10.24 )-----------( 207      ( 18 Dec 2020 14:27 )             24.3     12-18    144  |  117<H>  |  21  ----------------------------<  183<H>  3.9   |  20<L>  |  1.05    Ca    7.6<L>      18 Dec 2020 15:59    TPro  5.2<L>  /  Alb  1.7<L>  /  TBili  0.5  /  DBili  x   /  AST  39<H>  /  ALT  9<L>  /  AlkPhos  73  12-18      CARDIAC MARKERS ( 18 Dec 2020 15:59 )  0.068 ng/mL / x     / x     / x     / x          CAPILLARY BLOOD GLUCOSE        PT/INR - ( 18 Dec 2020 15:59 )   PT: 14.1 sec;   INR: 1.22 ratio         PTT - ( 18 Dec 2020 15:59 )  PTT:27.7 sec  Urinalysis Basic - ( 18 Dec 2020 15:00 )    Color: Yellow / Appearance: Slightly Turbid / S.010 / pH: x  Gluc: x / Ketone: Negative  / Bili: Negative / Urobili: Negative mg/dL   Blood: x / Protein: Negative mg/dL / Nitrite: Negative   Leuk Esterase: Moderate / RBC: 0-2 /HPF / WBC 11-25   Sq Epi: x / Non Sq Epi: Few / Bacteria: Many      CULTURES:    aztreonam  IVPB 1000 milliGRAM(s) IV Intermittent every 8 hours      Physical Examination:  General: unresponsive to tactile stimulus, trach to vent   NEURO: unresponsive to noxious stimuli, pupil R5 mm adn L 4mm and non reactive, no active RR over the vent, corneal reflex absent, absent gag    HEENT: pupil R5 mm adn L 4mm and non reactive  PULM: coarse BS thorughout trach to vent   CVS: Regular rate and rhythm, no murmurs, rubs, or gallops  ABD: Soft, nondistended, nontender, normoactive bowel sounds, + PEG  EXT: No edema, nontender  SKIN:  coool and well perfused, no rashes noted      EKG: ***    RADIOLOGY: ***      CENTRAL LINE: N          DATE INSERTED:                  HOWARD: N                        DATE INSERTED:                  A-LINE: N                       DATE INSERTED:                  GLOBAL ISSUE/BEST PRACTICE:  Analgesia: N/A  Sedation: N/A  HOB elevation: yes  Stress ulcer prophylaxis: protonix   VTE prophylaxis: SCD/Heparin SQ/Lovenox SQ  Glycemic control: N/A  Nutrition: NPO/Diet/TF       CC:  Patient is a 60y old  Female who presents with a chief complaint of Altered Mental Status (18 Dec 2020 16:58)      HPI/BRIEF HOSPITAL COURSE:   61 y/o F PMH Downs syndrome, Hypothyroidism, Recurrent UTI's, Seizures, chronic trach and PEG from Spring 2020 after hospitalization for COVID19 PNA presenting to the hospital today from [facility] for acute onset altered mental status- patient is non verbal at baseline however more mobile. NH reports that patient had been vomiting since yesterday, this AM lethargic, not moving extremities. En route to hospital patient found to be hypotensive, IVFs started, remaining hypotensive on arrival to the ED, also hypothermic. Patient was started on levophed in the ED. S/p 1950 cc Bolus, 1 x dose of Vanc, azithro, cefepime for treatment of what was presumed to be hypotension and AMS from a septic picture initally - CT abdomen/chest and head pending.     Pt in route to ICU was pan scanned and was found to have a < from: CT Head No Cont (20 @ 19:30) > Massive acute intraparenchymal hemorrhage involving the left frontal parietal and temporal lobes and left basal ganglia and crossing the midline through the corpus callosum measuring approximately 9 cm AP x 6.5 cm TR x 7 cm cc. There is extensive intraventricular extension of hemorrhage with complete effacement of the ventricular system,"     Pt was examined and assessed at the bedside and pt not non-responsive to tactile stimulus, pupils 5mm and fixed - non-reactive, absent corneal reflex, RR not breathing over the vent. Neurosurgery team called to see pt-  non-operable case. Updated brother who is the health proxy about pt's condition and prognosis and he is requesting to make her comfortable at this time.     Pt will be placed on comfort measures       PAST MEDICAL & SURGICAL HISTORY:  Frequent urinary tract infections    Hypothyroidism, unspecified type    Down syndrome    PEG (percutaneous endoscopic gastrostomy) status    Tracheostomy present      Allergies    amoxicillin (Rash)  penicillin (Rash)    Intolerances      FAMILY HISTORY:      Review of Systems:  ROS unable to be obtained non-responsive trach to vent       Medications:  aztreonam  IVPB 1000 milliGRAM(s) IV Intermittent every 8 hours    midodrine 10 milliGRAM(s) Oral every 8 hours  norepinephrine Infusion 0.05 MICROgram(s)/kG/Min IV Continuous <Continuous>    ALBUTerol  90 MICROgram(s) HFA Inhaler - Peds 4 Puff(s) Inhalation every 4 hours    levETIRAcetam  Solution 1000 milliGRAM(s) Oral two times a day        pantoprazole   Suspension 40 milliGRAM(s) Oral daily  polyethylene glycol 3350 17 Gram(s) Oral daily  senna 2 Tablet(s) Oral at bedtime      dextrose 40% Gel 15 Gram(s) Oral once  dextrose 50% Injectable 25 Gram(s) IV Push once  dextrose 50% Injectable 12.5 Gram(s) IV Push once  dextrose 50% Injectable 25 Gram(s) IV Push once  glucagon  Injectable 1 milliGRAM(s) IntraMuscular once  insulin lispro (ADMELOG) corrective regimen sliding scale   SubCutaneous every 6 hours  levothyroxine 125 MICROGram(s) Oral daily    dextrose 5%. 1000 milliLiter(s) IV Continuous <Continuous>  dextrose 5%. 1000 milliLiter(s) IV Continuous <Continuous>      chlorhexidine 0.12% Liquid 15 milliLiter(s) Oral Mucosa every 12 hours  chlorhexidine 4% Liquid 1 Application(s) Topical <User Schedule>        Mode: AC/ CMV (Assist Control/ Continuous Mandatory Ventilation)  RR (machine): 400  TV (machine): 12  FiO2: 60  PEEP: 5  ITime: 1  PIP: 27      ICU Vital Signs Last 24 Hrs  T(C): 34.9 (18 Dec 2020 17:22), Max: 34.9 (18 Dec 2020 14:47)  T(F): 94.8 (18 Dec 2020 17:22), Max: 94.8 (18 Dec 2020 14:47)  HR: 67 (18 Dec 2020 17:22) (67 - 88)  BP: 97/66 (18 Dec 2020 17:22) (52/41 - 112/63)  BP(mean): 76 (18 Dec 2020 17:22) (46 - 78)  ABP: --  ABP(mean): --  RR: 21 (18 Dec 2020 17:22) (12 - 26)  SpO2: 93% (18 Dec 2020 17:22) (92% - 100%)    Vital Signs Last 24 Hrs  T(C): 34.9 (18 Dec 2020 17:22), Max: 34.9 (18 Dec 2020 14:47)  T(F): 94.8 (18 Dec 2020 17:22), Max: 94.8 (18 Dec 2020 14:47)  HR: 67 (18 Dec 2020 17:22) (67 - 88)  BP: 97/66 (18 Dec 2020 17:22) (52/41 - 112/63)  BP(mean): 76 (18 Dec 2020 17:22) (46 - 78)  RR: 21 (18 Dec 2020 17:22) (12 - 26)  SpO2: 93% (18 Dec 2020 17:22) (92% - 100%)        I&O's Detail        LABS:                        8.1    10.24 )-----------( 207      ( 18 Dec 2020 14:27 )             24.3     12-18    144  |  117<H>  |  21  ----------------------------<  183<H>  3.9   |  20<L>  |  1.05    Ca    7.6<L>      18 Dec 2020 15:59    TPro  5.2<L>  /  Alb  1.7<L>  /  TBili  0.5  /  DBili  x   /  AST  39<H>  /  ALT  9<L>  /  AlkPhos  73  12-18      CARDIAC MARKERS ( 18 Dec 2020 15:59 )  0.068 ng/mL / x     / x     / x     / x          CAPILLARY BLOOD GLUCOSE        PT/INR - ( 18 Dec 2020 15:59 )   PT: 14.1 sec;   INR: 1.22 ratio         PTT - ( 18 Dec 2020 15:59 )  PTT:27.7 sec  Urinalysis Basic - ( 18 Dec 2020 15:00 )    Color: Yellow / Appearance: Slightly Turbid / S.010 / pH: x  Gluc: x / Ketone: Negative  / Bili: Negative / Urobili: Negative mg/dL   Blood: x / Protein: Negative mg/dL / Nitrite: Negative   Leuk Esterase: Moderate / RBC: 0-2 /HPF / WBC 11-25   Sq Epi: x / Non Sq Epi: Few / Bacteria: Many      CULTURES:    aztreonam  IVPB 1000 milliGRAM(s) IV Intermittent every 8 hours      Physical Examination:  General: unresponsive to tactile stimulus, trach to vent   NEURO: unresponsive to noxious stimuli, pupil R5 mm adn L 4mm and non reactive, no active RR over the vent, corneal reflex absent, absent gag    HEENT: pupil R5 mm adn L 4mm and non reactive  PULM: coarse BS thorughout trach to vent   CVS: Regular rate and rhythm, no murmurs, rubs, or gallops  ABD: Soft, nondistended, nontender, normoactive bowel sounds, + PEG  EXT: No edema, nontender  SKIN:  coool and well perfused, no rashes noted      EKG: NSR     RADIOLOGY:   < from: CT Head No Cont (20 @ 19:30) >    IMPRESSION:    Massive acute intraparenchymal hemorrhage involving the left frontal parietal and temporal lobes and left basal ganglia and crossing the midline through the corpus callosum measuring approximately 9 cm AP x 6.5 cm TR x 7 cm cc. There is extensive intraventricular extension of hemorrhage with complete effacement of the ventricular system. There is diffuse hypoattenuation of all of the visualized parenchyma of the frontal parietal occipital lobes, and anterior temporal lobes, most compatible with massive infarctions bilaterally. There is significant left to right midline shift, difficult to accurately measure given the effacement of the ventricular system. There is complete effacement of the basilar cisterns. There is complete effacement of the sulci throughout the brain parenchyma, compatible with diffuse severe brain edema. There is subdural hemorrhage along the interhemispheric fissure and bilateral tentorium. There is scattered subarachnoid hemorrhage surrounding the intraparenchymal hemorrhage and within the foramen magnum.      Critical value:  I discussed the finding of this report with ICU ZOE Kenney at 7:35 PM on 2020.  Critical value policy of the hospital was followed.  Read back and confirmation of receipt of this communication was performed.  This verbal communication supplements the text report of this document.    < end of copied text >    < from: CT Chest No Cont (20 @ 19:33) >    IMPRESSION:  Consolidation in the right lung with air bronchograms as above concerning for infection. Volume loss suggests a degree of atelectasis as well. Please correlate clinically. Probable mucous plugging in the right lower lobe bronchus.    < end of copied text >      CENTRAL LINE: N          DATE INSERTED:                  HOWARD: N                        DATE INSERTED:                  A-LINE: N                       DATE INSERTED:

## 2020-12-18 NOTE — PROGRESS NOTE ADULT - ASSESSMENT
ASSESSMENT   1. Massive intracranial bleed c/b cerebral infarction  2. neurogenic shock   3. chronic respiratory failure   4. ADAM  5.     PLAN     NEURO:     PULM:      CV:     GI:      :     ENDO:     ID:     HEME/DVT PPX:     ETHICS        CODE STATUS: ***      Care discussed with bedside provider and eICU attending.  ASSESSMENT   1. Massive intracranial bleed c/b cerebral infarction  2. neurogenic shock   3. chronic respiratory failure   4. ADAM  5. Anoxic brain injury     PLAN   -pt currently on levophed high dose   Pt was examined and assessed at the bedside and pt not non-responsive to tactile stimulus, pupils 5mm and fixed - non-reactive, absent corneal reflex, RR not breathing over the vent. -Neurosurgery team called to see pt-  non-operable case.   -Updated brother who is the health proxy about pt's condition and prognosis at length and he is requesting to make her comfortable at this time,     Pt will be placed on comfort measures and withdrawal of care       ETHICS        CODE STATUS: DNR /comfort measures        Care discussed with bedside provider and eICU attending.       Critical Care time: 55 mins assessing presenting problems of acute illness that poses high probability of life threatening deterioration or end organ damage/dysfunction.  Medical decision making including Initiating plan of care, reviewing data, reviewing radiology, direct patient bedside evaluation and interpretation of vital signs, any necessary ventilator management , discussion with multidisciplinary team, discussing goals of care with patient/family, all non inclusive of procedures

## 2020-12-18 NOTE — ED PROVIDER NOTE - OBJECTIVE STATEMENT
60 year old female with PMHx hypothyroidism, recurrent UTI, Down syndrome presents to ED BIB EMS for AMS. Pt non-verbal at baseline, had episode of emesis last evening and was not as responsive as she typically is this afternoon as she was not moving her extremities as she typically would. Pt was hypotensive PTA. Pt unable to provide further history given baseline mental status.

## 2020-12-18 NOTE — ED PROVIDER NOTE - CLINICAL SUMMARY MEDICAL DECISION MAKING FREE TEXT BOX
Less suspicious for stroke as pt is just unable to move any of her extremities. Given hypotension much more suspicious for infectious etiology of symptoms. Empirically dosed with ABx, 30 cc/kg fluid. Likely will start pressors. ICU consult to evaluate. Pending imaging of head, chest, abd and pelvis. Anticipate admission. Most concerning for infectious etiology upon presentation given that patient profoundly hypotensive and not responsive.  Is baseline non-verbal and trached, unclear what patient's typical baseline status is aside from non-verbal, but does typically move extremities per family report.  Pt notably not able to cooperate with neuro exam, but unstable vitals and requiring fluid resuscitation and eventually pressors given persistent hypotension.  CXR with concern for pna and ua concerning for UTI.  Empirically dosed with abx.   Discussed goals of care with family (brother Сергей - HCP) who reports that patient is DNR, but that pressors and abx are okay with him.  HCP understands patient is critically ill.  Given that patient unresponsive plan for CTH, chest, and abd/pelv to eval for possible underlying cause.   ICU emergently consulted for evaluation and patient accepted for further w/u of sepsis and decision made to CT image on transit to ICU. Most concerning for infectious etiology upon presentation given that patient profoundly hypotensive and not responsive.  Is baseline non-verbal and trached, unclear what patient's typical baseline status is aside from non-verbal, but does typically move extremities per family report.  Pt notably not able to cooperate with neuro exam, but unstable vitals and requiring fluid resuscitation and eventually pressors given persistent hypotension.  CXR with concern for pna and ua concerning for UTI.  Empirically dosed with abx.   Discussed goals of care with family (brother Сергей - HCP) who reports that patient is DNR, but that pressors and abx are okay with him.  HCP understands patient is critically ill.  Given that patient unresponsive plan for CTH, chest, and abd/pelv to eval for possible underlying cause.   ICU emergently consulted for evaluation and patient accepted for further w/u of sepsis and decision made to CT image on transit to ICU.  Less suspicious for cva, but patient not responsive and would evaluate with CTH.  Is not a candidate for neuro intervention given symptoms beginning yesterday.

## 2020-12-18 NOTE — ED ADULT NURSE REASSESSMENT NOTE - NS ED NURSE REASSESS COMMENT FT1
unable to obtain all blood work, phlebotomy called. ok to start antibiotics before all blood work drawn as per dr. pierce.

## 2020-12-18 NOTE — H&P ADULT - HISTORY OF PRESENT ILLNESS
Patient is a 59 y/o female with a medical history of Downs syndrome, Hypothyroidism, Recurrent UTI's, chronic trach and PEG from Spring 2020 after hospitalization for COVID19 PNA presenting to the hospital today from [facility] for acute onset altered mental status- patient is non verbal at baseline however more mobile. NH reporting that patient had been vomiting since yesterday, this AM lethargic, not moving extremities. En route to hospital patient found to be hypotensive, IVFs started, remaining hypotensive on arrival to the ED, also hypothermic. Patient was started on levophed in the ED. S/p 1950 cc Bolus, 1 x dose of Vanc, azithro, cefepime.  Patient is a 61 y/o female with a medical history of Downs syndrome, Hypothyroidism, Recurrent UTI's, Seizures, chronic trach and PEG from Spring 2020 after hospitalization for COVID19 PNA presenting to the hospital today from [facility] for acute onset altered mental status- patient is non verbal at baseline however more mobile. NH reporting that patient had been vomiting since yesterday, this AM lethargic, not moving extremities. En route to hospital patient found to be hypotensive, IVFs started, remaining hypotensive on arrival to the ED, also hypothermic. Patient was started on levophed in the ED. S/p 1950 cc Bolus, 1 x dose of Vanc, azithro, cefepime.

## 2020-12-18 NOTE — ED ADULT TRIAGE NOTE - CHIEF COMPLAINT QUOTE
pt biba for altered mental status as per EMS. Vent dependent, chronically trached. As per EMS, was unresponsive, BP of 48/22, F/s of 106, bagged by EMS. Pt direct bedded to room.

## 2020-12-18 NOTE — DISCHARGE NOTE FOR THE EXPIRED PATIENT - HOSPITAL COURSE
61 y/o F PMH Downs syndrome, Hypothyroidism, Recurrent UTI's, Seizures, chronic trach and PEG from Spring 2020 after hospitalization for COVID19 PNA presenting to the hospital today from [facility] for acute onset altered mental status- patient is non verbal at baseline however more mobile. NH reports that patient had been vomiting since yesterday, this AM lethargic, not moving extremities. En route to hospital patient found to be hypotensive, IVFs started, remaining hypotensive on arrival to the ED, also hypothermic. Patient was started on levophed in the ED. S/p 1950 cc Bolus, 1 x dose of Vanc, azithro, cefepime for treatment of what was presumed to be hypotension and AMS from a septic picture initally - CT abdomen/chest and head pending.     Pt in route to ICU was pan scanned and was found to have a < from: CT Head No Cont (12.18.20 @ 19:30) > Massive acute intraparenchymal hemorrhage involving the left frontal parietal and temporal lobes and left basal ganglia and crossing the midline through the corpus callosum measuring approximately 9 cm AP x 6.5 cm TR x 7 cm cc. There is extensive intraventricular extension of hemorrhage with complete effacement of the ventricular system,"     Pt was examined and assessed at the bedside and pt not non-responsive to tactile stimulus, pupils 5mm and fixed - non-reactive, absent corneal reflex, RR not breathing over the vent. Neurosurgery team called to see pt-  non-operable case. Updated brother who is the health proxy about pt's condition and prognosis and he is requesting to make her comfortable at this time.     Pt placed on comfort measures, family currently in [place] - was able to FT with family     I examined the patient and found PEA on monitor, no palpable pulses at carotid and femoral locations, the ventilator was disconnected, no spontaneous breath sounds were auscultated via stethoscope. There were no heart sounds auscultated via stethoscope at left and right sternal boarders as well at PMI, skin was cyanotic and cool and pupils were fixed and dilated without reaction to light.  Bedside POCUS performed wiht no notable cardiac catractility or movement.  Pt was pronounced dead       59 y/o F PMH Downs syndrome, Hypothyroidism, Recurrent UTI's, Seizures, chronic trach and PEG from Spring 2020 after hospitalization for COVID19 PNA presenting to the hospital today from [facility] for acute onset altered mental status- patient is non verbal at baseline however more mobile. NH reports that patient had been vomiting since yesterday, this AM lethargic, not moving extremities. En route to hospital patient found to be hypotensive, IVFs started, remaining hypotensive on arrival to the ED, also hypothermic. Patient was started on levophed in the ED. S/p 1950 cc Bolus, 1 x dose of Vanc, azithro, cefepime for treatment of what was presumed to be hypotension and AMS from a septic picture initally - CT abdomen/chest and head pending.     Pt in route to ICU was pan scanned and was found to have a < from: CT Head No Cont (12.18.20 @ 19:30) > Massive acute intraparenchymal hemorrhage involving the left frontal parietal and temporal lobes and left basal ganglia and crossing the midline through the corpus callosum measuring approximately 9 cm AP x 6.5 cm TR x 7 cm cc. There is extensive intraventricular extension of hemorrhage with complete effacement of the ventricular system,"     Pt was examined and assessed at the bedside and pt not non-responsive to tactile stimulus, pupils 5mm and fixed - non-reactive, absent corneal reflex, RR not breathing over the vent. Neurosurgery team called to see pt-  non-operable case. Updated brother who is the health proxy about pt's condition and prognosis and he is requesting to make her comfortable at this time.     pt with non-traumatic hemorrhagic stroke and extensive ICH    Pt placed on comfort measures, family currently in [place] - was able to FT with family     I examined the patient and found asystole on monitor, no palpable pulses at carotid and femoral locations, the ventilator was disconnected, no spontaneous breath sounds were auscultated via stethoscope. There were no heart sounds auscultated via stethoscope at left and right sternal boarders as well at PMI, skin was cyanotic and cool and pupils were fixed and dilated without reaction to light.    Pt was pronounced dead 2227 12/18/20, pt passed peacefully family notified by phone

## 2020-12-18 NOTE — H&P ADULT - ASSESSMENT
Patient is a  Patient is a 67 y/o female with a medical history of chronic Trach and PEG, Downs syndrome, Hypothyroidism presents with altered mental status, found to be septic with elevated lactate, tachypneic and hypothermic for which she will be admitted:      PLAN:    CNS:     HEENT: Oral care    PULMONARY: HOB @ 45 degrees. Aspiration precautions    CARDIOVASCULAR:    GI: GI prophylaxis. Feeding: . Bowel regimen    RENAL: Follow up renal function and lytes. Correct as needed. Keep potassium >4 and magnesium >2    INFECTIOUS DISEASE: Monitor vital signs. Follow up cultures    HEMATOLOGICAL: DVT prophylaxis    ENDOCRINE: Follow up fasting sugar. Insulin protocol if needed    MUSCULOSKELETAL: (activity order)    DISPO:   Patient is a 67 y/o female with a medical history of chronic Trach and PEG, Downs syndrome, Hypothyroidism presents with altered mental status, found to be septic with elevated lactate, tachypneic and hypothermic for which she will be admitted:      PLAN:    CNS: Minimally responsive- responds to pain     PULMONARY: CXR : moderate right sided pleural effusion with atelectasis, questionable infiltrate- follow up CT chest, On [facility] vent support: VC  PEEP 5 FIO2 40 RR18    CARDIOVASCULAR: Elevated troponin, likely due to current illness, Cardiology Consulted- followup  Hypotension secondary to septic shock- s/p IVF's, will keep on levophed, maintain MAP >65    GI: Elevated AST- trend with AM labs, Follow up CT Abdomen, PEG tub in place- will need to reinstate feeds in consultation with dietician, Gi PPx: protonix,     RENAL: Cr up from previous baseline- Likely an ADAM from [facility], s/p fluids, trend with AM labs     INFECTIOUS DISEASE: sepsis/septic shock due to UTI?, lactate elevated, normal WBC, CT chest/abdomen pending- cultures collected, s/p vanc/cefepime, will switch to Azactam, monitor vitals/ check      HEMATOLOGICAL: chronic macrocytic anemia- stable, trend with labs    ENDOCRINE: hyperglycemia, start BGM;s q 6, with ISS,    MUSCULOSKELETAL: (activity order)    DISPO:   Patient is a 65 y/o female with a medical history of chronic Trach and PEG, Downs syndrome, Hypothyroidism presents with altered mental status, found to be septic with elevated lactate, tachypneic and hypothermic for which she will be admitted:      PLAN:    CNS: Minimally responsive- responds to pain, on anticonvulsant medicatons for seizure hx?- on Keppra  valproic acid, will need to be reconciled, check AM valproic acid level    PULMONARY: CXR : moderate right sided pleural effusion with atelectasis, questionable infiltrate- follow up CT chest, On [facility] vent support: VC  PEEP 5 FIO2 40 RR18    CARDIOVASCULAR: Elevated troponin, likely due to current illness, Cardiology Consulted- followup,  Hypotension secondary to septic shock- s/p IVF's, will keep on levophed, maintain MAP >65    GI: Elevated AST- trend with AM labs, Follow up CT Abdomen, Gi PPx: protonix,     RENAL: Cr up from previous baseline- Likely an ADAM from [facility], s/p fluids, trend with AM labs     INFECTIOUS DISEASE: sepsis/septic shock due to UTI?, lactate elevated, normal WBC, CT chest/abdomen pending- s/p IVFs ~2000cc, cultures collected, s/p vanc/cefepime, will switch to Azactam, monitor vitals/ trends labs, check repeat lactate    HEMATOLOGICAL: chronic macrocytic anemia- stable, trend with labs    ENDOCRINE: hyperglycemia, start BGM;s q 6, with ISS    Diet: PEG tub in place- will need to reinstate feeds in consultation with dietician,     DVT ppx: Lovenox 40 sub Q    DISPO: Admit for hospitalization under ICU care       Above discussed with Dr. Dow   Patient is a 67 y/o female with a medical history of chronic Trach and PEG, Downs syndrome, seizures, Hypothyroidism presents with altered mental status, found to be in septic shock with elevated lactate, tachypneic and hypothermic likely from a UTI      PLAN:    CNS: Minimally responsive- responds to pain, on anticonvulsant medications for seizure hx?- on Keppra and valproic acid, will need to be reconciled, check AM valproic acid level    PULMONARY: CXR : moderate right sided pleural effusion with atelectasis, questionable infiltrate- follow up CT chest, On [facility] vent support: VC  PEEP 5 FIO2 40 RR18, send sputum culture    CARDIOVASCULAR: Elevated troponin, likely due to current illness, Cardiology Consulted- followup,  Hypotension secondary to septic shock- s/p IVF's, will keep on levophed, maintain MAP >65, asa, and trend troponin    GI: Elevated AST- trend with AM labs, Follow up CT Abdomen, Gi PPx: protonix,     RENAL: Cr up from previous baseline- Likely an ADAM from [facility], s/p fluids, trend with AM labs     INFECTIOUS DISEASE: sepsis/septic shock due to UTI?, lactate elevated, normal WBC, CT chest/abdomen pending- s/p IVFs ~2000cc, cultures collected, s/p vanc/cefepime, will switch to Azactam, monitor vitals/ trends labs, check repeat lactate    HEMATOLOGICAL: chronic macrocytic anemia- stable, trend with labs, check B-12 and folate    ENDOCRINE: hyperglycemia, start BGM;s q 6, with ISS    Diet: PEG tub in place- will need to reinstate feeds in consultation with dietician     DVT ppx: Lovenox 40 sub Q    Patient came as a DNR.  Will need to confirm that she is not a acosta of the [facility]

## 2020-12-18 NOTE — ED ADULT NURSE NOTE - OBJECTIVE STATEMENT
patient BIBEMS from [facility] for altered mental status. as per EMS, patient found unresponsive with a bp of 48/55. patient on ventilator AC mode, settings 400/12/40%/5. patient nonverbal at baseline, unable to obtain information from patient, unable to assess orientation. as per MD note, patient had episode of emesis last evening and was not as responsive as she typically is this afternoon as she was not moving her extremities as she typically would. warming blanket initiated upon rectal temperature result. tracheostomy midline to neck, size 7 cannula. color pale, skin cool and dry, respirations even and unlabored on ventilator. PEG tube noted to left upper quadrant. patient BIBEMS from [facility] for altered mental status. as per EMS, patient found unresponsive with a bp of 48/55. patient on ventilator AC mode, settings 400/12/40%/5. patient nonverbal at baseline, unable to obtain information from patient, unable to assess orientation. as per MD note, patient had episode of emesis last evening and was not as responsive as she typically is this afternoon as she was not moving her extremities as she typically would. warming blanket initiated upon rectal temperature result. tracheostomy midline to neck, portex, size 7 cannula. color pale, skin cool and dry, respirations even and unlabored on ventilator. PEG tube noted to left upper quadrant.

## 2020-12-18 NOTE — H&P ADULT - ATTENDING COMMENTS
Patient seen and examined with the resident and agree with above.  Additions and corrections made where necessary.

## 2020-12-18 NOTE — ED ADULT NURSE REASSESSMENT NOTE - NS ED NURSE REASSESS COMMENT FT1
incontinence care done. skin care done. patient repositioned. pillows adjusted. will continue to monitor.

## 2020-12-18 NOTE — ED ADULT NURSE REASSESSMENT NOTE - NS ED NURSE REASSESS COMMENT FT1
ICY charge RN states she is still unable to take report. ICU charge RN states she is still unable to take report.

## 2020-12-19 LAB
SARS-COV-2 IGG SERPL QL IA: NEGATIVE — SIGNIFICANT CHANGE UP
SARS-COV-2 IGM SERPL IA-ACNC: 0.49 INDEX — SIGNIFICANT CHANGE UP

## 2020-12-21 PROBLEM — Z01.419 ENCOUNTER FOR ANNUAL ROUTINE GYNECOLOGICAL EXAMINATION: Status: RESOLVED | Noted: 2019-09-25 | Resolved: 2020-12-21

## 2020-12-23 LAB
CULTURE RESULTS: SIGNIFICANT CHANGE UP
SPECIMEN SOURCE: SIGNIFICANT CHANGE UP

## 2020-12-29 DIAGNOSIS — N17.0 ACUTE KIDNEY FAILURE WITH TUBULAR NECROSIS: ICD-10-CM

## 2020-12-29 DIAGNOSIS — R40.2313 COMA SCALE, BEST MOTOR RESPONSE, NONE, AT HOSPITAL ADMISSION: ICD-10-CM

## 2020-12-29 DIAGNOSIS — R40.2213 COMA SCALE, BEST VERBAL RESPONSE, NONE, AT HOSPITAL ADMISSION: ICD-10-CM

## 2020-12-29 DIAGNOSIS — J98.11 ATELECTASIS: ICD-10-CM

## 2020-12-29 DIAGNOSIS — A41.9 SEPSIS, UNSPECIFIED ORGANISM: ICD-10-CM

## 2020-12-29 DIAGNOSIS — I61.9 NONTRAUMATIC INTRACEREBRAL HEMORRHAGE, UNSPECIFIED: ICD-10-CM

## 2020-12-29 DIAGNOSIS — G93.5 COMPRESSION OF BRAIN: ICD-10-CM

## 2020-12-29 DIAGNOSIS — Z86.19 PERSONAL HISTORY OF OTHER INFECTIOUS AND PARASITIC DISEASES: ICD-10-CM

## 2020-12-29 DIAGNOSIS — G93.1 ANOXIC BRAIN DAMAGE, NOT ELSEWHERE CLASSIFIED: ICD-10-CM

## 2020-12-29 DIAGNOSIS — E03.9 HYPOTHYROIDISM, UNSPECIFIED: ICD-10-CM

## 2020-12-29 DIAGNOSIS — R73.9 HYPERGLYCEMIA, UNSPECIFIED: ICD-10-CM

## 2020-12-29 DIAGNOSIS — Q90.9 DOWN SYNDROME, UNSPECIFIED: ICD-10-CM

## 2020-12-29 DIAGNOSIS — I95.9 HYPOTENSION, UNSPECIFIED: ICD-10-CM

## 2020-12-29 DIAGNOSIS — Z51.5 ENCOUNTER FOR PALLIATIVE CARE: ICD-10-CM

## 2020-12-29 DIAGNOSIS — R40.2113 COMA SCALE, EYES OPEN, NEVER, AT HOSPITAL ADMISSION: ICD-10-CM

## 2020-12-29 DIAGNOSIS — R57.8 OTHER SHOCK: ICD-10-CM

## 2020-12-29 DIAGNOSIS — Z66 DO NOT RESUSCITATE: ICD-10-CM

## 2020-12-29 DIAGNOSIS — N39.0 URINARY TRACT INFECTION, SITE NOT SPECIFIED: ICD-10-CM

## 2020-12-29 DIAGNOSIS — J96.10 CHRONIC RESPIRATORY FAILURE, UNSPECIFIED WHETHER WITH HYPOXIA OR HYPERCAPNIA: ICD-10-CM

## 2020-12-29 DIAGNOSIS — R65.21 SEVERE SEPSIS WITH SEPTIC SHOCK: ICD-10-CM

## 2020-12-29 DIAGNOSIS — Z93.0 TRACHEOSTOMY STATUS: ICD-10-CM

## 2021-08-18 NOTE — PROGRESS NOTE ADULT - PROBLEM SELECTOR PROBLEM 3
Altered mental status Cheek Interpolation Flap Text: A decision was made to reconstruct the defect utilizing an interpolation axial flap and a staged reconstruction.  A telfa template was made of the defect.  This telfa template was then used to outline the Cheek Interpolation flap.  The donor area for the pedicle flap was then injected with anesthesia.  The flap was excised through the skin and subcutaneous tissue down to the layer of the underlying musculature.  The interpolation flap was carefully excised within this deep plane to maintain its blood supply.  The edges of the donor site were undermined.   The donor site was closed in a primary fashion.  The pedicle was then rotated into position and sutured.  Once the tube was sutured into place, adequate blood supply was confirmed with blanching and refill.  The pedicle was then wrapped with xeroform gauze and dressed appropriately with a telfa and gauze bandage to ensure continued blood supply and protect the attached pedicle.

## 2021-10-11 NOTE — DISCHARGE NOTE PROVIDER - NSDCCPGOAL_GEN_ALL_CORE_FT
Pt here today to refill his Lithium medication and have others adjusted. Pt reports also having thoughts of harming himself by placing large objects on himself and \"just going\". Pt is claiming that people have hacked his 3 phones, and have been stealing money from him. Pt wearing mask on arrival. Pt has flight of ideas but follows directions.   
To get better and follow your care plan as instructed.

## 2022-02-14 NOTE — ED ADULT TRIAGE NOTE - MODE OF ARRIVAL
Doris Cano presents for her 1st OB visit.  She is a 25 year old female.    OB History    Para Term  AB Living   2 0 0 0 1 0   SAB IAB Ectopic Molar Multiple Live Births   1 0 0 0 0 0       The patient notes nausea and has not vomited.  She feels this is tolerable.  She did see Dr. Starks for the IV antibody infusion and follow up ultrasound due to COVID infection at 7w5d.  She is feeling good from that standpoint    I have reviewed the patient's vital signs, medications and allergies, past medical, surgical, social and family history, updating these as appropriate today, 2022  See Histories section of the EMR for a display of this information.    I have reviewed the OB checklist 2022    PHYSICAL EXAM:  Visit Vitals  /62   Ht 5' 4\" (1.626 m)   Wt 77 kg (169 lb 13.8 oz)   LMP 2021 (Exact Date)   BMI 29.16 kg/m²     GENERAL: Well developed, well nourished, in no acute distress  HEENT: WIthin normal limits  LUNGS:  Normal respiratory effort, clear to auscultation.  No wheezes, rales or rhonchi bilaterally  HEART:  Regular rate, rhythm, S1, S2 normal, no murmur, rub or gallop   ABDOMEN:  soft and non-tender, normal bowel sounds  PELVIC:   External genitalia: normal appearance, no lesions or discharge    Vagina: normal appearing without lesions or discharge    Cervix:  Normal appearing without lesions or discharge, cultures        were obtained, pap smear was not performed      Uterus:  Normal contour, non tender, 12 weeks size    Adnexa: non tender, not enlarged  EXTREMITIES: No edema bilateral lower extremities    Fetal heart tones were Present at 160 beats per minute    The following topics were reviewed with the patient at this visit:    ·   HIV and other routine prenatal tests  ·   Anticipated course of prenatal care  ·   Nutrition, dietary considerations, and weight gain.  ·   Exercise  ·   Laborist Model and Call Coverage    Pt. does not  meet the following criteria for Low Dose  Aspirin Therapy due to high risk for preeclampsia  · History of preeclampsia - especially when accompanied by an adverse outcome  · Multifetal gestation  · Chronic Hypertension  · Type 1 or 2 diabetes  · Renal disease  · Autoimmune disease (i.e.,systemic lupus erythematosus, the antiphospholipid syndrome)    Pt. does  meet the following criteria for High Risk Status for GDM  •    Prepregnancy BMI ? 27 kg/m3 7  · Advanced maternal age  •    Personal history of GDM  •    Glycosuria  •    Previous delivery of a baby greater than 9 pounds (4.1 kg)  •    Family history of diabetes  · Previous history of stillbirth    Pt. does not  meet the following criteria for  Progesterone Therapy due to high risk for  labor  · History of   delivery prior to 37 weeks   · Contraindications - multifetal gestation      Baby Scripts Exclusion Criteria     First Trimester Screening at 11-14 weeks and quad screen options were discussed with the patient.  She declines first trimester screening.      ASSESSMENT:  1.  25 year old  female at 11w5d  Encounter for supervision of normal pregnancy in first trimester, unspecified   (primary encounter diagnosis)  COVID-19 virus infection (MFM)    PLAN:  Plan cleocin gel for acne.  Plan to try to coordinate our return visits with Dr. Starks visits, so it is easier for the patient  Orders Placed This Encounter   • Chlamydia/Gonorrhea by Nucleic Acid Amplification   • clindamycin (Cleocin-T) 1 % gel       Return in about 4 weeks (around 3/14/2022).           Walk in

## 2022-07-26 NOTE — ED PROVIDER NOTE - CARE PLAN
Principal Discharge DX:	UTI (urinary tract infection) Consent (Marginal Mandibular)/Introductory Paragraph: The rationale for Mohs was explained to the patient and consent was obtained. The risks, benefits and alternatives to therapy were discussed in detail. Specifically, the risks of damage to the marginal mandibular branch of the facial nerve, infection, scarring, bleeding, prolonged wound healing, incomplete removal, allergy to anesthesia, and recurrence were addressed. Prior to the procedure, the treatment site was clearly identified and confirmed by the patient. All components of Universal Protocol/PAUSE Rule completed.

## 2022-10-02 NOTE — ED PROVIDER NOTE - CONSTITUTIONAL, MLM
Bed: 16  Expected date:   Expected time:   Means of arrival:   Comments:  [place]- Chest pain  
ED RN to IP RN Admission Note  The patient summary is located within the 'ED to IP Handoff' flow sheet.     Patient is being admitted for chest pain. Patient resides at home.    Brief Synopsis of Plan of Care: admit for chest pain    Mental Status:    Alert and oriented  If altered mentation, patient is disoriented to n/a.    Safety:     Fall Risk    Telemetry & Current Rhythm:  Yes, SR    Mobility:    Uses Assistive Devices:  Wheelchair  Has 1 leg    Any further questions should be directed to kayley in the ED @ [phone].  
Report given to Lefty ESCALANTE  
- - -

## 2022-10-18 NOTE — DISCHARGE NOTE NURSING/CASE MANAGEMENT/SOCIAL WORK - NSDCPEFALRISK_GEN_ALL_CORE
Psychiatric   Vaginal Delivery Note    Patient Name: Erin Angelo  : 1987  MRN: [id number]    Date of Delivery: 10/17/YOB: 2024    Time of Birth:  Gestational Age 10:Infant     Findings: female  infant     Infant observations: Weight: [id number]  Baby/Postpartum  in stable condition currently.  Baby remains with mom  in stable condition currently.    Le Angelo MD  10/17/22  23:07 EDT      
Patient information on fall and injury prevention

## 2022-11-17 NOTE — PROGRESS NOTE ADULT - SUBJECTIVE AND OBJECTIVE BOX
CHIEF COMPLAINT:    SUBJECTIVE:     [ ] All other systems negative  [ ] Unable to assess ROS because ________    OBJECTIVE:  ICU Vital Signs Last 24 Hrs  T(C): 37.3 (10 May 2020 05:32), Max: 37.9 (09 May 2020 13:50)  T(F): 99.1 (10 May 2020 05:32), Max: 100.3 (09 May 2020 13:50)  HR: 82 (10 May 2020 05:32) (78 - 93)  BP: 117/65 (10 May 2020 05:32) (96/51 - 117/65)  BP(mean): --  ABP: --  ABP(mean): --  RR: 15 (10 May 2020 05:32) (15 - 15)  SpO2: 98% (10 May 2020 05:32) (98% - 100%)    Mode: AC/ CMV (Assist Control/ Continuous Mandatory Ventilation), RR (machine): 15, TV (machine): 300, FiO2: 30, PEEP: 5, MAP: 7, PIP: 18    05-09 @ 07:01  -  05-10 @ 07:00  --------------------------------------------------------  IN: 910 mL / OUT: 1000 mL / NET: -90 mL    CAPILLARY BLOOD GLUCOSE  POCT Blood Glucose.: 104 mg/dL (08 May 2020 23:05)    PHYSICAL EXAM:  General:   HEENT:   Lymph Nodes:  Neck:   Respiratory:   Cardiovascular:   Abdomen:   Extremities:   Skin:   Neurological:  Psychiatry:    HOSPITAL MEDICATIONS:  MEDICATIONS  (STANDING):  chlorhexidine 0.12% Liquid 15 milliLiter(s) Oral Mucosa every 12 hours  chlorhexidine 4% Liquid 1 Application(s) Topical daily  dornase delmar Solution 2.5 milliGRAM(s) Inhalation every 12 hours  enoxaparin Injectable 30 milliGRAM(s) SubCutaneous daily  levETIRAcetam  Solution 1000 milliGRAM(s) Oral two times a day  levothyroxine 125 MICROGram(s) Oral daily  polyethylene glycol 3350 17 Gram(s) Oral daily  QUEtiapine 12.5 milliGRAM(s) Oral daily  senna 2 Tablet(s) Oral at bedtime  valproic  acid Syrup 500 milliGRAM(s) Oral two times a day    MEDICATIONS  (PRN):  acetaminophen    Suspension .. 650 milliGRAM(s) Oral every 6 hours PRN Temp greater or equal to 38C (100.4F)    LABS:                        8.7    9.18  )-----------( 321      ( 10 May 2020 05:41 )             28.4     05-10    143  |  105  |  14  ----------------------------<  97  3.7   |  27  |  0.55    Ca    8.7      10 May 2020 05:41  Phos  3.7     05-10  Mg     2.0     05-10    MICROBIOLOGY:     RADIOLOGY:  [ ] Reviewed and interpreted by me    PULMONARY FUNCTION TESTS:    EKG: CHIEF COMPLAINT: Patient is a 60y old  Female who presents with a chief complaint of acute respiratory failure (08 May 2020 12:19)    SUBJECTIVE:   No interval events overnight. TOV passed this morning. Seen by bedside during AM rounds and noted to be lethargic/ unresponsive.   [x ] Unable to assess ROS because lethargy     OBJECTIVE:  ICU Vital Signs Last 24 Hrs  T(C): 37.3 (10 May 2020 05:32), Max: 37.9 (09 May 2020 13:50)  T(F): 99.1 (10 May 2020 05:32), Max: 100.3 (09 May 2020 13:50)  HR: 82 (10 May 2020 05:32) (78 - 93)  BP: 117/65 (10 May 2020 05:32) (96/51 - 117/65)  BP(mean): --  ABP: --  ABP(mean): --  RR: 15 (10 May 2020 05:32) (15 - 15)  SpO2: 98% (10 May 2020 05:32) (98% - 100%)    Mode: AC/ CMV (Assist Control/ Continuous Mandatory Ventilation), RR (machine): 15, TV (machine): 300, FiO2: 30, PEEP: 5, MAP: 7, PIP: 18    05-09 @ 07:01  -  05-10 @ 07:00  --------------------------------------------------------  IN: 910 mL / OUT: 1000 mL / NET: -90 mL    CAPILLARY BLOOD GLUCOSE  POCT Blood Glucose.: 104 mg/dL (08 May 2020 23:05)    PHYSICAL EXAM:  General: NAD   Neck: Trach clean dry and intact.   Cards: S1/S2, no murmurs   Pulm: Coarse vent sounds BL. Failed PS trial.   Abdomen: Soft, NTND. BS (+) PEG (+).   Extremities: No pedal edema.   Neurology: Lethargic/ Unresponsive. No focal neurological deficits noted.     HOSPITAL MEDICATIONS:  MEDICATIONS  (STANDING):  chlorhexidine 0.12% Liquid 15 milliLiter(s) Oral Mucosa every 12 hours  chlorhexidine 4% Liquid 1 Application(s) Topical daily  dornase delmar Solution 2.5 milliGRAM(s) Inhalation every 12 hours  enoxaparin Injectable 30 milliGRAM(s) SubCutaneous daily  levETIRAcetam  Solution 1000 milliGRAM(s) Oral two times a day  levothyroxine 125 MICROGram(s) Oral daily  polyethylene glycol 3350 17 Gram(s) Oral daily  QUEtiapine 12.5 milliGRAM(s) Oral daily  senna 2 Tablet(s) Oral at bedtime  valproic  acid Syrup 500 milliGRAM(s) Oral two times a day    MEDICATIONS  (PRN):  acetaminophen    Suspension .. 650 milliGRAM(s) Oral every 6 hours PRN Temp greater or equal to 38C (100.4F)    LABS:                        8.7    9.18  )-----------( 321      ( 10 May 2020 05:41 )             28.4     05-10    143  |  105  |  14  ----------------------------<  97  3.7   |  27  |  0.55    Ca    8.7      10 May 2020 05:41  Phos  3.7     05-10  Mg     2.0     05-10    MICROBIOLOGY:     RADIOLOGY:  [ ] Reviewed and interpreted by me    PULMONARY FUNCTION TESTS:    EKG: [FreeTextEntry5] : See HPI.  [FreeTextEntry6] : See HPI.

## 2023-05-16 NOTE — PROGRESS NOTE ADULT - ATTENDING COMMENTS
Down syndrome  Resp failure due to covid19 pneumonitis, failed extubated x 2, s/p trach/PEG 5/7  Unable to wean, not tolerating PS   Vent facility has accepted her, dispo underway. Confirmed negative for COVD19 5/19 Clindamycin Counseling: I counseled the patient regarding use of clindamycin as an antibiotic for prophylactic and/or therapeutic purposes. Clindamycin is active against numerous classes of bacteria, including skin bacteria. Side effects may include nausea, diarrhea, gastrointestinal upset, rash, hives, yeast infections, and in rare cases, colitis.

## 2023-10-03 NOTE — ED ADULT NURSE NOTE - PRO INTERPRETER NEED 2
Medical Nutrition Therapy (MNT) Assessment     Pt. Name: Rosalio Cardona   Date:4/5/2023  F/U Appt: Sx Type 6 week rygb       Ht 5' 3\" (1.6 m)   Wt (!) 333 lb (151 kg)   BMI 58.99 kg/m²  Height: 5' 3\" (1.6 m) Weight: (!) 333 lb (151 kg)   IBW:ideal body weight   147 lbs % EBWL: 13%     Wt Readings from Last 3 Encounters:   04/05/23 (!) 333 lb (151 kg)   04/05/23 (!) 333 lb (151 kg)   04/01/23 (!) 343 lb (155.6 kg)                     Protein supplements:   Daily Protein needs (based on 1.0 gram per kg of IBW)  67-77 grams   Pt not meeting needs - getting 35-45 grams daily due to recent illness and dehydration. Pt had hospital visit (not admitted) over this past weekend and received IV infusion. This is the second IV infusion. Subjective:                    MNT ADVANCE TO:     Bariatric full liquid diet with MVI, Calcium & Protein Supplements          [facility] Bowel Protocol:  yes, pt now down to a couple times daily, was having it 20-30 times on weekend - Diarrhea  No - Constipation  How much plain water are you drinking daily pt getting 50-55 oz now. Much improved. How many meals a day 3-4 / Portions Sizes of Meals are 3 oz   Consuming soups and italian ice (SF) and water and popsicles - just what is tolerated          Labs: none new for this appt     Supplements: pt holding off for now - due to current illness    Estimated Daily Nutritional Needs: Based on Bariatric procedure for Wt.  Loss  Energy: Will be calculated at Maintenance Stage    Protein: 60 - 80 gms Daily    MNT Plan and Additional Education: Full Liquids advance as trina to soft    MEDICAL NUTRITION THERAPY (MNT) - Nutrition Care Plan   (The patient has been educated and given written education material that reinforces the following dietary guidelines for Bariatric Surgery)  Goal:  Patient able to verbalize the following dietary concepts:  3 to 4 ounce portions per meal     6 small meals daily      60 to 80 grams of protein   48 to 64 ounces
English
none

## 2023-11-02 NOTE — PROGRESS NOTE ADULT - PROBLEM/PLAN-2
Warren,  Test for chlamydia and gonorrhea were normal on the urine.    Please contact us if you have any questions.      Dr. Garay
DISPLAY PLAN FREE TEXT

## 2024-11-18 NOTE — PROCEDURE NOTE - NSTOLERANCE_GEN_A_CORE
Patient tolerated procedure well.
normal balance

## 2024-12-05 NOTE — ED PROVIDER NOTE - DISPOSITION TYPE
Initial CTA head and neck noted 1.7 cm spiculated nodule in GOMEZ suspicious for malignancy  Follow-up CT chest noted 11 mm opacity in GOMEZ which probably represents infectious/inflammatory etiology, recommend follow-up CT chest in approximately 3 months to ensure not concerning for malignancy, repeat imaging ordered for discharge   DISCHARGE